# Patient Record
Sex: MALE | Race: WHITE | NOT HISPANIC OR LATINO | URBAN - METROPOLITAN AREA
[De-identification: names, ages, dates, MRNs, and addresses within clinical notes are randomized per-mention and may not be internally consistent; named-entity substitution may affect disease eponyms.]

---

## 2019-10-07 ENCOUNTER — EMERGENCY (EMERGENCY)
Facility: HOSPITAL | Age: 59
LOS: 1 days | Discharge: ANOTHER HOSPITAL | End: 2019-10-07
Attending: EMERGENCY MEDICINE | Admitting: PERSONAL EMERGENCY RESPONSE ATTENDANT
Payer: COMMERCIAL

## 2019-10-07 VITALS
OXYGEN SATURATION: 100 % | HEART RATE: 91 BPM | RESPIRATION RATE: 18 BRPM | SYSTOLIC BLOOD PRESSURE: 126 MMHG | DIASTOLIC BLOOD PRESSURE: 88 MMHG | TEMPERATURE: 98 F

## 2019-10-07 PROCEDURE — 99284 EMERGENCY DEPT VISIT MOD MDM: CPT

## 2019-10-07 PROCEDURE — 71046 X-RAY EXAM CHEST 2 VIEWS: CPT | Mod: 26
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
## 2019-10-07 NOTE — ED ADULT TRIAGE NOTE - CHIEF COMPLAINT QUOTE
Pt sent in from MD office for low hemoglobin and worsening SOB over the past several weeks,  pt was told his most recent Hgb was 7.2. Pt on 4L NC breathing even and unlabored but does experience SEVERINO, pt denies any present pain, no headache/dizziness, abd soft, and distended.

## 2019-10-07 NOTE — ED ADULT NURSE NOTE - NSIMPLEMENTINTERV_GEN_ALL_ED
Implemented All Universal Safety Interventions:  [place] to call system. Call bell, personal items and telephone within reach. Instruct patient to call for assistance. Room bathroom lighting operational. Non-slip footwear when patient is off stretcher. Physically safe environment: no spills, clutter or unnecessary equipment. Stretcher in lowest position, wheels locked, appropriate side rails in place.

## 2019-10-07 NOTE — ED ADULT NURSE NOTE - OBJECTIVE STATEMENT
Received pt in room 21. Pt A&Ox3, ambulatory. Pt sent in from MD office for low hemoglobin and worsening SOB over the past several weeks,  pt was told his most recent Hgb was 7.2. Pt on 4L NC breathing even and unlabored but does experience SEVERINO, sating 97%. Pt appears uncomfortable but in no apparent distress. Pt afebrile rectally. vitals as noted.  pt denies any present pain, no headache/dizziness, abd soft, and distended. Pt stable, family at bedside. awaiting further plan Received pt in room 21. Pt A&Ox3, ambulatory. Pt sent in from MD office for low hemoglobin and worsening SOB over the past several weeks,  pt was told his most recent Hgb was 7.2. Pt w hx. Pt also c/o cough. Pt on 4L NC breathing even and unlabored but does experience SEVERINO, sating 97%. Pt appears uncomfortable but in no apparent distress. Pt afebrile rectally. vitals as noted.  pt denies any present pain, no headache/dizziness, abd soft, and distended. Pt stable, family at bedside. awaiting further plan Received pt in room 21. Pt A&Ox3, ambulatory. Pt sent in from MD office for low hemoglobin and worsening SOB over the past several weeks,  pt was told his most recent Hgb was 7.2. Pt w hx. Pt also c/o cough. Pt on 4L NC breathing even and unlabored but does experience SEVERINO, sating 97%. Pt appears uncomfortable but in no apparent distress. Pt afebrile rectally. vitals as noted.  pt denies any present pain, no headache/dizziness, abd soft, and distended. 20 gauge IV placed in the left AC, labs sent. Flu swab sent. Pt stable, family at bedside. awaiting further plan Received pt in room 21. Pt A&Ox3, ambulatory. Pt sent in from MD office for low hemoglobin and worsening SOB over the past several weeks,  pt was told his most recent Hgb was 7.2. Pt w hx. Pt also c/o cough. Pt on 4L NC breathing even and unlabored but does experience SEVERINO, sating 97%. Pt appears uncomfortable but in no apparent distress. Pt afebrile rectally. vitals as noted.  pt denies any present pain, no headache/dizziness, abd soft, and distended. 20 gauge IV placed in the left forearm, labs sent. Flu swab sent. Pt stable, family at bedside. awaiting further plan Received pt in room 21. Pt A&Ox3, ambulatory. Pt sent in from MD office for low hemoglobin and worsening SOB over the past several weeks,  pt was told his most recent Hgb was 7.2. Pt w hx anemia, CHF. Pt also c/o cough. Pt on 4L NC breathing even and unlabored but does experience SEVERINO, sating 97%. Pt appears uncomfortable but in no apparent distress. Pt afebrile rectally. vitals as noted.  pt denies any present pain, no headache/dizziness, abd soft, and distended. 20 gauge IV placed in the left forearm, labs sent. Flu swab sent. Pt stable, family at bedside. awaiting further plan

## 2019-10-08 ENCOUNTER — INPATIENT (INPATIENT)
Facility: HOSPITAL | Age: 59
LOS: 8 days | Discharge: HOME | DRG: 199 | End: 2019-10-17
Attending: STUDENT IN AN ORGANIZED HEALTH CARE EDUCATION/TRAINING PROGRAM | Admitting: SURGERY
Payer: COMMERCIAL

## 2019-10-08 ENCOUNTER — RESULT REVIEW (OUTPATIENT)
Age: 59
End: 2019-10-08

## 2019-10-08 VITALS
HEART RATE: 95 BPM | DIASTOLIC BLOOD PRESSURE: 59 MMHG | RESPIRATION RATE: 19 BRPM | OXYGEN SATURATION: 98 % | SYSTOLIC BLOOD PRESSURE: 145 MMHG | TEMPERATURE: 98 F

## 2019-10-08 VITALS
TEMPERATURE: 98 F | DIASTOLIC BLOOD PRESSURE: 74 MMHG | WEIGHT: 315 LBS | OXYGEN SATURATION: 95 % | HEIGHT: 70 IN | RESPIRATION RATE: 16 BRPM | HEART RATE: 95 BPM | SYSTOLIC BLOOD PRESSURE: 132 MMHG

## 2019-10-08 DIAGNOSIS — D61.818 OTHER PANCYTOPENIA: ICD-10-CM

## 2019-10-08 PROBLEM — Z00.00 ENCOUNTER FOR PREVENTIVE HEALTH EXAMINATION: Status: ACTIVE | Noted: 2019-10-08

## 2019-10-08 LAB
ALBUMIN FLD-MCNC: 1.8 G/DL — SIGNIFICANT CHANGE UP
ALBUMIN SERPL ELPH-MCNC: 2.8 G/DL — LOW (ref 3.3–5)
ALBUMIN SERPL ELPH-MCNC: 3 G/DL — LOW (ref 3.3–5)
ALP SERPL-CCNC: 135 U/L — HIGH (ref 40–120)
ALP SERPL-CCNC: 143 U/L — HIGH (ref 40–120)
ALT FLD-CCNC: 19 U/L — SIGNIFICANT CHANGE UP (ref 10–45)
ALT FLD-CCNC: 22 U/L — SIGNIFICANT CHANGE UP (ref 4–41)
AMMONIA BLD-MCNC: 94 UMOL/L — HIGH (ref 11–55)
ANION GAP SERPL CALC-SCNC: 11 MMO/L — SIGNIFICANT CHANGE UP (ref 7–14)
ANION GAP SERPL CALC-SCNC: 7 MMOL/L — SIGNIFICANT CHANGE UP (ref 5–17)
ANISOCYTOSIS BLD QL: SIGNIFICANT CHANGE UP
APTT BLD: 29.4 SEC — SIGNIFICANT CHANGE UP (ref 27.5–36.3)
APTT BLD: 38.1 SEC — HIGH (ref 27.5–36.3)
AST SERPL-CCNC: 34 U/L — SIGNIFICANT CHANGE UP (ref 10–40)
AST SERPL-CCNC: 45 U/L — HIGH (ref 4–40)
B PERT DNA SPEC QL NAA+PROBE: NOT DETECTED — SIGNIFICANT CHANGE UP
B PERT IGG+IGM PNL SER: ABNORMAL
BASE EXCESS BLDV CALC-SCNC: 8 MMOL/L — HIGH (ref -2–2)
BASOPHILS # BLD AUTO: 0 K/UL — SIGNIFICANT CHANGE UP (ref 0–0.2)
BASOPHILS # BLD AUTO: 0.03 K/UL — SIGNIFICANT CHANGE UP (ref 0–0.2)
BASOPHILS NFR BLD AUTO: 0 % — SIGNIFICANT CHANGE UP (ref 0–2)
BASOPHILS NFR BLD AUTO: 0.9 % — SIGNIFICANT CHANGE UP (ref 0–2)
BILIRUB DIRECT SERPL-MCNC: 0.7 MG/DL — HIGH (ref 0–0.2)
BILIRUB INDIRECT FLD-MCNC: 1.2 MG/DL — HIGH (ref 0.2–1)
BILIRUB SERPL-MCNC: 1.6 MG/DL — HIGH (ref 0.2–1.2)
BILIRUB SERPL-MCNC: 1.9 MG/DL — HIGH (ref 0.2–1.2)
BILIRUB SERPL-MCNC: 1.9 MG/DL — HIGH (ref 0.2–1.2)
BLASTS # FLD: 0.9 % — HIGH (ref 0–0)
BLD GP AB SCN SERPL QL: NEGATIVE — SIGNIFICANT CHANGE UP
BLD GP AB SCN SERPL QL: NEGATIVE — SIGNIFICANT CHANGE UP
BUN SERPL-MCNC: 11 MG/DL — SIGNIFICANT CHANGE UP (ref 7–23)
BUN SERPL-MCNC: 11 MG/DL — SIGNIFICANT CHANGE UP (ref 7–23)
C PNEUM DNA SPEC QL NAA+PROBE: NOT DETECTED — SIGNIFICANT CHANGE UP
CA-I SERPL-SCNC: 1.22 MMOL/L — SIGNIFICANT CHANGE UP (ref 1.12–1.3)
CALCIUM SERPL-MCNC: 8.4 MG/DL — SIGNIFICANT CHANGE UP (ref 8.4–10.5)
CALCIUM SERPL-MCNC: 8.6 MG/DL — SIGNIFICANT CHANGE UP (ref 8.4–10.5)
CHLORIDE BLDV-SCNC: 101 MMOL/L — SIGNIFICANT CHANGE UP (ref 96–108)
CHLORIDE SERPL-SCNC: 102 MMOL/L — SIGNIFICANT CHANGE UP (ref 96–108)
CHLORIDE SERPL-SCNC: 102 MMOL/L — SIGNIFICANT CHANGE UP (ref 98–107)
CK MB BLD-MCNC: 3.2 % — SIGNIFICANT CHANGE UP (ref 0–3.5)
CK MB CFR SERPL CALC: 1.6 NG/ML — SIGNIFICANT CHANGE UP (ref 0–6.7)
CK SERPL-CCNC: 50 U/L — SIGNIFICANT CHANGE UP (ref 30–200)
CO2 BLDV-SCNC: 37 MMOL/L — HIGH (ref 22–30)
CO2 SERPL-SCNC: 26 MMOL/L — SIGNIFICANT CHANGE UP (ref 22–31)
CO2 SERPL-SCNC: 32 MMOL/L — HIGH (ref 22–31)
COLOR FLD: SIGNIFICANT CHANGE UP
CREAT SERPL-MCNC: 0.96 MG/DL — SIGNIFICANT CHANGE UP (ref 0.5–1.3)
CREAT SERPL-MCNC: 1.05 MG/DL — SIGNIFICANT CHANGE UP (ref 0.5–1.3)
D DIMER BLD IA.RAPID-MCNC: 1947 NG/ML — SIGNIFICANT CHANGE UP
ELLIPTOCYTES BLD QL SMEAR: SLIGHT — SIGNIFICANT CHANGE UP
EOSINOPHIL # BLD AUTO: 0.09 K/UL — SIGNIFICANT CHANGE UP (ref 0–0.5)
EOSINOPHIL # BLD AUTO: 0.25 K/UL — SIGNIFICANT CHANGE UP (ref 0–0.5)
EOSINOPHIL # FLD: 29 % — SIGNIFICANT CHANGE UP
EOSINOPHIL NFR BLD AUTO: 4.4 % — SIGNIFICANT CHANGE UP (ref 0–6)
EOSINOPHIL NFR BLD AUTO: 7.6 % — HIGH (ref 0–6)
FLUAV H1 2009 PAND RNA SPEC QL NAA+PROBE: NOT DETECTED — SIGNIFICANT CHANGE UP
FLUAV H1 RNA SPEC QL NAA+PROBE: NOT DETECTED — SIGNIFICANT CHANGE UP
FLUAV H3 RNA SPEC QL NAA+PROBE: NOT DETECTED — SIGNIFICANT CHANGE UP
FLUAV SUBTYP SPEC NAA+PROBE: NOT DETECTED — SIGNIFICANT CHANGE UP
FLUBV RNA SPEC QL NAA+PROBE: NOT DETECTED — SIGNIFICANT CHANGE UP
FLUID INTAKE SUBSTANCE CLASS: SIGNIFICANT CHANGE UP
FLUID SEGMENTED GRANULOCYTES: 3 % — SIGNIFICANT CHANGE UP
GAS PNL BLDV: 139 MMOL/L — SIGNIFICANT CHANGE UP (ref 135–145)
GAS PNL BLDV: SIGNIFICANT CHANGE UP
GAS PNL BLDV: SIGNIFICANT CHANGE UP
GIANT PLATELETS BLD QL SMEAR: PRESENT — SIGNIFICANT CHANGE UP
GLUCOSE BLDV-MCNC: 97 MG/DL — SIGNIFICANT CHANGE UP (ref 70–99)
GLUCOSE FLD-MCNC: 117 MG/DL — SIGNIFICANT CHANGE UP
GLUCOSE SERPL-MCNC: 105 MG/DL — HIGH (ref 70–99)
GLUCOSE SERPL-MCNC: 129 MG/DL — HIGH (ref 70–99)
GRAM STN FLD: SIGNIFICANT CHANGE UP
HADV DNA SPEC QL NAA+PROBE: NOT DETECTED — SIGNIFICANT CHANGE UP
HAPTOGLOB SERPL-MCNC: <20 MG/DL — LOW (ref 34–200)
HCO3 BLDV-SCNC: 35 MMOL/L — HIGH (ref 21–29)
HCOV PNL SPEC NAA+PROBE: SIGNIFICANT CHANGE UP
HCT VFR BLD CALC: 25.7 % — LOW (ref 39–50)
HCT VFR BLD CALC: 30 % — LOW (ref 39–50)
HCT VFR BLD CALC: SIGNIFICANT CHANGE UP (ref 39–50)
HCT VFR BLDA CALC: 24 % — LOW (ref 39–50)
HGB BLD CALC-MCNC: 7.6 G/DL — LOW (ref 13–17)
HGB BLD-MCNC: 7 G/DL — CRITICAL LOW (ref 13–17)
HGB BLD-MCNC: 7.6 G/DL — LOW (ref 13–17)
HGB BLD-MCNC: 7.7 G/DL — LOW (ref 13–17)
HMPV RNA SPEC QL NAA+PROBE: NOT DETECTED — SIGNIFICANT CHANGE UP
HPIV1 RNA SPEC QL NAA+PROBE: NOT DETECTED — SIGNIFICANT CHANGE UP
HPIV2 RNA SPEC QL NAA+PROBE: NOT DETECTED — SIGNIFICANT CHANGE UP
HPIV3 RNA SPEC QL NAA+PROBE: NOT DETECTED — SIGNIFICANT CHANGE UP
HPIV4 RNA SPEC QL NAA+PROBE: NOT DETECTED — SIGNIFICANT CHANGE UP
HYPOCHROMIA BLD QL: SLIGHT — SIGNIFICANT CHANGE UP
IMM GRANULOCYTES NFR BLD AUTO: 0.3 % — SIGNIFICANT CHANGE UP (ref 0–1.5)
INR BLD: 1.8 — HIGH (ref 0.88–1.17)
INR BLD: 1.91 RATIO — HIGH (ref 0.88–1.16)
LACTATE BLDV-MCNC: 1.4 MMOL/L — SIGNIFICANT CHANGE UP (ref 0.7–2)
LDH SERPL L TO P-CCNC: 236 U/L — SIGNIFICANT CHANGE UP (ref 50–242)
LDH SERPL L TO P-CCNC: 282 U/L — SIGNIFICANT CHANGE UP
LIDOCAIN IGE QN: 62 U/L — HIGH (ref 7–60)
LYMPHOCYTES # BLD AUTO: 0.38 K/UL — LOW (ref 1–3.3)
LYMPHOCYTES # BLD AUTO: 0.91 K/UL — LOW (ref 1–3.3)
LYMPHOCYTES # BLD AUTO: 19.3 % — SIGNIFICANT CHANGE UP (ref 13–44)
LYMPHOCYTES # BLD AUTO: 27.5 % — SIGNIFICANT CHANGE UP (ref 13–44)
LYMPHOCYTES # FLD: 40 % — SIGNIFICANT CHANGE UP
MANUAL SMEAR VERIFICATION: SIGNIFICANT CHANGE UP
MCHC RBC-ENTMCNC: 21.6 PG — LOW (ref 27–34)
MCHC RBC-ENTMCNC: 22.2 PG — LOW (ref 27–34)
MCHC RBC-ENTMCNC: 25.7 % — LOW (ref 32–36)
MCHC RBC-ENTMCNC: 27.2 GM/DL — LOW (ref 32–36)
MCHC RBC-ENTMCNC: SIGNIFICANT CHANGE UP (ref 27–34)
MCHC RBC-ENTMCNC: SIGNIFICANT CHANGE UP (ref 32–36)
MCV RBC AUTO: 81.6 FL — SIGNIFICANT CHANGE UP (ref 80–100)
MCV RBC AUTO: 84 FL — SIGNIFICANT CHANGE UP (ref 80–100)
MCV RBC AUTO: SIGNIFICANT CHANGE UP (ref 80–100)
MESOTHL CELL # FLD: 1 % — SIGNIFICANT CHANGE UP
MICROCYTES BLD QL: SIGNIFICANT CHANGE UP
MONOCYTES # BLD AUTO: 0.03 K/UL — SIGNIFICANT CHANGE UP (ref 0–0.9)
MONOCYTES # BLD AUTO: 0.31 K/UL — SIGNIFICANT CHANGE UP (ref 0–0.9)
MONOCYTES NFR BLD AUTO: 1.7 % — LOW (ref 2–14)
MONOCYTES NFR BLD AUTO: 9.4 % — SIGNIFICANT CHANGE UP (ref 2–14)
MONOS+MACROS # FLD: 27 % — SIGNIFICANT CHANGE UP
MYELOCYTES NFR BLD: 0.9 % — HIGH (ref 0–0)
NEUTROPHILS # BLD AUTO: 1.45 K/UL — LOW (ref 1.8–7.4)
NEUTROPHILS # BLD AUTO: 1.8 K/UL — SIGNIFICANT CHANGE UP (ref 1.8–7.4)
NEUTROPHILS NFR BLD AUTO: 54.3 % — SIGNIFICANT CHANGE UP (ref 43–77)
NEUTROPHILS NFR BLD AUTO: 72.8 % — SIGNIFICANT CHANGE UP (ref 43–77)
NRBC # BLD: 0 /100 WBCS — SIGNIFICANT CHANGE UP (ref 0–0)
NRBC # BLD: 1 /100 — HIGH (ref 0–0)
NRBC # FLD: 0.02 K/UL — SIGNIFICANT CHANGE UP (ref 0–0)
OSMOLALITY SERPL: 303 MOSMOL/KG — HIGH (ref 275–300)
PCO2 BLDV: 69 MMHG — HIGH (ref 35–50)
PH BLDV: 7.32 — LOW (ref 7.35–7.45)
PH FLD: 7.51 — SIGNIFICANT CHANGE UP
PLAT MORPH BLD: ABNORMAL
PLATELET # BLD AUTO: 102 K/UL — LOW (ref 150–400)
PLATELET # BLD AUTO: 141 K/UL — LOW (ref 150–400)
PLATELET # BLD AUTO: 95 K/UL — LOW (ref 150–400)
PMV BLD: 10.8 FL — SIGNIFICANT CHANGE UP (ref 7–13)
PO2 BLDV: 37 MMHG — SIGNIFICANT CHANGE UP (ref 25–45)
POIKILOCYTOSIS BLD QL AUTO: SIGNIFICANT CHANGE UP
POLYCHROMASIA BLD QL SMEAR: SLIGHT — SIGNIFICANT CHANGE UP
POTASSIUM BLDV-SCNC: 4.4 MMOL/L — SIGNIFICANT CHANGE UP (ref 3.5–5.3)
POTASSIUM SERPL-MCNC: 4.3 MMOL/L — SIGNIFICANT CHANGE UP (ref 3.5–5.3)
POTASSIUM SERPL-MCNC: 4.4 MMOL/L — SIGNIFICANT CHANGE UP (ref 3.5–5.3)
POTASSIUM SERPL-SCNC: 4.3 MMOL/L — SIGNIFICANT CHANGE UP (ref 3.5–5.3)
POTASSIUM SERPL-SCNC: 4.4 MMOL/L — SIGNIFICANT CHANGE UP (ref 3.5–5.3)
PROT FLD-MCNC: 3.8 G/DL — SIGNIFICANT CHANGE UP
PROT SERPL-MCNC: 7 G/DL — SIGNIFICANT CHANGE UP (ref 6–8.3)
PROT SERPL-MCNC: 7.7 G/DL — SIGNIFICANT CHANGE UP (ref 6–8.3)
PROTHROM AB SERPL-ACNC: 20.3 SEC — HIGH (ref 9.8–13.1)
PROTHROM AB SERPL-ACNC: 22.4 SEC — HIGH (ref 10–12.9)
RBC # BLD: 3.15 M/UL — LOW (ref 4.2–5.8)
RBC # BLD: 3.44 M/UL — LOW (ref 4.2–5.8)
RBC # BLD: 3.57 M/UL — LOW (ref 4.2–5.8)
RBC # FLD: 19.8 % — HIGH (ref 10.3–14.5)
RBC # FLD: 19.8 % — HIGH (ref 10.3–14.5)
RBC # FLD: SIGNIFICANT CHANGE UP (ref 10.3–14.5)
RBC BLD AUTO: ABNORMAL
RCV VOL RI: HIGH /UL (ref 0–5)
RH IG SCN BLD-IMP: NEGATIVE — SIGNIFICANT CHANGE UP
RH IG SCN BLD-IMP: NEGATIVE — SIGNIFICANT CHANGE UP
RSV RNA SPEC QL NAA+PROBE: NOT DETECTED — SIGNIFICANT CHANGE UP
RV+EV RNA SPEC QL NAA+PROBE: NOT DETECTED — SIGNIFICANT CHANGE UP
SAO2 % BLDV: 56 % — LOW (ref 67–88)
SODIUM SERPL-SCNC: 139 MMOL/L — SIGNIFICANT CHANGE UP (ref 135–145)
SODIUM SERPL-SCNC: 141 MMOL/L — SIGNIFICANT CHANGE UP (ref 135–145)
SPECIMEN SOURCE FLD: SIGNIFICANT CHANGE UP
SPECIMEN SOURCE: SIGNIFICANT CHANGE UP
TOTAL NUCLEATED CELL COUNT, BODY FLUID: 540 /UL — HIGH (ref 0–5)
TROPONIN T, HIGH SENSITIVITY RESULT: 17 NG/L — SIGNIFICANT CHANGE UP (ref 0–51)
TROPONIN T, HIGH SENSITIVITY: 15 NG/L — SIGNIFICANT CHANGE UP (ref ?–14)
TUBE TYPE: SIGNIFICANT CHANGE UP
WBC # BLD: 1.99 K/UL — LOW (ref 3.8–10.5)
WBC # BLD: 2.56 K/UL — LOW (ref 3.8–10.5)
WBC # BLD: 3.31 K/UL — LOW (ref 3.8–10.5)
WBC # FLD AUTO: 1.99 K/UL — LOW (ref 3.8–10.5)
WBC # FLD AUTO: 2.56 K/UL — LOW (ref 3.8–10.5)
WBC # FLD AUTO: 3.31 K/UL — LOW (ref 3.8–10.5)

## 2019-10-08 PROCEDURE — 74177 CT ABD & PELVIS W/CONTRAST: CPT | Mod: 26

## 2019-10-08 PROCEDURE — 88112 CYTOPATH CELL ENHANCE TECH: CPT | Mod: 26

## 2019-10-08 PROCEDURE — 71275 CT ANGIOGRAPHY CHEST: CPT | Mod: 26

## 2019-10-08 PROCEDURE — 70450 CT HEAD/BRAIN W/O DYE: CPT | Mod: 26

## 2019-10-08 PROCEDURE — 99221 1ST HOSP IP/OBS SF/LOW 40: CPT

## 2019-10-08 PROCEDURE — 72125 CT NECK SPINE W/O DYE: CPT | Mod: 26

## 2019-10-08 PROCEDURE — 32557 INSERT CATH PLEURA W/ IMAGE: CPT | Mod: GC

## 2019-10-08 PROCEDURE — 99284 EMERGENCY DEPT VISIT MOD MDM: CPT

## 2019-10-08 PROCEDURE — 71045 X-RAY EXAM CHEST 1 VIEW: CPT | Mod: 26

## 2019-10-08 PROCEDURE — 88305 TISSUE EXAM BY PATHOLOGIST: CPT | Mod: 26

## 2019-10-08 PROCEDURE — 99254 IP/OBS CNSLTJ NEW/EST MOD 60: CPT | Mod: 57

## 2019-10-08 RX ORDER — KETOROLAC TROMETHAMINE 30 MG/ML
15 SYRINGE (ML) INJECTION ONCE
Refills: 0 | Status: DISCONTINUED | OUTPATIENT
Start: 2019-10-08 | End: 2019-10-08

## 2019-10-08 RX ORDER — OXYCODONE HYDROCHLORIDE 5 MG/1
5 TABLET ORAL ONCE
Refills: 0 | Status: DISCONTINUED | OUTPATIENT
Start: 2019-10-08 | End: 2019-10-09

## 2019-10-08 RX ORDER — ACETAMINOPHEN 500 MG
650 TABLET ORAL EVERY 6 HOURS
Refills: 0 | Status: DISCONTINUED | OUTPATIENT
Start: 2019-10-08 | End: 2019-10-17

## 2019-10-08 RX ORDER — INFLUENZA VIRUS VACCINE 15; 15; 15; 15 UG/.5ML; UG/.5ML; UG/.5ML; UG/.5ML
0.5 SUSPENSION INTRAMUSCULAR ONCE
Refills: 0 | Status: COMPLETED | OUTPATIENT
Start: 2019-10-08 | End: 2019-10-08

## 2019-10-08 RX ORDER — ENOXAPARIN SODIUM 100 MG/ML
40 INJECTION SUBCUTANEOUS DAILY
Refills: 0 | Status: DISCONTINUED | OUTPATIENT
Start: 2019-10-08 | End: 2019-10-17

## 2019-10-08 RX ORDER — OXYCODONE HYDROCHLORIDE 5 MG/1
10 TABLET ORAL ONCE
Refills: 0 | Status: DISCONTINUED | OUTPATIENT
Start: 2019-10-08 | End: 2019-10-08

## 2019-10-08 RX ADMIN — ENOXAPARIN SODIUM 40 MILLIGRAM(S): 100 INJECTION SUBCUTANEOUS at 22:35

## 2019-10-08 RX ADMIN — OXYCODONE HYDROCHLORIDE 10 MILLIGRAM(S): 5 TABLET ORAL at 17:13

## 2019-10-08 NOTE — ED PROVIDER NOTE - ATTENDING CONTRIBUTION TO CARE
Attending MD Yusra Griggs:  I personally have seen and examined this patient.  Resident note reviewed and agree on plan of care and except where noted.  See HPI, PE, and MDM for details.
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
## 2019-10-08 NOTE — ED PROVIDER NOTE - SKIN, MLM
Skin normal color for race, warm, dry and intact. Skin normal color for race, warm, dry and intact. + Caput Medusae

## 2019-10-08 NOTE — ED ADULT NURSE REASSESSMENT NOTE - NS ED NURSE REASSESS COMMENT FT1
Chest tube placed to right chest wall by Dr. Bonilla and Dr. Holman. Sterile procedure used and time out was performed prior to procedure. Pt tolerated well.

## 2019-10-08 NOTE — PATIENT PROFILE ADULT - BRADEN MOBILITY
Congestion and rhinitis typically in the spring, not currently taking any medication  No facial pain, cough   (3) slightly limited
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
## 2019-10-08 NOTE — ED PROVIDER NOTE - CARDIAC, MLM
Normal rate, regular rhythm.  Heart sounds S1, S2.  No murmurs, rubs or gallops. Normal rate, regular rhythm.  Heart sounds S1, S2.  No murmurs, rubs or gallops. +R chest wall TTP.

## 2019-10-08 NOTE — ED ADULT NURSE REASSESSMENT NOTE - NS ED NURSE REASSESS COMMENT FT1
report given to LIGIA Leone [facility] transfer center. pt currently receiving CT scan. pending EMS. will cont to monitor.

## 2019-10-08 NOTE — H&P ADULT - ASSESSMENT
ASSESSMENT:   Patient is a 59y old m with large right pleural effusion and right ribs 6-11 fractures    PLAN:    - Admit to trauma  - Thoracic surgery consult for drainage of effusion  - Pain control  - Plan discussed with Attending, Dr. Imelda Howe, PGY 2  [phone]

## 2019-10-08 NOTE — ED PROVIDER NOTE - ATTENDING CONTRIBUTION TO CARE
60yo w/ HLD, opioid abuse pw abnml labs from PCP. Hx of iron deficiency anemia previously on iron infusions found to be anemic in office. Pt also complains of some shortness of breath worse over past few days.   GEN - ill appearing; A+O x3   HEAD - NC/AT     EYES - EOMI,  +  conjunctival pallor, no scleral icterus  ENT -   mucous membranes  moist , no discharge      NECK - Neck supple  PULM - diminished bilaterally, mild resp distress, on 4L o2   COR -  RRR, S1 S2, no murmurs  ABD - , ND, NT, soft, no guarding, no rebound, no masses    BACK - no CVA tenderness, nontender spine     EXTREMS - no edema, no deformity, warm and well perfused    SKIN - no rash or bruising      NEUROLOGIC - alert, sensation nl, motor 5/5 RUE/LUE/RLE/LLE   a/p with SOB w/ known anemia however unclear if anemia pure source of SOB, will CTA chest r/o PE vs other intrapulmonary pathology, plan to admit.
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
## 2019-10-08 NOTE — CONSULT NOTE ADULT - ASSESSMENT
ASSESSMENT:   Patient is a 59y old m with large right pleural effusion and right ribs 6-11 fractures    PLAN:    - Pigtail chest tube for pleural effusion  - Will send fluid for culture and cytology  - Plan d/w thoracic surgery attending    Estrada Howe, PGY 2  [phone]

## 2019-10-08 NOTE — ED PROVIDER NOTE - CONSTITUTIONAL, MLM
normal... Obese, awake, alert, oriented to person, place, time/situation and appears mildly uncomfortable.

## 2019-10-08 NOTE — ED ADULT NURSE REASSESSMENT NOTE - NS ED NURSE REASSESS COMMENT FT1
report received from LIGIA HERMAN, pt aox self, place and time pt unsure of situation. pt repeating himself, noted to have bizarre affect. c/o feeling weak. on , NSR. offers no new complaints at this time, pending dispo, will cont to monitor.

## 2019-10-08 NOTE — ED PROVIDER NOTE - PHYSICAL EXAMINATION
Attending Yusra Griggs: Gen: NAD, heent: atrauamtic, eomi, perrla, mmm, op pink, uvula midline, neck; nttp, no nuchal rigidity, chest: nttp, no crepitus, cv: rrr, no murmurs, lungs: decreased breath sounds on right, abd: soft, ttp RUQ, no peritoneal signs, +BS, no guarding, ext: wwp, neg homans, mild le edemaskin: no rash, neuro: awake and alert, following commands, speech clear, sensation and strength intact, no focal deficits
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
## 2019-10-08 NOTE — ED PROVIDER NOTE - CLINICAL SUMMARY MEDICAL DECISION MAKING FREE TEXT BOX
60 yo M, hx CHF, presenting as transfer to [facility] d/t acute rib fractures with no known trauma. Initially presented to [facility] d/t anemia, found at [facility] to have R 6th to 11th rib fractures, as well as elevated ammonia, splenomegaly, and trace ascites. Patient endorsing pain over the fractures, and SOB, with atelectasis seen on CT, so transferred to [facility] for trauma evaluation. Will obtain pre-surgical labs, ekg, incentive spirometry, and trauma surgery evaluation. Reassess. 58 yo M, hx CHF, presenting as transfer to [facility] d/t acute rib fractures with no known trauma. Initially presented to [facility] d/t anemia, found at [facility] to have R 6th to 11th rib fractures, as well as elevated ammonia, splenomegaly, and trace ascites. Patient endorsing pain over the fractures, and SOB, with atelectasis seen on CT, so transferred to [facility] for trauma evaluation. Will obtain pre-surgical labs, ekg, incentive spirometry, and trauma surgery evaluation. Reassess.  Attending Yusra Griggs: 58 y/o  male h/o ETOH use and reported anemia transferred from outside hospital after CTA showed evidenceof multiple right sided rib fracture, a right pleural effusions, and splenomegaly. upon arrival pt in NAD. on exam pt with ttp RUQ. pocus shows evidence of stone in the neck. consider possible cholecystiis in addition to rib fractures. pt has a h/o etoh. denies any falls, however unclear if accurate history per pt. pt with pancytopenia, unclear whether underlying malignancy. may require hematology consult. surgery consulted for trauma eval and concern for ruq pain however pain could also be from rib fractures. will need admission

## 2019-10-08 NOTE — ED PROVIDER NOTE - OBJECTIVE STATEMENT
58 y/o male with pmhx of chronic cough x 2 yrs, obesity, herniated discs, HLD, insomnia, admitted for CHF (EF 52% 2017)/pneumonia/flu 2 yrs ago, presents to ED for SOB, SEVERINO, cough, anemia and hypoxia. Pt states his PMD sent him here for Hg of 7.2, hx of receiving iron transfusions 2 months ago. Pt states has dry cough that is worse. Has not required oxygen in the past. Nonsmoker. Not very active. No recent travel/surgeries/hospitalizations/calf pain/hx of dvt. No family hx of MI, no cardiac hx. No fever, chills, sore throat, abd pain, n/v, CP, palpitations, weakness, numbness, syncope. 58 y/o male with pmhx of chronic cough x 2 yrs, obesity, herniated discs, HLD, insomnia, admitted for CHF (EF 52% 2017)/"pneumonia/flu 2 yrs ago, " presents to ED for SOB, SEVERINO, worse cough, anemia and hypoxia. Pt states his PMD sent him here for Hg of 7.2, hx of receiving iron transfusions 2 months ago. Pt states has dry cough that is worse. Has not required oxygen in the past. Nonsmoker. Not very active. No recent travel/surgeries/hospitalizations/calf pain/hx of dvt. No family hx of MI, no cardiac hx. No fever, chills, sore throat, abd pain, n/v, CP, palpitations, weakness, numbness, syncope.

## 2019-10-08 NOTE — PATIENT PROFILE ADULT - NSASFALLNEEDSASSISTWITH_GEN_A_NUR
Psych Med Mgmt    Appearance: was calm and cooperative and good eye contact  Observed mood: mood appropriate  Observed mood: affect appropriate  Speech: a normal rate and fluent  Thought processes: coherent/organized  Hallucinations: no hallucinations present  Thought Content: no delusions  Abnormal Thoughts: The patient has no suicidal thoughts and no homicidal thoughts  Orientation: The patient is oriented to person, place and time  Recent and Remote Memory: short term memory intact and long term memory intact  Attention Span And Concentration: concentration intact  Insight: Insight intact  Judgment: Her judgment was intact  Muscle Strength And Tone  Muscle strength and tone were normal  Normal gait and station  Language:  Attacked and appropriate  Fund of knowledge: Patient displays adequate knowledge of current events, adequate fund of knowledge regarding past history and adequate fund of knowledge regarding vocabulary  Risks, Benefits And Possible Side Effects Of Medications: Risks, benefits, and possible side effects of medications explained to patient and patient verbalizes understanding  The patient has been filling controlled prescriptions on time as prescribed to [facility]     She reports normal appetite, normal energy level, no weight change and decrease in number of sleep hours   Hilary Book indicates that she is doing much better  Depression as 1-2/10 and anxieties 1-2/10  She says that work will make her stressed out a little bit at times and also at night when she decides to overeat and have weight gain this causes her to feel bad  She says that she's gained 6 pounds due to ice cream although generally she eats well  Energy is been good  Anxiety causes overeating and fatigue  No SI or HI  Sleep has been good  She says that she's not oversleeping like she used to  No side effects or issues with medications   Does take Klonopin perhaps twice a week when she is fellow overworked or anxious  Also she talked about Adderall how she does still feel like she needs it occasionally but generally doesn't use it very often  She would like to use immediate release rather than extended release because of the limited need  Discussed options with medications risks and benefits  Attention and memory have been relatively well controlled with the increase of Wellbutrin but she does feel that occasionally she will need that for special training such as for EPIC  Medications reviewed  No hospitalizations  No significant medical issues  No chest pain shortness of breath or other issues  Discussed her blood pressure elevation and relationship with medications and also healthy lifestyle  Also it was a mechanical device  She says that she has no issues whatsoever but will keep an eye on that occasionally and follow-up with her PCP  Vitals  Signs   Recorded: 01Sep2017 09:53AM   Heart Rate: 83  Respiration: 14  Systolic: 271  Diastolic: 90    Assessment    1  TIM (generalized anxiety disorder) (300 02) (F41 1)   2  Recurrent major depressive disorder, in remission (296 35) (F33 40)   3  Memory difficulties (780 93) (R41 3)   4  Attention and concentration deficit (799 51) (R41 840)    Generalized anxiety disorder  MDD, recurrent, in remission        Praful Siddiqi is a pleasant female who is presenting with symptoms that have shown improvement  Depression has shown improvement, anxiety has shown improvement, and attention and concentration have shown improvement  She rarely uses Adderall or Klonopin  Balanced lifestyle  No side effects of medication  Blood pressure elevated but asymptomatic  Advised of risks and relationship potentially the medication and lifestyle  Advised to continue to monitor and follow up with PCP as needed      Attention and concentration deficit is also apparently present although not sure if this is related to organic issues, anxiety, or underlying ADD/ADHD  She has a good support system in a good job  She is generally healthy aside from her neurological concerns and she does have a paternal aunt who had early onset dementia  Past medications include Prozac which helps but does have some decreased orgasm  She took Topamax for migraines but this seemed to lead to confusion and she has not been on it for years  She does not recall details of Zoloft, Celexa or Lexapro  Effexor seemed to cause weight gain and she did not like it for that  Wellbutrin in past  Ambien caused oversedation, melatonin did not help  Plan    1  Amphetamine-Dextroamphet ER 5 MG Oral Capsule Extended Release 24 Hour    2  ClonazePAM 0 5 MG Oral Tablet; Take 1/2 to 1 tab daily as needed   3  FLUoxetine HCl - 40 MG Oral Capsule; 1 CAPSULE DAILY    4  From  Amphetamine-Dextroamphetamine 5 MG Oral Tablet TAKE 1-2 tabs   daily as needed To Amphetamine-Dextroamphetamine 5 MG Oral Tablet (Adderall) TAKE   1 TO 2 TABLETS daily for attention difficulties    5  BuPROPion HCl ER (XL) 300 MG Oral Tablet Extended Release 24 Hour; take 1   tablet by mouth every morning        1) Meds:   - Prozac 40mg daily  PARQ discussed (consider increase but orgasm affected  Was on 50mg in past per charts)   - Wellbutrin XL 300mg daily   - Klonopin 0 25mg in day PRN and 0 5mg HS PRN  (Pt takes ~2x/wk)   - CHANGE FROM Adderal XR 20mg Daily TO Adderal 5mg 1-2 tabs daily as needed    2) Labs: PRN   -2/2017: CBC, CMP WNL  TSH 1 57, TG 56, HDL 98,     3) Therapy:   - not in therapy, revisit PRN    4) Medical:  Concentration issues, MRI abnormalities, family h/o early onset dementia ([place]), migraines (gets Botox)   - pt to f/u with neurologist   - pt to f/u with other providers PRN    5) Other: support prn   - works at [address] [place] [address]   Reception   - good support from family, daughter lives with her   - 3 kids,     6) Follow up:   - 3 months, but patient to call if issues or concerns    7) Treatment Plan: Needed      Review of Systems    Constitutional: No fever, no chills, feels well, no tiredness, no recent weight gain or loss  Cardiovascular: no complaints of slow or fast heart rate, no chest pain, no palpitations  Respiratory: no complaints of shortness of breath, no wheezing, no dyspnea on exertion  Gastrointestinal: no complaints of abdominal pain, no constipation, no nausea, no diarrhea, no vomiting  Genitourinary: no complaints of dysuria, no incontinence, no pelvic pain, no urinary frequency  Musculoskeletal: no complaints of arthralgia, no myalgias, no limb pain, no joint stiffness  Integumentary: no complaints of skin rash, no itching, no dry skin  Neurological: no complaints of headache, no confusion, no numbness, no dizziness  Past Psychiatric History    Past Psychiatric History: ALEX    Patient did used to see Andrea Lees in early 2016 when she had a change providers due to insurance  She does not have a therapist     She been hospitalized once in 2000  She says that it was related to having a breakup with her boyfriend at the time and then finding out that she lost her job because she was  at his place of employment  Losing her boyfriend because she did not want to get  and he did and also having job issues led her to crisis and overdose although it was not a dramatic overdose it was a time when she said that she was trying to take pills to go to sleep and that she did have some suicidal thoughts associated with it  She denies any self-harm homicidal ideation in the past or present  She's never been violent  She says that she's not had any suicidal ideation since that time  Active Problems    1  Attention and concentration deficit (799 51) (R41 840)   2  Chronic migraine without aura (346 70) (G43 709)   3  Classic migraine with aura (346 00) (G43 109)   4  TIM (generalized anxiety disorder) (300 02) (F41 1)   5  Hyperlipidemia (272 4) (E78 5)   6  Memory difficulties (780 93) (R41 3)   7  Recurrent major depressive disorder, in remission (296 35) (F33 40)    Past Medical History    1  History of Depression with anxiety (300 4) (F41 8)   2  History of basal cell carcinoma (V10 83) (Z85 828)   3  Denied: History of concussion   4  History of insomnia (V13 89) (Z87 898)   5  Denied: History of Seizures    The active problems and past medical history were reviewed and updated today  Surgical History    The surgical history was reviewed and updated today  Allergies    1  Femstat 3 CREA   2  Monistat 1 OINT    Current Meds   1  BuPROPion HCl ER (XL) 300 MG Oral Tablet Extended Release 24 Hour; take 1 tablet by   mouth every morning; Therapy: [id number] to (Last Rx:05Jun2017)  Requested for: [id number] Ordered   2  FLUoxetine HCl - 40 MG Oral Capsule; 1 CAPSULE DAILY  Requested for: [id number];   Last Rx:05Jun2017 Ordered    The medication list was reviewed and updated today  Family Psych History  Mother    1  Family history of Heart murmur   2  Family history of Hyperlipidemia  Father    3  Family history of Atrial fibrillation   4  Family history of arthritis (V17 7) (Z82 61)  Daughter    5  Family history of ADD (attention deficit disorder)  Son    10  Family history of ADD (attention deficit disorder)  Sister    9  Family history of Psoriasis  Paternal Grandmother    6  Family history of Breast Cancer (V16 3)   9  Family history of Diabetes Mellitus (V18 0)   10  Family history of Hypertension (V17 49)  Maternal Grandfather    11  Family history of Heart disease  Paternal Grandfather    15  Family history of Skin cancer  Family History    15  Denied: Family history of bipolar disorder   14  Denied: Family history of substance abuse   15  Denied: Family history of suicide attempt    The family history was reviewed and updated today         Social History    · Being A Social Drinker   · Caffeine use (V49 89) (F15 90)   ·    · Drinks coffee   · Denied: History of Drug use   · Never A Smoker  The social history was reviewed and updated today  Esther Keating was raised in [facility] to a "good" childhood  Her parents were together and still are  She denies ever having physical or sexual abuse or other forms neglect now or in the past as a child  She has one brother and one sister  She developed normally  She finished high school and got associates degree in business  Her daughter currently lives with her  She has 3 children 2 boys and one girl  She is good relationships with her family  She's been  and  twice  Currently not in a serious relationship at intake    Her support system includes her children her parents and her cousins  She has friends that are close but she doesn't typically opened up to them about mental health  She is [place] and attends [employer] sometimes  No  history no legal issues and no weapons at home  She does not use tobacco drinks about 1 cup of coffee a day and is a social drinker and when she does drink it's very rare and not much at that time  She denies ever using substances and has never been to rehabilitation  End of Encounter Meds    1  ClonazePAM 0 5 MG Oral Tablet; Take 1/2 to 1 tab daily as needed; Therapy: 01Sep2017 to (Last Rx:01Sep2017) Ordered   2  FLUoxetine HCl - 40 MG Oral Capsule; 1 CAPSULE DAILY  Requested for: 01Sep2017;   Last Rx:01Sep2017 Ordered    3  Amphetamine-Dextroamphetamine 5 MG Oral Tablet (Adderall); TAKE 1 TO 2 TABLETS   daily for attention difficulties; Therapy: 01Sep2017 to (Last Rx:01Sep2017) Ordered    4  BuPROPion HCl ER (XL) 300 MG Oral Tablet Extended Release 24 Hour; take 1 tablet by   mouth every morning;    Therapy: [id number] to (Last Rx:01Sep2017)  Requested for: 01Sep2017 Ordered    Future Appointments    Date/Time Provider Specialty Site   10/17/2017 07:30 AM Author Denice, [facility] Neurology [address] [place]   11/10/2017 10:45 AM David Valentin [facility] Neurology [facility]  [place]   01/19/2018 11:00 AM SHAVONNE Olson  Internal Medicine [facility]   11/28/2017 05:20 PM SHAVONNE Salgado   Obstetrics/Gynecology [facility] OB     Signatures   Electronically signed by : Buzz Alejandro DO; Sep  1 2017 10:02AM EST                       (Author) toileting/standing/walking
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
## 2019-10-08 NOTE — ED ADULT NURSE REASSESSMENT NOTE - NS ED NURSE REASSESS COMMENT FT1
1950- patient's report given by Daniella STRONG  to floor. Patient made aware about care for his chest tube. To floor with chest tube intact.  No complaints of pain. Denies respiratory problem.

## 2019-10-08 NOTE — ED PROVIDER NOTE - CLINICAL SUMMARY MEDICAL DECISION MAKING FREE TEXT BOX
60 y/o male with pmhx of chronic cough x 2 yrs, obesity, herniated discs, HLD, insomnia, admitted for CHF (EF 52% 2017)/pneumonia/flu 2 yrs ago, iron deficiency anemia presents to ED for SOB, SEVERINO, cough, anemia and hypoxia. concern for copd vs chf vs viral URI vs pneumonia vs PE. check rectal temp, rvp, cxr, dimer r/o PE given hypoxia, troponin. transfusion for anemia. admit to med on tele. 58 y/o male with pmhx of chronic cough x 2 yrs, obesity, herniated discs, HLD, insomnia, admitted for CHF (EF 52% 2017)/pneumonia/flu 2 yrs ago, iron deficiency anemia presents to ED for SOB, SEVERINO, cough, anemia and hypoxia. concern for copd vs chf vs viral URI vs pneumonia vs PE. check rectal temp, rvp, cxr, dimer r/o PE given hypoxia, troponin. admit to med on tele.

## 2019-10-08 NOTE — ED PROVIDER NOTE - PSYCHIATRIC, MLM
Alert and oriented to person, place, time/situation. normal mood and affect. Alert and oriented to person, place, time/situation. Bizarre mood and affect.

## 2019-10-09 DIAGNOSIS — K74.60 UNSPECIFIED CIRRHOSIS OF LIVER: ICD-10-CM

## 2019-10-09 DIAGNOSIS — E87.2 ACIDOSIS: ICD-10-CM

## 2019-10-09 DIAGNOSIS — S22.39XA FRACTURE OF ONE RIB, UNSPECIFIED SIDE, INITIAL ENCOUNTER FOR CLOSED FRACTURE: ICD-10-CM

## 2019-10-09 DIAGNOSIS — D68.9 COAGULATION DEFECT, UNSPECIFIED: ICD-10-CM

## 2019-10-09 DIAGNOSIS — D72.819 DECREASED WHITE BLOOD CELL COUNT, UNSPECIFIED: ICD-10-CM

## 2019-10-09 DIAGNOSIS — D69.6 THROMBOCYTOPENIA, UNSPECIFIED: ICD-10-CM

## 2019-10-09 DIAGNOSIS — D50.9 IRON DEFICIENCY ANEMIA, UNSPECIFIED: ICD-10-CM

## 2019-10-09 DIAGNOSIS — J90 PLEURAL EFFUSION, NOT ELSEWHERE CLASSIFIED: ICD-10-CM

## 2019-10-09 DIAGNOSIS — I51.7 CARDIOMEGALY: ICD-10-CM

## 2019-10-09 DIAGNOSIS — G47.33 OBSTRUCTIVE SLEEP APNEA (ADULT) (PEDIATRIC): ICD-10-CM

## 2019-10-09 PROBLEM — J18.9 PNEUMONIA, UNSPECIFIED ORGANISM: Chronic | Status: ACTIVE | Noted: 2019-10-08

## 2019-10-09 PROBLEM — E66.9 OBESITY, UNSPECIFIED: Chronic | Status: ACTIVE | Noted: 2019-10-08

## 2019-10-09 LAB
ALBUMIN SERPL ELPH-MCNC: 2.8 G/DL — LOW (ref 3.3–5)
ALP SERPL-CCNC: 130 U/L — HIGH (ref 40–120)
ALT FLD-CCNC: 18 U/L — SIGNIFICANT CHANGE UP (ref 10–45)
ANION GAP SERPL CALC-SCNC: 5 MMOL/L — SIGNIFICANT CHANGE UP (ref 5–17)
APTT BLD: 41.3 SEC — HIGH (ref 27.5–36.3)
AST SERPL-CCNC: 32 U/L — SIGNIFICANT CHANGE UP (ref 10–40)
BASOPHILS # BLD AUTO: 0 K/UL — SIGNIFICANT CHANGE UP (ref 0–0.2)
BASOPHILS NFR BLD AUTO: 0 % — SIGNIFICANT CHANGE UP (ref 0–2)
BILIRUB DIRECT SERPL-MCNC: 0.8 MG/DL — HIGH (ref 0–0.2)
BILIRUB INDIRECT FLD-MCNC: 1.3 MG/DL — HIGH (ref 0.2–1)
BILIRUB SERPL-MCNC: 2.1 MG/DL — HIGH (ref 0.2–1.2)
BUN SERPL-MCNC: 10 MG/DL — SIGNIFICANT CHANGE UP (ref 7–23)
CALCIUM SERPL-MCNC: 8.1 MG/DL — LOW (ref 8.4–10.5)
CHLORIDE SERPL-SCNC: 101 MMOL/L — SIGNIFICANT CHANGE UP (ref 96–108)
CO2 SERPL-SCNC: 32 MMOL/L — HIGH (ref 22–31)
CREAT SERPL-MCNC: 0.97 MG/DL — SIGNIFICANT CHANGE UP (ref 0.5–1.3)
EOSINOPHIL # BLD AUTO: 0.16 K/UL — SIGNIFICANT CHANGE UP (ref 0–0.5)
EOSINOPHIL NFR BLD AUTO: 7.9 % — HIGH (ref 0–6)
GLUCOSE SERPL-MCNC: 92 MG/DL — SIGNIFICANT CHANGE UP (ref 70–99)
HCT VFR BLD CALC: 25.5 % — LOW (ref 39–50)
HCT VFR BLD CALC: 28.2 % — LOW (ref 39–50)
HCV AB S/CO SERPL IA: 0.17 S/CO — SIGNIFICANT CHANGE UP (ref 0–0.99)
HCV AB SERPL-IMP: SIGNIFICANT CHANGE UP
HGB BLD-MCNC: 6.8 G/DL — CRITICAL LOW (ref 13–17)
HGB BLD-MCNC: 7.3 G/DL — LOW (ref 13–17)
INR BLD: 1.91 RATIO — HIGH (ref 0.88–1.16)
LYMPHOCYTES # BLD AUTO: 0.43 K/UL — LOW (ref 1–3.3)
LYMPHOCYTES # BLD AUTO: 21.1 % — SIGNIFICANT CHANGE UP (ref 13–44)
MAGNESIUM SERPL-MCNC: 2 MG/DL — SIGNIFICANT CHANGE UP (ref 1.6–2.6)
MANUAL DIF COMMENT BLD-IMP: SIGNIFICANT CHANGE UP
MANUAL SMEAR VERIFICATION: SIGNIFICANT CHANGE UP
MCHC RBC-ENTMCNC: 21.7 PG — LOW (ref 27–34)
MCHC RBC-ENTMCNC: 21.8 PG — LOW (ref 27–34)
MCHC RBC-ENTMCNC: 25.9 GM/DL — LOW (ref 32–36)
MCHC RBC-ENTMCNC: 26.7 GM/DL — LOW (ref 32–36)
MCV RBC AUTO: 81.7 FL — SIGNIFICANT CHANGE UP (ref 80–100)
MCV RBC AUTO: 83.7 FL — SIGNIFICANT CHANGE UP (ref 80–100)
MONOCYTES # BLD AUTO: 0.05 K/UL — SIGNIFICANT CHANGE UP (ref 0–0.9)
MONOCYTES NFR BLD AUTO: 2.6 % — SIGNIFICANT CHANGE UP (ref 2–14)
MYELOCYTES NFR BLD: 0.9 % — HIGH (ref 0–0)
NEUTROPHILS # BLD AUTO: 1.39 K/UL — LOW (ref 1.8–7.4)
NEUTROPHILS NFR BLD AUTO: 67.5 % — SIGNIFICANT CHANGE UP (ref 43–77)
PHOSPHATE SERPL-MCNC: 3.6 MG/DL — SIGNIFICANT CHANGE UP (ref 2.5–4.5)
PLAT MORPH BLD: NORMAL — SIGNIFICANT CHANGE UP
PLATELET # BLD AUTO: 102 K/UL — LOW (ref 150–400)
PLATELET # BLD AUTO: 89 K/UL — LOW (ref 150–400)
POTASSIUM SERPL-MCNC: 4.6 MMOL/L — SIGNIFICANT CHANGE UP (ref 3.5–5.3)
POTASSIUM SERPL-SCNC: 4.6 MMOL/L — SIGNIFICANT CHANGE UP (ref 3.5–5.3)
PROT SERPL-MCNC: 6.9 G/DL — SIGNIFICANT CHANGE UP (ref 6–8.3)
PROTHROM AB SERPL-ACNC: 22.2 SEC — HIGH (ref 10–13.1)
RBC # BLD: 3.12 M/UL — LOW (ref 4.2–5.8)
RBC # BLD: 3.37 M/UL — LOW (ref 4.2–5.8)
RBC # FLD: 19.3 % — HIGH (ref 10.3–14.5)
RBC # FLD: 19.4 % — HIGH (ref 10.3–14.5)
RBC BLD AUTO: SIGNIFICANT CHANGE UP
RH IG SCN BLD-IMP: NEGATIVE — SIGNIFICANT CHANGE UP
SODIUM SERPL-SCNC: 138 MMOL/L — SIGNIFICANT CHANGE UP (ref 135–145)
WBC # BLD: 2.06 K/UL — LOW (ref 3.8–10.5)
WBC # BLD: 3.14 K/UL — LOW (ref 3.8–10.5)
WBC # FLD AUTO: 2.06 K/UL — LOW (ref 3.8–10.5)
WBC # FLD AUTO: 3.14 K/UL — LOW (ref 3.8–10.5)

## 2019-10-09 PROCEDURE — 71045 X-RAY EXAM CHEST 1 VIEW: CPT | Mod: 26

## 2019-10-09 PROCEDURE — 99232 SBSQ HOSP IP/OBS MODERATE 35: CPT

## 2019-10-09 PROCEDURE — 99223 1ST HOSP IP/OBS HIGH 75: CPT

## 2019-10-09 RX ORDER — OMEGA-3 ACID ETHYL ESTERS 1 G
2 CAPSULE ORAL
Refills: 0 | Status: DISCONTINUED | OUTPATIENT
Start: 2019-10-09 | End: 2019-10-17

## 2019-10-09 RX ORDER — SPIRONOLACTONE 25 MG/1
100 TABLET, FILM COATED ORAL DAILY
Refills: 0 | Status: DISCONTINUED | OUTPATIENT
Start: 2019-10-09 | End: 2019-10-17

## 2019-10-09 RX ORDER — ATORVASTATIN CALCIUM 80 MG/1
20 TABLET, FILM COATED ORAL AT BEDTIME
Refills: 0 | Status: DISCONTINUED | OUTPATIENT
Start: 2019-10-09 | End: 2019-10-17

## 2019-10-09 RX ORDER — PANTOPRAZOLE SODIUM 20 MG/1
40 TABLET, DELAYED RELEASE ORAL
Refills: 0 | Status: DISCONTINUED | OUTPATIENT
Start: 2019-10-09 | End: 2019-10-17

## 2019-10-09 RX ORDER — LANOLIN ALCOHOL/MO/W.PET/CERES
3 CREAM (GRAM) TOPICAL AT BEDTIME
Refills: 0 | Status: DISCONTINUED | OUTPATIENT
Start: 2019-10-09 | End: 2019-10-17

## 2019-10-09 RX ORDER — SPIRONOLACTONE 25 MG/1
100 TABLET, FILM COATED ORAL DAILY
Refills: 0 | Status: DISCONTINUED | OUTPATIENT
Start: 2019-10-09 | End: 2019-10-09

## 2019-10-09 RX ORDER — OXYCODONE HYDROCHLORIDE 5 MG/1
10 TABLET ORAL EVERY 8 HOURS
Refills: 0 | Status: DISCONTINUED | OUTPATIENT
Start: 2019-10-09 | End: 2019-10-13

## 2019-10-09 RX ORDER — FUROSEMIDE 40 MG
40 TABLET ORAL
Refills: 0 | Status: DISCONTINUED | OUTPATIENT
Start: 2019-10-09 | End: 2019-10-17

## 2019-10-09 RX ORDER — FUROSEMIDE 40 MG
40 TABLET ORAL
Refills: 0 | Status: DISCONTINUED | OUTPATIENT
Start: 2019-10-09 | End: 2019-10-09

## 2019-10-09 RX ORDER — OXYCODONE HYDROCHLORIDE 5 MG/1
5 TABLET ORAL EVERY 6 HOURS
Refills: 0 | Status: DISCONTINUED | OUTPATIENT
Start: 2019-10-09 | End: 2019-10-13

## 2019-10-09 RX ADMIN — ENOXAPARIN SODIUM 40 MILLIGRAM(S): 100 INJECTION SUBCUTANEOUS at 22:33

## 2019-10-09 RX ADMIN — Medication 40 MILLIGRAM(S): at 18:03

## 2019-10-09 RX ADMIN — OXYCODONE HYDROCHLORIDE 5 MILLIGRAM(S): 5 TABLET ORAL at 23:20

## 2019-10-09 RX ADMIN — OXYCODONE HYDROCHLORIDE 5 MILLIGRAM(S): 5 TABLET ORAL at 23:03

## 2019-10-09 RX ADMIN — Medication 2 GRAM(S): at 18:03

## 2019-10-09 RX ADMIN — ATORVASTATIN CALCIUM 20 MILLIGRAM(S): 80 TABLET, FILM COATED ORAL at 22:33

## 2019-10-09 RX ADMIN — Medication 3 MILLIGRAM(S): at 23:20

## 2019-10-09 RX ADMIN — SPIRONOLACTONE 100 MILLIGRAM(S): 25 TABLET, FILM COATED ORAL at 13:32

## 2019-10-09 RX ADMIN — OXYCODONE HYDROCHLORIDE 5 MILLIGRAM(S): 5 TABLET ORAL at 22:33

## 2019-10-09 RX ADMIN — OXYCODONE HYDROCHLORIDE 5 MILLIGRAM(S): 5 TABLET ORAL at 23:50

## 2019-10-09 RX ADMIN — PANTOPRAZOLE SODIUM 40 MILLIGRAM(S): 20 TABLET, DELAYED RELEASE ORAL at 13:32
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
## 2019-10-09 NOTE — PROGRESS NOTE ADULT - SUBJECTIVE AND OBJECTIVE BOX
Patient is a 59y old  Male who presents with a chief complaint of Shortness of breath (09 Oct 2019 14:27)      Vital Signs Last 24 Hrs  T(C): 36.8 (10-09-19 @ 13:23), Max: 36.9 (10-08-19 @ 19:44)  T(F): 98.3 (10-09-19 @ 13:23), Max: 98.5 (10-08-19 @ 20:10)  HR: 90 (10-09-19 @ 13:23) (83 - 95)  BP: 115/64 (10-09-19 @ 13:23) (109/64 - 133/74)  RR: 18 (10-09-19 @ 13:23) (18 - 20)  SpO2: 95% (10-09-19 @ 13:23) (93% - 97%)           10-09-19 @ 07:01  -  10-09-19 @ 15:54  --------------------------------------------------------  IN: 860 mL / OUT: 200 mL / NET: 660 mL                            6.8    2.06  )-----------( 89       ( 09 Oct 2019 14:06 )             25.5     138  |  101  |  10  ----------------------------<  92  4.6   |  32<H>  |  0.97      PHYSICAL EXAM  Neurology: A&Ox3, NAD, no gross deficits  CV : RRR+S1S2  Lungs: Respirations non-labored, B/L BS clear, diminished at bases  +Right pigtail to LWS with serosanguinous drainage, no air leak  Abdomen: Soft, obese NT/ND, +BSx4Q  Extremities: B/L LE warm, no edema, +PP             MEDICATIONS  acetaminophen   Tablet .. 650 milliGRAM(s) Oral every 6 hours PRN  atorvastatin 20 milliGRAM(s) Oral at bedtime  enoxaparin Injectable 40 milliGRAM(s) SubCutaneous daily  furosemide    Tablet 40 milliGRAM(s) Oral two times a day  influenza   Vaccine 0.5 milliLiter(s) IntraMuscular once  LORazepam     Tablet 2 milliGRAM(s) Oral every 2 hours PRN  omega-3-Acid Ethyl Esters 2 Gram(s) Oral two times a day  oxyCODONE    IR 5 milliGRAM(s) Oral Once PRN  pantoprazole    Tablet 40 milliGRAM(s) Oral before breakfast  spironolactone 100 milliGRAM(s) Oral daily
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
## 2019-10-09 NOTE — PROGRESS NOTE ADULT - ATTENDING COMMENTS
Patient seen and examined on AM rounds  Doing much better than yesterday in ED, less dyspneic  Pain from rib fractures with good control  Denies symptoms of alcohol withdrawal or history of alcohol withdrawal  vitals stable  oxygenating well    CXR much improved from 10/7/19 pre-pig tail CXR    - Pleural effusion management as per thoracic surgery team  - Pancytopenia work up as per hospitalist  - Good pain control for rib fractures at this time

## 2019-10-09 NOTE — PROGRESS NOTE ADULT - PROBLEM SELECTOR PLAN 10
baseline INR 1.5-1.6. Currently 1.9  - continue monitoring, would give vit K if invasive procedures warranted    11. ppx: lovenox  - diet: dash/tlc  - dispo: pending clinical progress, weaning off O2, PT recs
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
## 2019-10-09 NOTE — PROGRESS NOTE ADULT - PROBLEM SELECTOR PLAN 1
spontaneous per patient's report, however per H/O patient is unreliable historian and may be omitting any recent falls/trauma  - pain presently controlled  - incentive spirometer  - care as per surgery

## 2019-10-09 NOTE — PROGRESS NOTE ADULT - PROBLEM SELECTOR PLAN 1
Maintain right pigtail to LWS   Check daily CXR  Strict I & Os, monitor drainage  F/U pleural fluid for culture and cytology  Keep NPO p MN for possible Right VATS decortication with Dr. Deng 10/10
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
## 2019-10-09 NOTE — PROGRESS NOTE ADULT - SUBJECTIVE AND OBJECTIVE BOX
TRAUMA SURGERY DAILY PROGRESS NOTE:       SUBJECTIVE/ROS: Patient seen and examined at bedside.  Patient states breathing is much better after placement of chest tube.  Denies nausea, vomiting, chest pain, SOB.         MEDICATIONS  (STANDING):  atorvastatin 20 milliGRAM(s) Oral at bedtime  enoxaparin Injectable 40 milliGRAM(s) SubCutaneous daily  furosemide    Tablet 40 milliGRAM(s) Oral two times a day  influenza   Vaccine 0.5 milliLiter(s) IntraMuscular once  omega-3-Acid Ethyl Esters 2 Gram(s) Oral two times a day  pantoprazole    Tablet 40 milliGRAM(s) Oral before breakfast  spironolactone 100 milliGRAM(s) Oral daily    MEDICATIONS  (PRN):  acetaminophen   Tablet .. 650 milliGRAM(s) Oral every 6 hours PRN Mild Pain (1 - 3)  LORazepam     Tablet 2 milliGRAM(s) Oral every 2 hours PRN CIWA-Ar score increase by 2 points and a total score of 7 or less  oxyCODONE    IR 5 milliGRAM(s) Oral Once PRN Moderate Pain (4 - 6)      OBJECTIVE:    Vital Signs Last 24 Hrs  T(C): 36.8 (09 Oct 2019 13:23), Max: 36.9 (08 Oct 2019 19:44)  T(F): 98.3 (09 Oct 2019 13:23), Max: 98.5 (08 Oct 2019 20:10)  HR: 90 (09 Oct 2019 13:23) (83 - 95)  BP: 115/64 (09 Oct 2019 13:23) (109/64 - 133/74)  BP(mean): --  RR: 18 (09 Oct 2019 13:23) (18 - 20)  SpO2: 95% (09 Oct 2019 13:23) (93% - 97%)        I&O's Detail    08 Oct 2019 07:01  -  09 Oct 2019 07:00  --------------------------------------------------------  IN:  Total IN: 0 mL    OUT:    Chest Tube: 400 mL    Voided: 300 mL  Total OUT: 700 mL    Total NET: -700 mL      09 Oct 2019 07:01  -  09 Oct 2019 15:12  --------------------------------------------------------  IN:    Oral Fluid: 860 mL  Total IN: 860 mL    OUT:    Voided: 200 mL  Total OUT: 200 mL    Total NET: 660 mL          Daily     Daily     LABS:                        6.8    2.06  )-----------( 89       ( 09 Oct 2019 14:06 )             25.5     10-09    138  |  101  |  10  ----------------------------<  92  4.6   |  32<H>  |  0.97    Ca    8.1<L>      09 Oct 2019 06:52  Phos  3.6     10-09  Mg     2.0     10-09    TPro  6.9  /  Alb  2.8<L>  /  TBili  2.1<H>  /  DBili  0.8<H>  /  AST  32  /  ALT  18  /  AlkPhos  130<H>  10-09    PT/INR - ( 09 Oct 2019 09:28 )   PT: 22.2 sec;   INR: 1.91 ratio         PTT - ( 09 Oct 2019 09:28 )  PTT:41.3 sec              PHYSICAL EXAM:  General: Obese male, in no acute distress   Neurologic:  AAOx3, No focal Deficits   Respiratory: No increased WOB, saturating well on 4L NC. Pigtail to suction.  No air leak.  Abdomen: Abdomen obese, soft, NT/ND, no rebound or guarding   Ext: Normal ROM all 4 extremities, strength and sensation grossly intact   Skin: Warm, dry, intact, no erythema

## 2019-10-09 NOTE — PROGRESS NOTE ADULT - PROBLEM SELECTOR PLAN 3
likely 2/2 alcohol abuse vs.possible concomitant NAFLD given poor eating habits and morbid obesity  - complicated by portant hypertension  - resume lasix/aldactone  - pt to resume OP GI follow up with Dr. White  - monitor LAURA likely 2/2 alcohol abuse vs.possible concomitant NAFLD given poor eating habits and morbid obesity  - complicated by portant hypertension  - resume lasix/aldactone  - pt to resume OP GI follow up with Dr. White  - monitor CIWA, start symptom triggered ativan for withdrawal

## 2019-10-10 ENCOUNTER — APPOINTMENT (OUTPATIENT)
Dept: THORACIC SURGERY | Facility: HOSPITAL | Age: 59
End: 2019-10-10

## 2019-10-10 LAB
24R-OH-CALCIDIOL SERPL-MCNC: 8.2 NG/ML — LOW (ref 30–80)
ALBUMIN SERPL ELPH-MCNC: 2.5 G/DL — LOW (ref 3.3–5)
ALP SERPL-CCNC: 122 U/L — HIGH (ref 40–120)
ALT FLD-CCNC: 14 U/L — SIGNIFICANT CHANGE UP (ref 10–45)
ANION GAP SERPL CALC-SCNC: 6 MMOL/L — SIGNIFICANT CHANGE UP (ref 5–17)
AST SERPL-CCNC: 27 U/L — SIGNIFICANT CHANGE UP (ref 10–40)
BILIRUB DIRECT SERPL-MCNC: 0.8 MG/DL — HIGH (ref 0–0.2)
BILIRUB INDIRECT FLD-MCNC: 1.6 MG/DL — HIGH (ref 0.2–1)
BILIRUB SERPL-MCNC: 2.4 MG/DL — HIGH (ref 0.2–1.2)
BUN SERPL-MCNC: 10 MG/DL — SIGNIFICANT CHANGE UP (ref 7–23)
CALCIUM SERPL-MCNC: 8.4 MG/DL — SIGNIFICANT CHANGE UP (ref 8.4–10.5)
CALCIUM SERPL-MCNC: 8.4 MG/DL — SIGNIFICANT CHANGE UP (ref 8.4–10.5)
CHLORIDE SERPL-SCNC: 100 MMOL/L — SIGNIFICANT CHANGE UP (ref 96–108)
CO2 SERPL-SCNC: 33 MMOL/L — HIGH (ref 22–31)
CREAT SERPL-MCNC: 0.96 MG/DL — SIGNIFICANT CHANGE UP (ref 0.5–1.3)
GGT SERPL-CCNC: 67 U/L — HIGH (ref 9–50)
GLUCOSE SERPL-MCNC: 87 MG/DL — SIGNIFICANT CHANGE UP (ref 70–99)
HCT VFR BLD CALC: 27.2 % — LOW (ref 39–50)
HGB BLD-MCNC: 7.3 G/DL — LOW (ref 13–17)
INR BLD: 1.77 RATIO — HIGH (ref 0.88–1.16)
MAGNESIUM SERPL-MCNC: 1.8 MG/DL — SIGNIFICANT CHANGE UP (ref 1.6–2.6)
MCHC RBC-ENTMCNC: 21.8 PG — LOW (ref 27–34)
MCHC RBC-ENTMCNC: 26.8 GM/DL — LOW (ref 32–36)
MCV RBC AUTO: 81.2 FL — SIGNIFICANT CHANGE UP (ref 80–100)
PHOSPHATE SERPL-MCNC: 3.3 MG/DL — SIGNIFICANT CHANGE UP (ref 2.5–4.5)
PLATELET # BLD AUTO: 82 K/UL — LOW (ref 150–400)
POTASSIUM SERPL-MCNC: 4.3 MMOL/L — SIGNIFICANT CHANGE UP (ref 3.5–5.3)
POTASSIUM SERPL-SCNC: 4.3 MMOL/L — SIGNIFICANT CHANGE UP (ref 3.5–5.3)
PROT SERPL-MCNC: 6.5 G/DL — SIGNIFICANT CHANGE UP (ref 6–8.3)
PROTHROM AB SERPL-ACNC: 20.7 SEC — HIGH (ref 10–12.9)
PTH-INTACT FLD-MCNC: 54 PG/ML — SIGNIFICANT CHANGE UP (ref 15–65)
RBC # BLD: 3.35 M/UL — LOW (ref 4.2–5.8)
RBC # FLD: 18.8 % — HIGH (ref 10.3–14.5)
SODIUM SERPL-SCNC: 139 MMOL/L — SIGNIFICANT CHANGE UP (ref 135–145)
VIT B12 SERPL-MCNC: 1326 PG/ML — HIGH (ref 232–1245)
VIT D25+D1,25 OH+D1,25 PNL SERPL-MCNC: 31.9 PG/ML — SIGNIFICANT CHANGE UP (ref 19.9–79.3)
WBC # BLD: 2.19 K/UL — LOW (ref 3.8–10.5)
WBC # FLD AUTO: 2.19 K/UL — LOW (ref 3.8–10.5)

## 2019-10-10 PROCEDURE — 99232 SBSQ HOSP IP/OBS MODERATE 35: CPT

## 2019-10-10 PROCEDURE — 71045 X-RAY EXAM CHEST 1 VIEW: CPT | Mod: 26

## 2019-10-10 PROCEDURE — 93306 TTE W/DOPPLER COMPLETE: CPT | Mod: 26

## 2019-10-10 PROCEDURE — 99233 SBSQ HOSP IP/OBS HIGH 50: CPT

## 2019-10-10 RX ORDER — ZOLPIDEM TARTRATE 10 MG/1
5 TABLET ORAL AT BEDTIME
Refills: 0 | Status: DISCONTINUED | OUTPATIENT
Start: 2019-10-10 | End: 2019-10-13

## 2019-10-10 RX ADMIN — OXYCODONE HYDROCHLORIDE 10 MILLIGRAM(S): 5 TABLET ORAL at 23:11

## 2019-10-10 RX ADMIN — ATORVASTATIN CALCIUM 20 MILLIGRAM(S): 80 TABLET, FILM COATED ORAL at 22:15

## 2019-10-10 RX ADMIN — Medication 2 GRAM(S): at 17:36

## 2019-10-10 RX ADMIN — ZOLPIDEM TARTRATE 5 MILLIGRAM(S): 10 TABLET ORAL at 22:15

## 2019-10-10 RX ADMIN — OXYCODONE HYDROCHLORIDE 10 MILLIGRAM(S): 5 TABLET ORAL at 23:41

## 2019-10-10 RX ADMIN — ENOXAPARIN SODIUM 40 MILLIGRAM(S): 100 INJECTION SUBCUTANEOUS at 22:15
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
## 2019-10-10 NOTE — PROGRESS NOTE ADULT - SUBJECTIVE AND OBJECTIVE BOX
Hospitalist- Lewis Leon MD  Pager: [phone]  If no response or off-hours, page [phone]  -------------------------------------  Patient is a 59y old  Male who presents with a chief complaint of Shortness of breath (09 Oct 2019 14:27)  Subjective: No acute events overnight. SOB stable, pain controlled, no fevers/chills, no cough. Pt reports he did not know he had cirrhosis. He also reports he did not develop a drinking habit until about 3-5 years ago.    14 point ros otherwise negative.     VITAL SIGNS:  Vital Signs Last 24 Hrs  T(C): 36.8 (10 Oct 2019 13:27), Max: 37.4 (09 Oct 2019 21:15)  T(F): 98.3 (10 Oct 2019 13:27), Max: 99.4 (09 Oct 2019 21:15)  HR: 79 (10 Oct 2019 13:27) (66 - 84)  BP: 111/63 (10 Oct 2019 13:27) (104/54 - 130/72)  BP(mean): --  RR: 18 (10 Oct 2019 13:27) (16 - 18)  SpO2: 95% (10 Oct 2019 13:27) (92% - 97%)      PHYSICAL EXAM:     GENERAL: no acute distress  HEENT: PERRLA, EOMI, moist oropharynx   RESPIRATORY: +CTAB, +R pigtail draining serosanguinous fluid  CARDIOVASCULAR: RRR, no murmurs, gallops, rubs  ABDOMINAL: soft, non-tender, +mildly distended, positive bowel sounds   EXTREMITIES: no clubbing, cyanosis, +trace pitting edema  NEUROLOGICAL: alert and oriented x 3, non-focal  SKIN: no rashes or lesions   MUSCULOSKELETAL: no gross joint deformity                          7.3    2.19  )-----------( 82       ( 10 Oct 2019 08:57 )             27.2     10-10    139  |  100  |  10  ----------------------------<  87  4.3   |  33<H>  |  0.96    Ca    8.4      10 Oct 2019 06:43  Phos  3.3     10-10  Mg     1.8     10-10    TPro  6.5  /  Alb  2.5<L>  /  TBili  2.4<H>  /  DBili  0.8<H>  /  AST  27  /  ALT  14  /  AlkPhos  122<H>  10-10      CAPILLARY BLOOD GLUCOSE          MEDICATIONS  (STANDING):  atorvastatin 20 milliGRAM(s) Oral at bedtime  enoxaparin Injectable 40 milliGRAM(s) SubCutaneous daily  furosemide    Tablet 40 milliGRAM(s) Oral two times a day  melatonin 3 milliGRAM(s) Oral at bedtime  omega-3-Acid Ethyl Esters 2 Gram(s) Oral two times a day  pantoprazole    Tablet 40 milliGRAM(s) Oral before breakfast  spironolactone 100 milliGRAM(s) Oral daily    MEDICATIONS  (PRN):  acetaminophen   Tablet .. 650 milliGRAM(s) Oral every 6 hours PRN Mild Pain (1 - 3)  LORazepam     Tablet 2 milliGRAM(s) Oral every 2 hours PRN CIWA-Ar score increase by 2 points and a total score of 7 or less  oxyCODONE    IR 10 milliGRAM(s) Oral every 8 hours PRN Severe Pain (7 - 10)  oxyCODONE    IR 5 milliGRAM(s) Oral every 6 hours PRN Moderate Pain (4 - 6)  zolpidem 5 milliGRAM(s) Oral at bedtime PRN Insomnia

## 2019-10-10 NOTE — PROVIDER CONTACT NOTE (OTHER) - ASSESSMENT
VSS, pt placed on CIWA for possible daily drinking( appears pt is not being completely upfront about drinking habits, changing story, medical hx. etc) c/o severe R side rib pain. Only 1x oxy 5 is ordered for pain. R pigtail chest tube in place to wall suction. pt asking for ambien says he usually takes it but it's not in home med rec. Pt stated he has taken melatonin to help him sleep when he was at [facility]. Pt NPO at midnight for possible VATS procedure. spironolactone and lasix scheduled for 6 a.m.

## 2019-10-10 NOTE — PROGRESS NOTE ADULT - PROBLEM SELECTOR PLAN 1
spontaneous per patient's report, possibly 2/2 heavy coughing, however per H/O patient is unreliable historian and may be omitting any recent falls/trauma  - pain presently controlled  - incentive spirometer  - care as per surgery  - vit D low- will start PO supplementation spontaneous per patient's report, possibly 2/2 heavy coughing, however per H/O patient is unreliable historian and may be omitting any recent falls/trauma  - pain presently controlled- would wean off opiates as soon as possible given pt successfully weaned off chronic opiates previously  - incentive spirometer  - care as per surgery  - vit D low- will start PO supplementation

## 2019-10-10 NOTE — PROGRESS NOTE ADULT - PROBLEM SELECTOR PLAN 3
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 Currently compensated.  - complicated by portal hypertension  - resume lasix/aldactone  - pt to resume OP GI follow up with Dr. White  - monitor CIWA, start symptom triggered ativan for withdrawal  - monitor CMP

## 2019-10-10 NOTE — CHART NOTE - NSCHARTNOTEFT_GEN_A_CORE
Upon Nutritional Assessment by the Registered Dietitian your patient was determined to meet criteria / has evidence of the following diagnosis/diagnoses:          [ ]  Mild Protein Calorie Malnutrition        [ ]  Moderate Protein Calorie Malnutrition        [ ] Severe Protein Calorie Malnutrition        [ ] Unspecified Protein Calorie Malnutrition        [ ] Underweight / BMI <19        [x] Morbid Obesity / BMI > 40      Findings as based on:  [x] Comprehensive nutrition assessment   [ ] Nutrition Focused Physical Exam  [x] Other: BMI 53 kg/m2       Nutrition Plan/Recommendations:    1) Extensive heart healthy education provided to patient    Pt aware RD remains available for any concerns, questions or diet preferences     Annelise Thibodeaux RD. Pager: [phone]       PROVIDER Section:     By signing this assessment you are acknowledging and agree with the diagnosis/diagnoses assigned by the Registered Dietitian    Comments:

## 2019-10-10 NOTE — PROGRESS NOTE ADULT - PROBLEM SELECTOR PLAN 1
Maintain right pigtail to water seal   Check daily CXR  Strict I & Os, document drainage q12h in flowsheets  F/U pleural fluid for culture and cytology  Continue care per primary team
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
## 2019-10-10 NOTE — PROGRESS NOTE ADULT - SUBJECTIVE AND OBJECTIVE BOX
Patient is a 59y old  Male who presents with a chief complaint of Shortness of breath (09 Oct 2019 14:27)      Vital Signs Last 24 Hrs  T(C): 37.1 (10-10-19 @ 07:30), Max: 37.4 (10-09-19 @ 21:15)  T(F): 98.7 (10-10-19 @ 07:30), Max: 99.4 (10-09-19 @ 21:15)  HR: 71 (10-10-19 @ 07:30) (67 - 90)  BP: 112/61 (10-10-19 @ 07:30) (104/54 - 130/72)  RR: 16 (10-10-19 @ 07:30) (16 - 18)  SpO2: 95% (10-10-19 @ 07:30) (92% - 97%)              10-10-19 @ 07:01  -  10-10-19 @ 12:43  --------------------------------------------------------  IN: 0 mL / OUT: 0 mL / NET: 0 mL                            7.3    2.19  )-----------( 82       ( 10 Oct 2019 08:57 )             27.2       139  |  100  |  10  ----------------------------<  87  4.3   |  33<H>  |  0.96           PHYSICAL EXAM  Neurology: A&Ox3, NAD, no gross deficits  CV : RRR+S1S2  Lungs: Respirations non-labored, B/L BS  Abdomen: Soft, NT/ND, +BSx4Q  Extremities: B/L LE edema, negative calf tenderness, +PP             MEDICATIONS  acetaminophen   Tablet .. 650 milliGRAM(s) Oral every 6 hours PRN  atorvastatin 20 milliGRAM(s) Oral at bedtime  enoxaparin Injectable 40 milliGRAM(s) SubCutaneous daily  furosemide    Tablet 40 milliGRAM(s) Oral two times a day  LORazepam     Tablet 2 milliGRAM(s) Oral every 2 hours PRN  melatonin 3 milliGRAM(s) Oral at bedtime  omega-3-Acid Ethyl Esters 2 Gram(s) Oral two times a day  oxyCODONE    IR 10 milliGRAM(s) Oral every 8 hours PRN  oxyCODONE    IR 5 milliGRAM(s) Oral every 6 hours PRN  pantoprazole    Tablet 40 milliGRAM(s) Oral before breakfast  spironolactone 100 milliGRAM(s) Oral daily  zolpidem 5 milliGRAM(s) Oral at bedtime PRN Patient is a 59y old  Male who presents with a chief complaint of Shortness of breath (09 Oct 2019 14:27)      Vital Signs Last 24 Hrs  T(C): 37.1 (10-10-19 @ 07:30), Max: 37.4 (10-09-19 @ 21:15)  T(F): 98.7 (10-10-19 @ 07:30), Max: 99.4 (10-09-19 @ 21:15)  HR: 71 (10-10-19 @ 07:30) (67 - 90)  BP: 112/61 (10-10-19 @ 07:30) (104/54 - 130/72)  RR: 16 (10-10-19 @ 07:30) (16 - 18)  SpO2: 95% (10-10-19 @ 07:30) (92% - 97%)              10-10-19 @ 07:01  -  10-10-19 @ 12:43  --------------------------------------------------------  IN: 0 mL / OUT: 0 mL / NET: 0 mL                            7.3    2.19  )-----------( 82       ( 10 Oct 2019 08:57 )             27.2       139  |  100  |  10  ----------------------------<  87  4.3   |  33<H>  |  0.96           PHYSICAL EXAM  Neurology: A&Ox3, NAD, no gross deficits  CV : RRR+S1S2  Lungs: Respirations non-labored, B/L BS clear, diminished at bases  +Right pigtail to water seal with serosanguinous drainage, no air leak  Abdomen: Soft, obese NT/ND, +BSx4Q  Extremities: B/L LE warm, no edema, +PP             MEDICATIONS  acetaminophen   Tablet .. 650 milliGRAM(s) Oral every 6 hours PRN  atorvastatin 20 milliGRAM(s) Oral at bedtime  enoxaparin Injectable 40 milliGRAM(s) SubCutaneous daily  furosemide    Tablet 40 milliGRAM(s) Oral two times a day  LORazepam     Tablet 2 milliGRAM(s) Oral every 2 hours PRN  melatonin 3 milliGRAM(s) Oral at bedtime  omega-3-Acid Ethyl Esters 2 Gram(s) Oral two times a day  oxyCODONE    IR 10 milliGRAM(s) Oral every 8 hours PRN  oxyCODONE    IR 5 milliGRAM(s) Oral every 6 hours PRN  pantoprazole    Tablet 40 milliGRAM(s) Oral before breakfast  spironolactone 100 milliGRAM(s) Oral daily  zolpidem 5 milliGRAM(s) Oral at bedtime PRN

## 2019-10-10 NOTE — DIETITIAN INITIAL EVALUATION ADULT. - ADD RECOMMEND
1) Change diet to DASH 2) Pt aware RD remains available for any concerns, questions or diet preferences 3) Monitor PO intake, weights, GI tolerance 4) BMI chart note placed in chart no abdominal pain, no bloating, no constipation, no diarrhea, no nausea and no vomiting.

## 2019-10-10 NOTE — PROGRESS NOTE ADULT - PROBLEM SELECTOR PLAN 10
baseline INR 1.5-1.6. Currently 1.8  - continue monitoring, would give vit K if invasive procedures warranted    11. ppx: lovenox  - diet: dash/tlc  - dispo: pending clinical progress, weaning off O2, PT recs

## 2019-10-10 NOTE — PROGRESS NOTE ADULT - SUBJECTIVE AND OBJECTIVE BOX
ACS SURGERY DAILY PROGRESS NOTE:       SUBJECTIVE/ROS: Patient seen and examined at bedside.  Patient states his pain is well controlled.  He received 1u PRBC yesterday for anemia (hgB<7).  He has been NPO after midnight for possible thoracic intervention today.         MEDICATIONS  (STANDING):  atorvastatin 20 milliGRAM(s) Oral at bedtime  enoxaparin Injectable 40 milliGRAM(s) SubCutaneous daily  furosemide    Tablet 40 milliGRAM(s) Oral two times a day  melatonin 3 milliGRAM(s) Oral at bedtime  omega-3-Acid Ethyl Esters 2 Gram(s) Oral two times a day  pantoprazole    Tablet 40 milliGRAM(s) Oral before breakfast  spironolactone 100 milliGRAM(s) Oral daily    MEDICATIONS  (PRN):  acetaminophen   Tablet .. 650 milliGRAM(s) Oral every 6 hours PRN Mild Pain (1 - 3)  LORazepam     Tablet 2 milliGRAM(s) Oral every 2 hours PRN CIWA-Ar score increase by 2 points and a total score of 7 or less  oxyCODONE    IR 10 milliGRAM(s) Oral every 8 hours PRN Severe Pain (7 - 10)  oxyCODONE    IR 5 milliGRAM(s) Oral every 6 hours PRN Moderate Pain (4 - 6)      OBJECTIVE:    Vital Signs Last 24 Hrs  T(C): 36.9 (10 Oct 2019 05:30), Max: 37.4 (09 Oct 2019 21:15)  T(F): 98.4 (10 Oct 2019 05:30), Max: 99.4 (09 Oct 2019 21:15)  HR: 74 (10 Oct 2019 05:30) (67 - 90)  BP: 126/71 (10 Oct 2019 05:30) (104/54 - 130/72)  BP(mean): --  RR: 18 (10 Oct 2019 05:30) (18 - 18)  SpO2: 95% (10 Oct 2019 05:30) (92% - 97%)        I&O's Detail    09 Oct 2019 07:01  -  10 Oct 2019 07:00  --------------------------------------------------------  IN:    Oral Fluid: 1420 mL  Total IN: 1420 mL    OUT:    Chest Tube: 350 mL    Voided: 1425 mL  Total OUT: 1775 mL    Total NET: -355 mL          Daily     Daily     LABS:                        6.8    2.06  )-----------( 89       ( 09 Oct 2019 14:06 )             25.5     10-09    138  |  101  |  10  ----------------------------<  92  4.6   |  32<H>  |  0.97    Ca    8.1<L>      09 Oct 2019 06:52  Phos  3.6     10-09  Mg     2.0     10-09    TPro  6.9  /  Alb  2.8<L>  /  TBili  2.1<H>  /  DBili  0.8<H>  /  AST  32  /  ALT  18  /  AlkPhos  130<H>  10-09    PT/INR - ( 09 Oct 2019 09:28 )   PT: 22.2 sec;   INR: 1.91 ratio         PTT - ( 09 Oct 2019 09:28 )  PTT:41.3 sec              PHYSICAL EXAM:  Constitutional: well developed, well nourished, NAD  Respiratory: Saturating well on 4L NC.  Pigtail in place with s/s drainage.  No air leak.  Abdomen: obese, soft, non-tender  Psych: A&Ox3

## 2019-10-10 NOTE — DIETITIAN INITIAL EVALUATION ADULT. - OTHER INFO
Intake PTA: Pt endorses poor diet, does not like vegetables, eats fried foods, all take-out, no prior nutrition knowledge. States he does not add salt to foods, however eats mostly takeout and pre-prepared foods.     Confirms NKFA, no nausea/vomiting, no difficulty chewing/swallowing no micronutrients PTA, BM 10/6, although no GI distress.    Diet: Pt with good PO intake, no complaints.    Education: Extensive education regarding heart healthy diet and low sodium recommendations. The following were discussed with the pt: relationship of sodium intake and fluid retention, impact of unhealthy fats on heart health, sources of heart healthy fats, recommended diet modifications, cooking with less salt, sodium contents of foods, modifications when eating out, optimizing fruit/vegetable intake, eliminating concentrated sweets and fruit juice.  Written education also provided.     Weight Hx: Pt unsure of UBW, does not endorse wt loss. Admitting wt is 369 pounds (10/8).

## 2019-10-11 ENCOUNTER — TRANSCRIPTION ENCOUNTER (OUTPATIENT)
Age: 59
End: 2019-10-11

## 2019-10-11 LAB
ALBUMIN SERPL ELPH-MCNC: 2.6 G/DL — LOW (ref 3.3–5)
ALP SERPL-CCNC: 121 U/L — HIGH (ref 40–120)
ALT FLD-CCNC: 15 U/L — SIGNIFICANT CHANGE UP (ref 10–45)
ANION GAP SERPL CALC-SCNC: 6 MMOL/L — SIGNIFICANT CHANGE UP (ref 5–17)
AST SERPL-CCNC: 23 U/L — SIGNIFICANT CHANGE UP (ref 10–40)
BILIRUB DIRECT SERPL-MCNC: 0.8 MG/DL — HIGH (ref 0–0.2)
BILIRUB INDIRECT FLD-MCNC: 1 MG/DL — SIGNIFICANT CHANGE UP (ref 0.2–1)
BILIRUB SERPL-MCNC: 1.8 MG/DL — HIGH (ref 0.2–1.2)
BUN SERPL-MCNC: 12 MG/DL — SIGNIFICANT CHANGE UP (ref 7–23)
CALCIUM SERPL-MCNC: 8 MG/DL — LOW (ref 8.4–10.5)
CHLORIDE SERPL-SCNC: 97 MMOL/L — SIGNIFICANT CHANGE UP (ref 96–108)
CO2 SERPL-SCNC: 31 MMOL/L — SIGNIFICANT CHANGE UP (ref 22–31)
CREAT SERPL-MCNC: 0.95 MG/DL — SIGNIFICANT CHANGE UP (ref 0.5–1.3)
GLUCOSE SERPL-MCNC: 99 MG/DL — SIGNIFICANT CHANGE UP (ref 70–99)
HCT VFR BLD CALC: 27.5 % — LOW (ref 39–50)
HGB BLD-MCNC: 7.4 G/DL — LOW (ref 13–17)
MAGNESIUM SERPL-MCNC: 1.9 MG/DL — SIGNIFICANT CHANGE UP (ref 1.6–2.6)
MCHC RBC-ENTMCNC: 22 PG — LOW (ref 27–34)
MCHC RBC-ENTMCNC: 26.9 GM/DL — LOW (ref 32–36)
MCV RBC AUTO: 81.6 FL — SIGNIFICANT CHANGE UP (ref 80–100)
PHOSPHATE SERPL-MCNC: 3 MG/DL — SIGNIFICANT CHANGE UP (ref 2.5–4.5)
PLATELET # BLD AUTO: 79 K/UL — LOW (ref 150–400)
POTASSIUM SERPL-MCNC: 4.6 MMOL/L — SIGNIFICANT CHANGE UP (ref 3.5–5.3)
POTASSIUM SERPL-SCNC: 4.6 MMOL/L — SIGNIFICANT CHANGE UP (ref 3.5–5.3)
PROT SERPL-MCNC: 6.6 G/DL — SIGNIFICANT CHANGE UP (ref 6–8.3)
RBC # BLD: 3.37 M/UL — LOW (ref 4.2–5.8)
RBC # FLD: 18.9 % — HIGH (ref 10.3–14.5)
SODIUM SERPL-SCNC: 134 MMOL/L — LOW (ref 135–145)
WBC # BLD: 1.95 K/UL — LOW (ref 3.8–10.5)
WBC # FLD AUTO: 1.95 K/UL — LOW (ref 3.8–10.5)

## 2019-10-11 PROCEDURE — 99231 SBSQ HOSP IP/OBS SF/LOW 25: CPT

## 2019-10-11 PROCEDURE — 99233 SBSQ HOSP IP/OBS HIGH 50: CPT

## 2019-10-11 PROCEDURE — 71045 X-RAY EXAM CHEST 1 VIEW: CPT | Mod: 26

## 2019-10-11 PROCEDURE — 99232 SBSQ HOSP IP/OBS MODERATE 35: CPT

## 2019-10-11 RX ADMIN — SPIRONOLACTONE 100 MILLIGRAM(S): 25 TABLET, FILM COATED ORAL at 06:03

## 2019-10-11 RX ADMIN — Medication 2 GRAM(S): at 17:35

## 2019-10-11 RX ADMIN — OXYCODONE HYDROCHLORIDE 10 MILLIGRAM(S): 5 TABLET ORAL at 20:19

## 2019-10-11 RX ADMIN — PANTOPRAZOLE SODIUM 40 MILLIGRAM(S): 20 TABLET, DELAYED RELEASE ORAL at 06:03

## 2019-10-11 RX ADMIN — ATORVASTATIN CALCIUM 20 MILLIGRAM(S): 80 TABLET, FILM COATED ORAL at 22:05

## 2019-10-11 RX ADMIN — Medication 2 GRAM(S): at 06:03

## 2019-10-11 RX ADMIN — OXYCODONE HYDROCHLORIDE 10 MILLIGRAM(S): 5 TABLET ORAL at 20:50

## 2019-10-11 RX ADMIN — Medication 3 MILLIGRAM(S): at 22:05

## 2019-10-11 RX ADMIN — Medication 40 MILLIGRAM(S): at 17:35

## 2019-10-11 RX ADMIN — Medication 40 MILLIGRAM(S): at 06:03

## 2019-10-11 RX ADMIN — ENOXAPARIN SODIUM 40 MILLIGRAM(S): 100 INJECTION SUBCUTANEOUS at 22:05

## 2019-10-11 NOTE — PROGRESS NOTE ADULT - PROBLEM SELECTOR PLAN 1
Maintain right pigtail to water seal   Check daily CXR  Strict I & Os, document drainage q12h in flowsheets  F/U pleural fluid for culture and cytology  Continue care per primary team Maintain right pigtail to water seal   Check daily CXR  Strict I & Os, document drainage,   Please have patient ambulate w/ asst  to asst w/ drainage  OOB to chair for all meals  F/U pleural fluid for culture and cytology

## 2019-10-11 NOTE — PROGRESS NOTE ADULT - SUBJECTIVE AND OBJECTIVE BOX
Hospitalist- Lewis Leon MD  Pager: [phone]  If no response or off-hours, page [phone]  -------------------------------------  Patient is a 59y old  Male who presents with a chief complaint of sp   rt pl tube (11 Oct 2019 10:41)  Subjective; No acute events overnight. Having daytime sleepiness today- reports this is common for him as he doesn't get good sleep at night due to his back pain. Takes frequent naps at home. Notes he often falls asleep mid conversation with family members. No sob, cough, no cp, no fevers/chills. No abd pain.    14 point ros otherwise negative.     VITAL SIGNS:  Vital Signs Last 24 Hrs  T(C): 36.8 (11 Oct 2019 11:45), Max: 36.8 (10 Oct 2019 13:27)  T(F): 98.3 (11 Oct 2019 11:45), Max: 98.3 (10 Oct 2019 13:27)  HR: 86 (11 Oct 2019 11:45) (74 - 87)  BP: 106/61 (11 Oct 2019 11:45) (93/50 - 137/98)  BP(mean): --  RR: 18 (11 Oct 2019 11:45) (16 - 18)  SpO2: 96% (11 Oct 2019 11:45) (93% - 99%)      PHYSICAL EXAM:     GENERAL: no acute distress  HEENT: PERRLA, EOMI, moist oropharynx   RESPIRATORY: +decreased breath sounds R base  CARDIOVASCULAR: RRR, no murmurs, gallops, rubs  ABDOMINAL: soft, non-tender, +obese + distended, positive bowel sounds   EXTREMITIES: no clubbing, cyanosis, +trace pitting edema  NEUROLOGICAL: alert and oriented x 3, non-focal  SKIN: no rashes or lesions   MUSCULOSKELETAL: no gross joint deformity                          7.4    1.95  )-----------( 79       ( 11 Oct 2019 10:17 )             27.5     10-11    134<L>  |  97  |  12  ----------------------------<  99  4.6   |  31  |  0.95    Ca    8.0<L>      11 Oct 2019 06:58  Phos  3.0     10-11  Mg     1.9     10-11    TPro  6.5  /  Alb  2.5<L>  /  TBili  2.4<H>  /  DBili  0.8<H>  /  AST  27  /  ALT  14  /  AlkPhos  122<H>  10-10      CAPILLARY BLOOD GLUCOSE          MEDICATIONS  (STANDING):  atorvastatin 20 milliGRAM(s) Oral at bedtime  enoxaparin Injectable 40 milliGRAM(s) SubCutaneous daily  furosemide    Tablet 40 milliGRAM(s) Oral two times a day  melatonin 3 milliGRAM(s) Oral at bedtime  omega-3-Acid Ethyl Esters 2 Gram(s) Oral two times a day  pantoprazole    Tablet 40 milliGRAM(s) Oral before breakfast  spironolactone 100 milliGRAM(s) Oral daily    MEDICATIONS  (PRN):  acetaminophen   Tablet .. 650 milliGRAM(s) Oral every 6 hours PRN Mild Pain (1 - 3)  LORazepam     Tablet 2 milliGRAM(s) Oral every 2 hours PRN CIWA-Ar score increase by 2 points and a total score of 7 or less  oxyCODONE    IR 10 milliGRAM(s) Oral every 8 hours PRN Severe Pain (7 - 10)  oxyCODONE    IR 5 milliGRAM(s) Oral every 6 hours PRN Moderate Pain (4 - 6)  zolpidem 5 milliGRAM(s) Oral at bedtime PRN Insomnia
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
## 2019-10-11 NOTE — PROGRESS NOTE ADULT - PROBLEM SELECTOR PLAN 3
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 Currently compensated.  - complicated by portal hypertension  - resume lasix/aldactone  - pt to resume OP GI follow up with Dr. White  - pt seems motivated to quit alcohol but resistant to changing his diet- also was interested in discussing transplant- will consult transplant hepatology  - monitor CIWA, start symptom triggered ativan for withdrawal  - monitor CMP

## 2019-10-11 NOTE — DISCHARGE NOTE PROVIDER - CARE PROVIDERS DIRECT ADDRESSES
[email or web address]_Unknown

## 2019-10-11 NOTE — CHART NOTE - NSCHARTNOTEFT_GEN_A_CORE
Spoke to patient to arrange for outpatient follow-up visit following treatment of rib fractures.     Patient may called Hepatology office number ([phone]) to arrange for follow-up.    Please call Hepatology ([phone]) if there are any additional questions or concerns. Please call on-call GI fellow after 5pm and before 8am, and on weekends.

## 2019-10-11 NOTE — DISCHARGE NOTE PROVIDER - PROVIDER TOKENS
PROVIDER:[TOKEN:[[id number]]] PROVIDER:[TOKEN:[[id number]]],PROVIDER:[TOKEN:[[id number]]],PROVIDER:[TOKEN:[[id number]]]

## 2019-10-11 NOTE — PROGRESS NOTE ADULT - SUBJECTIVE AND OBJECTIVE BOX
VITAL SIGNS    Telemetry:      Vital Signs Last 24 Hrs  T(C): 36.7 (10-11-19 @ 09:20), Max: 36.9 (10-10-19 @ 11:30)  T(F): 98.1 (10-11-19 @ 09:20), Max: 98.5 (10-10-19 @ 11:30)  HR: 87 (10-11-19 @ 09:20) (66 - 87)  BP: 106/54 (10-11-19 @ 09:20) (105/64 - 137/98)  RR: 18 (10-11-19 @ 09:20) (16 - 18)  SpO2: 96% (10-11-19 @ 09:20) (93% - 99%)                   Daily     Daily Weight in k (10 Oct 2019 15:12)        CAPILLARY BLOOD GLUCOSE                      PHYSICAL EXAM    Neurology: alert and oriented x 3, moves all extremities with no defecits  CV :  RRR  Sternal Wound :  CDI , Stable  Lungs:   CTA B/L  Abdomen: soft, nontender, nondistended, positive bowel sounds,  Extremities:     plus one to two pedal edema VITAL SIGNS      Vital Signs Last 24 Hrs  T(C): 36.7 (10-11-19 @ 09:20), Max: 36.9 (10-10-19 @ 11:30)  T(F): 98.1 (10-11-19 @ 09:20), Max: 98.5 (10-10-19 @ 11:30)  HR: 87 (10-11-19 @ 09:20) (66 - 87)  BP: 106/54 (10-11-19 @ 09:20) (105/64 - 137/98)  RR: 18 (10-11-19 @ 09:20) (16 - 18)  SpO2: 96% (10-11-19 @ 09:20) (93% - 99%)                   Daily     Daily Weight in k (10 Oct 2019 15:12)        CAPILLARY BLOOD GLUCOSE                      PHYSICAL EXAM  S   some SOB   when walking   No chest pain "  Neurology: alert and oriented x 3, moves all extremities with no defecits  CV :  RRR  Sternal Wound :  CDI , Stable  Lungs:   CTA B/L  Abdomen: soft, nontender, nondistended, positive bowel sounds,  Extremities:     plus one to two pedal edema

## 2019-10-11 NOTE — DISCHARGE NOTE PROVIDER - NSDCCPCAREPLAN_GEN_ALL_CORE_FT
PRINCIPAL DISCHARGE DIAGNOSIS  Diagnosis: Pancytopenia  Assessment and Plan of Treatment:       SECONDARY DISCHARGE DIAGNOSES  Diagnosis: Rib fractures  Assessment and Plan of Treatment: PRINCIPAL DISCHARGE DIAGNOSIS  Diagnosis: Pleural effusion, right  Assessment and Plan of Treatment: Follow up with Acute Care Surgery in 7-10days  [phone]  If Short of Breath, Increased pain not controlled with prescribed medications, chest pain, palpitation, weak, faint,  fevers, chills, or any other medical concerns go to the ER      SECONDARY DISCHARGE DIAGNOSES  Diagnosis: Rib fractures  Assessment and Plan of Treatment: Follow up with Acute Care Surgery  see above for concerning symptoms      Diagnosis: Hypertension  Assessment and Plan of Treatment: Follow with Primary Care Doctor  monitor for Headaches, Lightheadness, DIzziness, or Vision Changes and call doctor    Diagnosis: Anemia  Assessment and Plan of Treatment: Please follow up with Primary Care Doctor  Call their office for appointment  see above for concerning symptoms    Diagnosis: Cirrhosis  Assessment and Plan of Treatment: Cirrhosis  Please follow up with Primary Care Doctor      Diagnosis: SARKIS (obstructive sleep apnea)  Assessment and Plan of Treatment: SRAKIS (obstructive sleep apnea)  difficulty sleeping call doctor or difficulty breathing go to ER   Follow up as per PRimary care Doctor PRINCIPAL DISCHARGE DIAGNOSIS  Diagnosis: Pleural effusion, right  Assessment and Plan of Treatment: Follow up with Acute Care Surgery in 7-10days  [phone]  If Short of Breath, Increased pain not controlled with prescribed medications, chest pain, palpitation, weak, faint,  fevers, chills, or any other medical concerns go to the ER      SECONDARY DISCHARGE DIAGNOSES  Diagnosis: Cirrhosis  Assessment and Plan of Treatment: Cirrhosis  Follow up with Hepatology by calling [phone] and making an appointment.  Please follow up with Primary Care Doctor      Diagnosis: SARKIS (obstructive sleep apnea)  Assessment and Plan of Treatment: SARKIS (obstructive sleep apnea)  difficulty sleeping call doctor or difficulty breathing go to ER   Follow up as per PRimary care Doctor    Diagnosis: Hypertension  Assessment and Plan of Treatment: Follow with Primary Care Doctor  monitor for Headaches, Lightheadness, DIzziness, or Vision Changes and call doctor    Diagnosis: Anemia  Assessment and Plan of Treatment: Please follow up with Primary Care Doctor  Call their office for appointment  see above for concerning symptoms    Diagnosis: Rib fractures  Assessment and Plan of Treatment: Follow up with Acute Care Surgery  see above for concerning symptoms PRINCIPAL DISCHARGE DIAGNOSIS  Diagnosis: Pleural effusion, right  Assessment and Plan of Treatment: Follow up with Acute Care Surgery in 7-10days  [phone]  If Short of Breath, Increased pain not controlled with prescribed medications, chest pain, palpitation, weak, faint,  fevers, chills, or any other medical concerns go to the ER      SECONDARY DISCHARGE DIAGNOSES  Diagnosis: Cirrhosis  Assessment and Plan of Treatment: Cirrhosis  Follow up with Hepatology by calling [phone] and making an appointmen in the next 1-2 weeks   Please follow up with Primary Care Doctor      Diagnosis: SARKIS (obstructive sleep apnea)  Assessment and Plan of Treatment: SARKIS (obstructive sleep apnea)  difficulty sleeping call doctor or difficulty breathing go to ER   Follow up as per PRimary care Doctor    Diagnosis: Hypertension  Assessment and Plan of Treatment: Follow with Primary Care Doctor  monitor for Headaches, Lightheadness, DIzziness, or Vision Changes and call doctor    Diagnosis: Anemia  Assessment and Plan of Treatment: Please follow up with Primary Care Doctor  Call their office for appointment  see above for concerning symptoms    Diagnosis: Rib fractures  Assessment and Plan of Treatment: Follow up with Acute Care Surgery  see above for concerning symptoms

## 2019-10-11 NOTE — DISCHARGE NOTE PROVIDER - NSDCFUADDAPPT_GEN_ALL_CORE_FT
follow up with Primary Care Doctor follow up with Primary Care Doctor and Dr. Robert as well for your low platelets and for iron infusions.

## 2019-10-11 NOTE — PROGRESS NOTE ADULT - PROBLEM SELECTOR PLAN 1
spontaneous per patient's report, possibly 2/2 heavy coughing, however per H/O patient is unreliable historian and may be omitting any recent falls/trauma  - pain presently controlled- would wean off opiates as soon as possible given pt successfully weaned off chronic opiates previously  - incentive spirometer  - care as per surgery  - vit D low- will start PO supplementation
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
## 2019-10-11 NOTE — DISCHARGE NOTE PROVIDER - CARE PROVIDER_API CALL
Amadou Segura)  Surgery; Surgical Critical Care  [address] [place] [address]  Phone: [phone]  Fax: [phone]  Follow Up Time: Amadou Segura)  Surgery; Surgical Critical Care  [address] [place] [address]  Phone: [phone]  Fax: [phone]  Follow Up Time:     Jose Swartz)  Internal Medicine  [address] [place] [address]  Phone: [phone]  Fax: [phone]  Follow Up Time:     Wesley White)  Medicine  [address] [place] [address], [place] [address]  Phone: [phone]  Fax: [phone]  Follow Up Time:

## 2019-10-11 NOTE — PROGRESS NOTE ADULT - ATTENDING COMMENTS
Patient seen and examined on AM rounds  Doing well, no c/o SOB  vitals stable  pain with good control    - chest tube removal as per thoracic service  - I have offered patient a bariatric surgery evaluation (as his BMI is 53), which he states that he does not want  - I have discussed the need to stop drinking alcohol, given his advanced liver disease  - I have discussed the need for close outpatient follow up for his liver disease
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
## 2019-10-11 NOTE — PROGRESS NOTE ADULT - SUBJECTIVE AND OBJECTIVE BOX
ACS & TRAUMA SURGERY DAILY PROGRESS NOTE    SUBJECTIVE:  - Patient seen and examined at bedside during morning rounds  - Patient states feeling well  - Denies chest pain, SOB, N/V  --------------------------------------------------------------------------------------------------  OBJECTIVE:   Physical Exam:  General: AAOx3, NAD, lying comfortably in bed  HEENT: NC/AT  Respiratory: nonlabored breathing  Cardiovascular: RRR, normal S1 and S2, no murmurs or gallops  Abdomen: non-distended, soft, non-tender  Extremities: WWP, no edema  Drain:  Ostomy:   --------------------------------------------------------------------------------------------------  V/S:  Vital Signs Last 24 Hrs  T(C): 36.6 (11 Oct 2019 04:54), Max: 37.1 (10 Oct 2019 07:30)  T(F): 97.9 (11 Oct 2019 04:54), Max: 98.7 (10 Oct 2019 07:30)  HR: 79 (11 Oct 2019 04:54) (66 - 80)  BP: 137/98 (11 Oct 2019 04:54) (105/64 - 137/98)  BP(mean): --  RR: 18 (11 Oct 2019 04:54) (16 - 18)  SpO2: 93% (11 Oct 2019 04:54) (93% - 99%)    --------------------------------------------------------------------------------------------------  I/Os:    09 Oct 2019 07:01  -  10 Oct 2019 07:00  --------------------------------------------------------  IN:    Oral Fluid: 1420 mL  Total IN: 1420 mL    OUT:    Chest Tube: 650 mL    Voided: 1425 mL  Total OUT: 2075 mL    Total NET: -655 mL      10 Oct 2019 07:01  -  11 Oct 2019 05:01  --------------------------------------------------------  IN:    Oral Fluid: 500 mL  Total IN: 500 mL    OUT:    Chest Tube: 280 mL    Voided: 750 mL  Total OUT: 1030 mL    Total NET: -530 mL        --------------------------------------------------------------------------------------------------  LABS:                        7.3    2.19  )-----------( 82       ( 10 Oct 2019 08:57 )             27.2     10 Oct 2019 06:43    139    |  100    |  10     ----------------------------<  87     4.3     |  33     |  0.96     Ca    8.4        10 Oct 2019 06:43  Phos  3.3       10 Oct 2019 06:43  Mg     1.8       10 Oct 2019 06:43    TPro  6.5    /  Alb  2.5    /  TBili  2.4    /  DBili  0.8    /  AST  27     /  ALT  14     /  AlkPhos  122    10 Oct 2019 06:43    PT/INR - ( 10 Oct 2019 11:32 )   PT: 20.7 sec;   INR: 1.77 ratio         PTT - ( 09 Oct 2019 09:28 )  PTT:41.3 sec  CAPILLARY BLOOD GLUCOSE            LIVER FUNCTIONS - ( 10 Oct 2019 13:02 )  Alb: x     / Pro: x     / ALK PHOS: x     / ALT: x     / AST: x     / GGT: 67 U/L         Culture - Body Fluid with Gram Stain (collected 08 Oct 2019 20:45)  Source: .Body Fluid  Gram Stain (08 Oct 2019 23:14):    polymorphonuclear leukocytes seen    No organisms seen    by cytocentrifuge  Preliminary Report (09 Oct 2019 16:30):    No growth        --------------------------------------------------------------------------------------------------  MEDICATIONS  (STANDING):  atorvastatin 20 milliGRAM(s) Oral at bedtime  enoxaparin Injectable 40 milliGRAM(s) SubCutaneous daily  furosemide    Tablet 40 milliGRAM(s) Oral two times a day  melatonin 3 milliGRAM(s) Oral at bedtime  omega-3-Acid Ethyl Esters 2 Gram(s) Oral two times a day  pantoprazole    Tablet 40 milliGRAM(s) Oral before breakfast  spironolactone 100 milliGRAM(s) Oral daily    MEDICATIONS  (PRN):  acetaminophen   Tablet .. 650 milliGRAM(s) Oral every 6 hours PRN Mild Pain (1 - 3)  LORazepam     Tablet 2 milliGRAM(s) Oral every 2 hours PRN CIWA-Ar score increase by 2 points and a total score of 7 or less  oxyCODONE    IR 10 milliGRAM(s) Oral every 8 hours PRN Severe Pain (7 - 10)  oxyCODONE    IR 5 milliGRAM(s) Oral every 6 hours PRN Moderate Pain (4 - 6)  zolpidem 5 milliGRAM(s) Oral at bedtime PRN Insomnia
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
## 2019-10-12 LAB
ALBUMIN SERPL ELPH-MCNC: 2.7 G/DL — LOW (ref 3.3–5)
ALP SERPL-CCNC: 131 U/L — HIGH (ref 40–120)
ALT FLD-CCNC: 16 U/L — SIGNIFICANT CHANGE UP (ref 10–45)
ANION GAP SERPL CALC-SCNC: 8 MMOL/L — SIGNIFICANT CHANGE UP (ref 5–17)
APTT BLD: 41.5 SEC — HIGH (ref 27.5–36.3)
AST SERPL-CCNC: 26 U/L — SIGNIFICANT CHANGE UP (ref 10–40)
BILIRUB SERPL-MCNC: 2 MG/DL — HIGH (ref 0.2–1.2)
BUN SERPL-MCNC: 13 MG/DL — SIGNIFICANT CHANGE UP (ref 7–23)
CALCIUM SERPL-MCNC: 8.2 MG/DL — LOW (ref 8.4–10.5)
CHLORIDE SERPL-SCNC: 97 MMOL/L — SIGNIFICANT CHANGE UP (ref 96–108)
CO2 SERPL-SCNC: 32 MMOL/L — HIGH (ref 22–31)
CREAT SERPL-MCNC: 1.01 MG/DL — SIGNIFICANT CHANGE UP (ref 0.5–1.3)
GLUCOSE SERPL-MCNC: 84 MG/DL — SIGNIFICANT CHANGE UP (ref 70–99)
HCT VFR BLD CALC: 27.1 % — LOW (ref 39–50)
HGB BLD-MCNC: 7.5 G/DL — LOW (ref 13–17)
INR BLD: 1.8 RATIO — HIGH (ref 0.88–1.16)
MAGNESIUM SERPL-MCNC: 1.9 MG/DL — SIGNIFICANT CHANGE UP (ref 1.6–2.6)
MCHC RBC-ENTMCNC: 22.3 PG — LOW (ref 27–34)
MCHC RBC-ENTMCNC: 27.7 GM/DL — LOW (ref 32–36)
MCV RBC AUTO: 80.4 FL — SIGNIFICANT CHANGE UP (ref 80–100)
PHOSPHATE SERPL-MCNC: 3.4 MG/DL — SIGNIFICANT CHANGE UP (ref 2.5–4.5)
PLATELET # BLD AUTO: 78 K/UL — LOW (ref 150–400)
POTASSIUM SERPL-MCNC: 4.3 MMOL/L — SIGNIFICANT CHANGE UP (ref 3.5–5.3)
POTASSIUM SERPL-SCNC: 4.3 MMOL/L — SIGNIFICANT CHANGE UP (ref 3.5–5.3)
PROT SERPL-MCNC: 6.9 G/DL — SIGNIFICANT CHANGE UP (ref 6–8.3)
PROTHROM AB SERPL-ACNC: 20.9 SEC — HIGH (ref 10–13.1)
RBC # BLD: 3.37 M/UL — LOW (ref 4.2–5.8)
RBC # FLD: 19.1 % — HIGH (ref 10.3–14.5)
SODIUM SERPL-SCNC: 137 MMOL/L — SIGNIFICANT CHANGE UP (ref 135–145)
WBC # BLD: 1.99 K/UL — LOW (ref 3.8–10.5)
WBC # FLD AUTO: 1.99 K/UL — LOW (ref 3.8–10.5)

## 2019-10-12 PROCEDURE — 71045 X-RAY EXAM CHEST 1 VIEW: CPT | Mod: 26

## 2019-10-12 PROCEDURE — 71045 X-RAY EXAM CHEST 1 VIEW: CPT | Mod: 26,77

## 2019-10-12 PROCEDURE — 99232 SBSQ HOSP IP/OBS MODERATE 35: CPT

## 2019-10-12 PROCEDURE — 99231 SBSQ HOSP IP/OBS SF/LOW 25: CPT

## 2019-10-12 RX ADMIN — ENOXAPARIN SODIUM 40 MILLIGRAM(S): 100 INJECTION SUBCUTANEOUS at 22:35

## 2019-10-12 RX ADMIN — Medication 40 MILLIGRAM(S): at 05:54

## 2019-10-12 RX ADMIN — OXYCODONE HYDROCHLORIDE 10 MILLIGRAM(S): 5 TABLET ORAL at 05:52

## 2019-10-12 RX ADMIN — OXYCODONE HYDROCHLORIDE 10 MILLIGRAM(S): 5 TABLET ORAL at 06:30

## 2019-10-12 RX ADMIN — PANTOPRAZOLE SODIUM 40 MILLIGRAM(S): 20 TABLET, DELAYED RELEASE ORAL at 05:54

## 2019-10-12 RX ADMIN — ZOLPIDEM TARTRATE 5 MILLIGRAM(S): 10 TABLET ORAL at 22:35

## 2019-10-12 RX ADMIN — Medication 2 GRAM(S): at 18:53

## 2019-10-12 RX ADMIN — Medication 1 GRAM(S): at 05:53

## 2019-10-12 RX ADMIN — ATORVASTATIN CALCIUM 20 MILLIGRAM(S): 80 TABLET, FILM COATED ORAL at 22:35

## 2019-10-12 RX ADMIN — SPIRONOLACTONE 100 MILLIGRAM(S): 25 TABLET, FILM COATED ORAL at 05:53

## 2019-10-12 RX ADMIN — Medication 40 MILLIGRAM(S): at 18:53
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
## 2019-10-12 NOTE — PROGRESS NOTE ADULT - PROBLEM SELECTOR PLAN 1
dry dressing as needed to rt side   F/U pleural fluid for culture and cytology    will ck xray  and thoracic to sign off
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
## 2019-10-12 NOTE — PROGRESS NOTE ADULT - SUBJECTIVE AND OBJECTIVE BOX
ACS & TRAUMA SURGERY DAILY PROGRESS NOTE    SUBJECTIVE:  - Patient seen and examined at bedside during morning rounds  - Patient states feeling well, laying comfortable in bed  - Denies chest pain, SOB, N/V  --------------------------------------------------------------------------------------------------  OBJECTIVE:   Physical Exam:  General: AAOx3, NAD, lying comfortably in bed  HEENT: NC/AT  Respiratory: nonlabored breathing  Cardiovascular: RRR, normal S1 and S2, no murmurs or gallops  Abdomen: non-distended, soft, non-tender  Extremities: WWP, no edema  --------------------------------------------------------------------------------------------------  V/S:  Vital Signs Last 24 Hrs  T(C): 36.8 (12 Oct 2019 07:54), Max: 36.8 (11 Oct 2019 16:00)  T(F): 98.2 (12 Oct 2019 07:54), Max: 98.2 (11 Oct 2019 16:00)  HR: 84 (12 Oct 2019 07:54) (79 - 84)  BP: 134/72 (12 Oct 2019 07:54) (115/59 - 137/62)  BP(mean): --  RR: 16 (12 Oct 2019 07:54) (16 - 18)  SpO2: 91% (12 Oct 2019 07:54) (91% - 95%)    --------------------------------------------------------------------------------------------------  I/Os:    11 Oct 2019 07:01  -  12 Oct 2019 07:00  --------------------------------------------------------  IN:    Oral Fluid: 1310 mL  Total IN: 1310 mL    OUT:    Chest Tube: 100 mL    Voided: 2100 mL  Total OUT: 2200 mL    Total NET: -890 mL      12 Oct 2019 07:01  -  12 Oct 2019 14:30  --------------------------------------------------------  IN:  Total IN: 0 mL    OUT:    Voided: 450 mL  Total OUT: 450 mL    Total NET: -450 mL        --------------------------------------------------------------------------------------------------  LABS:                        7.5    1.99  )-----------( 78       ( 12 Oct 2019 11:01 )             27.1     12 Oct 2019 07:21    137    |  97     |  13     ----------------------------<  84     4.3     |  32     |  1.01     Ca    8.2        12 Oct 2019 07:21  Phos  3.4       12 Oct 2019 07:21  Mg     1.9       12 Oct 2019 07:21    TPro  6.9    /  Alb  2.7    /  TBili  2.0    /  DBili  x      /  AST  26     /  ALT  16     /  AlkPhos  131    12 Oct 2019 07:21    PT/INR - ( 12 Oct 2019 10:37 )   PT: 20.9 sec;   INR: 1.80 ratio         PTT - ( 12 Oct 2019 10:37 )  PTT:41.5 sec  CAPILLARY BLOOD GLUCOSE            LIVER FUNCTIONS - ( 12 Oct 2019 07:21 )  Alb: 2.7 g/dL / Pro: 6.9 g/dL / ALK PHOS: 131 U/L / ALT: 16 U/L / AST: 26 U/L / GGT: x               --------------------------------------------------------------------------------------------------  MEDICATIONS  (STANDING):  atorvastatin 20 milliGRAM(s) Oral at bedtime  enoxaparin Injectable 40 milliGRAM(s) SubCutaneous daily  furosemide    Tablet 40 milliGRAM(s) Oral two times a day  melatonin 3 milliGRAM(s) Oral at bedtime  omega-3-Acid Ethyl Esters 2 Gram(s) Oral two times a day  pantoprazole    Tablet 40 milliGRAM(s) Oral before breakfast  spironolactone 100 milliGRAM(s) Oral daily    MEDICATIONS  (PRN):  acetaminophen   Tablet .. 650 milliGRAM(s) Oral every 6 hours PRN Mild Pain (1 - 3)  LORazepam     Tablet 2 milliGRAM(s) Oral every 2 hours PRN CIWA-Ar score increase by 2 points and a total score of 7 or less  oxyCODONE    IR 10 milliGRAM(s) Oral every 8 hours PRN Severe Pain (7 - 10)  oxyCODONE    IR 5 milliGRAM(s) Oral every 6 hours PRN Moderate Pain (4 - 6)  zolpidem 5 milliGRAM(s) Oral at bedtime PRN Insomnia

## 2019-10-12 NOTE — PROGRESS NOTE ADULT - SUBJECTIVE AND OBJECTIVE BOX
VITAL SIGNS    Vital Signs Last 24 Hrs  T(C): 36.8 (10-12-19 @ 07:54), Max: 36.8 (10-11-19 @ 11:45)  T(F): 98.2 (10-12-19 @ 07:54), Max: 98.3 (10-11-19 @ 11:45)  HR: 84 (10-12-19 @ 07:54) (79 - 86)  BP: 134/72 (10-12-19 @ 07:54) (106/61 - 137/62)  RR: 16 (10-12-19 @ 07:54) (16 - 18)  SpO2: 91% (10-12-19 @ 07:54) (91% - 96%)                   Daily     Daily       Bilirubin Total, Serum: 2.0 mg/dL (10-12 @ 07:21)    CAPILLARY BLOOD GLUCOSE                      PHYSICAL EXAM  S      No chest pain no palpitations'  Neurology: alert and oriented x 3, moves all extremities with no defecits  CV :  RRR    Lungs:   cta   Abdomen: soft, nontender, nondistended, positive bowel sounds  Extremities:     no edema   no calf tenderness

## 2019-10-13 DIAGNOSIS — G93.40 ENCEPHALOPATHY, UNSPECIFIED: ICD-10-CM

## 2019-10-13 DIAGNOSIS — J94.2 HEMOTHORAX: ICD-10-CM

## 2019-10-13 DIAGNOSIS — Z29.9 ENCOUNTER FOR PROPHYLACTIC MEASURES, UNSPECIFIED: ICD-10-CM

## 2019-10-13 LAB
ALBUMIN SERPL ELPH-MCNC: 2.9 G/DL — LOW (ref 3.3–5)
ALP SERPL-CCNC: 133 U/L — HIGH (ref 40–120)
ALT FLD-CCNC: 17 U/L — SIGNIFICANT CHANGE UP (ref 10–45)
AMMONIA BLD-MCNC: 113 UMOL/L — HIGH (ref 11–55)
ANION GAP SERPL CALC-SCNC: 10 MMOL/L — SIGNIFICANT CHANGE UP (ref 5–17)
APPEARANCE UR: CLEAR — SIGNIFICANT CHANGE UP
AST SERPL-CCNC: 31 U/L — SIGNIFICANT CHANGE UP (ref 10–40)
BACTERIA # UR AUTO: ABNORMAL
BILIRUB SERPL-MCNC: 2.6 MG/DL — HIGH (ref 0.2–1.2)
BILIRUB UR-MCNC: NEGATIVE — SIGNIFICANT CHANGE UP
BUN SERPL-MCNC: 14 MG/DL — SIGNIFICANT CHANGE UP (ref 7–23)
CALCIUM SERPL-MCNC: 8.9 MG/DL — SIGNIFICANT CHANGE UP (ref 8.4–10.5)
CHLORIDE SERPL-SCNC: 95 MMOL/L — LOW (ref 96–108)
CO2 SERPL-SCNC: 30 MMOL/L — SIGNIFICANT CHANGE UP (ref 22–31)
COLOR SPEC: SIGNIFICANT CHANGE UP
CREAT SERPL-MCNC: 1.11 MG/DL — SIGNIFICANT CHANGE UP (ref 0.5–1.3)
CULTURE RESULTS: NO GROWTH — SIGNIFICANT CHANGE UP
DIFF PNL FLD: NEGATIVE — SIGNIFICANT CHANGE UP
EPI CELLS # UR: 0 /HPF — SIGNIFICANT CHANGE UP
GAS PNL BLDV: SIGNIFICANT CHANGE UP
GLUCOSE SERPL-MCNC: 105 MG/DL — HIGH (ref 70–99)
GLUCOSE UR QL: NEGATIVE — SIGNIFICANT CHANGE UP
HCT VFR BLD CALC: 28.1 % — LOW (ref 39–50)
HGB BLD-MCNC: 8.3 G/DL — LOW (ref 13–17)
HYALINE CASTS # UR AUTO: 0 /LPF — SIGNIFICANT CHANGE UP (ref 0–2)
KETONES UR-MCNC: NEGATIVE — SIGNIFICANT CHANGE UP
LEUKOCYTE ESTERASE UR-ACNC: NEGATIVE — SIGNIFICANT CHANGE UP
MCHC RBC-ENTMCNC: 23.1 PG — LOW (ref 27–34)
MCHC RBC-ENTMCNC: 29.5 GM/DL — LOW (ref 32–36)
MCV RBC AUTO: 78.1 FL — LOW (ref 80–100)
NITRITE UR-MCNC: NEGATIVE — SIGNIFICANT CHANGE UP
NRBC # BLD: 0 /100 WBCS — SIGNIFICANT CHANGE UP (ref 0–0)
PH UR: 7.5 — SIGNIFICANT CHANGE UP (ref 5–8)
PLATELET # BLD AUTO: 63 K/UL — LOW (ref 150–400)
POTASSIUM SERPL-MCNC: 4.1 MMOL/L — SIGNIFICANT CHANGE UP (ref 3.5–5.3)
POTASSIUM SERPL-SCNC: 4.1 MMOL/L — SIGNIFICANT CHANGE UP (ref 3.5–5.3)
PROT SERPL-MCNC: 7.3 G/DL — SIGNIFICANT CHANGE UP (ref 6–8.3)
PROT UR-MCNC: NEGATIVE — SIGNIFICANT CHANGE UP
RBC # BLD: 3.6 M/UL — LOW (ref 4.2–5.8)
RBC # FLD: 19.6 % — HIGH (ref 10.3–14.5)
RBC CASTS # UR COMP ASSIST: 4 /HPF — SIGNIFICANT CHANGE UP (ref 0–4)
SODIUM SERPL-SCNC: 135 MMOL/L — SIGNIFICANT CHANGE UP (ref 135–145)
SP GR SPEC: 1.01 — LOW (ref 1.01–1.02)
SPECIMEN SOURCE: SIGNIFICANT CHANGE UP
UROBILINOGEN FLD QL: NEGATIVE — SIGNIFICANT CHANGE UP
WBC # BLD: 1.95 K/UL — LOW (ref 3.8–10.5)
WBC # FLD AUTO: 1.95 K/UL — LOW (ref 3.8–10.5)
WBC UR QL: 2 /HPF — SIGNIFICANT CHANGE UP (ref 0–5)

## 2019-10-13 PROCEDURE — 99232 SBSQ HOSP IP/OBS MODERATE 35: CPT

## 2019-10-13 PROCEDURE — 99233 SBSQ HOSP IP/OBS HIGH 50: CPT

## 2019-10-13 RX ORDER — OXYCODONE HYDROCHLORIDE 5 MG/1
2.5 TABLET ORAL EVERY 6 HOURS
Refills: 0 | Status: DISCONTINUED | OUTPATIENT
Start: 2019-10-13 | End: 2019-10-13

## 2019-10-13 RX ORDER — LACTULOSE 10 G/15ML
10 SOLUTION ORAL THREE TIMES A DAY
Refills: 0 | Status: DISCONTINUED | OUTPATIENT
Start: 2019-10-13 | End: 2019-10-14

## 2019-10-13 RX ADMIN — ATORVASTATIN CALCIUM 20 MILLIGRAM(S): 80 TABLET, FILM COATED ORAL at 21:55

## 2019-10-13 RX ADMIN — Medication 2 GRAM(S): at 18:35

## 2019-10-13 RX ADMIN — SPIRONOLACTONE 100 MILLIGRAM(S): 25 TABLET, FILM COATED ORAL at 06:51

## 2019-10-13 RX ADMIN — PANTOPRAZOLE SODIUM 40 MILLIGRAM(S): 20 TABLET, DELAYED RELEASE ORAL at 06:51

## 2019-10-13 RX ADMIN — Medication 3 MILLIGRAM(S): at 21:55

## 2019-10-13 RX ADMIN — Medication 40 MILLIGRAM(S): at 18:35

## 2019-10-13 RX ADMIN — Medication 40 MILLIGRAM(S): at 06:51

## 2019-10-13 RX ADMIN — ENOXAPARIN SODIUM 40 MILLIGRAM(S): 100 INJECTION SUBCUTANEOUS at 21:55

## 2019-10-13 RX ADMIN — LACTULOSE 10 GRAM(S): 10 SOLUTION ORAL at 15:40

## 2019-10-13 RX ADMIN — Medication 2 GRAM(S): at 06:51

## 2019-10-13 RX ADMIN — LACTULOSE 10 GRAM(S): 10 SOLUTION ORAL at 21:55

## 2019-10-13 NOTE — PROGRESS NOTE ADULT - PROBLEM SELECTOR PLAN 3
Pt noted to be encephalopathic overnight, unable to coherently answer questions this morning, but able to follow simple commands, moves all extremities. Suspect toxic metabolic encephalopathy from hepatic encephalopathy vs. opiate-indeuced vs. doubt co2 narcosis as VBG shows normal PCO2 vs. doubt alcohol withdrawal  - ammonia elevateed > 100, will start rifaximin and lactulose standing  - r/o infection: check blood cultures x 2, UA  - check abdominal sono to eval for ascites/possible SBP  - CT head noncontrast  - place on continuous pulse ox given recent hemothorax  - BIPAP prn hypoxia if patient able to protect airway  - low threshold for MICU consult if patient unable to protect airway Pt noted to be encephalopathic overnight, unable to coherently answer questions this morning, but able to follow simple commands, moves all extremities. Suspect toxic metabolic encephalopathy from hepatic encephalopathy vs. opiate-indeuced vs. doubt co2 narcosis as VBG shows normal PCO2 vs. doubt alcohol withdrawal  - ammonia elevateed > 100, will start rifaximin and lactulose standing  - r/o infection: check blood cultures x 2, UA  - check abdominal sono to eval for ascites/possible SBP  - hold opiates  - CT head noncontrast  - place on continuous pulse ox given recent hemothorax  - BIPAP prn hypoxia if patient able to protect airway  - low threshold for MICU consult if patient unable to protect airway
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
## 2019-10-13 NOTE — PROVIDER CONTACT NOTE (OTHER) - ASSESSMENT
VSS, Pt confused- only knows his name and , keeps repeating his  when asked what the date it, unsure of year says I can't remember at all, unsure where he is or what happened to bring him here. Pt personality has been off since admission but was previoulsy answering orientation questions appropriately. Pt did say president is Trump. Asked "what's wrong with my head why can't I remember"

## 2019-10-13 NOTE — PROGRESS NOTE ADULT - ATTENDING COMMENTS
Patient seen and examined  Mildly confused, not agitated  Doesn't remember me from previous visits  Denies pain  vitals stable, oxygenating well  abd - morbidly obese, benign    ammonia level=113  MELD on admission = 16  Blayne B cirrhotic    - Likely mild hepatic encephalopathy  - ok to transfer to medical service for further work up of liver disease and pancytopenia

## 2019-10-13 NOTE — PROGRESS NOTE ADULT - PROBLEM SELECTOR PLAN 1
spontaneous per patient's report, possibly 2/2 heavy coughing, however per H/O patient is unreliable historian and may be omitting any recent falls/trauma  - pain presently controlled- will decrease oxy to 2.5mg prn severe pain given concern for worsening encephalopathy  - incentive spirometer  - surgery recs appreciated  - vit D low- will start PO supplementation spontaneous per patient's report, possibly 2/2 heavy coughing, however per H/O patient is unreliable historian and may be omitting any recent falls/trauma  - pain presently controlled- will hold opiates due to concern for worsening encephalopathy  - incentive spirometer  - surgery recs appreciated  - vit D low- will start PO supplementation

## 2019-10-13 NOTE — PROGRESS NOTE ADULT - SUBJECTIVE AND OBJECTIVE BOX
MEDICINE ACCEPT NOTE    Hospitalist- Lewis Leon MD  Pager: [phone]  If no response or off-hours, page [phone]  -------------------------------------  Patient is a 59y old  Male who presents with a chief complaint of sp  chest tube removal for rt pleural effusion (12 Oct 2019 11:33)    Subjective: Patient initially on trauma surgery service for R sided traumatic rib fractures suspected 2/2 chronic coughing and associated hemothorax s/p pigtail placement with resolution of hemothorax. Pigtail removed 10/12 and patient with stable respiratory status. Medicine comanaging for cirrhosis until 10/12. Noted to have become increasingly lethargic and encephalopathic overnight. Today, unable to obtain full ROS due to patient's encephalopathy.      VITAL SIGNS:  Vital Signs Last 24 Hrs  T(C): 36.6 (13 Oct 2019 09:01), Max: 36.9 (12 Oct 2019 21:18)  T(F): 97.9 (13 Oct 2019 09:01), Max: 98.5 (12 Oct 2019 21:18)  HR: 81 (13 Oct 2019 09:01) (66 - 81)  BP: 126/88 (13 Oct 2019 09:01) (100/62 - 137/72)  BP(mean): --  RR: 18 (13 Oct 2019 09:01) (16 - 18)  SpO2: 98% (13 Oct 2019 09:01) (94% - 98%)      PHYSICAL EXAM:     GENERAL: +encephalopathic  HEENT: PERRLA, EOMI, moist oropharynx   RESPIRATORY: CTAB, no w/c   CARDIOVASCULAR: RRR, no murmurs, gallops, rubs  ABDOMINAL: soft, non-tender, +obese, moderately distended, positive bowel sounds   EXTREMITIES: no clubbing, cyanosis, +2 pitting edema  NEUROLOGICAL: alert and oriented x 1,moves all extremities, follows basic commands, otherwise exam limited by encephalopathy  SKIN: no rashes or lesions, no jaundice  MUSCULOSKELETAL: no gross joint deformity                          8.3    1.95  )-----------( 63       ( 13 Oct 2019 09:39 )             28.1     10-13    135  |  95<L>  |  14  ----------------------------<  105<H>  4.1   |  30  |  1.11    Ca    8.9      13 Oct 2019 09:39  Phos  3.4     10-12  Mg     1.9     10-12    TPro  7.3  /  Alb  2.9<L>  /  TBili  2.6<H>  /  DBili  x   /  AST  31  /  ALT  17  /  AlkPhos  133<H>  10-13      CAPILLARY BLOOD GLUCOSE      MEDICATIONS  (STANDING):  atorvastatin 20 milliGRAM(s) Oral at bedtime  enoxaparin Injectable 40 milliGRAM(s) SubCutaneous daily  furosemide    Tablet 40 milliGRAM(s) Oral two times a day  lactulose Syrup 10 Gram(s) Oral three times a day  melatonin 3 milliGRAM(s) Oral at bedtime  omega-3-Acid Ethyl Esters 2 Gram(s) Oral two times a day  pantoprazole    Tablet 40 milliGRAM(s) Oral before breakfast  rifaximin 550 milliGRAM(s) Oral two times a day  spironolactone 100 milliGRAM(s) Oral daily    MEDICATIONS  (PRN):  acetaminophen   Tablet .. 650 milliGRAM(s) Oral every 6 hours PRN Mild Pain (1 - 3)  oxyCODONE    IR 2.5 milliGRAM(s) Oral every 6 hours PRN Severe Pain (7 - 10)

## 2019-10-13 NOTE — PROGRESS NOTE ADULT - SUBJECTIVE AND OBJECTIVE BOX
ACS & TRAUMA SURGERY DAILY PROGRESS NOTE    SUBJECTIVE:  - Patient seen and examined at bedside during morning rounds  - Patient states feeling well, laying comfortable in bed  - Denies chest pain, SOB, N/V  --------------------------------------------------------------------------------------------------  OBJECTIVE:   Physical Exam:  General: AAOx3, NAD, lying comfortably in bed  HEENT: NC/AT  Respiratory: nonlabored breathing  Cardiovascular: RRR, normal S1 and S2, no murmurs or gallops  Abdomen: non-distended, soft, non-tender  Extremities: WWP, no edema  --------------------------------------------------------------------------------------------------  V/S:  Vital Signs Last 24 Hrs  T(C): 36.7 (13 Oct 2019 04:29), Max: 36.9 (12 Oct 2019 21:18)  T(F): 98 (13 Oct 2019 04:29), Max: 98.5 (12 Oct 2019 21:18)  HR: 80 (13 Oct 2019 04:29) (66 - 84)  BP: 137/72 (13 Oct 2019 04:29) (100/62 - 137/72)  BP(mean): --  RR: 16 (13 Oct 2019 04:29) (16 - 16)  SpO2: 96% (13 Oct 2019 04:29) (91% - 98%)    --------------------------------------------------------------------------------------------------  I/Os:    11 Oct 2019 07:01  -  12 Oct 2019 07:00  --------------------------------------------------------  IN:    Oral Fluid: 1310 mL  Total IN: 1310 mL    OUT:    Chest Tube: 100 mL    Voided: 2100 mL  Total OUT: 2200 mL    Total NET: -890 mL      12 Oct 2019 07:01  -  13 Oct 2019 04:49  --------------------------------------------------------  IN:    Oral Fluid: 1380 mL  Total IN: 1380 mL    OUT:    Voided: 1150 mL  Total OUT: 1150 mL    Total NET: 230 mL        --------------------------------------------------------------------------------------------------  LABS:                        7.5    1.99  )-----------( 78       ( 12 Oct 2019 11:01 )             27.1     12 Oct 2019 07:21    137    |  97     |  13     ----------------------------<  84     4.3     |  32     |  1.01     Ca    8.2        12 Oct 2019 07:21  Phos  3.4       12 Oct 2019 07:21  Mg     1.9       12 Oct 2019 07:21    TPro  6.9    /  Alb  2.7    /  TBili  2.0    /  DBili  x      /  AST  26     /  ALT  16     /  AlkPhos  131    12 Oct 2019 07:21    PT/INR - ( 12 Oct 2019 10:37 )   PT: 20.9 sec;   INR: 1.80 ratio         PTT - ( 12 Oct 2019 10:37 )  PTT:41.5 sec  CAPILLARY BLOOD GLUCOSE            LIVER FUNCTIONS - ( 12 Oct 2019 07:21 )  Alb: 2.7 g/dL / Pro: 6.9 g/dL / ALK PHOS: 131 U/L / ALT: 16 U/L / AST: 26 U/L / GGT: x               --------------------------------------------------------------------------------------------------  MEDICATIONS  (STANDING):  atorvastatin 20 milliGRAM(s) Oral at bedtime  enoxaparin Injectable 40 milliGRAM(s) SubCutaneous daily  furosemide    Tablet 40 milliGRAM(s) Oral two times a day  melatonin 3 milliGRAM(s) Oral at bedtime  omega-3-Acid Ethyl Esters 2 Gram(s) Oral two times a day  pantoprazole    Tablet 40 milliGRAM(s) Oral before breakfast  spironolactone 100 milliGRAM(s) Oral daily    MEDICATIONS  (PRN):  acetaminophen   Tablet .. 650 milliGRAM(s) Oral every 6 hours PRN Mild Pain (1 - 3)  LORazepam     Tablet 2 milliGRAM(s) Oral every 2 hours PRN CIWA-Ar score increase by 2 points and a total score of 7 or less  oxyCODONE    IR 10 milliGRAM(s) Oral every 8 hours PRN Severe Pain (7 - 10)  oxyCODONE    IR 5 milliGRAM(s) Oral every 6 hours PRN Moderate Pain (4 - 6)  zolpidem 5 milliGRAM(s) Oral at bedtime PRN Insomnia ACS & TRAUMA SURGERY DAILY PROGRESS NOTE    SUBJECTIVE:  - Patient seen and examined at bedside during morning rounds  - Patient states feeling well, laying comfortable in bed, mildly confused  - Denies chest pain, SOB, N/V  --------------------------------------------------------------------------------------------------  OBJECTIVE:   Physical Exam:  General: AAOx3, NAD, lying comfortably in bed  HEENT: NC/AT  Respiratory: nonlabored breathing  Cardiovascular: RRR, normal S1 and S2, no murmurs or gallops  Abdomen: non-distended, soft, non-tender  Extremities: WWP, no edema  --------------------------------------------------------------------------------------------------  V/S:  Vital Signs Last 24 Hrs  T(C): 36.7 (13 Oct 2019 04:29), Max: 36.9 (12 Oct 2019 21:18)  T(F): 98 (13 Oct 2019 04:29), Max: 98.5 (12 Oct 2019 21:18)  HR: 80 (13 Oct 2019 04:29) (66 - 84)  BP: 137/72 (13 Oct 2019 04:29) (100/62 - 137/72)  BP(mean): --  RR: 16 (13 Oct 2019 04:29) (16 - 16)  SpO2: 96% (13 Oct 2019 04:29) (91% - 98%)    --------------------------------------------------------------------------------------------------  I/Os:    11 Oct 2019 07:01  -  12 Oct 2019 07:00  --------------------------------------------------------  IN:    Oral Fluid: 1310 mL  Total IN: 1310 mL    OUT:    Chest Tube: 100 mL    Voided: 2100 mL  Total OUT: 2200 mL    Total NET: -890 mL      12 Oct 2019 07:01  -  13 Oct 2019 04:49  --------------------------------------------------------  IN:    Oral Fluid: 1380 mL  Total IN: 1380 mL    OUT:    Voided: 1150 mL  Total OUT: 1150 mL    Total NET: 230 mL        --------------------------------------------------------------------------------------------------  LABS:                        7.5    1.99  )-----------( 78       ( 12 Oct 2019 11:01 )             27.1     12 Oct 2019 07:21    137    |  97     |  13     ----------------------------<  84     4.3     |  32     |  1.01     Ca    8.2        12 Oct 2019 07:21  Phos  3.4       12 Oct 2019 07:21  Mg     1.9       12 Oct 2019 07:21    TPro  6.9    /  Alb  2.7    /  TBili  2.0    /  DBili  x      /  AST  26     /  ALT  16     /  AlkPhos  131    12 Oct 2019 07:21    PT/INR - ( 12 Oct 2019 10:37 )   PT: 20.9 sec;   INR: 1.80 ratio         PTT - ( 12 Oct 2019 10:37 )  PTT:41.5 sec  CAPILLARY BLOOD GLUCOSE            LIVER FUNCTIONS - ( 12 Oct 2019 07:21 )  Alb: 2.7 g/dL / Pro: 6.9 g/dL / ALK PHOS: 131 U/L / ALT: 16 U/L / AST: 26 U/L / GGT: x               --------------------------------------------------------------------------------------------------  MEDICATIONS  (STANDING):  atorvastatin 20 milliGRAM(s) Oral at bedtime  enoxaparin Injectable 40 milliGRAM(s) SubCutaneous daily  furosemide    Tablet 40 milliGRAM(s) Oral two times a day  melatonin 3 milliGRAM(s) Oral at bedtime  omega-3-Acid Ethyl Esters 2 Gram(s) Oral two times a day  pantoprazole    Tablet 40 milliGRAM(s) Oral before breakfast  spironolactone 100 milliGRAM(s) Oral daily    MEDICATIONS  (PRN):  acetaminophen   Tablet .. 650 milliGRAM(s) Oral every 6 hours PRN Mild Pain (1 - 3)  LORazepam     Tablet 2 milliGRAM(s) Oral every 2 hours PRN CIWA-Ar score increase by 2 points and a total score of 7 or less  oxyCODONE    IR 10 milliGRAM(s) Oral every 8 hours PRN Severe Pain (7 - 10)  oxyCODONE    IR 5 milliGRAM(s) Oral every 6 hours PRN Moderate Pain (4 - 6)  zolpidem 5 milliGRAM(s) Oral at bedtime PRN Insomnia

## 2019-10-14 LAB
ALBUMIN SERPL ELPH-MCNC: 2.5 G/DL — LOW (ref 3.3–5)
ALP SERPL-CCNC: 118 U/L — SIGNIFICANT CHANGE UP (ref 40–120)
ALT FLD-CCNC: 15 U/L — SIGNIFICANT CHANGE UP (ref 10–45)
AMMONIA BLD-MCNC: 103 UMOL/L — HIGH (ref 11–55)
ANION GAP SERPL CALC-SCNC: 10 MMOL/L — SIGNIFICANT CHANGE UP (ref 5–17)
AST SERPL-CCNC: 29 U/L — SIGNIFICANT CHANGE UP (ref 10–40)
BILIRUB SERPL-MCNC: 2.4 MG/DL — HIGH (ref 0.2–1.2)
BUN SERPL-MCNC: 15 MG/DL — SIGNIFICANT CHANGE UP (ref 7–23)
CALCIUM SERPL-MCNC: 8.6 MG/DL — SIGNIFICANT CHANGE UP (ref 8.4–10.5)
CHLORIDE SERPL-SCNC: 97 MMOL/L — SIGNIFICANT CHANGE UP (ref 96–108)
CO2 SERPL-SCNC: 30 MMOL/L — SIGNIFICANT CHANGE UP (ref 22–31)
CREAT SERPL-MCNC: 1.2 MG/DL — SIGNIFICANT CHANGE UP (ref 0.5–1.3)
GLUCOSE SERPL-MCNC: 94 MG/DL — SIGNIFICANT CHANGE UP (ref 70–99)
HCT VFR BLD CALC: 24.9 % — LOW (ref 39–50)
HGB BLD-MCNC: 7.2 G/DL — LOW (ref 13–17)
MCHC RBC-ENTMCNC: 22.4 PG — LOW (ref 27–34)
MCHC RBC-ENTMCNC: 28.9 GM/DL — LOW (ref 32–36)
MCV RBC AUTO: 77.6 FL — LOW (ref 80–100)
NRBC # BLD: 0 /100 WBCS — SIGNIFICANT CHANGE UP (ref 0–0)
PLATELET # BLD AUTO: 74 K/UL — LOW (ref 150–400)
POTASSIUM SERPL-MCNC: 3.9 MMOL/L — SIGNIFICANT CHANGE UP (ref 3.5–5.3)
POTASSIUM SERPL-SCNC: 3.9 MMOL/L — SIGNIFICANT CHANGE UP (ref 3.5–5.3)
PROT SERPL-MCNC: 6.6 G/DL — SIGNIFICANT CHANGE UP (ref 6–8.3)
RBC # BLD: 3.21 M/UL — LOW (ref 4.2–5.8)
RBC # FLD: 19.5 % — HIGH (ref 10.3–14.5)
SODIUM SERPL-SCNC: 137 MMOL/L — SIGNIFICANT CHANGE UP (ref 135–145)
WBC # BLD: 1.65 K/UL — LOW (ref 3.8–10.5)
WBC # FLD AUTO: 1.65 K/UL — LOW (ref 3.8–10.5)

## 2019-10-14 PROCEDURE — 99233 SBSQ HOSP IP/OBS HIGH 50: CPT

## 2019-10-14 PROCEDURE — 76705 ECHO EXAM OF ABDOMEN: CPT | Mod: 26,RT

## 2019-10-14 RX ORDER — LACTULOSE 10 G/15ML
20 SOLUTION ORAL THREE TIMES A DAY
Refills: 0 | Status: DISCONTINUED | OUTPATIENT
Start: 2019-10-14 | End: 2019-10-15

## 2019-10-14 RX ORDER — CHOLECALCIFEROL (VITAMIN D3) 125 MCG
2000 CAPSULE ORAL DAILY
Refills: 0 | Status: DISCONTINUED | OUTPATIENT
Start: 2019-10-14 | End: 2019-10-17

## 2019-10-14 RX ADMIN — LACTULOSE 20 GRAM(S): 10 SOLUTION ORAL at 21:36

## 2019-10-14 RX ADMIN — SPIRONOLACTONE 100 MILLIGRAM(S): 25 TABLET, FILM COATED ORAL at 05:55

## 2019-10-14 RX ADMIN — Medication 2 GRAM(S): at 17:35

## 2019-10-14 RX ADMIN — PANTOPRAZOLE SODIUM 40 MILLIGRAM(S): 20 TABLET, DELAYED RELEASE ORAL at 05:54

## 2019-10-14 RX ADMIN — ATORVASTATIN CALCIUM 20 MILLIGRAM(S): 80 TABLET, FILM COATED ORAL at 21:36

## 2019-10-14 RX ADMIN — Medication 3 MILLIGRAM(S): at 21:36

## 2019-10-14 RX ADMIN — Medication 40 MILLIGRAM(S): at 05:54

## 2019-10-14 RX ADMIN — Medication 2 GRAM(S): at 05:54

## 2019-10-14 RX ADMIN — LACTULOSE 10 GRAM(S): 10 SOLUTION ORAL at 06:21

## 2019-10-14 RX ADMIN — ENOXAPARIN SODIUM 40 MILLIGRAM(S): 100 INJECTION SUBCUTANEOUS at 21:36

## 2019-10-14 RX ADMIN — LACTULOSE 10 GRAM(S): 10 SOLUTION ORAL at 14:36

## 2019-10-14 NOTE — CONSULT NOTE ADULT - ATTENDING COMMENTS
alcohol hx: pt. states he drank only a 1-2 drinks per day for half a year, never more  weight hx: longstanding obesity, current weight is maximal weight, 168 lbs, BMI 53.  SHx: lives alone. Parents and brother live in [place].  On exam very obese, sitting in chair, slightly slow to answer, but A&Ox3, no asterixis.   Known fatty liver, GI Dr. White.    - cirrhosis, likely due to BOB, possibly contribution by alcohol consumption (not heavy); MELD-Na 22 (Na 138, Creat 1.20, Bili 2.4, INR 1.89  - decompensation in setting of rib fracture, small ascites now on lasix/spironolactone 40bid/100 mg/d, hepatic encephalopathy that resolved with lactulose and rifaximin  - pancytopenia, likely due to cirrhosis, PLT 77  - on PPI, indication unclear    I told him that he has severe liver disease, likely due to being overweight, but that he should avoid any alcohol in the future. I told him that cirrhosis can cause ascite and encephalopathy - like he currently has, and also bleeding from esophageal varices, liver cancer, chronic kidney disease, and increase the risk for complications after surgery to that major surgery may not be able to be performed. I also told him that fatty liver disease may progress and cause death, or require a liver transplant; further, that it may not worsen or even improve if he manages to lose significant weight.    -- encephalopathy: continue lactulose, titer to achieve 2-3 soft bowel movements daily; would stop rifaximin and only resume if lactulose alone insufficient  -- ascites: continue diuresis; can likely discharge with lasix 40 mg/d and spironolactone 100 mg/d.  -- would stop PPI, f/u with his gastroenterologist. There is an increasing number of retrospective studies that find PPI associated with worse encephalopathy and even higher mortality.   -- should follow-up in our liver center after discharge

## 2019-10-14 NOTE — PROVIDER CONTACT NOTE (OTHER) - ASSESSMENT
pt hemoglobin 7.2, VSS, pt asymptomatic at this time, pt confused at times but oriented to person place time and situation. no c/o of chest pain, SOB.

## 2019-10-14 NOTE — CONSULT NOTE ADULT - ASSESSMENT
Impression:  1) Cirrhosis, presumedly alcoholic vs BOB, MELD-NA 22  Ascites: Mild on US  Ascites: Unknown  HE: Appears slow to respond on exam  HCC: None on US    Recommendations:  - trend CBC, INR, CMP   - obtain US with doppler to evaluate biliary tree/ rule out portal vein thrombosis  - start Lactulose 20 TID, ok to have multiple bowel movements in the initial time period  - continue Xifaxan 550mg BID  - monitor mental status  - obtain HBsAb, HBsAg, HBcAb, HCV Ab, HAV IgG/IgM, Hep E IgM  - avoid all hepatotoxic agents  - pt will need outpatient hepatology clinic follow up for cirrhosis management (EGD for varices surveillance, hep A and hep B vaccination series if non-immune, ultrasound q6 months for HCC screening (#[id number])  - please call with questions

## 2019-10-14 NOTE — PROGRESS NOTE ADULT - SUBJECTIVE AND OBJECTIVE BOX
Pia Murphy MD  Division of Hospital Medicine  Pager [phone]  If no response or off hours page: [phone]  ---------------------------------------------------------    SANTOS BELLE  59y  Male      Patient is a 59y old  Male who presents with a chief complaint of R sided rib fractures (13 Oct 2019 13:54)      INTERVAL HPI/OVERNIGHT EVENTS:  Patient states he has no complaints of abdominal pain, n/V, fevers. Pulled out his IV last night. no BM this AM        REVIEW OF SYSTEMS: 14 point ROS negative unless listed above    T(C): 36.7 (10-14-19 @ 12:59), Max: 36.8 (10-13-19 @ 21:30)  HR: 78 (10-14-19 @ 12:59) (76 - 83)  BP: 122/62 (10-14-19 @ 12:59) (105/58 - 125/69)  RR: 16 (10-14-19 @ 12:59) (16 - 18)  SpO2: 95% (10-14-19 @ 12:59) (92% - 98%)  Wt(kg): --Vital Signs Last 24 Hrs  T(C): 36.7 (14 Oct 2019 12:59), Max: 36.8 (13 Oct 2019 21:30)  T(F): 98.1 (14 Oct 2019 12:59), Max: 98.2 (13 Oct 2019 21:30)  HR: 78 (14 Oct 2019 12:59) (76 - 83)  BP: 122/62 (14 Oct 2019 12:59) (105/58 - 125/69)  BP(mean): --  RR: 16 (14 Oct 2019 12:59) (16 - 18)  SpO2: 95% (14 Oct 2019 12:59) (92% - 98%)    PHYSICAL EXAM:  GENERAL: NAD, sleepy, obese  HEAD:  Atraumatic, Normocephalic  EYES: EOMI, PERRLA, conjunctiva and sclera clear  ENMT: Moist mucous membranes. No lesions  NECK: Supple  CHEST/LUNG: Clear to auscultation bilaterally; No rales, rhonchi, wheezing, or rubs  HEART: Regular rate and rhythm; No murmurs, rubs, or gallops  ABDOMEN: Soft, Nontender, milddistended; Bowel sounds present.    EXTREMITIES:  No clubbing, cyanosis, or edema  SKIN: No rashes or lesions  PSYCH: Alert & Oriented x 2 to person and place  NERVOUS SYSTEM: no focal deficits, Sensory intact    Consultant(s) Notes Reviewed:  [x ] YES  [ ] NO  Care Discussed with Consultants/Other Providers [ x] YES-NP  [ ] NO    LABS:                        7.2    1.65  )-----------( 74       ( 14 Oct 2019 10:43 )             24.9     10-14    137  |  97  |  15  ----------------------------<  94  3.9   |  30  |  1.20    Ca    8.6      14 Oct 2019 10:43    TPro  6.6  /  Alb  2.5<L>  /  TBili  2.4<H>  /  DBili  x   /  AST  29  /  ALT  15  /  AlkPhos  118  10-14      Urinalysis Basic - ( 13 Oct 2019 12:17 )    Color: Light Yellow / Appearance: Clear / S.009 / pH: x  Gluc: x / Ketone: Negative  / Bili: Negative / Urobili: Negative   Blood: x / Protein: Negative / Nitrite: Negative   Leuk Esterase: Negative / RBC: 4 /hpf / WBC 2 /HPF   Sq Epi: x / Non Sq Epi: 0 /hpf / Bacteria: Few      CAPILLARY BLOOD GLUCOSE            Urinalysis Basic - ( 13 Oct 2019 12:17 )    Color: Light Yellow / Appearance: Clear / S.009 / pH: x  Gluc: x / Ketone: Negative  / Bili: Negative / Urobili: Negative   Blood: x / Protein: Negative / Nitrite: Negative   Leuk Esterase: Negative / RBC: 4 /hpf / WBC 2 /HPF   Sq Epi: x / Non Sq Epi: 0 /hpf / Bacteria: Few        RADIOLOGY & ADDITIONAL TESTS:    Imaging Personally Reviewed:  [ ] YES  [ ] NO
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
## 2019-10-14 NOTE — PROVIDER CONTACT NOTE (OTHER) - ACTION/TREATMENT ORDERED:
Spoke shay lea, oxy 5 already given, will add oxy 5/10 prn, melatonin for bedtime. okay to give lovenox now and will hold off on diuretics until asking team in a.m.
Calvin Jin aware of situation, came by to assess patient, no interventions ordered at this time, chair/bed alarm maintained at all times, will cont to monitor
Freddy Bunn NP aware. NO new interventions at this time. Repeat labs in the morning. will continue to monitor.

## 2019-10-14 NOTE — PROGRESS NOTE ADULT - PROBLEM SELECTOR PLAN 3
Pt noted to be encephalopathic over the weekend, unable to coherently answer questions this morning, but able to follow simple commands, moves all extremities. Currently, improved, able to answer questions appropriately.  Suspect toxic metabolic encephalopathy from hepatic encephalopathy vs. opiate-indeuced vs. doubt co2 narcosis as VBG shows normal PCO2 vs. doubt alcohol withdrawal  - ammonia elevateed > 100, will start rifaximin and lactulose standing, stool count ordered, educated nurse  - r/o infection: check blood cultures x 2, UA negative  - abdominal sono: mild ascites, cirrhosis  - hold opiates  - will hold off on CTH for now in setting of improvement, if patient has worsening encephalopathy will consider CTH at that time  - BIPAP prn hypoxia if patient able to protect airway  - low threshold for MICU consult if patient unable to protect airway

## 2019-10-14 NOTE — PROGRESS NOTE ADULT - PROBLEM SELECTOR PLAN 1
spontaneous per patient's report, possibly 2/2 heavy coughing, however per H/O patient is unreliable historian and may be omitting any recent falls/trauma  - pain presently controlled- will hold opiates due to concern for worsening encephalopathy  - incentive spirometer  - surgery recs appreciated  - vit D low- c/w po supplementation

## 2019-10-15 LAB
ALBUMIN SERPL ELPH-MCNC: 2.7 G/DL — LOW (ref 3.3–5)
ALP SERPL-CCNC: 121 U/L — HIGH (ref 40–120)
ALT FLD-CCNC: 14 U/L — SIGNIFICANT CHANGE UP (ref 10–45)
AMMONIA BLD-MCNC: 96 UMOL/L — HIGH (ref 11–55)
ANION GAP SERPL CALC-SCNC: 13 MMOL/L — SIGNIFICANT CHANGE UP (ref 5–17)
ANISOCYTOSIS BLD QL: SLIGHT — SIGNIFICANT CHANGE UP
AST SERPL-CCNC: 28 U/L — SIGNIFICANT CHANGE UP (ref 10–40)
BASOPHILS # BLD AUTO: 0.02 K/UL — SIGNIFICANT CHANGE UP (ref 0–0.2)
BASOPHILS NFR BLD AUTO: 1 % — SIGNIFICANT CHANGE UP (ref 0–2)
BILIRUB SERPL-MCNC: 2.3 MG/DL — HIGH (ref 0.2–1.2)
BUN SERPL-MCNC: 15 MG/DL — SIGNIFICANT CHANGE UP (ref 7–23)
CALCIUM SERPL-MCNC: 8.7 MG/DL — SIGNIFICANT CHANGE UP (ref 8.4–10.5)
CHLORIDE SERPL-SCNC: 97 MMOL/L — SIGNIFICANT CHANGE UP (ref 96–108)
CO2 SERPL-SCNC: 28 MMOL/L — SIGNIFICANT CHANGE UP (ref 22–31)
CREAT SERPL-MCNC: 1.12 MG/DL — SIGNIFICANT CHANGE UP (ref 0.5–1.3)
ELLIPTOCYTES BLD QL SMEAR: SIGNIFICANT CHANGE UP
EOSINOPHIL # BLD AUTO: 0.05 K/UL — SIGNIFICANT CHANGE UP (ref 0–0.5)
EOSINOPHIL NFR BLD AUTO: 2.6 % — SIGNIFICANT CHANGE UP (ref 0–6)
GLUCOSE SERPL-MCNC: 109 MG/DL — HIGH (ref 70–99)
HAV IGG SER QL IA: REACTIVE
HAV IGM SER-ACNC: SIGNIFICANT CHANGE UP
HBV CORE AB SER-ACNC: SIGNIFICANT CHANGE UP
HBV SURFACE AB SER-ACNC: REACTIVE
HBV SURFACE AG SER-ACNC: SIGNIFICANT CHANGE UP
HCT VFR BLD CALC: 26.4 % — LOW (ref 39–50)
HGB BLD-MCNC: 7.4 G/DL — LOW (ref 13–17)
HYPOCHROMIA BLD QL: SLIGHT — SIGNIFICANT CHANGE UP
IMM GRANULOCYTES NFR BLD AUTO: 0 % — SIGNIFICANT CHANGE UP (ref 0–1.5)
INR BLD: 1.89 RATIO — HIGH (ref 0.88–1.16)
LYMPHOCYTES # BLD AUTO: 0.59 K/UL — LOW (ref 1–3.3)
LYMPHOCYTES # BLD AUTO: 30.1 % — SIGNIFICANT CHANGE UP (ref 13–44)
MANUAL SMEAR VERIFICATION: SIGNIFICANT CHANGE UP
MCHC RBC-ENTMCNC: 22 PG — LOW (ref 27–34)
MCHC RBC-ENTMCNC: 28 GM/DL — LOW (ref 32–36)
MCV RBC AUTO: 78.6 FL — LOW (ref 80–100)
MICROCYTES BLD QL: SLIGHT — SIGNIFICANT CHANGE UP
MONOCYTES # BLD AUTO: 0.25 K/UL — SIGNIFICANT CHANGE UP (ref 0–0.9)
MONOCYTES NFR BLD AUTO: 12.8 % — SIGNIFICANT CHANGE UP (ref 2–14)
NEUTROPHILS # BLD AUTO: 1.05 K/UL — LOW (ref 1.8–7.4)
NEUTROPHILS NFR BLD AUTO: 53.5 % — SIGNIFICANT CHANGE UP (ref 43–77)
PLATELET # BLD AUTO: 77 K/UL — LOW (ref 150–400)
POIKILOCYTOSIS BLD QL AUTO: SLIGHT — SIGNIFICANT CHANGE UP
POLYCHROMASIA BLD QL SMEAR: SLIGHT — SIGNIFICANT CHANGE UP
POTASSIUM SERPL-MCNC: 3.7 MMOL/L — SIGNIFICANT CHANGE UP (ref 3.5–5.3)
POTASSIUM SERPL-SCNC: 3.7 MMOL/L — SIGNIFICANT CHANGE UP (ref 3.5–5.3)
PROT SERPL-MCNC: 6.8 G/DL — SIGNIFICANT CHANGE UP (ref 6–8.3)
PROTHROM AB SERPL-ACNC: 21.8 SEC — HIGH (ref 10–13.1)
RBC # BLD: 3.36 M/UL — LOW (ref 4.2–5.8)
RBC # FLD: 19.2 % — HIGH (ref 10.3–14.5)
RBC BLD AUTO: ABNORMAL
SCHISTOCYTES BLD QL AUTO: SLIGHT — SIGNIFICANT CHANGE UP
SODIUM SERPL-SCNC: 138 MMOL/L — SIGNIFICANT CHANGE UP (ref 135–145)
TSH SERPL-MCNC: 5.29 UIU/ML — HIGH (ref 0.27–4.2)
WBC # BLD: 1.96 K/UL — LOW (ref 3.8–10.5)
WBC # FLD AUTO: 1.96 K/UL — LOW (ref 3.8–10.5)

## 2019-10-15 PROCEDURE — 99222 1ST HOSP IP/OBS MODERATE 55: CPT | Mod: GC

## 2019-10-15 PROCEDURE — 93975 VASCULAR STUDY: CPT | Mod: 26

## 2019-10-15 PROCEDURE — 70450 CT HEAD/BRAIN W/O DYE: CPT | Mod: 26

## 2019-10-15 PROCEDURE — 99233 SBSQ HOSP IP/OBS HIGH 50: CPT

## 2019-10-15 RX ORDER — LACTULOSE 10 G/15ML
20 SOLUTION ORAL EVERY 6 HOURS
Refills: 0 | Status: DISCONTINUED | OUTPATIENT
Start: 2019-10-15 | End: 2019-10-16

## 2019-10-15 RX ADMIN — ATORVASTATIN CALCIUM 20 MILLIGRAM(S): 80 TABLET, FILM COATED ORAL at 22:36

## 2019-10-15 RX ADMIN — Medication 2000 UNIT(S): at 12:05

## 2019-10-15 RX ADMIN — PANTOPRAZOLE SODIUM 40 MILLIGRAM(S): 20 TABLET, DELAYED RELEASE ORAL at 05:22

## 2019-10-15 RX ADMIN — Medication 40 MILLIGRAM(S): at 17:47

## 2019-10-15 RX ADMIN — LACTULOSE 20 GRAM(S): 10 SOLUTION ORAL at 13:40

## 2019-10-15 RX ADMIN — Medication 40 MILLIGRAM(S): at 05:22

## 2019-10-15 RX ADMIN — Medication 2 GRAM(S): at 05:21

## 2019-10-15 RX ADMIN — LACTULOSE 20 GRAM(S): 10 SOLUTION ORAL at 22:50

## 2019-10-15 RX ADMIN — Medication 2 GRAM(S): at 17:47

## 2019-10-15 RX ADMIN — SPIRONOLACTONE 100 MILLIGRAM(S): 25 TABLET, FILM COATED ORAL at 05:22

## 2019-10-15 RX ADMIN — ENOXAPARIN SODIUM 40 MILLIGRAM(S): 100 INJECTION SUBCUTANEOUS at 22:36

## 2019-10-15 RX ADMIN — LACTULOSE 20 GRAM(S): 10 SOLUTION ORAL at 05:21

## 2019-10-15 RX ADMIN — Medication 3 MILLIGRAM(S): at 22:36

## 2019-10-15 NOTE — PROGRESS NOTE ADULT - ATTENDING COMMENTS
Mentation markedly improved, if continues to have improved mentation and CTH w/ no acute changes, will plan for discharge tomorrow. PT re-eval pending. Plan d/w patient, NP, and CM.

## 2019-10-15 NOTE — PROGRESS NOTE ADULT - SUBJECTIVE AND OBJECTIVE BOX
Pia Murphy MD  Division of Hospital Medicine  Pager [phone]  If no response or off hours page: [phone]  ---------------------------------------------------------    SANTOS BELLE  59y  Male      Patient is a 59y old  Male who presents with a chief complaint of R sided rib fractures (14 Oct 2019 13:46)      INTERVAL HPI/OVERNIGHT EVENTS:  Patient today w/ no c/o of abdominal pain, fevers/chills, cp. States he had one BM, and feels like he is going to have another one.         REVIEW OF SYSTEMS: 14 point ROS negative unless listed above    T(C): 36.7 (10-15-19 @ 13:03), Max: 36.9 (10-14-19 @ 17:24)  HR: 90 (10-15-19 @ 13:03) (75 - 100)  BP: 133/69 (10-15-19 @ 13:03) (101/60 - 148/83)  RR: 18 (10-15-19 @ 13:03) (16 - 18)  SpO2: 93% (10-15-19 @ 13:03) (92% - 98%)  Wt(kg): --Vital Signs Last 24 Hrs  T(C): 36.7 (15 Oct 2019 13:03), Max: 36.9 (14 Oct 2019 17:24)  T(F): 98.1 (15 Oct 2019 13:03), Max: 98.4 (14 Oct 2019 17:24)  HR: 90 (15 Oct 2019 13:03) (75 - 100)  BP: 133/69 (15 Oct 2019 13:03) (101/60 - 148/83)  BP(mean): --  RR: 18 (15 Oct 2019 13:03) (16 - 18)  SpO2: 93% (15 Oct 2019 13:03) (92% - 98%)    PHYSICAL EXAM:     GENERAL: NAD, well-developed  HEENT: PERRLA, moist oropharynx   RESPIRATORY: CTAB, no w/c   CARDIOVASCULAR: RRR, no murmurs, gallops, rubs  ABDOMINAL: soft, non-tender, +obese,  positive bowel sounds   EXTREMITIES: no clubbing, cyanosis, +1-2 pitting edema  NEUROLOGICAL: alert and oriented x 3,moves all extremities  SKIN: no rashes or lesions, no jaundice  MUSCULOSKELETAL: no gross joint deformity    Consultant(s) Notes Reviewed:  [x ] YES  [ ] NO  Care Discussed with Consultants/Other Providers [ x] YES-NP Amadoi  [ ] NO    LABS:                        7.4    1.96  )-----------( 77       ( 15 Oct 2019 09:12 )             26.4     10-15    138  |  97  |  15  ----------------------------<  109<H>  3.7   |  28  |  1.12    Ca    8.7      15 Oct 2019 07:25    TPro  6.8  /  Alb  2.7<L>  /  TBili  2.3<H>  /  DBili  x   /  AST  28  /  ALT  14  /  AlkPhos  121<H>  10-15    PT/INR - ( 15 Oct 2019 09:02 )   PT: 21.8 sec;   INR: 1.89 ratio             CAPILLARY BLOOD GLUCOSE                RADIOLOGY & ADDITIONAL TESTS:    Imaging Personally Reviewed:  [ ] YES  [ ] NO
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
## 2019-10-15 NOTE — PROGRESS NOTE ADULT - PROBLEM SELECTOR PLAN 3
Pt noted to be encephalopathic over the weekend, unable to coherently answer questions this morning, but able to follow simple commands, moves all extremities. Currently, improved, able to answer questions appropriately.  Suspect toxic metabolic encephalopathy from hepatic encephalopathy vs. opiate-indeuced vs. doubt co2 narcosis as VBG shows normal PCO2 vs. doubt alcohol withdrawal  - ammonia elevated > 100, c/w rifaximin and lactulose standing, patient states he had one BM, and feels like he going to have another  -Patient today with markedly improved mental status, axo x 3, able to have a full conversation with me  - r/o infection: BCx NGTD, UA negative  - abdominal sono: mild ascites, cirrhosis  - hold opiates  - CTH pending

## 2019-10-15 NOTE — PHYSICAL THERAPY INITIAL EVALUATION ADULT - ADDITIONAL COMMENTS
Patient resides alone in a 2 family house on the second floor, 1 flight of stairs to enter. Pt was independent with all ADLs and no DME at baseline.
Patient resides alone in a 2 family house on the second floor, 1 flight of stairs to enter. Pt was independent with all ADLs and no DME at baseline.

## 2019-10-15 NOTE — PHYSICAL THERAPY INITIAL EVALUATION ADULT - GENERAL OBSERVATIONS, REHAB EVAL
Pt was found in bedside chair on room air, +IVL, +chest tube on water seal
Pt encountered sitting in chair, AO x 3, + family at bedside

## 2019-10-16 LAB
ALBUMIN SERPL ELPH-MCNC: 2.6 G/DL — LOW (ref 3.3–5)
ALP SERPL-CCNC: 119 U/L — SIGNIFICANT CHANGE UP (ref 40–120)
ALT FLD-CCNC: 17 U/L — SIGNIFICANT CHANGE UP (ref 10–45)
AMMONIA BLD-MCNC: 118 UMOL/L — HIGH (ref 11–55)
ANION GAP SERPL CALC-SCNC: 11 MMOL/L — SIGNIFICANT CHANGE UP (ref 5–17)
AST SERPL-CCNC: 24 U/L — SIGNIFICANT CHANGE UP (ref 10–40)
BILIRUB SERPL-MCNC: 2.3 MG/DL — HIGH (ref 0.2–1.2)
BUN SERPL-MCNC: 14 MG/DL — SIGNIFICANT CHANGE UP (ref 7–23)
CALCIUM SERPL-MCNC: 8.7 MG/DL — SIGNIFICANT CHANGE UP (ref 8.4–10.5)
CHLORIDE SERPL-SCNC: 99 MMOL/L — SIGNIFICANT CHANGE UP (ref 96–108)
CO2 SERPL-SCNC: 26 MMOL/L — SIGNIFICANT CHANGE UP (ref 22–31)
CREAT SERPL-MCNC: 1.16 MG/DL — SIGNIFICANT CHANGE UP (ref 0.5–1.3)
GLUCOSE SERPL-MCNC: 108 MG/DL — HIGH (ref 70–99)
HCT VFR BLD CALC: 26 % — LOW (ref 39–50)
HGB BLD-MCNC: 7.4 G/DL — LOW (ref 13–17)
MAGNESIUM SERPL-MCNC: 2.1 MG/DL — SIGNIFICANT CHANGE UP (ref 1.6–2.6)
MCHC RBC-ENTMCNC: 22.2 PG — LOW (ref 27–34)
MCHC RBC-ENTMCNC: 28.5 GM/DL — LOW (ref 32–36)
MCV RBC AUTO: 77.8 FL — LOW (ref 80–100)
PHOSPHATE SERPL-MCNC: 3 MG/DL — SIGNIFICANT CHANGE UP (ref 2.5–4.5)
PLAT MORPH BLD: NORMAL — SIGNIFICANT CHANGE UP
PLATELET # BLD AUTO: 73 K/UL — LOW (ref 150–400)
POTASSIUM SERPL-MCNC: 3.9 MMOL/L — SIGNIFICANT CHANGE UP (ref 3.5–5.3)
POTASSIUM SERPL-SCNC: 3.9 MMOL/L — SIGNIFICANT CHANGE UP (ref 3.5–5.3)
PROT SERPL-MCNC: 6.9 G/DL — SIGNIFICANT CHANGE UP (ref 6–8.3)
RBC # BLD: 3.34 M/UL — LOW (ref 4.2–5.8)
RBC # FLD: 19.1 % — HIGH (ref 10.3–14.5)
SODIUM SERPL-SCNC: 136 MMOL/L — SIGNIFICANT CHANGE UP (ref 135–145)
WBC # BLD: 2.18 K/UL — LOW (ref 3.8–10.5)
WBC # FLD AUTO: 2.18 K/UL — LOW (ref 3.8–10.5)

## 2019-10-16 PROCEDURE — 99233 SBSQ HOSP IP/OBS HIGH 50: CPT

## 2019-10-16 RX ORDER — LACTULOSE 10 G/15ML
30 SOLUTION ORAL EVERY 6 HOURS
Refills: 0 | Status: DISCONTINUED | OUTPATIENT
Start: 2019-10-16 | End: 2019-10-17

## 2019-10-16 RX ORDER — IBUPROFEN 200 MG
400 TABLET ORAL EVERY 8 HOURS
Refills: 0 | Status: DISCONTINUED | OUTPATIENT
Start: 2019-10-16 | End: 2019-10-17

## 2019-10-16 RX ORDER — ZOLPIDEM TARTRATE 10 MG/1
5 TABLET ORAL AT BEDTIME
Refills: 0 | Status: DISCONTINUED | OUTPATIENT
Start: 2019-10-16 | End: 2019-10-17

## 2019-10-16 RX ADMIN — LACTULOSE 20 GRAM(S): 10 SOLUTION ORAL at 17:43

## 2019-10-16 RX ADMIN — Medication 650 MILLIGRAM(S): at 05:08

## 2019-10-16 RX ADMIN — Medication 650 MILLIGRAM(S): at 21:38

## 2019-10-16 RX ADMIN — Medication 2 GRAM(S): at 17:42

## 2019-10-16 RX ADMIN — Medication 40 MILLIGRAM(S): at 05:07

## 2019-10-16 RX ADMIN — ATORVASTATIN CALCIUM 20 MILLIGRAM(S): 80 TABLET, FILM COATED ORAL at 21:38

## 2019-10-16 RX ADMIN — LACTULOSE 20 GRAM(S): 10 SOLUTION ORAL at 11:48

## 2019-10-16 RX ADMIN — ENOXAPARIN SODIUM 40 MILLIGRAM(S): 100 INJECTION SUBCUTANEOUS at 21:38

## 2019-10-16 RX ADMIN — LACTULOSE 30 GRAM(S): 10 SOLUTION ORAL at 21:38

## 2019-10-16 RX ADMIN — SPIRONOLACTONE 100 MILLIGRAM(S): 25 TABLET, FILM COATED ORAL at 05:07

## 2019-10-16 RX ADMIN — LACTULOSE 20 GRAM(S): 10 SOLUTION ORAL at 05:07

## 2019-10-16 RX ADMIN — Medication 650 MILLIGRAM(S): at 05:40

## 2019-10-16 RX ADMIN — Medication 400 MILLIGRAM(S): at 17:42

## 2019-10-16 RX ADMIN — Medication 40 MILLIGRAM(S): at 17:42

## 2019-10-16 RX ADMIN — ZOLPIDEM TARTRATE 5 MILLIGRAM(S): 10 TABLET ORAL at 21:38

## 2019-10-16 RX ADMIN — PANTOPRAZOLE SODIUM 40 MILLIGRAM(S): 20 TABLET, DELAYED RELEASE ORAL at 05:10

## 2019-10-16 RX ADMIN — Medication 2000 UNIT(S): at 11:48

## 2019-10-16 RX ADMIN — Medication 2 GRAM(S): at 05:07

## 2019-10-16 RX ADMIN — Medication 3 MILLIGRAM(S): at 21:38

## 2019-10-16 RX ADMIN — Medication 650 MILLIGRAM(S): at 22:10

## 2019-10-16 NOTE — CHART NOTE - NSCHARTNOTEFT_GEN_A_CORE
Pt had 1 BM since last night. D/w Hepatology MD, Dr Meléndez.  	His recommendations as follows:   	No need to monitor Ammonia level since pt is now alert.   	Increase Lactulose to 30 gm  Q6h since pt needs to have 3-4 BMs/day.   	D/c Rifaxamin.   	Pt can be d/cd tomorrow - He needs to follow up with his own Hepatologist, of if  	has none, pt can follow up with Dr Swartz in 1 to 2 weeks.   	D/w Attending Dr Murphy.

## 2019-10-16 NOTE — PROGRESS NOTE ADULT - PROBLEM SELECTOR PLAN 1
spontaneous per patient's report, possibly 2/2 heavy coughing, however per H/O patient is unreliable historian and may be omitting any recent falls/trauma  - pain presently controlled- will continue to hold opiates due to concern for worsening encephalopathy  - incentive spirometer  - surgery recs appreciated  - vit D low- c/w po supplementation

## 2019-10-16 NOTE — PROGRESS NOTE ADULT - PROBLEM SELECTOR PLAN 3
Pt noted to be encephalopathic over the weekend, unable to coherently answer questions this morning, but able to follow simple commands, moves all extremities. Currently, improved, able to answer questions appropriately.  Suspect toxic metabolic encephalopathy from hepatic encephalopathy vs. opiate-induced vs. doubt co2 narcosis as VBG shows normal PCO2 vs. doubt alcohol withdrawal  - ammonia elevated > 100, mental status much improved and having BM's  - r/o infection: BCx NGTD, UA negative  - abdominal sono: mild ascites, cirrhosis  - hold opiates  - CTH w/ no acute findings

## 2019-10-16 NOTE — PROGRESS NOTE ADULT - SUBJECTIVE AND OBJECTIVE BOX
Pia Murphy MD  Division of Hospital Medicine  Pager [phone]  If no response or off hours page: [phone]  ---------------------------------------------------------    SANTOS BELLE  59y  Male      Patient is a 59y old  Male who presents with a chief complaint of R sided rib fractures (15 Oct 2019 15:36)      INTERVAL HPI/OVERNIGHT EVENTS:        REVIEW OF SYSTEMS: 14 point ROS negative unless listed above    T(C): 36.4 (10-16-19 @ 07:12), Max: 36.8 (10-15-19 @ 20:30)  HR: 82 (10-16-19 @ 07:12) (75 - 90)  BP: 132/73 (10-16-19 @ 07:12) (119/65 - 133/69)  RR: 17 (10-16-19 @ 07:12) (17 - 18)  SpO2: 95% (10-16-19 @ 07:12) (93% - 97%)  Wt(kg): --Vital Signs Last 24 Hrs  T(C): 36.4 (16 Oct 2019 07:12), Max: 36.8 (15 Oct 2019 20:30)  T(F): 97.6 (16 Oct 2019 07:12), Max: 98.3 (15 Oct 2019 20:30)  HR: 82 (16 Oct 2019 07:12) (75 - 90)  BP: 132/73 (16 Oct 2019 07:12) (119/65 - 133/69)  BP(mean): --  RR: 17 (16 Oct 2019 07:12) (17 - 18)  SpO2: 95% (16 Oct 2019 07:12) (93% - 97%)    PHYSICAL EXAM:  GENERAL: NAD, well-groomed, well-developed  HEAD:  Atraumatic, Normocephalic  EYES: EOMI, PERRLA, conjunctiva and sclera clear  ENMT: No tonsillar erythema, exudates; Moist mucous membranes. No lesions  NECK: Supple, No JVD  CHEST/LUNG: Clear to auscultation bilaterally; No rales, rhonchi, wheezing, or rubs  HEART: Regular rate and rhythm; No murmurs, rubs, or gallops  ABDOMEN: Soft, Nontender, Nondistended; Bowel sounds present.    EXTREMITIES:  2+ Peripheral Pulses, No clubbing, cyanosis, or edema  LYMPH: No lymphadenopathy noted  SKIN: No rashes or lesions  PSYCH: Alert & Oriented x3  NERVOUS SYSTEM: Motor Strength 5/5 B/L upper and lower extremities.  Sensory intact    Consultant(s) Notes Reviewed:  [x ] YES  [ ] NO  Care Discussed with Consultants/Other Providers [ x] YES  [ ] NO    LABS:                        7.4    2.18  )-----------( 73       ( 16 Oct 2019 08:11 )             26.0     10-16    136  |  99  |  14  ----------------------------<  108<H>  3.9   |  26  |  1.16    Ca    8.7      16 Oct 2019 06:15  Phos  3.0     10-16  Mg     2.1     10-16    TPro  6.9  /  Alb  2.6<L>  /  TBili  2.3<H>  /  DBili  x   /  AST  24  /  ALT  17  /  AlkPhos  119  10-16    PT/INR - ( 15 Oct 2019 09:02 )   PT: 21.8 sec;   INR: 1.89 ratio             CAPILLARY BLOOD GLUCOSE                RADIOLOGY & ADDITIONAL TESTS:    Imaging Personally Reviewed:  [ ] YES  [ ] NO Pia Murphy MD  Division of Hospital Medicine  Pager [phone]  If no response or off hours page: [phone]  ---------------------------------------------------------    SANTOS BELLE  59y  Male      Patient is a 59y old  Male who presents with a chief complaint of R sided rib fractures (15 Oct 2019 15:36)      INTERVAL HPI/OVERNIGHT EVENTS:  No acute events overnight, Patient had 1 BM.  He currently has no complaints of pain, SOB, CP.         REVIEW OF SYSTEMS: 14 point ROS negative unless listed above    T(C): 36.4 (10-16-19 @ 07:12), Max: 36.8 (10-15-19 @ 20:30)  HR: 82 (10-16-19 @ 07:12) (75 - 90)  BP: 132/73 (10-16-19 @ 07:12) (119/65 - 133/69)  RR: 17 (10-16-19 @ 07:12) (17 - 18)  SpO2: 95% (10-16-19 @ 07:12) (93% - 97%)  Wt(kg): --Vital Signs Last 24 Hrs  T(C): 36.4 (16 Oct 2019 07:12), Max: 36.8 (15 Oct 2019 20:30)  T(F): 97.6 (16 Oct 2019 07:12), Max: 98.3 (15 Oct 2019 20:30)  HR: 82 (16 Oct 2019 07:12) (75 - 90)  BP: 132/73 (16 Oct 2019 07:12) (119/65 - 133/69)  BP(mean): --  RR: 17 (16 Oct 2019 07:12) (17 - 18)  SpO2: 95% (16 Oct 2019 07:12) (93% - 97%)    PHYSICAL EXAM:  GENERAL: NAD, well-groomed, well-developed  EYES:  conjunctiva and sclera clear  ENMT: Moist mucous membranes. No lesions  NECK: Supple  CHEST/LUNG: Clear to auscultation bilaterally; No rales, rhonchi, wheezing, or rubs  HEART: Regular rate and rhythm; No murmurs, rubs, or gallops  ABDOMEN: Soft, Nontender, Nondistended    EXTREMITIES:  No clubbing, cyanosis, or edema  SKIN: No rashes or lesions  PSYCH: Alert & Oriented x3      Consultant(s) Notes Reviewed:  [x ] YES  [ ] NO  Care Discussed with Consultants/Other Providers [ x] YES-NP  [ ] NO    LABS:                        7.4    2.18  )-----------( 73       ( 16 Oct 2019 08:11 )             26.0     10-16    136  |  99  |  14  ----------------------------<  108<H>  3.9   |  26  |  1.16    Ca    8.7      16 Oct 2019 06:15  Phos  3.0     10-16  Mg     2.1     10-16    TPro  6.9  /  Alb  2.6<L>  /  TBili  2.3<H>  /  DBili  x   /  AST  24  /  ALT  17  /  AlkPhos  119  10-16    PT/INR - ( 15 Oct 2019 09:02 )   PT: 21.8 sec;   INR: 1.89 ratio             CAPILLARY BLOOD GLUCOSE                RADIOLOGY & ADDITIONAL TESTS:    Imaging Personally Reviewed:  [ ] YES  [ ] NO    < from: CT Head No Cont (10.15.19 @ 14:54) >  FINDINGS:      There is no acute intracranial mass-effect, hemorrhage, midline shift, or   abnormal extra-axial fluid collection.     Ventricles, sulci, and cisterns are normal in size for the patient's age.   The gray-white junction is grossly preserved. No hydrocephalus. Basal   cisterns are patent.     Bilateral sphenoid and ethmoid sinus thickening. Bilateral maxillary   inclusion cysts versus polyps. Mastoid air cells are intact. Bilateral   proptosis. Multiple small dural calcifications. Calvarium is intact.     IMPRESSION:     No evidence of acute intracranialpathology.    < end of copied text >

## 2019-10-16 NOTE — PROGRESS NOTE ADULT - SUBJECTIVE AND OBJECTIVE BOX
Chief Complaint: Right sided flank pain    Interval Events:   - The patient had no complaints this morning  - He was awake, alert, and conversive    Allergies:  No Known Allergies    Hospital Medications:  acetaminophen   Tablet .. 650 milliGRAM(s) Oral every 6 hours PRN  atorvastatin 20 milliGRAM(s) Oral at bedtime  cholecalciferol 2000 Unit(s) Oral daily  enoxaparin Injectable 40 milliGRAM(s) SubCutaneous daily  furosemide    Tablet 40 milliGRAM(s) Oral two times a day  lactulose Syrup 20 Gram(s) Oral every 6 hours  melatonin 3 milliGRAM(s) Oral at bedtime  omega-3-Acid Ethyl Esters 2 Gram(s) Oral two times a day  pantoprazole    Tablet 40 milliGRAM(s) Oral before breakfast  rifaximin 550 milliGRAM(s) Oral two times a day  spironolactone 100 milliGRAM(s) Oral daily    PMHX/PSHX:  CHF (congestive heart failure)  Obese  Hyperlipidemia  Pneumonia  CHF (congestive heart failure)  No significant past surgical history    Family history:      ROS:     General:  No wt loss, fevers, chills, night sweats, fatigue,   Eyes:  Good vision, no reported pain  ENT:  No sore throat, pain, runny nose, dysphagia  CV:  No pain, palpitations, hypo/hypertension  Pulm:  No dyspnea, cough, tachypnea, wheezing  GI:  No pain, No nausea, No vomiting, No diarrhea, No constipation, No weight loss, No fever, No pruritis, No rectal bleeding, No tarry stools, No dysphagia  :  No pain, bleeding, incontinence, nocturia  Muscle:  + pain, weakness  Neuro:  No weakness, tingling, memory problems  Psych:  No fatigue, insomnia, mood problems, depression  Endocrine:  No polyuria, polydipsia, cold/heat intolerance  Heme:  No petechiae, ecchymosis, easy bruisability  Skin:  No rash, tattoos, scars, edema    PHYSICAL EXAM:   Vital Signs:  Vital Signs Last 24 Hrs  T(C): 36.4 (16 Oct 2019 07:12), Max: 36.8 (15 Oct 2019 20:30)  T(F): 97.6 (16 Oct 2019 07:12), Max: 98.3 (15 Oct 2019 20:30)  HR: 82 (16 Oct 2019 07:12) (75 - 90)  BP: 132/73 (16 Oct 2019 07:12) (119/65 - 133/69)  BP(mean): --  RR: 17 (16 Oct 2019 07:12) (17 - 18)  SpO2: 95% (16 Oct 2019 07:12) (93% - 97%)    GENERAL:  No acute distress  HEENT:  Normocephalic/atraumatic,  no scleral icterus  CHEST:  Normal effort, no accessory muscle use  HEART:  Regular rate and rhythm, no murmurs/rubs/gallops  ABDOMEN:  Soft, non-tender, + distended, normoactive bowel sounds  EXTREMITIES:  No cyanosis, clubbing, or edema  SKIN:  No rash/erythema  NEURO:  Alert and oriented x 3, no asterixis    LABS:                        7.4    2.18  )-----------( 73       ( 16 Oct 2019 08:11 )             26.0     Mean Cell Volume: 77.8 fl (10-16-19 @ 08:11)    10-16    136  |  99  |  14  ----------------------------<  108<H>  3.9   |  26  |  1.16    Ca    8.7      16 Oct 2019 06:15  Phos  3.0     10-16  Mg     2.1     10-16    TPro  6.9  /  Alb  2.6<L>  /  TBili  2.3<H>  /  DBili  x   /  AST  24  /  ALT  17  /  AlkPhos  119  10-16    LIVER FUNCTIONS - ( 16 Oct 2019 06:15 )  Alb: 2.6 g/dL / Pro: 6.9 g/dL / ALK PHOS: 119 U/L / ALT: 17 U/L / AST: 24 U/L / GGT: x           PT/INR - ( 15 Oct 2019 09:02 )   PT: 21.8 sec;   INR: 1.89 ratio      Amylase Serum--      Lipase serum--       [id number]  Amylase Serum--      Lipase serum--       [id number]                          7.4    2.18  )-----------( 73       ( 16 Oct 2019 08:11 )             26.0                         7.4    1.96  )-----------( 77       ( 15 Oct 2019 09:12 )             26.4                         7.2    1.65  )-----------( 74       ( 14 Oct 2019 10:43 )             24.9     Imaging:    < from: CT Head No Cont (10.15.19 @ 14:54) >    EXAM:  CT BRAIN                            PROCEDURE DATE:  10/15/2019            INTERPRETATION:  HISTORY: Altered mental status.    COMPARISON: Comparison is made with CT head from 10/8/2019.    TECHNIQUE: Axial noncontrast CT images from the skull base to the vertex   were obtained and submitted for interpretation. Coronal and sagittal   reformatted images were performed. Bone and soft tissue windows were   evaluated.    FINDINGS:      There is no acute intracranial mass-effect, hemorrhage, midline shift, or   abnormal extra-axial fluid collection.     Ventricles, sulci, and cisterns are normal in size for the patient's age.   The gray-white junction is grossly preserved. No hydrocephalus. Basal   cisterns are patent.     Bilateral sphenoid and ethmoid sinus thickening. Bilateral maxillary   inclusion cysts versus polyps. Mastoid air cells are intact. Bilateral   proptosis. Multiple small dural calcifications. Calvarium is intact.     IMPRESSION:     No evidence of acute intracranialpathology.                ODELL DIALLO M.D., RADIOLOGY RESIDENT  This document has been electronically signed.  ZOLTAN FERNÁNDEZ M.D., ATTENDING RADIOLOGIST  This document has been electronically signed. Oct 15 2019  4:41PM                < end of copied text >

## 2019-10-16 NOTE — PROGRESS NOTE ADULT - ATTENDING COMMENTS
Dispo likely today, w/ outpatient PT Will d/w hepatology, though ammonia level elevated, clinically patient much improved. Likely dispo later today vs tomorrow, pending hepatology recs. Plan d/w patient and NP.

## 2019-10-16 NOTE — PROGRESS NOTE ADULT - ASSESSMENT
Impression:  1) Cirrhosis, presumedly alcoholic vs BOB, MELD-NA 22  Ascites: Mild on US  HE: AAO x 3, no asterixis  HCC: None on US    Recommendations:  - trend CBC, INR, CMP   - Continue Lactulose 20 TID  - continue rifaximin 550mg BID  - monitor mental status  - F/U HBsAb, HBsAg, HBcAb, HCV Ab, HAV IgG/IgM, Hep E IgM  - avoid all hepatotoxic agents  - pt will need outpatient hepatology clinic follow up for cirrhosis management (EGD for varices surveillance, hep A and hep B vaccination series if non-immune, ultrasound q6 months for HCC screening (#[id number])    Mikel Rai MD  Gastroenterology Fellow  Pager number: [phone] / [phone]    Please call Hepatology ([phone]) if there are any additional questions or concerns. Please call on-call GI fellow after 5pm and before 8am, and on weekends.

## 2019-10-17 ENCOUNTER — TRANSCRIPTION ENCOUNTER (OUTPATIENT)
Age: 59
End: 2019-10-17

## 2019-10-17 VITALS
HEART RATE: 80 BPM | DIASTOLIC BLOOD PRESSURE: 52 MMHG | OXYGEN SATURATION: 94 % | TEMPERATURE: 98 F | RESPIRATION RATE: 17 BRPM | SYSTOLIC BLOOD PRESSURE: 104 MMHG

## 2019-10-17 LAB
HCT VFR BLD CALC: 24.8 % — LOW (ref 39–50)
HGB BLD-MCNC: 7.2 G/DL — LOW (ref 13–17)
MCHC RBC-ENTMCNC: 22.6 PG — LOW (ref 27–34)
MCHC RBC-ENTMCNC: 29 GM/DL — LOW (ref 32–36)
MCV RBC AUTO: 78 FL — LOW (ref 80–100)
NRBC # BLD: 0 /100 WBCS — SIGNIFICANT CHANGE UP (ref 0–0)
PLATELET # BLD AUTO: 65 K/UL — LOW (ref 150–400)
RBC # BLD: 3.18 M/UL — LOW (ref 4.2–5.8)
RBC # FLD: 18.6 % — HIGH (ref 10.3–14.5)
WBC # BLD: 2.03 K/UL — LOW (ref 3.8–10.5)
WBC # FLD AUTO: 2.03 K/UL — LOW (ref 3.8–10.5)

## 2019-10-17 PROCEDURE — 81001 URINALYSIS AUTO W/SCOPE: CPT

## 2019-10-17 PROCEDURE — 97164 PT RE-EVAL EST PLAN CARE: CPT

## 2019-10-17 PROCEDURE — 32556 INSERT CATH PLEURA W/O IMAGE: CPT

## 2019-10-17 PROCEDURE — 83930 ASSAY OF BLOOD OSMOLALITY: CPT

## 2019-10-17 PROCEDURE — 87205 SMEAR GRAM STAIN: CPT

## 2019-10-17 PROCEDURE — 88305 TISSUE EXAM BY PATHOLOGIST: CPT

## 2019-10-17 PROCEDURE — 93308 TTE F-UP OR LMTD: CPT

## 2019-10-17 PROCEDURE — 82435 ASSAY OF BLOOD CHLORIDE: CPT

## 2019-10-17 PROCEDURE — 82607 VITAMIN B-12: CPT

## 2019-10-17 PROCEDURE — 84484 ASSAY OF TROPONIN QUANT: CPT

## 2019-10-17 PROCEDURE — 86704 HEP B CORE ANTIBODY TOTAL: CPT

## 2019-10-17 PROCEDURE — 70450 CT HEAD/BRAIN W/O DYE: CPT

## 2019-10-17 PROCEDURE — 83880 ASSAY OF NATRIURETIC PEPTIDE: CPT

## 2019-10-17 PROCEDURE — C1729: CPT

## 2019-10-17 PROCEDURE — 82247 BILIRUBIN TOTAL: CPT

## 2019-10-17 PROCEDURE — 87340 HEPATITIS B SURFACE AG IA: CPT

## 2019-10-17 PROCEDURE — 86709 HEPATITIS A IGM ANTIBODY: CPT

## 2019-10-17 PROCEDURE — 87070 CULTURE OTHR SPECIMN AEROBIC: CPT

## 2019-10-17 PROCEDURE — 86901 BLOOD TYPING SEROLOGIC RH(D): CPT

## 2019-10-17 PROCEDURE — 84100 ASSAY OF PHOSPHORUS: CPT

## 2019-10-17 PROCEDURE — 82140 ASSAY OF AMMONIA: CPT

## 2019-10-17 PROCEDURE — 87040 BLOOD CULTURE FOR BACTERIA: CPT

## 2019-10-17 PROCEDURE — 76705 ECHO EXAM OF ABDOMEN: CPT

## 2019-10-17 PROCEDURE — 82945 GLUCOSE OTHER FLUID: CPT

## 2019-10-17 PROCEDURE — 80048 BASIC METABOLIC PNL TOTAL CA: CPT

## 2019-10-17 PROCEDURE — 85610 PROTHROMBIN TIME: CPT

## 2019-10-17 PROCEDURE — 93005 ELECTROCARDIOGRAM TRACING: CPT | Mod: XU

## 2019-10-17 PROCEDURE — 82306 VITAMIN D 25 HYDROXY: CPT

## 2019-10-17 PROCEDURE — 86708 HEPATITIS A ANTIBODY: CPT

## 2019-10-17 PROCEDURE — 87075 CULTR BACTERIA EXCEPT BLOOD: CPT

## 2019-10-17 PROCEDURE — 85014 HEMATOCRIT: CPT

## 2019-10-17 PROCEDURE — 82565 ASSAY OF CREATININE: CPT

## 2019-10-17 PROCEDURE — 82310 ASSAY OF CALCIUM: CPT

## 2019-10-17 PROCEDURE — 36430 TRANSFUSION BLD/BLD COMPNT: CPT

## 2019-10-17 PROCEDURE — 82550 ASSAY OF CK (CPK): CPT

## 2019-10-17 PROCEDURE — 83970 ASSAY OF PARATHORMONE: CPT

## 2019-10-17 PROCEDURE — C8929: CPT

## 2019-10-17 PROCEDURE — 89051 BODY FLUID CELL COUNT: CPT

## 2019-10-17 PROCEDURE — 86850 RBC ANTIBODY SCREEN: CPT

## 2019-10-17 PROCEDURE — 84443 ASSAY THYROID STIM HORMONE: CPT

## 2019-10-17 PROCEDURE — 83010 ASSAY OF HAPTOGLOBIN QUANT: CPT

## 2019-10-17 PROCEDURE — 88112 CYTOPATH CELL ENHANCE TECH: CPT

## 2019-10-17 PROCEDURE — 83986 ASSAY PH BODY FLUID NOS: CPT

## 2019-10-17 PROCEDURE — 36415 COLL VENOUS BLD VENIPUNCTURE: CPT

## 2019-10-17 PROCEDURE — 82977 ASSAY OF GGT: CPT

## 2019-10-17 PROCEDURE — 82330 ASSAY OF CALCIUM: CPT

## 2019-10-17 PROCEDURE — 86706 HEP B SURFACE ANTIBODY: CPT

## 2019-10-17 PROCEDURE — 82248 BILIRUBIN DIRECT: CPT

## 2019-10-17 PROCEDURE — 71045 X-RAY EXAM CHEST 1 VIEW: CPT

## 2019-10-17 PROCEDURE — 83605 ASSAY OF LACTIC ACID: CPT

## 2019-10-17 PROCEDURE — 84132 ASSAY OF SERUM POTASSIUM: CPT

## 2019-10-17 PROCEDURE — 86803 HEPATITIS C AB TEST: CPT

## 2019-10-17 PROCEDURE — 82553 CREATINE MB FRACTION: CPT

## 2019-10-17 PROCEDURE — 86900 BLOOD TYPING SEROLOGIC ABO: CPT

## 2019-10-17 PROCEDURE — 86923 COMPATIBILITY TEST ELECTRIC: CPT

## 2019-10-17 PROCEDURE — P9016: CPT

## 2019-10-17 PROCEDURE — 82042 OTHER SOURCE ALBUMIN QUAN EA: CPT

## 2019-10-17 PROCEDURE — 80076 HEPATIC FUNCTION PANEL: CPT

## 2019-10-17 PROCEDURE — 84295 ASSAY OF SERUM SODIUM: CPT

## 2019-10-17 PROCEDURE — 84157 ASSAY OF PROTEIN OTHER: CPT

## 2019-10-17 PROCEDURE — 83615 LACTATE (LD) (LDH) ENZYME: CPT

## 2019-10-17 PROCEDURE — 93975 VASCULAR STUDY: CPT

## 2019-10-17 PROCEDURE — 80053 COMPREHEN METABOLIC PANEL: CPT

## 2019-10-17 PROCEDURE — 82803 BLOOD GASES ANY COMBINATION: CPT

## 2019-10-17 PROCEDURE — 99285 EMERGENCY DEPT VISIT HI MDM: CPT | Mod: 25

## 2019-10-17 PROCEDURE — 83735 ASSAY OF MAGNESIUM: CPT

## 2019-10-17 PROCEDURE — 85730 THROMBOPLASTIN TIME PARTIAL: CPT

## 2019-10-17 PROCEDURE — 86790 VIRUS ANTIBODY NOS: CPT

## 2019-10-17 PROCEDURE — 82947 ASSAY GLUCOSE BLOOD QUANT: CPT

## 2019-10-17 PROCEDURE — 82652 VIT D 1 25-DIHYDROXY: CPT

## 2019-10-17 PROCEDURE — 99239 HOSP IP/OBS DSCHRG MGMT >30: CPT

## 2019-10-17 PROCEDURE — 83690 ASSAY OF LIPASE: CPT

## 2019-10-17 PROCEDURE — 85027 COMPLETE CBC AUTOMATED: CPT

## 2019-10-17 PROCEDURE — 97161 PT EVAL LOW COMPLEX 20 MIN: CPT

## 2019-10-17 RX ORDER — LACTULOSE 10 G/15ML
45 SOLUTION ORAL
Qty: 250 | Refills: 0
Start: 2019-10-17 | End: 2019-11-15

## 2019-10-17 RX ADMIN — Medication 400 MILLIGRAM(S): at 05:42

## 2019-10-17 RX ADMIN — LACTULOSE 30 GRAM(S): 10 SOLUTION ORAL at 10:54

## 2019-10-17 RX ADMIN — Medication 2 GRAM(S): at 05:42

## 2019-10-17 RX ADMIN — PANTOPRAZOLE SODIUM 40 MILLIGRAM(S): 20 TABLET, DELAYED RELEASE ORAL at 05:43

## 2019-10-17 RX ADMIN — Medication 2000 UNIT(S): at 11:36

## 2019-10-17 RX ADMIN — Medication 400 MILLIGRAM(S): at 06:10

## 2019-10-17 RX ADMIN — LACTULOSE 30 GRAM(S): 10 SOLUTION ORAL at 03:37

## 2019-10-17 RX ADMIN — Medication 40 MILLIGRAM(S): at 05:43

## 2019-10-17 RX ADMIN — SPIRONOLACTONE 100 MILLIGRAM(S): 25 TABLET, FILM COATED ORAL at 05:43

## 2019-10-17 NOTE — PROGRESS NOTE ADULT - PROVIDER SPECIALTY LIST ADULT
Hepatology
Hospitalist
Thoracic Surgery
Trauma Surgery
CT Surgery
Hospitalist
Hospitalist

## 2019-10-17 NOTE — PROGRESS NOTE ADULT - PROBLEM SELECTOR PLAN 5
likely 2/2 cirrhosis- lower than baseline, possibly 2/2 infection  - f/u infection workup as above  - monitor off abx for now  - continue to monitor
likely 2/2 cirrhosis- lower than baseline, possibly 2/2 infection  - infectious work-up negative  - monitor off abx for now  - continue to monitor
nonadherent to CPAP  - continue monitoring

## 2019-10-17 NOTE — PROGRESS NOTE ADULT - PROBLEM SELECTOR PLAN 7
diagnosed with ITP by Dr. Jakob cohen
diagnosed with ITP by Dr. Robert  - continue monitoring
diagnosed with ITP by Dr. Jakob cohen
diagnosed with ITP by Dr. Robert  - continue monitoring
diagnosed with ITP by Dr. Robert  - continue monitoring
on OP IV iron infusions.   - consider 1 unit prbc transfusion
on OP IV iron infusions.   - s/p 1 unit prbc  - resume OP follow up with Dr. Robert
on OP IV iron infusions.   - s/p 1 unit prbc  - resume OP follow up with Dr. Robret

## 2019-10-17 NOTE — PROGRESS NOTE ADULT - PROBLEM SELECTOR PLAN 2
2/2 rib fractures, s/p pigtail placement, now removed. Patient with stable respiratory status  -on RA
2/2 rib fractures, s/p pigtail placement, now removed. Patient with stable respiratory status  -on RA
2/2 rib fractures, s/p pigtail placement, now removed. Patient with stable respiratory status  - would place on continuous pulse ox given new encephalopathy
2/2 rib fractures, s/p pigtail placement, now removed. Patient with stable respiratory status  -on RA
2/2 rib fractures, s/p pigtail placement, now removed. Patient with stable respiratory status  -on RA
exudative by lytes criteria. Likely hemothorax 2/2 rib fractures vs. less likely superinfected transudative pleural effusion from cirrhosis vs. less likely malignant  - cytology negative for malignant cells  - TTE without LV/RV dysfunction  - f/u final gram stain/culture  - wean off o2 as tolerated  - incentive spirometer
exudative by lytes criteria. Possibly 2/2 rib fractures vs. less likely superinfected transudative pleural effusion from cirrhosis vs. less likely malignant  - cytology negative for malignant cells  - TTE without LV/RV dysfunction  - f/u final gram stain/culture  - wean off o2 as tolerated  - incentive spirometer
exudative by lytes criteria. Possibly superinfected transudative pleural effusion from cirrhosis or CHF vs. possible malignant pleural effusion  - s/p chest tube, f/u cytology, monitor output  - f/u TTE to eval LV/RV  - wean off o2 as tolerated  - incentive spirometer

## 2019-10-17 NOTE — PROGRESS NOTE ADULT - PROBLEM SELECTOR PROBLEM 5
Leukopenia
SARKIS (obstructive sleep apnea)

## 2019-10-17 NOTE — PROGRESS NOTE ADULT - PROBLEM SELECTOR PROBLEM 7
Thrombocytopenia
Thrombocytopenia
Iron deficiency anemia
Thrombocytopenia

## 2019-10-17 NOTE — PROGRESS NOTE ADULT - PROBLEM SELECTOR PROBLEM 8
SARKIS (obstructive sleep apnea)
Thrombocytopenia

## 2019-10-17 NOTE — PROGRESS NOTE ADULT - PROBLEM SELECTOR PLAN 4
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 Currently decompensated with hepatic encephalopathy  - transplant hepatology had seen patient on friday 10/11 and left clinic number for referral, will resconsult as patient now in hepatic encephalopathy  - complicated by portal hypertension  - resume lasix/aldactone  - pt to resume OP GI follow up with Dr. White  - monitor CMP
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 Currently decompensated with hepatic encephalopathy, now improving  - transplant hepatology following, appreciate recs  - complicated by portal hypertension  - resume lasix/aldactone, c/w lactulose and rifaximin  - pt to resume OP GI follow up with Dr. White  - monitor CMP
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 Currently decompensated with hepatic encephalopathy  - transplant hepatology had seen patient on friday 10/11 and left clinic number for referral, would reconsult tomorrow as patient now in hepatic encephalopathy  - complicated by portal hypertension  - resume lasix/aldactone  - pt to resume OP GI follow up with Dr. White  - monitor CMP
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 Currently decompensated with hepatic encephalopathy, now improving  - transplant hepatology following, appreciate recs  - complicated by portal hypertension  - resume lasix/aldactone, c/w lactulose and rifaximin  - pt to resume OP GI follow up with Dr. White  - monitor CMP
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 Currently decompensated with hepatic encephalopathy, now resolved  - transplant hepatology following, appreciate recs  - complicated by portal hypertension  - c/w lasix/aldactone, c/w lactulose  - pt to resume OP GI follow up with Dr. White or Dr. Swartz  - monitor CMP
Seen on prior OP chest CT, no formal CHF diagnosis.  - pro BNP normal  - TTE without evidence of heart failure  - resume lasix/aldactone
Seen on prior OP chest CT, no formal CHF diagnosis.  - pro BNP normal  - TTE without evidence of heart failure  - resume lasix/aldactone for cirrhosis
Seen on prior chest CT, no formal CHF diagnosis.  - pro BNP normal, although may be falsely normal due to obesity  - f/u TTE  - resume lasix/aldactone

## 2019-10-17 NOTE — PROGRESS NOTE ADULT - PROBLEM SELECTOR PLAN 8
nonadherent to CPAP  - continue monitoring
nonadherent to CPAP  - continue monitoring
diagnosed with ITP by Dr. Jakob cohen
nonadherent to CPAP  - continue monitoring

## 2019-10-17 NOTE — PROGRESS NOTE ADULT - PROBLEM SELECTOR PLAN 6
on OP IV iron infusions.   - s/p 1 unit prbc  - resume OP follow up with Dr. Robert
on OP IV iron infusions.   - s/p 1 unit prbc  - resume OP follow up with Dr. Robert
elevated PCO2 on VBG and elevated HCO3 on BMP, likely chronic CO2 retainer from SARKIS and likely obesity hypoventilation  - continue monitoring  - incentive spirometer
on OP IV iron infusions.   - s/p 1 unit prbc  - resume OP follow up with Dr. Robert

## 2019-10-17 NOTE — PROGRESS NOTE ADULT - PROBLEM SELECTOR PROBLEM 6
Iron deficiency anemia
Respiratory acidosis

## 2019-10-17 NOTE — PROGRESS NOTE ADULT - PROBLEM SELECTOR PROBLEM 1
Pleural effusion
Rib fractures
Rib fractures
Pleural effusion
Rib fractures

## 2019-10-17 NOTE — PROGRESS NOTE ADULT - PROBLEM SELECTOR PROBLEM 3
Encephalopathy acute
Encephalopathy acute
Cirrhosis
Encephalopathy acute

## 2019-10-17 NOTE — PROGRESS NOTE ADULT - SUBJECTIVE AND OBJECTIVE BOX
Pia Murphy MD  Division of Hospital Medicine  Pager [phone]  If no response or off hours page: [phone]  ---------------------------------------------------------    SANTOS BELLE  59y  Male      Patient is a 59y old  Male who presents with a chief complaint of R sided rib fractures (16 Oct 2019 08:49)      INTERVAL HPI/OVERNIGHT EVENTS:  no acute events, Patient doing wordsearch puzzles. No c/o of CP, SOB, palpitations.        REVIEW OF SYSTEMS: 14 point ROS negative unless listed above    T(C): 36.7 (10-17-19 @ 07:22), Max: 36.9 (10-16-19 @ 14:00)  HR: 80 (10-17-19 @ 07:22) (80 - 100)  BP: 104/52 (10-17-19 @ 07:22) (104/52 - 120/66)  RR: 17 (10-17-19 @ 07:22) (17 - 17)  SpO2: 94% (10-17-19 @ 07:22) (94% - 98%)  Wt(kg): --Vital Signs Last 24 Hrs  T(C): 36.7 (17 Oct 2019 07:22), Max: 36.9 (16 Oct 2019 14:00)  T(F): 98 (17 Oct 2019 07:22), Max: 98.4 (16 Oct 2019 14:00)  HR: 80 (17 Oct 2019 07:22) (80 - 100)  BP: 104/52 (17 Oct 2019 07:22) (104/52 - 120/66)  BP(mean): --  RR: 17 (17 Oct 2019 07:22) (17 - 17)  SpO2: 94% (17 Oct 2019 07:22) (94% - 98%)    PHYSICAL EXAM:  GENERAL: NAD, well-groomed, well-developed  EYES: conjunctiva and sclera clear  ENMT: Moist mucous membranes  NECK: Supple  CHEST/LUNG: Clear to auscultation bilaterally; No rales, rhonchi, wheezing, or rubs  HEART: Regular rate and rhythm; No murmurs, rubs, or gallops  ABDOMEN: Soft, Nontender, Nondistended  EXTREMITIES: No clubbing, cyanosis, or edema  SKIN: No rashes or lesions  PSYCH: Alert & Oriented x3      Consultant(s) Notes Reviewed:  [x ] YES  [ ] NO  Care Discussed with Consultants/Other Providers [ x] YES-NP  [ ] NO    LABS:                        7.2    2.03  )-----------( 65       ( 17 Oct 2019 07:22 )             24.8     10-16    136  |  99  |  14  ----------------------------<  108<H>  3.9   |  26  |  1.16    Ca    8.7      16 Oct 2019 06:15  Phos  3.0     10-16  Mg     2.1     10-16    TPro  6.9  /  Alb  2.6<L>  /  TBili  2.3<H>  /  DBili  x   /  AST  24  /  ALT  17  /  AlkPhos  119  10-16        CAPILLARY BLOOD GLUCOSE                RADIOLOGY & ADDITIONAL TESTS:    Imaging Personally Reviewed:  [ ] YES  [ ] NO

## 2019-10-17 NOTE — PROGRESS NOTE ADULT - PROBLEM SELECTOR PLAN 9
baseline INR 1.5-1.6. Currently 1.8. Likely 2/2 cirrhosis  - continue monitoring, would give vit K if invasive procedures planned    11. ppx: lovenox  - diet: dash/tlc  - dispo: pending clinical progress
baseline INR 1.5-1.6. Currently 1.8. Likely 2/2 cirrhosis  - continue monitoring, would give vit K if invasive procedures planned    11. ppx: lovenox  - diet: dash/tlc  - dispo: pending clinical progress  - PT: outpatient PT
baseline INR 1.5-1.6. Currently 1.8. Likely 2/2 cirrhosis  - continue monitoring, would give vit K if invasive procedures planned    11. ppx: lovenox  - diet: dash/tlc  - dispo: dispo today  - PT: outpatient PT
baseline INR 1.5-1.6. Currently 1.8. Likely 2/2 cirrhosis  - continue monitoring, would give vit K if invasive procedures planned    11. ppx: lovenox  - diet: dash/tlc  - dispo: pending clinical progress
baseline INR 1.5-1.6. Currently 1.8. Likely 2/2 cirrhosis  - continue monitoring, would give vit K if invasive procedures planned    11. ppx: lovenox  - diet: dash/tlc  - dispo: pending clinical progress
likely 2/2 cirrhosis- at baseline, continue monitoring

## 2019-10-17 NOTE — PROGRESS NOTE ADULT - REASON FOR ADMISSION
Rib fractures
sp  chest tube removal for rt pleural effusion
sp   rt pl tube
R sided rib fractures
Shortness of breath
acute rib fractures and pleural effusion
rib fractures

## 2019-10-17 NOTE — PROGRESS NOTE ADULT - PROBLEM SELECTOR PROBLEM 2
Hemothorax on right
Pleural effusion

## 2019-10-17 NOTE — DISCHARGE NOTE NURSING/CASE MANAGEMENT/SOCIAL WORK - PATIENT PORTAL LINK FT
You can access the FollowMyHealth Patient Portal offered by [facility] by registering at the following website: [email or web address]. By joining [facility]’s FollowMyHealth portal, you will also be able to view your health information using other applications (apps) compatible with our system.

## 2019-10-18 LAB
CULTURE RESULTS: SIGNIFICANT CHANGE UP
HEV IGM SER QL: SIGNIFICANT CHANGE UP
SPECIMEN SOURCE: SIGNIFICANT CHANGE UP

## 2019-10-20 LAB
CULTURE RESULTS: SIGNIFICANT CHANGE UP
SPECIMEN SOURCE: SIGNIFICANT CHANGE UP

## 2019-11-08 ENCOUNTER — INPATIENT (INPATIENT)
Facility: HOSPITAL | Age: 59
LOS: 13 days | Discharge: NURSING, REHAB OR LONG-TERM CARE | DRG: 871 | End: 2019-11-22
Attending: INTERNAL MEDICINE | Admitting: HOSPITALIST
Payer: COMMERCIAL

## 2019-11-08 VITALS
WEIGHT: 300.05 LBS | TEMPERATURE: 100 F | SYSTOLIC BLOOD PRESSURE: 136 MMHG | DIASTOLIC BLOOD PRESSURE: 64 MMHG | OXYGEN SATURATION: 95 % | HEIGHT: 70 IN | RESPIRATION RATE: 18 BRPM | HEART RATE: 101 BPM

## 2019-11-08 LAB
ALBUMIN SERPL ELPH-MCNC: 2.9 G/DL — LOW (ref 3.3–5)
ALP SERPL-CCNC: 153 U/L — HIGH (ref 40–120)
ALT FLD-CCNC: 18 U/L — SIGNIFICANT CHANGE UP (ref 10–45)
AMMONIA BLD-MCNC: 101 UMOL/L — HIGH (ref 11–55)
ANION GAP SERPL CALC-SCNC: 8 MMOL/L — SIGNIFICANT CHANGE UP (ref 5–17)
APTT BLD: 30.4 SEC — SIGNIFICANT CHANGE UP (ref 27.5–36.3)
AST SERPL-CCNC: 37 U/L — SIGNIFICANT CHANGE UP (ref 10–40)
BASOPHILS # BLD AUTO: 0.01 K/UL — SIGNIFICANT CHANGE UP (ref 0–0.2)
BASOPHILS NFR BLD AUTO: 0.2 % — SIGNIFICANT CHANGE UP (ref 0–2)
BILIRUB SERPL-MCNC: 3.3 MG/DL — HIGH (ref 0.2–1.2)
BUN SERPL-MCNC: 13 MG/DL — SIGNIFICANT CHANGE UP (ref 7–23)
CALCIUM SERPL-MCNC: 8.7 MG/DL — SIGNIFICANT CHANGE UP (ref 8.4–10.5)
CHLORIDE SERPL-SCNC: 102 MMOL/L — SIGNIFICANT CHANGE UP (ref 96–108)
CO2 SERPL-SCNC: 24 MMOL/L — SIGNIFICANT CHANGE UP (ref 22–31)
CREAT SERPL-MCNC: 0.93 MG/DL — SIGNIFICANT CHANGE UP (ref 0.5–1.3)
EOSINOPHIL # BLD AUTO: 0.02 K/UL — SIGNIFICANT CHANGE UP (ref 0–0.5)
EOSINOPHIL NFR BLD AUTO: 0.4 % — SIGNIFICANT CHANGE UP (ref 0–6)
GAS PNL BLDV: SIGNIFICANT CHANGE UP
GLUCOSE SERPL-MCNC: 115 MG/DL — HIGH (ref 70–99)
HCT VFR BLD CALC: SIGNIFICANT CHANGE UP (ref 39–50)
HGB BLD-MCNC: 8.2 G/DL — LOW (ref 13–17)
IMM GRANULOCYTES NFR BLD AUTO: 0.4 % — SIGNIFICANT CHANGE UP (ref 0–1.5)
INR BLD: 2.12 RATIO — HIGH (ref 0.88–1.16)
LYMPHOCYTES # BLD AUTO: 0.55 K/UL — LOW (ref 1–3.3)
LYMPHOCYTES # BLD AUTO: 11.4 % — LOW (ref 13–44)
MCHC RBC-ENTMCNC: SIGNIFICANT CHANGE UP (ref 27–34)
MCHC RBC-ENTMCNC: SIGNIFICANT CHANGE UP (ref 32–36)
MCV RBC AUTO: SIGNIFICANT CHANGE UP (ref 80–100)
MONOCYTES # BLD AUTO: 0.42 K/UL — SIGNIFICANT CHANGE UP (ref 0–0.9)
MONOCYTES NFR BLD AUTO: 8.7 % — SIGNIFICANT CHANGE UP (ref 2–14)
NEUTROPHILS # BLD AUTO: 3.79 K/UL — SIGNIFICANT CHANGE UP (ref 1.8–7.4)
NEUTROPHILS NFR BLD AUTO: 78.9 % — HIGH (ref 43–77)
NRBC # BLD: 0 /100 WBCS — SIGNIFICANT CHANGE UP (ref 0–0)
PLATELET # BLD AUTO: 106 K/UL — LOW (ref 150–400)
POTASSIUM SERPL-MCNC: 4.3 MMOL/L — SIGNIFICANT CHANGE UP (ref 3.5–5.3)
POTASSIUM SERPL-SCNC: 4.3 MMOL/L — SIGNIFICANT CHANGE UP (ref 3.5–5.3)
PROT SERPL-MCNC: 8.2 G/DL — SIGNIFICANT CHANGE UP (ref 6–8.3)
PROTHROM AB SERPL-ACNC: 25 SEC — HIGH (ref 10–12.9)
RBC # BLD: 3.63 M/UL — LOW (ref 4.2–5.8)
RBC # FLD: SIGNIFICANT CHANGE UP (ref 10.3–14.5)
SODIUM SERPL-SCNC: 134 MMOL/L — LOW (ref 135–145)
WBC # BLD: 4.81 K/UL — SIGNIFICANT CHANGE UP (ref 3.8–10.5)
WBC # FLD AUTO: 4.81 K/UL — SIGNIFICANT CHANGE UP (ref 3.8–10.5)

## 2019-11-08 PROCEDURE — 99291 CRITICAL CARE FIRST HOUR: CPT

## 2019-11-08 PROCEDURE — 93010 ELECTROCARDIOGRAM REPORT: CPT

## 2019-11-08 PROCEDURE — 74177 CT ABD & PELVIS W/CONTRAST: CPT | Mod: 26

## 2019-11-08 PROCEDURE — 71045 X-RAY EXAM CHEST 1 VIEW: CPT | Mod: 26

## 2019-11-08 PROCEDURE — 71260 CT THORAX DX C+: CPT | Mod: 26

## 2019-11-08 RX ORDER — ACETAMINOPHEN 500 MG
975 TABLET ORAL ONCE
Refills: 0 | Status: COMPLETED | OUTPATIENT
Start: 2019-11-08 | End: 2019-11-08

## 2019-11-08 RX ORDER — CEFTRIAXONE 500 MG/1
1000 INJECTION, POWDER, FOR SOLUTION INTRAMUSCULAR; INTRAVENOUS ONCE
Refills: 0 | Status: COMPLETED | OUTPATIENT
Start: 2019-11-08 | End: 2019-11-08

## 2019-11-08 RX ORDER — FUROSEMIDE 40 MG
40 TABLET ORAL ONCE
Refills: 0 | Status: COMPLETED | OUTPATIENT
Start: 2019-11-08 | End: 2019-11-08

## 2019-11-08 RX ADMIN — Medication 975 MILLIGRAM(S): at 22:22

## 2019-11-08 RX ADMIN — Medication 40 MILLIGRAM(S): at 22:22

## 2019-11-08 RX ADMIN — CEFTRIAXONE 100 MILLIGRAM(S): 500 INJECTION, POWDER, FOR SOLUTION INTRAMUSCULAR; INTRAVENOUS at 22:23

## 2019-11-08 NOTE — ED PROVIDER NOTE - NS ED ROS FT
GENERAL: +fever, +chills, EYES: no change in vision, HEENT: no trouble swallowing or speaking, CARDIAC: no chest pain, PULMONARY: no cough or +SOB, GI: no abdominal pain, no nausea, no vomiting, no diarrhea or constipation, : No changes in urination, SKIN: no rashes, NEURO: no headache,  MSK: No joint pain ~Tra Dolan M.D. Resident

## 2019-11-08 NOTE — ED ADULT NURSE NOTE - OBJECTIVE STATEMENT
60 y/o male A&OX3 with hx of liver cirrhosis, CHF and pneumonia presents to ED for fever, chills and cough. As per pt, was admitted with multiple rib fractures with chest tube placed, 4 weeks ago had paracentesis completed. +non productive cough, SOB with exertion. Upon arrival to [facility], pt tachypneic and sating low on RA, 5L NC O2 placed with improvement to 97%. B/l breath sounds diminished, no use of accessory muscles. No abdominal pain, dysuria or burning with urination. No CP, weakness, numbness or tingling. Safety measures maintained with bed in low position and side rails up.

## 2019-11-08 NOTE — ED PROVIDER NOTE - PMH
CHF (congestive heart failure)    CHF (congestive heart failure)    Hyperlipidemia    Obese    Pneumonia

## 2019-11-08 NOTE — ED PROVIDER NOTE - PHYSICAL EXAMINATION
Gen: AAOx3  Head: NCAT  HEENT: EOMI, oral mucosa moist, normal conjunctiva  Lung: CTAB, increase work of breathing,  speaking in full sentences  CV: tachycardia, no murmurs, rubs or gallops  Abd: Obese abd, soft, NTND, no guarding, no CVA tenderness  MSK: no visible deformities  EXT: B/L LE edema  Neuro: No focal sensory or motor deficits  Skin: Warm, well perfused, no rash  Psych: normal affect.   ~Tra Dolan M.D. Resident

## 2019-11-08 NOTE — ED PROVIDER NOTE - CLINICAL SUMMARY MEDICAL DECISION MAKING FREE TEXT BOX
59yM with h/o of CHF, HLD, Cirrhosis (2/2 alcohol intake) presents with fever and chills of 1 day duration. Meets SIRS criteria. Will evaluate with sepsis protocol, with ekg, labs, abx, UA, U cultures, blood cultures, CXR,. Will give lasix for fluid over load and CT chest/abd//pelvis for pathology and BiPAP for increase work of breathing and reassess

## 2019-11-08 NOTE — ED PROVIDER NOTE - OBJECTIVE STATEMENT
59yM with h/o of CHF, HLD, Cirrhosis (2/2 alcohol intake) presents with fever and chills of 1 day duration. Recently admitted for multiple rib fractures (10/8/19-10/17/19 ) in which a chest tube was placed. Endorse non productive cough (2 years), sob, and diarrhea but denies, chest pain, urinary or bowel irregularities.

## 2019-11-08 NOTE — ED PROVIDER NOTE - ATTENDING CONTRIBUTION TO CARE
attending Danelle: 59yM h/o CHF, HLD, alcoholic Cirrhosis, p/w fever and chills x 1 day. Recent admission for multiple rib fx requiring chest tube. Febrile and tachypneic on arrival. Sepsis. Will obtain labs including vbg with lactate and blood cultures, IVF, empiric abx, cxr, UA/Ucx, RVP, bipap for work of breathing, admit

## 2019-11-08 NOTE — ED PROVIDER NOTE - CRITICAL CARE PROVIDED
documentation/additional history taking/direct patient care (not related to procedure)/interpretation of diagnostic studies/consult w/ pt's family directly relating to pts condition

## 2019-11-09 DIAGNOSIS — Z29.9 ENCOUNTER FOR PROPHYLACTIC MEASURES, UNSPECIFIED: ICD-10-CM

## 2019-11-09 DIAGNOSIS — J90 PLEURAL EFFUSION, NOT ELSEWHERE CLASSIFIED: ICD-10-CM

## 2019-11-09 DIAGNOSIS — K70.30 ALCOHOLIC CIRRHOSIS OF LIVER WITHOUT ASCITES: ICD-10-CM

## 2019-11-09 DIAGNOSIS — Z98.890 OTHER SPECIFIED POSTPROCEDURAL STATES: Chronic | ICD-10-CM

## 2019-11-09 DIAGNOSIS — G47.33 OBSTRUCTIVE SLEEP APNEA (ADULT) (PEDIATRIC): ICD-10-CM

## 2019-11-09 DIAGNOSIS — D69.6 THROMBOCYTOPENIA, UNSPECIFIED: ICD-10-CM

## 2019-11-09 DIAGNOSIS — D68.9 COAGULATION DEFECT, UNSPECIFIED: ICD-10-CM

## 2019-11-09 DIAGNOSIS — J96.01 ACUTE RESPIRATORY FAILURE WITH HYPOXIA: ICD-10-CM

## 2019-11-09 DIAGNOSIS — Z02.9 ENCOUNTER FOR ADMINISTRATIVE EXAMINATIONS, UNSPECIFIED: ICD-10-CM

## 2019-11-09 DIAGNOSIS — N39.0 URINARY TRACT INFECTION, SITE NOT SPECIFIED: ICD-10-CM

## 2019-11-09 DIAGNOSIS — A41.9 SEPSIS, UNSPECIFIED ORGANISM: ICD-10-CM

## 2019-11-09 DIAGNOSIS — D50.9 IRON DEFICIENCY ANEMIA, UNSPECIFIED: ICD-10-CM

## 2019-11-09 LAB
APPEARANCE UR: ABNORMAL
BASE EXCESS BLDA CALC-SCNC: 1.4 MMOL/L — SIGNIFICANT CHANGE UP (ref -2–2)
BILIRUB UR-MCNC: NEGATIVE — SIGNIFICANT CHANGE UP
CO2 BLDA-SCNC: 27 MMOL/L — SIGNIFICANT CHANGE UP (ref 22–30)
COLOR SPEC: YELLOW — SIGNIFICANT CHANGE UP
DIFF PNL FLD: ABNORMAL
GAS PNL BLDA: SIGNIFICANT CHANGE UP
GLUCOSE UR QL: NEGATIVE — SIGNIFICANT CHANGE UP
HCO3 BLDA-SCNC: 26 MMOL/L — SIGNIFICANT CHANGE UP (ref 21–29)
KETONES UR-MCNC: NEGATIVE — SIGNIFICANT CHANGE UP
LEUKOCYTE ESTERASE UR-ACNC: ABNORMAL
NITRITE UR-MCNC: POSITIVE
PCO2 BLDA: 44 MMHG — SIGNIFICANT CHANGE UP (ref 32–46)
PH BLDA: 7.39 — SIGNIFICANT CHANGE UP (ref 7.35–7.45)
PH UR: 6 — SIGNIFICANT CHANGE UP (ref 5–8)
PO2 BLDA: 82 MMHG — SIGNIFICANT CHANGE UP (ref 74–108)
PROT UR-MCNC: ABNORMAL
RAPID RVP RESULT: SIGNIFICANT CHANGE UP
SAO2 % BLDA: 97 % — HIGH (ref 92–96)
SP GR SPEC: 1.03 — HIGH (ref 1.01–1.02)
UROBILINOGEN FLD QL: NEGATIVE — SIGNIFICANT CHANGE UP

## 2019-11-09 PROCEDURE — 99223 1ST HOSP IP/OBS HIGH 75: CPT | Mod: GC

## 2019-11-09 PROCEDURE — 99223 1ST HOSP IP/OBS HIGH 75: CPT | Mod: AI,GC

## 2019-11-09 RX ORDER — INFLUENZA VIRUS VACCINE 15; 15; 15; 15 UG/.5ML; UG/.5ML; UG/.5ML; UG/.5ML
0.5 SUSPENSION INTRAMUSCULAR ONCE
Refills: 0 | Status: DISCONTINUED | OUTPATIENT
Start: 2019-11-09 | End: 2019-11-22

## 2019-11-09 RX ORDER — LACTOBACILLUS ACIDOPHILUS 100MM CELL
1 CAPSULE ORAL DAILY
Refills: 0 | Status: DISCONTINUED | OUTPATIENT
Start: 2019-11-09 | End: 2019-11-22

## 2019-11-09 RX ORDER — PANTOPRAZOLE SODIUM 20 MG/1
40 TABLET, DELAYED RELEASE ORAL
Refills: 0 | Status: DISCONTINUED | OUTPATIENT
Start: 2019-11-09 | End: 2019-11-22

## 2019-11-09 RX ORDER — ACETAMINOPHEN 500 MG
325 TABLET ORAL EVERY 4 HOURS
Refills: 0 | Status: DISCONTINUED | OUTPATIENT
Start: 2019-11-09 | End: 2019-11-22

## 2019-11-09 RX ORDER — PIPERACILLIN AND TAZOBACTAM 4; .5 G/20ML; G/20ML
3.38 INJECTION, POWDER, LYOPHILIZED, FOR SOLUTION INTRAVENOUS ONCE
Refills: 0 | Status: COMPLETED | OUTPATIENT
Start: 2019-11-09 | End: 2019-11-09

## 2019-11-09 RX ORDER — ZOLPIDEM TARTRATE 10 MG/1
5 TABLET ORAL AT BEDTIME
Refills: 0 | Status: DISCONTINUED | OUTPATIENT
Start: 2019-11-09 | End: 2019-11-10

## 2019-11-09 RX ORDER — FUROSEMIDE 40 MG
40 TABLET ORAL
Refills: 0 | Status: DISCONTINUED | OUTPATIENT
Start: 2019-11-09 | End: 2019-11-10

## 2019-11-09 RX ORDER — ATORVASTATIN CALCIUM 80 MG/1
20 TABLET, FILM COATED ORAL AT BEDTIME
Refills: 0 | Status: DISCONTINUED | OUTPATIENT
Start: 2019-11-09 | End: 2019-11-22

## 2019-11-09 RX ORDER — FLUTICASONE PROPIONATE 50 MCG
1 SPRAY, SUSPENSION NASAL
Refills: 0 | Status: DISCONTINUED | OUTPATIENT
Start: 2019-11-09 | End: 2019-11-22

## 2019-11-09 RX ORDER — LACTULOSE 10 G/15ML
30 SOLUTION ORAL EVERY 6 HOURS
Refills: 0 | Status: DISCONTINUED | OUTPATIENT
Start: 2019-11-09 | End: 2019-11-22

## 2019-11-09 RX ORDER — LANOLIN ALCOHOL/MO/W.PET/CERES
3 CREAM (GRAM) TOPICAL AT BEDTIME
Refills: 0 | Status: DISCONTINUED | OUTPATIENT
Start: 2019-11-09 | End: 2019-11-09

## 2019-11-09 RX ORDER — OMEGA-3 ACID ETHYL ESTERS 1 G
4 CAPSULE ORAL DAILY
Refills: 0 | Status: DISCONTINUED | OUTPATIENT
Start: 2019-11-09 | End: 2019-11-22

## 2019-11-09 RX ORDER — SPIRONOLACTONE 25 MG/1
100 TABLET, FILM COATED ORAL DAILY
Refills: 0 | Status: DISCONTINUED | OUTPATIENT
Start: 2019-11-09 | End: 2019-11-19

## 2019-11-09 RX ORDER — CEFEPIME 1 G/1
INJECTION, POWDER, FOR SOLUTION INTRAMUSCULAR; INTRAVENOUS
Refills: 0 | Status: DISCONTINUED | OUTPATIENT
Start: 2019-11-09 | End: 2019-11-09

## 2019-11-09 RX ORDER — FUROSEMIDE 40 MG
40 TABLET ORAL DAILY
Refills: 0 | Status: DISCONTINUED | OUTPATIENT
Start: 2019-11-10 | End: 2019-11-10

## 2019-11-09 RX ORDER — CEFTRIAXONE 500 MG/1
1000 INJECTION, POWDER, FOR SOLUTION INTRAMUSCULAR; INTRAVENOUS EVERY 24 HOURS
Refills: 0 | Status: DISCONTINUED | OUTPATIENT
Start: 2019-11-09 | End: 2019-11-09

## 2019-11-09 RX ORDER — PIPERACILLIN AND TAZOBACTAM 4; .5 G/20ML; G/20ML
3.38 INJECTION, POWDER, LYOPHILIZED, FOR SOLUTION INTRAVENOUS EVERY 8 HOURS
Refills: 0 | Status: DISCONTINUED | OUTPATIENT
Start: 2019-11-09 | End: 2019-11-15

## 2019-11-09 RX ORDER — VANCOMYCIN HCL 1 G
2000 VIAL (EA) INTRAVENOUS EVERY 12 HOURS
Refills: 0 | Status: DISCONTINUED | OUTPATIENT
Start: 2019-11-09 | End: 2019-11-09

## 2019-11-09 RX ORDER — VANCOMYCIN HCL 1 G
1000 VIAL (EA) INTRAVENOUS EVERY 12 HOURS
Refills: 0 | Status: DISCONTINUED | OUTPATIENT
Start: 2019-11-09 | End: 2019-11-11

## 2019-11-09 RX ORDER — ZOLPIDEM TARTRATE 10 MG/1
5 TABLET ORAL AT BEDTIME
Refills: 0 | Status: DISCONTINUED | OUTPATIENT
Start: 2019-11-09 | End: 2019-11-16

## 2019-11-09 RX ORDER — ENOXAPARIN SODIUM 100 MG/ML
40 INJECTION SUBCUTANEOUS DAILY
Refills: 0 | Status: DISCONTINUED | OUTPATIENT
Start: 2019-11-09 | End: 2019-11-12

## 2019-11-09 RX ADMIN — Medication 250 MILLIGRAM(S): at 18:30

## 2019-11-09 RX ADMIN — Medication 4 GRAM(S): at 12:32

## 2019-11-09 RX ADMIN — ENOXAPARIN SODIUM 40 MILLIGRAM(S): 100 INJECTION SUBCUTANEOUS at 11:26

## 2019-11-09 RX ADMIN — Medication 40 MILLIGRAM(S): at 17:11

## 2019-11-09 RX ADMIN — PIPERACILLIN AND TAZOBACTAM 200 GRAM(S): 4; .5 INJECTION, POWDER, LYOPHILIZED, FOR SOLUTION INTRAVENOUS at 17:11

## 2019-11-09 RX ADMIN — Medication 1 TABLET(S): at 12:33

## 2019-11-09 RX ADMIN — Medication 1 SPRAY(S): at 17:11

## 2019-11-09 RX ADMIN — PIPERACILLIN AND TAZOBACTAM 25 GRAM(S): 4; .5 INJECTION, POWDER, LYOPHILIZED, FOR SOLUTION INTRAVENOUS at 21:18

## 2019-11-09 RX ADMIN — ZOLPIDEM TARTRATE 5 MILLIGRAM(S): 10 TABLET ORAL at 21:17

## 2019-11-09 RX ADMIN — SPIRONOLACTONE 100 MILLIGRAM(S): 25 TABLET, FILM COATED ORAL at 12:32

## 2019-11-09 RX ADMIN — LACTULOSE 30 GRAM(S): 10 SOLUTION ORAL at 12:33

## 2019-11-09 RX ADMIN — ATORVASTATIN CALCIUM 20 MILLIGRAM(S): 80 TABLET, FILM COATED ORAL at 21:18

## 2019-11-09 NOTE — H&P ADULT - NSHPREVIEWOFSYSTEMS_GEN_ALL_CORE
CONSTITUTIONAL: +Fevers, +Chills. No weakness, or unintentional weight change.  EYES: No visual changes, no blurry vision.  ENT:  No neck pain or stiffness, no vertigo, no dysphagia or odynophagia.  RESPIRATORY: +Chronic nonproductive cough and +SOB. No wheezing, hemoptysis  CARDIOVASCULAR: No chest pain, palpitations, or lower extremity edema  GASTROINTESTINAL: No abdominal pain. No nausea, vomiting, diarrhea or constipation. No melena or hematochezia.  GENITOURINARY: No dysuria, frequency or hematuria.  NEUROLOGICAL: No headache, fainting, numbness, or paresthesias.  SKIN: No itching, rashes, or unexpected bruising.  MSK: No joint or muscle pain.

## 2019-11-09 NOTE — H&P ADULT - PROBLEM SELECTOR PLAN 5
- Nonadherent w/ CPAP - Likely 2/2 cirrhosis  - Also previously Dx with ITP, follows with Heme Dr. Robert

## 2019-11-09 NOTE — H&P ADULT - PROBLEM SELECTOR PLAN 2
- Likely 2/2 alcoholism and BOB  - No asterixis or signs of hepatic encephalopathy on exam  - c/w home Lasix, Spironolactone, Lactulose - Bilateral lung effusion, R>L on CT (11/9)  - h/o R chest tube in recent hospitalization with negative cultures  - DDx Parapneumonic effusion vs. empyema vs. hepatic hydrothorax  - Pulm consulted, rec: diuresis goal -1L/day. Based on size of effusion, unlikely to be cause of hypoxia.  No safe window for thoracentesis

## 2019-11-09 NOTE — H&P ADULT - PROBLEM SELECTOR PLAN 4
- Likely 2/2 cirrhosis  - Also previously Dx with ITP, follows with Heme Dr. Robert - hx of decompensated cirrhosis  - Likely 2/2 alcoholism and BOB  - No asterixis or signs of hepatic encephalopathy on exam  - c/w home Lasix, Spironolactone, Lactulose - Hx of decompensated cirrhosis  - Likely 2/2 alcoholism and BOB  - No asterixis or signs of hepatic encephalopathy on exam  - c/w home Lasix, Spironolactone, Lactulose

## 2019-11-09 NOTE — H&P ADULT - PROBLEM SELECTOR PLAN 3
- Required IV iron on previous admission - Fever to 100.3, HR 's, RR 20, meeting SIRS criteria in ED (now resolved)  - f/u BCx, UCx  - c/w Vancomycin, Cefepime pending culture results - Fever to 100.3, HR 's, RR 20,  - patient reporting fevers of 102 at home  -likely due to bacterial pna  - f/u BCx, UCx  - c/w Vancomycin, Cefepime pending culture results - Fever to 100.3, HR 's, RR 20,  - patient reporting fevers of 102 at home  -likely due to bacterial pna  - f/u BCx, UCx  - c/w Vancomycin, Zosyn pending culture results

## 2019-11-09 NOTE — H&P ADULT - NSHPLABSRESULTS_GEN_ALL_CORE
ICU Vital Signs Last 24 Hrs  T(C): 37 (2019 10:00), Max: 37.9 (2019 18:15)  T(F): 98.6 (2019 10:00), Max: 100.3 (2019 18:15)  HR: 90 (2019 12:29) (80 - 889)  BP: 126/63 (2019 12:29) (108/60 - 144/79)  RR: 20 (2019 12:29) (18 - 20)  SpO2: 96% (2019 12:29) (91% - 100%)                          8.2    4.81  )-----------( 106      ( 2019 21:33 )             See note        134<L>  |  102  |  13  ----------------------------<  115<H>  4.3   |  24  |  0.93    Ca    8.7      2019 21:32    TPro  8.2  /  Alb  2.9<L>  /  TBili  3.3<H>  /  DBili  x   /  AST  37  /  ALT  18  /  AlkPhos  153<H>  1108    Urinalysis Basic - ( 2019 00:21 )    Color: Yellow / Appearance: Slightly Turbid / S.026 / pH: x  Gluc: x / Ketone: Negative  / Bili: Negative / Urobili: Negative   Blood: x / Protein: 30 mg/dL / Nitrite: Positive   Leuk Esterase: Large / RBC: 20 /hpf /  /HPF   Sq Epi: x / Non Sq Epi: 0 /hpf / Bacteria: Moderate    < from: CT Chest w/ IV Cont (19 @ 23:55) >  EXAM:  CT ABDOMEN AND PELVIS IC  EXAM:  CT CHEST IC    PROCEDURE DATE:  2019    IMPRESSION:   CHEST:  Moderate right pleural effusion. Small left pleural effusion.   No infectious consolidation.     ABDOMEN AND PELVIS:  Cirrhosis, splenomegaly and small volume ascites.   < end of copied text >
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
## 2019-11-09 NOTE — H&P ADULT - PROBLEM SELECTOR PLAN 8
Transitions of Care Status:  1.  Name of PCP:  2.  PCP Contacted on Admission: [ ] Y    [ ] N    3.  PCP contacted at Discharge: [ ] Y    [ ] N    [ ] N/A  4.  Post-Discharge Appointment Date and Location:  5.  Summary of Handoff given to PCP: - DVT:  - Diet: DASH/TLC - DVT: Lovenox 40  - Diet: DASH/TLC

## 2019-11-09 NOTE — H&P ADULT - ATTENDING COMMENTS
acute hypoxemic resp failure 2/2 to ? bacterial pna and r mod pleural effusion  +u/a  titrated on to NC 5L  get ABG  2d echo w bubble study  pulm c/s re: thoracentesis  follow up bcx, ucx  vanc, zosyn acute hypoxemic resp failure 2/2 to ? bacterial pna and r mod pleural effusion  +u/a  titrated on to NC 5L  get ABG  2d echo w bubble study  diurese with IV lasix  rest of plan as above  pulm c/s re: thoracentesis  follow up bcx, ucx  vanc, zosyn
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
## 2019-11-09 NOTE — H&P ADULT - NSHPSOCIALHISTORY_GEN_ALL_CORE
Marital Status:  (X) Single  Occupation: Retired   Lives with: (X) Alone    Substance Use (street drugs): (X) never used  Tobacco Usage:  (X) never smoked  Alcohol Usage: Former alcohol abuse; denies current drinking

## 2019-11-09 NOTE — H&P ADULT - NSHPPHYSICALEXAM_GEN_ALL_CORE
GENERAL: NAD, well-groomed, well-developed  HEAD:  Atraumatic, normocephalic  EYES: EOMI, PERRLA, conjunctiva and sclera clear, anicteric  ENMT: No tonsillar erythema, exudates, or enlargement; moist mucous membranes, good dentition, no lesions  NECK: Supple, no JVD, normal thyroid  CHEST/LUNG: On HFNC 40L.  +Coughing.  Clear to ausculation bilaterally; no rales, rhonchi, wheezing, or rubs  HEART: Regular rate and rhythm; no murmurs, rubs, or gallops. Prominent distal pulses.  ABDOMEN: Obese. Nontender, nondistended; bowel sounds present.  EXTREMITIES:  2+ Peripheral pulses, no clubbing, cyanosis, or pitting edema  LYMPH: No lymphadenopathy noted  SKIN: No rashes or lesions  NERVOUS SYSTEM:  CN II-XII intact. Oriented to person, place, time, events. Good concentration.  Nonfocal.
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
## 2019-11-09 NOTE — SBIRT NOTE ADULT - NSSBIRTALCPOSREINDET_GEN_A_CORE
Positive reinforcement provided given patient currently within healthy guidelines. Education materials reviewed

## 2019-11-09 NOTE — H&P ADULT - NSICDXPASTMEDICALHX_GEN_ALL_CORE_FT
PAST MEDICAL HISTORY:  CHF (congestive heart failure) EF 65% Oct 2019    Hyperlipidemia Atorvastatin 20    Obese     Pneumonia

## 2019-11-09 NOTE — H&P ADULT - PROBLEM SELECTOR PLAN 9
Transitions of Care Status:  1.  Name of PCP:  2.  PCP Contacted on Admission: [ ] Y    [ ] N    3.  PCP contacted at Discharge: [ ] Y    [ ] N    [ ] N/A  4.  Post-Discharge Appointment Date and Location:  5.  Summary of Handoff given to PCP: Transitions of Care Status:  1.  Name of PCP:  2.  PCP Contacted on Admission: [ ] Y    [X] N    3.  PCP contacted at Discharge: [ ] Y    [ ] N    [ ] N/A  4.  Post-Discharge Appointment Date and Location:  5.  Summary of Handoff given to PCP: Transitions of Care Status:  1.  Name of PCP: Wesley White [phone]  2.  PCP Contacted on Admission: [ ] Y    [X] N    3.  PCP contacted at Discharge: [ ] Y    [ ] N    [ ] N/A  4.  Post-Discharge Appointment Date and Location:  5.  Summary of Handoff given to PCP:
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
## 2019-11-09 NOTE — H&P ADULT - PROBLEM SELECTOR PLAN 1
- Fever to 100.3 and met SIRS criteria in ED (now resolved)  - Continues to require HFNC  - (+) UA, pending UCx/BCx  - c/w Ceftriaxone pending cultures - Initially on RA, however desaturated and required HFNC  - Likely 2/2 fluid overload vs restriction 2/2 obesity  - Respiratory consulted, will wean to 5L NC  - f/u ABG, consider TTE w/bubble study if hypoxic - Likely 2/2 fluid overload vs restriction 2/2 obesity  -, will wean to 5L NC  - f/u ABG, consider TTE w/bubble study if hypoxic

## 2019-11-09 NOTE — H&P ADULT - REASON FOR ADMISSION
Fever/shaking chills x1 day, found to have positive UA concerning for urosepsis Fever/shaking chills x1 day,

## 2019-11-10 DIAGNOSIS — N39.0 URINARY TRACT INFECTION, SITE NOT SPECIFIED: ICD-10-CM

## 2019-11-10 LAB
ALBUMIN SERPL ELPH-MCNC: 2.7 G/DL — LOW (ref 3.3–5)
ALP SERPL-CCNC: 138 U/L — HIGH (ref 40–120)
ALT FLD-CCNC: 15 U/L — SIGNIFICANT CHANGE UP (ref 10–45)
ANION GAP SERPL CALC-SCNC: 4 MMOL/L — LOW (ref 5–17)
AST SERPL-CCNC: 27 U/L — SIGNIFICANT CHANGE UP (ref 10–40)
BASE EXCESS BLDA CALC-SCNC: 2 MMOL/L — SIGNIFICANT CHANGE UP (ref -2–2)
BASOPHILS # BLD AUTO: 0.01 K/UL — SIGNIFICANT CHANGE UP (ref 0–0.2)
BASOPHILS NFR BLD AUTO: 0.3 % — SIGNIFICANT CHANGE UP (ref 0–2)
BILIRUB SERPL-MCNC: 2.2 MG/DL — HIGH (ref 0.2–1.2)
BUN SERPL-MCNC: 15 MG/DL — SIGNIFICANT CHANGE UP (ref 7–23)
CALCIUM SERPL-MCNC: 8.5 MG/DL — SIGNIFICANT CHANGE UP (ref 8.4–10.5)
CHLORIDE SERPL-SCNC: 102 MMOL/L — SIGNIFICANT CHANGE UP (ref 96–108)
CO2 BLDA-SCNC: 28 MMOL/L — SIGNIFICANT CHANGE UP (ref 22–30)
CO2 SERPL-SCNC: 26 MMOL/L — SIGNIFICANT CHANGE UP (ref 22–31)
CREAT SERPL-MCNC: 1.13 MG/DL — SIGNIFICANT CHANGE UP (ref 0.5–1.3)
CULTURE RESULTS: SIGNIFICANT CHANGE UP
EOSINOPHIL # BLD AUTO: 0.09 K/UL — SIGNIFICANT CHANGE UP (ref 0–0.5)
EOSINOPHIL NFR BLD AUTO: 2.6 % — SIGNIFICANT CHANGE UP (ref 0–6)
GLUCOSE SERPL-MCNC: 112 MG/DL — HIGH (ref 70–99)
HCO3 BLDA-SCNC: 27 MMOL/L — SIGNIFICANT CHANGE UP (ref 21–29)
HCT VFR BLD CALC: 29 % — LOW (ref 39–50)
HGB BLD-MCNC: 7.9 G/DL — LOW (ref 13–17)
HOROWITZ INDEX BLDA+IHG-RTO: 21 — SIGNIFICANT CHANGE UP
IMM GRANULOCYTES NFR BLD AUTO: 0.6 % — SIGNIFICANT CHANGE UP (ref 0–1.5)
LYMPHOCYTES # BLD AUTO: 0.72 K/UL — LOW (ref 1–3.3)
LYMPHOCYTES # BLD AUTO: 21.2 % — SIGNIFICANT CHANGE UP (ref 13–44)
MAGNESIUM SERPL-MCNC: 2 MG/DL — SIGNIFICANT CHANGE UP (ref 1.6–2.6)
MCHC RBC-ENTMCNC: 23.2 PG — LOW (ref 27–34)
MCHC RBC-ENTMCNC: 27.2 GM/DL — LOW (ref 32–36)
MCV RBC AUTO: 85 FL — SIGNIFICANT CHANGE UP (ref 80–100)
MONOCYTES # BLD AUTO: 0.57 K/UL — SIGNIFICANT CHANGE UP (ref 0–0.9)
MONOCYTES NFR BLD AUTO: 16.8 % — HIGH (ref 2–14)
NEUTROPHILS # BLD AUTO: 1.99 K/UL — SIGNIFICANT CHANGE UP (ref 1.8–7.4)
NEUTROPHILS NFR BLD AUTO: 58.5 % — SIGNIFICANT CHANGE UP (ref 43–77)
PCO2 BLDA: 46 MMHG — SIGNIFICANT CHANGE UP (ref 32–46)
PH BLDA: 7.38 — SIGNIFICANT CHANGE UP (ref 7.35–7.45)
PHOSPHATE SERPL-MCNC: 3.2 MG/DL — SIGNIFICANT CHANGE UP (ref 2.5–4.5)
PLATELET # BLD AUTO: 83 K/UL — LOW (ref 150–400)
PO2 BLDA: 65 MMHG — LOW (ref 74–108)
POTASSIUM SERPL-MCNC: 4.2 MMOL/L — SIGNIFICANT CHANGE UP (ref 3.5–5.3)
POTASSIUM SERPL-SCNC: 4.2 MMOL/L — SIGNIFICANT CHANGE UP (ref 3.5–5.3)
PROT SERPL-MCNC: 7.8 G/DL — SIGNIFICANT CHANGE UP (ref 6–8.3)
RBC # BLD: 3.41 M/UL — LOW (ref 4.2–5.8)
RBC # FLD: 21.2 % — HIGH (ref 10.3–14.5)
SAO2 % BLDA: 93 % — SIGNIFICANT CHANGE UP (ref 92–96)
SODIUM SERPL-SCNC: 132 MMOL/L — LOW (ref 135–145)
SPECIMEN SOURCE: SIGNIFICANT CHANGE UP
WBC # BLD: 3.4 K/UL — LOW (ref 3.8–10.5)
WBC # FLD AUTO: 3.4 K/UL — LOW (ref 3.8–10.5)

## 2019-11-10 PROCEDURE — 99233 SBSQ HOSP IP/OBS HIGH 50: CPT | Mod: GC

## 2019-11-10 RX ORDER — FUROSEMIDE 40 MG
40 TABLET ORAL EVERY 12 HOURS
Refills: 0 | Status: DISCONTINUED | OUTPATIENT
Start: 2019-11-10 | End: 2019-11-13

## 2019-11-10 RX ORDER — ZOLPIDEM TARTRATE 10 MG/1
5 TABLET ORAL ONCE
Refills: 0 | Status: DISCONTINUED | OUTPATIENT
Start: 2019-11-10 | End: 2019-11-10

## 2019-11-10 RX ADMIN — Medication 250 MILLIGRAM(S): at 05:44

## 2019-11-10 RX ADMIN — Medication 40 MILLIGRAM(S): at 05:43

## 2019-11-10 RX ADMIN — SPIRONOLACTONE 100 MILLIGRAM(S): 25 TABLET, FILM COATED ORAL at 06:02

## 2019-11-10 RX ADMIN — Medication 325 MILLIGRAM(S): at 06:32

## 2019-11-10 RX ADMIN — Medication 1 TABLET(S): at 11:27

## 2019-11-10 RX ADMIN — Medication 325 MILLIGRAM(S): at 11:25

## 2019-11-10 RX ADMIN — ZOLPIDEM TARTRATE 5 MILLIGRAM(S): 10 TABLET ORAL at 00:03

## 2019-11-10 RX ADMIN — Medication 250 MILLIGRAM(S): at 18:45

## 2019-11-10 RX ADMIN — Medication 1 SPRAY(S): at 05:42

## 2019-11-10 RX ADMIN — ENOXAPARIN SODIUM 40 MILLIGRAM(S): 100 INJECTION SUBCUTANEOUS at 11:26

## 2019-11-10 RX ADMIN — Medication 325 MILLIGRAM(S): at 12:20

## 2019-11-10 RX ADMIN — PIPERACILLIN AND TAZOBACTAM 25 GRAM(S): 4; .5 INJECTION, POWDER, LYOPHILIZED, FOR SOLUTION INTRAVENOUS at 05:45

## 2019-11-10 RX ADMIN — Medication 4 GRAM(S): at 11:26

## 2019-11-10 RX ADMIN — Medication 325 MILLIGRAM(S): at 00:37

## 2019-11-10 RX ADMIN — ATORVASTATIN CALCIUM 20 MILLIGRAM(S): 80 TABLET, FILM COATED ORAL at 21:14

## 2019-11-10 RX ADMIN — ZOLPIDEM TARTRATE 5 MILLIGRAM(S): 10 TABLET ORAL at 21:14

## 2019-11-10 RX ADMIN — Medication 100 MILLIGRAM(S): at 05:43

## 2019-11-10 RX ADMIN — Medication 325 MILLIGRAM(S): at 06:02

## 2019-11-10 RX ADMIN — LACTULOSE 30 GRAM(S): 10 SOLUTION ORAL at 11:27

## 2019-11-10 RX ADMIN — PIPERACILLIN AND TAZOBACTAM 25 GRAM(S): 4; .5 INJECTION, POWDER, LYOPHILIZED, FOR SOLUTION INTRAVENOUS at 15:15

## 2019-11-10 RX ADMIN — ZOLPIDEM TARTRATE 5 MILLIGRAM(S): 10 TABLET ORAL at 22:51

## 2019-11-10 RX ADMIN — PANTOPRAZOLE SODIUM 40 MILLIGRAM(S): 20 TABLET, DELAYED RELEASE ORAL at 06:02

## 2019-11-10 RX ADMIN — Medication 1 SPRAY(S): at 17:59

## 2019-11-10 RX ADMIN — Medication 40 MILLIGRAM(S): at 17:59

## 2019-11-10 RX ADMIN — Medication 325 MILLIGRAM(S): at 21:45

## 2019-11-10 RX ADMIN — Medication 325 MILLIGRAM(S): at 00:07

## 2019-11-10 RX ADMIN — PIPERACILLIN AND TAZOBACTAM 25 GRAM(S): 4; .5 INJECTION, POWDER, LYOPHILIZED, FOR SOLUTION INTRAVENOUS at 21:14

## 2019-11-10 RX ADMIN — Medication 325 MILLIGRAM(S): at 21:15

## 2019-11-10 NOTE — PROGRESS NOTE ADULT - PROBLEM SELECTOR PLAN 3
- Fever to 100.3, HR 's, RR 20  - Patient reporting fevers of 102 at home, continues   - Likely due to bacterial PNA  - f/u BCx, UCx  - c/w Vancomycin, Zosyn pending culture results - Positive UA on admission  - UCx 11/9: contaminated  - c/w Empiric Vanc & Zosyn

## 2019-11-10 NOTE — PROGRESS NOTE ADULT - PROBLEM SELECTOR PLAN 9
Transitions of Care Status:  1.  Name of PCP: Wesley White [phone]  2.  PCP Contacted on Admission: [ ] Y    [X] N    3.  PCP contacted at Discharge: [ ] Y    [ ] N    [ ] N/A  4.  Post-Discharge Appointment Date and Location:  5.  Summary of Handoff given to PCP: - DVT: Lovenox 40  - Diet: DASH/TLC

## 2019-11-10 NOTE — PROGRESS NOTE ADULT - PROBLEM SELECTOR PLAN 2
- Bilateral lung effusion, R>L on CT (11/9)  - h/o R chest tube in recent hospitalization with negative cultures  - DDx Parapneumonic effusion vs. empyema vs. hepatic hydrothorax  - Pulm consulted, rec: diuresis goal -1L/day. Based on size of effusion, unlikely to be cause of hypoxia.  No safe window for thoracentesis  - Strict I/O - Bilateral lung effusion, R>L on CT (11/9)  - h/o R chest tube in recent hospitalization with negative cultures  - DDx Parapneumonic effusion vs. empyema vs. hepatic hydrothorax  - Pulm consulted, rec: diuresis goal -1L/day. Based on size of effusion, unlikely to be cause of hypoxia.  No safe window for thoracentesis  - Strict I/O, daily weights, Lasix 40mg IVP BID

## 2019-11-10 NOTE — PROGRESS NOTE ADULT - PROBLEM SELECTOR PLAN 5
- Likely 2/2 cirrhosis  - Also previously Dx with ITP, follows with Heme Dr. Robert - Fever to 100.3, HR 's, RR 20 on admission. Currently resolved.  - Patient reporting fevers of 102 at home  - Likely due to bacterial PNA  - BCx 11/9: NGTD. UCx contaminated  - c/w Vancomycin, Zosyn pending culture results

## 2019-11-10 NOTE — PROGRESS NOTE ADULT - SUBJECTIVE AND OBJECTIVE BOX
Demario Art, PGY1  Internal Medicine Resident  Pager: [phone] ([facility]), [phone] ([facility])    Patient is a 59y old  Male who presents with a chief complaint of Fever/shaking chills x1 day, found to have positive UA concerning for urosepsis (2019 14:39)    SUBJECTIVE / OVERNIGHT EVENTS:  No overnight events per Night Float.  At bedside, patient said he felt fevers/chills overnight.  Continues to have coughing and SOB.  Currently on 5L NC.  Has been tolerating food and water.  Last bowel movement was 1x regular yesterday.     ADDITIONAL REVIEW OF SYSTEMS:  Patient denies chest pain, palpitations, wheezing, abdominal pain, nausea, vomiting, diarrhea, or constipation.    MEDICATIONS  (STANDING):  atorvastatin 20 milliGRAM(s) Oral at bedtime  enoxaparin Injectable 40 milliGRAM(s) SubCutaneous daily  fluticasone propionate 50 MICROgram(s)/spray Nasal Spray 1 Spray(s) Both Nostrils two times a day  furosemide    Tablet 40 milliGRAM(s) Oral two times a day  influenza   Vaccine 0.5 milliLiter(s) IntraMuscular once  lactobacillus acidophilus 1 Tablet(s) Oral daily  lactulose Syrup 30 Gram(s) Oral every 6 hours  omega-3-Acid Ethyl Esters 4 Gram(s) Oral daily  pantoprazole    Tablet 40 milliGRAM(s) Oral before breakfast  piperacillin/tazobactam IVPB.. 3.375 Gram(s) IV Intermittent every 8 hours  spironolactone 100 milliGRAM(s) Oral daily  vancomycin  IVPB 1000 milliGRAM(s) IV Intermittent every 12 hours    MEDICATIONS  (PRN):  acetaminophen   Tablet .. 325 milliGRAM(s) Oral every 4 hours PRN Temp greater or equal to 38C (100.4F), Mild Pain (1 - 3), Moderate Pain (4 - 6)  zolpidem 5 milliGRAM(s) Oral at bedtime PRN Insomnia  zolpidem 5 milliGRAM(s) Oral at bedtime PRN Insomnia      CAPILLARY BLOOD GLUCOSE        I&O's Summary    2019 07:01  -  10 Nov 2019 07:00  --------------------------------------------------------  IN: 1552 mL / OUT: 0 mL / NET: 1552 mL        PHYSICAL EXAM:  Vital Signs Last 24 Hrs  T(C): 36.2 (10 Nov 2019 05:23), Max: 37.6 (2019 21:00)  T(F): 97.2 (10 Nov 2019 05:23), Max: 99.6 (2019 21:00)  HR: 90 (10 Nov 2019 05:29) (87 - 98)  BP: 151/85 (10 Nov 2019 05:23) (115/70 - 151/85)  BP(mean): --  RR: 18 (10 Nov 2019 05:) (18 - 20)  SpO2: 96% (10 Nov 2019 05:) (94% - 97%)    CONSTITUTIONAL: No apparent distress. Awake and conversive, cooperative with exam  EYES: PERRLA; EOMI, conjunctiva and sclera clear  ENMT: Moist oral mucosa, no pharyngeal injection or exudates; normal dentition  NECK: Supple, no palpable masses; no JVD  RESPIRATORY: Normal respiratory effort; lungs CTAB, no wheezes, rales, or rhonchi. Coughing. On 5L NC.  CARDIOVASCULAR: Regular rate and rhythm, normal S1 and S2, no murmur/rub/gallop; No lower extremity edema; Peripheral pulses are 2+ bilaterally  ABDOMEN: Obese, distended, firm, nontender to palpation, normoactive bowel sounds, no rebound/guarding  MUSCULOSKELETAL:  Extremities WWP, no clubbing or cyanosis of digits; no joint swelling or tenderness to palpation  PSYCH: A+O to person, place, and time; affect appropriate  NEUROLOGY: Nonfocal; no gross sensory or motor deficits   SKIN: No rashes; no palpable lesions    LABS:                        7.9    3.40  )-----------( x        ( 10 Nov 2019 09:06 )             29.0     11-10    132<L>  |  102  |  15  ----------------------------<  112<H>  4.2   |  26  |  1.13    Ca    8.5      10 Nov 2019 06:15  Phos  3.2     11-10  Mg     2.0     11-10    TPro  7.8  /  Alb  2.7<L>  /  TBili  2.2<H>  /  DBili  x   /  AST  27  /  ALT  15  /  AlkPhos  138<H>  11-10    PT/INR - ( 2019 21:33 )   PT: 25.0 sec;   INR: 2.12 ratio         PTT - ( 2019 21:33 )  PTT:30.4 sec      Urinalysis Basic - ( 2019 00:21 )    Color: Yellow / Appearance: Slightly Turbid / S.026 / pH: x  Gluc: x / Ketone: Negative  / Bili: Negative / Urobili: Negative   Blood: x / Protein: 30 mg/dL / Nitrite: Positive   Leuk Esterase: Large / RBC: 20 /hpf /  /HPF   Sq Epi: x / Non Sq Epi: 0 /hpf / Bacteria: Moderate        Culture - Urine (collected 2019 02:56)  Source: .Urine Clean Catch (Midstream)  Final Report (10 Nov 2019 09:50):    >=3 organisms. Probable collection contamination.    Culture - Blood (collected 2019 01:09)  Source: .Blood Blood-Peripheral  Preliminary Report (10 Nov 2019 02:02):    No growth to date.    Culture - Blood (collected 2019 01:09)  Source: .Blood Blood-Peripheral  Preliminary Report (10 Nov 2019 02:02):    No growth to date.        RADIOLOGY & ADDITIONAL TESTS:  Results Reviewed:   Imaging Personally Reviewed:  Electrocardiogram Personally Reviewed:    COORDINATION OF CARE:  Care Discussed with Consultants/Other Providers [Y/N]:  Prior or Outpatient Records Reviewed [Y/N]: Demario Art, PGY1  Internal Medicine Resident  Pager: [phone] ([facility]), [phone] ([facility])    Patient is a 59y old  Male who presents with a chief complaint of Fever/shaking chills x1 day, found to have positive UA concerning for urosepsis (2019 14:39)    SUBJECTIVE / OVERNIGHT EVENTS:  No overnight events per Night Float.  At bedside, patient said he felt fevers/chills overnight.  Continues to have coughing and SOB.  Currently on 5L NC.  Has been tolerating food and water.  Last bowel movement was 1x regular yesterday.     ADDITIONAL REVIEW OF SYSTEMS:  Patient denies chest pain, palpitations, wheezing, abdominal pain, nausea, vomiting, diarrhea, or constipation.    MEDICATIONS  (STANDING):  atorvastatin 20 milliGRAM(s) Oral at bedtime  enoxaparin Injectable 40 milliGRAM(s) SubCutaneous daily  fluticasone propionate 50 MICROgram(s)/spray Nasal Spray 1 Spray(s) Both Nostrils two times a day  furosemide    Tablet 40 milliGRAM(s) Oral two times a day  influenza   Vaccine 0.5 milliLiter(s) IntraMuscular once  lactobacillus acidophilus 1 Tablet(s) Oral daily  lactulose Syrup 30 Gram(s) Oral every 6 hours  omega-3-Acid Ethyl Esters 4 Gram(s) Oral daily  pantoprazole    Tablet 40 milliGRAM(s) Oral before breakfast  piperacillin/tazobactam IVPB.. 3.375 Gram(s) IV Intermittent every 8 hours  spironolactone 100 milliGRAM(s) Oral daily  vancomycin  IVPB 1000 milliGRAM(s) IV Intermittent every 12 hours    MEDICATIONS  (PRN):  acetaminophen   Tablet .. 325 milliGRAM(s) Oral every 4 hours PRN Temp greater or equal to 38C (100.4F), Mild Pain (1 - 3), Moderate Pain (4 - 6)  zolpidem 5 milliGRAM(s) Oral at bedtime PRN Insomnia  zolpidem 5 milliGRAM(s) Oral at bedtime PRN Insomnia      CAPILLARY BLOOD GLUCOSE        I&O's Summary    2019 07:01  -  10 Nov 2019 07:00  --------------------------------------------------------  IN: 1552 mL / OUT: 0 mL / NET: 1552 mL        PHYSICAL EXAM:  Vital Signs Last 24 Hrs  T(C): 36.2 (10 Nov 2019 05:23), Max: 37.6 (2019 21:00)  T(F): 97.2 (10 Nov 2019 05:23), Max: 99.6 (2019 21:00)  HR: 90 (10 Nov 2019 05:29) (87 - 98)  BP: 151/85 (10 Nov 2019 05:23) (115/70 - 151/85)  BP(mean): --  RR: 18 (10 Nov 2019 05:) (18 - 20)  SpO2: 96% (10 Nov 2019 05:) (94% - 97%)    CONSTITUTIONAL: No apparent distress. Awake and conversive, cooperative with exam  ENMT: Moist oral mucosa, no pharyngeal injection or exudates; normal dentition  RESPIRATORY: Normal respiratory effort; lungs CTAB, no wheezes, rales, or rhonchi. Coughing. On 5L NC.  CARDIOVASCULAR: Regular rate and rhythm, normal S1 and S2, no murmur/rub/gallop; No lower extremity edema; Peripheral pulses are 2+ bilaterally  ABDOMEN: Obese, distended, firm, nontender to palpation, normoactive bowel sounds, no rebound/guarding  MUSCULOSKELETAL:  Extremities WWP, no clubbing or cyanosis of digits; no joint swelling or tenderness to palpation  PSYCH: A+O to person, place, and time; affect appropriate  NEUROLOGY: Nonfocal; no gross sensory or motor deficits       LABS:                        7.9    3.40  )-----------( x        ( 10 Nov 2019 09:06 )             29.0     11-10    132<L>  |  102  |  15  ----------------------------<  112<H>  4.2   |  26  |  1.13    Ca    8.5      10 Nov 2019 06:15  Phos  3.2     11-10  Mg     2.0     11-10    TPro  7.8  /  Alb  2.7<L>  /  TBili  2.2<H>  /  DBili  x   /  AST  27  /  ALT  15  /  AlkPhos  138<H>  11-10    PT/INR - ( 2019 21:33 )   PT: 25.0 sec;   INR: 2.12 ratio         PTT - ( 2019 21:33 )  PTT:30.4 sec      Urinalysis Basic - ( 2019 00:21 )    Color: Yellow / Appearance: Slightly Turbid / S.026 / pH: x  Gluc: x / Ketone: Negative  / Bili: Negative / Urobili: Negative   Blood: x / Protein: 30 mg/dL / Nitrite: Positive   Leuk Esterase: Large / RBC: 20 /hpf /  /HPF   Sq Epi: x / Non Sq Epi: 0 /hpf / Bacteria: Moderate        Culture - Urine (collected 2019 02:56)  Source: .Urine Clean Catch (Midstream)  Final Report (10 Nov 2019 09:50):    >=3 organisms. Probable collection contamination.    Culture - Blood (collected 2019 01:09)  Source: .Blood Blood-Peripheral  Preliminary Report (10 Nov 2019 02:02):    No growth to date.    Culture - Blood (collected 2019 01:09)  Source: .Blood Blood-Peripheral  Preliminary Report (10 Nov 2019 02:02):    No growth to date.        RADIOLOGY & ADDITIONAL TESTS:  Results Reviewed:   Imaging Personally Reviewed:  Electrocardiogram Personally Reviewed:    COORDINATION OF CARE:  Care Discussed with Consultants/Other Providers [Y/N]:  Prior or Outpatient Records Reviewed [Y/N]:
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
## 2019-11-10 NOTE — PROGRESS NOTE ADULT - PROBLEM SELECTOR PLAN 1
- Likely 2/2 fluid overload vs restriction 2/2 obesity  - Currently on 5L NC  - ABG WNL - Likely 2/2 fluid overload vs restriction 2/2 obesity  - Currently on 5L NC  - ABG WNL. Repeat ABG

## 2019-11-11 DIAGNOSIS — R09.02 HYPOXEMIA: ICD-10-CM

## 2019-11-11 LAB
-  CANDIDA ALBICANS: SIGNIFICANT CHANGE UP
-  CANDIDA GLABRATA: SIGNIFICANT CHANGE UP
-  CANDIDA KRUSEI: SIGNIFICANT CHANGE UP
-  CANDIDA PARAPSILOSIS: SIGNIFICANT CHANGE UP
-  CANDIDA TROPICALIS: SIGNIFICANT CHANGE UP
-  COAGULASE NEGATIVE STAPHYLOCOCCUS: SIGNIFICANT CHANGE UP
-  K. PNEUMONIAE GROUP: SIGNIFICANT CHANGE UP
-  KPC RESISTANCE GENE: SIGNIFICANT CHANGE UP
-  STREPTOCOCCUS SP. (NOT GRP A, B OR S PNEUMONIAE): SIGNIFICANT CHANGE UP
A BAUMANNII DNA SPEC QL NAA+PROBE: SIGNIFICANT CHANGE UP
ALBUMIN SERPL ELPH-MCNC: 2.6 G/DL — LOW (ref 3.3–5)
ALP SERPL-CCNC: 130 U/L — HIGH (ref 40–120)
ALT FLD-CCNC: 17 U/L — SIGNIFICANT CHANGE UP (ref 10–45)
ANION GAP SERPL CALC-SCNC: 9 MMOL/L — SIGNIFICANT CHANGE UP (ref 5–17)
APTT BLD: 41.9 SEC — HIGH (ref 27.5–36.3)
AST SERPL-CCNC: 31 U/L — SIGNIFICANT CHANGE UP (ref 10–40)
BILIRUB SERPL-MCNC: 1.4 MG/DL — HIGH (ref 0.2–1.2)
BUN SERPL-MCNC: 15 MG/DL — SIGNIFICANT CHANGE UP (ref 7–23)
CALCIUM SERPL-MCNC: 8.2 MG/DL — LOW (ref 8.4–10.5)
CHLORIDE SERPL-SCNC: 103 MMOL/L — SIGNIFICANT CHANGE UP (ref 96–108)
CO2 SERPL-SCNC: 26 MMOL/L — SIGNIFICANT CHANGE UP (ref 22–31)
CREAT SERPL-MCNC: 1.09 MG/DL — SIGNIFICANT CHANGE UP (ref 0.5–1.3)
E CLOAC COMP DNA BLD POS QL NAA+PROBE: SIGNIFICANT CHANGE UP
E COLI DNA BLD POS QL NAA+NON-PROBE: SIGNIFICANT CHANGE UP
ENTEROCOC DNA BLD POS QL NAA+NON-PROBE: SIGNIFICANT CHANGE UP
ENTEROCOC DNA BLD POS QL NAA+NON-PROBE: SIGNIFICANT CHANGE UP
GLUCOSE SERPL-MCNC: 107 MG/DL — HIGH (ref 70–99)
GP B STREP DNA BLD POS QL NAA+NON-PROBE: SIGNIFICANT CHANGE UP
GRAM STN FLD: SIGNIFICANT CHANGE UP
HAEM INFLU DNA BLD POS QL NAA+NON-PROBE: SIGNIFICANT CHANGE UP
HCT VFR BLD CALC: 25.6 % — LOW (ref 39–50)
HGB BLD-MCNC: 7.2 G/DL — LOW (ref 13–17)
INR BLD: 2.05 RATIO — HIGH (ref 0.88–1.16)
K OXYTOCA DNA BLD POS QL NAA+NON-PROBE: SIGNIFICANT CHANGE UP
L MONOCYTOG DNA BLD POS QL NAA+NON-PROBE: SIGNIFICANT CHANGE UP
MAGNESIUM SERPL-MCNC: 1.7 MG/DL — SIGNIFICANT CHANGE UP (ref 1.6–2.6)
MCHC RBC-ENTMCNC: 23.5 PG — LOW (ref 27–34)
MCHC RBC-ENTMCNC: 28.1 GM/DL — LOW (ref 32–36)
MCV RBC AUTO: 83.7 FL — SIGNIFICANT CHANGE UP (ref 80–100)
METHOD TYPE: SIGNIFICANT CHANGE UP
MRSA SPEC QL CULT: SIGNIFICANT CHANGE UP
MSSA DNA SPEC QL NAA+PROBE: SIGNIFICANT CHANGE UP
N MEN ISLT CULT: SIGNIFICANT CHANGE UP
P AERUGINOSA DNA BLD POS NAA+NON-PROBE: SIGNIFICANT CHANGE UP
PHOSPHATE SERPL-MCNC: 2.9 MG/DL — SIGNIFICANT CHANGE UP (ref 2.5–4.5)
PLATELET # BLD AUTO: 73 K/UL — LOW (ref 150–400)
POTASSIUM SERPL-MCNC: 4.7 MMOL/L — SIGNIFICANT CHANGE UP (ref 3.5–5.3)
POTASSIUM SERPL-SCNC: 4.7 MMOL/L — SIGNIFICANT CHANGE UP (ref 3.5–5.3)
PROT SERPL-MCNC: 7.2 G/DL — SIGNIFICANT CHANGE UP (ref 6–8.3)
PROTHROM AB SERPL-ACNC: 23.7 SEC — HIGH (ref 10–13.1)
RBC # BLD: 3.06 M/UL — LOW (ref 4.2–5.8)
RBC # FLD: 20.8 % — HIGH (ref 10.3–14.5)
S MARCESCENS DNA BLD POS NAA+NON-PROBE: SIGNIFICANT CHANGE UP
S PNEUM DNA BLD POS QL NAA+NON-PROBE: SIGNIFICANT CHANGE UP
S PYO DNA BLD POS QL NAA+NON-PROBE: SIGNIFICANT CHANGE UP
SODIUM SERPL-SCNC: 138 MMOL/L — SIGNIFICANT CHANGE UP (ref 135–145)
WBC # BLD: 2.33 K/UL — LOW (ref 3.8–10.5)
WBC # FLD AUTO: 2.33 K/UL — LOW (ref 3.8–10.5)

## 2019-11-11 PROCEDURE — 99233 SBSQ HOSP IP/OBS HIGH 50: CPT | Mod: GC

## 2019-11-11 PROCEDURE — 99223 1ST HOSP IP/OBS HIGH 75: CPT | Mod: GC

## 2019-11-11 RX ORDER — VANCOMYCIN HCL 1 G
1000 VIAL (EA) INTRAVENOUS EVERY 12 HOURS
Refills: 0 | Status: DISCONTINUED | OUTPATIENT
Start: 2019-11-11 | End: 2019-11-15

## 2019-11-11 RX ORDER — VANCOMYCIN HCL 1 G
1000 VIAL (EA) INTRAVENOUS ONCE
Refills: 0 | Status: DISCONTINUED | OUTPATIENT
Start: 2019-11-11 | End: 2019-11-11

## 2019-11-11 RX ADMIN — Medication 40 MILLIGRAM(S): at 05:37

## 2019-11-11 RX ADMIN — Medication 250 MILLIGRAM(S): at 18:00

## 2019-11-11 RX ADMIN — LACTULOSE 30 GRAM(S): 10 SOLUTION ORAL at 12:19

## 2019-11-11 RX ADMIN — LACTULOSE 30 GRAM(S): 10 SOLUTION ORAL at 18:00

## 2019-11-11 RX ADMIN — Medication 1 SPRAY(S): at 05:37

## 2019-11-11 RX ADMIN — Medication 325 MILLIGRAM(S): at 21:15

## 2019-11-11 RX ADMIN — Medication 1 SPRAY(S): at 17:59

## 2019-11-11 RX ADMIN — Medication 4 GRAM(S): at 12:18

## 2019-11-11 RX ADMIN — Medication 325 MILLIGRAM(S): at 10:06

## 2019-11-11 RX ADMIN — ENOXAPARIN SODIUM 40 MILLIGRAM(S): 100 INJECTION SUBCUTANEOUS at 12:19

## 2019-11-11 RX ADMIN — ATORVASTATIN CALCIUM 20 MILLIGRAM(S): 80 TABLET, FILM COATED ORAL at 21:39

## 2019-11-11 RX ADMIN — PANTOPRAZOLE SODIUM 40 MILLIGRAM(S): 20 TABLET, DELAYED RELEASE ORAL at 05:39

## 2019-11-11 RX ADMIN — PIPERACILLIN AND TAZOBACTAM 25 GRAM(S): 4; .5 INJECTION, POWDER, LYOPHILIZED, FOR SOLUTION INTRAVENOUS at 21:39

## 2019-11-11 RX ADMIN — Medication 1 TABLET(S): at 12:18

## 2019-11-11 RX ADMIN — PIPERACILLIN AND TAZOBACTAM 25 GRAM(S): 4; .5 INJECTION, POWDER, LYOPHILIZED, FOR SOLUTION INTRAVENOUS at 05:38

## 2019-11-11 RX ADMIN — Medication 325 MILLIGRAM(S): at 15:25

## 2019-11-11 RX ADMIN — Medication 250 MILLIGRAM(S): at 05:38

## 2019-11-11 RX ADMIN — Medication 325 MILLIGRAM(S): at 03:31

## 2019-11-11 RX ADMIN — SPIRONOLACTONE 100 MILLIGRAM(S): 25 TABLET, FILM COATED ORAL at 05:39

## 2019-11-11 RX ADMIN — Medication 325 MILLIGRAM(S): at 20:45

## 2019-11-11 RX ADMIN — Medication 325 MILLIGRAM(S): at 14:38

## 2019-11-11 RX ADMIN — Medication 100 MILLIGRAM(S): at 03:01

## 2019-11-11 RX ADMIN — Medication 40 MILLIGRAM(S): at 18:00

## 2019-11-11 RX ADMIN — Medication 325 MILLIGRAM(S): at 11:03

## 2019-11-11 RX ADMIN — Medication 325 MILLIGRAM(S): at 03:01

## 2019-11-11 RX ADMIN — PIPERACILLIN AND TAZOBACTAM 25 GRAM(S): 4; .5 INJECTION, POWDER, LYOPHILIZED, FOR SOLUTION INTRAVENOUS at 14:39

## 2019-11-11 RX ADMIN — ZOLPIDEM TARTRATE 5 MILLIGRAM(S): 10 TABLET ORAL at 21:38

## 2019-11-11 NOTE — DIETITIAN INITIAL EVALUATION ADULT. - PROBLEM SELECTOR PLAN 4
- Hx of decompensated cirrhosis  - Likely 2/2 alcoholism and BOB  - No asterixis or signs of hepatic encephalopathy on exam  - c/w home Lasix, Spironolactone, Lactulose

## 2019-11-11 NOTE — PROGRESS NOTE ADULT - SUBJECTIVE AND OBJECTIVE BOX
CHIEF COMPLAINT:    Interval Events: No acute events overnight. Still complaining of SOB and cough, unchanged since admission.     REVIEW OF SYSTEMS:  Constitutional: [ ] negative [ ] fevers [ ] chills [ ] weight loss [ ] weight gain  HEENT: [ ] negative [ ] dry eyes [ ] eye irritation [ ] postnasal drip [ ] nasal congestion  CV: [ ] negative  [ ] chest pain [ ] orthopnea [ ] palpitations [ ] murmur  Resp: [ ] negative [X] cough [X] shortness of breath [ ] dyspnea [ ] wheezing [ ] sputum [ ] hemoptysis  GI: [ ] negative [ ] nausea [ ] vomiting [ ] diarrhea [ ] constipation [ ] abd pain [ ] dysphagia   : [ ] negative [ ] dysuria [ ] nocturia [ ] hematuria [ ] increased urinary frequency  Musculoskeletal: [ ] negative [ ] back pain [ ] myalgias [ ] arthralgias [ ] fracture  Skin: [ ] negative [ ] rash [ ] itch  Neurological: [ ] negative [ ] headache [ ] dizziness [ ] syncope [ ] weakness [ ] numbness  Psychiatric: [ ] negative [ ] anxiety [ ] depression  Endocrine: [ ] negative [ ] diabetes [ ] thyroid problem  Hematologic/Lymphatic: [ ] negative [ ] anemia [ ] bleeding problem  Allergic/Immunologic: [ ] negative [ ] itchy eyes [ ] nasal discharge [ ] hives [ ] angioedema  [X] All other systems negative  [ ] Unable to assess ROS because ________    OBJECTIVE:  ICU Vital Signs Last 24 Hrs  T(C): 37.4 (11 Nov 2019 14:11), Max: 37.9 (11 Nov 2019 04:40)  T(F): 99.4 (11 Nov 2019 14:11), Max: 100.2 (11 Nov 2019 04:40)  HR: 94 (11 Nov 2019 17:34) (78 - 100)  BP: 154/76 (11 Nov 2019 14:11) (113/53 - 154/76)  BP(mean): --  ABP: --  ABP(mean): --  RR: 18 (11 Nov 2019 14:11) (18 - 23)  SpO2: 97% (11 Nov 2019 17:34) (95% - 100%)        11-10 @ 07:01  -  11-11 @ 07:00  --------------------------------------------------------  IN: 1940 mL / OUT: 1175 mL / NET: 765 mL    11-11 @ 07:01  -  11-11 @ 17:48  --------------------------------------------------------  IN: 240 mL / OUT: 0 mL / NET: 240 mL      CAPILLARY BLOOD GLUCOSE          PHYSICAL EXAM:  General: Uncomfortable, tremulous  HEENT: EOMI, PERRLA  Respiratory: Decreased bibasilar breath sounds  Cardiovascular: S1S2, RRR  Abdomen: Distended, NT, BS+, Soft  Extremities: Mild pitting edema  Neurological: [facility] MEDICATIONS:  MEDICATIONS  (STANDING):  atorvastatin 20 milliGRAM(s) Oral at bedtime  enoxaparin Injectable 40 milliGRAM(s) SubCutaneous daily  fluticasone propionate 50 MICROgram(s)/spray Nasal Spray 1 Spray(s) Both Nostrils two times a day  furosemide   Injectable 40 milliGRAM(s) IV Push every 12 hours  influenza   Vaccine 0.5 milliLiter(s) IntraMuscular once  lactobacillus acidophilus 1 Tablet(s) Oral daily  lactulose Syrup 30 Gram(s) Oral every 6 hours  omega-3-Acid Ethyl Esters 4 Gram(s) Oral daily  pantoprazole    Tablet 40 milliGRAM(s) Oral before breakfast  piperacillin/tazobactam IVPB.. 3.375 Gram(s) IV Intermittent every 8 hours  spironolactone 100 milliGRAM(s) Oral daily  vancomycin  IVPB 1000 milliGRAM(s) IV Intermittent every 12 hours    MEDICATIONS  (PRN):  acetaminophen   Tablet .. 325 milliGRAM(s) Oral every 4 hours PRN Temp greater or equal to 38C (100.4F), Mild Pain (1 - 3), Moderate Pain (4 - 6)  zolpidem 5 milliGRAM(s) Oral at bedtime PRN Insomnia      LABS:                        7.2    2.33  )-----------( 73       ( 11 Nov 2019 08:03 )             25.6     Hgb Trend: 7.2<--, 7.9<--, 8.2<--  11-11    138  |  103  |  15  ----------------------------<  107<H>  4.7   |  26  |  1.09    Ca    8.2<L>      11 Nov 2019 06:07  Phos  2.9     11-11  Mg     1.7     11-11    TPro  7.2  /  Alb  2.6<L>  /  TBili  1.4<H>  /  DBili  x   /  AST  31  /  ALT  17  /  AlkPhos  130<H>  11-11    Creatinine Trend: 1.09<--, 1.13<--, 0.93<--, 1.16<--, 1.12<--, 1.20<--  PT/INR - ( 11 Nov 2019 07:53 )   PT: 23.7 sec;   INR: 2.05 ratio         PTT - ( 11 Nov 2019 07:53 )  PTT:41.9 sec    Arterial Blood Gas:  11-10 @ 15:03  7.38/46/65/27/93/2.0  ABG lactate: --        MICROBIOLOGY:     Culture - Urine (collected 09 Nov 2019 02:56)  Source: .Urine Clean Catch (Midstream)  Final Report (10 Nov 2019 09:50):    >=3 organisms. Probable collection contamination.    Culture - Blood (collected 09 Nov 2019 01:09)  Source: .Blood Blood-Peripheral  Gram Stain (11 Nov 2019 14:03):    Growth in aerobic bottle: Gram Positive Cocci in Clusters  Preliminary Report (11 Nov 2019 14:04):    Growth in aerobic bottle: Gram Positive Cocci in Clusters    "Due to technical problems, Proteus sp. will Not be reported as part of    the BCID panel until further notice"    ***Blood Panel PCR results on this specimen are available    approximately 3 hours after the Gram stain result.***    Gram stain, PCR, and/or culture results may not always    correspond due to difference in methodologies.    ************************************************************    This PCR assay was performed using [employer].    The following targets are tested for: Enterococcus,    vancomycin resistant enterococci, Listeria monocytogenes,    coagulase negative staphylococci, S. aureus,    methicillin resistant S. aureus, Streptococcus agalactiae    (Group B), S. pneumoniae, S.pyogenes (Group A),    Acinetobacter baumannii, Enterobacter cloacae, E. coli,    Klebsiella oxytoca, K. pneumoniae, Proteus sp.,    Serratia marcescens, Haemophilus influenzae,    Neisseria meningitidis, Pseudomonas aeruginosa, Candida    albicans, C. glabrata, C krusei, C parapsilosis,    C. tropicalis and the KPC resistance gene.  Organism: Blood Culture PCR (11 Nov 2019 16:16)  Organism: Blood Culture PCR (11 Nov 2019 16:16)    Culture - Blood (collected 09 Nov 2019 01:09)  Source: .Blood Blood-Peripheral  Preliminary Report (10 Nov 2019 02:02):    No growth to date.        RADIOLOGY:  [ ] Reviewed and interpreted by me    PULMONARY FUNCTION TESTS:    EKG:
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
## 2019-11-11 NOTE — DIETITIAN INITIAL EVALUATION ADULT. - PROBLEM SELECTOR PLAN 1
- Likely 2/2 fluid overload vs restriction 2/2 obesity  -, will wean to 5L NC  - f/u ABG, consider TTE w/bubble study if hypoxic

## 2019-11-11 NOTE — PROGRESS NOTE ADULT - PROBLEM SELECTOR PLAN 5
- Fever to 100.3, HR 's, RR 20 on admission. Currently resolved.  - Patient reporting fevers of 102 at home  - Likely due to bacterial PNA  - BCx 11/9: NGTD. UCx contaminated  - c/w Vancomycin, Zosyn pending culture results

## 2019-11-11 NOTE — DIETITIAN INITIAL EVALUATION ADULT. - ADD RECOMMEND
1. Will continue to monitor PO intake, weight, labs, skin, GI status, diet. 2. Provided education on heart failure and weight loss nutrition therapy - made aware RD remains available. 3. BMI > 40 notification placed in  chart - spoke to Team 2.

## 2019-11-11 NOTE — CHART NOTE - NSCHARTNOTEFT_GEN_A_CORE
Upon Nutritional Assessment by the Registered Dietitian your patient was determined to meet criteria / has evidence of the following diagnosis/diagnoses:          [ ]  Mild Protein Calorie Malnutrition        [ ]  Moderate Protein Calorie Malnutrition        [ ] Severe Protein Calorie Malnutrition        [ ] Unspecified Protein Calorie Malnutrition        [ ] Underweight / BMI <19        [x] Morbid Obesity / BMI > 40      Findings as based on:  [x] Comprehensive nutrition assessment   [ ] Nutrition Focused Physical Exam  [x] Other: BMI 45.8       Nutrition Plan/Recommendations:    1. Recommend continue DASH/TLC diet. Will continue to monitor and adjust as needed.  2. Provided education on heart failure and weight loss nutrition therapy - made aware RD remains available.    RD remains available,   Natali Esquivel MS, RDN, #[phone]     PROVIDER Section:     By signing this assessment you are acknowledging and agree with the diagnosis/diagnoses assigned by the Registered Dietitian    Comments:

## 2019-11-11 NOTE — DIETITIAN INITIAL EVALUATION ADULT. - PROBLEM SELECTOR PLAN 9
Transitions of Care Status:  1.  Name of PCP: Wesley White [phone]  2.  PCP Contacted on Admission: [ ] Y    [X] N    3.  PCP contacted at Discharge: [ ] Y    [ ] N    [ ] N/A  4.  Post-Discharge Appointment Date and Location:  5.  Summary of Handoff given to PCP:
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
## 2019-11-11 NOTE — PROVIDER CONTACT NOTE (CRITICAL VALUE NOTIFICATION) - ASSESSMENT
Pt A&Ox2-3, disoriented to situation and place. Pt on IV zoysn for UTI and was on Vanco but d/c this morning.

## 2019-11-11 NOTE — PROGRESS NOTE ADULT - PROBLEM SELECTOR PLAN 2
- Bilateral lung effusion, R>L on CT (11/9)  - h/o R chest tube in recent hospitalization with negative cultures  - DDx Parapneumonic effusion vs. empyema vs. hepatic hydrothorax  - Pulm consulted, rec: diuresis goal -1L/day. Based on size of effusion, unlikely to be cause of hypoxia.  No safe window for thoracentesis  - Strict I/O, daily weights, Lasix 40mg IVP BID

## 2019-11-11 NOTE — PROVIDER CONTACT NOTE (MEDICATION) - ASSESSMENT
Pt A&OX3, constant dry cough, nonproductive, unable to sleep due to cough. O2 saturation 96% on nasal cannula 4L, no SOB.

## 2019-11-11 NOTE — PROGRESS NOTE ADULT - ASSESSMENT
Patient is a 60 yo M w/ HFpEF (EF 65% Oct 2019), HLD, EtOH cirrhosis, and recent admission (10/2019) for multiple rib fractures and R pleff s/p chest tube now p/w fevers, chills. CT Chest with b/l pleural effusion R>L, though on POCUS on time of consult with no safe window for thoracentesis.

## 2019-11-11 NOTE — PROGRESS NOTE ADULT - PROBLEM SELECTOR PLAN 1
- Likely multifactorial 2/2 hepatopulmonary syndrome and restrictive lung disease 2/2 obesity  - Currently on 5L NC with Sat in Mid 90s  - Will attempt to ambulate off of O2, however will likely require O2 on discharge  - Diuresis as below - Likely multifactorial 2/2 pleural effusions, bacterial pneuomonia and restrictive lung disease 2/2 obesity  - Currently on 5L NC with Sat in Mid 90s  - Will attempt to ambulate off of O2  - Diuresis as below

## 2019-11-11 NOTE — PROGRESS NOTE ADULT - SUBJECTIVE AND OBJECTIVE BOX
PROGRESS NOTE:   Authoted by Dr. Wili Elizalde MD, S  Pager [phone] [facility], [id number] [place]     Patient is a 59y old  Male who presents with a chief complaint of Fever/shaking chills x1 day, (10 Nov 2019 10:04)      SUBJECTIVE / OVERNIGHT EVENTS: No acute events overnight   This morning pt chief complaint remains his severe chronic cough present for the past 3years, but worse than usual during this admission    ADDITIONAL REVIEW OF SYSTEMS: He endorses SOB, but no Fever/chills, N/V, CP, or palpitations    MEDICATIONS  (STANDING):  atorvastatin 20 milliGRAM(s) Oral at bedtime  enoxaparin Injectable 40 milliGRAM(s) SubCutaneous daily  fluticasone propionate 50 MICROgram(s)/spray Nasal Spray 1 Spray(s) Both Nostrils two times a day  furosemide   Injectable 40 milliGRAM(s) IV Push every 12 hours  influenza   Vaccine 0.5 milliLiter(s) IntraMuscular once  lactobacillus acidophilus 1 Tablet(s) Oral daily  lactulose Syrup 30 Gram(s) Oral every 6 hours  omega-3-Acid Ethyl Esters 4 Gram(s) Oral daily  pantoprazole    Tablet 40 milliGRAM(s) Oral before breakfast  piperacillin/tazobactam IVPB.. 3.375 Gram(s) IV Intermittent every 8 hours  spironolactone 100 milliGRAM(s) Oral daily  vancomycin  IVPB 1000 milliGRAM(s) IV Intermittent every 12 hours    MEDICATIONS  (PRN):  acetaminophen   Tablet .. 325 milliGRAM(s) Oral every 4 hours PRN Temp greater or equal to 38C (100.4F), Mild Pain (1 - 3), Moderate Pain (4 - 6)  zolpidem 5 milliGRAM(s) Oral at bedtime PRN Insomnia      CAPILLARY BLOOD GLUCOSE        I&O's Summary    10 Nov 2019 07:01  -  11 Nov 2019 07:00  --------------------------------------------------------  IN: 1940 mL / OUT: 1175 mL / NET: 765 mL        PHYSICAL EXAM:  Vital Signs Last 24 Hrs  T(C): 37 (11 Nov 2019 06:12), Max: 37.9 (11 Nov 2019 04:40)  T(F): 98.6 (11 Nov 2019 06:12), Max: 100.2 (11 Nov 2019 04:40)  HR: 92 (11 Nov 2019 05:30) (76 - 96)  BP: 127/61 (11 Nov 2019 04:40) (127/61 - 139/49)  BP(mean): --  RR: 20 (11 Nov 2019 04:40) (20 - 23)  SpO2: 98% (11 Nov 2019 05:30) (95% - 100%)    CONSTITUTIONAL: No apparent distress. Awake and conversive, cooperative with exam  ENMT: Moist oral mucosa, no pharyngeal injection or exudates; normal dentition  RESPIRATORY: Normal respiratory effort; lungs CTAB, no wheezes, rales, or rhonchi. Coughing. On 5L NC.  CARDIOVASCULAR: Regular rate and rhythm, normal S1 and S2, no murmur/rub/gallop; No lower extremity edema; Peripheral pulses are 2+ bilaterally  ABDOMEN: Obese, distended, firm, nontender to palpation, normoactive bowel sounds, no rebound/guarding  MUSCULOSKELETAL:  Extremities WWP, no clubbing or cyanosis of digits; no joint swelling or tenderness to palpation  PSYCH: A+O to person, place, and time; affect appropriate  NEUROLOGY: Nonfocal; no gross sensory or motor deficits     LABS:                        7.9    3.40  )-----------( 83       ( 10 Nov 2019 09:06 )             29.0     11-11    138  |  103  |  15  ----------------------------<  107<H>  4.7   |  26  |  1.09    Ca    8.2<L>      11 Nov 2019 06:07  Phos  2.9     11-11  Mg     1.7     11-11    TPro  7.2  /  Alb  2.6<L>  /  TBili  1.4<H>  /  DBili  x   /  AST  31  /  ALT  17  /  AlkPhos  130<H>  11-11    PT/INR - ( 11 Nov 2019 07:53 )   PT: 23.7 sec;   INR: 2.05 ratio         PTT - ( 11 Nov 2019 07:53 )  PTT:41.9 sec          Culture - Urine (collected 09 Nov 2019 02:56)  Source: .Urine Clean Catch (Midstream)  Final Report (10 Nov 2019 09:50):    >=3 organisms. Probable collection contamination.    Culture - Blood (collected 09 Nov 2019 01:09)  Source: .Blood Blood-Peripheral  Preliminary Report (10 Nov 2019 02:02):    No growth to date.    Culture - Blood (collected 09 Nov 2019 01:09)  Source: .Blood Blood-Peripheral  Preliminary Report (10 Nov 2019 02:02):    No growth to date.        RADIOLOGY & ADDITIONAL TESTS:  Results Reviewed:   Imaging Personally Reviewed:  Electrocardiogram Personally Reviewed:    COORDINATION OF CARE:  Care Discussed with Consultants/Other Providers [Y/N]:  Prior or Outpatient Records Reviewed [Y/N]:

## 2019-11-11 NOTE — DIETITIAN INITIAL EVALUATION ADULT. - PROBLEM SELECTOR PLAN 2
- Bilateral lung effusion, R>L on CT (11/9)  - h/o R chest tube in recent hospitalization with negative cultures  - DDx Parapneumonic effusion vs. empyema vs. hepatic hydrothorax  - Pulm consulted, rec: diuresis goal -1L/day. Based on size of effusion, unlikely to be cause of hypoxia.  No safe window for thoracentesis

## 2019-11-11 NOTE — PROGRESS NOTE ADULT - PROBLEM SELECTOR PLAN 4
- Hx of decompensated cirrhosis  - Likely 2/2 alcoholism and BOB  - No asterixis or signs of hepatic encephalopathy on exam  - c/w home Spironolactone, Lactulose. Lasix switched to 40mg IV BID  - Daily INR and LFTs  - MELD-Na 15 on 11/11/19 - Hx of decompensated cirrhosis  - Likely 2/2 alcoholism and BOB  - No asterixis or signs of hepatic encephalopathy on exam  - c/w home Spironolactone, Lactulose. Lasix switched to 40mg IV BID  - Daily INR and LFTs  - MELD-Na 15 on 11/11/19  -small volume ascites on CT scan

## 2019-11-11 NOTE — DIETITIAN INITIAL EVALUATION ADULT. - REASON INDICATOR FOR ASSESSMENT
Pt seen for BMI >40 referral.   Information obtained from: medical record, previous RD note, pt, and PCA.   Pt forgetful at times/disoriented as per documentation, no family at bedside -?accuracy of information obtained.
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
## 2019-11-11 NOTE — PROGRESS NOTE ADULT - PROBLEM SELECTOR PLAN 1
- Will monitor pleural effusions with serial POCUS  - Supplemental O2 to keep SO2>90%  - Consider TTE with bubble study to eval for shunt physiology ie. hepatopulmonary syndrome  - c/w diuresis    Vasiliy Atkins MD  Pulmonary & Critical Care Fellow  [phone]

## 2019-11-11 NOTE — DIETITIAN INITIAL EVALUATION ADULT. - PHYSICAL APPEARANCE
No visual signs of muscle/fat loss noted/other (specify)/obese Ht: 70 inches Wt: 319.6 pounds BMI: 45.8 kg/m2 IBW: 166 (+/-10%) 182 %IBW  Noted +1 generalized, +2 charisma. ankle and foot, and +3 charisma. hand edema as per flow sheets.   Skin: no noted pressure injuries as per documentation.

## 2019-11-12 DIAGNOSIS — A41.9 SEPSIS, UNSPECIFIED ORGANISM: ICD-10-CM

## 2019-11-12 LAB
ALBUMIN SERPL ELPH-MCNC: 2.6 G/DL — LOW (ref 3.3–5)
ALP SERPL-CCNC: 113 U/L — SIGNIFICANT CHANGE UP (ref 40–120)
ALT FLD-CCNC: 16 U/L — SIGNIFICANT CHANGE UP (ref 10–45)
ANION GAP SERPL CALC-SCNC: 4 MMOL/L — LOW (ref 5–17)
APTT BLD: 42.4 SEC — HIGH (ref 27.5–36.3)
AST SERPL-CCNC: 29 U/L — SIGNIFICANT CHANGE UP (ref 10–40)
BILIRUB SERPL-MCNC: 1.2 MG/DL — SIGNIFICANT CHANGE UP (ref 0.2–1.2)
BUN SERPL-MCNC: 13 MG/DL — SIGNIFICANT CHANGE UP (ref 7–23)
CALCIUM SERPL-MCNC: 8.2 MG/DL — LOW (ref 8.4–10.5)
CHLORIDE SERPL-SCNC: 97 MMOL/L — SIGNIFICANT CHANGE UP (ref 96–108)
CO2 SERPL-SCNC: 31 MMOL/L — SIGNIFICANT CHANGE UP (ref 22–31)
CREAT SERPL-MCNC: 1.11 MG/DL — SIGNIFICANT CHANGE UP (ref 0.5–1.3)
CULTURE RESULTS: SIGNIFICANT CHANGE UP
GLUCOSE SERPL-MCNC: 126 MG/DL — HIGH (ref 70–99)
HCT VFR BLD CALC: 25.9 % — LOW (ref 39–50)
HGB BLD-MCNC: 7.3 G/DL — LOW (ref 13–17)
INR BLD: 1.87 RATIO — HIGH (ref 0.88–1.16)
MAGNESIUM SERPL-MCNC: 1.7 MG/DL — SIGNIFICANT CHANGE UP (ref 1.6–2.6)
MCHC RBC-ENTMCNC: 23.4 PG — LOW (ref 27–34)
MCHC RBC-ENTMCNC: 28.2 GM/DL — LOW (ref 32–36)
MCV RBC AUTO: 83 FL — SIGNIFICANT CHANGE UP (ref 80–100)
ORGANISM # SPEC MICROSCOPIC CNT: SIGNIFICANT CHANGE UP
ORGANISM # SPEC MICROSCOPIC CNT: SIGNIFICANT CHANGE UP
PHOSPHATE SERPL-MCNC: 2.7 MG/DL — SIGNIFICANT CHANGE UP (ref 2.5–4.5)
PLATELET # BLD AUTO: 70 K/UL — LOW (ref 150–400)
POTASSIUM SERPL-MCNC: 4.2 MMOL/L — SIGNIFICANT CHANGE UP (ref 3.5–5.3)
POTASSIUM SERPL-SCNC: 4.2 MMOL/L — SIGNIFICANT CHANGE UP (ref 3.5–5.3)
PROT SERPL-MCNC: 7.5 G/DL — SIGNIFICANT CHANGE UP (ref 6–8.3)
PROTHROM AB SERPL-ACNC: 21.9 SEC — HIGH (ref 10–13.1)
RBC # BLD: 3.12 M/UL — LOW (ref 4.2–5.8)
RBC # FLD: 21 % — HIGH (ref 10.3–14.5)
SODIUM SERPL-SCNC: 132 MMOL/L — LOW (ref 135–145)
SPECIMEN SOURCE: SIGNIFICANT CHANGE UP
WBC # BLD: 2.3 K/UL — LOW (ref 3.8–10.5)
WBC # FLD AUTO: 2.3 K/UL — LOW (ref 3.8–10.5)

## 2019-11-12 PROCEDURE — 99233 SBSQ HOSP IP/OBS HIGH 50: CPT | Mod: GC

## 2019-11-12 PROCEDURE — 99255 IP/OBS CONSLTJ NEW/EST HI 80: CPT | Mod: GC

## 2019-11-12 PROCEDURE — 99233 SBSQ HOSP IP/OBS HIGH 50: CPT | Mod: GC,25

## 2019-11-12 PROCEDURE — 76604 US EXAM CHEST: CPT | Mod: 26,GC

## 2019-11-12 RX ORDER — HEPARIN SODIUM 5000 [USP'U]/ML
7500 INJECTION INTRAVENOUS; SUBCUTANEOUS EVERY 8 HOURS
Refills: 0 | Status: DISCONTINUED | OUTPATIENT
Start: 2019-11-12 | End: 2019-11-12

## 2019-11-12 RX ORDER — PHYTONADIONE (VIT K1) 5 MG
5 TABLET ORAL ONCE
Refills: 0 | Status: COMPLETED | OUTPATIENT
Start: 2019-11-12 | End: 2019-11-12

## 2019-11-12 RX ORDER — HEPARIN SODIUM 5000 [USP'U]/ML
5000 INJECTION INTRAVENOUS; SUBCUTANEOUS EVERY 12 HOURS
Refills: 0 | Status: DISCONTINUED | OUTPATIENT
Start: 2019-11-12 | End: 2019-11-13

## 2019-11-12 RX ADMIN — Medication 1 SPRAY(S): at 05:36

## 2019-11-12 RX ADMIN — Medication 40 MILLIGRAM(S): at 06:37

## 2019-11-12 RX ADMIN — Medication 5 MILLIGRAM(S): at 16:25

## 2019-11-12 RX ADMIN — Medication 40 MILLIGRAM(S): at 18:29

## 2019-11-12 RX ADMIN — LACTULOSE 30 GRAM(S): 10 SOLUTION ORAL at 13:35

## 2019-11-12 RX ADMIN — Medication 325 MILLIGRAM(S): at 22:10

## 2019-11-12 RX ADMIN — Medication 325 MILLIGRAM(S): at 05:38

## 2019-11-12 RX ADMIN — LACTULOSE 30 GRAM(S): 10 SOLUTION ORAL at 21:13

## 2019-11-12 RX ADMIN — PIPERACILLIN AND TAZOBACTAM 25 GRAM(S): 4; .5 INJECTION, POWDER, LYOPHILIZED, FOR SOLUTION INTRAVENOUS at 07:56

## 2019-11-12 RX ADMIN — HEPARIN SODIUM 5000 UNIT(S): 5000 INJECTION INTRAVENOUS; SUBCUTANEOUS at 18:39

## 2019-11-12 RX ADMIN — PANTOPRAZOLE SODIUM 40 MILLIGRAM(S): 20 TABLET, DELAYED RELEASE ORAL at 05:38

## 2019-11-12 RX ADMIN — SPIRONOLACTONE 100 MILLIGRAM(S): 25 TABLET, FILM COATED ORAL at 05:38

## 2019-11-12 RX ADMIN — ATORVASTATIN CALCIUM 20 MILLIGRAM(S): 80 TABLET, FILM COATED ORAL at 21:11

## 2019-11-12 RX ADMIN — PIPERACILLIN AND TAZOBACTAM 25 GRAM(S): 4; .5 INJECTION, POWDER, LYOPHILIZED, FOR SOLUTION INTRAVENOUS at 21:12

## 2019-11-12 RX ADMIN — Medication 250 MILLIGRAM(S): at 05:36

## 2019-11-12 RX ADMIN — Medication 1 TABLET(S): at 13:35

## 2019-11-12 RX ADMIN — Medication 250 MILLIGRAM(S): at 18:27

## 2019-11-12 RX ADMIN — ZOLPIDEM TARTRATE 5 MILLIGRAM(S): 10 TABLET ORAL at 21:14

## 2019-11-12 RX ADMIN — Medication 4 GRAM(S): at 13:35

## 2019-11-12 RX ADMIN — PIPERACILLIN AND TAZOBACTAM 25 GRAM(S): 4; .5 INJECTION, POWDER, LYOPHILIZED, FOR SOLUTION INTRAVENOUS at 13:34

## 2019-11-12 RX ADMIN — Medication 325 MILLIGRAM(S): at 21:14

## 2019-11-12 RX ADMIN — Medication 1 SPRAY(S): at 21:12
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
## 2019-11-12 NOTE — PROGRESS NOTE ADULT - SUBJECTIVE AND OBJECTIVE BOX
Demario Art, PGY1  Internal Medicine Resident  Pager: [phone] ([facility]), [phone] ([facility])    Patient is a 59y old  Male who presents with a chief complaint of Fever/shaking chills x1 day, (11 Nov 2019 17:47)    SUBJECTIVE / OVERNIGHT EVENTS:  No overnight events per Night Float.  On Telemetry, .  At bedside, patient resting comfortably and slept well.  Has been tolerating food and water.  Last bowel movement was 1x regular yesterday.     ADDITIONAL REVIEW OF SYSTEMS:  Patient denies fevers/chills, chest pain, palpitations, SOB, cough/wheezing, abdominal pain, nausea, vomiting, diarrhea, or constipation.    MEDICATIONS  (STANDING):  atorvastatin 20 milliGRAM(s) Oral at bedtime  enoxaparin Injectable 40 milliGRAM(s) SubCutaneous daily  fluticasone propionate 50 MICROgram(s)/spray Nasal Spray 1 Spray(s) Both Nostrils two times a day  furosemide   Injectable 40 milliGRAM(s) IV Push every 12 hours  influenza   Vaccine 0.5 milliLiter(s) IntraMuscular once  lactobacillus acidophilus 1 Tablet(s) Oral daily  lactulose Syrup 30 Gram(s) Oral every 6 hours  omega-3-Acid Ethyl Esters 4 Gram(s) Oral daily  pantoprazole    Tablet 40 milliGRAM(s) Oral before breakfast  piperacillin/tazobactam IVPB.. 3.375 Gram(s) IV Intermittent every 8 hours  spironolactone 100 milliGRAM(s) Oral daily  vancomycin  IVPB 1000 milliGRAM(s) IV Intermittent every 12 hours    MEDICATIONS  (PRN):  acetaminophen   Tablet .. 325 milliGRAM(s) Oral every 4 hours PRN Temp greater or equal to 38C (100.4F), Mild Pain (1 - 3), Moderate Pain (4 - 6)  zolpidem 5 milliGRAM(s) Oral at bedtime PRN Insomnia      CAPILLARY BLOOD GLUCOSE        I&O's Summary    11 Nov 2019 07:01  -  12 Nov 2019 07:00  --------------------------------------------------------  IN: 2680 mL / OUT: 200 mL / NET: 2480 mL        PHYSICAL EXAM:  Vital Signs Last 24 Hrs  T(C): 37.6 (12 Nov 2019 07:57), Max: 38.4 (12 Nov 2019 05:18)  T(F): 99.6 (12 Nov 2019 07:57), Max: 101.1 (12 Nov 2019 05:18)  HR: 90 (12 Nov 2019 07:57) (77 - 100)  BP: 125/58 (12 Nov 2019 07:57) (113/53 - 154/76)  BP(mean): --  RR: 18 (12 Nov 2019 07:57) (18 - 22)  SpO2: 96% (12 Nov 2019 07:57) (93% - 98%)    CONSTITUTIONAL: No apparent distress. Awake and conversive, cooperative with exam  EYES: PERRLA; EOMI, conjunctiva and sclera clear  ENMT: Moist oral mucosa, no pharyngeal injection or exudates; normal dentition  NECK: Supple, no palpable masses; no JVD  RESPIRATORY: Normal respiratory effort; clear to auscultation bilaterally, no wheezes, rales, or rhonchi  CARDIOVASCULAR: Regular rate and rhythm, normal S1 and S2, no murmur/rub/gallop; No lower extremity edema; Peripheral pulses are 2+ bilaterally  ABDOMEN: Soft, nontender to palpation, normoactive bowel sounds, no rebound/guarding; No hepatosplenomegaly  MUSCULOSKELETAL:  Extremities WWP, no clubbing or cyanosis of digits; no joint swelling or tenderness to palpation  PSYCH: A+O to person, place, and time; affect appropriate  NEUROLOGY: CN 2-12 are intact and symmetric; no gross sensory or motor deficits   SKIN: No rashes; no palpable lesions    LABS:                        7.2    2.33  )-----------( 73       ( 11 Nov 2019 08:03 )             25.6     11-12    132<L>  |  97  |  13  ----------------------------<  126<H>  4.2   |  31  |  1.11    Ca    8.2<L>      12 Nov 2019 05:58  Phos  2.7     11-12  Mg     1.7     11-12    TPro  7.5  /  Alb  2.6<L>  /  TBili  1.2  /  DBili  x   /  AST  29  /  ALT  16  /  AlkPhos  113  11-12    PT/INR - ( 11 Nov 2019 07:53 )   PT: 23.7 sec;   INR: 2.05 ratio         PTT - ( 11 Nov 2019 07:53 )  PTT:41.9 sec            RADIOLOGY & ADDITIONAL TESTS:  Results Reviewed:   Imaging Personally Reviewed:  Electrocardiogram Personally Reviewed:    COORDINATION OF CARE:  Care Discussed with Consultants/Other Providers [Y/N]:  Prior or Outpatient Records Reviewed [Y/N]: Demario Art, PGY1  Internal Medicine Resident  Pager: [phone] ([facility]), [phone] ([facility])    Patient is a 59y old  Male who presents with a chief complaint of Fever/shaking chills x1 day, (11 Nov 2019 17:47)    SUBJECTIVE / OVERNIGHT EVENTS:  No overnight events per Night Float, .  At bedside, patient resting comfortably and slept well.  Has been tolerating food and water.  Last bowel movement was 1x regular yesterday.     ADDITIONAL REVIEW OF SYSTEMS:  Patient denies fevers/chills, chest pain, palpitations, SOB, cough/wheezing, abdominal pain, nausea, vomiting, diarrhea, or constipation.    MEDICATIONS  (STANDING):  atorvastatin 20 milliGRAM(s) Oral at bedtime  enoxaparin Injectable 40 milliGRAM(s) SubCutaneous daily  fluticasone propionate 50 MICROgram(s)/spray Nasal Spray 1 Spray(s) Both Nostrils two times a day  furosemide   Injectable 40 milliGRAM(s) IV Push every 12 hours  influenza   Vaccine 0.5 milliLiter(s) IntraMuscular once  lactobacillus acidophilus 1 Tablet(s) Oral daily  lactulose Syrup 30 Gram(s) Oral every 6 hours  omega-3-Acid Ethyl Esters 4 Gram(s) Oral daily  pantoprazole    Tablet 40 milliGRAM(s) Oral before breakfast  piperacillin/tazobactam IVPB.. 3.375 Gram(s) IV Intermittent every 8 hours  spironolactone 100 milliGRAM(s) Oral daily  vancomycin  IVPB 1000 milliGRAM(s) IV Intermittent every 12 hours    MEDICATIONS  (PRN):  acetaminophen   Tablet .. 325 milliGRAM(s) Oral every 4 hours PRN Temp greater or equal to 38C (100.4F), Mild Pain (1 - 3), Moderate Pain (4 - 6)  zolpidem 5 milliGRAM(s) Oral at bedtime PRN Insomnia      CAPILLARY BLOOD GLUCOSE        I&O's Summary    11 Nov 2019 07:01  -  12 Nov 2019 07:00  --------------------------------------------------------  IN: 2680 mL / OUT: 200 mL / NET: 2480 mL        PHYSICAL EXAM:  Vital Signs Last 24 Hrs  T(C): 37.6 (12 Nov 2019 07:57), Max: 38.4 (12 Nov 2019 05:18)  T(F): 99.6 (12 Nov 2019 07:57), Max: 101.1 (12 Nov 2019 05:18)  HR: 90 (12 Nov 2019 07:57) (77 - 100)  BP: 125/58 (12 Nov 2019 07:57) (113/53 - 154/76)  BP(mean): --  RR: 18 (12 Nov 2019 07:57) (18 - 22)  SpO2: 96% (12 Nov 2019 07:57) (93% - 98%)    CONSTITUTIONAL: No apparent distress. Awake and conversive, cooperative with exam  EYES: PERRLA; EOMI, conjunctiva and sclera clear  ENMT: Moist oral mucosa, no pharyngeal injection or exudates; normal dentition  NECK: Supple, no palpable masses; no JVD  RESPIRATORY: Normal respiratory effort; clear to auscultation bilaterally, no wheezes, rales, or rhonchi  CARDIOVASCULAR: Regular rate and rhythm, normal S1 and S2, no murmur/rub/gallop; No lower extremity edema; Peripheral pulses are 2+ bilaterally  ABDOMEN: Soft, nontender to palpation, normoactive bowel sounds, no rebound/guarding; No hepatosplenomegaly  MUSCULOSKELETAL:  Extremities WWP, no clubbing or cyanosis of digits; no joint swelling or tenderness to palpation  PSYCH: A+O to person, place, and time; affect appropriate  NEUROLOGY: CN 2-12 are intact and symmetric; no gross sensory or motor deficits   SKIN: No rashes; no palpable lesions    LABS:                        7.2    2.33  )-----------( 73       ( 11 Nov 2019 08:03 )             25.6     11-12    132<L>  |  97  |  13  ----------------------------<  126<H>  4.2   |  31  |  1.11    Ca    8.2<L>      12 Nov 2019 05:58  Phos  2.7     11-12  Mg     1.7     11-12    TPro  7.5  /  Alb  2.6<L>  /  TBili  1.2  /  DBili  x   /  AST  29  /  ALT  16  /  AlkPhos  113  11-12    PT/INR - ( 11 Nov 2019 07:53 )   PT: 23.7 sec;   INR: 2.05 ratio         PTT - ( 11 Nov 2019 07:53 )  PTT:41.9 sec            RADIOLOGY & ADDITIONAL TESTS:  Results Reviewed:   Imaging Personally Reviewed:  Electrocardiogram Personally Reviewed:    COORDINATION OF CARE:  Care Discussed with Consultants/Other Providers [Y/N]:  Prior or Outpatient Records Reviewed [Y/N]: Demario Art, PGY1  Internal Medicine Resident  Pager: [phone] ([facility]), [phone] ([facility])    Patient is a 59y old  Male who presents with a chief complaint of Fever/shaking chills x1 day, (11 Nov 2019 17:47)    SUBJECTIVE / OVERNIGHT EVENTS:  Patient complaining of worsening headache and productive cough overnight. States that CPAP was uncomfortable but slept well.  Has been tolerating food and water.  Last bowel movement was this morning and regular. One episode of urinary incontinence overnight.     ADDITIONAL REVIEW OF SYSTEMS:  Patient endorses fever, headache, and chest discomfort with productive cough. Denies chills, palpitations, SOB, wheezing, abdominal pain, nausea, vomiting, diarrhea, or constipation.    MEDICATIONS  (STANDING):  atorvastatin 20 milliGRAM(s) Oral at bedtime  enoxaparin Injectable 40 milliGRAM(s) SubCutaneous daily  fluticasone propionate 50 MICROgram(s)/spray Nasal Spray 1 Spray(s) Both Nostrils two times a day  furosemide   Injectable 40 milliGRAM(s) IV Push every 12 hours  influenza   Vaccine 0.5 milliLiter(s) IntraMuscular once  lactobacillus acidophilus 1 Tablet(s) Oral daily  lactulose Syrup 30 Gram(s) Oral every 6 hours  omega-3-Acid Ethyl Esters 4 Gram(s) Oral daily  pantoprazole    Tablet 40 milliGRAM(s) Oral before breakfast  piperacillin/tazobactam IVPB.. 3.375 Gram(s) IV Intermittent every 8 hours  spironolactone 100 milliGRAM(s) Oral daily  vancomycin  IVPB 1000 milliGRAM(s) IV Intermittent every 12 hours    MEDICATIONS  (PRN):  acetaminophen   Tablet .. 325 milliGRAM(s) Oral every 4 hours PRN Temp greater or equal to 38C (100.4F), Mild Pain (1 - 3), Moderate Pain (4 - 6)  zolpidem 5 milliGRAM(s) Oral at bedtime PRN Insomnia      CAPILLARY BLOOD GLUCOSE        I&O's Summary    11 Nov 2019 07:01  -  12 Nov 2019 07:00  --------------------------------------------------------  IN: 2680 mL / OUT: 200 mL / NET: 2480 mL        PHYSICAL EXAM:  Vital Signs Last 24 Hrs  T(C): 37.6 (12 Nov 2019 07:57), Max: 38.4 (12 Nov 2019 05:18)  T(F): 99.6 (12 Nov 2019 07:57), Max: 101.1 (12 Nov 2019 05:18)  HR: 90 (12 Nov 2019 07:57) (77 - 100)  BP: 125/58 (12 Nov 2019 07:57) (113/53 - 154/76)  BP(mean): --  RR: 18 (12 Nov 2019 07:57) (18 - 22)  SpO2: 96% (12 Nov 2019 07:57) (93% - 98%)    CONSTITUTIONAL: No acute distress. Tired-appearing and conversant, cooperative with exam  EYES: PERRLA; EOMI, conjunctiva and sclera clear  ENMT: Moist oral mucosa, no pharyngeal injection or exudates; normal dentition  NECK: Supple, no palpable masses; no JVD  RESPIRATORY: Increased work of breathing on 5L NC; coughing throughout exam, lungs clear to auscultation bilaterally, no wheezes, rales, or rhonchi  CARDIOVASCULAR: Regular rate and rhythm, normal S1 and S2, no murmur/rub/gallop; No lower extremity edema; Peripheral pulses are 2+ bilaterally  ABDOMEN: Soft, nontender to palpation, normoactive bowel sounds, no rebound/guarding; No hepatosplenomegaly  MUSCULOSKELETAL:  Extremities WWP, no clubbing or cyanosis of digits; no joint swelling or tenderness to palpation  PSYCH: AOx2 to person and place. Affect appropriate  NEUROLOGY: CN 2-12 are intact and symmetric; no gross sensory or motor deficits   SKIN: No rashes; no palpable lesions    LABS:                        7.2    2.33  )-----------( 73       ( 11 Nov 2019 08:03 )             25.6     11-12    132<L>  |  97  |  13  ----------------------------<  126<H>  4.2   |  31  |  1.11    Ca    8.2<L>      12 Nov 2019 05:58  Phos  2.7     11-12  Mg     1.7     11-12    TPro  7.5  /  Alb  2.6<L>  /  TBili  1.2  /  DBili  x   /  AST  29  /  ALT  16  /  AlkPhos  113  11-12    PT/INR - ( 11 Nov 2019 07:53 )   PT: 23.7 sec;   INR: 2.05 ratio         PTT - ( 11 Nov 2019 07:53 )  PTT:41.9 sec      RADIOLOGY & ADDITIONAL TESTS:     Culture Results:   Growth in aerobic bottle: Gram Positive Cocci in Clusters  "Due to technical problems, Proteus sp. will Not be reported as part of  the BCID panel until further notice"  ***Blood Panel PCR results on this specimen are available  approximately 3 hours after the Gram stain result.***  Gram stain, PCR, and/or culture results may not always  correspond due to difference in methodologies.  ************************************************************  This PCR assay was performed using [employer].  The following targets are tested for: Enterococcus,  vancomycin resistant enterococci, Listeria monocytogenes,  coagulase negative staphylococci, S. aureus,  methicillin resistant S. aureus, Streptococcus agalactiae  (Group B), S. pneumoniae, S.pyogenes (Group A),  Acinetobacter baumannii, Enterobacter cloacae, E. coli, Klebsiella oxytoca, K. pneumoniae, Proteus sp.,  Serratia marcescens, Haemophilus influenzae,  Neisseria meningitidis, Pseudomonas aeruginosa, Candida  albicans, C. glabrata, C krusei, C parapsilosis,  C. tropicalis and the KPC resistance gene. (11.09.19 @ 01:09)        < from: CT Chest w/ IV Cont (11.08.19 @ 23:55) >  CHEST:  Moderate right pleural effusion. Small left pleural effusion.   No infectious consolidation.     ABDOMEN AND PELVIS:  Cirrhosis, splenomegaly and small volume ascites.     AUDREY BANERJEE M.D., RADIOLOGY RESIDENT  This document has been electronically signed.  TINO BROWN M.D., ATTENDING RADIOLOGIST  This document has been electronically signed. Nov 9 2019  1:16AM        < end of copied text >      Culture Results:   >=3 organisms. Probable collection contamination. (11.09.19 @ 02:56)      Imaging Personally Reviewed:      Electrocardiogram Personally Reviewed: Demario Art, PGY1  Internal Medicine Resident  Pager: [phone] ([facility]), [phone] ([facility])    Patient is a 59y old  Male who presents with a chief complaint of Fever/shaking chills x1 day, (11 Nov 2019 17:47)    SUBJECTIVE / OVERNIGHT EVENTS:  Patient complaining of worsening headache and productive cough overnight. States that CPAP was uncomfortable but slept well.  Has been tolerating food and water.  Last bowel movement was this morning and regular. One episode of urinary incontinence overnight.     ADDITIONAL REVIEW OF SYSTEMS:  Patient endorses fever, headache, and chest discomfort with productive cough. Denies chills, palpitations, SOB, wheezing, abdominal pain, nausea, vomiting, diarrhea, or constipation.    MEDICATIONS  (STANDING):  atorvastatin 20 milliGRAM(s) Oral at bedtime  enoxaparin Injectable 40 milliGRAM(s) SubCutaneous daily  fluticasone propionate 50 MICROgram(s)/spray Nasal Spray 1 Spray(s) Both Nostrils two times a day  furosemide   Injectable 40 milliGRAM(s) IV Push every 12 hours  influenza   Vaccine 0.5 milliLiter(s) IntraMuscular once  lactobacillus acidophilus 1 Tablet(s) Oral daily  lactulose Syrup 30 Gram(s) Oral every 6 hours  omega-3-Acid Ethyl Esters 4 Gram(s) Oral daily  pantoprazole    Tablet 40 milliGRAM(s) Oral before breakfast  piperacillin/tazobactam IVPB.. 3.375 Gram(s) IV Intermittent every 8 hours  spironolactone 100 milliGRAM(s) Oral daily  vancomycin  IVPB 1000 milliGRAM(s) IV Intermittent every 12 hours    MEDICATIONS  (PRN):  acetaminophen   Tablet .. 325 milliGRAM(s) Oral every 4 hours PRN Temp greater or equal to 38C (100.4F), Mild Pain (1 - 3), Moderate Pain (4 - 6)  zolpidem 5 milliGRAM(s) Oral at bedtime PRN Insomnia      CAPILLARY BLOOD GLUCOSE        I&O's Summary    11 Nov 2019 07:01  -  12 Nov 2019 07:00  --------------------------------------------------------  IN: 2680 mL / OUT: 200 mL / NET: 2480 mL        PHYSICAL EXAM:  Vital Signs Last 24 Hrs  T(C): 37.6 (12 Nov 2019 07:57), Max: 38.4 (12 Nov 2019 05:18)  T(F): 99.6 (12 Nov 2019 07:57), Max: 101.1 (12 Nov 2019 05:18)  HR: 90 (12 Nov 2019 07:57) (77 - 100)  BP: 125/58 (12 Nov 2019 07:57) (113/53 - 154/76)  BP(mean): --  RR: 18 (12 Nov 2019 07:57) (18 - 22)  SpO2: 96% (12 Nov 2019 07:57) (93% - 98%)    CONSTITUTIONAL: No acute distress. Tired-appearing and conversant, cooperative with exam  EYES: PERRLA; EOMI, conjunctiva and sclera clear  ENMT: Moist oral mucosa, no pharyngeal injection or exudates; normal dentition  NECK: Supple, no palpable masses; no JVD  RESPIRATORY: Increased work of breathing on 5L NC; coughing throughout exam, lungs clear to auscultation bilaterally, no wheezes, rales, or rhonchi  CARDIOVASCULAR: Regular rate and rhythm, normal S1 and S2, no murmur/rub/gallop; No lower extremity edema; Peripheral pulses are 2+ bilaterally  ABDOMEN: Soft, nontender to palpation, normoactive bowel sounds, no rebound/guarding; No hepatosplenomegaly  MUSCULOSKELETAL:  Extremities WWP, no clubbing or cyanosis of digits; no joint swelling or tenderness to palpation  PSYCH: AOx2 to person and place. Affect appropriate  NEUROLOGY: CN 2-12 are intact and symmetric; no gross sensory or motor deficits   SKIN: No rashes; no palpable lesions    LABS:                        7.2    2.33  )-----------( 73       ( 11 Nov 2019 08:03 )             25.6     11-12    132<L>  |  97  |  13  ----------------------------<  126<H>  4.2   |  31  |  1.11    Ca    8.2<L>      12 Nov 2019 05:58  Phos  2.7     11-12  Mg     1.7     11-12    TPro  7.5  /  Alb  2.6<L>  /  TBili  1.2  /  DBili  x   /  AST  29  /  ALT  16  /  AlkPhos  113  11-12    PT/INR - ( 11 Nov 2019 07:53 )   PT: 23.7 sec;   INR: 2.05 ratio         PTT - ( 11 Nov 2019 07:53 )  PTT:41.9 sec      RADIOLOGY & ADDITIONAL TESTS:     Culture Results:   Growth in aerobic bottle: Gram Positive Cocci in Clusters  "Due to technical problems, Proteus sp. will Not be reported as part of  the BCID panel until further notice"  ***Blood Panel PCR results on this specimen are available  approximately 3 hours after the Gram stain result.***  Gram stain, PCR, and/or culture results may not always  correspond due to difference in methodologies.  ************************************************************  This PCR assay was performed using [employer].  The following targets are tested for: Enterococcus,  vancomycin resistant enterococci, Listeria monocytogenes,  coagulase negative staphylococci, S. aureus,  methicillin resistant S. aureus, Streptococcus agalactiae  (Group B), S. pneumoniae, S.pyogenes (Group A),  Acinetobacter baumannii, Enterobacter cloacae, E. coli, Klebsiella oxytoca, K. pneumoniae, Proteus sp.,  Serratia marcescens, Haemophilus influenzae,  Neisseria meningitidis, Pseudomonas aeruginosa, Candida  albicans, C. glabrata, C krusei, C parapsilosis,  C. tropicalis and the KPC resistance gene. (11.09.19 @ 01:09)    Culture Results:   >=3 organisms. Probable collection contamination. (11.09.19 @ 02:56)      Imaging Personally Reviewed:    < from: CT Chest w/ IV Cont (11.08.19 @ 23:55) >  CHEST:  Moderate right pleural effusion. Small left pleural effusion.   No infectious consolidation.     ABDOMEN AND PELVIS:  Cirrhosis, splenomegaly and small volume ascites.     AUDREY BANERJEE M.D., RADIOLOGY RESIDENT  This document has been electronically signed.  TINO BROWN M.D., ATTENDING RADIOLOGIST  This document has been electronically signed. Nov 9 2019  1:16AM        < end of copied text >        Electrocardiogram Personally Reviewed:  < from: 12 Lead ECG (11.08.19 @ 22:19) >  Diagnosis Line SINUS RHYTHM WITH PREMATURE SUPRAVENTRICULAR COMPLEXES  OTHERWISE NORMAL ECG    Confirmed by ATTENDING, ED ([id number]),  FELIX COMER ([id number]) on 11/9/2019 4:43:31 AM    < end of copied text > Demario Art, PGY1  Internal Medicine Resident  Pager: [phone] ([facility]), [phone] ([facility])    Patient is a 59y old  Male who presents with a chief complaint of Fever/shaking chills x1 day, (11 Nov 2019 17:47)    SUBJECTIVE / OVERNIGHT EVENTS:  Patient complaining of worsening headache and productive cough overnight. States that CPAP was uncomfortable but slept well.  Has been tolerating food and water.  Last bowel movement was this morning and regular. One episode of urinary incontinence overnight.     ADDITIONAL REVIEW OF SYSTEMS:  Patient endorses fever, headache, and chest discomfort with productive cough. Denies chills, palpitations, SOB, wheezing, abdominal pain, nausea, vomiting, diarrhea, or constipation.    MEDICATIONS  (STANDING):  atorvastatin 20 milliGRAM(s) Oral at bedtime  enoxaparin Injectable 40 milliGRAM(s) SubCutaneous daily  fluticasone propionate 50 MICROgram(s)/spray Nasal Spray 1 Spray(s) Both Nostrils two times a day  furosemide   Injectable 40 milliGRAM(s) IV Push every 12 hours  influenza   Vaccine 0.5 milliLiter(s) IntraMuscular once  lactobacillus acidophilus 1 Tablet(s) Oral daily  lactulose Syrup 30 Gram(s) Oral every 6 hours  omega-3-Acid Ethyl Esters 4 Gram(s) Oral daily  pantoprazole    Tablet 40 milliGRAM(s) Oral before breakfast  piperacillin/tazobactam IVPB.. 3.375 Gram(s) IV Intermittent every 8 hours  spironolactone 100 milliGRAM(s) Oral daily  vancomycin  IVPB 1000 milliGRAM(s) IV Intermittent every 12 hours    MEDICATIONS  (PRN):  acetaminophen   Tablet .. 325 milliGRAM(s) Oral every 4 hours PRN Temp greater or equal to 38C (100.4F), Mild Pain (1 - 3), Moderate Pain (4 - 6)  zolpidem 5 milliGRAM(s) Oral at bedtime PRN Insomnia      CAPILLARY BLOOD GLUCOSE        I&O's Summary    11 Nov 2019 07:01  -  12 Nov 2019 07:00  --------------------------------------------------------  IN: 2680 mL / OUT: 200 mL / NET: 2480 mL        PHYSICAL EXAM:  Vital Signs Last 24 Hrs  T(C): 37.6 (12 Nov 2019 07:57), Max: 38.4 (12 Nov 2019 05:18)  T(F): 99.6 (12 Nov 2019 07:57), Max: 101.1 (12 Nov 2019 05:18)  HR: 90 (12 Nov 2019 07:57) (77 - 100)  BP: 125/58 (12 Nov 2019 07:57) (113/53 - 154/76)  BP(mean): --  RR: 18 (12 Nov 2019 07:57) (18 - 22)  SpO2: 96% (12 Nov 2019 07:57) (93% - 98%)    CONSTITUTIONAL: No acute distress. Somnolent but conversant, cooperative with exam  EYES: PERRLA; EOMI, conjunctiva and sclera clear  ENMT: Moist oral mucosa, no pharyngeal injection or exudates; normal dentition  NECK: Supple, no palpable masses; no JVD  RESPIRATORY: Increased work of breathing on 3L nasal cannula; coughing throughout exam, lungs CTAB, no wheezes, rales, or rhonchi  CARDIOVASCULAR: Regular rate and rhythm, normal S1 and S2, no murmur/rub/gallop; No lower extremity edema; Peripheral pulses are 2+ bilaterally  ABDOMEN: Soft, nontender to palpation, normoactive bowel sounds, no rebound/guarding; no hepatosplenomegaly  MUSCULOSKELETAL:  Extremities WWP, no clubbing or cyanosis of digits; no joint swelling or tenderness to palpation  PSYCH: AOx2 to person and place. Affect appropriate  NEUROLOGY: CN 2-12 are intact and symmetric; no gross sensory or motor deficits   SKIN: No rashes; no palpable lesions    LABS:                        7.2    2.33  )-----------( 73       ( 11 Nov 2019 08:03 )             25.6     11-12    132<L>  |  97  |  13  ----------------------------<  126<H>  4.2   |  31  |  1.11    Ca    8.2<L>      12 Nov 2019 05:58  Phos  2.7     11-12  Mg     1.7     11-12    TPro  7.5  /  Alb  2.6<L>  /  TBili  1.2  /  DBili  x   /  AST  29  /  ALT  16  /  AlkPhos  113  11-12    PT/INR - ( 11 Nov 2019 07:53 )   PT: 23.7 sec;   INR: 2.05 ratio         PTT - ( 11 Nov 2019 07:53 )  PTT:41.9 sec      RADIOLOGY & ADDITIONAL TESTS:     Blood Culture Results:   Growth in aerobic bottle: Gram Positive Cocci in Clusters  "Due to technical problems, Proteus sp. will Not be reported as part of  the BCID panel until further notice"  ***Blood Panel PCR results on this specimen are available  approximately 3 hours after the Gram stain result.***  Gram stain, PCR, and/or culture results may not always  correspond due to difference in methodologies.  ************************************************************  This PCR assay was performed using [employer].  The following targets are tested for: Enterococcus,  vancomycin resistant enterococci, Listeria monocytogenes,  coagulase negative staphylococci, S. aureus,  methicillin resistant S. aureus, Streptococcus agalactiae  (Group B), S. pneumoniae, S.pyogenes (Group A),  Acinetobacter baumannii, Enterobacter cloacae, E. coli, Klebsiella oxytoca, K. pneumoniae, Proteus sp.,  Serratia marcescens, Haemophilus influenzae,  Neisseria meningitidis, Pseudomonas aeruginosa, Candida  albicans, C. glabrata, C krusei, C parapsilosis,  C. tropicalis and the KPC resistance gene. (11.09.19 @ 01:09)    Urinalysis (11.09.19 @ 00:21)    Glucose Qualitative, Urine: Negative    Blood, Urine: Trace    pH Urine: 6.0    Color: Yellow    Urine Appearance: Slightly Turbid    Bilirubin: Negative    Ketone - Urine: Negative    Specific Gravity: 1.026    Protein, Urine: 30 mg/dL    Urobilinogen: Negative    Nitrite: Positive    Leukocyte Esterase Concentration: Large    Urine Culture Results:   >=3 organisms. Probable collection contamination. (11.09.19 @ 02:56)      Imaging Personally Reviewed:  < from: CT Chest w/ IV Cont (11.08.19 @ 23:55) >  CHEST:  Moderate right pleural effusion. Small left pleural effusion.   No infectious consolidation.     ABDOMEN AND PELVIS:  Cirrhosis, splenomegaly and small volume ascites.     AUDREY BANERJEE M.D., RADIOLOGY RESIDENT  This document has been electronically signed.  TINO BROWN M.D., ATTENDING RADIOLOGIST  This document has been electronically signed. Nov 9 2019  1:16AM            Electrocardiogram Personally Reviewed:  < from: 12 Lead ECG (11.08.19 @ 22:19) >  Diagnosis Line SINUS RHYTHM WITH PREMATURE SUPRAVENTRICULAR COMPLEXES  OTHERWISE NORMAL ECG    Confirmed by ATTENDING, ED ([id number]),  FELIX COMER ([id number]) on 11/9/2019 4:43:31 AM    < end of copied text > Demario Art, PGY1  Internal Medicine Resident  Pager: [phone] ([place]), [phone] (NS)    Patient is a 59y old  Male who presents with a chief complaint of Fever/shaking chills x1 day, (11 Nov 2019 17:47)    SUBJECTIVE / OVERNIGHT EVENTS:  Patient complaining of worsening headache and productive cough overnight. States that CPAP was uncomfortable but slept well.  Has been tolerating food and water.  Last bowel movement was this morning and regular. One episode of urinary incontinence overnight.     ADDITIONAL REVIEW OF SYSTEMS:  Patient endorses fever, headache, and chest discomfort with productive cough. Denies chills, palpitations, SOB, wheezing, abdominal pain, nausea, vomiting, diarrhea, or constipation.    MEDICATIONS  (STANDING):  atorvastatin 20 milliGRAM(s) Oral at bedtime  enoxaparin Injectable 40 milliGRAM(s) SubCutaneous daily  fluticasone propionate 50 MICROgram(s)/spray Nasal Spray 1 Spray(s) Both Nostrils two times a day  furosemide   Injectable 40 milliGRAM(s) IV Push every 12 hours  influenza   Vaccine 0.5 milliLiter(s) IntraMuscular once  lactobacillus acidophilus 1 Tablet(s) Oral daily  lactulose Syrup 30 Gram(s) Oral every 6 hours  omega-3-Acid Ethyl Esters 4 Gram(s) Oral daily  pantoprazole    Tablet 40 milliGRAM(s) Oral before breakfast  piperacillin/tazobactam IVPB.. 3.375 Gram(s) IV Intermittent every 8 hours  spironolactone 100 milliGRAM(s) Oral daily  vancomycin  IVPB 1000 milliGRAM(s) IV Intermittent every 12 hours    MEDICATIONS  (PRN):  acetaminophen   Tablet .. 325 milliGRAM(s) Oral every 4 hours PRN Temp greater or equal to 38C (100.4F), Mild Pain (1 - 3), Moderate Pain (4 - 6)  zolpidem 5 milliGRAM(s) Oral at bedtime PRN Insomnia      CAPILLARY BLOOD GLUCOSE        I&O's Summary    11 Nov 2019 07:01  -  12 Nov 2019 07:00  --------------------------------------------------------  IN: 2680 mL / OUT: 200 mL / NET: 2480 mL        PHYSICAL EXAM:  Vital Signs Last 24 Hrs  T(C): 37.6 (12 Nov 2019 07:57), Max: 38.4 (12 Nov 2019 05:18)  T(F): 99.6 (12 Nov 2019 07:57), Max: 101.1 (12 Nov 2019 05:18)  HR: 90 (12 Nov 2019 07:57) (77 - 100)  BP: 125/58 (12 Nov 2019 07:57) (113/53 - 154/76)  BP(mean): --  RR: 18 (12 Nov 2019 07:57) (18 - 22)  SpO2: 96% (12 Nov 2019 07:57) (93% - 98%)    CONSTITUTIONAL: No acute distress. Somnolent but conversant, cooperative with exam  EYES: PERRLA; EOMI, conjunctiva and sclera clear  ENMT: Moist oral mucosa, no pharyngeal injection or exudates; normal dentition  NECK: Supple, no palpable masses; no JVD  RESPIRATORY: Increased work of breathing on 3L nasal cannula; coughing throughout exam, lungs CTAB, no wheezes, rales, or rhonchi  CARDIOVASCULAR: Regular rate and rhythm, normal S1 and S2, no murmur/rub/gallop; No lower extremity edema; Peripheral pulses are 2+ bilaterally  ABDOMEN: Soft, nontender to palpation, normoactive bowel sounds, no rebound/guarding; no hepatosplenomegaly  MUSCULOSKELETAL:  Extremities WWP, no clubbing or cyanosis of digits; no joint swelling or tenderness to palpation  PSYCH: AOx2 to person and place. Affect appropriate  NEUROLOGY: CN 2-12 are intact and symmetric; no gross sensory or motor deficits   SKIN: No rashes; no palpable lesions    LABS:                        7.2    2.33  )-----------( 73       ( 11 Nov 2019 08:03 )             25.6     11-12    132<L>  |  97  |  13  ----------------------------<  126<H>  4.2   |  31  |  1.11    Ca    8.2<L>      12 Nov 2019 05:58  Phos  2.7     11-12  Mg     1.7     11-12    TPro  7.5  /  Alb  2.6<L>  /  TBili  1.2  /  DBili  x   /  AST  29  /  ALT  16  /  AlkPhos  113  11-12    PT/INR - ( 11 Nov 2019 07:53 )   PT: 23.7 sec;   INR: 2.05 ratio         PTT - ( 11 Nov 2019 07:53 )  PTT:41.9 sec      RADIOLOGY & ADDITIONAL TESTS:     Blood Culture Results:   Growth in aerobic bottle: Gram Positive Cocci in Clusters  "Due to technical problems, Proteus sp. will Not be reported as part of  the BCID panel until further notice"  ***Blood Panel PCR results on this specimen are available  approximately 3 hours after the Gram stain result.***  Gram stain, PCR, and/or culture results may not always  correspond due to difference in methodologies.  ************************************************************  This PCR assay was performed using [employer].  The following targets are tested for: Enterococcus,  vancomycin resistant enterococci, Listeria monocytogenes,  coagulase negative staphylococci, S. aureus,  methicillin resistant S. aureus, Streptococcus agalactiae  (Group B), S. pneumoniae, S.pyogenes (Group A),  Acinetobacter baumannii, Enterobacter cloacae, E. coli, Klebsiella oxytoca, K. pneumoniae, Proteus sp.,  Serratia marcescens, Haemophilus influenzae,  Neisseria meningitidis, Pseudomonas aeruginosa, Candida  albicans, C. glabrata, C krusei, C parapsilosis,  C. tropicalis and the KPC resistance gene. (11.09.19 @ 01:09)    Urinalysis (11.09.19 @ 00:21)    Glucose Qualitative, Urine: Negative    Blood, Urine: Trace    pH Urine: 6.0    Color: Yellow    Urine Appearance: Slightly Turbid    Bilirubin: Negative    Ketone - Urine: Negative    Specific Gravity: 1.026    Protein, Urine: 30 mg/dL    Urobilinogen: Negative    Nitrite: Positive    Leukocyte Esterase Concentration: Large    Urine Culture Results:   >=3 organisms. Probable collection contamination. (11.09.19 @ 02:56)      Imaging Personally Reviewed:  < from: CT Chest w/ IV Cont (11.08.19 @ 23:55) >  CHEST:  Moderate right pleural effusion. Small left pleural effusion.   No infectious consolidation.     ABDOMEN AND PELVIS:  Cirrhosis, splenomegaly and small volume ascites.     AUDREY BANERJEE M.D., RADIOLOGY RESIDENT  This document has been electronically signed.  TINO BROWN M.D., ATTENDING RADIOLOGIST  This document has been electronically signed. Nov 9 2019  1:16AM            Electrocardiogram Personally Reviewed:  < from: 12 Lead ECG (11.08.19 @ 22:19) >  Diagnosis Line SINUS RHYTHM WITH PREMATURE SUPRAVENTRICULAR COMPLEXES  OTHERWISE NORMAL ECG    Confirmed by ATTENDING, ED ([id number]),  FELIX COMER ([id number]) on 11/9/2019 4:43:31 AM    < end of copied text >

## 2019-11-12 NOTE — PROGRESS NOTE ADULT - PROBLEM SELECTOR PLAN 2
- Bilateral lung effusion, R>L on CT (11/9)  - h/o R chest tube in recent hospitalization with negative cultures  - DDx Parapneumonic effusion vs. empyema vs. hepatic hydrothorax  - Pulm consulted, rec: diuresis goal -1L/day. Based on size of effusion, unlikely to be cause of hypoxia.  No safe window for thoracentesis  - Strict I/O, daily weights, Lasix 40mg IVP BID - Bilateral lung effusion, R>L on CT (11/9)  - h/o R chest tube in recent hospitalization (Oct 2019) with negative cultures  - DDx Parapneumonic effusion vs. empyema vs. hepatic hydrothorax  - Consult IR for thoracentesis w/ cytology  - Pulm consulted, rec: diuresis goal -1L/day. Based on size of effusion, unlikely to be cause of hypoxia.  No safe window for thoracentesis  - Strict I/O, daily weights, Lasix 40mg IVP BID - Bilateral lung effusion, R>L on CT (11/9)  - h/o R chest tube in recent hospitalization (Oct 2019) with negative cultures  - DDx Parapneumonic effusion vs. empyema vs. hepatic hydrothorax  - Pulm consulted, rec: diuresis goal -1L/day. Based on size of effusion, unlikely to be cause of hypoxia.  No safe window for thoracentesis  - Strict I/O, daily weights, Lasix 40mg IVP BID  - Diagnostic Rads consulted 11/12: will perform thoracentesis w/ cytology
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
## 2019-11-12 NOTE — PROGRESS NOTE ADULT - PROBLEM SELECTOR PLAN 4
- Hx of decompensated cirrhosis  - Likely 2/2 alcoholism and BOB  - No asterixis or signs of hepatic encephalopathy on exam  - c/w home Spironolactone, Lactulose. Lasix switched to 40mg IV BID  - Daily INR and LFTs  - MELD-Na 15 on 11/11/19  -small volume ascites on CT scan - Hx of decompensated cirrhosis  - Likely 2/2 alcoholism and BOB  - Mild ascites on CT ab  - No asterixis or signs of hepatic encephalopathy on exam - Positive UA on admission  - UCx 11/9: contaminated  - c/w Empiric Vanc & Zosyn - Positive UA on admission, however contaminated UCx (11/9)  - c/w Empiric Vanc & Zosyn - Positive UA on admission, however contaminated UCx (11/9)

## 2019-11-12 NOTE — PROGRESS NOTE ADULT - PROBLEM SELECTOR PLAN 1
Likely hepatic hydrothorax given cirrhosis/ascites. No safe window for thoracentesis on admission  - Will POCUS today to reeval size of pleural effusions  - Consider IR for thora/para if sampling required and there remains no safe window for bedside procedure  - Supplemental O2 to keep SO2>90%  - Consider TTE with bubble study to eval for shunt physiology ie. hepatopulmonary syndrome  - c/w diuresis    Vasiliy Atkins MD  Pulmonary & Critical Care Fellow  [phone]
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
## 2019-11-12 NOTE — PROGRESS NOTE ADULT - PROBLEM SELECTOR PLAN 3
- Positive UA on admission  - UCx 11/9: contaminated  - c/w Empiric Vanc & Zosyn - Fever to 101.1 overnight, HR 's, RR 20 on admission. AMS (AOx2-oriented to person, place; multiple episodes of urinary incontinence)  - Patient reporting fevers of 102 at home  - Likely due to bacterial PNA vs. UTI   - +BCx 11/9 w/ 1/4 aerobic bottles growing gram+ organisms in clusters. +UA, UCx contaminated  - Consult ID for eval of source control  - c/w Vancomycin, Zosyn pending culture results - Fever to 101.1 overnight, HR 's, RR 20 on admission. AMS (AOx2-oriented to person, place; multiple episodes of urinary incontinence)  - Patient reporting fevers of 102 at home  - Likely due to bacterial PNA vs. UTI   - +BCx 11/9 w/ 1/4 aerobic bottles growing gram+ organisms in clusters. +UA, UCx contaminated  - ID consulted for eval of source control  - c/w Vancomycin, Zosyn pending culture results

## 2019-11-12 NOTE — PROGRESS NOTE ADULT - SUBJECTIVE AND OBJECTIVE BOX
CHIEF COMPLAINT:    Interval Events: Febrile overnight to 101.1. Continues to have SOB and cough. Also endorsing abdominal pain.    REVIEW OF SYSTEMS:  Constitutional: [ ] negative [X] fevers [ ] chills [ ] weight loss [ ] weight gain  HEENT: [ ] negative [ ] dry eyes [ ] eye irritation [ ] postnasal drip [ ] nasal congestion  CV: [ ] negative  [ ] chest pain [ ] orthopnea [ ] palpitations [ ] murmur  Resp: [ ] negative [X] cough [X] shortness of breath [ ] dyspnea [ ] wheezing [ ] sputum [ ] hemoptysis  GI: [ ] negative [ ] nausea [ ] vomiting [ ] diarrhea [ ] constipation [X] abd pain [ ] dysphagia   : [ ] negative [ ] dysuria [ ] nocturia [ ] hematuria [ ] increased urinary frequency  Musculoskeletal: [ ] negative [ ] back pain [ ] myalgias [ ] arthralgias [ ] fracture  Skin: [ ] negative [ ] rash [ ] itch  Neurological: [ ] negative [ ] headache [ ] dizziness [ ] syncope [ ] weakness [ ] numbness  Psychiatric: [ ] negative [ ] anxiety [ ] depression  Endocrine: [ ] negative [ ] diabetes [ ] thyroid problem  Hematologic/Lymphatic: [ ] negative [ ] anemia [ ] bleeding problem  Allergic/Immunologic: [ ] negative [ ] itchy eyes [ ] nasal discharge [ ] hives [ ] angioedema  [X] All other systems negative  [ ] Unable to assess ROS because ________    OBJECTIVE:  ICU Vital Signs Last 24 Hrs  T(C): 37.6 (12 Nov 2019 07:57), Max: 38.4 (12 Nov 2019 05:18)  T(F): 99.6 (12 Nov 2019 07:57), Max: 101.1 (12 Nov 2019 05:18)  HR: 78 (12 Nov 2019 09:32) (77 - 100)  BP: 125/58 (12 Nov 2019 07:57) (113/53 - 154/76)  BP(mean): --  ABP: --  ABP(mean): --  RR: 18 (12 Nov 2019 07:57) (18 - 22)  SpO2: 96% (12 Nov 2019 09:32) (93% - 98%)        11-11 @ 07:01  -  11-12 @ 07:00  --------------------------------------------------------  IN: 2680 mL / OUT: 200 mL / NET: 2480 mL      CAPILLARY BLOOD GLUCOSE          PHYSICAL EXAM:  General: Uncomfortable, tremulous  HEENT: EOMI, PERRLA  Respiratory: Decreased bibasilar breath sounds  Cardiovascular: S1S2, RRR  Abdomen: Distended, TTP, BS+, Soft  Extremities: Mild pitting edema  Neurological: [facility] MEDICATIONS:  MEDICATIONS  (STANDING):  atorvastatin 20 milliGRAM(s) Oral at bedtime  fluticasone propionate 50 MICROgram(s)/spray Nasal Spray 1 Spray(s) Both Nostrils two times a day  furosemide   Injectable 40 milliGRAM(s) IV Push every 12 hours  influenza   Vaccine 0.5 milliLiter(s) IntraMuscular once  lactobacillus acidophilus 1 Tablet(s) Oral daily  lactulose Syrup 30 Gram(s) Oral every 6 hours  omega-3-Acid Ethyl Esters 4 Gram(s) Oral daily  pantoprazole    Tablet 40 milliGRAM(s) Oral before breakfast  piperacillin/tazobactam IVPB.. 3.375 Gram(s) IV Intermittent every 8 hours  spironolactone 100 milliGRAM(s) Oral daily  vancomycin  IVPB 1000 milliGRAM(s) IV Intermittent every 12 hours    MEDICATIONS  (PRN):  acetaminophen   Tablet .. 325 milliGRAM(s) Oral every 4 hours PRN Temp greater or equal to 38C (100.4F), Mild Pain (1 - 3), Moderate Pain (4 - 6)  zolpidem 5 milliGRAM(s) Oral at bedtime PRN Insomnia      LABS:                        7.3    2.30  )-----------( 70       ( 12 Nov 2019 08:57 )             25.9     Hgb Trend: 7.3<--, 7.2<--, 7.9<--, 8.2<--  11-12    132<L>  |  97  |  13  ----------------------------<  126<H>  4.2   |  31  |  1.11    Ca    8.2<L>      12 Nov 2019 05:58  Phos  2.7     11-12  Mg     1.7     11-12    TPro  7.5  /  Alb  2.6<L>  /  TBili  1.2  /  DBili  x   /  AST  29  /  ALT  16  /  AlkPhos  113  11-12    Creatinine Trend: 1.11<--, 1.09<--, 1.13<--, 0.93<--, 1.16<--, 1.12<--  PT/INR - ( 11 Nov 2019 07:53 )   PT: 23.7 sec;   INR: 2.05 ratio         PTT - ( 11 Nov 2019 07:53 )  PTT:41.9 sec    Arterial Blood Gas:  11-10 @ 15:03  7.38/46/65/27/93/2.0  ABG lactate: --        MICROBIOLOGY:       RADIOLOGY:  [ ] Reviewed and interpreted by me    PULMONARY FUNCTION TESTS:    EKG:

## 2019-11-12 NOTE — CHART NOTE - NSCHARTNOTEFT_GEN_A_CORE
: Wilbert    INDICATION: Pleural effusion    PROCEDURE:  [ ] LIMITED ECHO  [X] LIMITED CHEST  [ ] LIMITED RETROPERITONEAL  [ ] LIMITED ABDOMINAL  [ ] LIMITED DVT  [ ] NEEDLE GUIDANCE VASCULAR  [ ] NEEDLE GUIDANCE THORACENTESIS  [ ] NEEDLE GUIDANCE PARACENTESIS  [ ] NEEDLE GUIDANCE PERICARDIOCENTESIS  [ ] OTHER    FINDINGS: Small R pleural effusion. Trace L pleural effusion.       INTERPRETATION: Small R pleural effusion. Trace L pleural effusion.     Images stored on [employer].    Vasiliy Atkins MD  Pulmonary & Critical Care Fellow  [phone]

## 2019-11-12 NOTE — PHYSICAL THERAPY INITIAL EVALUATION ADULT - RANGE OF MOTION EXAMINATION, REHAB EVAL
bilateral lower extremity ROM was WFL (within functional limits)/bilateral shoulder flexion 0-90/bilateral upper extremity ROM was WFL (within functional limits)

## 2019-11-12 NOTE — PROGRESS NOTE ADULT - PROBLEM SELECTOR PLAN 5
- Fever to 100.3, HR 's, RR 20 on admission. Currently resolved.  - Patient reporting fevers of 102 at home  - Likely due to bacterial PNA  - BCx 11/9: NGTD. UCx contaminated  - c/w Vancomycin, Zosyn pending culture results - Fever to 101.1 overnight, HR 's, RR 20 on admission. AMS (AOx2-oriented to person, place; multiple episodes of urinary incontinence)  - Patient reporting fevers of 102 at home  - Likely due to bacterial PNA vs. UTI   - +BCx 11/9 w/ 1/4 aerobic bottles growing gram+ organisms in clusters. +UA, UCx contaminated  - c/w Vancomycin, Zosyn pending culture results - Hx of decompensated cirrhosis  - Likely 2/2 alcoholism and BOB  - Mild ascites on CT ab  - No asterixis or signs of hepatic encephalopathy on exam - Hx of decompensated cirrhosis  - Likely 2/2 alcoholism and BOB  - Mild ascites on CT abd/pelvis  - No asterixis or signs of hepatic encephalopathy on exam

## 2019-11-12 NOTE — PROGRESS NOTE ADULT - PROBLEM SELECTOR PLAN 1
- Likely multifactorial 2/2 pleural effusions, bacterial pneuomonia and restrictive lung disease 2/2 obesity  - Currently on 5L NC with Sat in Mid 90s  - Will attempt to ambulate off of O2  - Diuresis as below - Likely multifactorial 2/2 pleural effusions, bacterial pneumonia and restrictive lung disease 2/2 obesity  - Currently on 3L NC with Sat in Mid 90s  - Will attempt to ambulate off of O2  - Diuresis as below

## 2019-11-13 LAB
ALBUMIN SERPL ELPH-MCNC: 2.6 G/DL — LOW (ref 3.3–5)
ALP SERPL-CCNC: 109 U/L — SIGNIFICANT CHANGE UP (ref 40–120)
ALT FLD-CCNC: 19 U/L — SIGNIFICANT CHANGE UP (ref 10–45)
ANION GAP SERPL CALC-SCNC: 8 MMOL/L — SIGNIFICANT CHANGE UP (ref 5–17)
APTT BLD: 38.3 SEC — HIGH (ref 27.5–36.3)
APTT BLD: 39 SEC — HIGH (ref 27.5–36.3)
AST SERPL-CCNC: 30 U/L — SIGNIFICANT CHANGE UP (ref 10–40)
BASOPHILS # BLD AUTO: 0.02 K/UL — SIGNIFICANT CHANGE UP (ref 0–0.2)
BASOPHILS NFR BLD AUTO: 0.8 % — SIGNIFICANT CHANGE UP (ref 0–2)
BILIRUB SERPL-MCNC: 1.4 MG/DL — HIGH (ref 0.2–1.2)
BUN SERPL-MCNC: 12 MG/DL — SIGNIFICANT CHANGE UP (ref 7–23)
CALCIUM SERPL-MCNC: 8.3 MG/DL — LOW (ref 8.4–10.5)
CHLORIDE SERPL-SCNC: 97 MMOL/L — SIGNIFICANT CHANGE UP (ref 96–108)
CO2 SERPL-SCNC: 33 MMOL/L — HIGH (ref 22–31)
CREAT SERPL-MCNC: 1.06 MG/DL — SIGNIFICANT CHANGE UP (ref 0.5–1.3)
EOSINOPHIL # BLD AUTO: 0.05 K/UL — SIGNIFICANT CHANGE UP (ref 0–0.5)
EOSINOPHIL NFR BLD AUTO: 2 % — SIGNIFICANT CHANGE UP (ref 0–6)
GLUCOSE SERPL-MCNC: 109 MG/DL — HIGH (ref 70–99)
HCT VFR BLD CALC: 25.5 % — LOW (ref 39–50)
HGB BLD-MCNC: 7.2 G/DL — LOW (ref 13–17)
HIV 1+2 AB+HIV1 P24 AG SERPL QL IA: SIGNIFICANT CHANGE UP
IMM GRANULOCYTES NFR BLD AUTO: 0.4 % — SIGNIFICANT CHANGE UP (ref 0–1.5)
INR BLD: 1.99 RATIO — HIGH (ref 0.88–1.16)
INR BLD: 2.03 RATIO — HIGH (ref 0.88–1.16)
LYMPHOCYTES # BLD AUTO: 0.57 K/UL — LOW (ref 1–3.3)
LYMPHOCYTES # BLD AUTO: 22.6 % — SIGNIFICANT CHANGE UP (ref 13–44)
MAGNESIUM SERPL-MCNC: 1.8 MG/DL — SIGNIFICANT CHANGE UP (ref 1.6–2.6)
MCHC RBC-ENTMCNC: 23.5 PG — LOW (ref 27–34)
MCHC RBC-ENTMCNC: 28.2 GM/DL — LOW (ref 32–36)
MCV RBC AUTO: 83.1 FL — SIGNIFICANT CHANGE UP (ref 80–100)
MONOCYTES # BLD AUTO: 0.32 K/UL — SIGNIFICANT CHANGE UP (ref 0–0.9)
MONOCYTES NFR BLD AUTO: 12.7 % — SIGNIFICANT CHANGE UP (ref 2–14)
NEUTROPHILS # BLD AUTO: 1.55 K/UL — LOW (ref 1.8–7.4)
NEUTROPHILS NFR BLD AUTO: 61.5 % — SIGNIFICANT CHANGE UP (ref 43–77)
PHOSPHATE SERPL-MCNC: 2.9 MG/DL — SIGNIFICANT CHANGE UP (ref 2.5–4.5)
PLATELET # BLD AUTO: 74 K/UL — LOW (ref 150–400)
POTASSIUM SERPL-MCNC: 4.1 MMOL/L — SIGNIFICANT CHANGE UP (ref 3.5–5.3)
POTASSIUM SERPL-SCNC: 4.1 MMOL/L — SIGNIFICANT CHANGE UP (ref 3.5–5.3)
PROT SERPL-MCNC: 7.4 G/DL — SIGNIFICANT CHANGE UP (ref 6–8.3)
PROTHROM AB SERPL-ACNC: 23.2 SEC — HIGH (ref 10–12.9)
PROTHROM AB SERPL-ACNC: 23.6 SEC — HIGH (ref 10–12.9)
RBC # BLD: 3.07 M/UL — LOW (ref 4.2–5.8)
RBC # FLD: 21.1 % — HIGH (ref 10.3–14.5)
SODIUM SERPL-SCNC: 138 MMOL/L — SIGNIFICANT CHANGE UP (ref 135–145)
VANCOMYCIN TROUGH SERPL-MCNC: 13.3 UG/ML — SIGNIFICANT CHANGE UP (ref 10–20)
WBC # BLD: 2.52 K/UL — LOW (ref 3.8–10.5)
WBC # FLD AUTO: 2.52 K/UL — LOW (ref 3.8–10.5)

## 2019-11-13 PROCEDURE — 99233 SBSQ HOSP IP/OBS HIGH 50: CPT | Mod: GC

## 2019-11-13 PROCEDURE — 99232 SBSQ HOSP IP/OBS MODERATE 35: CPT | Mod: GC

## 2019-11-13 PROCEDURE — 99254 IP/OBS CNSLTJ NEW/EST MOD 60: CPT

## 2019-11-13 RX ORDER — FUROSEMIDE 40 MG
40 TABLET ORAL DAILY
Refills: 0 | Status: DISCONTINUED | OUTPATIENT
Start: 2019-11-13 | End: 2019-11-14

## 2019-11-13 RX ORDER — PHYTONADIONE (VIT K1) 5 MG
5 TABLET ORAL DAILY
Refills: 0 | Status: COMPLETED | OUTPATIENT
Start: 2019-11-13 | End: 2019-11-14

## 2019-11-13 RX ORDER — PHYTONADIONE (VIT K1) 5 MG
5 TABLET ORAL ONCE
Refills: 0 | Status: COMPLETED | OUTPATIENT
Start: 2019-11-13 | End: 2019-11-13

## 2019-11-13 RX ORDER — POLYETHYLENE GLYCOL 3350 17 G/17G
17 POWDER, FOR SOLUTION ORAL ONCE
Refills: 0 | Status: DISCONTINUED | OUTPATIENT
Start: 2019-11-13 | End: 2019-11-22

## 2019-11-13 RX ADMIN — PANTOPRAZOLE SODIUM 40 MILLIGRAM(S): 20 TABLET, DELAYED RELEASE ORAL at 06:58

## 2019-11-13 RX ADMIN — Medication 1 TABLET(S): at 13:50

## 2019-11-13 RX ADMIN — SPIRONOLACTONE 100 MILLIGRAM(S): 25 TABLET, FILM COATED ORAL at 06:58

## 2019-11-13 RX ADMIN — LACTULOSE 30 GRAM(S): 10 SOLUTION ORAL at 18:18

## 2019-11-13 RX ADMIN — Medication 250 MILLIGRAM(S): at 06:59

## 2019-11-13 RX ADMIN — Medication 40 MILLIGRAM(S): at 06:56

## 2019-11-13 RX ADMIN — Medication 40 MILLIGRAM(S): at 13:50

## 2019-11-13 RX ADMIN — Medication 1 SPRAY(S): at 18:18

## 2019-11-13 RX ADMIN — PIPERACILLIN AND TAZOBACTAM 25 GRAM(S): 4; .5 INJECTION, POWDER, LYOPHILIZED, FOR SOLUTION INTRAVENOUS at 22:34

## 2019-11-13 RX ADMIN — ZOLPIDEM TARTRATE 5 MILLIGRAM(S): 10 TABLET ORAL at 22:36

## 2019-11-13 RX ADMIN — HEPARIN SODIUM 5000 UNIT(S): 5000 INJECTION INTRAVENOUS; SUBCUTANEOUS at 06:57

## 2019-11-13 RX ADMIN — Medication 325 MILLIGRAM(S): at 06:57

## 2019-11-13 RX ADMIN — ATORVASTATIN CALCIUM 20 MILLIGRAM(S): 80 TABLET, FILM COATED ORAL at 22:36

## 2019-11-13 RX ADMIN — Medication 101 MILLIGRAM(S): at 18:19

## 2019-11-13 RX ADMIN — Medication 250 MILLIGRAM(S): at 18:54

## 2019-11-13 RX ADMIN — Medication 4 GRAM(S): at 13:52

## 2019-11-13 RX ADMIN — LACTULOSE 30 GRAM(S): 10 SOLUTION ORAL at 13:51

## 2019-11-13 RX ADMIN — Medication 325 MILLIGRAM(S): at 23:03

## 2019-11-13 RX ADMIN — Medication 325 MILLIGRAM(S): at 22:33

## 2019-11-13 RX ADMIN — PIPERACILLIN AND TAZOBACTAM 25 GRAM(S): 4; .5 INJECTION, POWDER, LYOPHILIZED, FOR SOLUTION INTRAVENOUS at 06:59

## 2019-11-13 RX ADMIN — LACTULOSE 30 GRAM(S): 10 SOLUTION ORAL at 06:55

## 2019-11-13 RX ADMIN — Medication 5 MILLIGRAM(S): at 13:50

## 2019-11-13 RX ADMIN — Medication 1 SPRAY(S): at 06:56

## 2019-11-13 RX ADMIN — PIPERACILLIN AND TAZOBACTAM 25 GRAM(S): 4; .5 INJECTION, POWDER, LYOPHILIZED, FOR SOLUTION INTRAVENOUS at 13:52

## 2019-11-13 NOTE — PROVIDER CONTACT NOTE (OTHER) - ACTION/TREATMENT ORDERED:
Team 2 made aware, will have another RN attempt blood draw.
MD aware & agrees, will continue to monitor patients safety at this time

## 2019-11-13 NOTE — PROGRESS NOTE ADULT - SUBJECTIVE AND OBJECTIVE BOX
Follow Up:  fever, pleural effusion    Interval History: pt afebrile today, now on airborne isolation    ROS:      All other systems negative    Constitutional: + fever, no chills  Head: no trauma  Eyes: no vision changes, no eye pain  ENT:  no sore throat, no rhinorrhea  Cardiovascular:  no chest pain, no palpitation  Respiratory:  + SOB, + cough  GI:  no abd pain, no vomiting, no diarrhea  urinary: no dysuria, no hematuria, no flank pain  musculoskeletal:  no joint pain, no joint swelling  skin:  no rash  neurology:  no headache, no seizure, no change in mental status  psych: no anxiety, no depression         Allergies  No Known Allergies        ANTIMICROBIALS:  piperacillin/tazobactam IVPB.. 3.375 every 8 hours  vancomycin  IVPB 1000 every 12 hours      OTHER MEDS:  acetaminophen   Tablet .. 325 milliGRAM(s) Oral every 4 hours PRN  atorvastatin 20 milliGRAM(s) Oral at bedtime  fluticasone propionate 50 MICROgram(s)/spray Nasal Spray 1 Spray(s) Both Nostrils two times a day  furosemide    Tablet 40 milliGRAM(s) Oral daily  influenza   Vaccine 0.5 milliLiter(s) IntraMuscular once  lactobacillus acidophilus 1 Tablet(s) Oral daily  lactulose Syrup 30 Gram(s) Oral every 6 hours  omega-3-Acid Ethyl Esters 4 Gram(s) Oral daily  pantoprazole    Tablet 40 milliGRAM(s) Oral before breakfast  phytonadione   Solution 5 milliGRAM(s) Oral daily  spironolactone 100 milliGRAM(s) Oral daily  zolpidem 5 milliGRAM(s) Oral at bedtime PRN      Vital Signs Last 24 Hrs  T(C): 36.8 (13 Nov 2019 09:20), Max: 37.4 (13 Nov 2019 07:01)  T(F): 98.3 (13 Nov 2019 09:20), Max: 99.4 (13 Nov 2019 07:01)  HR: 86 (13 Nov 2019 10:45) (72 - 94)  BP: 118/62 (13 Nov 2019 09:20) (118/62 - 145/80)  BP(mean): --  RR: 22 (13 Nov 2019 10:45) (18 - 22)  SpO2: 96% (13 Nov 2019 10:45) (93% - 99%)    Physical Exam:  General:    NAD, non toxic  Head: atraumatic, normocephalic  Eyes: normal sclera and conjunctiva  ENT:   no oropharyngeal lesions, no LAD, neck supple  Cardio:    irregular S1,S2, + murmur  Respiratory:  clear b/l, no wheezing  abd:   soft, BS +, not tender, no hepatosplenomegaly  :     no CVAT, no suprapubic tenderness, no rod  Musculoskeletal : no joint swelling, no edema  Skin:    no rash  vascular: no phlebitis  Neurologic:     no focal deficits  psych: normal affect                              7.3    2.30  )-----------( 70       ( 12 Nov 2019 08:57 )             25.9       11-12    132<L>  |  97  |  13  ----------------------------<  126<H>  4.2   |  31  |  1.11    Ca    8.2<L>      12 Nov 2019 05:58  Phos  2.7     11-12  Mg     1.7     11-12    TPro  7.5  /  Alb  2.6<L>  /  TBili  1.2  /  DBili  x   /  AST  29  /  ALT  16  /  AlkPhos  113  11-12          MICROBIOLOGY:  Vancomycin Level, Trough: 13.3 ug/mL (11-13-19 @ 07:22)  v  .Blood Blood-Venous  11-12-19   No growth to date.  --  --      .Blood Blood  11-11-19   No growth to date.  --  --      .Urine Clean Catch (Midstream)  11-09-19   >=3 organisms. Probable collection contamination.  --  --      .Blood Blood-Peripheral  11-09-19   No growth to date.  --  Blood Culture PCR      .Blood  10-15-19   No growth at 5 days.  --  --          Rapid RVP Result: NotDetec (11-08 @ 22:53)        RADIOLOGY:  Images below reviewed personally  < from: CT Chest w/ IV Cont (11.08.19 @ 23:55) >  IMPRESSION:   CHEST:  Moderate right pleural effusion. Small left pleural effusion.   No infectious consolidation.     ABDOMEN AND PELVIS:  Cirrhosis, splenomegaly and small volume ascites.

## 2019-11-13 NOTE — PROGRESS NOTE ADULT - PROBLEM SELECTOR PLAN 3
- Fever to 101.1 overnight, HR 's, RR 20 on admission. AMS (AOx2-oriented to person, place; multiple episodes of urinary incontinence)  - Patient reporting fevers of 102 at home  - Likely due to bacterial PNA vs. UTI   - +BCx 11/9 w/ 1/4 aerobic bottles growing gram+ organisms in clusters. +UA, UCx contaminated  - ID consulted: concern for TB. currently on airborne isolation, pending sputum AFB and thoracentesis fluid workup  - c/w Vancomycin, Zosyn: both started 11/10  - Repeat BCx Nov 11/12: NGTD - Fever to 101.1 overnight, HR 's, RR 20 on admission. AMS (AOx2-oriented to person, place; multiple episodes of urinary incontinence)  - Patient reporting fevers of 102 at home  - Likely due to bacterial PNA vs. UTI   - +BCx 11/9 w/ 1/4 aerobic bottles growing gram+ organisms in clusters. +UA, UCx contaminated  - ID consulted: concern for TB. currently on airborne isolation, pending sputum AFB and thoracentesis fluid workup. HIV nonreactive  - c/w Vancomycin, Zosyn: both started 11/10  - Repeat BCx Nov 11/12: NGTD - Fever to 101.1 overnight, HR 's, RR 20 on admission. AMS (AOx2-oriented to person, place; multiple episodes of urinary incontinence)  - Patient reporting fevers of 102 at home  - Likely due to bacterial PNA vs. UTI   - +BCx 11/9 w/ 1/4 aerobic bottles growing gram+ organisms in clusters. +UA, UCx contaminated  - ID consulted: concern for TB. currently on airborne isolation, pending sputum AFB and thoracentesis fluid workup. HIV nonreactive  - c/w Vancomycin, Zosyn: both started 11/10  - Last fever 101.2 on Nov 12  - Repeat BCx Nov 11/12: NGTD

## 2019-11-13 NOTE — PROVIDER CONTACT NOTE (OTHER) - SITUATION
Pt difficult stick, phlebotomy used to draw labs during day. Rn unable to draw stat ptt/inr at this time after two attempts.
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
## 2019-11-13 NOTE — PROGRESS NOTE ADULT - SUBJECTIVE AND OBJECTIVE BOX
Demario Art, PGY1  Internal Medicine Resident  Pager: [phone] ([facility]), [phone] ([facility])    Patient is a 59y old  Male who presents with a chief complaint of Fever/shaking chills x1 day, (13 Nov 2019 11:05)    SUBJECTIVE / OVERNIGHT EVENTS:  Was placed in isolation for TB precaution yesterday afternoon, otherwise no overnight events per Night Float.  At bedside, patient resting comfortably and slept well.  Has been tolerating food and water.  Last bowel movement was 1x regular yesterday.  Continues to have mild RUQ tenderness, but otherwise no complaints.  On 4L NC.    ADDITIONAL REVIEW OF SYSTEMS:  Patient denies fevers/chills, chest pain, palpitations, SOB, cough/wheezing, nausea, vomiting, diarrhea, or constipation.    MEDICATIONS  (STANDING):  atorvastatin 20 milliGRAM(s) Oral at bedtime  fluticasone propionate 50 MICROgram(s)/spray Nasal Spray 1 Spray(s) Both Nostrils two times a day  furosemide    Tablet 40 milliGRAM(s) Oral daily  influenza   Vaccine 0.5 milliLiter(s) IntraMuscular once  lactobacillus acidophilus 1 Tablet(s) Oral daily  lactulose Syrup 30 Gram(s) Oral every 6 hours  omega-3-Acid Ethyl Esters 4 Gram(s) Oral daily  pantoprazole    Tablet 40 milliGRAM(s) Oral before breakfast  phytonadione   Solution 5 milliGRAM(s) Oral daily  piperacillin/tazobactam IVPB.. 3.375 Gram(s) IV Intermittent every 8 hours  spironolactone 100 milliGRAM(s) Oral daily  vancomycin  IVPB 1000 milliGRAM(s) IV Intermittent every 12 hours    MEDICATIONS  (PRN):  acetaminophen   Tablet .. 325 milliGRAM(s) Oral every 4 hours PRN Temp greater or equal to 38C (100.4F), Mild Pain (1 - 3), Moderate Pain (4 - 6)  zolpidem 5 milliGRAM(s) Oral at bedtime PRN Insomnia      CAPILLARY BLOOD GLUCOSE        I&O's Summary    12 Nov 2019 07:01  -  13 Nov 2019 07:00  --------------------------------------------------------  IN: 710 mL / OUT: 1150 mL / NET: -440 mL    13 Nov 2019 07:01  -  13 Nov 2019 11:38  --------------------------------------------------------  IN: 800 mL / OUT: 0 mL / NET: 800 mL        PHYSICAL EXAM:  Vital Signs Last 24 Hrs  T(C): 36.8 (13 Nov 2019 09:20), Max: 37.4 (13 Nov 2019 07:01)  T(F): 98.3 (13 Nov 2019 09:20), Max: 99.4 (13 Nov 2019 07:01)  HR: 84 (13 Nov 2019 11:19) (72 - 94)  BP: 118/62 (13 Nov 2019 09:20) (118/62 - 145/80)  BP(mean): --  RR: 22 (13 Nov 2019 10:45) (18 - 22)  SpO2: 97% (13 Nov 2019 11:19) (93% - 99%)    CONSTITUTIONAL: No apparent distress. Awake and conversive, cooperative with exam  EYES: PERRLA, EOMI, conjunctiva and sclera clear  ENMT: Moist oral mucosa, no pharyngeal injection or exudates; normal dentition  NECK: Supple, no palpable masses; no JVD  RESPIRATORY: Normal respiratory effort, clear to auscultation bilaterally, no wheezes, rales, or rhonchi  CARDIOVASCULAR: Regular rate and rhythm, normal S1 and S2, no murmur/rub/gallop; distal pulses 2+ bilaterally  ABDOMEN: Soft, nontender to palpation, normoactive bowel sounds, no rebound/guarding  MUSCULOSKELETAL:  No LE edema; extremities WWP, no clubbing or cyanosis of digits; no joint swelling or tenderness  PSYCH: A+O to person, place, and time; affect appropriate  NEUROLOGY: CN II-XII grossly intact; no focal sensory or motor deficits; moving all extremities spontaneously  SKIN: No rashes; no palpable lesions    LABS:                        7.3    2.30  )-----------( 70       ( 12 Nov 2019 08:57 )             25.9     11-12    132<L>  |  97  |  13  ----------------------------<  126<H>  4.2   |  31  |  1.11    Ca    8.2<L>      12 Nov 2019 05:58  Phos  2.7     11-12  Mg     1.7     11-12    TPro  7.5  /  Alb  2.6<L>  /  TBili  1.2  /  DBili  x   /  AST  29  /  ALT  16  /  AlkPhos  113  11-12    PT/INR - ( 12 Nov 2019 09:28 )   PT: 21.9 sec;   INR: 1.87 ratio         PTT - ( 12 Nov 2019 09:28 )  PTT:42.4 sec          Culture - Blood (collected 12 Nov 2019 10:00)  Source: .Blood Blood-Venous  Preliminary Report (13 Nov 2019 11:01):    No growth to date.    Culture - Blood (collected 11 Nov 2019 21:00)  Source: .Blood Blood  Preliminary Report (12 Nov 2019 22:01):    No growth to date.        RADIOLOGY & ADDITIONAL TESTS:  Results Reviewed:   Imaging Personally Reviewed:  Electrocardiogram Personally Reviewed:    COORDINATION OF CARE:  Care Discussed with Consultants/Other Providers [Y/N]:  Prior or Outpatient Records Reviewed [Y/N]:

## 2019-11-13 NOTE — CONSULT NOTE ADULT - ASSESSMENT
This is a 58 y/o male with a medical history of SARKIS, HTN, HLD, ETOH cirrhosis, HFpEF, who is admitted for c/o SOB and SEVERINO and hypoxia with associated cough with new oxygen requirement. CT scan obtained which revealed bilat pleural effusions, R>L; POCUS by Pulmonary with no clear window for diagnostic thoracentesis.  Thoracic surgery consulted for evaluation. Pt is currently a Child Ochoa Class B Cirrhotic     -No acute thoracic surgical intervention warranted at this time  -Consult IR for diagnostic thoracentesis   -Pt high risk for general anesthesia given high Child B cirrhosis  -D/w Dr. Carbone This is a 60 y/o male with a medical history of SARKIS, HTN, HLD, ETOH cirrhosis, HFpEF, who is admitted for c/o SOB and SEVERINO and hypoxia with associated cough with new oxygen requirement. CT scan obtained which revealed bilat pleural effusions, R>L; POCUS by Pulmonary with no clear window for diagnostic thoracentesis.  Thoracic surgery consulted for evaluation. Pt is currently a Child Ochoa Class B Cirrhotic     -No acute thoracic surgical intervention warranted at this time  -Right pleural effusion most likely secondary to reactive effusion from cirrhosis  -Consult IR for diagnostic thoracentesis and pigtail insertion  -Pt high risk for general anesthesia given high Child B cirrhosis  -D/w Dr. Carbone

## 2019-11-13 NOTE — PROGRESS NOTE ADULT - PROBLEM SELECTOR PLAN 1
- Likely multifactorial 2/2 pleural effusions, bacterial pneumonia and restrictive lung disease 2/2 obesity  - Currently on 4L NC with Sat in Mid 90s  - Will attempt to ambulate off of O2  - Diuresis: back on home Lasix 40mg PO daily - Likely multifactorial 2/2 pleural effusions, bacterial pneumonia and restrictive lung disease 2/2 obesity  - Currently on 4L NC with Sat in Mid 90s  - Will attempt to ambulate off of O2  - changes IV lasix to 40mg lasix po

## 2019-11-13 NOTE — PROGRESS NOTE ADULT - SUBJECTIVE AND OBJECTIVE BOX
Interval Events:   Continues to complains of cough and SOB  Fever 101.1 , no fever since then  No abdominal pain, nausea, vomiting, diarrhea    Hospital Medications:  acetaminophen   Tablet .. 325 milliGRAM(s) Oral every 4 hours PRN  atorvastatin 20 milliGRAM(s) Oral at bedtime  fluticasone propionate 50 MICROgram(s)/spray Nasal Spray 1 Spray(s) Both Nostrils two times a day  furosemide    Tablet 40 milliGRAM(s) Oral daily  influenza   Vaccine 0.5 milliLiter(s) IntraMuscular once  lactobacillus acidophilus 1 Tablet(s) Oral daily  lactulose Syrup 30 Gram(s) Oral every 6 hours  omega-3-Acid Ethyl Esters 4 Gram(s) Oral daily  pantoprazole    Tablet 40 milliGRAM(s) Oral before breakfast  phytonadione   Solution 5 milliGRAM(s) Oral daily  piperacillin/tazobactam IVPB.. 3.375 Gram(s) IV Intermittent every 8 hours  spironolactone 100 milliGRAM(s) Oral daily  vancomycin  IVPB 1000 milliGRAM(s) IV Intermittent every 12 hours  zolpidem 5 milliGRAM(s) Oral at bedtime PRN        ROS:   General:  + fevers and chills  ENT:  No sore throat or dysphagia  CV:  No pain or palpitations  Resp:  + dyspnea, cough   GI:  as above  Skin:  No rash or edema  Neuro: no weakness   Hematologic: no bleeding  Musculoskeletal: no muscle pain or join pain  Psych: no agitation      PHYSICAL EXAM:   Vital Signs:  Vital Signs Last 24 Hrs  T(C): 36.8 (2019 09:20), Max: 37.4 (2019 07:01)  T(F): 98.3 (2019 09:20), Max: 99.4 (2019 07:01)  HR: 86 (2019 10:45) (72 - 94)  BP: 118/62 (2019 09:20) (118/62 - 145/80)  BP(mean): --  RR: 22 (2019 10:45) (18 - 22)  SpO2: 96% (2019 10:45) (93% - 99%)  Daily     Daily Weight in k (2019 16:39)    GENERAL:  NAD, Appears stated age, short neck, obese BMI: 43  HEENT:  NC/AT,  conjunctivae clear and pink, sclera -anicteric  CHEST:  decreased breath sounds b/l bases, (+) bibasilar rales appreciated R>L, (+) wheezing   HEART:  RRR S1/S2  ABDOMEN:  Soft, non-tender, non-distended, obese, normoactive bowel sounds,  no masses   EXTREMITIES: + Edema  SKIN:  Warm & Dry. No rash or erythema  NEURO:  Alert, oriented    LABS:                        7.3    2.30  )-----------( 70       ( 2019 08:57 )             25.9       11-12    132<L>  |  97  |  13  ----------------------------<  126<H>  4.2   |  31  |  1.11    Ca    8.2<L>      2019 05:58  Phos  2.7     -12  Mg     1.7         TPro  7.5  /  Alb  2.6<L>  /  TBili  1.2  /  DBili  x   /  AST  29  /  ALT  16  /  AlkPhos  113  -12    LIVER FUNCTIONS - ( 2019 05:58 )  Alb: 2.6 g/dL / Pro: 7.5 g/dL / ALK PHOS: 113 U/L / ALT: 16 U/L / AST: 29 U/L / GGT: x           PT/INR - ( 2019 09:28 )   PT: 21.9 sec;   INR: 1.87 ratio         PTT - ( 2019 09:28 )  PTT:42.4 sec                            7.3    2.30  )-----------( 70       ( 2019 08:57 )             25.9                         7.2    2.33  )-----------( 73       ( 2019 08:03 )             25.6       Imaging:

## 2019-11-13 NOTE — PROGRESS NOTE ADULT - PROBLEM SELECTOR PLAN 2
- Bilateral lung effusion, R>L on CT (11/9)  - h/o R chest tube in recent hospitalization (Oct 2019) with negative cultures  - DDx Parapneumonic effusion vs. empyema vs. hepatic hydrothorax vs. TB  - Pulm rec: diuresis -1L/day. Based on size of effusion, unlikely to be cause of hypoxia.    - Strict I/O, daily weights. Lasix returned to home dose 40mg PO daily  - Diagnostic Rads consulted 11/12: thoracentesis w/ cytology planned for 11/14 AM  - CT surgery consulted 11/13: do not see indication for VATS at this time - Bilateral lung effusion, R>L on CT (11/9)  - h/o R chest tube in recent hospitalization (Oct 2019) with negative cultures  - DDx Parapneumonic effusion vs. empyema vs. hepatic hydrothorax vs. TB  - Pulm rec: diuresis -1L/day. Based on size of effusion, unlikely to be cause of hypoxia.    - Strict I/O, daily weights. Lasix returned to home dose 40mg PO daily  - CT surgery consulted 11/13: do not see indication for VATS at this time  - Diagnostic Rads consulted 11/12: thoracentesis w/ cytology planned for 11/14 AM  - Since INR remains elevated despite 3x vitamin K, will require FFP - Bilateral lung effusion, R>L on CT (11/9)  - h/o R chest tube in recent hospitalization (Oct 2019) with negative cultures  - DDx Parapneumonic effusion vs. empyema vs. hepatic hydrothorax vs. TB  - Strict I/O, daily weights. Lasix returned to home dose 40mg PO daily  - CT surgery consulted 11/13: do not see indication for VATS at this time  - Diagnostic Rads consulted 11/12: thoracentesis w/ cytology planned for 11/14 AM  - Since INR remains elevated despite 3x vitamin K, will require FFP
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
## 2019-11-13 NOTE — PROGRESS NOTE ADULT - PROBLEM SELECTOR PLAN 5
- Hx of decompensated cirrhosis  - Likely 2/2 alcoholism and BOB  - Mild ascites on CT abd/pelvis  - No asterixis or signs of hepatic encephalopathy on exam  - Trend MELD-Na  - Hepatology following

## 2019-11-13 NOTE — PROGRESS NOTE ADULT - ASSESSMENT
59 M with HLD, CHF (EF 65% Oct 2019), HLD, cirrhosis (2/2 alcohol abuse), and recent admission (Oct 2019) for R pleural effusion s/p chest tube, fluid was exudative but negative cytology and cultures, now again presented 11/8 with fever and chills, SOB and stated that has had 3 years of cough which is getting more productive with brown sputum.   initially T: 100.3, WBC: 2-4 not neutropenic   u/a positive and urine cx >3 organisms  chest/abd CT: mod R pleural effusion, small L, no consolidation, splenomegaly, cirrhosis and small ascites   pt was started on zosyn but the blood cx came back with micrococcus luteus and pt was started on vanco 11/11  pt spiked again on 11/12 to 101.1     3 years of cough with recurrent R pleural effusion, last tap 10/2019 exudative with negative cx and cytology, will have to r/o TB vs malignancy  pt also cirrhotic with pancytopenia  micrococcus luteus 1/4 in blood likely contamination, repeat negative    * airborne isolation  * f/u sputum AFB x 3 q 8, one early morning  * if pleural tap can't be done, then will need pleural biopsy and cultures, AFB  * f/u HIV and quantiferon  * will c/w vanco 1 q 12, started 11/11, now day 3  * c/w zosyn 3.375 q 8, started 11/9, now day 5

## 2019-11-14 LAB
ALBUMIN SERPL ELPH-MCNC: 2.7 G/DL — LOW (ref 3.3–5)
ALP SERPL-CCNC: 108 U/L — SIGNIFICANT CHANGE UP (ref 40–120)
ALT FLD-CCNC: 18 U/L — SIGNIFICANT CHANGE UP (ref 10–45)
AMMONIA BLD-MCNC: 65 UMOL/L — HIGH (ref 11–55)
ANION GAP SERPL CALC-SCNC: 9 MMOL/L — SIGNIFICANT CHANGE UP (ref 5–17)
APTT BLD: 40.2 SEC — HIGH (ref 27.5–36.3)
AST SERPL-CCNC: 28 U/L — SIGNIFICANT CHANGE UP (ref 10–40)
BILIRUB SERPL-MCNC: 1.6 MG/DL — HIGH (ref 0.2–1.2)
BLD GP AB SCN SERPL QL: NEGATIVE — SIGNIFICANT CHANGE UP
BUN SERPL-MCNC: 13 MG/DL — SIGNIFICANT CHANGE UP (ref 7–23)
CALCIUM SERPL-MCNC: 8.6 MG/DL — SIGNIFICANT CHANGE UP (ref 8.4–10.5)
CHLORIDE SERPL-SCNC: 95 MMOL/L — LOW (ref 96–108)
CO2 SERPL-SCNC: 33 MMOL/L — HIGH (ref 22–31)
CREAT SERPL-MCNC: 1.04 MG/DL — SIGNIFICANT CHANGE UP (ref 0.5–1.3)
CULTURE RESULTS: SIGNIFICANT CHANGE UP
GLUCOSE SERPL-MCNC: 96 MG/DL — SIGNIFICANT CHANGE UP (ref 70–99)
HCT VFR BLD CALC: 25.6 % — LOW (ref 39–50)
HGB BLD-MCNC: 7.1 G/DL — LOW (ref 13–17)
INR BLD: 1.7 RATIO — HIGH (ref 0.88–1.16)
INR BLD: 1.88 RATIO — HIGH (ref 0.88–1.16)
INR BLD: 1.91 RATIO — HIGH (ref 0.88–1.16)
MAGNESIUM SERPL-MCNC: 1.6 MG/DL — SIGNIFICANT CHANGE UP (ref 1.6–2.6)
MCHC RBC-ENTMCNC: 23.1 PG — LOW (ref 27–34)
MCHC RBC-ENTMCNC: 27.7 GM/DL — LOW (ref 32–36)
MCV RBC AUTO: 83.1 FL — SIGNIFICANT CHANGE UP (ref 80–100)
PHOSPHATE SERPL-MCNC: 3.1 MG/DL — SIGNIFICANT CHANGE UP (ref 2.5–4.5)
PLATELET # BLD AUTO: 72 K/UL — LOW (ref 150–400)
POTASSIUM SERPL-MCNC: 4 MMOL/L — SIGNIFICANT CHANGE UP (ref 3.5–5.3)
POTASSIUM SERPL-SCNC: 4 MMOL/L — SIGNIFICANT CHANGE UP (ref 3.5–5.3)
PROT SERPL-MCNC: 7.4 G/DL — SIGNIFICANT CHANGE UP (ref 6–8.3)
PROTHROM AB SERPL-ACNC: 19.9 SEC — HIGH (ref 10–12.9)
PROTHROM AB SERPL-ACNC: 21.8 SEC — HIGH (ref 10–13.1)
PROTHROM AB SERPL-ACNC: 22.2 SEC — HIGH (ref 10–12.9)
RBC # BLD: 3.08 M/UL — LOW (ref 4.2–5.8)
RBC # FLD: 21.1 % — HIGH (ref 10.3–14.5)
RH IG SCN BLD-IMP: NEGATIVE — SIGNIFICANT CHANGE UP
SODIUM SERPL-SCNC: 137 MMOL/L — SIGNIFICANT CHANGE UP (ref 135–145)
SPECIMEN SOURCE: SIGNIFICANT CHANGE UP
WBC # BLD: 2.05 K/UL — LOW (ref 3.8–10.5)
WBC # FLD AUTO: 2.05 K/UL — LOW (ref 3.8–10.5)

## 2019-11-14 PROCEDURE — 71045 X-RAY EXAM CHEST 1 VIEW: CPT | Mod: 26

## 2019-11-14 PROCEDURE — 99232 SBSQ HOSP IP/OBS MODERATE 35: CPT | Mod: GC

## 2019-11-14 PROCEDURE — 76705 ECHO EXAM OF ABDOMEN: CPT | Mod: 26

## 2019-11-14 PROCEDURE — 99233 SBSQ HOSP IP/OBS HIGH 50: CPT | Mod: GC

## 2019-11-14 PROCEDURE — 99233 SBSQ HOSP IP/OBS HIGH 50: CPT

## 2019-11-14 RX ORDER — SODIUM CHLORIDE 9 MG/ML
5 INJECTION INTRAMUSCULAR; INTRAVENOUS; SUBCUTANEOUS THREE TIMES A DAY
Refills: 0 | Status: DISCONTINUED | OUTPATIENT
Start: 2019-11-14 | End: 2019-11-22

## 2019-11-14 RX ORDER — FUROSEMIDE 40 MG
40 TABLET ORAL
Refills: 0 | Status: DISCONTINUED | OUTPATIENT
Start: 2019-11-14 | End: 2019-11-15

## 2019-11-14 RX ADMIN — Medication 325 MILLIGRAM(S): at 03:49

## 2019-11-14 RX ADMIN — PIPERACILLIN AND TAZOBACTAM 25 GRAM(S): 4; .5 INJECTION, POWDER, LYOPHILIZED, FOR SOLUTION INTRAVENOUS at 15:40

## 2019-11-14 RX ADMIN — PIPERACILLIN AND TAZOBACTAM 25 GRAM(S): 4; .5 INJECTION, POWDER, LYOPHILIZED, FOR SOLUTION INTRAVENOUS at 06:47

## 2019-11-14 RX ADMIN — Medication 1 SPRAY(S): at 18:08

## 2019-11-14 RX ADMIN — ZOLPIDEM TARTRATE 5 MILLIGRAM(S): 10 TABLET ORAL at 21:37

## 2019-11-14 RX ADMIN — PANTOPRAZOLE SODIUM 40 MILLIGRAM(S): 20 TABLET, DELAYED RELEASE ORAL at 06:47

## 2019-11-14 RX ADMIN — Medication 250 MILLIGRAM(S): at 18:11

## 2019-11-14 RX ADMIN — LACTULOSE 30 GRAM(S): 10 SOLUTION ORAL at 06:47

## 2019-11-14 RX ADMIN — LACTULOSE 30 GRAM(S): 10 SOLUTION ORAL at 15:34

## 2019-11-14 RX ADMIN — Medication 5 MILLIGRAM(S): at 15:39

## 2019-11-14 RX ADMIN — SPIRONOLACTONE 100 MILLIGRAM(S): 25 TABLET, FILM COATED ORAL at 06:47

## 2019-11-14 RX ADMIN — SODIUM CHLORIDE 5 MILLILITER(S): 9 INJECTION INTRAMUSCULAR; INTRAVENOUS; SUBCUTANEOUS at 21:25

## 2019-11-14 RX ADMIN — PIPERACILLIN AND TAZOBACTAM 25 GRAM(S): 4; .5 INJECTION, POWDER, LYOPHILIZED, FOR SOLUTION INTRAVENOUS at 21:22

## 2019-11-14 RX ADMIN — Medication 40 MILLIGRAM(S): at 18:08

## 2019-11-14 RX ADMIN — SODIUM CHLORIDE 5 MILLILITER(S): 9 INJECTION INTRAMUSCULAR; INTRAVENOUS; SUBCUTANEOUS at 15:43

## 2019-11-14 RX ADMIN — Medication 1 TABLET(S): at 15:32

## 2019-11-14 RX ADMIN — Medication 1 SPRAY(S): at 06:46

## 2019-11-14 RX ADMIN — ATORVASTATIN CALCIUM 20 MILLIGRAM(S): 80 TABLET, FILM COATED ORAL at 21:22

## 2019-11-14 RX ADMIN — Medication 250 MILLIGRAM(S): at 06:48

## 2019-11-14 RX ADMIN — Medication 4 GRAM(S): at 15:37

## 2019-11-14 RX ADMIN — Medication 325 MILLIGRAM(S): at 04:19

## 2019-11-14 RX ADMIN — Medication 40 MILLIGRAM(S): at 06:47

## 2019-11-14 NOTE — PROGRESS NOTE ADULT - PROBLEM SELECTOR PLAN 4
- Fever to 101.1 overnight, HR 's, RR 20 on admission. AMS (AOx2-oriented to person, place; multiple episodes of urinary incontinence)  - Patient reporting fevers of 102 at home  - Likely due to bacterial PNA vs. UTI   - +BCx 11/9 w/ 1/4 aerobic bottles growing gram+ organisms in clusters. +UA, UCx contaminated  - ID consulted: concern for TB. currently on airborne isolation, pending sputum AFB and thoracentesis fluid workup. HIV nonreactive  - c/w Vancomycin, Zosyn: both started 11/10  - Last fever 101.2 on Nov 12  - Repeat BCx Nov 11/12: NGTD

## 2019-11-14 NOTE — PROGRESS NOTE ADULT - PROBLEM SELECTOR PLAN 1
- Likely multifactorial 2/2 pleural effusions, bacterial pneumonia and restrictive lung disease 2/2 obesity  - Currently on 4L NC with Sat in Mid 90s  - Will attempt to ambulate off of O2  - Lasix 40mg PO daily

## 2019-11-14 NOTE — PROGRESS NOTE ADULT - SUBJECTIVE AND OBJECTIVE BOX
Demairo Art, PGY1  Internal Medicine Resident  Pager: [phone] ([facility]), [phone] (NS)    Patient is a 59y old  Male who presents with a chief complaint of Fever/shaking chills x1 day, (14 Nov 2019 10:27)    SUBJECTIVE / OVERNIGHT EVENTS:  Overnight, INR could not be lowered <1.5 despite 2x FFP.  Otherwise no events per Night Float.  At bedside, patient said he could not sleep well due to SOB.  When I asked why he refused CPAP he said he tried it at home and finds it uncomfortable.  Has been tolerating food and water.  Had diarrhea yesterday.     ADDITIONAL REVIEW OF SYSTEMS:  Patient denies fevers/chills, chest pain, palpitations, cough/wheezing, abdominal pain, nausea, vomiting, or constipation.    MEDICATIONS  (STANDING):  atorvastatin 20 milliGRAM(s) Oral at bedtime  fluticasone propionate 50 MICROgram(s)/spray Nasal Spray 1 Spray(s) Both Nostrils two times a day  furosemide    Tablet 40 milliGRAM(s) Oral daily  influenza   Vaccine 0.5 milliLiter(s) IntraMuscular once  lactobacillus acidophilus 1 Tablet(s) Oral daily  lactulose Syrup 30 Gram(s) Oral every 6 hours  omega-3-Acid Ethyl Esters 4 Gram(s) Oral daily  pantoprazole    Tablet 40 milliGRAM(s) Oral before breakfast  phytonadione   Solution 5 milliGRAM(s) Oral daily  piperacillin/tazobactam IVPB.. 3.375 Gram(s) IV Intermittent every 8 hours  polyethylene glycol 3350 17 Gram(s) Oral once  rifAXIMin 550 milliGRAM(s) Oral two times a day  sodium chloride 7% Inhalation 5 milliLiter(s) Inhalation three times a day  spironolactone 100 milliGRAM(s) Oral daily  vancomycin  IVPB 1000 milliGRAM(s) IV Intermittent every 12 hours    MEDICATIONS  (PRN):  acetaminophen   Tablet .. 325 milliGRAM(s) Oral every 4 hours PRN Temp greater or equal to 38C (100.4F), Mild Pain (1 - 3), Moderate Pain (4 - 6)  zolpidem 5 milliGRAM(s) Oral at bedtime PRN Insomnia      CAPILLARY BLOOD GLUCOSE        I&O's Summary    13 Nov 2019 07:01  -  14 Nov 2019 07:00  --------------------------------------------------------  IN: 1550 mL / OUT: 0 mL / NET: 1550 mL        PHYSICAL EXAM:  Vital Signs Last 24 Hrs  T(C): 36.5 (14 Nov 2019 11:30), Max: 36.8 (13 Nov 2019 23:37)  T(F): 97.7 (14 Nov 2019 11:30), Max: 98.3 (13 Nov 2019 23:37)  HR: 99 (14 Nov 2019 11:30) (73 - 99)  BP: 116/69 (14 Nov 2019 11:30) (111/69 - 128/65)  BP(mean): --  RR: 18 (14 Nov 2019 11:30) (16 - 22)  SpO2: 97% (14 Nov 2019 11:30) (92% - 99%)    CONSTITUTIONAL: Uncomfortable, no apparent distress. Awake and conversive, cooperative with exam  EYES: PERRLA, EOMI, conjunctiva and sclera clear  ENMT: Moist oral mucosa, no pharyngeal injection or exudates; normal dentition  NECK: Supple, no palpable masses; no JVD  RESPIRATORY: Normal respiratory effort, clear to auscultation bilaterally, no wheezes, rales, or rhonchi  CARDIOVASCULAR: Regular rate and rhythm, normal S1 and S2, no murmur/rub/gallop; distal pulses 2+ bilaterally  ABDOMEN: Obese, soft, nontender to palpation, normoactive bowel sounds, no rebound/guarding  MUSCULOSKELETAL:  No LE edema; extremities WWP, no clubbing or cyanosis of digits; no joint swelling or tenderness  PSYCH: A+O to person, place, and time; affect appropriate  NEUROLOGY: CN II-XII grossly intact; no focal sensory or motor deficits; moving all extremities spontaneously  SKIN: No rashes; no palpable lesions    LABS:                        7.1    2.05  )-----------( 72       ( 14 Nov 2019 09:15 )             25.6     11-14    137  |  95<L>  |  13  ----------------------------<  96  4.0   |  33<H>  |  1.04    Ca    8.6      14 Nov 2019 07:17  Phos  3.1     11-14  Mg     1.6     11-14    TPro  7.4  /  Alb  2.7<L>  /  TBili  1.6<H>  /  DBili  x   /  AST  28  /  ALT  18  /  AlkPhos  108  11-14    PT/INR - ( 14 Nov 2019 09:32 )   PT: 21.8 sec;   INR: 1.88 ratio         PTT - ( 14 Nov 2019 09:32 )  PTT:40.2 sec          Culture - Blood (collected 12 Nov 2019 10:00)  Source: .Blood Blood-Venous  Preliminary Report (13 Nov 2019 11:01):    No growth to date.    Culture - Blood (collected 11 Nov 2019 21:00)  Source: .Blood Blood  Preliminary Report (12 Nov 2019 22:01):    No growth to date.        RADIOLOGY & ADDITIONAL TESTS:  Results Reviewed:   Imaging Personally Reviewed:  Electrocardiogram Personally Reviewed:    COORDINATION OF CARE:  Care Discussed with Consultants/Other Providers [Y/N]:  Prior or Outpatient Records Reviewed [Y/N]:

## 2019-11-14 NOTE — PROGRESS NOTE ADULT - SUBJECTIVE AND OBJECTIVE BOX
Interval Events:   awaiting thoracentesis  No new fevers  No abdominal pain     Hospital Medications:  acetaminophen   Tablet .. 325 milliGRAM(s) Oral every 4 hours PRN  atorvastatin 20 milliGRAM(s) Oral at bedtime  fluticasone propionate 50 MICROgram(s)/spray Nasal Spray 1 Spray(s) Both Nostrils two times a day  furosemide    Tablet 40 milliGRAM(s) Oral daily  influenza   Vaccine 0.5 milliLiter(s) IntraMuscular once  lactobacillus acidophilus 1 Tablet(s) Oral daily  lactulose Syrup 30 Gram(s) Oral every 6 hours  omega-3-Acid Ethyl Esters 4 Gram(s) Oral daily  pantoprazole    Tablet 40 milliGRAM(s) Oral before breakfast  phytonadione   Solution 5 milliGRAM(s) Oral daily  piperacillin/tazobactam IVPB.. 3.375 Gram(s) IV Intermittent every 8 hours  polyethylene glycol 3350 17 Gram(s) Oral once  spironolactone 100 milliGRAM(s) Oral daily  vancomycin  IVPB 1000 milliGRAM(s) IV Intermittent every 12 hours  zolpidem 5 milliGRAM(s) Oral at bedtime PRN        ROS:   General:  No fevers, chills or night sweats  ENT:  No sore throat or dysphagia  CV:  No pain or palpitations  Resp:  No dyspnea, cough or  wheezing  GI:  as above  Skin:  No rash or edema  Neuro: no weakness   Hematologic: no bleeding  Musculoskeletal: no muscle pain or join pain  Psych: no agitation      PHYSICAL EXAM:   Vital Signs:  Vital Signs Last 24 Hrs  T(C): 36.6 (14 Nov 2019 06:28), Max: 36.8 (13 Nov 2019 09:20)  T(F): 97.8 (14 Nov 2019 06:28), Max: 98.3 (13 Nov 2019 09:20)  HR: 82 (14 Nov 2019 06:28) (73 - 89)  BP: 120/73 (14 Nov 2019 06:28) (111/69 - 128/65)  BP(mean): --  RR: 18 (14 Nov 2019 06:28) (18 - 22)  SpO2: 92% (14 Nov 2019 06:28) (92% - 98%)  Daily     Daily     GENERAL:  mild respiratory distress, short neck, obese BMI: 43  HEENT:  NC/AT,  conjunctivae clear and pink, sclera -anicteric  CHEST:  decreased breath sounds b/l bases, (+) bibasilar rales appreciated R>L, (+) wheezing   HEART:  RRR S1/S2  ABDOMEN:  Soft, non-tender, non-distended, obese, normoactive bowel sounds,  no masses   EXTREMITIES: + Edema  SKIN:  Warm & Dry. No rash or erythema  NEURO:  Alert, oriented    LABS:                        7.2    2.52  )-----------( 74       ( 13 Nov 2019 16:19 )             25.5     Mean Cell Volume: 83.1 fl (11-13-19 @ 16:19)    11-13    138  |  97  |  12  ----------------------------<  109<H>  4.1   |  33<H>  |  1.06    Ca    8.3<L>      13 Nov 2019 13:20  Phos  2.9     11-13  Mg     1.8     11-13    TPro  7.4  /  Alb  2.6<L>  /  TBili  1.4<H>  /  DBili  x   /  AST  30  /  ALT  19  /  AlkPhos  109  11-13    LIVER FUNCTIONS - ( 13 Nov 2019 13:20 )  Alb: 2.6 g/dL / Pro: 7.4 g/dL / ALK PHOS: 109 U/L / ALT: 19 U/L / AST: 30 U/L / GGT: x           PT/INR - ( 14 Nov 2019 03:07 )   PT: 22.2 sec;   INR: 1.91 ratio         PTT - ( 13 Nov 2019 19:56 )  PTT:38.3 sec                            7.2    2.52  )-----------( 74       ( 13 Nov 2019 16:19 )             25.5                         7.3    2.30  )-----------( 70       ( 12 Nov 2019 08:57 )             25.9       Imaging:

## 2019-11-14 NOTE — PROGRESS NOTE ADULT - ASSESSMENT
59 year old man with medical history of cirrhosis likely due to BOB, possibly contribution by alcohol consumption (he drank only a 1-2 drinks per day for half a year),Known fatty liver, decompensated by small ascites for which he is on Lasix /spironolactone 40 mg/day, 100 mg/d, h/o hepatic encephalopathy on lactulose and rifaximin, Morbid obesity BMI: 43 ,SARKIS who was admitted on (10/2019) for traumatic R sided rib fractures right ribs 6-11 2/2 with right side pleural effusion s/p pigtail placement and removal with resolution of hemothorax, present with fevers, chills for 2 days.  He was found to have positive UA and now being treated for UTI. Found to be hypoxic on RA to 91% and placed on bilevel then HFNC.   Blood culture came back positive for gram positive cocci  CT chest, abdomen and pelvis: Moderate right pleural effusion. Small left pleural effusion. Cirrhosis, splenomegaly and small volume ascites.  Healing fractures of the right 8th-11th ribs.  Temp: 100.2 (100.3 in ER)  Echo 10/2019: mild aortic stenosis. Mild aortic regurgitation. Normal left ventricular systolic function 65%. No segmental wall motion abnormalities. Normal diastolic function. Normal right ventricular size and function.  Normal pulmonary pressures.  He was seen by Pulmonary consult.  There is no safe window for thoracentesis as per Pulmonary.     Impression:  # Fever 11/12 DDx: pneumonia, SBP, TB ?  # Positive blood culture : micrococcus luteus, contamination ?  # Pleural effusion (moderate size Rt >Lt small) DDx: parapneumonic effusion, Hepatic hydrothorax  # History of exudative right side pleural effusion in 10/2019 s/p right side chest tube. Serum- effusion albumin gradient is 1.0 indicating exudative fluid.   # Cirrhosis likely due to BOB, possibly contribution by alcohol consumption MELD - Na: 17 (11/11/2019)  - Ascites: Small CT 11/2019  - HE: on Lactulose and Rifaximin at home   - Varices; unknown   - HCC: none CT 11/2019  # Morbid Obesity BMI: 43  # SARKIS     Recommendations:  - Continue empiric abx as per ID.  - IR consult for diagnostic thoracentesis (and paracentesis if the ascitic fluid is drainable ) to rule out SBP, empyema   (check pleural / ascitic fluid for cultures, cell count, albumin, protein, cytology, AFB, TG, cholesterol, LDH, PH, glucose )  - 2g Sodium diet  - Daily weights  - I+ O  - Check daily electrolytes  - Daily CMP and INR  - Continue home meds including Lasix and spironolactone 40 and 100 mg / day PO  - Continue Lactulose and Rifaximin at home dose 59 year old man with medical history of cirrhosis likely due to BOB, possibly contribution by alcohol consumption (he drank only a 1-2 drinks per day for half a year),Known fatty liver, decompensated by small ascites for which he is on Lasix /spironolactone 40 mg/day, 100 mg/d, h/o hepatic encephalopathy on lactulose and rifaximin, Morbid obesity BMI: 43 ,SARKIS who was admitted on (10/2019) for traumatic R sided rib fractures right ribs 6-11 2/2 with right side pleural effusion s/p pigtail placement and removal with resolution of hemothorax, present with fevers, chills for 2 days.  He was found to have positive UA and now being treated for UTI. Found to be hypoxic on RA to 91% and placed on bilevel then HFNC.   Blood culture came back positive for gram positive cocci  CT chest, abdomen and pelvis: Moderate right pleural effusion. Small left pleural effusion. Cirrhosis, splenomegaly and small volume ascites.  Healing fractures of the right 8th-11th ribs.  Temp: 100.2 (100.3 in ER)  Echo 10/2019: mild aortic stenosis. Mild aortic regurgitation. Normal left ventricular systolic function 65%. No segmental wall motion abnormalities. Normal diastolic function. Normal right ventricular size and function.  Normal pulmonary pressures.  He was seen by Pulmonary consult.  There is no safe window for thoracentesis as per Pulmonary.     Impression:  # Fever 11/12 DDx: pneumonia, SBP, TB ?  # Positive blood culture : micrococcus luteus, contamination ?  # Pleural effusion (moderate size Rt >Lt small) DDx: parapneumonic effusion, Hepatic hydrothorax  # History of exudative right side pleural effusion in 10/2019 s/p right side chest tube. Serum- effusion albumin gradient is 1.0 indicating exudative fluid.   # Cirrhosis likely due to BOB, possibly contribution by alcohol consumption MELD - Na: 17 (11/11/2019)  - Ascites: Small CT 11/2019  - HE: on Lactulose and Rifaximin at home   - Varices; unknown   - HCC: none CT 11/2019  # Morbid Obesity BMI: 43  # SARKIS     Recommendations:  - Patient has cirrhosis, thrombocytopenia 74 k and elevated INR 1.9. No need to administer FFP or platelet transfusion prior to thoracentesis.     - Continue empiric abx as per ID.  - IR consult for diagnostic thoracentesis (and paracentesis if the ascitic fluid is drainable ) to rule out SBP, empyema   (check pleural / ascitic fluid for cultures, cell count, albumin, protein, cytology, AFB, TG, cholesterol, LDH, PH, glucose )  - 2g Sodium diet  - Daily weights  - I+ O  - Check daily electrolytes  - Daily CMP and INR  - Continue home meds including Lasix and spironolactone 40 and 100 mg / day PO  - Continue Lactulose and Rifaximin at home dose

## 2019-11-14 NOTE — PROGRESS NOTE ADULT - PROBLEM SELECTOR PLAN 3
- Hx of decompensated cirrhosis  - Likely 2/2 alcoholism and BOB  - Mild ascites on CT abd/pelvis  - No asterixis or signs of hepatic encephalopathy on exam  - Trend MELD-Na  - Hepatology following  - Started on Rifaximin for concern for encephalopathy

## 2019-11-14 NOTE — PROGRESS NOTE ADULT - PROBLEM SELECTOR PLAN 2
- Bilateral lung effusion, R>L on CT (11/9)  - h/o R chest tube in recent hospitalization (Oct 2019) with negative cultures  - DDx Parapneumonic effusion vs. empyema vs. hepatic hydrothorax vs. TB  - Strict I/O, daily weights. Lasix returned to home dose 40mg PO daily  - CT surgery consulted 11/13: do not see indication for VATS at this time  - Diagnostic Rads consulted 11/12: thoracentesis w/ cytology planned for 11/14 AM  - Currently requiring multiple transfusions of FFP to lower INR <1.5 for thoracentesis - Bilateral lung effusion, R>L on CT (11/9)  - h/o R chest tube in recent hospitalization (Oct 2019) with negative cultures  - DDx Parapneumonic effusion vs. empyema vs. hepatic hydrothorax vs. TB  - Strict I/O, daily weights. Lasix returned to home dose 40mg PO daily  - CT surgery consulted 11/13: do not see indication for VATS at this time  - Diagnostic Rads consulted 11/12: thoracentesis w/ cytology planned for 11/14 AM - Bilateral lung effusion, R>L on CT (11/9)  - h/o R chest tube in recent hospitalization (Oct 2019) with negative cultures  - DDx Parapneumonic effusion vs. empyema vs. hepatic hydrothorax vs. TB  - Strict I/O, daily weights. Lasix returned to home dose 40mg PO daily  - CT surgery consulted 11/13: do not see indication for VATS at this time  - Diagnostic Rads consulted 11/12: thoracentesis w/ cytology planned for 11/15 AM, will transfuse additional FFP prior to procedure

## 2019-11-14 NOTE — PROGRESS NOTE ADULT - ASSESSMENT
59 M with HLD, CHF (EF 65% Oct 2019), HLD, cirrhosis (2/2 alcohol abuse), and recent admission (Oct 2019) for R pleural effusion s/p chest tube, fluid was exudative but negative cytology and cultures, now again presented 11/8 with fever and chills, SOB and stated that has had 3 years of cough which is getting more productive with brown sputum.   initially T: 100.3, WBC: 2-4 not neutropenic   u/a positive and urine cx >3 organisms  chest/abd CT: mod R pleural effusion, small L, no consolidation, splenomegaly, cirrhosis and small ascites   pt was started on zosyn but the blood cx came back with micrococcus luteus and pt was started on vanco 11/11  pt spiked again on 11/12 to 101.1   HIV negative    3 years of cough with recurrent R pleural effusion, last tap 10/2019 exudative with negative cx and cytology, will have to r/o TB vs malignancy  pt also cirrhotic with pancytopenia, although hepatic hydrothorax possible but pt has fever with exudative effusion and chronic cough  micrococcus luteus 1/4 in blood likely contamination, repeat negative    * airborne isolation  * f/u sputum AFB x 3 q 8, one early morning  * thoracic surgery did not recommend pleural biopsy and as per pulm no window for tap, f/u with IR for thoracentesis  * send the effusion for cell count, cx, AFB and ADA  * f/u  quantiferon  * will c/w vanco 1 q 12, started 11/11, now day 4  * c/w zosyn 3.375 q 8, started 11/9, now day 6

## 2019-11-14 NOTE — PROGRESS NOTE ADULT - SUBJECTIVE AND OBJECTIVE BOX
Follow Up:  fever, pleural effusion    Interval History: pt afebrile now, pending thoracentesis     ROS:      All other systems negative    Constitutional: no fever, no chills  Head: no trauma  Eyes: no vision changes, no eye pain  ENT:  no sore throat, no rhinorrhea  Cardiovascular:  no chest pain, no palpitation  Respiratory:  + SOB, + cough  GI:  no abd pain, no vomiting, no diarrhea  urinary: no dysuria, no hematuria, no flank pain  musculoskeletal:  no joint pain, no joint swelling  skin:  no rash  neurology:  no headache, no seizure, no change in mental status  psych: no anxiety, no depression         Allergies  No Known Allergies        ANTIMICROBIALS:  piperacillin/tazobactam IVPB.. 3.375 every 8 hours  vancomycin  IVPB 1000 every 12 hours      OTHER MEDS:  acetaminophen   Tablet .. 325 milliGRAM(s) Oral every 4 hours PRN  atorvastatin 20 milliGRAM(s) Oral at bedtime  fluticasone propionate 50 MICROgram(s)/spray Nasal Spray 1 Spray(s) Both Nostrils two times a day  furosemide    Tablet 40 milliGRAM(s) Oral daily  influenza   Vaccine 0.5 milliLiter(s) IntraMuscular once  lactobacillus acidophilus 1 Tablet(s) Oral daily  lactulose Syrup 30 Gram(s) Oral every 6 hours  omega-3-Acid Ethyl Esters 4 Gram(s) Oral daily  pantoprazole    Tablet 40 milliGRAM(s) Oral before breakfast  phytonadione   Solution 5 milliGRAM(s) Oral daily  polyethylene glycol 3350 17 Gram(s) Oral once  spironolactone 100 milliGRAM(s) Oral daily  zolpidem 5 milliGRAM(s) Oral at bedtime PRN      Vital Signs Last 24 Hrs  T(C): 36.5 (14 Nov 2019 09:30), Max: 36.8 (13 Nov 2019 23:37)  T(F): 97.7 (14 Nov 2019 09:30), Max: 98.3 (13 Nov 2019 23:37)  HR: 80 (14 Nov 2019 09:30) (73 - 89)  BP: 121/74 (14 Nov 2019 09:30) (111/69 - 128/65)  BP(mean): --  RR: 16 (14 Nov 2019 09:30) (16 - 22)  SpO2: 99% (14 Nov 2019 09:30) (92% - 99%)    Physical Exam:  General:    NAD, non toxic  Head: atraumatic, normocephalic  Eyes: normal sclera and conjunctiva  ENT:   no oropharyngeal lesions, no LAD, neck supple  Cardio:    irregular S1,S2, + murmur  Respiratory:  clear b/l, no wheezing  abd:   soft, BS +, not tender, no hepatosplenomegaly  :     no CVAT, no suprapubic tenderness, no rod  Musculoskeletal : no joint swelling, no edema  Skin:    no rash  vascular: no phlebitis  Neurologic:     no focal deficits  psych: normal affect                            7.2    2.52  )-----------( 74       ( 13 Nov 2019 16:19 )             25.5       11-14    137  |  95<L>  |  13  ----------------------------<  96  4.0   |  33<H>  |  1.04    Ca    8.6      14 Nov 2019 07:17  Phos  3.1     11-14  Mg     1.6     11-14    TPro  7.4  /  Alb  2.7<L>  /  TBili  1.6<H>  /  DBili  x   /  AST  28  /  ALT  18  /  AlkPhos  108  11-14          MICROBIOLOGY:  v  .Blood Blood-Venous  11-12-19   No growth to date.  --  --      .Blood Blood  11-11-19   No growth to date.  --  --      .Urine Clean Catch (Midstream)  11-09-19   >=3 organisms. Probable collection contamination.  --  --      .Blood Blood-Peripheral  11-09-19   No growth at 5 days.  --  Blood Culture PCR      .Blood  10-15-19   No growth at 5 days.  --  --          Rapid RVP Result: NotDetec (11-08 @ 22:53)        RADIOLOGY:  Images below reviewed personally  < from: CT Chest w/ IV Cont (11.08.19 @ 23:55) >  IMPRESSION:   CHEST:  Moderate right pleural effusion. Small left pleural effusion.   No infectious consolidation.     ABDOMEN AND PELVIS:  Cirrhosis, splenomegaly and small volume ascites.

## 2019-11-15 ENCOUNTER — RESULT REVIEW (OUTPATIENT)
Age: 59
End: 2019-11-15

## 2019-11-15 LAB
ALBUMIN FLD-MCNC: 1.4 G/DL — SIGNIFICANT CHANGE UP
ALBUMIN SERPL ELPH-MCNC: 2.7 G/DL — LOW (ref 3.3–5)
ALP SERPL-CCNC: 112 U/L — SIGNIFICANT CHANGE UP (ref 40–120)
ALT FLD-CCNC: 18 U/L — SIGNIFICANT CHANGE UP (ref 10–45)
AMMONIA BLD-MCNC: 99 UMOL/L — HIGH (ref 11–55)
ANION GAP SERPL CALC-SCNC: 8 MMOL/L — SIGNIFICANT CHANGE UP (ref 5–17)
APTT BLD: 35.2 SEC — SIGNIFICANT CHANGE UP (ref 27.5–36.3)
AST SERPL-CCNC: 28 U/L — SIGNIFICANT CHANGE UP (ref 10–40)
B PERT IGG+IGM PNL SER: ABNORMAL
BILIRUB SERPL-MCNC: 1.5 MG/DL — HIGH (ref 0.2–1.2)
BUN SERPL-MCNC: 15 MG/DL — SIGNIFICANT CHANGE UP (ref 7–23)
CALCIUM SERPL-MCNC: 8.6 MG/DL — SIGNIFICANT CHANGE UP (ref 8.4–10.5)
CHLORIDE SERPL-SCNC: 98 MMOL/L — SIGNIFICANT CHANGE UP (ref 96–108)
CO2 SERPL-SCNC: 33 MMOL/L — HIGH (ref 22–31)
COLOR FLD: YELLOW — SIGNIFICANT CHANGE UP
CREAT SERPL-MCNC: 1.07 MG/DL — SIGNIFICANT CHANGE UP (ref 0.5–1.3)
FLUID INTAKE SUBSTANCE CLASS: SIGNIFICANT CHANGE UP
FLUID SEGMENTED GRANULOCYTES: 3 % — SIGNIFICANT CHANGE UP
GAMMA INTERFERON BACKGROUND BLD IA-ACNC: 0.01 IU/ML — SIGNIFICANT CHANGE UP
GLUCOSE FLD-MCNC: 105 MG/DL — SIGNIFICANT CHANGE UP
GLUCOSE SERPL-MCNC: 106 MG/DL — HIGH (ref 70–99)
GRAM STN FLD: SIGNIFICANT CHANGE UP
HCT VFR BLD CALC: 26.2 % — LOW (ref 39–50)
HGB BLD-MCNC: 7.4 G/DL — LOW (ref 13–17)
INR BLD: 1.81 RATIO — HIGH (ref 0.88–1.16)
LDH SERPL L TO P-CCNC: 175 U/L — SIGNIFICANT CHANGE UP
LDH SERPL L TO P-CCNC: 244 U/L — HIGH (ref 50–242)
LYMPHOCYTES # FLD: 53 % — SIGNIFICANT CHANGE UP
M TB IFN-G BLD-IMP: ABNORMAL
M TB IFN-G CD4+ BCKGRND COR BLD-ACNC: 0 IU/ML — SIGNIFICANT CHANGE UP
M TB IFN-G CD4+CD8+ BCKGRND COR BLD-ACNC: 0 IU/ML — SIGNIFICANT CHANGE UP
MAGNESIUM SERPL-MCNC: 1.6 MG/DL — SIGNIFICANT CHANGE UP (ref 1.6–2.6)
MCHC RBC-ENTMCNC: 23.7 PG — LOW (ref 27–34)
MCHC RBC-ENTMCNC: 28.2 GM/DL — LOW (ref 32–36)
MCV RBC AUTO: 84 FL — SIGNIFICANT CHANGE UP (ref 80–100)
MESOTHL CELL # FLD: 2 % — SIGNIFICANT CHANGE UP
MONOS+MACROS # FLD: 42 % — SIGNIFICANT CHANGE UP
NIGHT BLUE STAIN TISS: SIGNIFICANT CHANGE UP
PH FLD: 7.61 — SIGNIFICANT CHANGE UP
PHOSPHATE SERPL-MCNC: 3.3 MG/DL — SIGNIFICANT CHANGE UP (ref 2.5–4.5)
PLATELET # BLD AUTO: 81 K/UL — LOW (ref 150–400)
POTASSIUM SERPL-MCNC: 4.1 MMOL/L — SIGNIFICANT CHANGE UP (ref 3.5–5.3)
POTASSIUM SERPL-SCNC: 4.1 MMOL/L — SIGNIFICANT CHANGE UP (ref 3.5–5.3)
PROT FLD-MCNC: 3.7 G/DL — SIGNIFICANT CHANGE UP
PROT SERPL-MCNC: 7.8 G/DL — SIGNIFICANT CHANGE UP (ref 6–8.3)
PROTHROM AB SERPL-ACNC: 21.2 SEC — HIGH (ref 10–12.9)
QUANT TB PLUS MITOGEN MINUS NIL: 0.4 IU/ML — SIGNIFICANT CHANGE UP
RBC # BLD: 3.12 M/UL — LOW (ref 4.2–5.8)
RBC # FLD: 21.6 % — HIGH (ref 10.3–14.5)
RCV VOL RI: 6500 /UL — HIGH (ref 0–5)
SODIUM SERPL-SCNC: 139 MMOL/L — SIGNIFICANT CHANGE UP (ref 135–145)
SPECIMEN SOURCE FLD: SIGNIFICANT CHANGE UP
SPECIMEN SOURCE FLD: SIGNIFICANT CHANGE UP
SPECIMEN SOURCE: SIGNIFICANT CHANGE UP
SPECIMEN SOURCE: SIGNIFICANT CHANGE UP
TOTAL NUCLEATED CELL COUNT, BODY FLUID: 42 /UL — HIGH (ref 0–5)
TRIGL FLD-MCNC: 22 MG/DL — SIGNIFICANT CHANGE UP
TUBE TYPE: SIGNIFICANT CHANGE UP
VANCOMYCIN TROUGH SERPL-MCNC: 16.8 UG/ML — SIGNIFICANT CHANGE UP (ref 10–20)
WBC # BLD: 2.33 K/UL — LOW (ref 3.8–10.5)
WBC # FLD AUTO: 2.33 K/UL — LOW (ref 3.8–10.5)

## 2019-11-15 PROCEDURE — 32555 ASPIRATE PLEURA W/ IMAGING: CPT | Mod: RT

## 2019-11-15 PROCEDURE — 99233 SBSQ HOSP IP/OBS HIGH 50: CPT | Mod: GC

## 2019-11-15 PROCEDURE — 99232 SBSQ HOSP IP/OBS MODERATE 35: CPT | Mod: GC

## 2019-11-15 PROCEDURE — 88112 CYTOPATH CELL ENHANCE TECH: CPT | Mod: 26

## 2019-11-15 PROCEDURE — 99233 SBSQ HOSP IP/OBS HIGH 50: CPT

## 2019-11-15 PROCEDURE — 88305 TISSUE EXAM BY PATHOLOGIST: CPT | Mod: 26

## 2019-11-15 RX ORDER — ALBUMIN HUMAN 25 %
100 VIAL (ML) INTRAVENOUS EVERY 6 HOURS
Refills: 0 | Status: DISCONTINUED | OUTPATIENT
Start: 2019-11-15 | End: 2019-11-15

## 2019-11-15 RX ORDER — ALBUMIN HUMAN 25 %
50 VIAL (ML) INTRAVENOUS EVERY 6 HOURS
Refills: 0 | Status: COMPLETED | OUTPATIENT
Start: 2019-11-15 | End: 2019-11-16

## 2019-11-15 RX ORDER — FUROSEMIDE 40 MG
40 TABLET ORAL DAILY
Refills: 0 | Status: DISCONTINUED | OUTPATIENT
Start: 2019-11-15 | End: 2019-11-19

## 2019-11-15 RX ADMIN — SODIUM CHLORIDE 5 MILLILITER(S): 9 INJECTION INTRAMUSCULAR; INTRAVENOUS; SUBCUTANEOUS at 14:40

## 2019-11-15 RX ADMIN — Medication 40 MILLIGRAM(S): at 04:38

## 2019-11-15 RX ADMIN — Medication 250 MILLIGRAM(S): at 04:43

## 2019-11-15 RX ADMIN — LACTULOSE 30 GRAM(S): 10 SOLUTION ORAL at 18:13

## 2019-11-15 RX ADMIN — Medication 50 MILLILITER(S): at 18:07

## 2019-11-15 RX ADMIN — SPIRONOLACTONE 100 MILLIGRAM(S): 25 TABLET, FILM COATED ORAL at 04:39

## 2019-11-15 RX ADMIN — Medication 325 MILLIGRAM(S): at 03:47

## 2019-11-15 RX ADMIN — Medication 1 TABLET(S): at 14:39

## 2019-11-15 RX ADMIN — Medication 40 MILLIGRAM(S): at 14:42

## 2019-11-15 RX ADMIN — Medication 50 MILLILITER(S): at 23:57

## 2019-11-15 RX ADMIN — Medication 1 SPRAY(S): at 18:08

## 2019-11-15 RX ADMIN — LACTULOSE 30 GRAM(S): 10 SOLUTION ORAL at 14:42

## 2019-11-15 RX ADMIN — Medication 1 SPRAY(S): at 04:37

## 2019-11-15 RX ADMIN — LACTULOSE 30 GRAM(S): 10 SOLUTION ORAL at 23:55

## 2019-11-15 RX ADMIN — ATORVASTATIN CALCIUM 20 MILLIGRAM(S): 80 TABLET, FILM COATED ORAL at 22:30

## 2019-11-15 RX ADMIN — Medication 325 MILLIGRAM(S): at 02:47

## 2019-11-15 RX ADMIN — PANTOPRAZOLE SODIUM 40 MILLIGRAM(S): 20 TABLET, DELAYED RELEASE ORAL at 04:44

## 2019-11-15 RX ADMIN — PIPERACILLIN AND TAZOBACTAM 25 GRAM(S): 4; .5 INJECTION, POWDER, LYOPHILIZED, FOR SOLUTION INTRAVENOUS at 04:43

## 2019-11-15 RX ADMIN — Medication 325 MILLIGRAM(S): at 23:58

## 2019-11-15 RX ADMIN — Medication 100 MILLIGRAM(S): at 08:21

## 2019-11-15 RX ADMIN — SODIUM CHLORIDE 5 MILLILITER(S): 9 INJECTION INTRAMUSCULAR; INTRAVENOUS; SUBCUTANEOUS at 22:30

## 2019-11-15 RX ADMIN — Medication 4 GRAM(S): at 14:39

## 2019-11-15 RX ADMIN — SODIUM CHLORIDE 5 MILLILITER(S): 9 INJECTION INTRAMUSCULAR; INTRAVENOUS; SUBCUTANEOUS at 04:41

## 2019-11-15 NOTE — PROGRESS NOTE ADULT - PROBLEM SELECTOR PLAN 4
- Fever to 101.1 overnight, HR 's, RR 20 on admission. AMS (AOx2-oriented to person, place; multiple episodes of urinary incontinence)  - Patient reporting fevers of 102 at home  - Likely due to bacterial PNA vs. UTI   - +BCx 11/9 w/ 1/4 aerobic bottles growing gram+ organisms in clusters. +UA, UCx contaminated  - ID consulted: concern for TB. currently on airborne isolation, pending sputum AFB and thoracentesis fluid workup. HIV nonreactive  - c/w Vancomycin, Zosyn: both started 11/10  - Last fever 101.2 on Nov 12  - Repeat BCx Nov 11/12: NGTD - Fever to 101.1 overnight, HR 's, RR 20 on admission. AMS (AOx2-oriented to person, place; multiple episodes of urinary incontinence)  - Patient reporting fevers of 102 at home  - Likely due to bacterial PNA vs. UTI   - +BCx 11/9 w/ 1/4 aerobic bottles growing gram+ organisms in clusters. +UA, UCx contaminated  - ID consulted: concern for TB. currently on airborne isolation, pending sputum AFB and thoracentesis fluid workup. HIV nonreactive  - Last fever 101.2 on Nov 12  - Repeat BCx Nov 11/12: NGTD  - s/p 7 days Vanc & Zosyn (11/9-11/15), can DC per ID

## 2019-11-15 NOTE — PROGRESS NOTE ADULT - PROBLEM SELECTOR PLAN 2
- Bilateral lung effusion, R>L on CT (11/9)  - h/o R chest tube in recent hospitalization (Oct 2019) with negative cultures  - DDx Parapneumonic effusion vs. empyema vs. hepatic hydrothorax vs. TB  - Strict I/O, daily weights. Lasix returned to home dose 40mg PO daily  - CT surgery consulted 11/13: do not see indication for VATS at this time  - Diagnostic Rads consulted 11/12: thoracentesis w/ cytology planned for 11/15 AM, will transfuse additional FFP prior to procedure - Bilateral lung effusion, R>L on CT (11/9)  - h/o R chest tube in recent hospitalization (Oct 2019) with negative cultures  - DDx Parapneumonic effusion vs. empyema vs. hepatic hydrothorax vs. TB  - Strict I/O, daily weights. Lasix returned to home dose 40mg PO daily  - CT surgery consulted 11/13: no indication for VATS at this time  - Thoracentesis 11/15: meets Lights criteria (exudative effusion), cytology pending

## 2019-11-15 NOTE — PROGRESS NOTE ADULT - ASSESSMENT
59 year old man with medical history of cirrhosis likely due to BOB, possibly contribution by alcohol consumption (he drank only a 1-2 drinks per day for half a year),Known fatty liver, decompensated by small ascites for which he is on Lasix /spironolactone 40 mg/day, 100 mg/d, h/o hepatic encephalopathy on lactulose and rifaximin, Morbid obesity BMI: 43 ,SARKIS who was admitted on (10/2019) for traumatic R sided rib fractures right ribs 6-11 2/2 with right side pleural effusion s/p pigtail placement and removal with resolution of hemothorax, present with fevers, chills for 2 days.  He was found to have positive UA and now being treated for UTI. Found to be hypoxic on RA to 91% and placed on bilevel then HFNC.   Blood culture came back positive for gram positive cocci  CT chest, abdomen and pelvis: Moderate right pleural effusion. Small left pleural effusion. Cirrhosis, splenomegaly and small volume ascites.  Healing fractures of the right 8th-11th ribs.  Temp: 100.2 (100.3 in ER)  Echo 10/2019: mild aortic stenosis. Mild aortic regurgitation. Normal left ventricular systolic function 65%. No segmental wall motion abnormalities. Normal diastolic function. Normal right ventricular size and function.  Normal pulmonary pressures.  He was seen by Pulmonary consult.  There is no safe window for thoracentesis as per Pulmonary.   US abdomen: moderate size ascites.    Impression:  # Ascites   # Fever 11/12 DDx: pneumonia, SBP, TB ?  # Positive blood culture : micrococcus luteus, contamination ?  # Pleural effusion (moderate size Rt >Lt small) DDx: parapneumonic effusion, Hepatic hydrothorax  # History of exudative right side pleural effusion in 10/2019 s/p right side chest tube. Serum- effusion albumin gradient is 1.0 indicating exudative fluid.   # Cirrhosis likely due to BOB, possibly contribution by alcohol consumption MELD - Na: 17 (11/11/2019)  - Ascites: Small CT 11/2019  - HE: on Lactulose and Rifaximin at home   - Varices; unknown   - HCC: none CT 11/2019  # Morbid Obesity BMI: 43  # SARKIS     Recommendations:  - No need to administer FFP or platelet transfusion prior to thoracentesis and / or paracentesis.   - IR consult for diagnostic thoracentesis and paracentesis to rule out SBP, empyema   (check pleural / ascitic fluid for cultures, cell count, albumin, protein, cytology, AFB, TG, cholesterol, LDH, PH, glucose )  - Continue empiric abx as per ID.  - Daily CMP and INR  - Continue home meds including Lasix and spironolactone 40 and 100 mg / day PO  - Continue Lactulose and Rifaximin at home dose 59 year old man with medical history of cirrhosis likely due to BOB, possibly contribution by alcohol consumption (he drank only a 1-2 drinks per day for half a year),Known fatty liver, decompensated by small ascites for which he is on Lasix /spironolactone 40 mg/day, 100 mg/d, h/o hepatic encephalopathy on lactulose and rifaximin, Morbid obesity BMI: 43 ,SARKIS who was admitted on (10/2019) for traumatic R sided rib fractures right ribs 6-11 2/2 with right side pleural effusion s/p pigtail placement and removal with resolution of hemothorax, present with fevers, chills for 2 days.  He was found to have positive UA and now being treated for UTI. Found to be hypoxic on RA to 91% and placed on bilevel then HFNC.   Blood culture came back positive for gram positive cocci  CT chest, abdomen and pelvis: Moderate right pleural effusion. Small left pleural effusion. Cirrhosis, splenomegaly and small volume ascites.  Healing fractures of the right 8th-11th ribs.  Temp: 100.2 (100.3 in ER) then 101.1 on 11/12  Echo 10/2019: mild aortic stenosis. Mild aortic regurgitation. Normal left ventricular systolic function 65%. No segmental wall motion abnormalities. Normal diastolic function. Normal right ventricular size and function.  Normal pulmonary pressures.  He was seen by Pulmonary consult.  There is no safe window for thoracentesis as per Pulmonary.   US abdomen: moderate size ascites.    Impression:  # Ascites DDx: SBP, Portal HTN 2nd to cirrhosis   # Fever 11/12 DDx: pneumonia, SBP, TB ?  # Positive blood culture : micrococcus luteus, contamination ?  # Pleural effusion (moderate size Rt >Lt small) DDx: parapneumonic effusion, Hepatic hydrothorax  # History of exudative right side pleural effusion in 10/2019 s/p right side chest tube. Serum- effusion albumin gradient is 1.0 indicating exudative fluid.   # Cirrhosis likely due to BOB, possibly contribution by alcohol consumption MELD - Na: 17 (11/11/2019)  - Ascites: Small CT 11/2019  - HE: on Lactulose and Rifaximin at home   - Varices; unknown   - HCC: none CT 11/2019  # Morbid Obesity BMI: 43  # SARKIS     Recommendations:  - No need to administer FFP or platelet transfusion prior to thoracentesis and / or paracentesis.   - IR consult for diagnostic thoracentesis and paracentesis to rule out SBP, empyema   (check pleural / ascitic fluid for cultures, cell count, albumin, protein, cytology, AFB, TG, cholesterol, LDH, PH, glucose )  - Continue empiric abx as per ID.  - Daily CMP and INR  - Continue home meds including Lasix and spironolactone 40 and 100 mg / day PO  - Continue Lactulose and Rifaximin at home dose

## 2019-11-15 NOTE — PROGRESS NOTE ADULT - SUBJECTIVE AND OBJECTIVE BOX
Follow Up:  fever, pleural effusion    Interval History: pt afebrile, s/p thoracentesis    ROS:      All other systems negative    Constitutional: no fever, no chills  Head: no trauma  Eyes: no vision changes, no eye pain  ENT:  no sore throat, no rhinorrhea  Cardiovascular:  no chest pain, no palpitation  Respiratory:  + SOB, + cough  GI:  no abd pain, no vomiting, no diarrhea  urinary: no dysuria, no hematuria, no flank pain  musculoskeletal:  no joint pain, no joint swelling  skin:  no rash  neurology:  no headache, no seizure  psych: no anxiety, no depression         Allergies  No Known Allergies        ANTIMICROBIALS:  rifAXIMin 550 two times a day      OTHER MEDS:  acetaminophen   Tablet .. 325 milliGRAM(s) Oral every 4 hours PRN  atorvastatin 20 milliGRAM(s) Oral at bedtime  benzonatate 100 milliGRAM(s) Oral three times a day PRN  fluticasone propionate 50 MICROgram(s)/spray Nasal Spray 1 Spray(s) Both Nostrils two times a day  furosemide    Tablet 40 milliGRAM(s) Oral daily  influenza   Vaccine 0.5 milliLiter(s) IntraMuscular once  lactobacillus acidophilus 1 Tablet(s) Oral daily  lactulose Syrup 30 Gram(s) Oral every 6 hours  omega-3-Acid Ethyl Esters 4 Gram(s) Oral daily  pantoprazole    Tablet 40 milliGRAM(s) Oral before breakfast  polyethylene glycol 3350 17 Gram(s) Oral once  sodium chloride 7% Inhalation 5 milliLiter(s) Inhalation three times a day  spironolactone 100 milliGRAM(s) Oral daily  zolpidem 5 milliGRAM(s) Oral at bedtime PRN      Vital Signs Last 24 Hrs  T(C): 36.6 (15 Nov 2019 07:50), Max: 36.9 (15 Nov 2019 00:45)  T(F): 97.9 (15 Nov 2019 07:50), Max: 98.5 (15 Nov 2019 04:40)  HR: 85 (15 Nov 2019 07:50) (83 - 98)  BP: 116/65 (15 Nov 2019 07:50) (109/60 - 141/62)  BP(mean): --  RR: 18 (15 Nov 2019 07:50) (18 - 18)  SpO2: 95% (15 Nov 2019 07:55) (87% - 96%)    Physical Exam:  General:    NAD, non toxic  Head: atraumatic, normocephalic  Eyes: normal sclera and conjunctiva  ENT:   no oropharyngeal lesions, no LAD, neck supple  Cardio:    irregular S1,S2, + murmur  Respiratory:  clear b/l, no wheezing  abd:   soft, BS +, not tender, no hepatosplenomegaly  :     no CVAT, no suprapubic tenderness, no rod  Musculoskeletal : no joint swelling, no edema  Skin:    no rash  vascular: no phlebitis  Neurologic:     no focal deficits  psych: normal affect                          7.4    2.33  )-----------( 81       ( 15 Nov 2019 08:40 )             26.2       11-15    139  |  98  |  15  ----------------------------<  106<H>  4.1   |  33<H>  |  1.07    Ca    8.6      15 Nov 2019 04:46  Phos  3.3     11-15  Mg     1.6     11-15    TPro  7.8  /  Alb  2.7<L>  /  TBili  1.5<H>  /  DBili  x   /  AST  28  /  ALT  18  /  AlkPhos  112  11-15          MICROBIOLOGY:  Vancomycin Level, Trough: 16.8 ug/mL (11-15-19 @ 04:46)  v  .Blood Blood-Venous  11-12-19   No growth to date.  --  --      .Blood Blood  11-11-19   No growth to date.  --  --      .Urine Clean Catch (Midstream)  11-09-19   >=3 organisms. Probable collection contamination.  --  --      .Blood Blood-Peripheral  11-09-19   No growth at 5 days.  --  Blood Culture PCR          Rapid RVP Result: NotDetec (11-08 @ 22:53)        RADIOLOGY:  Images below reviewed personally  < from: US Abdomen Limited (11.14.19 @ 15:45) >    IMPRESSION:  Moderate volume ascites.      < from: Xray Chest 1 View- PORTABLE-Urgent (11.14.19 @ 13:40) >    IMPRESSION:   Right-sided pleural effusion     < from: CT Chest w/ IV Cont (11.08.19 @ 23:55) >  IMPRESSION:   CHEST:  Moderate right pleural effusion. Small left pleural effusion.   No infectious consolidation.     ABDOMEN AND PELVIS:  Cirrhosis, splenomegaly and small volume ascites.

## 2019-11-15 NOTE — PROGRESS NOTE ADULT - SUBJECTIVE AND OBJECTIVE BOX
Interval Events:     Hospital Medications:  acetaminophen   Tablet .. 325 milliGRAM(s) Oral every 4 hours PRN  atorvastatin 20 milliGRAM(s) Oral at bedtime  benzonatate 100 milliGRAM(s) Oral three times a day PRN  fluticasone propionate 50 MICROgram(s)/spray Nasal Spray 1 Spray(s) Both Nostrils two times a day  furosemide   Injectable 40 milliGRAM(s) IV Push two times a day  influenza   Vaccine 0.5 milliLiter(s) IntraMuscular once  lactobacillus acidophilus 1 Tablet(s) Oral daily  lactulose Syrup 30 Gram(s) Oral every 6 hours  omega-3-Acid Ethyl Esters 4 Gram(s) Oral daily  pantoprazole    Tablet 40 milliGRAM(s) Oral before breakfast  piperacillin/tazobactam IVPB.. 3.375 Gram(s) IV Intermittent every 8 hours  polyethylene glycol 3350 17 Gram(s) Oral once  rifAXIMin 550 milliGRAM(s) Oral two times a day  sodium chloride 7% Inhalation 5 milliLiter(s) Inhalation three times a day  spironolactone 100 milliGRAM(s) Oral daily  vancomycin  IVPB 1000 milliGRAM(s) IV Intermittent every 12 hours  zolpidem 5 milliGRAM(s) Oral at bedtime PRN        ROS:   General:  No fevers, chills or night sweats  ENT:  No sore throat or dysphagia  CV:  No pain or palpitations  Resp:  No dyspnea, cough or  wheezing  GI:  as above  Skin:  No rash or edema  Neuro: no weakness   Hematologic: no bleeding  Musculoskeletal: no muscle pain or join pain  Psych: no agitation      PHYSICAL EXAM:   Vital Signs:  Vital Signs Last 24 Hrs  T(C): 36.7 (15 Nov 2019 05:36), Max: 36.9 (15 Nov 2019 00:45)  T(F): 98.1 (15 Nov 2019 05:36), Max: 98.5 (15 Nov 2019 04:40)  HR: 96 (15 Nov 2019 05:36) (74 - 99)  BP: 109/60 (15 Nov 2019 05:36) (109/60 - 141/62)  BP(mean): --  RR: 18 (15 Nov 2019 05:36) (16 - 18)  SpO2: 96% (15 Nov 2019 05:36) (91% - 99%)  Daily     Daily     GENERAL:  mild respiratory distress, short neck, obese BMI: 43  HEENT:  NC/AT,  conjunctivae clear and pink, sclera -anicteric  CHEST:  decreased breath sounds b/l bases, (+) bibasilar rales appreciated R>L, (+) wheezing   HEART:  RRR S1/S2  ABDOMEN:  Soft, non-tender, non-distended, obese, normoactive bowel sounds,  no masses   EXTREMITIES: + Edema  SKIN:  Warm & Dry. No rash or erythema  NEURO:  Alert, oriented    LABS:                        7.1    2.05  )-----------( 72       ( 14 Nov 2019 09:15 )             25.6     Mean Cell Volume: 83.1 fl (11-14-19 @ 09:15)    11-15    139  |  98  |  15  ----------------------------<  106<H>  4.1   |  33<H>  |  1.07    Ca    8.6      15 Nov 2019 04:46  Phos  3.3     11-15  Mg     1.6     11-15    TPro  7.8  /  Alb  2.7<L>  /  TBili  1.5<H>  /  DBili  x   /  AST  28  /  ALT  18  /  AlkPhos  112  11-15    LIVER FUNCTIONS - ( 15 Nov 2019 04:46 )  Alb: 2.7 g/dL / Pro: 7.8 g/dL / ALK PHOS: 112 U/L / ALT: 18 U/L / AST: 28 U/L / GGT: x           PT/INR - ( 14 Nov 2019 14:03 )   PT: 19.9 sec;   INR: 1.70 ratio         PTT - ( 14 Nov 2019 09:32 )  PTT:40.2 sec    Amylase Serum--      Lipase serum--       [id number]                          7.1    2.05  )-----------( 72       ( 14 Nov 2019 09:15 )             25.6                         7.2    2.52  )-----------( 74       ( 13 Nov 2019 16:19 )             25.5                         7.3    2.30  )-----------( 70       ( 12 Nov 2019 08:57 )             25.9       Imaging: Interval Events:   Feels exhausted  No abdominal pain.     Hospital Medications:  acetaminophen   Tablet .. 325 milliGRAM(s) Oral every 4 hours PRN  atorvastatin 20 milliGRAM(s) Oral at bedtime  benzonatate 100 milliGRAM(s) Oral three times a day PRN  fluticasone propionate 50 MICROgram(s)/spray Nasal Spray 1 Spray(s) Both Nostrils two times a day  furosemide   Injectable 40 milliGRAM(s) IV Push two times a day  influenza   Vaccine 0.5 milliLiter(s) IntraMuscular once  lactobacillus acidophilus 1 Tablet(s) Oral daily  lactulose Syrup 30 Gram(s) Oral every 6 hours  omega-3-Acid Ethyl Esters 4 Gram(s) Oral daily  pantoprazole    Tablet 40 milliGRAM(s) Oral before breakfast  piperacillin/tazobactam IVPB.. 3.375 Gram(s) IV Intermittent every 8 hours  polyethylene glycol 3350 17 Gram(s) Oral once  rifAXIMin 550 milliGRAM(s) Oral two times a day  sodium chloride 7% Inhalation 5 milliLiter(s) Inhalation three times a day  spironolactone 100 milliGRAM(s) Oral daily  vancomycin  IVPB 1000 milliGRAM(s) IV Intermittent every 12 hours  zolpidem 5 milliGRAM(s) Oral at bedtime PRN        ROS:   General:  No fevers, chills or night sweats  ENT:  No sore throat or dysphagia  CV:  No pain or palpitations  Resp:  No dyspnea, cough or  wheezing  GI:  as above  Skin:  No rash or edema  Neuro: no weakness   Hematologic: no bleeding  Musculoskeletal: no muscle pain or join pain  Psych: no agitation      PHYSICAL EXAM:   Vital Signs:  Vital Signs Last 24 Hrs  T(C): 36.7 (15 Nov 2019 05:36), Max: 36.9 (15 Nov 2019 00:45)  T(F): 98.1 (15 Nov 2019 05:36), Max: 98.5 (15 Nov 2019 04:40)  HR: 96 (15 Nov 2019 05:36) (74 - 99)  BP: 109/60 (15 Nov 2019 05:36) (109/60 - 141/62)  BP(mean): --  RR: 18 (15 Nov 2019 05:36) (16 - 18)  SpO2: 96% (15 Nov 2019 05:36) (91% - 99%)  Daily     Daily     GENERAL: not in acute distress, short neck, obese BMI: 43  HEENT:  NC/AT,  conjunctivae clear and pink, sclera -anicteric  CHEST:  decreased breath sounds b/l bases, (+) bibasilar rales appreciated R>L,  HEART:  RRR S1/S2  ABDOMEN:  Soft, non-tender, distended, ascites, obese, normoactive bowel sounds,  no masses   EXTREMITIES: + Edema  SKIN:  Warm & Dry. No rash or erythema  NEURO:  Alert, oriented    LABS:                        7.1    2.05  )-----------( 72       ( 14 Nov 2019 09:15 )             25.6     Mean Cell Volume: 83.1 fl (11-14-19 @ 09:15)    11-15    139  |  98  |  15  ----------------------------<  106<H>  4.1   |  33<H>  |  1.07    Ca    8.6      15 Nov 2019 04:46  Phos  3.3     11-15  Mg     1.6     11-15    TPro  7.8  /  Alb  2.7<L>  /  TBili  1.5<H>  /  DBili  x   /  AST  28  /  ALT  18  /  AlkPhos  112  11-15    LIVER FUNCTIONS - ( 15 Nov 2019 04:46 )  Alb: 2.7 g/dL / Pro: 7.8 g/dL / ALK PHOS: 112 U/L / ALT: 18 U/L / AST: 28 U/L / GGT: x           PT/INR - ( 14 Nov 2019 14:03 )   PT: 19.9 sec;   INR: 1.70 ratio         PTT - ( 14 Nov 2019 09:32 )  PTT:40.2 sec    Amylase Serum--      Lipase serum--       [id number]                          7.1    2.05  )-----------( 72       ( 14 Nov 2019 09:15 )             25.6                         7.2    2.52  )-----------( 74       ( 13 Nov 2019 16:19 )             25.5                         7.3    2.30  )-----------( 70       ( 12 Nov 2019 08:57 )             25.9       Imaging:

## 2019-11-15 NOTE — PROGRESS NOTE ADULT - SUBJECTIVE AND OBJECTIVE BOX
Demario Art, PGY1  Internal Medicine Resident  Pager: [phone] ([facility]), [phone] (NS)    Patient is a 59y old  Male who presents with a chief complaint of Fever/shaking chills x1 day, (15 Nov 2019 08:26)    SUBJECTIVE / OVERNIGHT EVENTS:  No overnight events per Night Float.  At bedside, patient said he did not sleep well.  Has been tolerating food and water.  Last bowel movement was 1x regular yesterday. Tolerated FFP infusion without issue.    ADDITIONAL REVIEW OF SYSTEMS:  Patient denies fevers/chills, chest pain, palpitations, SOB, cough/wheezing, abdominal pain, nausea, vomiting, diarrhea, or constipation.    MEDICATIONS  (STANDING):  atorvastatin 20 milliGRAM(s) Oral at bedtime  fluticasone propionate 50 MICROgram(s)/spray Nasal Spray 1 Spray(s) Both Nostrils two times a day  furosemide   Injectable 40 milliGRAM(s) IV Push two times a day  influenza   Vaccine 0.5 milliLiter(s) IntraMuscular once  lactobacillus acidophilus 1 Tablet(s) Oral daily  lactulose Syrup 30 Gram(s) Oral every 6 hours  omega-3-Acid Ethyl Esters 4 Gram(s) Oral daily  pantoprazole    Tablet 40 milliGRAM(s) Oral before breakfast  piperacillin/tazobactam IVPB.. 3.375 Gram(s) IV Intermittent every 8 hours  polyethylene glycol 3350 17 Gram(s) Oral once  rifAXIMin 550 milliGRAM(s) Oral two times a day  sodium chloride 7% Inhalation 5 milliLiter(s) Inhalation three times a day  spironolactone 100 milliGRAM(s) Oral daily  vancomycin  IVPB 1000 milliGRAM(s) IV Intermittent every 12 hours    MEDICATIONS  (PRN):  acetaminophen   Tablet .. 325 milliGRAM(s) Oral every 4 hours PRN Temp greater or equal to 38C (100.4F), Mild Pain (1 - 3), Moderate Pain (4 - 6)  benzonatate 100 milliGRAM(s) Oral three times a day PRN Cough  zolpidem 5 milliGRAM(s) Oral at bedtime PRN Insomnia      CAPILLARY BLOOD GLUCOSE        I&O's Summary      PHYSICAL EXAM:  Vital Signs Last 24 Hrs  T(C): 36.6 (15 Nov 2019 07:50), Max: 36.9 (15 Nov 2019 00:45)  T(F): 97.9 (15 Nov 2019 07:50), Max: 98.5 (15 Nov 2019 04:40)  HR: 85 (15 Nov 2019 07:50) (80 - 99)  BP: 116/65 (15 Nov 2019 07:50) (109/60 - 141/62)  BP(mean): --  RR: 18 (15 Nov 2019 07:50) (16 - 18)  SpO2: 95% (15 Nov 2019 07:55) (87% - 99%)    CONSTITUTIONAL: Uncomfortable, no apparent distress. Awake and conversive, cooperative with exam  EYES: PERRLA, EOMI, conjunctiva and sclera clear  ENMT: Moist oral mucosa, no pharyngeal injection or exudates; normal dentition  NECK: Supple, no palpable masses; no JVD  RESPIRATORY: Normal respiratory effort, clear to auscultation bilaterally, no wheezes, rales, or rhonchi  CARDIOVASCULAR: Regular rate and rhythm, normal S1 and S2, no murmur/rub/gallop; distal pulses 2+ bilaterally  ABDOMEN: Obese, soft, nontender to palpation, normoactive bowel sounds, no rebound/guarding  MUSCULOSKELETAL:  No LE edema; extremities WWP, no clubbing or cyanosis of digits; no joint swelling or tenderness  PSYCH: A+Ox2 (person, place); affect appropriate  NEUROLOGY: CN II-XII grossly intact; no focal sensory or motor deficits; moving all extremities spontaneously  SKIN: No rashes; no palpable lesions    LABS:                        7.1    2.05  )-----------( 72       ( 14 Nov 2019 09:15 )             25.6     11-15    139  |  98  |  15  ----------------------------<  106<H>  4.1   |  33<H>  |  1.07    Ca    8.6      15 Nov 2019 04:46  Phos  3.3     11-15  Mg     1.6     11-15    TPro  7.8  /  Alb  2.7<L>  /  TBili  1.5<H>  /  DBili  x   /  AST  28  /  ALT  18  /  AlkPhos  112  11-15    PT/INR - ( 14 Nov 2019 14:03 )   PT: 19.9 sec;   INR: 1.70 ratio         PTT - ( 14 Nov 2019 09:32 )  PTT:40.2 sec          Culture - Blood (collected 12 Nov 2019 10:00)  Source: .Blood Blood-Venous  Preliminary Report (13 Nov 2019 11:01):    No growth to date.        RADIOLOGY & ADDITIONAL TESTS:  Results Reviewed:   Imaging Personally Reviewed:  Electrocardiogram Personally Reviewed:    COORDINATION OF CARE:  Care Discussed with Consultants/Other Providers [Y/N]:  Prior or Outpatient Records Reviewed [Y/N]:

## 2019-11-15 NOTE — PROGRESS NOTE ADULT - PROBLEM SELECTOR PLAN 3
- Hx of decompensated cirrhosis  - Likely 2/2 alcoholism and BOB  - Mild ascites on CT abd/pelvis  - No asterixis or signs of hepatic encephalopathy on exam  - Trend MELD-Na  - Hepatology following  - Started on Rifaximin for concern for encephalopathy - Hx of decompensated cirrhosis  - Likely 2/2 alcoholism and BOB  - Trend MELD-Na  - Hepatology following  - c/w lactulose and rifaxamin for PSE - Hx of decompensated cirrhosis  - Likely 2/2 alcoholism and BOB  - Trend MELD-Na  - Hepatology following  - c/w Lactulose and Rifaximin for PSE  - Abdominal US 11/14: moderate volume ascites (increased since admission): per IR, no safe window for paracentesis

## 2019-11-15 NOTE — PROGRESS NOTE ADULT - PROBLEM SELECTOR PLAN 1
- Likely multifactorial 2/2 pleural effusions, bacterial pneumonia and restrictive lung disease 2/2 obesity  - Currently on 5L NC with Sat in Mid 90s  - Lasix 40mg PO daily - Likely multifactorial 2/2 pleural effusions, bacterial pneumonia and restrictive lung disease 2/2 obesity  - Currently on 5L NC with Sat in Mid 90s  - Lasix 40mg IV daily

## 2019-11-15 NOTE — PROGRESS NOTE ADULT - ASSESSMENT
59 M with HLD, CHF (EF 65% Oct 2019), HLD, cirrhosis (2/2 alcohol abuse), and recent admission (Oct 2019) for R pleural effusion s/p chest tube, fluid was exudative but negative cytology and cultures, now again presented 11/8 with fever and chills, SOB and stated that has had 3 years of cough which is getting more productive with brown sputum.   initially T: 100.3, WBC: 2-4 not neutropenic   u/a positive and urine cx >3 organisms  chest/abd CT: mod R pleural effusion, small L, no consolidation, splenomegaly, cirrhosis and small ascites   pt was started on zosyn but the blood cx came back with micrococcus luteus and pt was started on vanco 11/11  pt spiked again on 11/12 to 101.1   HIV negative    3 years of cough with recurrent R pleural effusion, last tap 10/2019 exudative with negative cx and cytology, will have to r/o TB vs malignancy  pt also cirrhotic with pancytopenia, although hepatic hydrothorax possible but pt has fever with exudative effusion and chronic cough  micrococcus luteus 1/4 in blood likely contamination, repeat negative    * airborne isolation  * f/u sputum AFB x 3 q 8, one early morning  * thoracic surgery did not recommend pleural biopsy and as per pulm no window for tap, f/u with IR for thoracentesis  * s/p thoracentesis f/u the pleural effusion for cell count, cx, AFB and ADA  * f/u  quantiferon  * if 3 AFB smears are negative, DC isolation  * s/p 7 days of vanco and zosyn, will DC

## 2019-11-16 LAB
ALBUMIN SERPL ELPH-MCNC: 3 G/DL — LOW (ref 3.3–5)
ALP SERPL-CCNC: 109 U/L — SIGNIFICANT CHANGE UP (ref 40–120)
ALT FLD-CCNC: 19 U/L — SIGNIFICANT CHANGE UP (ref 10–45)
ANION GAP SERPL CALC-SCNC: 9 MMOL/L — SIGNIFICANT CHANGE UP (ref 5–17)
APTT BLD: 40.4 SEC — HIGH (ref 27.5–36.3)
AST SERPL-CCNC: 30 U/L — SIGNIFICANT CHANGE UP (ref 10–40)
BILIRUB SERPL-MCNC: 1.7 MG/DL — HIGH (ref 0.2–1.2)
BUN SERPL-MCNC: 12 MG/DL — SIGNIFICANT CHANGE UP (ref 7–23)
CALCIUM SERPL-MCNC: 8.8 MG/DL — SIGNIFICANT CHANGE UP (ref 8.4–10.5)
CHLORIDE SERPL-SCNC: 97 MMOL/L — SIGNIFICANT CHANGE UP (ref 96–108)
CO2 SERPL-SCNC: 33 MMOL/L — HIGH (ref 22–31)
CREAT SERPL-MCNC: 0.99 MG/DL — SIGNIFICANT CHANGE UP (ref 0.5–1.3)
CULTURE RESULTS: SIGNIFICANT CHANGE UP
GLUCOSE SERPL-MCNC: 107 MG/DL — HIGH (ref 70–99)
HCT VFR BLD CALC: 26.5 % — LOW (ref 39–50)
HGB BLD-MCNC: 7.4 G/DL — LOW (ref 13–17)
INR BLD: 1.69 RATIO — HIGH (ref 0.88–1.16)
MAGNESIUM SERPL-MCNC: 1.8 MG/DL — SIGNIFICANT CHANGE UP (ref 1.6–2.6)
MCHC RBC-ENTMCNC: 23.8 PG — LOW (ref 27–34)
MCHC RBC-ENTMCNC: 27.9 GM/DL — LOW (ref 32–36)
MCV RBC AUTO: 85.2 FL — SIGNIFICANT CHANGE UP (ref 80–100)
NIGHT BLUE STAIN TISS: SIGNIFICANT CHANGE UP
PHOSPHATE SERPL-MCNC: 3.1 MG/DL — SIGNIFICANT CHANGE UP (ref 2.5–4.5)
PLATELET # BLD AUTO: 85 K/UL — LOW (ref 150–400)
POTASSIUM SERPL-MCNC: 4 MMOL/L — SIGNIFICANT CHANGE UP (ref 3.5–5.3)
POTASSIUM SERPL-SCNC: 4 MMOL/L — SIGNIFICANT CHANGE UP (ref 3.5–5.3)
PROT SERPL-MCNC: 7.9 G/DL — SIGNIFICANT CHANGE UP (ref 6–8.3)
PROTHROM AB SERPL-ACNC: 19.6 SEC — HIGH (ref 10–13.1)
RBC # BLD: 3.11 M/UL — LOW (ref 4.2–5.8)
RBC # FLD: 21.5 % — HIGH (ref 10.3–14.5)
SODIUM SERPL-SCNC: 139 MMOL/L — SIGNIFICANT CHANGE UP (ref 135–145)
SPECIMEN SOURCE: SIGNIFICANT CHANGE UP
SPECIMEN SOURCE: SIGNIFICANT CHANGE UP
WBC # BLD: 2.64 K/UL — LOW (ref 3.8–10.5)
WBC # FLD AUTO: 2.64 K/UL — LOW (ref 3.8–10.5)

## 2019-11-16 PROCEDURE — 99233 SBSQ HOSP IP/OBS HIGH 50: CPT | Mod: GC

## 2019-11-16 PROCEDURE — 99232 SBSQ HOSP IP/OBS MODERATE 35: CPT | Mod: GC

## 2019-11-16 RX ORDER — ZOLPIDEM TARTRATE 10 MG/1
5 TABLET ORAL AT BEDTIME
Refills: 0 | Status: DISCONTINUED | OUTPATIENT
Start: 2019-11-16 | End: 2019-11-19

## 2019-11-16 RX ADMIN — PANTOPRAZOLE SODIUM 40 MILLIGRAM(S): 20 TABLET, DELAYED RELEASE ORAL at 05:46

## 2019-11-16 RX ADMIN — LACTULOSE 30 GRAM(S): 10 SOLUTION ORAL at 05:45

## 2019-11-16 RX ADMIN — LACTULOSE 30 GRAM(S): 10 SOLUTION ORAL at 18:10

## 2019-11-16 RX ADMIN — SPIRONOLACTONE 100 MILLIGRAM(S): 25 TABLET, FILM COATED ORAL at 05:46

## 2019-11-16 RX ADMIN — ZOLPIDEM TARTRATE 5 MILLIGRAM(S): 10 TABLET ORAL at 23:47

## 2019-11-16 RX ADMIN — SODIUM CHLORIDE 5 MILLILITER(S): 9 INJECTION INTRAMUSCULAR; INTRAVENOUS; SUBCUTANEOUS at 12:52

## 2019-11-16 RX ADMIN — ATORVASTATIN CALCIUM 20 MILLIGRAM(S): 80 TABLET, FILM COATED ORAL at 23:46

## 2019-11-16 RX ADMIN — Medication 40 MILLIGRAM(S): at 05:46

## 2019-11-16 RX ADMIN — LACTULOSE 30 GRAM(S): 10 SOLUTION ORAL at 23:46

## 2019-11-16 RX ADMIN — Medication 1 SPRAY(S): at 05:45

## 2019-11-16 RX ADMIN — Medication 4 GRAM(S): at 12:53

## 2019-11-16 RX ADMIN — SODIUM CHLORIDE 5 MILLILITER(S): 9 INJECTION INTRAMUSCULAR; INTRAVENOUS; SUBCUTANEOUS at 05:46

## 2019-11-16 RX ADMIN — Medication 50 MILLILITER(S): at 15:57

## 2019-11-16 RX ADMIN — LACTULOSE 30 GRAM(S): 10 SOLUTION ORAL at 12:57

## 2019-11-16 RX ADMIN — ZOLPIDEM TARTRATE 5 MILLIGRAM(S): 10 TABLET ORAL at 00:54

## 2019-11-16 RX ADMIN — Medication 1 TABLET(S): at 12:57

## 2019-11-16 RX ADMIN — Medication 325 MILLIGRAM(S): at 00:28

## 2019-11-16 RX ADMIN — Medication 1 SPRAY(S): at 18:11

## 2019-11-16 RX ADMIN — Medication 50 MILLILITER(S): at 05:58

## 2019-11-16 NOTE — PROGRESS NOTE ADULT - SUBJECTIVE AND OBJECTIVE BOX
Follow Up:  fever, pleural effusion    Interval History: pt afebrile, s/p thoracentesis    ROS:      All other systems negative    Constitutional: no fever, no chills  Head: no trauma  Eyes: no vision changes, no eye pain  ENT:  no sore throat, no rhinorrhea  Cardiovascular:  no chest pain, no palpitation  Respiratory:  + SOB, + cough  GI:  no abd pain, no vomiting, no diarrhea  urinary: no dysuria, no hematuria, no flank pain  musculoskeletal:  no joint pain, no joint swelling  skin:  no rash  neurology:  no headache, no seizure  psych: no anxiety, no depression         Allergies  No Known Allergies  influenza   Vaccine 0.5 once  rifAXIMin 550 two times a day      MEDICATIONS  (STANDING):  atorvastatin 20 at bedtime  furosemide    Tablet 40 daily  influenza   Vaccine 0.5 once  lactulose Syrup 30 every 6 hours  pantoprazole    Tablet 40 before breakfast  polyethylene glycol 3350 17 once  sodium chloride 7% Inhalation 5 three times a day  spironolactone 100 daily      PRN  acetaminophen   Tablet .. 325 milliGRAM(s) Oral every 4 hours PRN  benzonatate 100 milliGRAM(s) Oral three times a day PRN      Vital Signs Last 24 Hrs  T(F): 98.4 (11-16-19 @ 11:58), Max: 98.7 (11-15-19 @ 23:27)  HR: 92 (11-16-19 @ 11:58)  BP: 116/67 (11-16-19 @ 11:58)  RR: 18 (11-16-19 @ 11:58)  SpO2: 95% (11-16-19 @ 11:58) (91% - 96%)  Wt(kg): --    Physical Exam:  General:    NAD, non toxic, drousy, falling asleep during exam  Head: atraumatic, normocephalic  Eyes: normal sclera and conjunctiva  ENT:   no oropharyngeal lesions, no LAD, neck supple  Cardio:    irregular S1,S2, + murmur  Respiratory:  decreased b/l at the bases, no wheezing  abd:   soft, BS +, not tender, no hepatosplenomegaly  :     no CVAT, no suprapubic tenderness, no rod  Musculoskeletal : no joint swelling, + pitting edema  Skin:    no rash  vascular: no phlebitis  Neurologic:     no focal deficits  psych: normal affect                          7.4    2.33  )-----------( 81       ( 15 Nov 2019 08:40 )             26.2       11-15    139  |  98  |  15  ----------------------------<  106<H>  4.1   |  33<H>  |  1.07    Ca    8.6      15 Nov 2019 04:46  Phos  3.3     11-15  Mg     1.6     11-15    TPro  7.8  /  Alb  2.7<L>  /  TBili  1.5<H>  /  DBili  x   /  AST  28  /  ALT  18  /  AlkPhos  112  11-15          MICROBIOLOGY:  Vancomycin Level, Trough: 16.8 ug/mL (11-15-19 @ 04:46)  v  .Blood Blood-Venous  11-12-19   No growth to date.  --  --      .Blood Blood  11-11-19   No growth to date.  --  --      .Urine Clean Catch (Midstream)  11-09-19   >=3 organisms. Probable collection contamination.  --  --      .Blood Blood-Peripheral  11-09-19   No growth at 5 days.  --  Blood Culture PCR          Rapid RVP Result: NotDetec (11-08 @ 22:53)        RADIOLOGY:  Images below reviewed personally  < from: US Abdomen Limited (11.14.19 @ 15:45) >    IMPRESSION:  Moderate volume ascites.      < from: Xray Chest 1 View- PORTABLE-Urgent (11.14.19 @ 13:40) >    IMPRESSION:   Right-sided pleural effusion     < from: CT Chest w/ IV Cont (11.08.19 @ 23:55) >  IMPRESSION:   CHEST:  Moderate right pleural effusion. Small left pleural effusion.   No infectious consolidation.     ABDOMEN AND PELVIS:  Cirrhosis, splenomegaly and small volume ascites. Follow Up:  fever, pleural effusion    Interval History: pt afebrile, s/p thoracentesis, falling asleep while talking sitting in chair. Some cough, multiple BM's, could not sleep at night.       ROS:    Constitutional: no fever, no chills  Cardiovascular:  no chest pain,  Respiratory:  + SOB, + cough  GI:  no abd pain,   skin:  no rash      Allergies  No Known Allergies  influenza   Vaccine 0.5 once  rifAXIMin 550 two times a day      MEDICATIONS  (STANDING):  atorvastatin 20 at bedtime  furosemide    Tablet 40 daily  influenza   Vaccine 0.5 once  lactulose Syrup 30 every 6 hours  pantoprazole    Tablet 40 before breakfast  polyethylene glycol 3350 17 once  sodium chloride 7% Inhalation 5 three times a day  spironolactone 100 daily      PRN  acetaminophen   Tablet .. 325 milliGRAM(s) Oral every 4 hours PRN  benzonatate 100 milliGRAM(s) Oral three times a day PRN      Vital Signs Last 24 Hrs  T(F): 98.4 (11-16-19 @ 11:58), Max: 98.7 (11-15-19 @ 23:27)  HR: 92 (11-16-19 @ 11:58)  BP: 116/67 (11-16-19 @ 11:58)  RR: 18 (11-16-19 @ 11:58)  SpO2: 95% (11-16-19 @ 11:58) (91% - 96%)  Wt(kg): --      Physical Exam:  General:    NAD, non toxic, drousy, falling asleep during exam  Cardio:    irregular S1,S2, + murmur  Respiratory:  decreased b/l at the bases, rt >lt.   abd:   soft, BS +, not tender,   :   no rod  Skin:    no rash  vascular: no phlebitis, + pitting edema                            7.4    2.33  )-----------( 81       ( 15 Nov 2019 08:40 )             26.2       11-15    139  |  98  |  15  ----------------------------<  106<H>  4.1   |  33<H>  |  1.07    Ca    8.6      15 Nov 2019 04:46  Phos  3.3     11-15  Mg     1.6     11-15    TPro  7.8  /  Alb  2.7<L>  /  TBili  1.5<H>  /  DBili  x   /  AST  28  /  ALT  18  /  AlkPhos  112  11-15          MICROBIOLOGY:  Vancomycin Level, Trough: 16.8 ug/mL (11-15-19 @ 04:46)  v  .Blood Blood-Venous  11-12-19   No growth to date.  --  --      .Blood Blood  11-11-19   No growth to date.  --  --      .Urine Clean Catch (Midstream)  11-09-19   >=3 organisms. Probable collection contamination.  --  --      .Blood Blood-Peripheral  11-09-19   No growth at 5 days.  --  Blood Culture PCR          Rapid RVP Result: NotDetec (11-08 @ 22:53)        RADIOLOGY:  Images below reviewed personally  < from: US Abdomen Limited (11.14.19 @ 15:45) >    IMPRESSION:  Moderate volume ascites.      < from: Xray Chest 1 View- PORTABLE-Urgent (11.14.19 @ 13:40) >    IMPRESSION:   Right-sided pleural effusion     < from: CT Chest w/ IV Cont (11.08.19 @ 23:55) >  IMPRESSION:   CHEST:  Moderate right pleural effusion. Small left pleural effusion.   No infectious consolidation.     ABDOMEN AND PELVIS:  Cirrhosis, splenomegaly and small volume ascites.

## 2019-11-16 NOTE — PROGRESS NOTE ADULT - PROBLEM SELECTOR PLAN 3
- Hx of decompensated cirrhosis  - Likely 2/2 alcoholism and BOB  - Trend MELD-Na  - Hepatology following  - c/w Lactulose and Rifaximin for PSE  - Abdominal US 11/14: moderate volume ascites (increased since admission): per IR, no safe window for paracentesis

## 2019-11-16 NOTE — PROGRESS NOTE ADULT - PROBLEM SELECTOR PLAN 4
- AMS (AOx2-oriented to person, place; multiple episodes of urinary incontinence)  - Patient reporting fevers of 102 at home  - Likely due to bacterial PNA vs. UTI   - +BCx 11/9 w/ 1/4 aerobic bottles growing gram+ organisms in clusters. +UA, UCx contaminated  - ID consulted: concern for TB. currently on airborne isolation, pending sputum AFB and thoracentesis fluid workup. HIV nonreactive  - Last fever 101.2 on Nov 12  - Repeat BCx Nov 11/12: NGTD  - s/p 7 days Vanc & Zosyn (11/9-11/15), d/c'ed - AMS (AOx2-oriented to person, place; multiple episodes of urinary incontinence)  - Patient reporting fevers of 102 at home  - Likely due to bacterial PNA vs. UTI   - +BCx 11/9 w/ 1/4 aerobic bottles growing gram+ organisms in clusters. +UA, UCx contaminated  - ID consulted: concern for TB. currently on airborne isolation, pending sputum AFB and thoracentesis fluid workup. HIV nonreactive. If AFB - x 3 can d/c isolation  - Last fever 101.2 on Nov 12  - Repeat BCx Nov 11/12: NGTD  - s/p 7 days Vanc & Zosyn (11/9-11/15), d/c'ed

## 2019-11-16 NOTE — PROGRESS NOTE ADULT - SUBJECTIVE AND OBJECTIVE BOX
Wesley Woodson MD PGY-2  Department of Internal Medicine  Pager: [phone] ([facility]) /[phone] ([facility])     PROGRESS NOTE:     Patient is a 59y old  Male who presents with a chief complaint of Fever/shaking chills x1 day, (15 Nov 2019 11:43)      SUBJECTIVE / OVERNIGHT EVENTS: Patient seen and examined at bedside. Vital signs stable overnight. Patient denies headache, dizziness, chest/abd pain, shortness of breath. ROS negative unless otherwise stated.     ADDITIONAL REVIEW OF SYSTEMS:    MEDICATIONS  (STANDING):  albumin human 25% IVPB 50 milliLiter(s) IV Intermittent every 6 hours  atorvastatin 20 milliGRAM(s) Oral at bedtime  fluticasone propionate 50 MICROgram(s)/spray Nasal Spray 1 Spray(s) Both Nostrils two times a day  furosemide    Tablet 40 milliGRAM(s) Oral daily  influenza   Vaccine 0.5 milliLiter(s) IntraMuscular once  lactobacillus acidophilus 1 Tablet(s) Oral daily  lactulose Syrup 30 Gram(s) Oral every 6 hours  omega-3-Acid Ethyl Esters 4 Gram(s) Oral daily  pantoprazole    Tablet 40 milliGRAM(s) Oral before breakfast  polyethylene glycol 3350 17 Gram(s) Oral once  rifAXIMin 550 milliGRAM(s) Oral two times a day  sodium chloride 7% Inhalation 5 milliLiter(s) Inhalation three times a day  spironolactone 100 milliGRAM(s) Oral daily    MEDICATIONS  (PRN):  acetaminophen   Tablet .. 325 milliGRAM(s) Oral every 4 hours PRN Temp greater or equal to 38C (100.4F), Mild Pain (1 - 3), Moderate Pain (4 - 6)  benzonatate 100 milliGRAM(s) Oral three times a day PRN Cough      CAPILLARY BLOOD GLUCOSE        I&O's Summary    15 Nov 2019 07:01  -  16 Nov 2019 07:00  --------------------------------------------------------  IN: 990 mL / OUT: 3 mL / NET: 987 mL        PHYSICAL EXAM:  Vital Signs Last 24 Hrs  T(C): 36.8 (16 Nov 2019 05:34), Max: 37.1 (15 Nov 2019 23:27)  T(F): 98.3 (16 Nov 2019 05:34), Max: 98.7 (15 Nov 2019 23:27)  HR: 97 (16 Nov 2019 05:34) (86 - 97)  BP: 133/72 (16 Nov 2019 05:34) (103/65 - 133/72)  BP(mean): --  RR: 18 (16 Nov 2019 05:34) (17 - 18)  SpO2: 95% (16 Nov 2019 05:34) (91% - 96%)    CONSTITUTIONAL: Somnolent, in NAD, on 4L of NC  RESPIRATORY: Normal respiratory effort; lungs are clear to auscultation bilaterally  CARDIOVASCULAR: Regular rate and rhythm, normal S1 and S2, no murmur/rub/gallop; No lower extremity edema; Peripheral pulses are 2+ bilaterally  ABDOMEN: Distended abdomen however not tender to palpation  MUSCLOSKELETAL: no clubbing or cyanosis of digits; no joint swelling or tenderness to palpation  PSYCH: A+O x2    LABS:                        7.4    2.33  )-----------( 81       ( 15 Nov 2019 08:40 )             26.2     11-15    139  |  98  |  15  ----------------------------<  106<H>  4.1   |  33<H>  |  1.07    Ca    8.6      15 Nov 2019 04:46  Phos  3.3     11-15  Mg     1.6     11-15    TPro  7.8  /  Alb  2.7<L>  /  TBili  1.5<H>  /  DBili  x   /  AST  28  /  ALT  18  /  AlkPhos  112  11-15    PT/INR - ( 15 Nov 2019 08:38 )   PT: 21.2 sec;   INR: 1.81 ratio         PTT - ( 15 Nov 2019 08:38 )  PTT:35.2 sec          Culture - Body Fluid with Gram Stain (collected 15 Nov 2019 16:18)  Source: .Body Fluid Pleural Fluid  Gram Stain (15 Nov 2019 22:31):    polymorphonuclear leukocytes seen    No organisms seen    by cytocentrifuge    Culture - Acid Fast - Sputum w/Smear (collected 15 Nov 2019 01:59)  Source: .Sputum Sputum        RADIOLOGY & ADDITIONAL TESTS:  Results Reviewed:   Imaging Personally Reviewed:  Electrocardiogram Personally Reviewed:    COORDINATION OF CARE:  Care Discussed with Consultants/Other Providers [Y/N]:  Prior or Outpatient Records Reviewed [Y/N]:

## 2019-11-16 NOTE — PROGRESS NOTE ADULT - PROBLEM SELECTOR PLAN 2
- Bilateral lung effusion, R>L on CT (11/9)  - h/o R chest tube in recent hospitalization (Oct 2019) with negative cultures  - DDx Parapneumonic effusion vs. empyema vs. hepatic hydrothorax vs. TB  - Strict I/O, daily weights. Lasix returned to home dose 40mg PO daily  - CT surgery consulted 11/13: no indication for VATS at this time  - Thoracentesis 11/15: meets Lights criteria (exudative effusion), cytology pending
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
## 2019-11-16 NOTE — PROGRESS NOTE ADULT - ASSESSMENT
59 M with HLD, CHF (EF 65% Oct 2019), HLD, cirrhosis (2/2 alcohol abuse), and recent admission (Oct 2019) for R pleural effusion s/p chest tube, fluid was exudative but negative cytology and cultures, now again presented 11/8 with fever and chills, SOB and stated that has had 3 years of cough which is getting more productive with brown sputum.   initially T: 100.3, WBC: 2-4 not neutropenic   u/a positive and urine cx >3 organisms  chest/abd CT: mod R pleural effusion, small L, no consolidation, splenomegaly, cirrhosis and small ascites   pt was started on zosyn but the blood cx came back with micrococcus luteus and pt was started on vanco 11/11 now off antibiotics   pt spiked again on 11/12 to 101.1   HIV negative    3 years of cough with recurrent R pleural effusion, last tap 10/2019 exudative with negative cx and cytology, will have to r/o TB vs malignancy  pt also cirrhotic with pancytopenia, although hepatic hydrothorax possible but pt has fever with exudative effusion and chronic cough  micrococcus luteus 1/4 in blood likely contamination, repeat negative    * airborne isolation  * f/u sputum AFB x 3 q 8, one early morning->2/3 sent thus far  * thoracic surgery did not recommend pleural biopsy and as per pulm no window for tap, s/p thoracentesis  * s/p thoracentesis f/u the pleural effusion cx, AFB and ADA  * QuantiFeron indeterminate  * if 3 AFB smears are negative, DC isolation  * s/p 7 days of vanco and zosyn, now off antibiotics 59 M with HLD, CHF (EF 65% Oct 2019), HLD, cirrhosis (2/2 alcohol abuse), and recent admission (Oct 2019) for R pleural effusion s/p chest tube, fluid was exudative but negative cytology and cultures, now again presented 11/8 with fever and chills, SOB and stated that has had 3 years of cough which is getting more productive with brown sputum.   initially T: 100.3, WBC: 2-4 not neutropenic   u/a positive and urine cx >3 organisms  chest/abd CT: mod R pleural effusion, small L, no consolidation, splenomegaly, cirrhosis and small ascites   pt was started on zosyn but the blood cx came back with micrococcus luteus and pt was started on vanco 11/11 now off antibiotics   pt spiked again on 11/12 to 101.1   HIV negative    3 years of cough with recurrent R pleural effusion, last tap 10/2019 exudative with negative cx and cytology, will have to r/o TB vs malignancy  pt also cirrhotic with pancytopenia, although hepatic hydrothorax possible but pt has fever with exudative effusion and chronic cough  micrococcus luteus 1/4 in blood likely contamination, repeat negative      * airborne isolation  * f/u sputum AFB x 3 q 8, one early morning->2/3 sent thus far, needs one more specimen.   * thoracic surgery did not recommend pleural biopsy, s/p thoracentesis by IR, fluid exudative  * s/p thoracentesis f/u the pleural effusion cx, AFB and ADA and pathology   * QuantiFeron indeterminate  * if 3 AFB smears are negative, DC isolation  * s/p 7 days of vanco and zosyn, now off antibiotics

## 2019-11-17 LAB
ADENOSINE DEAMINASE PLR-CCNC: <1.6 U/L — SIGNIFICANT CHANGE UP (ref 0–9.4)
ALBUMIN SERPL ELPH-MCNC: 2.7 G/DL — LOW (ref 3.3–5)
ALP SERPL-CCNC: 109 U/L — SIGNIFICANT CHANGE UP (ref 40–120)
ALT FLD-CCNC: 19 U/L — SIGNIFICANT CHANGE UP (ref 10–45)
ANION GAP SERPL CALC-SCNC: 8 MMOL/L — SIGNIFICANT CHANGE UP (ref 5–17)
APTT BLD: 41.9 SEC — HIGH (ref 27.5–36.3)
AST SERPL-CCNC: 28 U/L — SIGNIFICANT CHANGE UP (ref 10–40)
BILIRUB SERPL-MCNC: 1.5 MG/DL — HIGH (ref 0.2–1.2)
BLD GP AB SCN SERPL QL: NEGATIVE — SIGNIFICANT CHANGE UP
BUN SERPL-MCNC: 13 MG/DL — SIGNIFICANT CHANGE UP (ref 7–23)
CALCIUM SERPL-MCNC: 8.8 MG/DL — SIGNIFICANT CHANGE UP (ref 8.4–10.5)
CHLORIDE SERPL-SCNC: 98 MMOL/L — SIGNIFICANT CHANGE UP (ref 96–108)
CO2 SERPL-SCNC: 33 MMOL/L — HIGH (ref 22–31)
CREAT SERPL-MCNC: 0.98 MG/DL — SIGNIFICANT CHANGE UP (ref 0.5–1.3)
CULTURE RESULTS: SIGNIFICANT CHANGE UP
GLUCOSE SERPL-MCNC: 95 MG/DL — SIGNIFICANT CHANGE UP (ref 70–99)
HCT VFR BLD CALC: 24.3 % — LOW (ref 39–50)
HGB BLD-MCNC: 6.8 G/DL — CRITICAL LOW (ref 13–17)
INR BLD: 1.82 RATIO — HIGH (ref 0.88–1.16)
MAGNESIUM SERPL-MCNC: 1.6 MG/DL — SIGNIFICANT CHANGE UP (ref 1.6–2.6)
MCHC RBC-ENTMCNC: 23.5 PG — LOW (ref 27–34)
MCHC RBC-ENTMCNC: 28 GM/DL — LOW (ref 32–36)
MCV RBC AUTO: 84.1 FL — SIGNIFICANT CHANGE UP (ref 80–100)
PHOSPHATE SERPL-MCNC: 2.3 MG/DL — LOW (ref 2.5–4.5)
PLATELET # BLD AUTO: 77 K/UL — LOW (ref 150–400)
POTASSIUM SERPL-MCNC: 4 MMOL/L — SIGNIFICANT CHANGE UP (ref 3.5–5.3)
POTASSIUM SERPL-SCNC: 4 MMOL/L — SIGNIFICANT CHANGE UP (ref 3.5–5.3)
PROT SERPL-MCNC: 7.3 G/DL — SIGNIFICANT CHANGE UP (ref 6–8.3)
PROTHROM AB SERPL-ACNC: 21.3 SEC — HIGH (ref 10–13.1)
RBC # BLD: 2.89 M/UL — LOW (ref 4.2–5.8)
RBC # FLD: 21.1 % — HIGH (ref 10.3–14.5)
RH IG SCN BLD-IMP: NEGATIVE — SIGNIFICANT CHANGE UP
SODIUM SERPL-SCNC: 139 MMOL/L — SIGNIFICANT CHANGE UP (ref 135–145)
SPECIMEN SOURCE: SIGNIFICANT CHANGE UP
WBC # BLD: 2.4 K/UL — LOW (ref 3.8–10.5)
WBC # FLD AUTO: 2.4 K/UL — LOW (ref 3.8–10.5)

## 2019-11-17 PROCEDURE — 99233 SBSQ HOSP IP/OBS HIGH 50: CPT | Mod: GC

## 2019-11-17 RX ORDER — HEPARIN SODIUM 5000 [USP'U]/ML
5000 INJECTION INTRAVENOUS; SUBCUTANEOUS EVERY 12 HOURS
Refills: 0 | Status: DISCONTINUED | OUTPATIENT
Start: 2019-11-17 | End: 2019-11-22

## 2019-11-17 RX ORDER — METOCLOPRAMIDE HCL 10 MG
10 TABLET ORAL ONCE
Refills: 0 | Status: DISCONTINUED | OUTPATIENT
Start: 2019-11-17 | End: 2019-11-22

## 2019-11-17 RX ADMIN — ZOLPIDEM TARTRATE 5 MILLIGRAM(S): 10 TABLET ORAL at 22:38

## 2019-11-17 RX ADMIN — LACTULOSE 30 GRAM(S): 10 SOLUTION ORAL at 17:22

## 2019-11-17 RX ADMIN — LACTULOSE 30 GRAM(S): 10 SOLUTION ORAL at 12:44

## 2019-11-17 RX ADMIN — Medication 1 SPRAY(S): at 06:17

## 2019-11-17 RX ADMIN — Medication 1 TABLET(S): at 12:44

## 2019-11-17 RX ADMIN — SODIUM CHLORIDE 5 MILLILITER(S): 9 INJECTION INTRAMUSCULAR; INTRAVENOUS; SUBCUTANEOUS at 06:18

## 2019-11-17 RX ADMIN — Medication 100 MILLIGRAM(S): at 17:22

## 2019-11-17 RX ADMIN — Medication 325 MILLIGRAM(S): at 12:44

## 2019-11-17 RX ADMIN — PANTOPRAZOLE SODIUM 40 MILLIGRAM(S): 20 TABLET, DELAYED RELEASE ORAL at 06:17

## 2019-11-17 RX ADMIN — SPIRONOLACTONE 100 MILLIGRAM(S): 25 TABLET, FILM COATED ORAL at 06:17

## 2019-11-17 RX ADMIN — Medication 1 SPRAY(S): at 17:22

## 2019-11-17 RX ADMIN — Medication 40 MILLIGRAM(S): at 06:17

## 2019-11-17 RX ADMIN — Medication 325 MILLIGRAM(S): at 13:16

## 2019-11-17 RX ADMIN — LACTULOSE 30 GRAM(S): 10 SOLUTION ORAL at 06:16

## 2019-11-17 RX ADMIN — ATORVASTATIN CALCIUM 20 MILLIGRAM(S): 80 TABLET, FILM COATED ORAL at 22:38

## 2019-11-17 RX ADMIN — SODIUM CHLORIDE 5 MILLILITER(S): 9 INJECTION INTRAMUSCULAR; INTRAVENOUS; SUBCUTANEOUS at 12:45

## 2019-11-17 RX ADMIN — Medication 4 GRAM(S): at 22:38

## 2019-11-17 RX ADMIN — LACTULOSE 30 GRAM(S): 10 SOLUTION ORAL at 22:38

## 2019-11-17 NOTE — PROGRESS NOTE ADULT - PROBLEM SELECTOR PLAN 4
- AMS (AOx2-oriented to person, place; multiple episodes of urinary incontinence)  - Patient reporting fevers of 102 at home  - Likely due to bacterial PNA vs. UTI   - +BCx 11/9 w/ 1/4 aerobic bottles growing gram+ organisms in clusters. +UA, UCx contaminated  - ID consulted: concern for TB. currently on airborne isolation, pending sputum AFB and thoracentesis fluid workup. HIV nonreactive. If AFB - x 3 can d/c isolation  - Last fever 101.2 on Nov 12  - Repeat BCx Nov 11/12: NGTD  - s/p 7 days Vanc & Zosyn (11/9-11/15), d/c'ed

## 2019-11-17 NOTE — PROGRESS NOTE ADULT - SUBJECTIVE AND OBJECTIVE BOX
Demario Art, PGY1  Internal Medicine Resident  Pager: [phone] ([facility]), [phone] ([facility])    Patient is a 59y old  Male who presents with a chief complaint of Fever/shaking chills x1 day, (16 Nov 2019 12:00)    SUBJECTIVE / OVERNIGHT EVENTS:  No overnight events per Night Float.  At bedside, patient said he has not slept well.  Has been tolerating food and water.  Last bowel movement was 1x regular yesterday.     ADDITIONAL REVIEW OF SYSTEMS:  Patient has SOB.  denies fevers/chills, chest pain, palpitations, SOB, cough/wheezing, abdominal pain, nausea, vomiting, diarrhea, or constipation.     MEDICATIONS  (STANDING):  atorvastatin 20 milliGRAM(s) Oral at bedtime  fluticasone propionate 50 MICROgram(s)/spray Nasal Spray 1 Spray(s) Both Nostrils two times a day  furosemide    Tablet 40 milliGRAM(s) Oral daily  heparin  Injectable 5000 Unit(s) SubCutaneous every 12 hours  influenza   Vaccine 0.5 milliLiter(s) IntraMuscular once  lactobacillus acidophilus 1 Tablet(s) Oral daily  lactulose Syrup 30 Gram(s) Oral every 6 hours  metoclopramide Injectable 10 milliGRAM(s) IV Push once  omega-3-Acid Ethyl Esters 4 Gram(s) Oral daily  pantoprazole    Tablet 40 milliGRAM(s) Oral before breakfast  polyethylene glycol 3350 17 Gram(s) Oral once  rifAXIMin 550 milliGRAM(s) Oral two times a day  sodium chloride 7% Inhalation 5 milliLiter(s) Inhalation three times a day  spironolactone 100 milliGRAM(s) Oral daily    MEDICATIONS  (PRN):  acetaminophen   Tablet .. 325 milliGRAM(s) Oral every 4 hours PRN Temp greater or equal to 38C (100.4F), Mild Pain (1 - 3), Moderate Pain (4 - 6)  benzonatate 100 milliGRAM(s) Oral three times a day PRN Cough  zolpidem 5 milliGRAM(s) Oral at bedtime PRN Insomnia      CAPILLARY BLOOD GLUCOSE        I&O's Summary    16 Nov 2019 07:01  -  17 Nov 2019 07:00  --------------------------------------------------------  IN: 950 mL / OUT: 0 mL / NET: 950 mL        PHYSICAL EXAM:  Vital Signs Last 24 Hrs  T(C): 36.9 (17 Nov 2019 04:44), Max: 36.9 (16 Nov 2019 11:58)  T(F): 98.4 (17 Nov 2019 04:44), Max: 98.4 (16 Nov 2019 11:58)  HR: 84 (17 Nov 2019 04:44) (77 - 92)  BP: 113/60 (17 Nov 2019 04:44) (113/60 - 133/75)  BP(mean): --  RR: 21 (17 Nov 2019 09:46) (18 - 21)  SpO2: 93% (17 Nov 2019 09:46) (93% - 98%)    CONSTITUTIONAL: No apparent distress. Awake and conversive, cooperative with exam  EYES: PERRLA, EOMI, conjunctiva and sclera clear  ENMT: Moist oral mucosa, no pharyngeal injection or exudates; normal dentition  NECK: Supple, no palpable masses; no JVD  RESPIRATORY: Normal respiratory effort, clear to auscultation bilaterally, no wheezes, rales, or rhonchi  CARDIOVASCULAR: Regular rate and rhythm, normal S1 and S2, no murmur/rub/gallop; distal pulses 2+ bilaterally  ABDOMEN: Soft, nontender to palpation, normoactive bowel sounds, no rebound/guarding  MUSCULOSKELETAL:  No LE edema; extremities WWP, no clubbing or cyanosis of digits; no joint swelling or tenderness  PSYCH: A+O to person, place, and time; affect appropriate  NEUROLOGY: CN II-XII grossly intact; no focal sensory or motor deficits; moving all extremities spontaneously  SKIN: No rashes; no palpable lesions    LABS:                        6.8    2.40  )-----------( 77       ( 17 Nov 2019 09:35 )             24.3     11-17    139  |  98  |  13  ----------------------------<  95  4.0   |  33<H>  |  0.98    Ca    8.8      17 Nov 2019 07:11  Phos  2.3     11-17  Mg     1.6     11-17    TPro  7.3  /  Alb  2.7<L>  /  TBili  1.5<H>  /  DBili  x   /  AST  28  /  ALT  19  /  AlkPhos  109  11-17    PT/INR - ( 17 Nov 2019 08:57 )   PT: 21.3 sec;   INR: 1.82 ratio         PTT - ( 17 Nov 2019 08:57 )  PTT:41.9 sec          Culture - Acid Fast - Sputum w/Smear (collected 16 Nov 2019 07:15)  Source: .Sputum Sputum    Culture - Acid Fast (collected 15 Nov 2019 21:53)    Culture - Body Fluid with Gram Stain (collected 15 Nov 2019 16:18)  Source: .Body Fluid Pleural Fluid  Gram Stain (15 Nov 2019 22:31):    polymorphonuclear leukocytes seen    No organisms seen    by cytocentrifuge  Preliminary Report (16 Nov 2019 10:54):    No growth    Culture - Acid Fast - Sputum w/Smear (collected 15 Nov 2019 01:59)  Source: .Sputum Sputum  Preliminary Report (16 Nov 2019 15:03):    Culture is being performed.        RADIOLOGY & ADDITIONAL TESTS:  Results Reviewed:   Imaging Personally Reviewed:  Electrocardiogram Personally Reviewed:    COORDINATION OF CARE:  Care Discussed with Consultants/Other Providers [Y/N]:  Prior or Outpatient Records Reviewed [Y/N]: Deamrio Art, PGY1  Internal Medicine Resident  Pager: [phone] ([facility]), [phone] ([facility])    Patient is a 59y old  Male who presents with a chief complaint of Fever/shaking chills x1 day, (16 Nov 2019 12:00)    SUBJECTIVE / OVERNIGHT EVENTS:  No overnight events per Night Float.  At bedside, patient said he has not slept well.  Has been tolerating food and water.  Last bowel movement was 1x regular yesterday.  Nasal cannula had slipped off; SaO2 was measured at 87%; was put back on 5L NC.    ADDITIONAL REVIEW OF SYSTEMS:  Patient has SOB.  Otherwise denies fevers/chills, chest pain, palpitations, SOB, cough/wheezing, abdominal pain, nausea, vomiting, diarrhea, or constipation.     MEDICATIONS  (STANDING):  atorvastatin 20 milliGRAM(s) Oral at bedtime  fluticasone propionate 50 MICROgram(s)/spray Nasal Spray 1 Spray(s) Both Nostrils two times a day  furosemide    Tablet 40 milliGRAM(s) Oral daily  heparin  Injectable 5000 Unit(s) SubCutaneous every 12 hours  influenza   Vaccine 0.5 milliLiter(s) IntraMuscular once  lactobacillus acidophilus 1 Tablet(s) Oral daily  lactulose Syrup 30 Gram(s) Oral every 6 hours  metoclopramide Injectable 10 milliGRAM(s) IV Push once  omega-3-Acid Ethyl Esters 4 Gram(s) Oral daily  pantoprazole    Tablet 40 milliGRAM(s) Oral before breakfast  polyethylene glycol 3350 17 Gram(s) Oral once  rifAXIMin 550 milliGRAM(s) Oral two times a day  sodium chloride 7% Inhalation 5 milliLiter(s) Inhalation three times a day  spironolactone 100 milliGRAM(s) Oral daily    MEDICATIONS  (PRN):  acetaminophen   Tablet .. 325 milliGRAM(s) Oral every 4 hours PRN Temp greater or equal to 38C (100.4F), Mild Pain (1 - 3), Moderate Pain (4 - 6)  benzonatate 100 milliGRAM(s) Oral three times a day PRN Cough  zolpidem 5 milliGRAM(s) Oral at bedtime PRN Insomnia      CAPILLARY BLOOD GLUCOSE        I&O's Summary    16 Nov 2019 07:01  -  17 Nov 2019 07:00  --------------------------------------------------------  IN: 950 mL / OUT: 0 mL / NET: 950 mL        PHYSICAL EXAM:  Vital Signs Last 24 Hrs  T(C): 36.9 (17 Nov 2019 04:44), Max: 36.9 (16 Nov 2019 11:58)  T(F): 98.4 (17 Nov 2019 04:44), Max: 98.4 (16 Nov 2019 11:58)  HR: 84 (17 Nov 2019 04:44) (77 - 92)  BP: 113/60 (17 Nov 2019 04:44) (113/60 - 133/75)  BP(mean): --  RR: 21 (17 Nov 2019 09:46) (18 - 21)  SpO2: 93% (17 Nov 2019 09:46) (93% - 98%)    CONSTITUTIONAL: Uncomfortable, no apparent distress. Sleepy but arousable and conversive, cooperative with exam  EYES: PERRLA, EOMI, conjunctiva and sclera clear  ENMT: Moist oral mucosa, no pharyngeal injection or exudates; normal dentition  NECK: Supple, no palpable masses; no JVD  RESPIRATORY: Normal respiratory effort, coarse breath sounds R side, no wheezes, rales, or rhonchi  CARDIOVASCULAR: Regular rate and rhythm, normal S1 and S2, no murmur/rub/gallop; distal pulses 2+ bilaterally  ABDOMEN: Obese, distended, firm, nontender to palpation, normoactive bowel sounds, no rebound/guarding  MUSCULOSKELETAL:  2+ pitting edema bilateral LE to upper shins; extremities WWP, no clubbing or cyanosis of digits; no joint swelling or tenderness  PSYCH: A+Ox2 (person, place); affect appropriate  NEUROLOGY: CN II-XII grossly intact; no focal sensory or motor deficits; moving all extremities spontaneously  SKIN: No rashes; no palpable lesions    LABS:                        6.8    2.40  )-----------( 77       ( 17 Nov 2019 09:35 )             24.3     11-17    139  |  98  |  13  ----------------------------<  95  4.0   |  33<H>  |  0.98    Ca    8.8      17 Nov 2019 07:11  Phos  2.3     11-17  Mg     1.6     11-17    TPro  7.3  /  Alb  2.7<L>  /  TBili  1.5<H>  /  DBili  x   /  AST  28  /  ALT  19  /  AlkPhos  109  11-17    PT/INR - ( 17 Nov 2019 08:57 )   PT: 21.3 sec;   INR: 1.82 ratio         PTT - ( 17 Nov 2019 08:57 )  PTT:41.9 sec          Culture - Acid Fast - Sputum w/Smear (collected 16 Nov 2019 07:15)  Source: .Sputum Sputum    Culture - Acid Fast (collected 15 Nov 2019 21:53)    Culture - Body Fluid with Gram Stain (collected 15 Nov 2019 16:18)  Source: .Body Fluid Pleural Fluid  Gram Stain (15 Nov 2019 22:31):    polymorphonuclear leukocytes seen    No organisms seen    by cytocentrifuge  Preliminary Report (16 Nov 2019 10:54):    No growth    Culture - Acid Fast - Sputum w/Smear (collected 15 Nov 2019 01:59)  Source: .Sputum Sputum  Preliminary Report (16 Nov 2019 15:03):    Culture is being performed.        RADIOLOGY & ADDITIONAL TESTS:  Results Reviewed:   Imaging Personally Reviewed:  Electrocardiogram Personally Reviewed:    COORDINATION OF CARE:  Care Discussed with Consultants/Other Providers [Y/N]:  Prior or Outpatient Records Reviewed [Y/N]:

## 2019-11-17 NOTE — PROGRESS NOTE ADULT - PROBLEM SELECTOR PLAN 3
- Hx of decompensated cirrhosis  - Likely 2/2 alcoholism and BOB  - Trend MELD-Na  - Hepatology following  - c/w Lactulose and Rifaximin for PSE  - Abdominal US 11/14: moderate volume ascites (increased since admission): per IR, no safe window for paracentesis - Hx of decompensated cirrhosis likely 2/2 alcoholism and BOB  - Trend MELD-Na  - Hepatology following  - c/w Lactulose and Rifaximin for PSE  - Abdominal US 11/14: moderate volume ascites (increased since admission): per IR, no safe window for paracentesis

## 2019-11-17 NOTE — PROGRESS NOTE ADULT - PROBLEM SELECTOR PLAN 2
- Bilateral lung effusion, R>L on CT (11/9)  - h/o R chest tube in recent hospitalization (Oct 2019) with negative cultures  - DDx Parapneumonic effusion vs. empyema vs. hepatic hydrothorax vs. TB  - Strict I/O, daily weights. Lasix returned to home dose 40mg PO daily  - CT surgery consulted 11/13: no indication for VATS at this time  - Thoracentesis 11/15: meets Lights criteria (exudative effusion), cytology pending - Bilateral lung effusion, R>L on CT (11/9)  - h/o R chest tube in recent hospitalization (Oct 2019) with negative cultures  - DDx Parapneumonic effusion vs. empyema vs. hepatic hydrothorax vs. TB  - Strict I/O, daily weights. Lasix returned to home dose 40mg PO daily  - CT surgery consulted 11/13: no indication for VATS at this time  - Thoracentesis 11/15: meets Lights criteria (exudative effusion), cytology pending  - TB workup: 2 sputum neg AFB (need 1 more), f/u ADA pleural fluid
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
## 2019-11-17 NOTE — PROGRESS NOTE ADULT - PROBLEM SELECTOR PLAN 1
- Likely multifactorial 2/2 pleural effusions, bacterial pneumonia and restrictive lung disease 2/2 obesity  - Currently on 4L NC with Sat in Mid 90s  - Lasix 40mg PO daily - Likely multifactorial 2/2 pleural effusions, bacterial pneumonia and restrictive lung disease 2/2 obesity  - Currently on 5L NC with Sat in Mid 90s  - Lasix 40mg PO daily

## 2019-11-18 LAB
ALBUMIN SERPL ELPH-MCNC: 2.9 G/DL — LOW (ref 3.3–5)
ALP SERPL-CCNC: 118 U/L — SIGNIFICANT CHANGE UP (ref 40–120)
ALT FLD-CCNC: 20 U/L — SIGNIFICANT CHANGE UP (ref 10–45)
ANION GAP SERPL CALC-SCNC: 7 MMOL/L — SIGNIFICANT CHANGE UP (ref 5–17)
APTT BLD: 43 SEC — HIGH (ref 27.5–36.3)
AST SERPL-CCNC: 31 U/L — SIGNIFICANT CHANGE UP (ref 10–40)
BILIRUB SERPL-MCNC: 2.3 MG/DL — HIGH (ref 0.2–1.2)
BUN SERPL-MCNC: 12 MG/DL — SIGNIFICANT CHANGE UP (ref 7–23)
CALCIUM SERPL-MCNC: 9.1 MG/DL — SIGNIFICANT CHANGE UP (ref 8.4–10.5)
CHLORIDE SERPL-SCNC: 98 MMOL/L — SIGNIFICANT CHANGE UP (ref 96–108)
CO2 SERPL-SCNC: 33 MMOL/L — HIGH (ref 22–31)
CREAT SERPL-MCNC: 0.93 MG/DL — SIGNIFICANT CHANGE UP (ref 0.5–1.3)
GLUCOSE SERPL-MCNC: 103 MG/DL — HIGH (ref 70–99)
HCT VFR BLD CALC: 29.8 % — LOW (ref 39–50)
HGB BLD-MCNC: 8.3 G/DL — LOW (ref 13–17)
INR BLD: 1.81 RATIO — HIGH (ref 0.88–1.16)
MAGNESIUM SERPL-MCNC: 1.8 MG/DL — SIGNIFICANT CHANGE UP (ref 1.6–2.6)
MCHC RBC-ENTMCNC: 23.3 PG — LOW (ref 27–34)
MCHC RBC-ENTMCNC: 27.9 GM/DL — LOW (ref 32–36)
MCV RBC AUTO: 83.7 FL — SIGNIFICANT CHANGE UP (ref 80–100)
NIGHT BLUE STAIN TISS: SIGNIFICANT CHANGE UP
NON-GYNECOLOGICAL CYTOLOGY STUDY: SIGNIFICANT CHANGE UP
PHOSPHATE SERPL-MCNC: 2.9 MG/DL — SIGNIFICANT CHANGE UP (ref 2.5–4.5)
PLATELET # BLD AUTO: 84 K/UL — LOW (ref 150–400)
POTASSIUM SERPL-MCNC: 4.8 MMOL/L — SIGNIFICANT CHANGE UP (ref 3.5–5.3)
POTASSIUM SERPL-SCNC: 4.8 MMOL/L — SIGNIFICANT CHANGE UP (ref 3.5–5.3)
PROT SERPL-MCNC: 7.8 G/DL — SIGNIFICANT CHANGE UP (ref 6–8.3)
PROTHROM AB SERPL-ACNC: 20.8 SEC — HIGH (ref 10–13.1)
RBC # BLD: 3.56 M/UL — LOW (ref 4.2–5.8)
RBC # FLD: 20.8 % — HIGH (ref 10.3–14.5)
SODIUM SERPL-SCNC: 138 MMOL/L — SIGNIFICANT CHANGE UP (ref 135–145)
SPECIMEN SOURCE: SIGNIFICANT CHANGE UP
WBC # BLD: 3.36 K/UL — LOW (ref 3.8–10.5)
WBC # FLD AUTO: 3.36 K/UL — LOW (ref 3.8–10.5)

## 2019-11-18 PROCEDURE — 99233 SBSQ HOSP IP/OBS HIGH 50: CPT

## 2019-11-18 PROCEDURE — 99232 SBSQ HOSP IP/OBS MODERATE 35: CPT | Mod: GC

## 2019-11-18 PROCEDURE — 76705 ECHO EXAM OF ABDOMEN: CPT | Mod: 26

## 2019-11-18 PROCEDURE — 99233 SBSQ HOSP IP/OBS HIGH 50: CPT | Mod: GC

## 2019-11-18 RX ADMIN — SPIRONOLACTONE 100 MILLIGRAM(S): 25 TABLET, FILM COATED ORAL at 06:53

## 2019-11-18 RX ADMIN — HEPARIN SODIUM 5000 UNIT(S): 5000 INJECTION INTRAVENOUS; SUBCUTANEOUS at 06:52

## 2019-11-18 RX ADMIN — ZOLPIDEM TARTRATE 5 MILLIGRAM(S): 10 TABLET ORAL at 22:37

## 2019-11-18 RX ADMIN — Medication 100 MILLIGRAM(S): at 22:14

## 2019-11-18 RX ADMIN — Medication 1 SPRAY(S): at 18:10

## 2019-11-18 RX ADMIN — ATORVASTATIN CALCIUM 20 MILLIGRAM(S): 80 TABLET, FILM COATED ORAL at 22:14

## 2019-11-18 RX ADMIN — Medication 325 MILLIGRAM(S): at 23:50

## 2019-11-18 RX ADMIN — PANTOPRAZOLE SODIUM 40 MILLIGRAM(S): 20 TABLET, DELAYED RELEASE ORAL at 06:53

## 2019-11-18 RX ADMIN — Medication 4 GRAM(S): at 13:35

## 2019-11-18 RX ADMIN — Medication 1 SPRAY(S): at 06:53

## 2019-11-18 RX ADMIN — SODIUM CHLORIDE 5 MILLILITER(S): 9 INJECTION INTRAMUSCULAR; INTRAVENOUS; SUBCUTANEOUS at 06:52

## 2019-11-18 RX ADMIN — SODIUM CHLORIDE 5 MILLILITER(S): 9 INJECTION INTRAMUSCULAR; INTRAVENOUS; SUBCUTANEOUS at 22:16

## 2019-11-18 RX ADMIN — LACTULOSE 30 GRAM(S): 10 SOLUTION ORAL at 06:53

## 2019-11-18 RX ADMIN — SODIUM CHLORIDE 5 MILLILITER(S): 9 INJECTION INTRAMUSCULAR; INTRAVENOUS; SUBCUTANEOUS at 13:33

## 2019-11-18 RX ADMIN — Medication 40 MILLIGRAM(S): at 06:53

## 2019-11-18 RX ADMIN — LACTULOSE 30 GRAM(S): 10 SOLUTION ORAL at 18:03

## 2019-11-18 RX ADMIN — Medication 325 MILLIGRAM(S): at 00:50

## 2019-11-18 RX ADMIN — LACTULOSE 30 GRAM(S): 10 SOLUTION ORAL at 23:50

## 2019-11-18 RX ADMIN — Medication 1 TABLET(S): at 13:33

## 2019-11-18 RX ADMIN — HEPARIN SODIUM 5000 UNIT(S): 5000 INJECTION INTRAVENOUS; SUBCUTANEOUS at 18:09

## 2019-11-18 RX ADMIN — LACTULOSE 30 GRAM(S): 10 SOLUTION ORAL at 18:09
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
## 2019-11-18 NOTE — PROGRESS NOTE ADULT - SUBJECTIVE AND OBJECTIVE BOX
Interval Events:   Afebrile   Remains hypoxic with SaO2 was measured at 87%; was put back on 5L NC.  No abdominal pain. No nausea, vomiting, diarrhea     Hospital Medications:  acetaminophen   Tablet .. 325 milliGRAM(s) Oral every 4 hours PRN  atorvastatin 20 milliGRAM(s) Oral at bedtime  benzonatate 100 milliGRAM(s) Oral three times a day PRN  fluticasone propionate 50 MICROgram(s)/spray Nasal Spray 1 Spray(s) Both Nostrils two times a day  furosemide    Tablet 40 milliGRAM(s) Oral daily  heparin  Injectable 5000 Unit(s) SubCutaneous every 12 hours  influenza   Vaccine 0.5 milliLiter(s) IntraMuscular once  lactobacillus acidophilus 1 Tablet(s) Oral daily  lactulose Syrup 30 Gram(s) Oral every 6 hours  metoclopramide Injectable 10 milliGRAM(s) IV Push once  omega-3-Acid Ethyl Esters 4 Gram(s) Oral daily  pantoprazole    Tablet 40 milliGRAM(s) Oral before breakfast  polyethylene glycol 3350 17 Gram(s) Oral once  rifAXIMin 550 milliGRAM(s) Oral two times a day  sodium chloride 7% Inhalation 5 milliLiter(s) Inhalation three times a day  spironolactone 100 milliGRAM(s) Oral daily  zolpidem 5 milliGRAM(s) Oral at bedtime PRN        ROS:   General:  No fevers, chills or night sweats  ENT:  No sore throat or dysphagia  CV:  No pain or palpitations  Resp:  + dyspnea and cough   GI:  as above  Skin:  No rash or edema  Neuro: no weakness   Hematologic: no bleeding  Musculoskeletal: no muscle pain or join pain  Psych: no agitation      PHYSICAL EXAM:   Vital Signs:  Vital Signs Last 24 Hrs  T(C): 37.1 (18 Nov 2019 08:16), Max: 37.2 (18 Nov 2019 00:08)  T(F): 98.8 (18 Nov 2019 08:16), Max: 98.9 (18 Nov 2019 00:08)  HR: 92 (18 Nov 2019 08:16) (75 - 92)  BP: 129/71 (18 Nov 2019 08:16) (97/58 - 129/71)  BP(mean): --  RR: 18 (18 Nov 2019 08:16) (18 - 22)  SpO2: 95% (18 Nov 2019 08:16) (91% - 95%)  Daily     Daily     GENERAL: not in acute distress, short neck, obese BMI: 43  HEENT:  NC/AT,  conjunctivae clear and pink, sclera -anicteric  CHEST:  decreased breath sounds b/l bases, (+) bibasilar rales appreciated R>L,  HEART:  RRR S1/S2  ABDOMEN:  Soft, non-tender, distended, obese, normoactive bowel sounds,  no masses   EXTREMITIES: + Edema  SKIN:  Warm & Dry. No rash or erythema  NEURO:  Alert, oriented    LABS:                        8.3    3.36  )-----------( 84       ( 18 Nov 2019 09:49 )             29.8     Mean Cell Volume: 83.7 fl (11-18-19 @ 09:49)    11-18    138  |  98  |  12  ----------------------------<  103<H>  4.8   |  33<H>  |  0.93    Ca    9.1      18 Nov 2019 07:19  Phos  2.9     11-18  Mg     1.8     11-18    TPro  7.8  /  Alb  2.9<L>  /  TBili  2.3<H>  /  DBili  x   /  AST  31  /  ALT  20  /  AlkPhos  118  11-18    LIVER FUNCTIONS - ( 18 Nov 2019 07:19 )  Alb: 2.9 g/dL / Pro: 7.8 g/dL / ALK PHOS: 118 U/L / ALT: 20 U/L / AST: 31 U/L / GGT: x           PT/INR - ( 18 Nov 2019 10:21 )   PT: 20.8 sec;   INR: 1.81 ratio         PTT - ( 18 Nov 2019 10:21 )  PTT:43.0 sec                            8.3    3.36  )-----------( 84       ( 18 Nov 2019 09:49 )             29.8                         6.8    2.40  )-----------( 77       ( 17 Nov 2019 09:35 )             24.3                         7.4    2.64  )-----------( 85       ( 16 Nov 2019 15:29 )             26.5       Imaging:

## 2019-11-18 NOTE — PROGRESS NOTE ADULT - PROBLEM SELECTOR PLAN 3
- Hx of decompensated cirrhosis likely 2/2 alcoholism and BOB  - Trend MELD-Na  - Hepatology following  - c/w Lactulose and Rifaximin for PSE  - Abdominal US 11/14: moderate volume ascites (increased since admission): per IR, no safe window for paracentesis. Re-ordered 11/18

## 2019-11-18 NOTE — PROGRESS NOTE ADULT - PROBLEM SELECTOR PLAN 4
- AMS (AOx2-oriented to person, place; multiple episodes of urinary incontinence)  - Patient reporting fevers of 102 at home  - Likely due to bacterial PNA vs. UTI   - +BCx 11/9 w/ 1/4 aerobic bottles growing gram+ organisms in clusters. +UA, UCx contaminated  - ID consulted: TB ruled out with negative sputum and pleural fluid. HIV nonreactive.   - Last fever 101.2 on Nov 12  - Repeat BCx Nov 11/12: NGTD  - Completed 7 days Vanc & Zosyn (11/9-11/15)

## 2019-11-18 NOTE — PROGRESS NOTE ADULT - PROBLEM SELECTOR PLAN 1
- Likely multifactorial 2/2 pleural effusions, bacterial pneumonia and restrictive lung disease 2/2 obesity  - Currently on 2L NC with Sat in Mid 90s  - Lasix 40mg PO daily

## 2019-11-18 NOTE — PROGRESS NOTE ADULT - PROBLEM SELECTOR PLAN 2
- Bilateral lung effusion, R>L on CT (11/9)  - h/o R chest tube in recent hospitalization (Oct 2019) with negative cultures  - DDx Parapneumonic effusion vs. empyema vs. hepatic hydrothorax vs. TB  - Strict I/O, daily weights. Lasix returned to home dose 40mg PO daily  - CT surgery consulted 11/13: no indication for VATS at this time  - Thoracentesis 11/15: meets Lights criteria (exudative effusion), cytology pending  - TB workup: negative sputum AFB x3, negative ADA pleural fluid - Bilateral lung effusion, R>L on CT (11/9)  - h/o R chest tube in recent hospitalization (Oct 2019) with negative cultures  - DDx Parapneumonic effusion vs. empyema vs. hepatic hydrothorax vs. TB  - Strict I/O, daily weights. Lasix returned to home dose 40mg PO daily  - CT surgery consulted 11/13: no indication for VATS at this time  - Thoracentesis 11/15: meets Lights criteria (exudative effusion), cytology negative for malignant cells  - TB workup: negative sputum AFB x3, negative ADA pleural fluid

## 2019-11-18 NOTE — PROGRESS NOTE ADULT - ASSESSMENT
59 M with HLD, CHF (EF 65% Oct 2019), HLD, cirrhosis (2/2 alcohol abuse), and recent admission (Oct 2019) for R pleural effusion s/p chest tube, fluid was exudative but negative cytology and cultures, now again presented 11/8 with fever and chills, SOB and stated that has had 3 years of cough which is getting more productive with brown sputum.   initially T: 100.3, WBC: 2-4 not neutropenic   u/a positive and urine cx >3 organisms  chest/abd CT: mod R pleural effusion, small L, no consolidation, splenomegaly, cirrhosis and small ascites   pt was started on zosyn but the blood cx came back with micrococcus luteus and pt was started on vanco 11/11  pt spiked again on 11/12 to 101.1   HIV negative    3 years of cough with recurrent R pleural effusion, last tap 10/2019 exudative with negative cx and cytology, so TB and malignancy were considered, now s/p tap again exudative but negative ADA and AFB, sputum also negative x 3, quantiferon indeterminate  pt also cirrhotic with pancytopenia, although hepatic hydrothorax possible but pt has fever with exudative effusion and chronic cough  micrococcus luteus 1/4 in blood likely contamination, repeat negative    * DC airborne isolation  * will follow the AFB cultures  * f/u with pulm and thoracic surgery regarding tap results and possible biopsy  * s/p 7 days of vanco and zosyn, now off

## 2019-11-18 NOTE — PROGRESS NOTE ADULT - SUBJECTIVE AND OBJECTIVE BOX
Demario Art, PGY1  Internal Medicine Resident  Pager: [phone] ([facility]), [phone] ([facility])    Patient is a 59y old  Male who presents with a chief complaint of Fever/shaking chills x1 day, (18 Nov 2019 12:43)    SUBJECTIVE / OVERNIGHT EVENTS:  No overnight events per Night Float.  At bedside, patient resting comfortably and slept well.  Has been tolerating food and water.  Last bowel movement was 1x diarrhea yesterday. Was not on NC this morning; I measured SaO2 at 87% - returned to 2L NC.    ADDITIONAL REVIEW OF SYSTEMS:  Patient denies fevers/chills, chest pain, palpitations, SOB, cough/wheezing, abdominal pain, nausea, vomiting, or constipation.    MEDICATIONS  (STANDING):  atorvastatin 20 milliGRAM(s) Oral at bedtime  fluticasone propionate 50 MICROgram(s)/spray Nasal Spray 1 Spray(s) Both Nostrils two times a day  furosemide    Tablet 40 milliGRAM(s) Oral daily  heparin  Injectable 5000 Unit(s) SubCutaneous every 12 hours  influenza   Vaccine 0.5 milliLiter(s) IntraMuscular once  lactobacillus acidophilus 1 Tablet(s) Oral daily  lactulose Syrup 30 Gram(s) Oral every 6 hours  metoclopramide Injectable 10 milliGRAM(s) IV Push once  omega-3-Acid Ethyl Esters 4 Gram(s) Oral daily  pantoprazole    Tablet 40 milliGRAM(s) Oral before breakfast  polyethylene glycol 3350 17 Gram(s) Oral once  rifAXIMin 550 milliGRAM(s) Oral two times a day  sodium chloride 7% Inhalation 5 milliLiter(s) Inhalation three times a day  spironolactone 100 milliGRAM(s) Oral daily    MEDICATIONS  (PRN):  acetaminophen   Tablet .. 325 milliGRAM(s) Oral every 4 hours PRN Temp greater or equal to 38C (100.4F), Mild Pain (1 - 3), Moderate Pain (4 - 6)  benzonatate 100 milliGRAM(s) Oral three times a day PRN Cough  zolpidem 5 milliGRAM(s) Oral at bedtime PRN Insomnia      CAPILLARY BLOOD GLUCOSE        I&O's Summary    17 Nov 2019 07:01  -  18 Nov 2019 07:00  --------------------------------------------------------  IN: 1100 mL / OUT: 0 mL / NET: 1100 mL    18 Nov 2019 07:01  -  18 Nov 2019 14:26  --------------------------------------------------------  IN: 260 mL / OUT: 0 mL / NET: 260 mL        PHYSICAL EXAM:  Vital Signs Last 24 Hrs  T(C): 37.1 (18 Nov 2019 08:16), Max: 37.2 (18 Nov 2019 00:08)  T(F): 98.8 (18 Nov 2019 08:16), Max: 98.9 (18 Nov 2019 00:08)  HR: 92 (18 Nov 2019 08:16) (75 - 92)  BP: 129/71 (18 Nov 2019 08:16) (102/61 - 129/71)  BP(mean): --  RR: 18 (18 Nov 2019 08:16) (18 - 22)  SpO2: 95% (18 Nov 2019 08:16) (91% - 95%)    CONSTITUTIONAL: Uncomfortable, no apparent distress. Sleepy but arousable and conversive, cooperative with exam  EYES: PERRLA, EOMI, conjunctiva and sclera clear  ENMT: Moist oral mucosa, no pharyngeal injection or exudates; normal dentition  NECK: Supple, no palpable masses; no JVD  RESPIRATORY: Normal respiratory effort, coarse breath sounds R>L, no wheezes, rales, or rhonchi  CARDIOVASCULAR: Regular rate and rhythm, normal S1 and S2, no murmur/rub/gallop; distal pulses 2+ bilaterally  ABDOMEN: Obese, distended, firm, nontender to palpation, normoactive bowel sounds, no rebound/guarding  MUSCULOSKELETAL:  Bilateral pitting edema improved to 1+ to upper shins; extremities WWP, no clubbing or cyanosis of digits; no joint swelling or tenderness  PSYCH: A+Ox2 (person, place); affect appropriate  NEUROLOGY: CN II-XII grossly intact; no focal sensory or motor deficits; moving all extremities spontaneously  SKIN: No rashes; no palpable lesions    LABS:                        8.3    3.36  )-----------( 84       ( 18 Nov 2019 09:49 )             29.8     11-18    138  |  98  |  12  ----------------------------<  103<H>  4.8   |  33<H>  |  0.93    Ca    9.1      18 Nov 2019 07:19  Phos  2.9     11-18  Mg     1.8     11-18    TPro  7.8  /  Alb  2.9<L>  /  TBili  2.3<H>  /  DBili  x   /  AST  31  /  ALT  20  /  AlkPhos  118  11-18    PT/INR - ( 18 Nov 2019 10:21 )   PT: 20.8 sec;   INR: 1.81 ratio         PTT - ( 18 Nov 2019 10:21 )  PTT:43.0 sec          Culture - Acid Fast - Sputum w/Smear (collected 17 Nov 2019 17:51)  Source: .Sputum Sputum    Culture - Acid Fast - Sputum w/Smear (collected 16 Nov 2019 07:15)  Source: .Sputum Sputum    Culture - Acid Fast (collected 15 Nov 2019 21:53)    Culture - Body Fluid with Gram Stain (collected 15 Nov 2019 16:18)  Source: .Body Fluid Pleural Fluid  Gram Stain (15 Nov 2019 22:31):    polymorphonuclear leukocytes seen    No organisms seen    by cytocentrifuge  Preliminary Report (16 Nov 2019 10:54):    No growth        RADIOLOGY & ADDITIONAL TESTS:  Results Reviewed:   Imaging Personally Reviewed:  Electrocardiogram Personally Reviewed:    COORDINATION OF CARE:  Care Discussed with Consultants/Other Providers [Y/N]:  Prior or Outpatient Records Reviewed [Y/N]:

## 2019-11-18 NOTE — PROGRESS NOTE ADULT - SUBJECTIVE AND OBJECTIVE BOX
Follow Up:  fever, pleural effusion    Interval History: pt afebrile, s/p thoracentesis, exudative but negative AFB and also sputum AFB negative x 3    ROS:      All other systems negative    Constitutional: no fever, no chills  Head: no trauma  Eyes: no vision changes, no eye pain  ENT:  no sore throat, no rhinorrhea  Cardiovascular:  no chest pain, no palpitation  Respiratory:  + SOB, + cough  GI:  no abd pain, no vomiting, no diarrhea  urinary: no dysuria, no hematuria, no flank pain  musculoskeletal:  no joint pain, no joint swelling  skin:  no rash  neurology:  no headache, no seizure  psych: no anxiety, no depression           Allergies  No Known Allergies        ANTIMICROBIALS:  rifAXIMin 550 two times a day      OTHER MEDS:  acetaminophen   Tablet .. 325 milliGRAM(s) Oral every 4 hours PRN  atorvastatin 20 milliGRAM(s) Oral at bedtime  benzonatate 100 milliGRAM(s) Oral three times a day PRN  fluticasone propionate 50 MICROgram(s)/spray Nasal Spray 1 Spray(s) Both Nostrils two times a day  furosemide    Tablet 40 milliGRAM(s) Oral daily  heparin  Injectable 5000 Unit(s) SubCutaneous every 12 hours  influenza   Vaccine 0.5 milliLiter(s) IntraMuscular once  lactobacillus acidophilus 1 Tablet(s) Oral daily  lactulose Syrup 30 Gram(s) Oral every 6 hours  metoclopramide Injectable 10 milliGRAM(s) IV Push once  omega-3-Acid Ethyl Esters 4 Gram(s) Oral daily  pantoprazole    Tablet 40 milliGRAM(s) Oral before breakfast  polyethylene glycol 3350 17 Gram(s) Oral once  sodium chloride 7% Inhalation 5 milliLiter(s) Inhalation three times a day  spironolactone 100 milliGRAM(s) Oral daily  zolpidem 5 milliGRAM(s) Oral at bedtime PRN      Vital Signs Last 24 Hrs  T(C): 37.1 (18 Nov 2019 08:16), Max: 37.2 (18 Nov 2019 00:08)  T(F): 98.8 (18 Nov 2019 08:16), Max: 98.9 (18 Nov 2019 00:08)  HR: 92 (18 Nov 2019 08:16) (75 - 92)  BP: 129/71 (18 Nov 2019 08:16) (102/61 - 129/71)  BP(mean): --  RR: 18 (18 Nov 2019 08:16) (18 - 22)  SpO2: 95% (18 Nov 2019 08:16) (91% - 95%)    Physical Exam:  General:    NAD, non toxic  Head: atraumatic, normocephalic  Eyes: normal sclera and conjunctiva  ENT:   no oropharyngeal lesions, no LAD, neck supple  Cardio:    irregular S1,S2, + murmur  Respiratory:  clear b/l, no wheezing  abd:   distended but soft, BS +, not tender  :     no CVAT, no suprapubic tenderness, no rod  Musculoskeletal : no joint swelling  Skin:    no rash  vascular: no phlebitis  Neurologic:     no focal deficits                            8.3    3.36  )-----------( 84       ( 18 Nov 2019 09:49 )             29.8       11-18    138  |  98  |  12  ----------------------------<  103<H>  4.8   |  33<H>  |  0.93    Ca    9.1      18 Nov 2019 07:19  Phos  2.9     11-18  Mg     1.8     11-18    TPro  7.8  /  Alb  2.9<L>  /  TBili  2.3<H>  /  DBili  x   /  AST  31  /  ALT  20  /  AlkPhos  118  11-18          MICROBIOLOGY:  v  .Sputum Sputum  11-17-19 --  --  --      .Sputum Sputum  11-16-19 --  --  --      .Body Fluid Pleural Fluid  11-15-19   No growth  --    polymorphonuclear leukocytes seen  No organisms seen  by cytocentrifuge      .Sputum Sputum  11-15-19   Culture is being performed.  --  --      .Blood Blood-Venous  11-12-19   No growth at 5 days.  --  --      .Blood Blood  11-11-19   No growth at 5 days.  --  --      .Urine Clean Catch (Midstream)  11-09-19   >=3 organisms. Probable collection contamination.  --  --      .Blood Blood-Peripheral  11-09-19   No growth at 5 days.  --  Blood Culture PCR                RADIOLOGY:  Images below reviewed personally  < from: US Abdomen Limited (11.14.19 @ 15:45) >    IMPRESSION:  Moderate volume ascites.    < from: CT Chest w/ IV Cont (11.08.19 @ 23:55) >  IMPRESSION:   CHEST:  Moderate right pleural effusion. Small left pleural effusion.   No infectious consolidation.     ABDOMEN AND PELVIS:  Cirrhosis, splenomegaly and small volume ascites.

## 2019-11-19 LAB
ALBUMIN SERPL ELPH-MCNC: 2.7 G/DL — LOW (ref 3.3–5)
ALP SERPL-CCNC: 124 U/L — HIGH (ref 40–120)
ALT FLD-CCNC: 27 U/L — SIGNIFICANT CHANGE UP (ref 10–45)
ANION GAP SERPL CALC-SCNC: 7 MMOL/L — SIGNIFICANT CHANGE UP (ref 5–17)
APTT BLD: 43 SEC — HIGH (ref 27.5–36.3)
AST SERPL-CCNC: 49 U/L — HIGH (ref 10–40)
BILIRUB SERPL-MCNC: 2.3 MG/DL — HIGH (ref 0.2–1.2)
BUN SERPL-MCNC: 14 MG/DL — SIGNIFICANT CHANGE UP (ref 7–23)
CALCIUM SERPL-MCNC: 8.6 MG/DL — SIGNIFICANT CHANGE UP (ref 8.4–10.5)
CHLORIDE SERPL-SCNC: 97 MMOL/L — SIGNIFICANT CHANGE UP (ref 96–108)
CO2 SERPL-SCNC: 32 MMOL/L — HIGH (ref 22–31)
CREAT SERPL-MCNC: 0.99 MG/DL — SIGNIFICANT CHANGE UP (ref 0.5–1.3)
GLUCOSE SERPL-MCNC: 99 MG/DL — SIGNIFICANT CHANGE UP (ref 70–99)
HCT VFR BLD CALC: 27.4 % — LOW (ref 39–50)
HGB BLD-MCNC: 7.8 G/DL — LOW (ref 13–17)
INR BLD: 1.95 RATIO — HIGH (ref 0.88–1.16)
MAGNESIUM SERPL-MCNC: 1.8 MG/DL — SIGNIFICANT CHANGE UP (ref 1.6–2.6)
MCHC RBC-ENTMCNC: 24.4 PG — LOW (ref 27–34)
MCHC RBC-ENTMCNC: 28.5 GM/DL — LOW (ref 32–36)
MCV RBC AUTO: 85.6 FL — SIGNIFICANT CHANGE UP (ref 80–100)
PHOSPHATE SERPL-MCNC: 3 MG/DL — SIGNIFICANT CHANGE UP (ref 2.5–4.5)
PLATELET # BLD AUTO: 78 K/UL — LOW (ref 150–400)
POTASSIUM SERPL-MCNC: 4.5 MMOL/L — SIGNIFICANT CHANGE UP (ref 3.5–5.3)
POTASSIUM SERPL-SCNC: 4.5 MMOL/L — SIGNIFICANT CHANGE UP (ref 3.5–5.3)
PROT SERPL-MCNC: 7.4 G/DL — SIGNIFICANT CHANGE UP (ref 6–8.3)
PROTHROM AB SERPL-ACNC: 22.9 SEC — HIGH (ref 10–13.1)
RBC # BLD: 3.2 M/UL — LOW (ref 4.2–5.8)
RBC # FLD: 21.1 % — HIGH (ref 10.3–14.5)
SODIUM SERPL-SCNC: 136 MMOL/L — SIGNIFICANT CHANGE UP (ref 135–145)
WBC # BLD: 3.4 K/UL — LOW (ref 3.8–10.5)
WBC # FLD AUTO: 3.4 K/UL — LOW (ref 3.8–10.5)

## 2019-11-19 PROCEDURE — 99233 SBSQ HOSP IP/OBS HIGH 50: CPT

## 2019-11-19 PROCEDURE — 99233 SBSQ HOSP IP/OBS HIGH 50: CPT | Mod: GC

## 2019-11-19 PROCEDURE — 99232 SBSQ HOSP IP/OBS MODERATE 35: CPT | Mod: GC

## 2019-11-19 RX ORDER — FUROSEMIDE 40 MG
60 TABLET ORAL DAILY
Refills: 0 | Status: DISCONTINUED | OUTPATIENT
Start: 2019-11-20 | End: 2019-11-22

## 2019-11-19 RX ORDER — SPIRONOLACTONE 25 MG/1
150 TABLET, FILM COATED ORAL DAILY
Refills: 0 | Status: DISCONTINUED | OUTPATIENT
Start: 2019-11-20 | End: 2019-11-22

## 2019-11-19 RX ORDER — ZOLPIDEM TARTRATE 10 MG/1
10 TABLET ORAL AT BEDTIME
Refills: 0 | Status: DISCONTINUED | OUTPATIENT
Start: 2019-11-19 | End: 2019-11-22

## 2019-11-19 RX ADMIN — LACTULOSE 30 GRAM(S): 10 SOLUTION ORAL at 06:06

## 2019-11-19 RX ADMIN — ATORVASTATIN CALCIUM 20 MILLIGRAM(S): 80 TABLET, FILM COATED ORAL at 21:47

## 2019-11-19 RX ADMIN — Medication 325 MILLIGRAM(S): at 13:44

## 2019-11-19 RX ADMIN — Medication 1 TABLET(S): at 13:42

## 2019-11-19 RX ADMIN — Medication 1 SPRAY(S): at 17:03

## 2019-11-19 RX ADMIN — Medication 40 MILLIGRAM(S): at 06:02

## 2019-11-19 RX ADMIN — HEPARIN SODIUM 5000 UNIT(S): 5000 INJECTION INTRAVENOUS; SUBCUTANEOUS at 06:03

## 2019-11-19 RX ADMIN — Medication 100 MILLIGRAM(S): at 17:06

## 2019-11-19 RX ADMIN — LACTULOSE 30 GRAM(S): 10 SOLUTION ORAL at 17:02

## 2019-11-19 RX ADMIN — Medication 4 GRAM(S): at 13:42

## 2019-11-19 RX ADMIN — Medication 325 MILLIGRAM(S): at 14:15

## 2019-11-19 RX ADMIN — SODIUM CHLORIDE 5 MILLILITER(S): 9 INJECTION INTRAMUSCULAR; INTRAVENOUS; SUBCUTANEOUS at 21:47

## 2019-11-19 RX ADMIN — SODIUM CHLORIDE 5 MILLILITER(S): 9 INJECTION INTRAMUSCULAR; INTRAVENOUS; SUBCUTANEOUS at 13:44

## 2019-11-19 RX ADMIN — SPIRONOLACTONE 100 MILLIGRAM(S): 25 TABLET, FILM COATED ORAL at 06:02

## 2019-11-19 RX ADMIN — SODIUM CHLORIDE 5 MILLILITER(S): 9 INJECTION INTRAMUSCULAR; INTRAVENOUS; SUBCUTANEOUS at 06:03

## 2019-11-19 RX ADMIN — PANTOPRAZOLE SODIUM 40 MILLIGRAM(S): 20 TABLET, DELAYED RELEASE ORAL at 06:02

## 2019-11-19 RX ADMIN — ZOLPIDEM TARTRATE 10 MILLIGRAM(S): 10 TABLET ORAL at 21:47

## 2019-11-19 RX ADMIN — Medication 1 SPRAY(S): at 06:02

## 2019-11-19 RX ADMIN — LACTULOSE 30 GRAM(S): 10 SOLUTION ORAL at 13:42

## 2019-11-19 RX ADMIN — HEPARIN SODIUM 5000 UNIT(S): 5000 INJECTION INTRAVENOUS; SUBCUTANEOUS at 17:03

## 2019-11-19 NOTE — PROGRESS NOTE ADULT - ASSESSMENT
59 year old man with medical history of cirrhosis likely due to BOB, possibly contribution by alcohol consumption (he drank only a 1-2 drinks per day for half a year),Known fatty liver, decompensated by small ascites for which he is on Lasix /spironolactone 40 mg/day, 100 mg/d, h/o hepatic encephalopathy on lactulose and rifaximin, Morbid obesity BMI: 43 ,SARKIS who was admitted on (10/2019) for traumatic R sided rib fractures right ribs 6-11 with right side pleural effusion s/p pigtail placement and removal with resolution of hemothorax, present with fevers, chills for 2 days.  He was found to have positive UA and now being treated for UTI. Found to be hypoxic on RA to 91% and placed on bilevel then HFNC.   Blood culture came back positive for gram positive cocci  CT chest, abdomen and pelvis: Moderate right pleural effusion. Small left pleural effusion. Cirrhosis, splenomegaly and small volume ascites.  Healing fractures of the right 8th-11th ribs.  Temp: 100.2 (100.3 in ER) then 101.1 on 11/12  Echo 10/2019: mild aortic stenosis. Mild aortic regurgitation. Normal left ventricular systolic function 65%. No segmental wall motion abnormalities. Normal diastolic function. Normal right ventricular size and function.  Normal pulmonary pressures.  US abdomen: moderate size ascites.  S/P u/s thoracentesis 11/15 600 ml drained : meets Lights criteria (exudative effusion), cytology pending  2 sputum neg AFB   QuantiFeron indeterminate  s/p 7 days of vanco and zosyn, now off antibiotics     Impression:  # Ascites DDx:  Portal HTN 2nd to cirrhosis   # Fever 11/12 DDx: pneumonia, SBP ?  # Positive blood culture : micrococcus luteus, contamination ?  # exudative Pleural effusion (moderate size Rt >Lt small) DDx: parapneumonic effusion, S/P u/s thoracentesis 11/15 600 ml drained : meets Lights criteria (exudative effusion),   # History of exudative right side pleural effusion in 10/2019 s/p right side chest tube. Serum- effusion albumin gradient is 1.0 indicating exudative fluid.   # Cirrhosis likely due to BOB, possibly contribution by alcohol consumption MELD - Na: 17 (11/18/2019)  - Ascites: Small CT 11/2019  - HE: on Lactulose and Rifaximin at home   - Varices; unknown   - HCC: none CT 11/2019  # Morbid Obesity BMI: 43  # SARKIS     Recommendations:  - Increase Lasix and spironolactone to 60 and 150 mg / day PO  - Daily CMP and INR  - Continue Lactulose and Rifaximin at home dose  - EGD as outpatient for varices screening   - Consider repeating echocardiogram, very limited exercise tolerance due to dyspnea, need better optimization of his cardiac and pulmonary functions, he is likely to have SARKIS, Obesity hypoventilation syndrome. Never had cardiac work up before. Consider cardiology consult and pulmonary follow up.

## 2019-11-19 NOTE — PROGRESS NOTE ADULT - PROBLEM SELECTOR PLAN 3
- Hx of decompensated cirrhosis likely 2/2 alcoholism and BOB  - Trend MELD-Na  - Hepatology following  - c/w Lactulose and Rifaximin for PSE  - Abdominal US 11/14: moderate volume ascites (increased since admission): per IR, no safe window for paracentesis. Repeat 11/18 small-volume; no window. - Hx of decompensated cirrhosis likely 2/2 alcoholism and BOB  - Trend MELD-Na  - Hepatology following  - c/w Lactulose and Rifaximin for PSE  - Abdominal US 11/14: moderate volume ascites (increased since admission): per IR, no safe window for paracentesis. Repeat 11/18 small-volume; no window.  - 11/19: Lasix increased to 60mg and aldactone to 150mg

## 2019-11-19 NOTE — PROGRESS NOTE ADULT - SUBJECTIVE AND OBJECTIVE BOX
Demario Art, PGY1  Internal Medicine Resident  Pager: [phone] ([facility]), [phone] ([facility])    Patient is a 59y old  Male who presents with a chief complaint of Fever/shaking chills x1 day, (19 Nov 2019 11:44)    SUBJECTIVE / OVERNIGHT EVENTS:  No overnight events per Night Float.  Patient was sitting in the chair this morning, off nasal cannula, with pulse ox SaO2 92-94%.  Has been tolerating food and water.  Last bowel movement was 1x diarrhea yesterday.  Feels better overall, asking for [facility].    ADDITIONAL REVIEW OF SYSTEMS:  Patient denies fevers/chills, chest pain, palpitations, SOB, cough/wheezing, abdominal pain, nausea, vomiting, diarrhea, or constipation.    MEDICATIONS  (STANDING):  atorvastatin 20 milliGRAM(s) Oral at bedtime  fluticasone propionate 50 MICROgram(s)/spray Nasal Spray 1 Spray(s) Both Nostrils two times a day  heparin  Injectable 5000 Unit(s) SubCutaneous every 12 hours  influenza   Vaccine 0.5 milliLiter(s) IntraMuscular once  lactobacillus acidophilus 1 Tablet(s) Oral daily  lactulose Syrup 30 Gram(s) Oral every 6 hours  metoclopramide Injectable 10 milliGRAM(s) IV Push once  omega-3-Acid Ethyl Esters 4 Gram(s) Oral daily  pantoprazole    Tablet 40 milliGRAM(s) Oral before breakfast  polyethylene glycol 3350 17 Gram(s) Oral once  rifAXIMin 550 milliGRAM(s) Oral two times a day  sodium chloride 7% Inhalation 5 milliLiter(s) Inhalation three times a day    MEDICATIONS  (PRN):  acetaminophen   Tablet .. 325 milliGRAM(s) Oral every 4 hours PRN Temp greater or equal to 38C (100.4F), Mild Pain (1 - 3), Moderate Pain (4 - 6)  benzonatate 100 milliGRAM(s) Oral three times a day PRN Cough  zolpidem 5 milliGRAM(s) Oral at bedtime PRN Insomnia      CAPILLARY BLOOD GLUCOSE        I&O's Summary    18 Nov 2019 07:01  -  19 Nov 2019 07:00  --------------------------------------------------------  IN: 260 mL / OUT: 0 mL / NET: 260 mL    19 Nov 2019 07:01  -  19 Nov 2019 11:58  --------------------------------------------------------  IN: 280 mL / OUT: 0 mL / NET: 280 mL        PHYSICAL EXAM:  Vital Signs Last 24 Hrs  T(C): 36.8 (19 Nov 2019 08:47), Max: 36.8 (18 Nov 2019 16:10)  T(F): 98.2 (19 Nov 2019 08:47), Max: 98.2 (18 Nov 2019 16:10)  HR: 72 (19 Nov 2019 10:49) (72 - 84)  BP: 111/68 (19 Nov 2019 10:49) (99/56 - 132/68)  BP(mean): --  RR: 18 (19 Nov 2019 10:49) (18 - 18)  SpO2: 91% (19 Nov 2019 10:49) (90% - 93%)    CONSTITUTIONAL: No apparent distress. Awake and conversive, cooperative with exam  EYES: PERRLA, EOMI, conjunctiva and sclera clear  ENMT: Moist oral mucosa, no pharyngeal injection or exudates; normal dentition  NECK: Supple, no palpable masses; no JVD  RESPIRATORY: Normal respiratory effort, somewhat reduced breath sounds R side, otherwise CTAB, no wheezes, rales, or rhonchi  CARDIOVASCULAR: Regular rate and rhythm, normal S1 and S2, no murmur/rub/gallop; distal pulses 2+ bilaterally  ABDOMEN: Obese, distended, nontender to palpation, normoactive bowel sounds, no rebound/guarding  MUSCULOSKELETAL:  Bilateral pitting edema improved to 1+ to upper shins; extremities WWP, no clubbing or cyanosis of digits; no joint swelling or tenderness  PSYCH: A+Ox2-3; affect appropriate  NEUROLOGY: CN II-XII grossly intact; no focal sensory or motor deficits; moving all extremities     LABS:                        7.8    3.40  )-----------( 78       ( 19 Nov 2019 10:38 )             27.4     11-19    136  |  97  |  14  ----------------------------<  99  4.5   |  32<H>  |  0.99    Ca    8.6      19 Nov 2019 08:13  Phos  3.0     11-19  Mg     1.8     11-19    TPro  7.4  /  Alb  2.7<L>  /  TBili  2.3<H>  /  DBili  x   /  AST  49<H>  /  ALT  27  /  AlkPhos  124<H>  11-19    PT/INR - ( 19 Nov 2019 10:43 )   PT: 22.9 sec;   INR: 1.95 ratio         PTT - ( 19 Nov 2019 10:43 )  PTT:43.0 sec          Culture - Acid Fast - Sputum w/Smear (collected 17 Nov 2019 17:51)  Source: .Sputum Sputum        RADIOLOGY & ADDITIONAL TESTS:  Results Reviewed:   Imaging Personally Reviewed:  Electrocardiogram Personally Reviewed:    COORDINATION OF CARE:  Care Discussed with Consultants/Other Providers [Y/N]:  Prior or Outpatient Records Reviewed [Y/N]:

## 2019-11-19 NOTE — PROGRESS NOTE ADULT - SUBJECTIVE AND OBJECTIVE BOX
Follow Up:  fever, pleural effusion    Interval History: pt afebrile, s/p thoracentesis, exudative but negative AFB and also sputum AFB negative x 3    ROS:      All other systems negative    Constitutional: no fever, no chills  Head: no trauma  Eyes: no vision changes, no eye pain  ENT:  no sore throat, no rhinorrhea  Cardiovascular:  no chest pain, no palpitation  Respiratory:  + SOB mostly on exertion, + cough  GI:  no abd pain, no vomiting, no diarrhea  urinary: no dysuria, no hematuria, no flank pain  musculoskeletal:  no joint pain, no joint swelling  skin:  no rash  neurology:  no headache, no seizure  psych: no anxiety, no depression         Allergies  No Known Allergies        ANTIMICROBIALS:  rifAXIMin 550 two times a day      OTHER MEDS:  acetaminophen   Tablet .. 325 milliGRAM(s) Oral every 4 hours PRN  atorvastatin 20 milliGRAM(s) Oral at bedtime  benzonatate 100 milliGRAM(s) Oral three times a day PRN  fluticasone propionate 50 MICROgram(s)/spray Nasal Spray 1 Spray(s) Both Nostrils two times a day  heparin  Injectable 5000 Unit(s) SubCutaneous every 12 hours  influenza   Vaccine 0.5 milliLiter(s) IntraMuscular once  lactobacillus acidophilus 1 Tablet(s) Oral daily  lactulose Syrup 30 Gram(s) Oral every 6 hours  metoclopramide Injectable 10 milliGRAM(s) IV Push once  omega-3-Acid Ethyl Esters 4 Gram(s) Oral daily  pantoprazole    Tablet 40 milliGRAM(s) Oral before breakfast  polyethylene glycol 3350 17 Gram(s) Oral once  sodium chloride 7% Inhalation 5 milliLiter(s) Inhalation three times a day  zolpidem 5 milliGRAM(s) Oral at bedtime PRN      Vital Signs Last 24 Hrs  T(C): 36.8 (19 Nov 2019 08:47), Max: 36.8 (18 Nov 2019 16:10)  T(F): 98.2 (19 Nov 2019 08:47), Max: 98.2 (18 Nov 2019 16:10)  HR: 72 (19 Nov 2019 10:49) (72 - 84)  BP: 111/68 (19 Nov 2019 10:49) (99/56 - 132/68)  BP(mean): --  RR: 18 (19 Nov 2019 10:49) (18 - 18)  SpO2: 91% (19 Nov 2019 10:49) (90% - 93%)    Physical Exam:  General:    NAD, non toxic  Head: atraumatic, normocephalic  Eyes: normal sclera and conjunctiva  ENT:   no oropharyngeal lesions, no LAD, neck supple  Cardio:    irregular S1,S2, + murmur  Respiratory:  clear b/l, no wheezing  abd:   distended but soft, BS +, not tender  :     no CVAT, no suprapubic tenderness, no rod  Musculoskeletal : no joint swelling  Skin:    no rash  vascular: no phlebitis  Neurologic:     no focal deficits                          7.8    3.40  )-----------( 78       ( 19 Nov 2019 10:38 )             27.4       11-19    136  |  97  |  14  ----------------------------<  99  4.5   |  32<H>  |  0.99    Ca    8.6      19 Nov 2019 08:13  Phos  3.0     11-19  Mg     1.8     11-19    TPro  7.4  /  Alb  2.7<L>  /  TBili  2.3<H>  /  DBili  x   /  AST  49<H>  /  ALT  27  /  AlkPhos  124<H>  11-19          MICROBIOLOGY:  v  .Sputum Sputum  11-17-19 --  --  --      .Sputum Sputum  11-16-19 --  --  --      .Body Fluid Pleural Fluid  11-15-19   No growth  --    polymorphonuclear leukocytes seen  No organisms seen  by cytocentrifuge      .Sputum Sputum  11-15-19   Culture is being performed.  --  --      .Blood Blood-Venous  11-12-19   No growth at 5 days.  --  --      .Blood Blood  11-11-19   No growth at 5 days.  --  --      .Urine Clean Catch (Midstream)  11-09-19   >=3 organisms. Probable collection contamination.  --  --      .Blood Blood-Peripheral  11-09-19   No growth at 5 days.  --  Blood Culture PCR                RADIOLOGY:  Images below reviewed personally  < from: US Abdomen Limited (11.18.19 @ 15:07) >  IMPRESSION:     Small volume of ascites.    Right pleural effusion.        < from: CT Chest w/ IV Cont (11.08.19 @ 23:55) >  IMPRESSION:   CHEST:  Moderate right pleural effusion. Small left pleural effusion.   No infectious consolidation.     ABDOMEN AND PELVIS:  Cirrhosis, splenomegaly and small volume ascites.

## 2019-11-19 NOTE — PROGRESS NOTE ADULT - PROBLEM SELECTOR PLAN 1
- Likely multifactorial 2/2 pleural effusions, bacterial pneumonia and restrictive lung disease 2/2 obesity  - Lasix 40mg PO daily  - Currently saturating mid 90's off nasal cannula  - Attempt to walk with PT off of supplemental oxygen

## 2019-11-19 NOTE — PROGRESS NOTE ADULT - ASSESSMENT
59 M with HLD, CHF (EF 65% Oct 2019), HLD, cirrhosis (2/2 alcohol abuse), and recent admission (Oct 2019) for R pleural effusion s/p chest tube, fluid was exudative but negative cytology and cultures, now again presented 11/8 with fever and chills, SOB and stated that has had 3 years of cough which is getting more productive with brown sputum.   initially T: 100.3, WBC: 2-4 not neutropenic   u/a positive and urine cx >3 organisms  chest/abd CT: mod R pleural effusion, small L, no consolidation, splenomegaly, cirrhosis and small ascites   pt was started on zosyn but the blood cx came back with micrococcus luteus and pt was started on vanco 11/11  pt spiked again on 11/12 to 101.1   HIV negative    3 years of cough with recurrent R pleural effusion, last tap 10/2019 exudative with negative cx and cytology, so TB and malignancy were considered, now s/p tap again exudative but negative ADA and AFB, sputum also negative x 3, quantiferon indeterminate  pt also cirrhotic with pancytopenia, although hepatic hydrothorax possible but pt has fever with exudative effusion and chronic cough  micrococcus luteus 1/4 in blood likely contamination, repeat negative    * will follow the AFB cultures  * f/u with pulm and thoracic surgery regarding tap results and possible biopsy  * if patient is not a candidate for biopsy and invasive measures then will have to follow the cultures and monitor closely  * s/p 7 days of vanco and zosyn, now off

## 2019-11-19 NOTE — PROGRESS NOTE ADULT - PROBLEM SELECTOR PLAN 2
- Bilateral lung effusion, R>L on CT (11/9)  - h/o R chest tube in recent hospitalization (Oct 2019) with negative cultures  - DDx Parapneumonic effusion vs. empyema vs. hepatic hydrothorax vs. TB  - Strict I/O, daily weights. Lasix returned to home dose 40mg PO daily  - CT surgery consulted 11/13: no indication for VATS at this time  - Thoracentesis 11/15: (+) Light's criteria (exudative effusion), cytology negative for malignant cells, cultures negative  - TB workup: negative sputum AFB x3, negative ADA pleural fluid

## 2019-11-19 NOTE — PROGRESS NOTE ADULT - SUBJECTIVE AND OBJECTIVE BOX
Interval Events:   Remained short of breath.  No abdominal pain.    Hospital Medications:  acetaminophen   Tablet .. 325 milliGRAM(s) Oral every 4 hours PRN  atorvastatin 20 milliGRAM(s) Oral at bedtime  benzonatate 100 milliGRAM(s) Oral three times a day PRN  fluticasone propionate 50 MICROgram(s)/spray Nasal Spray 1 Spray(s) Both Nostrils two times a day  heparin  Injectable 5000 Unit(s) SubCutaneous every 12 hours  influenza   Vaccine 0.5 milliLiter(s) IntraMuscular once  lactobacillus acidophilus 1 Tablet(s) Oral daily  lactulose Syrup 30 Gram(s) Oral every 6 hours  metoclopramide Injectable 10 milliGRAM(s) IV Push once  omega-3-Acid Ethyl Esters 4 Gram(s) Oral daily  pantoprazole    Tablet 40 milliGRAM(s) Oral before breakfast  polyethylene glycol 3350 17 Gram(s) Oral once  rifAXIMin 550 milliGRAM(s) Oral two times a day  sodium chloride 7% Inhalation 5 milliLiter(s) Inhalation three times a day  zolpidem 5 milliGRAM(s) Oral at bedtime PRN        ROS:   General:  No fevers, chills or night sweats  ENT:  No sore throat or dysphagia  CV:  No pain or palpitations  Resp:  No dyspnea, cough or  wheezing  GI:  as above  Skin:  No rash or edema  Neuro: no weakness   Hematologic: no bleeding  Musculoskeletal: no muscle pain or join pain  Psych: no agitation      PHYSICAL EXAM:   Vital Signs:  Vital Signs Last 24 Hrs  T(C): 36.8 (2019 08:47), Max: 36.8 (2019 16:10)  T(F): 98.2 (2019 08:47), Max: 98.2 (2019 16:10)  HR: 80 (2019 13:05) (72 - 84)  BP: 125/70 (2019 13:05) (99/56 - 132/68)  BP(mean): --  RR: 18 (2019 10:49) (18 - 18)  SpO2: 90% (2019 13:05) (90% - 93%)  Daily     Daily Weight in k.2 (2019 08:47)    GENERAL: not in acute distress, short neck, obese BMI: 43  HEENT:  NC/AT,  conjunctivae clear and pink, sclera -anicteric  CHEST:  decreased breath sounds b/l bases, (+) bibasilar rales appreciated R>L,  HEART:  RRR S1/S2  ABDOMEN:  Soft, non-tender, distended, obese, normoactive bowel sounds,  no masses   EXTREMITIES: + Edema  SKIN:  Warm & Dry. No rash or erythema  NEURO:  Alert, oriented  LABS:                        7.8    3.40  )-----------( 78       ( 2019 10:38 )             27.4     Mean Cell Volume: 85.6 fl (19 @ 10:38)        136  |  97  |  14  ----------------------------<  99  4.5   |  32<H>  |  0.99    Ca    8.6      2019 08:13  Phos  3.0       Mg     1.8         TPro  7.4  /  Alb  2.7<L>  /  TBili  2.3<H>  /  DBili  x   /  AST  49<H>  /  ALT  27  /  AlkPhos  124<H>      LIVER FUNCTIONS - ( 2019 08:13 )  Alb: 2.7 g/dL / Pro: 7.4 g/dL / ALK PHOS: 124 U/L / ALT: 27 U/L / AST: 49 U/L / GGT: x           PT/INR - ( 2019 10:43 )   PT: 22.9 sec;   INR: 1.95 ratio         PTT - ( 2019 10:43 )  PTT:43.0 sec                            7.8    3.40  )-----------( 78       ( 2019 10:38 )             27.4                         8.3    3.36  )-----------( 84       ( 2019 09:49 )             29.8                         6.8    2.40  )-----------( 77       ( 2019 09:35 )             24.3                         7.4    2.64  )-----------( 85       ( 2019 15:29 )             26.5       Imaging:

## 2019-11-20 ENCOUNTER — TRANSCRIPTION ENCOUNTER (OUTPATIENT)
Age: 59
End: 2019-11-20

## 2019-11-20 LAB
ALBUMIN SERPL ELPH-MCNC: 2.8 G/DL — LOW (ref 3.3–5)
ALP SERPL-CCNC: 130 U/L — HIGH (ref 40–120)
ALT FLD-CCNC: 22 U/L — SIGNIFICANT CHANGE UP (ref 10–45)
ANION GAP SERPL CALC-SCNC: 10 MMOL/L — SIGNIFICANT CHANGE UP (ref 5–17)
APTT BLD: 41.7 SEC — HIGH (ref 27.5–36.3)
AST SERPL-CCNC: 38 U/L — SIGNIFICANT CHANGE UP (ref 10–40)
BILIRUB SERPL-MCNC: 2 MG/DL — HIGH (ref 0.2–1.2)
BUN SERPL-MCNC: 16 MG/DL — SIGNIFICANT CHANGE UP (ref 7–23)
CALCIUM SERPL-MCNC: 8.7 MG/DL — SIGNIFICANT CHANGE UP (ref 8.4–10.5)
CHLORIDE SERPL-SCNC: 98 MMOL/L — SIGNIFICANT CHANGE UP (ref 96–108)
CO2 SERPL-SCNC: 29 MMOL/L — SIGNIFICANT CHANGE UP (ref 22–31)
CREAT SERPL-MCNC: 1.04 MG/DL — SIGNIFICANT CHANGE UP (ref 0.5–1.3)
CULTURE RESULTS: SIGNIFICANT CHANGE UP
GLUCOSE SERPL-MCNC: 98 MG/DL — SIGNIFICANT CHANGE UP (ref 70–99)
HCT VFR BLD CALC: 27.6 % — LOW (ref 39–50)
HGB BLD-MCNC: 7.8 G/DL — LOW (ref 13–17)
INR BLD: 1.89 RATIO — HIGH (ref 0.88–1.16)
MAGNESIUM SERPL-MCNC: 1.8 MG/DL — SIGNIFICANT CHANGE UP (ref 1.6–2.6)
MCHC RBC-ENTMCNC: 24.1 PG — LOW (ref 27–34)
MCHC RBC-ENTMCNC: 28.3 GM/DL — LOW (ref 32–36)
MCV RBC AUTO: 85.2 FL — SIGNIFICANT CHANGE UP (ref 80–100)
PHOSPHATE SERPL-MCNC: 3.8 MG/DL — SIGNIFICANT CHANGE UP (ref 2.5–4.5)
PLATELET # BLD AUTO: 80 K/UL — LOW (ref 150–400)
POTASSIUM SERPL-MCNC: 4.7 MMOL/L — SIGNIFICANT CHANGE UP (ref 3.5–5.3)
POTASSIUM SERPL-SCNC: 4.7 MMOL/L — SIGNIFICANT CHANGE UP (ref 3.5–5.3)
PROT SERPL-MCNC: 7.8 G/DL — SIGNIFICANT CHANGE UP (ref 6–8.3)
PROTHROM AB SERPL-ACNC: 22 SEC — HIGH (ref 10–13.1)
RBC # BLD: 3.24 M/UL — LOW (ref 4.2–5.8)
RBC # FLD: 21.4 % — HIGH (ref 10.3–14.5)
SODIUM SERPL-SCNC: 137 MMOL/L — SIGNIFICANT CHANGE UP (ref 135–145)
SPECIMEN SOURCE: SIGNIFICANT CHANGE UP
WBC # BLD: 2.73 K/UL — LOW (ref 3.8–10.5)
WBC # FLD AUTO: 2.73 K/UL — LOW (ref 3.8–10.5)

## 2019-11-20 PROCEDURE — 99233 SBSQ HOSP IP/OBS HIGH 50: CPT | Mod: GC

## 2019-11-20 PROCEDURE — 99232 SBSQ HOSP IP/OBS MODERATE 35: CPT

## 2019-11-20 PROCEDURE — 99232 SBSQ HOSP IP/OBS MODERATE 35: CPT | Mod: GC

## 2019-11-20 RX ORDER — LACTULOSE 10 G/15ML
45 SOLUTION ORAL
Qty: 0 | Refills: 0 | DISCHARGE
Start: 2019-11-20

## 2019-11-20 RX ORDER — CHLORHEXIDINE GLUCONATE 213 G/1000ML
1 SOLUTION TOPICAL DAILY
Refills: 0 | Status: DISCONTINUED | OUTPATIENT
Start: 2019-11-20 | End: 2019-11-22

## 2019-11-20 RX ORDER — PANTOPRAZOLE SODIUM 20 MG/1
1 TABLET, DELAYED RELEASE ORAL
Qty: 0 | Refills: 0 | DISCHARGE
Start: 2019-11-20

## 2019-11-20 RX ORDER — FUROSEMIDE 40 MG
1 TABLET ORAL
Qty: 0 | Refills: 0 | DISCHARGE

## 2019-11-20 RX ORDER — FUROSEMIDE 40 MG
3 TABLET ORAL
Qty: 0 | Refills: 0 | DISCHARGE
Start: 2019-11-20

## 2019-11-20 RX ORDER — OMEPRAZOLE 10 MG/1
1 CAPSULE, DELAYED RELEASE ORAL
Qty: 0 | Refills: 0 | DISCHARGE

## 2019-11-20 RX ORDER — SPIRONOLACTONE 25 MG/1
1 TABLET, FILM COATED ORAL
Qty: 0 | Refills: 0 | DISCHARGE

## 2019-11-20 RX ORDER — FLUTICASONE PROPIONATE 50 MCG
1 SPRAY, SUSPENSION NASAL
Qty: 0 | Refills: 0 | DISCHARGE
Start: 2019-11-20

## 2019-11-20 RX ORDER — SPIRONOLACTONE 25 MG/1
6 TABLET, FILM COATED ORAL
Qty: 0 | Refills: 0 | DISCHARGE
Start: 2019-11-20

## 2019-11-20 RX ADMIN — Medication 100 MILLIGRAM(S): at 06:13

## 2019-11-20 RX ADMIN — SODIUM CHLORIDE 5 MILLILITER(S): 9 INJECTION INTRAMUSCULAR; INTRAVENOUS; SUBCUTANEOUS at 15:47

## 2019-11-20 RX ADMIN — Medication 1 TABLET(S): at 13:27

## 2019-11-20 RX ADMIN — ZOLPIDEM TARTRATE 10 MILLIGRAM(S): 10 TABLET ORAL at 21:47

## 2019-11-20 RX ADMIN — Medication 325 MILLIGRAM(S): at 22:35

## 2019-11-20 RX ADMIN — Medication 60 MILLIGRAM(S): at 06:08

## 2019-11-20 RX ADMIN — LACTULOSE 30 GRAM(S): 10 SOLUTION ORAL at 06:01

## 2019-11-20 RX ADMIN — Medication 4 GRAM(S): at 15:48

## 2019-11-20 RX ADMIN — ATORVASTATIN CALCIUM 20 MILLIGRAM(S): 80 TABLET, FILM COATED ORAL at 21:47

## 2019-11-20 RX ADMIN — HEPARIN SODIUM 5000 UNIT(S): 5000 INJECTION INTRAVENOUS; SUBCUTANEOUS at 06:02

## 2019-11-20 RX ADMIN — SPIRONOLACTONE 150 MILLIGRAM(S): 25 TABLET, FILM COATED ORAL at 06:08

## 2019-11-20 RX ADMIN — Medication 1 SPRAY(S): at 17:28

## 2019-11-20 RX ADMIN — LACTULOSE 20 GRAM(S): 10 SOLUTION ORAL at 00:57

## 2019-11-20 RX ADMIN — SODIUM CHLORIDE 5 MILLILITER(S): 9 INJECTION INTRAMUSCULAR; INTRAVENOUS; SUBCUTANEOUS at 21:47

## 2019-11-20 RX ADMIN — HEPARIN SODIUM 5000 UNIT(S): 5000 INJECTION INTRAVENOUS; SUBCUTANEOUS at 17:28

## 2019-11-20 RX ADMIN — Medication 325 MILLIGRAM(S): at 23:00

## 2019-11-20 RX ADMIN — Medication 1 SPRAY(S): at 06:01

## 2019-11-20 RX ADMIN — SODIUM CHLORIDE 5 MILLILITER(S): 9 INJECTION INTRAMUSCULAR; INTRAVENOUS; SUBCUTANEOUS at 06:02

## 2019-11-20 RX ADMIN — LACTULOSE 30 GRAM(S): 10 SOLUTION ORAL at 13:26

## 2019-11-20 RX ADMIN — PANTOPRAZOLE SODIUM 40 MILLIGRAM(S): 20 TABLET, DELAYED RELEASE ORAL at 06:09
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
## 2019-11-20 NOTE — PROGRESS NOTE ADULT - ASSESSMENT
59 M with HLD, CHF (EF 65% Oct 2019), HLD, cirrhosis (2/2 alcohol abuse), and recent admission (Oct 2019) for R pleural effusion s/p chest tube, fluid was exudative but negative cytology and cultures, now again presented 11/8 with fever and chills, SOB and stated that has had 3 years of cough which is getting more productive with brown sputum.   initially T: 100.3, WBC: 2-4 not neutropenic   u/a positive and urine cx >3 organisms  chest/abd CT: mod R pleural effusion, small L, no consolidation, splenomegaly, cirrhosis and small ascites   pt was started on zosyn but the blood cx came back with micrococcus luteus and pt was started on vanco 11/11  pt spiked again on 11/12 to 101.1   HIV negative    3 years of cough with recurrent R pleural effusion, last tap 10/2019 exudative with negative cx and cytology, so TB and malignancy were considered, now s/p tap again exudative but negative ADA and AFB, sputum also negative x 3, quantiferon indeterminate  pt also cirrhotic with pancytopenia, although hepatic hydrothorax possible but pt has fever with exudative effusion and chronic cough  micrococcus luteus 1/4 in blood likely contamination, repeat negative    * will follow the AFB cultures  * if patient is not a candidate for biopsy and invasive measures then will have to follow the cultures and monitor closely  * s/p 7 days of vanco and zosyn, now off  * f/u with ID and pulmonary as outpatient
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
## 2019-11-20 NOTE — PROGRESS NOTE ADULT - SUBJECTIVE AND OBJECTIVE BOX
Demario Art, PGY1  Internal Medicine Resident  Pager: [phone] ([facility]), [phone] ([facility])    Patient is a 59y old  Male who presents with a chief complaint of Fever/shaking chills x1 day, (20 Nov 2019 12:20)    SUBJECTIVE / OVERNIGHT EVENTS:  No overnight events per Night Float.  At bedside, patient resting comfortably, did not sleep well.  Has been tolerating food and water.  Continues to have diarrhea from lactulose.  Coughing.  Fully oriented this morning.    ADDITIONAL REVIEW OF SYSTEMS:  Patient denies fevers/chills, chest pain, palpitations, abdominal pain, nausea, vomiting, or constipation.    MEDICATIONS  (STANDING):  atorvastatin 20 milliGRAM(s) Oral at bedtime  fluticasone propionate 50 MICROgram(s)/spray Nasal Spray 1 Spray(s) Both Nostrils two times a day  furosemide    Tablet 60 milliGRAM(s) Oral daily  heparin  Injectable 5000 Unit(s) SubCutaneous every 12 hours  influenza   Vaccine 0.5 milliLiter(s) IntraMuscular once  lactobacillus acidophilus 1 Tablet(s) Oral daily  lactulose Syrup 30 Gram(s) Oral every 6 hours  metoclopramide Injectable 10 milliGRAM(s) IV Push once  omega-3-Acid Ethyl Esters 4 Gram(s) Oral daily  pantoprazole    Tablet 40 milliGRAM(s) Oral before breakfast  polyethylene glycol 3350 17 Gram(s) Oral once  rifAXIMin 550 milliGRAM(s) Oral two times a day  sodium chloride 7% Inhalation 5 milliLiter(s) Inhalation three times a day  spironolactone 150 milliGRAM(s) Oral daily    MEDICATIONS  (PRN):  acetaminophen   Tablet .. 325 milliGRAM(s) Oral every 4 hours PRN Temp greater or equal to 38C (100.4F), Mild Pain (1 - 3), Moderate Pain (4 - 6)  benzonatate 100 milliGRAM(s) Oral three times a day PRN Cough  zolpidem 10 milliGRAM(s) Oral at bedtime PRN Insomnia      CAPILLARY BLOOD GLUCOSE        I&O's Summary    19 Nov 2019 07:01  -  20 Nov 2019 07:00  --------------------------------------------------------  IN: 600 mL / OUT: 0 mL / NET: 600 mL        PHYSICAL EXAM:  Vital Signs Last 24 Hrs  T(C): 37.2 (20 Nov 2019 04:34), Max: 37.2 (20 Nov 2019 04:34)  T(F): 98.9 (20 Nov 2019 04:34), Max: 98.9 (20 Nov 2019 04:34)  HR: 89 (20 Nov 2019 09:19) (76 - 97)  BP: 99/56 (20 Nov 2019 09:19) (99/56 - 151/72)  BP(mean): --  RR: 18 (20 Nov 2019 09:19) (18 - 18)  SpO2: 90% (20 Nov 2019 09:19) (90% - 92%)    CONSTITUTIONAL: No apparent distress. Awake and conversive, cooperative with exam  EYES: PERRLA, EOMI, conjunctiva and sclera clear  ENMT: Moist oral mucosa, no pharyngeal injection or exudates; normal dentition  NECK: Supple, no palpable masses; no JVD  RESPIRATORY: Normal respiratory effort, reduced breath sounds R side, otherwise CTAB, no wheezes, rales, or rhonchi  CARDIOVASCULAR: Regular rate and rhythm, normal S1 and S2, no murmur/rub/gallop; distal pulses 2+ bilaterally  ABDOMEN: Obese, distended, nontender to palpation, normoactive bowel sounds, no rebound/guarding  MUSCULOSKELETAL:  Bilateral pitting edema 1+ to upper shins; extremities WWP, no clubbing or cyanosis of digits; no joint swelling or tenderness  PSYCH: A+O x3; affect appropriate  NEUROLOGY: CN II-XII grossly intact; no focal sensory or motor deficits; moving all extremities    LABS:                        7.8    3.40  )-----------( 78       ( 19 Nov 2019 10:38 )             27.4     11-20    137  |  98  |  16  ----------------------------<  98  4.7   |  29  |  1.04    Ca    8.7      20 Nov 2019 08:54  Phos  3.8     11-20  Mg     1.8     11-20    TPro  7.8  /  Alb  2.8<L>  /  TBili  2.0<H>  /  DBili  x   /  AST  38  /  ALT  22  /  AlkPhos  130<H>  11-20    PT/INR - ( 19 Nov 2019 10:43 )   PT: 22.9 sec;   INR: 1.95 ratio         PTT - ( 19 Nov 2019 10:43 )  PTT:43.0 sec          Culture - Acid Fast - Sputum w/Smear (collected 17 Nov 2019 17:51)  Source: .Sputum Sputum        RADIOLOGY & ADDITIONAL TESTS:  Results Reviewed:   Imaging Personally Reviewed:  Electrocardiogram Personally Reviewed:    COORDINATION OF CARE:  Care Discussed with Consultants/Other Providers [Y/N]:  Prior or Outpatient Records Reviewed [Y/N]:

## 2019-11-20 NOTE — PROGRESS NOTE ADULT - PROBLEM SELECTOR PLAN 3
- Hx of decompensated cirrhosis likely 2/2 alcoholism and BOB  - Trend MELD-Na  - Hepatology following  - c/w Lactulose and Rifaximin for PSE  - Abdominal US 11/14: moderate volume ascites (increased since admission): per IR, no safe window for paracentesis. Repeat 11/18 small-volume; no window.  - 11/19: Lasix increased to 60mg and aldactone to 150mg

## 2019-11-20 NOTE — DISCHARGE NOTE PROVIDER - CARE PROVIDER_API CALL
Aubrey Rios)  Gastroenterology; Internal Medicine; Transplant Hepatology  [address] [place] [address]  Phone: [phone]  Fax: [phone]  Follow Up Time:     Wesley White)  [address], [place] [address]  Phone: [phone]  Fax: [phone]  Follow Up Time:

## 2019-11-20 NOTE — DISCHARGE NOTE PROVIDER - NSDCCPCAREPLAN_GEN_ALL_CORE_FT
PRINCIPAL DISCHARGE DIAGNOSIS  Diagnosis: Sepsis due to pneumonia  Assessment and Plan of Treatment: You came in to the hospital with fevers and were found to have signs of severe infection (sepsis). You sepsis was likely caused by a pneumonia, however we were not able to test the fluid in your abdomen to determine if an infection in your abdomen was the cause. You completed a 7 day course of antibiotics and your fevers resolved.      SECONDARY DISCHARGE DIAGNOSES  Diagnosis: Cirrhosis  Assessment and Plan of Treatment:     Diagnosis: Pleural effusion  Assessment and Plan of Treatment: Due to your liver failure you have fluid in your abdomen as well as your lungs. on 11/15/19 radiology specialists drained 600ml of fluid out of your right chest. This fluid did not show signs of infection and had accumulated to your liver failure. PRINCIPAL DISCHARGE DIAGNOSIS  Diagnosis: Sepsis due to pneumonia  Assessment and Plan of Treatment: You came in to the hospital with fevers and were found to have signs of severe infection (sepsis). You sepsis was likely caused by a pneumonia, however we were not able to test the fluid in your abdomen to determine if an infection in your abdomen was the cause. You completed a 7 day course of antibiotics and your fevers resolved.      SECONDARY DISCHARGE DIAGNOSES  Diagnosis: Cirrhosis  Assessment and Plan of Treatment: You have cirrhosis (scaring of the liver) due to fatty tissue in your liver from obesity and prior alcohol consumption. While in the hospital your were evaluated by hepatology specialists and your medications were adjusted. It is important that your take all your medications as prescribed and followup with hepatology clinic on discharge.    Diagnosis: Pleural effusion  Assessment and Plan of Treatment: Due to your liver failure you have fluid in your abdomen as well as your lungs. on 11/15/19 radiology specialists drained 600ml of fluid out of your right chest. This fluid did not show signs of infection and had accumulated to your liver failure. PRINCIPAL DISCHARGE DIAGNOSIS  Diagnosis: Sepsis due to pneumonia  Assessment and Plan of Treatment: You came in to the hospital with fevers and were found to have signs of severe infection (sepsis). Your sepsis was likely caused by pneumonia, however we were not able to test the fluid in your abdomen to determine if an infection in your abdomen was the cause. You completed a 7 day course of antibiotics and your fevers resolved.  Please follow up with your primary care physician after leaving the hospital. Should you experience a return of the fevers, return to the hospital immediately.      SECONDARY DISCHARGE DIAGNOSES  Diagnosis: Cirrhosis  Assessment and Plan of Treatment: You have cirrhosis (scarring of the liver) due to fatty tissue in your liver from obesity and prior alcohol consumption. While in the hospital you were evaluated by hepatology specialists and your medications were adjusted. It is important that your take all your medications as prescribed and follow-up with a hepatologist after leaving the hospital.    Diagnosis: Pleural effusion  Assessment and Plan of Treatment: Due to your liver failure you have fluid in your abdomen as well as your lungs. On 11/15/19, radiology specialists drained 600ml of fluid out of your right chest. This fluid did not show signs of infection and had most likely accumulated to your liver failure.

## 2019-11-20 NOTE — DISCHARGE NOTE PROVIDER - NSDCFUADDINST_GEN_ALL_CORE_FT
It is important that you take lactulose as needed so that you have 3-4 bowel movements per day. This is to prevent hepatic encephalopathy.

## 2019-11-20 NOTE — PROGRESS NOTE ADULT - ASSESSMENT
59 year old man with medical history of cirrhosis likely due to BOB, possibly contribution by alcohol consumption (he drank only a 1-2 drinks per day for half a year),Known fatty liver, decompensated by small ascites for which he is on Lasix /spironolactone 40 mg/day, 100 mg/d, h/o hepatic encephalopathy on lactulose and rifaximin, Morbid obesity BMI: 43 ,SARKIS   who was admitted on (10/2019) for traumatic R sided rib fractures right ribs 6-11 with right side pleural effusion s/p pigtail placement and removal with resolution of hemothorax,   present with fevers, chills for 2 days.  Found to be hypoxic on RA to 91% and placed on bilevel then HFNC., currently on room air   Blood culture came back positive for gram positive cocci  CT chest, abdomen and pelvis: Moderate right pleural effusion. Small left pleural effusion. Cirrhosis, splenomegaly and small volume ascites.  Healing fractures of the right 8th-11th ribs.  Temp: 100.2 (100.3 in ER) then 101.1 on 11/12, currently afebrile   Echo 10/2019: mild aortic stenosis. Mild aortic regurgitation. Normal left ventricular systolic function 65%. No segmental wall motion abnormalities. Normal diastolic function. Normal right ventricular size and function.  Normal pulmonary pressures.  US abdomen: small volume ascites   S/P u/s thoracentesis 11/15 600 ml drained : meets Lights criteria (exudative effusion), cytology pending  2 sputum neg AFB   QuantiFeron indeterminate  s/p 7 days of vanco and zosyn, now off antibiotics     Impression:  # Min Ascites- stable , DDx:  Portal HTN 2nd to cirrhosis   # Fever 11/12 DDx: pneumonia, SBP ?  # Positive blood culture : micrococcus luteus, contamination ?  # exudative Pleural effusion (moderate size Rt >Lt small) DDx: parapneumonic effusion, S/P u/s thoracentesis 11/15 600 ml drained : meets Lights criteria (exudative effusion),   # History of exudative right side pleural effusion in 10/2019 s/p right side chest tube. Serum- effusion albumin gradient is 1.0 indicating exudative fluid.   # Cirrhosis likely due to BOB, possibly contribution by alcohol consumption MELD - Na: 17 (11/18/2019)  - Ascites: Small CT 11/2019, currently on Lasix 60 and aldactone 150 , Na - 137, K- 4.7, Cr -1.07   - HE: on Lactulose and Rifaximin at home   - Varices; unknown   - HCC: none CT 11/2019  # Morbid Obesity BMI: 43  # SARKIS     Recommendations:  - c/w Lasix and spironolactone to 60 and 150 mg / day PO  - Daily CMP and INR  - Continue Lactulose and Rifaximin at home dose  - EGD as outpatient for varices screening   - Consider repeating echocardiogram, very limited exercise tolerance due to dyspnea, need better optimization of his cardiac and pulmonary functions, he is likely to have SARKIS, Obesity hypoventilation syndrome. Never had cardiac work up before. Consider cardiology consult and pulmonary follow up. 59 year old man with medical history of cirrhosis likely due to BOB, possibly contribution by alcohol consumption (he drank only a 1-2 drinks per day for half a year),Known fatty liver, decompensated by small ascites for which he is on Lasix /spironolactone 40 mg/day, 100 mg/d, h/o hepatic encephalopathy on lactulose and rifaximin, Morbid obesity BMI: 43 ,SARKIS   who was admitted on (10/2019) for traumatic R sided rib fractures right ribs 6-11 with right side pleural effusion s/p pigtail placement and removal with resolution of hemothorax,   present with fevers, chills for 2 days.  Found to be hypoxic on RA to 91% and placed on bilevel then HFNC., currently on room air   Blood culture came back positive for gram positive cocci  CT chest, abdomen and pelvis: Moderate right pleural effusion. Small left pleural effusion. Cirrhosis, splenomegaly and small volume ascites.  Healing fractures of the right 8th-11th ribs.  Temp: 100.2 (100.3 in ER) then 101.1 on 11/12, currently afebrile   Echo 10/2019: mild aortic stenosis. Mild aortic regurgitation. Normal left ventricular systolic function 65%. No segmental wall motion abnormalities. Normal diastolic function. Normal right ventricular size and function.  Normal pulmonary pressures.  US abdomen: small volume ascites   S/P u/s thoracentesis 11/15 600 ml drained : meets Lights criteria (exudative effusion), cytology pending  2 sputum neg AFB   QuantiFeron indeterminate  s/p 7 days of vanco and zosyn, now off antibiotics     Impression:  # Min Ascites- stable , DDx:  Portal HTN 2nd to cirrhosis   # Fever 11/12 DDx: pneumonia, SBP ?  # Positive blood culture : micrococcus luteus, contamination ?  # exudative Pleural effusion (moderate size Rt >Lt small) DDx: parapneumonic effusion, S/P u/s thoracentesis 11/15 600 ml drained : meets Lights criteria (exudative effusion),   # History of exudative right side pleural effusion in 10/2019 s/p right side chest tube. Serum- effusion albumin gradient is 1.0 indicating exudative fluid.   # Cirrhosis likely due to BOB, possibly contribution by alcohol consumption MELD - Na: 17 (11/18/2019)  - Ascites: Small CT 11/2019, currently on Lasix 60 and aldactone 150 , Na - 137, K- 4.7, Cr -1.07   - HE: on Lactulose and Rifaximin at home   - Varices; unknown   - HCC: none CT 11/2019  # Morbid Obesity BMI: 43  # SARKSI     Recommendations:  - c/w Lasix and spironolactone to 60 and 150 mg / day PO and titrate based on daily weights, Sr. Cr, Sr.Na, K   - Daily CMP and INR  - Continue Lactulose and Rifaximin at home dose  - EGD as outpatient for varices screening   - Consider repeating echocardiogram, very limited exercise tolerance due to dyspnea, need better optimization of his cardiac and pulmonary functions, he is likely to have SARKIS, Obesity hypoventilation syndrome. Never had cardiac work up before. Consider cardiology consult and pulmonary follow up.

## 2019-11-20 NOTE — PROGRESS NOTE ADULT - PROBLEM SELECTOR PLAN 1
- Likely multifactorial 2/2 pleural effusions, bacterial pneumonia and restrictive lung disease 2/2 obesity  - Lasix 40mg PO daily  - Currently saturating mid 90's off nasal cannula  - PT nayeli 11/19, recommending J[facility]RI

## 2019-11-20 NOTE — PROGRESS NOTE ADULT - SUBJECTIVE AND OBJECTIVE BOX
Follow Up:  fever, pleural effusion    Interval History: pt afebrile, still with cough    ROS:      All other systems negative    Constitutional: no fever, no chills  Head: no trauma  Eyes: no vision changes, no eye pain  ENT:  no sore throat, no rhinorrhea  Cardiovascular:  no chest pain, no palpitation  Respiratory:  + SOB mostly on exertion, + cough  GI:  no abd pain, no vomiting, no diarrhea  urinary: no dysuria, no hematuria, no flank pain  musculoskeletal:  no joint pain, no joint swelling  skin:  no rash  neurology:  no headache, no seizure  psych: no anxiety, no depression           Allergies  No Known Allergies        ANTIMICROBIALS:  rifAXIMin 550 two times a day      OTHER MEDS:  acetaminophen   Tablet .. 325 milliGRAM(s) Oral every 4 hours PRN  atorvastatin 20 milliGRAM(s) Oral at bedtime  benzonatate 100 milliGRAM(s) Oral three times a day PRN  fluticasone propionate 50 MICROgram(s)/spray Nasal Spray 1 Spray(s) Both Nostrils two times a day  furosemide    Tablet 60 milliGRAM(s) Oral daily  heparin  Injectable 5000 Unit(s) SubCutaneous every 12 hours  influenza   Vaccine 0.5 milliLiter(s) IntraMuscular once  lactobacillus acidophilus 1 Tablet(s) Oral daily  lactulose Syrup 30 Gram(s) Oral every 6 hours  metoclopramide Injectable 10 milliGRAM(s) IV Push once  omega-3-Acid Ethyl Esters 4 Gram(s) Oral daily  pantoprazole    Tablet 40 milliGRAM(s) Oral before breakfast  polyethylene glycol 3350 17 Gram(s) Oral once  sodium chloride 7% Inhalation 5 milliLiter(s) Inhalation three times a day  spironolactone 150 milliGRAM(s) Oral daily  zolpidem 10 milliGRAM(s) Oral at bedtime PRN      Vital Signs Last 24 Hrs  T(C): 37.2 (20 Nov 2019 04:34), Max: 37.2 (20 Nov 2019 04:34)  T(F): 98.9 (20 Nov 2019 04:34), Max: 98.9 (20 Nov 2019 04:34)  HR: 89 (20 Nov 2019 09:19) (76 - 97)  BP: 99/56 (20 Nov 2019 09:19) (99/56 - 151/72)  BP(mean): --  RR: 18 (20 Nov 2019 09:19) (18 - 18)  SpO2: 90% (20 Nov 2019 09:19) (90% - 92%)    Physical Exam:  General:    NAD, non toxic  Head: atraumatic, normocephalic  Eyes: normal sclera and conjunctiva  ENT:   no oropharyngeal lesions, no LAD, neck supple  Cardio:    irregular S1,S2, + murmur  Respiratory:  clear b/l, no wheezing  abd:   distended but soft, BS +, not tender  :     no CVAT, no suprapubic tenderness, no rod  Musculoskeletal : no joint swelling  Skin:    no rash  vascular: no phlebitis  Neurologic:     no focal deficits                        7.8    3.40  )-----------( 78       ( 19 Nov 2019 10:38 )             27.4       11-20    137  |  98  |  16  ----------------------------<  98  4.7   |  29  |  1.04    Ca    8.7      20 Nov 2019 08:54  Phos  3.8     11-20  Mg     1.8     11-20    TPro  7.8  /  Alb  2.8<L>  /  TBili  2.0<H>  /  DBili  x   /  AST  38  /  ALT  22  /  AlkPhos  130<H>  11-20          MICROBIOLOGY:  v  .Sputum Sputum  11-17-19 --  --  --      .Sputum Sputum  11-16-19 --  --  --      .Body Fluid Pleural Fluid  11-15-19   No growth at 5 days  --    polymorphonuclear leukocytes seen  No organisms seen  by cytocentrifuge      .Sputum Sputum  11-15-19   Culture is being performed.  --  --      .Blood Blood-Venous  11-12-19   No growth at 5 days.  --  --      .Blood Blood  11-11-19   No growth at 5 days.  --  --      .Urine Clean Catch (Midstream)  11-09-19   >=3 organisms. Probable collection contamination.  --  --      .Blood Blood-Peripheral  11-09-19   No growth at 5 days.  --  Blood Culture PCR                RADIOLOGY:  Images below reviewed personally  < from: CT Chest w/ IV Cont (11.08.19 @ 23:55) >  IMPRESSION:   CHEST:  Moderate right pleural effusion. Small left pleural effusion.   No infectious consolidation.     ABDOMEN AND PELVIS:  Cirrhosis, splenomegaly and small volume ascites.

## 2019-11-20 NOTE — DISCHARGE NOTE PROVIDER - NSDCMRMEDTOKEN_GEN_ALL_CORE_FT
atorvastatin 20 mg oral tablet: 1 tab(s) orally once a day  Bariatric Rolling Walker: Dx: R 6-11 rib fx  furosemide 40 mg oral tablet: 1 tab(s) orally 2 times a day  lactulose 10 g/15 mL oral syrup: 45 milliliter(s) orally every 6 hours  hold for diarrhea   Omega-3 oral capsule: 1 cap(s) orally once a day  omeprazole 40 mg oral delayed release capsule: 1 cap(s) orally once a day  Prodigen oral capsule: 1 cap(s) orally once a day  spironolactone 100 mg oral tablet: 1 tab(s) orally once a day atorvastatin 20 mg oral tablet: 1 tab(s) orally once a day  Bariatric Rolling Walker: Dx: R 6-11 rib fx  benzonatate 100 mg oral capsule: 1 cap(s) orally 3 times a day, As needed, Cough  fluticasone 50 mcg/inh nasal spray: 1 spray(s) nasal 2 times a day  furosemide 20 mg oral tablet: 3 tab(s) orally once a day  lactulose 10 g/15 mL oral syrup: 45 milliliter(s) orally every 6 hours  Titrate to 3 BMs per day  Omega-3 oral capsule: 1 cap(s) orally once a day  pantoprazole 40 mg oral delayed release tablet: 1 tab(s) orally once a day (before a meal)  Prodigen oral capsule: 1 cap(s) orally once a day  rifAXIMin 550 mg oral tablet: 1 tab(s) orally 2 times a day  spironolactone 25 mg oral tablet: 6 tab(s) orally once a day

## 2019-11-21 LAB
ALBUMIN SERPL ELPH-MCNC: 3 G/DL — LOW (ref 3.3–5)
ALP SERPL-CCNC: 133 U/L — HIGH (ref 40–120)
ALT FLD-CCNC: 23 U/L — SIGNIFICANT CHANGE UP (ref 10–45)
ANION GAP SERPL CALC-SCNC: 10 MMOL/L — SIGNIFICANT CHANGE UP (ref 5–17)
APTT BLD: 39.8 SEC — HIGH (ref 27.5–36.3)
AST SERPL-CCNC: 34 U/L — SIGNIFICANT CHANGE UP (ref 10–40)
BILIRUB SERPL-MCNC: 2.2 MG/DL — HIGH (ref 0.2–1.2)
BUN SERPL-MCNC: 16 MG/DL — SIGNIFICANT CHANGE UP (ref 7–23)
CALCIUM SERPL-MCNC: 8.7 MG/DL — SIGNIFICANT CHANGE UP (ref 8.4–10.5)
CHLORIDE SERPL-SCNC: 96 MMOL/L — SIGNIFICANT CHANGE UP (ref 96–108)
CO2 SERPL-SCNC: 29 MMOL/L — SIGNIFICANT CHANGE UP (ref 22–31)
CREAT SERPL-MCNC: 1.08 MG/DL — SIGNIFICANT CHANGE UP (ref 0.5–1.3)
GLUCOSE SERPL-MCNC: 92 MG/DL — SIGNIFICANT CHANGE UP (ref 70–99)
HCT VFR BLD CALC: 28.3 % — LOW (ref 39–50)
HGB BLD-MCNC: 8.2 G/DL — LOW (ref 13–17)
INR BLD: 1.79 RATIO — HIGH (ref 0.88–1.16)
MAGNESIUM SERPL-MCNC: 1.8 MG/DL — SIGNIFICANT CHANGE UP (ref 1.6–2.6)
MCHC RBC-ENTMCNC: 24.4 PG — LOW (ref 27–34)
MCHC RBC-ENTMCNC: 29 GM/DL — LOW (ref 32–36)
MCV RBC AUTO: 84.2 FL — SIGNIFICANT CHANGE UP (ref 80–100)
PHOSPHATE SERPL-MCNC: 3.9 MG/DL — SIGNIFICANT CHANGE UP (ref 2.5–4.5)
PLATELET # BLD AUTO: 87 K/UL — LOW (ref 150–400)
POTASSIUM SERPL-MCNC: 5 MMOL/L — SIGNIFICANT CHANGE UP (ref 3.5–5.3)
POTASSIUM SERPL-SCNC: 5 MMOL/L — SIGNIFICANT CHANGE UP (ref 3.5–5.3)
PROT SERPL-MCNC: 8.2 G/DL — SIGNIFICANT CHANGE UP (ref 6–8.3)
PROTHROM AB SERPL-ACNC: 20.8 SEC — HIGH (ref 10–13.1)
RBC # BLD: 3.36 M/UL — LOW (ref 4.2–5.8)
RBC # FLD: 21.6 % — HIGH (ref 10.3–14.5)
SODIUM SERPL-SCNC: 135 MMOL/L — SIGNIFICANT CHANGE UP (ref 135–145)
WBC # BLD: 2.76 K/UL — LOW (ref 3.8–10.5)
WBC # FLD AUTO: 2.76 K/UL — LOW (ref 3.8–10.5)

## 2019-11-21 PROCEDURE — 99233 SBSQ HOSP IP/OBS HIGH 50: CPT | Mod: GC

## 2019-11-21 PROCEDURE — 99232 SBSQ HOSP IP/OBS MODERATE 35: CPT

## 2019-11-21 RX ADMIN — Medication 1 SPRAY(S): at 05:21

## 2019-11-21 RX ADMIN — CHLORHEXIDINE GLUCONATE 1 APPLICATION(S): 213 SOLUTION TOPICAL at 12:24

## 2019-11-21 RX ADMIN — Medication 325 MILLIGRAM(S): at 21:04

## 2019-11-21 RX ADMIN — HEPARIN SODIUM 5000 UNIT(S): 5000 INJECTION INTRAVENOUS; SUBCUTANEOUS at 17:43

## 2019-11-21 RX ADMIN — Medication 1 TABLET(S): at 12:25

## 2019-11-21 RX ADMIN — SPIRONOLACTONE 150 MILLIGRAM(S): 25 TABLET, FILM COATED ORAL at 05:23

## 2019-11-21 RX ADMIN — Medication 325 MILLIGRAM(S): at 21:34

## 2019-11-21 RX ADMIN — Medication 60 MILLIGRAM(S): at 05:22

## 2019-11-21 RX ADMIN — HEPARIN SODIUM 5000 UNIT(S): 5000 INJECTION INTRAVENOUS; SUBCUTANEOUS at 05:23

## 2019-11-21 RX ADMIN — Medication 4 GRAM(S): at 17:43

## 2019-11-21 RX ADMIN — Medication 1 SPRAY(S): at 17:43

## 2019-11-21 RX ADMIN — ZOLPIDEM TARTRATE 10 MILLIGRAM(S): 10 TABLET ORAL at 21:04

## 2019-11-21 RX ADMIN — ATORVASTATIN CALCIUM 20 MILLIGRAM(S): 80 TABLET, FILM COATED ORAL at 21:05

## 2019-11-21 RX ADMIN — PANTOPRAZOLE SODIUM 40 MILLIGRAM(S): 20 TABLET, DELAYED RELEASE ORAL at 05:22

## 2019-11-21 NOTE — PROGRESS NOTE ADULT - SUBJECTIVE AND OBJECTIVE BOX
Follow Up:  fever, pleural effusion    Interval History: pt afebrile, still with cough    ROS:      All other systems negative    Constitutional: no fever, no chills  Head: no trauma  Eyes: no vision changes, no eye pain  ENT:  no sore throat, no rhinorrhea  Cardiovascular:  no chest pain, no palpitation  Respiratory:  + SOB mostly on exertion, + cough  GI:  no abd pain, no vomiting, no diarrhea  urinary: no dysuria, no hematuria, no flank pain  musculoskeletal:  no joint pain, no joint swelling  skin:  no rash  neurology:  no headache, no seizure  psych: no anxiety, no depression         Allergies  No Known Allergies        ANTIMICROBIALS:  rifAXIMin 550 two times a day      OTHER MEDS:  acetaminophen   Tablet .. 325 milliGRAM(s) Oral every 4 hours PRN  atorvastatin 20 milliGRAM(s) Oral at bedtime  benzonatate 100 milliGRAM(s) Oral three times a day PRN  chlorhexidine 4% Liquid 1 Application(s) Topical daily  fluticasone propionate 50 MICROgram(s)/spray Nasal Spray 1 Spray(s) Both Nostrils two times a day  furosemide    Tablet 60 milliGRAM(s) Oral daily  heparin  Injectable 5000 Unit(s) SubCutaneous every 12 hours  influenza   Vaccine 0.5 milliLiter(s) IntraMuscular once  lactobacillus acidophilus 1 Tablet(s) Oral daily  lactulose Syrup 30 Gram(s) Oral every 6 hours  metoclopramide Injectable 10 milliGRAM(s) IV Push once  omega-3-Acid Ethyl Esters 4 Gram(s) Oral daily  pantoprazole    Tablet 40 milliGRAM(s) Oral before breakfast  polyethylene glycol 3350 17 Gram(s) Oral once  sodium chloride 7% Inhalation 5 milliLiter(s) Inhalation three times a day  spironolactone 150 milliGRAM(s) Oral daily  zolpidem 10 milliGRAM(s) Oral at bedtime PRN      Vital Signs Last 24 Hrs  T(C): 36.7 (21 Nov 2019 08:33), Max: 36.8 (21 Nov 2019 05:03)  T(F): 98.1 (21 Nov 2019 08:33), Max: 98.2 (21 Nov 2019 05:03)  HR: 75 (21 Nov 2019 08:33) (74 - 83)  BP: 108/56 (21 Nov 2019 08:33) (106/60 - 119/65)  BP(mean): --  RR: 18 (21 Nov 2019 08:33) (18 - 18)  SpO2: 90% (21 Nov 2019 08:33) (90% - 95%)    Physical Exam:  General:    NAD, non toxic  Head: atraumatic, normocephalic  Eyes: normal sclera and conjunctiva  ENT:   no oropharyngeal lesions, no LAD, neck supple  Cardio:    irregular S1,S2, + murmur  Respiratory:  clear b/l, no wheezing  abd:   distended but soft, BS +, not tender  :     no CVAT, no suprapubic tenderness, no rod  Musculoskeletal : no joint swelling  Skin:    no rash  vascular: no phlebitis  Neurologic:     no focal deficits                        7.8    2.73  )-----------( 80       ( 20 Nov 2019 13:06 )             27.6       11-21    135  |  96  |  16  ----------------------------<  92  5.0   |  29  |  1.08    Ca    8.7      21 Nov 2019 08:00  Phos  3.9     11-21  Mg     1.8     11-21    TPro  8.2  /  Alb  3.0<L>  /  TBili  2.2<H>  /  DBili  x   /  AST  34  /  ALT  23  /  AlkPhos  133<H>  11-21          MICROBIOLOGY:  v  .Sputum Sputum  11-17-19   Culture is being performed.  --  --      .Sputum Sputum  11-16-19   Culture is being performed.  --  --      .Body Fluid Pleural Fluid  11-15-19   No growth at 5 days  --    polymorphonuclear leukocytes seen  No organisms seen  by cytocentrifuge      .Sputum Sputum  11-15-19   Culture is being performed.  --  --      .Blood Blood-Venous  11-12-19   No growth at 5 days.  --  --      .Blood Blood  11-11-19   No growth at 5 days.  --  --      .Urine Clean Catch (Midstream)  11-09-19   >=3 organisms. Probable collection contamination.  --  --      .Blood Blood-Peripheral  11-09-19   No growth at 5 days.  --  Blood Culture PCR                RADIOLOGY:  Images below reviewed personally  < from: CT Chest w/ IV Cont (11.08.19 @ 23:55) >  IMPRESSION:   CHEST:  Moderate right pleural effusion. Small left pleural effusion.   No infectious consolidation.     ABDOMEN AND PELVIS:  Cirrhosis, splenomegaly and small volume ascites.

## 2019-11-21 NOTE — PROGRESS NOTE ADULT - PROBLEM SELECTOR PLAN 1
- Likely multifactorial 2/2 pleural effusions, bacterial pneumonia and restrictive lung disease 2/2 obesity  - Lasix 40mg PO daily  - Currently saturating mid 90's off nasal cannula  - PT eval 11/19, rec JERRI, pending pre-auth

## 2019-11-21 NOTE — PROGRESS NOTE ADULT - SUBJECTIVE AND OBJECTIVE BOX
Demario Art, PGY1  Internal Medicine Resident  Pager: [phone] ([facility]), [phone] ([facility])    Patient is a 59y old  Male who presents with a chief complaint of Fever/shaking chills x1 day, (20 Nov 2019 13:05)    SUBJECTIVE / OVERNIGHT EVENTS:  No overnight events per Night Float.  On exam, patient sitting comfortably in chair, off of supplemental oxygen.  Still coughing occasionally.  Has been tolerating food and water.  Last bowel movement was 3x diarrhea yesterday.  Looking forward to [facility].    ADDITIONAL REVIEW OF SYSTEMS:  Patient denies fevers/chills, chest pain, palpitations, abdominal pain, nausea, vomiting, or constipation.    MEDICATIONS  (STANDING):  atorvastatin 20 milliGRAM(s) Oral at bedtime  chlorhexidine 4% Liquid 1 Application(s) Topical daily  fluticasone propionate 50 MICROgram(s)/spray Nasal Spray 1 Spray(s) Both Nostrils two times a day  furosemide    Tablet 60 milliGRAM(s) Oral daily  heparin  Injectable 5000 Unit(s) SubCutaneous every 12 hours  influenza   Vaccine 0.5 milliLiter(s) IntraMuscular once  lactobacillus acidophilus 1 Tablet(s) Oral daily  lactulose Syrup 30 Gram(s) Oral every 6 hours  metoclopramide Injectable 10 milliGRAM(s) IV Push once  omega-3-Acid Ethyl Esters 4 Gram(s) Oral daily  pantoprazole    Tablet 40 milliGRAM(s) Oral before breakfast  polyethylene glycol 3350 17 Gram(s) Oral once  rifAXIMin 550 milliGRAM(s) Oral two times a day  sodium chloride 7% Inhalation 5 milliLiter(s) Inhalation three times a day  spironolactone 150 milliGRAM(s) Oral daily    MEDICATIONS  (PRN):  acetaminophen   Tablet .. 325 milliGRAM(s) Oral every 4 hours PRN Temp greater or equal to 38C (100.4F), Mild Pain (1 - 3), Moderate Pain (4 - 6)  benzonatate 100 milliGRAM(s) Oral three times a day PRN Cough  zolpidem 10 milliGRAM(s) Oral at bedtime PRN Insomnia      CAPILLARY BLOOD GLUCOSE        I&O's Summary    21 Nov 2019 07:01  -  21 Nov 2019 11:13  --------------------------------------------------------  IN: 360 mL / OUT: 0 mL / NET: 360 mL        PHYSICAL EXAM:  Vital Signs Last 24 Hrs  T(C): 36.7 (21 Nov 2019 08:33), Max: 36.8 (21 Nov 2019 05:03)  T(F): 98.1 (21 Nov 2019 08:33), Max: 98.2 (21 Nov 2019 05:03)  HR: 75 (21 Nov 2019 08:33) (74 - 83)  BP: 108/56 (21 Nov 2019 08:33) (106/60 - 119/65)  BP(mean): --  RR: 18 (21 Nov 2019 08:33) (18 - 18)  SpO2: 90% (21 Nov 2019 08:33) (90% - 95%)    CONSTITUTIONAL: No apparent distress. Awake and conversive, cooperative with exam  EYES: PERRLA, EOMI, conjunctiva and sclera clear  ENMT: Moist oral mucosa, no pharyngeal injection or exudates; normal dentition  NECK: Supple, no palpable masses; no JVD  RESPIRATORY: Normal respiratory effort, reduced breath sounds R side, otherwise CTAB, no wheezes, rales, or rhonchi  CARDIOVASCULAR: Regular rate and rhythm, normal S1 and S2, no murmur/rub/gallop; distal pulses 2+ bilaterally  ABDOMEN: Obese, distended, nontender to palpation, normoactive bowel sounds, no rebound/guarding  MUSCULOSKELETAL:  Bilateral pitting edema 1+ to upper shins; extremities WWP, no clubbing or cyanosis of digits; no joint swelling or tenderness  PSYCH: A+O x3; affect appropriate  NEUROLOGY: CN II-XII grossly intact; no focal sensory or motor deficits; moving all extremities    LABS:                        8.2    2.76  )-----------( 87       ( 21 Nov 2019 10:47 )             28.3     11-21    135  |  96  |  16  ----------------------------<  92  5.0   |  29  |  1.08    Ca    8.7      21 Nov 2019 08:00  Phos  3.9     11-21  Mg     1.8     11-21    TPro  8.2  /  Alb  3.0<L>  /  TBili  2.2<H>  /  DBili  x   /  AST  34  /  ALT  23  /  AlkPhos  133<H>  11-21    PT/INR - ( 21 Nov 2019 10:32 )   PT: 20.8 sec;   INR: 1.79 ratio         PTT - ( 21 Nov 2019 10:32 )  PTT:39.8 sec            RADIOLOGY & ADDITIONAL TESTS:  Results Reviewed:   Imaging Personally Reviewed:  Electrocardiogram Personally Reviewed:    COORDINATION OF CARE:  Care Discussed with Consultants/Other Providers [Y/N]:  Prior or Outpatient Records Reviewed [Y/N]:

## 2019-11-21 NOTE — PROGRESS NOTE ADULT - NSHPATTENDINGPLANDISCUSS_GEN_ALL_CORE
the primary team
Medicine Team
Medicine Team

## 2019-11-21 NOTE — PROGRESS NOTE ADULT - ASSESSMENT
59 M with HLD, CHF (EF 65% Oct 2019), HLD, cirrhosis (2/2 alcohol abuse), and recent admission (Oct 2019) for R pleural effusion s/p chest tube, fluid was exudative but negative cytology and cultures, now again presented 11/8 with fever and chills, SOB and stated that has had 3 years of cough which is getting more productive with brown sputum.   initially T: 100.3, WBC: 2-4 not neutropenic   u/a positive and urine cx >3 organisms  chest/abd CT: mod R pleural effusion, small L, no consolidation, splenomegaly, cirrhosis and small ascites   pt was started on zosyn but the blood cx came back with micrococcus luteus and pt was started on vanco 11/11  pt spiked again on 11/12 to 101.1   HIV negative    3 years of cough with recurrent R pleural effusion, last tap 10/2019 exudative with negative cx and cytology, so TB and malignancy were considered, now s/p tap again exudative but negative ADA and AFB, sputum also negative x 3, quantiferon indeterminate  pt also cirrhotic with pancytopenia, although hepatic hydrothorax possible but pt has fever with exudative effusion and chronic cough  micrococcus luteus 1/4 in blood likely contamination, repeat negative    * will follow the AFB cultures  * pulm and thoracic surgery not convinced that this is an exudative or infectious effusion  * s/p 7 days of vanco and zosyn, now off  * f/u with ID and pulmonary as outpatient  * will sign off, please call with questions

## 2019-11-22 ENCOUNTER — TRANSCRIPTION ENCOUNTER (OUTPATIENT)
Age: 59
End: 2019-11-22

## 2019-11-22 VITALS
HEART RATE: 85 BPM | RESPIRATION RATE: 18 BRPM | OXYGEN SATURATION: 92 % | TEMPERATURE: 98 F | DIASTOLIC BLOOD PRESSURE: 66 MMHG | SYSTOLIC BLOOD PRESSURE: 118 MMHG

## 2019-11-22 PROBLEM — E78.5 HYPERLIPIDEMIA, UNSPECIFIED: Chronic | Status: ACTIVE | Noted: 2019-10-08

## 2019-11-22 PROBLEM — I50.9 HEART FAILURE, UNSPECIFIED: Chronic | Status: ACTIVE | Noted: 2019-10-08

## 2019-11-22 LAB
ALBUMIN SERPL ELPH-MCNC: 2.9 G/DL — LOW (ref 3.3–5)
ALP SERPL-CCNC: 130 U/L — HIGH (ref 40–120)
ALT FLD-CCNC: 21 U/L — SIGNIFICANT CHANGE UP (ref 10–45)
ANION GAP SERPL CALC-SCNC: 11 MMOL/L — SIGNIFICANT CHANGE UP (ref 5–17)
APTT BLD: 39.9 SEC — HIGH (ref 27.5–36.3)
AST SERPL-CCNC: 30 U/L — SIGNIFICANT CHANGE UP (ref 10–40)
BILIRUB SERPL-MCNC: 2.1 MG/DL — HIGH (ref 0.2–1.2)
BUN SERPL-MCNC: 19 MG/DL — SIGNIFICANT CHANGE UP (ref 7–23)
CALCIUM SERPL-MCNC: 9 MG/DL — SIGNIFICANT CHANGE UP (ref 8.4–10.5)
CHLORIDE SERPL-SCNC: 96 MMOL/L — SIGNIFICANT CHANGE UP (ref 96–108)
CO2 SERPL-SCNC: 29 MMOL/L — SIGNIFICANT CHANGE UP (ref 22–31)
CREAT SERPL-MCNC: 1.16 MG/DL — SIGNIFICANT CHANGE UP (ref 0.5–1.3)
GLUCOSE SERPL-MCNC: 95 MG/DL — SIGNIFICANT CHANGE UP (ref 70–99)
HCT VFR BLD CALC: 27.8 % — LOW (ref 39–50)
HGB BLD-MCNC: 8.1 G/DL — LOW (ref 13–17)
INR BLD: 1.8 RATIO — HIGH (ref 0.88–1.16)
MAGNESIUM SERPL-MCNC: 1.8 MG/DL — SIGNIFICANT CHANGE UP (ref 1.6–2.6)
MCHC RBC-ENTMCNC: 24.5 PG — LOW (ref 27–34)
MCHC RBC-ENTMCNC: 29.1 GM/DL — LOW (ref 32–36)
MCV RBC AUTO: 84 FL — SIGNIFICANT CHANGE UP (ref 80–100)
PHOSPHATE SERPL-MCNC: 4 MG/DL — SIGNIFICANT CHANGE UP (ref 2.5–4.5)
PLATELET # BLD AUTO: 88 K/UL — LOW (ref 150–400)
POTASSIUM SERPL-MCNC: 5 MMOL/L — SIGNIFICANT CHANGE UP (ref 3.5–5.3)
POTASSIUM SERPL-SCNC: 5 MMOL/L — SIGNIFICANT CHANGE UP (ref 3.5–5.3)
PROT SERPL-MCNC: 8.4 G/DL — HIGH (ref 6–8.3)
PROTHROM AB SERPL-ACNC: 20.7 SEC — HIGH (ref 10–13.1)
RBC # BLD: 3.31 M/UL — LOW (ref 4.2–5.8)
RBC # FLD: 21.8 % — HIGH (ref 10.3–14.5)
SODIUM SERPL-SCNC: 136 MMOL/L — SIGNIFICANT CHANGE UP (ref 135–145)
WBC # BLD: 2.97 K/UL — LOW (ref 3.8–10.5)
WBC # FLD AUTO: 2.97 K/UL — LOW (ref 3.8–10.5)

## 2019-11-22 PROCEDURE — 97161 PT EVAL LOW COMPLEX 20 MIN: CPT

## 2019-11-22 PROCEDURE — 86850 RBC ANTIBODY SCREEN: CPT

## 2019-11-22 PROCEDURE — 99281 EMR DPT VST MAYX REQ PHY/QHP: CPT | Mod: 25

## 2019-11-22 PROCEDURE — 93005 ELECTROCARDIOGRAM TRACING: CPT

## 2019-11-22 PROCEDURE — 87798 DETECT AGENT NOS DNA AMP: CPT

## 2019-11-22 PROCEDURE — 82945 GLUCOSE OTHER FLUID: CPT

## 2019-11-22 PROCEDURE — 84157 ASSAY OF PROTEIN OTHER: CPT

## 2019-11-22 PROCEDURE — 87633 RESP VIRUS 12-25 TARGETS: CPT

## 2019-11-22 PROCEDURE — 87015 SPECIMEN INFECT AGNT CONCNTJ: CPT

## 2019-11-22 PROCEDURE — 87070 CULTURE OTHR SPECIMN AEROBIC: CPT

## 2019-11-22 PROCEDURE — P9016: CPT

## 2019-11-22 PROCEDURE — P9011: CPT

## 2019-11-22 PROCEDURE — 83605 ASSAY OF LACTIC ACID: CPT

## 2019-11-22 PROCEDURE — 85027 COMPLETE CBC AUTOMATED: CPT

## 2019-11-22 PROCEDURE — 83986 ASSAY PH BODY FLUID NOS: CPT

## 2019-11-22 PROCEDURE — 94660 CPAP INITIATION&MGMT: CPT

## 2019-11-22 PROCEDURE — P9047: CPT

## 2019-11-22 PROCEDURE — 82947 ASSAY GLUCOSE BLOOD QUANT: CPT

## 2019-11-22 PROCEDURE — 86901 BLOOD TYPING SEROLOGIC RH(D): CPT

## 2019-11-22 PROCEDURE — 84295 ASSAY OF SERUM SODIUM: CPT

## 2019-11-22 PROCEDURE — 74177 CT ABD & PELVIS W/CONTRAST: CPT

## 2019-11-22 PROCEDURE — 87086 URINE CULTURE/COLONY COUNT: CPT

## 2019-11-22 PROCEDURE — 32555 ASPIRATE PLEURA W/ IMAGING: CPT

## 2019-11-22 PROCEDURE — 82435 ASSAY OF BLOOD CHLORIDE: CPT

## 2019-11-22 PROCEDURE — 71260 CT THORAX DX C+: CPT

## 2019-11-22 PROCEDURE — 87040 BLOOD CULTURE FOR BACTERIA: CPT

## 2019-11-22 PROCEDURE — 87150 DNA/RNA AMPLIFIED PROBE: CPT

## 2019-11-22 PROCEDURE — 80202 ASSAY OF VANCOMYCIN: CPT

## 2019-11-22 PROCEDURE — 36600 WITHDRAWAL OF ARTERIAL BLOOD: CPT

## 2019-11-22 PROCEDURE — 87486 CHLMYD PNEUM DNA AMP PROBE: CPT

## 2019-11-22 PROCEDURE — P9059: CPT

## 2019-11-22 PROCEDURE — 96374 THER/PROPH/DIAG INJ IV PUSH: CPT | Mod: XU

## 2019-11-22 PROCEDURE — 82042 OTHER SOURCE ALBUMIN QUAN EA: CPT

## 2019-11-22 PROCEDURE — 82140 ASSAY OF AMMONIA: CPT

## 2019-11-22 PROCEDURE — 85610 PROTHROMBIN TIME: CPT

## 2019-11-22 PROCEDURE — 84311 SPECTROPHOTOMETRY: CPT

## 2019-11-22 PROCEDURE — 87075 CULTR BACTERIA EXCEPT BLOOD: CPT

## 2019-11-22 PROCEDURE — 88305 TISSUE EXAM BY PATHOLOGIST: CPT

## 2019-11-22 PROCEDURE — 86480 TB TEST CELL IMMUN MEASURE: CPT

## 2019-11-22 PROCEDURE — 86900 BLOOD TYPING SEROLOGIC ABO: CPT

## 2019-11-22 PROCEDURE — 85730 THROMBOPLASTIN TIME PARTIAL: CPT

## 2019-11-22 PROCEDURE — 87581 M.PNEUMON DNA AMP PROBE: CPT

## 2019-11-22 PROCEDURE — 83690 ASSAY OF LIPASE: CPT

## 2019-11-22 PROCEDURE — 82330 ASSAY OF CALCIUM: CPT

## 2019-11-22 PROCEDURE — 97116 GAIT TRAINING THERAPY: CPT

## 2019-11-22 PROCEDURE — 94640 AIRWAY INHALATION TREATMENT: CPT

## 2019-11-22 PROCEDURE — 87205 SMEAR GRAM STAIN: CPT

## 2019-11-22 PROCEDURE — 87206 SMEAR FLUORESCENT/ACID STAI: CPT

## 2019-11-22 PROCEDURE — 83735 ASSAY OF MAGNESIUM: CPT

## 2019-11-22 PROCEDURE — 76705 ECHO EXAM OF ABDOMEN: CPT

## 2019-11-22 PROCEDURE — 99239 HOSP IP/OBS DSCHRG MGMT >30: CPT | Mod: GC

## 2019-11-22 PROCEDURE — 36430 TRANSFUSION BLD/BLD COMPNT: CPT

## 2019-11-22 PROCEDURE — 88112 CYTOPATH CELL ENHANCE TECH: CPT

## 2019-11-22 PROCEDURE — 85014 HEMATOCRIT: CPT

## 2019-11-22 PROCEDURE — 80053 COMPREHEN METABOLIC PANEL: CPT

## 2019-11-22 PROCEDURE — 96375 TX/PRO/DX INJ NEW DRUG ADDON: CPT | Mod: XU

## 2019-11-22 PROCEDURE — 84100 ASSAY OF PHOSPHORUS: CPT

## 2019-11-22 PROCEDURE — 81001 URINALYSIS AUTO W/SCOPE: CPT

## 2019-11-22 PROCEDURE — 83615 LACTATE (LD) (LDH) ENZYME: CPT

## 2019-11-22 PROCEDURE — 87389 HIV-1 AG W/HIV-1&-2 AB AG IA: CPT

## 2019-11-22 PROCEDURE — 84132 ASSAY OF SERUM POTASSIUM: CPT

## 2019-11-22 PROCEDURE — 89051 BODY FLUID CELL COUNT: CPT

## 2019-11-22 PROCEDURE — 86923 COMPATIBILITY TEST ELECTRIC: CPT

## 2019-11-22 PROCEDURE — 71045 X-RAY EXAM CHEST 1 VIEW: CPT

## 2019-11-22 PROCEDURE — 97530 THERAPEUTIC ACTIVITIES: CPT

## 2019-11-22 PROCEDURE — 82803 BLOOD GASES ANY COMBINATION: CPT

## 2019-11-22 PROCEDURE — 87116 MYCOBACTERIA CULTURE: CPT

## 2019-11-22 PROCEDURE — 84478 ASSAY OF TRIGLYCERIDES: CPT

## 2019-11-22 RX ADMIN — PANTOPRAZOLE SODIUM 40 MILLIGRAM(S): 20 TABLET, DELAYED RELEASE ORAL at 05:07

## 2019-11-22 RX ADMIN — CHLORHEXIDINE GLUCONATE 1 APPLICATION(S): 213 SOLUTION TOPICAL at 11:59

## 2019-11-22 RX ADMIN — Medication 1 TABLET(S): at 11:59

## 2019-11-22 RX ADMIN — Medication 60 MILLIGRAM(S): at 05:07

## 2019-11-22 RX ADMIN — Medication 1 SPRAY(S): at 05:08

## 2019-11-22 RX ADMIN — SPIRONOLACTONE 150 MILLIGRAM(S): 25 TABLET, FILM COATED ORAL at 05:06

## 2019-11-22 RX ADMIN — LACTULOSE 30 GRAM(S): 10 SOLUTION ORAL at 05:08

## 2019-11-22 RX ADMIN — HEPARIN SODIUM 5000 UNIT(S): 5000 INJECTION INTRAVENOUS; SUBCUTANEOUS at 05:07

## 2019-11-22 RX ADMIN — Medication 4 GRAM(S): at 11:59

## 2019-11-22 NOTE — PROGRESS NOTE ADULT - SUBJECTIVE AND OBJECTIVE BOX
Demario Art, PGY1  Internal Medicine Resident  Pager: [phone] ([facility]), [phone] ([facility])    Patient is a 59y old  Male who presents with a chief complaint of Fever/shaking chills x1 day, (21 Nov 2019 11:13)    SUBJECTIVE / OVERNIGHT EVENTS:  No overnight events per Night Float.  At bedside, patient resting comfortably but did not sleep well.  Has been tolerating food and water.  Had diarrhea yesterday.  Pending transport to [facility] at 3 PM.    ADDITIONAL REVIEW OF SYSTEMS:  Patient denies fevers/chills, chest pain, palpitations, abdominal pain, nausea, vomiting, diarrhea, or constipation.    MEDICATIONS  (STANDING):  atorvastatin 20 milliGRAM(s) Oral at bedtime  chlorhexidine 4% Liquid 1 Application(s) Topical daily  fluticasone propionate 50 MICROgram(s)/spray Nasal Spray 1 Spray(s) Both Nostrils two times a day  furosemide    Tablet 60 milliGRAM(s) Oral daily  heparin  Injectable 5000 Unit(s) SubCutaneous every 12 hours  influenza   Vaccine 0.5 milliLiter(s) IntraMuscular once  lactobacillus acidophilus 1 Tablet(s) Oral daily  lactulose Syrup 30 Gram(s) Oral every 6 hours  metoclopramide Injectable 10 milliGRAM(s) IV Push once  omega-3-Acid Ethyl Esters 4 Gram(s) Oral daily  pantoprazole    Tablet 40 milliGRAM(s) Oral before breakfast  polyethylene glycol 3350 17 Gram(s) Oral once  rifAXIMin 550 milliGRAM(s) Oral two times a day  sodium chloride 7% Inhalation 5 milliLiter(s) Inhalation three times a day  spironolactone 150 milliGRAM(s) Oral daily    MEDICATIONS  (PRN):  acetaminophen   Tablet .. 325 milliGRAM(s) Oral every 4 hours PRN Temp greater or equal to 38C (100.4F), Mild Pain (1 - 3), Moderate Pain (4 - 6)  benzonatate 100 milliGRAM(s) Oral three times a day PRN Cough  zolpidem 10 milliGRAM(s) Oral at bedtime PRN Insomnia      CAPILLARY BLOOD GLUCOSE        I&O's Summary    21 Nov 2019 07:01  -  22 Nov 2019 07:00  --------------------------------------------------------  IN: 1080 mL / OUT: 250 mL / NET: 830 mL    22 Nov 2019 07:01  -  22 Nov 2019 13:35  --------------------------------------------------------  IN: 420 mL / OUT: 2 mL / NET: 418 mL        PHYSICAL EXAM:  Vital Signs Last 24 Hrs  T(C): 36.7 (22 Nov 2019 09:53), Max: 37.1 (21 Nov 2019 16:36)  T(F): 98 (22 Nov 2019 09:53), Max: 98.7 (21 Nov 2019 16:36)  HR: 89 (22 Nov 2019 09:53) (80 - 89)  BP: 121/55 (22 Nov 2019 09:53) (110/62 - 132/71)  BP(mean): --  RR: 18 (22 Nov 2019 09:53) (18 - 18)  SpO2: 92% (22 Nov 2019 09:53) (90% - 94%)    CONSTITUTIONAL: No apparent distress. Awake and conversive, cooperative with exam  EYES: PERRLA, EOMI, conjunctiva and sclera clear  ENMT: Moist oral mucosa, no pharyngeal injection or exudates; normal dentition  NECK: Supple, no palpable masses; no JVD  RESPIRATORY: Normal respiratory effort, clear to auscultation bilaterally, no wheezes, rales, or rhonchi  CARDIOVASCULAR: Regular rate and rhythm, normal S1 and S2, no murmur/rub/gallop; distal pulses 2+ bilaterally  ABDOMEN: Soft, nontender to palpation, normoactive bowel sounds, no rebound/guarding  MUSCULOSKELETAL:  No LE edema; extremities WWP, no clubbing or cyanosis of digits; no joint swelling or tenderness  PSYCH: A+O to person, place, and time; affect appropriate  NEUROLOGY: CN II-XII grossly intact; no focal sensory or motor deficits; moving all extremities spontaneously  SKIN: No rashes; no palpable lesions    LABS:                        8.1    2.97  )-----------( 88       ( 22 Nov 2019 09:11 )             27.8     11-22    136  |  96  |  19  ----------------------------<  95  5.0   |  29  |  1.16    Ca    9.0      22 Nov 2019 07:33  Phos  4.0     11-22  Mg     1.8     11-22    TPro  8.4<H>  /  Alb  2.9<L>  /  TBili  2.1<H>  /  DBili  x   /  AST  30  /  ALT  21  /  AlkPhos  130<H>  11-22    PT/INR - ( 22 Nov 2019 08:51 )   PT: 20.7 sec;   INR: 1.80 ratio         PTT - ( 22 Nov 2019 08:51 )  PTT:39.9 sec            RADIOLOGY & ADDITIONAL TESTS:  Results Reviewed:   Imaging Personally Reviewed:  Electrocardiogram Personally Reviewed:    COORDINATION OF CARE:  Care Discussed with Consultants/Other Providers [Y/N]:  Prior or Outpatient Records Reviewed [Y/N]:

## 2019-11-22 NOTE — PROGRESS NOTE ADULT - REASON FOR ADMISSION
Fever/shaking chills x1 day,

## 2019-11-22 NOTE — PROGRESS NOTE ADULT - PROBLEM SELECTOR PLAN 1
- Likely multifactorial 2/2 pleural effusions, bacterial pneumonia and restrictive lung disease 2/2 obesity  - Lasix 40mg PO daily  - Currently saturating mid 90's off nasal cannula  - PT eval 11/19, rec [facility], pending pre-auth - Likely multifactorial 2/2 pleural effusions, bacterial pneumonia and restrictive lung disease 2/2 obesity  - Lasix 40mg PO daily  - Currently saturating mid 90's off nasal cannula  - PT eval 11/19, transport scheduled to \[facility]\"" on 11/22/19, at 3 PM
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
## 2019-11-22 NOTE — DISCHARGE NOTE NURSING/CASE MANAGEMENT/SOCIAL WORK - PATIENT PORTAL LINK FT
You can access the FollowMyHealth Patient Portal offered by [facility] by registering at the following website: [email or web address]. By joining [employer]’s FollowMyHealth portal, you will also be able to view your health information using other applications (apps) compatible with our system.

## 2019-11-22 NOTE — PROGRESS NOTE ADULT - PROBLEM SELECTOR PLAN 10
Transitions of Care Status:  1.  Name of PCP: Wesley White [phone]  2.  PCP Contacted on Admission: [ ] Y    [X] N    3.  PCP contacted at Discharge: [ ] Y    [ ] N    [ ] N/A  4.  Post-Discharge Appointment Date and Location:  5.  Summary of Handoff given to PCP:
Transitions of Care Status:  1.  Name of PCP: Wesley White [phone]  2.  PCP Contacted on Admission: [ ] Y    [X] N    3.  PCP contacted at Discharge: [ ] Y    [ ] N    [ ] N/A  4.  Post-Discharge Appointment Date and Location:  5.  Summary of Handoff given to PCP:
Transitions of Care Status:  1.  Name of PCP: Wesley White [phone]  2.  PCP Contacted on Admission: [ ] Y    [X] N    3.  PCP contacted at Discharge: [ ] Y    [ ] N    [ ] N/A  4.  Post-Discharge Appointment Date and Location:  5.  Summary of Handoff given to PCP:
Transitions of Care Status:  1.  Name of PCP: Wesley White [phone]  2.  PCP Contacted on Admission: [ ] Y    [X] N    3.  PCP contacted at Discharge: [ ] Y    [ ] N    [ ] N/A  4.  Post-Discharge Appointment Date and Location:  5.  Summary of Handoff given to PCP:
Transitions of Care Status:  1.  Name of PCP: Wesley White [phone]  2.  PCP Contacted on Admission: [ ] Y    [X] N    3.  PCP contacted at Discharge: [ ] Y    [ ] N    [ ] N/A  4.  Post-Discharge Appointment Date and Location:  5.  Summary of Handoff given to PCP:
Transitions of Care Status:  1.  Name of PCP: Wesley White [phone]  2.  PCP Contacted on Admission: [ ] Y    [X] N    3.  PCP contacted at Discharge: [ ] Y    [ ] N    [ ] N/A  4.  Post-Discharge Appointment Date and Location:  5.  Summary of Handoff given to PCP:
Transitions of Care Status:  1.  Name of PCP: Wesley White [phone]  2.  PCP Contacted on Admission: [ ] Y    [X] N    3.  PCP contacted at Discharge: [ ] Y    [ ] N    [ ] N/A  4.  Post-Discharge Appointment Date and Location:  5.  Summary of Handoff given to PCP:
Transitions of Care Status:  1.  Name of PCP: Wesley White [phone]  2.  PCP Contacted on Admission: [ ] Y    [X] N    3.  PCP contacted at Discharge: [ ] Y    [ ] N    [ ] N/A  4.  Post-Discharge Appointment Date and Location:  5.  Summary of Handoff given to PCP:
Transitions of Care Status:  1.  Name of PCP: Wesley White [phone]  2.  PCP Contacted on Admission: [ ] Y    [X] N    3.  PCP contacted at Discharge: [ ] Y    [ ] N    [ ] N/A  4.  Post-Discharge Appointment Date and Location:  5.  Summary of Handoff given to PCP:
Transitions of Care Status:  1.  Name of PCP: Wesley White [phone]  2.  PCP Contacted on Admission: [ ] Y    [X] N    3.  PCP contacted at Discharge: [ ] Y    [ ] N    [ ] N/A  4.  Post-Discharge Appointment Date and Location:  5.  Summary of Handoff given to PCP:
Transitions of Care Status:  1.  Name of PCP: Wesley White [phone]  2.  PCP Contacted on Admission: [ ] Y    [X] N    3.  PCP contacted at Discharge: [ ] Y    [ ] N    [ ] N/A  4.  Post-Discharge Appointment Date and Location:  5.  Summary of Handoff given to PCP:
Transitions of Care Status:  1.  Name of PCP: Wesley White [phone]  2.  PCP Contacted on Admission: [ ] Y    [X] N    3.  PCP contacted at Discharge: [ ] Y    [ ] N    [ ] N/A  4.  Post-Discharge Appointment Date and Location:  5.  Summary of Handoff given to PCP:
Transitions of Care Status:  1.  Name of PCP: Wesley White [phone]  2.  PCP Contacted on Admission: [ ] Y    [X] N    3.  PCP contacted at Discharge: [ ] Y    [ ] N    [ ] N/A  4.  Post-Discharge Appointment Date and Location:  5.  Summary of Handoff given to PCP:
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
## 2019-11-22 NOTE — PROGRESS NOTE ADULT - PROBLEM SELECTOR PLAN 9
- DVT: Lovenox 40  - Diet: DASH/TLC  - Dispo: [facility] ([facility]) pending pre-authorization - DVT: Lovenox 40  - Diet: DASH/TLC  - Dispo: [facility]) , transport scheduled for 11/22 at 3 PM

## 2019-12-01 ENCOUNTER — FORM ENCOUNTER (OUTPATIENT)
Age: 59
End: 2019-12-01

## 2020-01-04 LAB
CULTURE RESULTS: SIGNIFICANT CHANGE UP
CULTURE RESULTS: SIGNIFICANT CHANGE UP
SPECIMEN SOURCE: SIGNIFICANT CHANGE UP
SPECIMEN SOURCE: SIGNIFICANT CHANGE UP

## 2020-01-08 LAB
CULTURE RESULTS: SIGNIFICANT CHANGE UP
SPECIMEN SOURCE: SIGNIFICANT CHANGE UP

## 2020-01-11 NOTE — ED ADULT NURSE REASSESSMENT NOTE - NS ED NURSE REASSESS COMMENT FT1
Patient's blood sugar was retaken and it is 67.  Patient was given the glucose gel and his fluids were switched to the protocol of D5W to stabilize his blood sugar.  Will recheck in 30 minutes.    pt transferred VIA [facility] to [facility].

## 2020-02-22 ENCOUNTER — INPATIENT (INPATIENT)
Facility: HOSPITAL | Age: 60
LOS: 4 days | Discharge: NURSING, REHAB OR LONG-TERM CARE | DRG: 433 | End: 2020-02-27
Attending: HOSPITALIST | Admitting: STUDENT IN AN ORGANIZED HEALTH CARE EDUCATION/TRAINING PROGRAM
Payer: COMMERCIAL

## 2020-02-22 VITALS
SYSTOLIC BLOOD PRESSURE: 134 MMHG | DIASTOLIC BLOOD PRESSURE: 79 MMHG | HEART RATE: 88 BPM | TEMPERATURE: 98 F | HEIGHT: 70 IN | WEIGHT: 289.91 LBS | OXYGEN SATURATION: 95 % | RESPIRATION RATE: 18 BRPM

## 2020-02-22 DIAGNOSIS — Z98.890 OTHER SPECIFIED POSTPROCEDURAL STATES: Chronic | ICD-10-CM

## 2020-02-22 DIAGNOSIS — Z02.9 ENCOUNTER FOR ADMINISTRATIVE EXAMINATIONS, UNSPECIFIED: ICD-10-CM

## 2020-02-22 DIAGNOSIS — Z29.9 ENCOUNTER FOR PROPHYLACTIC MEASURES, UNSPECIFIED: ICD-10-CM

## 2020-02-22 DIAGNOSIS — K72.90 HEPATIC FAILURE, UNSPECIFIED WITHOUT COMA: ICD-10-CM

## 2020-02-22 DIAGNOSIS — E78.5 HYPERLIPIDEMIA, UNSPECIFIED: ICD-10-CM

## 2020-02-22 DIAGNOSIS — R06.02 SHORTNESS OF BREATH: ICD-10-CM

## 2020-02-22 DIAGNOSIS — E66.9 OBESITY, UNSPECIFIED: ICD-10-CM

## 2020-02-22 DIAGNOSIS — D61.818 OTHER PANCYTOPENIA: ICD-10-CM

## 2020-02-22 LAB
ALBUMIN SERPL ELPH-MCNC: 2.8 G/DL — LOW (ref 3.3–5)
ALP SERPL-CCNC: 168 U/L — HIGH (ref 40–120)
ALT FLD-CCNC: 13 U/L — SIGNIFICANT CHANGE UP (ref 10–45)
ANION GAP SERPL CALC-SCNC: 8 MMOL/L — SIGNIFICANT CHANGE UP (ref 5–17)
APTT BLD: 42.7 SEC — HIGH (ref 27.5–36.3)
AST SERPL-CCNC: 27 U/L — SIGNIFICANT CHANGE UP (ref 10–40)
BASOPHILS # BLD AUTO: 0.04 K/UL — SIGNIFICANT CHANGE UP (ref 0–0.2)
BASOPHILS NFR BLD AUTO: 1.8 % — SIGNIFICANT CHANGE UP (ref 0–2)
BILIRUB SERPL-MCNC: 2.5 MG/DL — HIGH (ref 0.2–1.2)
BLD GP AB SCN SERPL QL: NEGATIVE — SIGNIFICANT CHANGE UP
BUN SERPL-MCNC: 13 MG/DL — SIGNIFICANT CHANGE UP (ref 7–23)
CALCIUM SERPL-MCNC: 8.8 MG/DL — SIGNIFICANT CHANGE UP (ref 8.4–10.5)
CHLORIDE SERPL-SCNC: 102 MMOL/L — SIGNIFICANT CHANGE UP (ref 96–108)
CO2 SERPL-SCNC: 29 MMOL/L — SIGNIFICANT CHANGE UP (ref 22–31)
CREAT SERPL-MCNC: 1.03 MG/DL — SIGNIFICANT CHANGE UP (ref 0.5–1.3)
EOSINOPHIL # BLD AUTO: 0 K/UL — SIGNIFICANT CHANGE UP (ref 0–0.5)
EOSINOPHIL NFR BLD AUTO: 0 % — SIGNIFICANT CHANGE UP (ref 0–6)
GAS PNL BLDV: SIGNIFICANT CHANGE UP
GLUCOSE SERPL-MCNC: 102 MG/DL — HIGH (ref 70–99)
HCT VFR BLD CALC: SIGNIFICANT CHANGE UP (ref 39–50)
HGB BLD-MCNC: 8.4 G/DL — LOW (ref 13–17)
INR BLD: 2.17 RATIO — HIGH (ref 0.88–1.16)
LYMPHOCYTES # BLD AUTO: 0.68 K/UL — LOW (ref 1–3.3)
LYMPHOCYTES # BLD AUTO: 29.4 % — SIGNIFICANT CHANGE UP (ref 13–44)
MCHC RBC-ENTMCNC: SIGNIFICANT CHANGE UP (ref 27–34)
MCHC RBC-ENTMCNC: SIGNIFICANT CHANGE UP (ref 32–36)
MCV RBC AUTO: SIGNIFICANT CHANGE UP (ref 80–100)
MONOCYTES # BLD AUTO: 0.06 K/UL — SIGNIFICANT CHANGE UP (ref 0–0.9)
MONOCYTES NFR BLD AUTO: 2.7 % — SIGNIFICANT CHANGE UP (ref 2–14)
NEUTROPHILS # BLD AUTO: 1.49 K/UL — LOW (ref 1.8–7.4)
NEUTROPHILS NFR BLD AUTO: 64.3 % — SIGNIFICANT CHANGE UP (ref 43–77)
NT-PROBNP SERPL-SCNC: 77 PG/ML — SIGNIFICANT CHANGE UP (ref 0–300)
PLATELET # BLD AUTO: 71 K/UL — LOW (ref 150–400)
POTASSIUM SERPL-MCNC: 4.2 MMOL/L — SIGNIFICANT CHANGE UP (ref 3.5–5.3)
POTASSIUM SERPL-SCNC: 4.2 MMOL/L — SIGNIFICANT CHANGE UP (ref 3.5–5.3)
PROT SERPL-MCNC: 7.9 G/DL — SIGNIFICANT CHANGE UP (ref 6–8.3)
PROTHROM AB SERPL-ACNC: 25.6 SEC — HIGH (ref 10–12.9)
RAPID RVP RESULT: SIGNIFICANT CHANGE UP
RBC # BLD: 3.52 M/UL — LOW (ref 4.2–5.8)
RBC # FLD: SIGNIFICANT CHANGE UP (ref 10.3–14.5)
RH IG SCN BLD-IMP: NEGATIVE — SIGNIFICANT CHANGE UP
SODIUM SERPL-SCNC: 139 MMOL/L — SIGNIFICANT CHANGE UP (ref 135–145)
WBC # BLD: 2.32 K/UL — LOW (ref 3.8–10.5)
WBC # FLD AUTO: 2.32 K/UL — LOW (ref 3.8–10.5)

## 2020-02-22 PROCEDURE — 99223 1ST HOSP IP/OBS HIGH 75: CPT

## 2020-02-22 PROCEDURE — 71046 X-RAY EXAM CHEST 2 VIEWS: CPT | Mod: 26

## 2020-02-22 PROCEDURE — 71275 CT ANGIOGRAPHY CHEST: CPT | Mod: 26

## 2020-02-22 PROCEDURE — 99285 EMERGENCY DEPT VISIT HI MDM: CPT

## 2020-02-22 PROCEDURE — 93010 ELECTROCARDIOGRAM REPORT: CPT

## 2020-02-22 RX ORDER — IPRATROPIUM/ALBUTEROL SULFATE 18-103MCG
3 AEROSOL WITH ADAPTER (GRAM) INHALATION ONCE
Refills: 0 | Status: COMPLETED | OUTPATIENT
Start: 2020-02-22 | End: 2020-02-22

## 2020-02-22 RX ORDER — ZOLPIDEM TARTRATE 10 MG/1
5 TABLET ORAL AT BEDTIME
Refills: 0 | Status: DISCONTINUED | OUTPATIENT
Start: 2020-02-22 | End: 2020-02-27

## 2020-02-22 RX ORDER — PANTOPRAZOLE SODIUM 20 MG/1
40 TABLET, DELAYED RELEASE ORAL
Refills: 0 | Status: DISCONTINUED | OUTPATIENT
Start: 2020-02-22 | End: 2020-02-27

## 2020-02-22 RX ORDER — SPIRONOLACTONE 25 MG/1
100 TABLET, FILM COATED ORAL DAILY
Refills: 0 | Status: DISCONTINUED | OUTPATIENT
Start: 2020-02-22 | End: 2020-02-27

## 2020-02-22 RX ORDER — ATORVASTATIN CALCIUM 80 MG/1
20 TABLET, FILM COATED ORAL AT BEDTIME
Refills: 0 | Status: DISCONTINUED | OUTPATIENT
Start: 2020-02-22 | End: 2020-02-27

## 2020-02-22 RX ORDER — INFLUENZA VIRUS VACCINE 15; 15; 15; 15 UG/.5ML; UG/.5ML; UG/.5ML; UG/.5ML
0.5 SUSPENSION INTRAMUSCULAR ONCE
Refills: 0 | Status: DISCONTINUED | OUTPATIENT
Start: 2020-02-22 | End: 2020-02-27

## 2020-02-22 RX ORDER — LANOLIN ALCOHOL/MO/W.PET/CERES
3 CREAM (GRAM) TOPICAL AT BEDTIME
Refills: 0 | Status: DISCONTINUED | OUTPATIENT
Start: 2020-02-22 | End: 2020-02-27

## 2020-02-22 RX ORDER — FUROSEMIDE 40 MG
40 TABLET ORAL DAILY
Refills: 0 | Status: DISCONTINUED | OUTPATIENT
Start: 2020-02-22 | End: 2020-02-23

## 2020-02-22 RX ORDER — LANOLIN ALCOHOL/MO/W.PET/CERES
3 CREAM (GRAM) TOPICAL ONCE
Refills: 0 | Status: COMPLETED | OUTPATIENT
Start: 2020-02-22 | End: 2020-02-22

## 2020-02-22 RX ORDER — LACTULOSE 10 G/15ML
30 SOLUTION ORAL
Refills: 0 | Status: DISCONTINUED | OUTPATIENT
Start: 2020-02-22 | End: 2020-02-27

## 2020-02-22 RX ADMIN — ATORVASTATIN CALCIUM 20 MILLIGRAM(S): 80 TABLET, FILM COATED ORAL at 22:42

## 2020-02-22 RX ADMIN — Medication 3 MILLILITER(S): at 17:24

## 2020-02-22 RX ADMIN — Medication 40 MILLIGRAM(S): at 22:42

## 2020-02-22 RX ADMIN — SPIRONOLACTONE 100 MILLIGRAM(S): 25 TABLET, FILM COATED ORAL at 22:43

## 2020-02-22 RX ADMIN — Medication 3 MILLILITER(S): at 17:45

## 2020-02-22 RX ADMIN — Medication 200 MILLIGRAM(S): at 23:27

## 2020-02-22 NOTE — H&P ADULT - PROBLEM SELECTOR PLAN 2
PE (ruled out) Vs. viral syndrome vs. volume overload  -US abd and +/- para if has ascites  -Starting diuretics as above

## 2020-02-22 NOTE — ED ADULT NURSE NOTE - PMH
CHF (congestive heart failure)  EF 65% Oct 2019  Hyperlipidemia  Atorvastatin 20  Obese    Pneumonia

## 2020-02-22 NOTE — ED ADULT NURSE NOTE - OBJECTIVE STATEMENT
59 year old male a/ox3 with pmh of CHF with EF 65%, Hyperlipidemia, Morbid Obesity, Cirrhosis (2* ETOH abuse, ascites, presenting to ed with 3 years of cough and sob on exertion. patient states he was also seen by his hematologist 4 weeks ago who told him to come to ED then, but patient decided to wait. respirations even unlabored no sob/dyspnea abd large round distended +bs nontender skin dry warm intact bateman equally

## 2020-02-22 NOTE — ED PROVIDER NOTE - NS ED ROS FT
ROS:  GENERAL: No fever, no chills  EYES: no change in vision  HEENT: no trouble swallowing, no trouble speaking  CARDIAC: no chest pain  PULMONARY: +cough and SOB   GI: no abdominal pain, no nausea, no vomiting, no diarrhea, no constipation  : No dysuria, no frequency, no change in appearance, or odor of urine  SKIN: no rashes  NEURO: no headache, no weakness  MSK: No joint pain    Javan Lemus PGY2

## 2020-02-22 NOTE — H&P ADULT - PROBLEM SELECTOR PLAN 1
Etiology BOB w/ ETOH abuse history last drink 6 months ago  HCC:  Ascites- f/u US, never had para before but has had thora.  Resume Lasix and Spironolactone- pt does not meet sepsis criteria and history consistent with medication non adherance.  Lasix 40mg PO daily and Spironolactone 100mg PO daily w/ holding parameters  Varices: Unknown  HCC: none CT 11/2019  Encephalopathy: Non present, continue home Lactulose 45mg BID and Rifaximin 550mg PO BID  F/U blood cultures, RVP, CXR shows effusions, blood cultures, UA and US abd w/ doppler for work up of decompensated cirrhosis. Etiology BOB w/ ETOH abuse history last drink 6 months ago  HCC:  Ascites- f/u US, never had para before but has had thora which showed exudate but most likely concentrated transudative fluid as per pulmonary due to hepatic hydrothorax and diuretic use.  Resume Lasix and Spironolactone- pt does not meet sepsis criteria and history consistent with medication non adherance.  Lasix 40mg PO daily and Spironolactone 100mg PO daily w/ holding parameters  Varices: Unknown  HCC: none CT 11/2019  Encephalopathy: Non present, continue home Lactulose 45mg BID and Rifaximin 550mg PO BID  F/U blood cultures, RVP, CXR shows effusions, blood cultures, UA and US abd w/ doppler for work up of decompensated cirrhosis.

## 2020-02-22 NOTE — ED PROVIDER NOTE - OBJECTIVE STATEMENT
59M with PMH of cirrhosis 2/2 BOB and etoh abuse and anemia requiring transfusion p/w dry cough and SOB x 3 years worsening over the past few weeks. Denies any other symptoms including fever, chest pain, increased swelling, abdominal pain. Has required paracentesis but denies any recent increase in abdominal distension.

## 2020-02-22 NOTE — H&P ADULT - HISTORY OF PRESENT ILLNESS
59M pmxh of BOB and ETOH cirrhosis with pancytopenia, and lumbar disc herniations presenting with increasing SOB after he ran out of his medications at home.  He says that he has had a cough for 3 years which is attributed to his hepatic hydrothorax (s/p diagnostic thoracentesis in the past) which is at it's baseline but that his SOB has been progressive over a couple of weeks/months finally getting worse this week after running out of medications.  He says he was not able to get refills because he "had a coughing fit".  He is not sure which of the medications he ran out of but says that he takes them all as prescribed since his December 2019 discharge.  He has no sick contacts, fevers, chills, nausea, vomiting, or disorientation. Says he takes his Lactulose twice per day and has 3-4 BMs per day.    In ED:  He was given nebulizer treatments.

## 2020-02-22 NOTE — ED ADULT TRIAGE NOTE - ESI TRIAGE ACUITY LEVEL, MLM
3
Breath Sounds equal & clear to percussion & auscultation, no accessory muscle use

## 2020-02-22 NOTE — ED PROVIDER NOTE - CLINICAL SUMMARY MEDICAL DECISION MAKING FREE TEXT BOX
Worsening chronic SOB/cough. No chest pain, abd pain, fever. Will assess for PE, PNA, PTX, CHF and likely admit for further observation/evaluation.

## 2020-02-22 NOTE — H&P ADULT - PROBLEM SELECTOR PLAN 7
Transitions of Care Status:  1.  Name of PCP: Dr. White   2.  PCP Contacted on Admission: [ x] Y    [ ] N    3.  PCP contacted at Discharge: [ ] Y    [ ] N    [ ] N/A  4.  Post-Discharge Appointment Date and Location:  5.  Summary of Handoff given to PCP:

## 2020-02-22 NOTE — H&P ADULT - PROBLEM SELECTOR PLAN 4
Morbid obesity BMI 43 last admission  Nutrition consult  Would need weight loss prior to being considered for transplant.

## 2020-02-22 NOTE — H&P ADULT - NSHPSOCIALHISTORY_GEN_ALL_CORE
No smoking  Former ETOH abuse- says he drank heavily on weekends only and for 2 years only. Last drink 6 months ago  Single

## 2020-02-22 NOTE — PATIENT PROFILE ADULT - NSTOBACCONEVERSMOKERY/N_GEN_A
Pt walks in room per self and reminded to use call light for assistance. No distress or complaints. Will continue to monitor on rounds. Bed alarm on. Call light in reach.  DALE MEDRANO No

## 2020-02-22 NOTE — H&P ADULT - ASSESSMENT
59M pmxh of BOB and ETOH cirrhosis with pancytopenia, and lumbar disc herniations admitted for acute on chronic shortness of breath 2/2 volume overload from medication non adherence and decompensated hepatic cirrhosis.

## 2020-02-22 NOTE — ED PROVIDER NOTE - ATTENDING CONTRIBUTION TO CARE
Attending MD Singh.  Agree with above.  Pt is a 58 yo male wiht hx of cirrhosis related to EtOH abuse, CHF, anemia requiring txes previously who presents to ED with complaint of 3 yrs cough, SOB worsening x sev'l wks.  Saw outpt doctor sevl wks ago for this complaint and was advised to come to ED but didn't come to ED at that time.  Has required paracentesis previously.  Denies abdominal/CP/fever.  Endorses isolated cough/SOB. Pt is morbidly obese.  No acute distress on arrival.  Pt breathing without acute distress on room air.  He is not hypoxic.  No sig LE edema bilaterally.  Coarse cough appreciated intermittently. Attending MD Singh.  Agree with above.  Pt is a 58 yo male wiht hx of cirrhosis related to EtOH abuse, CHF, anemia requiring txes previously who presents to ED with complaint of 3 yrs cough, SOB worsening x sev'l wks.  Saw outpt doctor victor manuell wks ago for this complaint and was advised to come to ED but didn't come to ED at that time.  Has required paracentesis previously.  Denies abdominal/CP/fever.  Endorses isolated cough/SOB. Pt is morbidly obese.  No acute distress on arrival.  Pt breathing without acute distress on room air.  He is not hypoxic.  No sig LE edema bilaterally.  Coarse cough appreciated intermittently.    Pt's CXR with what appears to be a chronic mild R basilar pleural effusion with poss new wedge deformity vs. fluid in R sulcus.  Will obtain CTA chest 2/2 cough, SOB.  Pt endorses orthopnea and inc SOB x 3 yrs that has worsened over the last 3 weeks to make him unable to lie flat.  Planned CTA and prob admission for AC if + for PE vs. CHF given orthopnea and fluid on CXR.  Hemodynamically stable with no acute SOB at time of signout to incoming team.

## 2020-02-22 NOTE — ED PROVIDER NOTE - PHYSICAL EXAMINATION
Gen: AAOx3, non-toxic  Head: NCAT  HEENT: EOMI, oral mucosa moist, normal conjunctiva  Lung: decreased breath sounds b/l   CV: RRR, no murmurs, rubs or gallops  Abd: soft, NTND, no guarding, no CVA tenderness  MSK: no visible deformities  Neuro: No focal sensory or motor deficits  Skin: Warm, well perfused, no rash  Psych: normal affect.     ~Javan Lemus PGY2

## 2020-02-22 NOTE — H&P ADULT - PROBLEM SELECTOR PLAN 3
2/2 cirrhosis, stable  No signs of active bleeding  Hold HSQ in elevated INR and thrombocytopenia  Monitor CBC daily

## 2020-02-22 NOTE — H&P ADULT - NSHPPHYSICALEXAM_GEN_ALL_CORE
ICU Vital Signs Last 24 Hrs  T(C): 36.8 (22 Feb 2020 15:53), Max: 36.8 (22 Feb 2020 13:44)  T(F): 98.2 (22 Feb 2020 15:53), Max: 98.3 (22 Feb 2020 13:44)  HR: 86 (22 Feb 2020 15:53) (86 - 88)  BP: 127/75 (22 Feb 2020 15:53) (127/75 - 134/79)  BP(mean): --  ABP: --  ABP(mean): --  RR: 18 (22 Feb 2020 15:53) (18 - 18)  SpO2: 95% (22 Feb 2020 15:53) (95% - 95%)

## 2020-02-22 NOTE — H&P ADULT - NSHPLABSRESULTS_GEN_ALL_CORE
02-22    139  |  102  |  13  ----------------------------<  102<H>  4.2   |  29  |  1.03    Ca    8.8      22 Feb 2020 14:39    TPro  7.9  /  Alb  2.8<L>  /  TBili  2.5<H>  /  DBili  x   /  AST  27  /  ALT  13  /  AlkPhos  168<H>  02-22                            8.4    2.32  )-----------( 71       ( 22 Feb 2020 14:39 )             See note            LIVER FUNCTIONS - ( 22 Feb 2020 14:39 )  Alb: 2.8 g/dL / Pro: 7.9 g/dL / ALK PHOS: 168 U/L / ALT: 13 U/L / AST: 27 U/L / GGT: x             PT/INR - ( 22 Feb 2020 14:39 )   PT: 25.6 sec;   INR: 2.17 ratio         PTT - ( 22 Feb 2020 14:39 )  PTT:42.7 sec    CTA: Negative for PE

## 2020-02-22 NOTE — H&P ADULT - PROBLEM SELECTOR PLAN 6
Holding in setting of elevated INR and pancytopenia  Keep Mg > 2 K > 4  Dash diet with 1.2L fluid restriction

## 2020-02-23 LAB
ALBUMIN SERPL ELPH-MCNC: 2.6 G/DL — LOW (ref 3.3–5)
ALP SERPL-CCNC: 154 U/L — HIGH (ref 40–120)
ALT FLD-CCNC: 14 U/L — SIGNIFICANT CHANGE UP (ref 10–45)
ANION GAP SERPL CALC-SCNC: 10 MMOL/L — SIGNIFICANT CHANGE UP (ref 5–17)
APPEARANCE UR: CLEAR — SIGNIFICANT CHANGE UP
APTT BLD: 43.7 SEC — HIGH (ref 27.5–36.3)
AST SERPL-CCNC: 21 U/L — SIGNIFICANT CHANGE UP (ref 10–40)
BILIRUB SERPL-MCNC: 2.2 MG/DL — HIGH (ref 0.2–1.2)
BILIRUB UR-MCNC: NEGATIVE — SIGNIFICANT CHANGE UP
BUN SERPL-MCNC: 12 MG/DL — SIGNIFICANT CHANGE UP (ref 7–23)
CALCIUM SERPL-MCNC: 8.4 MG/DL — SIGNIFICANT CHANGE UP (ref 8.4–10.5)
CHLORIDE SERPL-SCNC: 101 MMOL/L — SIGNIFICANT CHANGE UP (ref 96–108)
CO2 SERPL-SCNC: 29 MMOL/L — SIGNIFICANT CHANGE UP (ref 22–31)
COLOR SPEC: YELLOW — SIGNIFICANT CHANGE UP
CREAT SERPL-MCNC: 0.97 MG/DL — SIGNIFICANT CHANGE UP (ref 0.5–1.3)
DIFF PNL FLD: NEGATIVE — SIGNIFICANT CHANGE UP
GLUCOSE SERPL-MCNC: 81 MG/DL — SIGNIFICANT CHANGE UP (ref 70–99)
GLUCOSE UR QL: NEGATIVE — SIGNIFICANT CHANGE UP
HCT VFR BLD CALC: 26.9 % — LOW (ref 39–50)
HGB BLD-MCNC: 7.7 G/DL — LOW (ref 13–17)
INR BLD: 2.21 RATIO — HIGH (ref 0.88–1.16)
KETONES UR-MCNC: NEGATIVE — SIGNIFICANT CHANGE UP
LEUKOCYTE ESTERASE UR-ACNC: NEGATIVE — SIGNIFICANT CHANGE UP
MCHC RBC-ENTMCNC: 24.4 PG — LOW (ref 27–34)
MCHC RBC-ENTMCNC: 28.6 GM/DL — LOW (ref 32–36)
MCV RBC AUTO: 85.4 FL — SIGNIFICANT CHANGE UP (ref 80–100)
NITRITE UR-MCNC: NEGATIVE — SIGNIFICANT CHANGE UP
NRBC # BLD: 0 /100 WBCS — SIGNIFICANT CHANGE UP (ref 0–0)
PH UR: 6 — SIGNIFICANT CHANGE UP (ref 5–8)
PLATELET # BLD AUTO: 69 K/UL — LOW (ref 150–400)
POTASSIUM SERPL-MCNC: 4 MMOL/L — SIGNIFICANT CHANGE UP (ref 3.5–5.3)
POTASSIUM SERPL-SCNC: 4 MMOL/L — SIGNIFICANT CHANGE UP (ref 3.5–5.3)
PROT SERPL-MCNC: 7.3 G/DL — SIGNIFICANT CHANGE UP (ref 6–8.3)
PROT UR-MCNC: NEGATIVE — SIGNIFICANT CHANGE UP
PROTHROM AB SERPL-ACNC: 26.1 SEC — HIGH (ref 10–12.9)
RBC # BLD: 3.15 M/UL — LOW (ref 4.2–5.8)
RBC # FLD: 18.5 % — HIGH (ref 10.3–14.5)
SODIUM SERPL-SCNC: 140 MMOL/L — SIGNIFICANT CHANGE UP (ref 135–145)
SP GR SPEC: 1.02 — SIGNIFICANT CHANGE UP (ref 1.01–1.02)
UROBILINOGEN FLD QL: ABNORMAL
WBC # BLD: 2.09 K/UL — LOW (ref 3.8–10.5)
WBC # FLD AUTO: 2.09 K/UL — LOW (ref 3.8–10.5)

## 2020-02-23 PROCEDURE — 99232 SBSQ HOSP IP/OBS MODERATE 35: CPT | Mod: GC

## 2020-02-23 RX ORDER — LIDOCAINE 4 G/100G
1 CREAM TOPICAL DAILY
Refills: 0 | Status: DISCONTINUED | OUTPATIENT
Start: 2020-02-23 | End: 2020-02-27

## 2020-02-23 RX ORDER — FUROSEMIDE 40 MG
40 TABLET ORAL
Refills: 0 | Status: DISCONTINUED | OUTPATIENT
Start: 2020-02-23 | End: 2020-02-24

## 2020-02-23 RX ADMIN — Medication 40 MILLIGRAM(S): at 06:30

## 2020-02-23 RX ADMIN — PANTOPRAZOLE SODIUM 40 MILLIGRAM(S): 20 TABLET, DELAYED RELEASE ORAL at 06:30

## 2020-02-23 RX ADMIN — ZOLPIDEM TARTRATE 5 MILLIGRAM(S): 10 TABLET ORAL at 22:09

## 2020-02-23 RX ADMIN — LIDOCAINE 1 PATCH: 4 CREAM TOPICAL at 18:47

## 2020-02-23 RX ADMIN — LACTULOSE 30 GRAM(S): 10 SOLUTION ORAL at 06:31

## 2020-02-23 RX ADMIN — Medication 200 MILLIGRAM(S): at 06:30

## 2020-02-23 RX ADMIN — LACTULOSE 30 GRAM(S): 10 SOLUTION ORAL at 18:07

## 2020-02-23 RX ADMIN — ZOLPIDEM TARTRATE 5 MILLIGRAM(S): 10 TABLET ORAL at 00:03

## 2020-02-23 RX ADMIN — Medication 40 MILLIGRAM(S): at 18:08

## 2020-02-23 RX ADMIN — ZOLPIDEM TARTRATE 5 MILLIGRAM(S): 10 TABLET ORAL at 23:27

## 2020-02-23 RX ADMIN — ATORVASTATIN CALCIUM 20 MILLIGRAM(S): 80 TABLET, FILM COATED ORAL at 22:09

## 2020-02-23 RX ADMIN — Medication 200 MILLIGRAM(S): at 18:09

## 2020-02-23 RX ADMIN — SPIRONOLACTONE 100 MILLIGRAM(S): 25 TABLET, FILM COATED ORAL at 06:30

## 2020-02-23 RX ADMIN — LIDOCAINE 1 PATCH: 4 CREAM TOPICAL at 23:27

## 2020-02-23 NOTE — PROGRESS NOTE ADULT - SUBJECTIVE AND OBJECTIVE BOX
Contact info:  Marlen Cline MD  Internal Medicine, PGY3  Pager: [phone] (NS)/[phone] ([facility])    M-F 7AM-7PM: pager covered by primary day team  Mon-Sun 7PM-7AM: Night float page [phone] for teams 1-3, [phone] for teams 4, CMA, CMB  Sa-Sun 7AM-12PM: please see contact sheet in front of chart and/or Provider to RN, primary day team  Sa-Sun 12PM-7PM: Page [phone] for teams 1-4 (NS), primary team for CMA/CMB, [facility] please page covering resident    24 HOUR EVENTS/ROS:    MEDICATIONS:  furosemide    Tablet 40 milliGRAM(s) Oral daily  spironolactone 100 milliGRAM(s) Oral daily    rifAXIMin 550 milliGRAM(s) Oral two times a day    guaiFENesin   Syrup  (Sugar-Free) 200 milliGRAM(s) Oral every 6 hours PRN    melatonin 3 milliGRAM(s) Oral at bedtime  zolpidem 5 milliGRAM(s) Oral at bedtime PRN  zolpidem 5 milliGRAM(s) Oral at bedtime PRN    lactulose Syrup 30 Gram(s) Oral two times a day  pantoprazole    Tablet 40 milliGRAM(s) Oral before breakfast    atorvastatin 20 milliGRAM(s) Oral at bedtime    influenza   Vaccine 0.5 milliLiter(s) IntraMuscular once      PHYSICAL EXAM:  T(C): 36.6 (02-23-20 @ 04:51), Max: 36.8 (02-22-20 @ 13:44)  HR: 84 (02-23-20 @ 04:51) (84 - 93)  BP: 110/57 (02-23-20 @ 04:51) (110/57 - 137/68)  RR: 19 (02-23-20 @ 04:51) (18 - 22)  SpO2: 98% (02-23-20 @ 04:51) (89% - 98%)  Wt(kg): --  Daily Height in cm: 177.8 (22 Feb 2020 19:50)    Daily   I&O's Summary    TELEMETRY:     Appearance: NAD	  HEENT:   Normal oral mucosa, PERRL, EOMI	  Lymphatic: No lymphadenopathy  Cardiovascular: Normal S1 S2, No JVD, No murmurs, No edema  Respiratory: Lungs clear to auscultation	  Neuro/psych: Grossly non-focal, CN 2-12 intact, AAOX3, mood and affect normal  Gastrointestinal:  Soft, Non-tender, + BS	  Skin: No rashes, No ecchymoses, No cyanosis	  Extremities: Normal range of motion, No clubbing, cyanosis or edema  Vascular: Peripheral pulses palpable 2+ bilaterally    LABS:	 	                        8.4    2.32  )-----------( 71       ( 22 Feb 2020 14:39 )             See note    02-22    139  |  102  |  13  ----------------------------<  102<H>  4.2   |  29  |  1.03    Ca    8.8      22 Feb 2020 14:39    TPro  7.9  /  Alb  2.8<L>  /  TBili  2.5<H>  /  DBili  x   /  AST  27  /  ALT  13  /  AlkPhos  168<H>  02-22    proBNP: Serum Pro-Brain Natriuretic Peptide: 77 pg/mL (02-22 @ 14:39)    Lipid Profile:   HgA1c:   TSH:   FS: CAPILLARY BLOOD GLUCOSE        BCX/UCX:   Coags: PT/INR - ( 22 Feb 2020 14:39 )   PT: 25.6 sec;   INR: 2.17 ratio         PTT - ( 22 Feb 2020 14:39 )  PTT:42.7 sec    CARDIAC MARKERS:            	    ECG:  	  RADIOLOGY:    Consult notes reviewed:  Care discussed with providers: Contact info:  Marlen Cline MD  Internal Medicine, PGY3  Pager: [phone] (NS)/[phone] ([facility])    M-F 7AM-7PM: pager covered by primary day team  Mon-Sun 7PM-7AM: Night float page [phone] for teams 1-3, [phone] for teams 4, CMA, CMB  Sa-Sun 7AM-12PM: please see contact sheet in front of chart and/or Provider to RN, primary day team  Sa-Sun 12PM-7PM: Page [phone] for teams 1-4 (NS), primary team for CMA/CMB, [facility] please page covering resident    24 HOUR EVENTS/ROS: Pt still with dry cough and SOB. Denies F/C, swelling, N/V. Does report rib pain when coughing.    MEDICATIONS:  furosemide    Tablet 40 milliGRAM(s) Oral daily  spironolactone 100 milliGRAM(s) Oral daily  rifAXIMin 550 milliGRAM(s) Oral two times a day  guaiFENesin   Syrup  (Sugar-Free) 200 milliGRAM(s) Oral every 6 hours PRN  melatonin 3 milliGRAM(s) Oral at bedtime  zolpidem 5 milliGRAM(s) Oral at bedtime PRN  zolpidem 5 milliGRAM(s) Oral at bedtime PRN  lactulose Syrup 30 Gram(s) Oral two times a day  pantoprazole    Tablet 40 milliGRAM(s) Oral before breakfast  atorvastatin 20 milliGRAM(s) Oral at bedtime  influenza   Vaccine 0.5 milliLiter(s) IntraMuscular once      PHYSICAL EXAM:  T(C): 36.6 (02-23-20 @ 04:51), Max: 36.8 (02-22-20 @ 13:44)  HR: 84 (02-23-20 @ 04:51) (84 - 93)  BP: 110/57 (02-23-20 @ 04:51) (110/57 - 137/68)  RR: 19 (02-23-20 @ 04:51) (18 - 22)  SpO2: 98% (02-23-20 @ 04:51) (89% - 98%)  Wt(kg): --  Daily Height in cm: 177.8 (22 Feb 2020 19:50)    Daily   I&O's Summary    Appearance: NAD obese male sitting up eating breakfast  HEENT:   Normal oral mucosa, PERRL, EOMI	  Lymphatic: No lymphadenopathy  Cardiovascular: Normal S1 S2, No JVD, No murmurs, No edema  Respiratory: Lungs clear to auscultation, mildly reduced at bases  Neuro/psych: Grossly non-focal, CN 2-12 intact, AAOX3, mood and affect normal  Gastrointestinal:  Soft, Non-tender, + BS	  Skin: No rashes, No ecchymoses, No cyanosis	  Extremities: Normal range of motion, No clubbing, cyanosis or edema  Vascular: Peripheral pulses palpable 2+ bilaterally    LABS:	 	                        8.4    2.32  )-----------( 71       ( 22 Feb 2020 14:39 )             See note    02-22    139  |  102  |  13  ----------------------------<  102<H>  4.2   |  29  |  1.03    Ca    8.8      22 Feb 2020 14:39    TPro  7.9  /  Alb  2.8<L>  /  TBili  2.5<H>  /  DBili  x   /  AST  27  /  ALT  13  /  AlkPhos  168<H>  02-22    proBNP: Serum Pro-Brain Natriuretic Peptide: 77 pg/mL (02-22 @ 14:39)      BCX/UCX:   Coags: PT/INR - ( 22 Feb 2020 14:39 )   PT: 25.6 sec;   INR: 2.17 ratio         PTT - ( 22 Feb 2020 14:39 )  PTT:42.7 sec    Care discussed with providers: Edward

## 2020-02-24 LAB
ALBUMIN SERPL ELPH-MCNC: 2.9 G/DL — LOW (ref 3.3–5)
ALP SERPL-CCNC: 152 U/L — HIGH (ref 40–120)
ALT FLD-CCNC: 13 U/L — SIGNIFICANT CHANGE UP (ref 10–45)
ANION GAP SERPL CALC-SCNC: 9 MMOL/L — SIGNIFICANT CHANGE UP (ref 5–17)
APTT BLD: 41.8 SEC — HIGH (ref 27.5–36.3)
AST SERPL-CCNC: 25 U/L — SIGNIFICANT CHANGE UP (ref 10–40)
BILIRUB SERPL-MCNC: 2.8 MG/DL — HIGH (ref 0.2–1.2)
BUN SERPL-MCNC: 13 MG/DL — SIGNIFICANT CHANGE UP (ref 7–23)
CALCIUM SERPL-MCNC: 8.8 MG/DL — SIGNIFICANT CHANGE UP (ref 8.4–10.5)
CHLORIDE SERPL-SCNC: 98 MMOL/L — SIGNIFICANT CHANGE UP (ref 96–108)
CO2 SERPL-SCNC: 32 MMOL/L — HIGH (ref 22–31)
CREAT SERPL-MCNC: 1.18 MG/DL — SIGNIFICANT CHANGE UP (ref 0.5–1.3)
GLUCOSE SERPL-MCNC: 97 MG/DL — SIGNIFICANT CHANGE UP (ref 70–99)
HCT VFR BLD CALC: 29.8 % — LOW (ref 39–50)
HGB BLD-MCNC: 8.3 G/DL — LOW (ref 13–17)
INR BLD: 2.12 RATIO — HIGH (ref 0.88–1.16)
MAGNESIUM SERPL-MCNC: 1.6 MG/DL — SIGNIFICANT CHANGE UP (ref 1.6–2.6)
MCHC RBC-ENTMCNC: 23.6 PG — LOW (ref 27–34)
MCHC RBC-ENTMCNC: 27.9 GM/DL — LOW (ref 32–36)
MCV RBC AUTO: 84.7 FL — SIGNIFICANT CHANGE UP (ref 80–100)
NRBC # BLD: 0 /100 WBCS — SIGNIFICANT CHANGE UP (ref 0–0)
PHOSPHATE SERPL-MCNC: 3.9 MG/DL — SIGNIFICANT CHANGE UP (ref 2.5–4.5)
PLATELET # BLD AUTO: 73 K/UL — LOW (ref 150–400)
POTASSIUM SERPL-MCNC: 4.1 MMOL/L — SIGNIFICANT CHANGE UP (ref 3.5–5.3)
POTASSIUM SERPL-SCNC: 4.1 MMOL/L — SIGNIFICANT CHANGE UP (ref 3.5–5.3)
PROT SERPL-MCNC: 7.9 G/DL — SIGNIFICANT CHANGE UP (ref 6–8.3)
PROTHROM AB SERPL-ACNC: 25 SEC — HIGH (ref 10–12.9)
RBC # BLD: 3.52 M/UL — LOW (ref 4.2–5.8)
RBC # FLD: 18.8 % — HIGH (ref 10.3–14.5)
SODIUM SERPL-SCNC: 139 MMOL/L — SIGNIFICANT CHANGE UP (ref 135–145)
WBC # BLD: 3.15 K/UL — LOW (ref 3.8–10.5)
WBC # FLD AUTO: 3.15 K/UL — LOW (ref 3.8–10.5)

## 2020-02-24 PROCEDURE — 76700 US EXAM ABDOM COMPLETE: CPT | Mod: 26,59

## 2020-02-24 PROCEDURE — 93975 VASCULAR STUDY: CPT | Mod: 26

## 2020-02-24 PROCEDURE — 99233 SBSQ HOSP IP/OBS HIGH 50: CPT | Mod: GC

## 2020-02-24 RX ORDER — FUROSEMIDE 40 MG
40 TABLET ORAL
Refills: 0 | Status: DISCONTINUED | OUTPATIENT
Start: 2020-02-24 | End: 2020-02-27

## 2020-02-24 RX ORDER — FUROSEMIDE 40 MG
40 TABLET ORAL DAILY
Refills: 0 | Status: DISCONTINUED | OUTPATIENT
Start: 2020-02-24 | End: 2020-02-24

## 2020-02-24 RX ORDER — ACETAMINOPHEN 500 MG
1000 TABLET ORAL ONCE
Refills: 0 | Status: COMPLETED | OUTPATIENT
Start: 2020-02-24 | End: 2020-02-24

## 2020-02-24 RX ADMIN — Medication 40 MILLIGRAM(S): at 05:20

## 2020-02-24 RX ADMIN — LIDOCAINE 1 PATCH: 4 CREAM TOPICAL at 11:26

## 2020-02-24 RX ADMIN — ZOLPIDEM TARTRATE 5 MILLIGRAM(S): 10 TABLET ORAL at 22:36

## 2020-02-24 RX ADMIN — LIDOCAINE 1 PATCH: 4 CREAM TOPICAL at 07:42

## 2020-02-24 RX ADMIN — ZOLPIDEM TARTRATE 5 MILLIGRAM(S): 10 TABLET ORAL at 21:26

## 2020-02-24 RX ADMIN — Medication 1000 MILLIGRAM(S): at 11:30

## 2020-02-24 RX ADMIN — LIDOCAINE 1 PATCH: 4 CREAM TOPICAL at 23:25

## 2020-02-24 RX ADMIN — Medication 40 MILLIGRAM(S): at 17:34

## 2020-02-24 RX ADMIN — LACTULOSE 30 GRAM(S): 10 SOLUTION ORAL at 05:21

## 2020-02-24 RX ADMIN — SPIRONOLACTONE 100 MILLIGRAM(S): 25 TABLET, FILM COATED ORAL at 05:21

## 2020-02-24 RX ADMIN — Medication 1000 MILLIGRAM(S): at 12:00

## 2020-02-24 RX ADMIN — ATORVASTATIN CALCIUM 20 MILLIGRAM(S): 80 TABLET, FILM COATED ORAL at 21:26

## 2020-02-24 RX ADMIN — LACTULOSE 30 GRAM(S): 10 SOLUTION ORAL at 17:34

## 2020-02-24 RX ADMIN — PANTOPRAZOLE SODIUM 40 MILLIGRAM(S): 20 TABLET, DELAYED RELEASE ORAL at 05:21

## 2020-02-24 NOTE — DIETITIAN INITIAL EVALUATION ADULT. - REASON INDICATOR FOR ASSESSMENT
Pt seen for consult for nutrition assessment.  Information obtained from pt, EMR, previous RD notes.  Per chart: pt is a 59 y.o M with PMH of BOB and ETOH cirrhosis with pancytopenia, and lumbar disc herniations presenting with increasing SOB after he ran out of his medications at home.

## 2020-02-24 NOTE — PHYSICAL THERAPY INITIAL EVALUATION ADULT - DISCHARGE DISPOSITION, PT EVAL
Adequate: hears normal conversation without difficulty
rehabilitation facility/DC subacute rehab for endurance training and strengthening; pt lives alone +15 steps to negotiate, MD Reyes aware and agrees, pt agrees, DEVENDRA Aguilera aware.

## 2020-02-24 NOTE — DIETITIAN INITIAL EVALUATION ADULT. - ADD RECOMMEND
1. Continue with DASH/TLC diet, defer fluid restriction to team. 2. Provided weight loss nutrition education - pt amenable, made aware RD remains available.  3. BMI >40 alert placed in chart - provider alerted.   4. Will continue to monitor PO intake, labs, weights, BM, skin, clinical course.

## 2020-02-24 NOTE — CHART NOTE - NSCHARTNOTEFT_GEN_A_CORE
Upon Nutritional Assessment by the Registered Dietitian your patient was determined to meet criteria / has evidence of the following diagnosis/diagnoses:          [x] Morbid Obesity / BMI > 40      Findings as based on:  [x] Comprehensive nutrition assessment   [ ] Nutrition Focused Physical Exam  [ ] Other:       Nutrition Plan/Recommendations:    1. Continue with DASH/TLC diet, defer fluid restriction to team. 2. Provided weight loss nutrition education - pt amenable, made aware RD remains available.  3. Will continue to monitor PO intake, labs, weights, BM, skin, clinical course.    RD remains available.     PROVIDER Section:     By signing this assessment you are acknowledging and agree with the diagnosis/diagnoses assigned by the Registered Dietitian    Comments:

## 2020-02-24 NOTE — PHYSICAL THERAPY INITIAL EVALUATION ADULT - PERTINENT HX OF CURRENT PROBLEM, REHAB EVAL
Pt is a 59M admitted to [facility] on 2/22/20 pmxh of BOB and ETOH cirrhosis with pancytopenia, and lumbar disc herniations admitted for acute on chronic shortness of breath 2/2 volume overload from medication non adherence and decompensated hepatic cirrhosis.

## 2020-02-24 NOTE — PROGRESS NOTE ADULT - SUBJECTIVE AND OBJECTIVE BOX
Reddy Saunders MD, PGY-1  Pager #[phone]  After 7 PM on weekdays and 12 PM on weekends, for urgent issues please page #[phone].  ----------------------------------------------------------------  Patient is a 59y old  Male who presents with a chief complaint of SOB (2020 07:36)      SUBJECTIVE / OVERNIGHT EVENTS: No acute events overnight. Pt seen and examined at bedside. Pt feels short of breath still, not improved from admission. Denies any acute complaints including headache, fever, chills, nausea, vomiting, diarrhea, constipation, chest pain.     MEDICATIONS  (STANDING):  atorvastatin 20 milliGRAM(s) Oral at bedtime  furosemide   Injectable 40 milliGRAM(s) IV Push daily  influenza   Vaccine 0.5 milliLiter(s) IntraMuscular once  lactulose Syrup 30 Gram(s) Oral two times a day  lidocaine   Patch 1 Patch Transdermal daily  melatonin 3 milliGRAM(s) Oral at bedtime  pantoprazole    Tablet 40 milliGRAM(s) Oral before breakfast  rifAXIMin 550 milliGRAM(s) Oral two times a day  spironolactone 100 milliGRAM(s) Oral daily    MEDICATIONS  (PRN):  guaiFENesin   Syrup  (Sugar-Free) 200 milliGRAM(s) Oral every 6 hours PRN Cough  zolpidem 5 milliGRAM(s) Oral at bedtime PRN Insomnia  zolpidem 5 milliGRAM(s) Oral at bedtime PRN Insomnia      CAPILLARY BLOOD GLUCOSE    I&O's Summary    2020 07:01  -  2020 07:00  --------------------------------------------------------  IN: 714 mL / OUT: 375 mL / NET: 339 mL    PHYSICAL EXAM:  Vital Signs Last 24 Hrs  T(C): 36.7 (2020 13:10), Max: 36.7 (2020 21:18)  T(F): 98 (2020 13:10), Max: 98.1 (2020 21:18)  HR: 90 (2020 13:10) (85 - 102)  BP: 124/69 (2020 13:10) (104/54 - 124/69)  RR: 20 (2020 13:10) (20 - 24)  SpO2: 92% (2020 13:10) (92% - 94%)    Appearance: NAD, obese male  HEENT: PERRL, EOMI	  Cardiovascular: Normal s1, s2, no MRG  Respiratory: CTABL, no crackles  Gastrointestinal:  Soft, Non-tender, +BS	  Skin: No rashes, No ecchymoses, No cyanosis	  Extremities: trace b/l pedal edema  Vascular: Peripheral pulses palpable 2+ bilaterally    LABS:                        8.3    3.15  )-----------( 73       ( 2020 09:02 )             29.8     02-24    139  |  98  |  13  ----------------------------<  97  4.1   |  32<H>  |  1.18    Ca    8.8      2020 09:02  Phos  3.9     02-24  Mg     1.6     02-24    TPro  7.9  /  Alb  2.9<L>  /  TBili  2.8<H>  /  DBili  x   /  AST  25  /  ALT  13  /  AlkPhos  152<H>  02-24    PT/INR - ( 2020 09:02 )   PT: 25.0 sec;   INR: 2.12 ratio         PTT - ( 2020 09:02 )  PTT:41.8 sec    Urinalysis Basic - ( 2020 21:35 )    Color: Yellow / Appearance: Clear / S.022 / pH: x  Gluc: x / Ketone: Negative  / Bili: Negative / Urobili: 6 mg/dL   Blood: x / Protein: Negative / Nitrite: Negative   Leuk Esterase: Negative / RBC: x / WBC x   Sq Epi: x / Non Sq Epi: x / Bacteria: x    Culture - Blood (collected 2020 09:57)  Source: .Blood Blood-Peripheral  Preliminary Report (2020 10:01):    No growth to date.    Culture - Blood (collected 2020 01:33)  Source: .Blood Blood-Peripheral  Preliminary Report (2020 02:01):    No growth to date.        RADIOLOGY & ADDITIONAL TESTS:  Results Reviewed:   Imaging Personally Reviewed:  Electrocardiogram Personally Reviewed:    COORDINATION OF CARE:  Care Discussed with Consultants/Other Providers [Y/N]:  Prior or Outpatient Records Reviewed [Y/N]:

## 2020-02-24 NOTE — PHYSICAL THERAPY INITIAL EVALUATION ADULT - GENERAL OBSERVATIONS, REHAB EVAL
pt received sitting in chair, +IVL, A&Ox3, follows commands, groggy at times pt received sitting in bedside chair, +IVL, agreeable to amb with encouragement. Pt is A&Ox3, appears groggy, slow to answer questions.

## 2020-02-24 NOTE — PHYSICAL THERAPY INITIAL EVALUATION ADULT - ACTIVE RANGE OF MOTION EXAMINATION, REHAB EVAL
Left UE Active ROM was WFL (within functional limits)/Right UE Active ROM was WFL (within functional limits)/Left LE Active ROM was WFL (within functional limits)/b/l shld flex NT/Right LE Active ROM was WFL (within functional limits)

## 2020-02-24 NOTE — DIETITIAN INITIAL EVALUATION ADULT. - PROBLEM SELECTOR PLAN 1
Etiology BOB w/ ETOH abuse history last drink 6 months ago  HCC:  Ascites- f/u US, never had para before but has had thora which showed exudate but most likely concentrated transudative fluid as per pulmonary due to hepatic hydrothorax and diuretic use.  Resume Lasix and Spironolactone- pt does not meet sepsis criteria and history consistent with medication non adherance.  Lasix 40mg PO daily and Spironolactone 100mg PO daily w/ holding parameters  Varices: Unknown  HCC: none CT 11/2019  Encephalopathy: Non present, continue home Lactulose 45mg BID and Rifaximin 550mg PO BID  F/U blood cultures, RVP, CXR shows effusions, blood cultures, UA and US abd w/ doppler for work up of decompensated cirrhosis.
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
## 2020-02-24 NOTE — PHYSICAL THERAPY INITIAL EVALUATION ADULT - ADDITIONAL COMMENTS
pt lives at home alone, +15 steps to negotiate with HR, ind amb and ADLs, owns rolling walker does not use

## 2020-02-24 NOTE — DIETITIAN INITIAL EVALUATION ADULT. - PHYSICAL APPEARANCE
obese/other (specify) No overt fat or muscle loss observed.  Per nursing assessment, skin intact. Per flow sheets, +1 bilateral ankle edema noted.   Ht: 60 inches Wt: 316.3 pounds BMI: 45.4 kg/m2 IBW: 160 pounds (+/-10%) %IBW: 198%

## 2020-02-25 LAB
ANION GAP SERPL CALC-SCNC: 8 MMOL/L — SIGNIFICANT CHANGE UP (ref 5–17)
BUN SERPL-MCNC: 16 MG/DL — SIGNIFICANT CHANGE UP (ref 7–23)
CALCIUM SERPL-MCNC: 8.2 MG/DL — LOW (ref 8.4–10.5)
CHLORIDE SERPL-SCNC: 96 MMOL/L — SIGNIFICANT CHANGE UP (ref 96–108)
CO2 SERPL-SCNC: 30 MMOL/L — SIGNIFICANT CHANGE UP (ref 22–31)
CREAT SERPL-MCNC: 1.26 MG/DL — SIGNIFICANT CHANGE UP (ref 0.5–1.3)
GLUCOSE SERPL-MCNC: 120 MG/DL — HIGH (ref 70–99)
HCT VFR BLD CALC: 28.1 % — LOW (ref 39–50)
HGB BLD-MCNC: 7.8 G/DL — LOW (ref 13–17)
MAGNESIUM SERPL-MCNC: 1.6 MG/DL — SIGNIFICANT CHANGE UP (ref 1.6–2.6)
MCHC RBC-ENTMCNC: 23.8 PG — LOW (ref 27–34)
MCHC RBC-ENTMCNC: 27.8 GM/DL — LOW (ref 32–36)
MCV RBC AUTO: 85.7 FL — SIGNIFICANT CHANGE UP (ref 80–100)
NRBC # BLD: 0 /100 WBCS — SIGNIFICANT CHANGE UP (ref 0–0)
PHOSPHATE SERPL-MCNC: 3.4 MG/DL — SIGNIFICANT CHANGE UP (ref 2.5–4.5)
PLATELET # BLD AUTO: 66 K/UL — LOW (ref 150–400)
POTASSIUM SERPL-MCNC: 4.3 MMOL/L — SIGNIFICANT CHANGE UP (ref 3.5–5.3)
POTASSIUM SERPL-SCNC: 4.3 MMOL/L — SIGNIFICANT CHANGE UP (ref 3.5–5.3)
RBC # BLD: 3.28 M/UL — LOW (ref 4.2–5.8)
RBC # FLD: 18.6 % — HIGH (ref 10.3–14.5)
SODIUM SERPL-SCNC: 134 MMOL/L — LOW (ref 135–145)
WBC # BLD: 2.39 K/UL — LOW (ref 3.8–10.5)
WBC # FLD AUTO: 2.39 K/UL — LOW (ref 3.8–10.5)

## 2020-02-25 PROCEDURE — 99233 SBSQ HOSP IP/OBS HIGH 50: CPT | Mod: GC

## 2020-02-25 RX ORDER — ALBUTEROL 90 UG/1
2 AEROSOL, METERED ORAL EVERY 6 HOURS
Refills: 0 | Status: DISCONTINUED | OUTPATIENT
Start: 2020-02-25 | End: 2020-02-27

## 2020-02-25 RX ORDER — FLUTICASONE PROPIONATE 50 MCG
1 SPRAY, SUSPENSION NASAL
Refills: 0 | Status: DISCONTINUED | OUTPATIENT
Start: 2020-02-25 | End: 2020-02-27

## 2020-02-25 RX ADMIN — ZOLPIDEM TARTRATE 5 MILLIGRAM(S): 10 TABLET ORAL at 23:05

## 2020-02-25 RX ADMIN — Medication 200 MILLIGRAM(S): at 06:05

## 2020-02-25 RX ADMIN — Medication 1 SPRAY(S): at 18:11

## 2020-02-25 RX ADMIN — Medication 40 MILLIGRAM(S): at 18:11

## 2020-02-25 RX ADMIN — ATORVASTATIN CALCIUM 20 MILLIGRAM(S): 80 TABLET, FILM COATED ORAL at 21:19

## 2020-02-25 RX ADMIN — ZOLPIDEM TARTRATE 5 MILLIGRAM(S): 10 TABLET ORAL at 00:08

## 2020-02-25 RX ADMIN — LACTULOSE 30 GRAM(S): 10 SOLUTION ORAL at 06:00

## 2020-02-25 RX ADMIN — LACTULOSE 30 GRAM(S): 10 SOLUTION ORAL at 18:11

## 2020-02-25 RX ADMIN — ALBUTEROL 2 PUFF(S): 90 AEROSOL, METERED ORAL at 23:08

## 2020-02-25 RX ADMIN — Medication 40 MILLIGRAM(S): at 06:01

## 2020-02-25 RX ADMIN — ALBUTEROL 2 PUFF(S): 90 AEROSOL, METERED ORAL at 11:38

## 2020-02-25 RX ADMIN — Medication 3 MILLIGRAM(S): at 21:19

## 2020-02-25 RX ADMIN — PANTOPRAZOLE SODIUM 40 MILLIGRAM(S): 20 TABLET, DELAYED RELEASE ORAL at 06:00

## 2020-02-25 RX ADMIN — SPIRONOLACTONE 100 MILLIGRAM(S): 25 TABLET, FILM COATED ORAL at 06:00

## 2020-02-25 NOTE — CONSULT NOTE ADULT - ASSESSMENT
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 Decompensated cirrhosis MELD Na of 21, with increased INR from November in the setting of nonadherence with diuretics. Reports abstinence.   -ascites: with recurrent pleural effusion. SPAG from [facility] 11/15. 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 Decompensated cirrhosis MELD Na of 21, with increased INR from November in the setting of nonadherence with diuretics. Reports abstinence.   -ascites: with recurrent pleural effusion (R>L). SPAG from [facility] 11/15 1.3. Patient also required a chest tube during that admission,and had chest trauma. Small ascites on ultrasound.   -varices status unknown  -HCC not detected on CT w IV contrast 11/19  -PSE: patient currently with some confusion, difficulty verbalizing but no asterixis. Per chart has history of HE on last admission.   Immune to HAV/HBV    2. Morbid obesity     Recommendation:  -trend Cr, it is slowly uptrending slightly, may need to hold diuretics if continues to increase  -continue lactulose and rifaximin  -obtain an XR of the chest to assess progress of the pleural effusion  -daily MELD Labs This is a 59 year old man with BOB/NOELLE Cirrhosis who presents with decompensation in the setting of poor follow up /adherence found to have likely hepatic hydrothorax and bipedal edema    Impression:     1. Decompensated cirrhosis MELD Na of 21, with increased INR from November in the setting of nonadherence with diuretics. Reports abstinence.   -ascites: with recurrent pleural effusion (R>L). SPAG from [facility] 11/15 1.3. Patient also required a chest tube during that admission,and had chest trauma. Small ascites on ultrasound.   -varices status unknown  -HCC not detected on CT w IV contrast 11/19  -PSE: patient currently with some confusion, difficulty verbalizing but no asterixis. Per chart has history of HE on last admission.   Immune to HAV/HBV    2. Morbid obesity     3. Volume overload, TTE was unremarkable in 10/19    4. History of bacteremia, blood cultures negative this admission. UA negative as well.    Recommendation:  -trend Cr, it is slowly uptrending slightly, may need to hold diuretics if continues to increase  -continue lactulose and rifaximin  -obtain an XR of the chest to assess progress of the pleural effusion  -daily MELD Labs  -perform paracentesis if possible 59M with decompensated BOB/NOELLE Cirrhosis, h/o ascites and hepatic hydrothorax (0.6L drained in Nov), h/o hepatic encephalopathy, hospitalizations in Oct & Nov for SOB, poor follow-up, admitted on 2/22 with SOB after he ran out of medications. Last discharged on lasix/spironolactone 60/150, lactulose 30g q6, rifaximin, PPI. Now found only mild ascites, mild bilat. pleural effusions, 2+ edema, and mild disorientation.  PMH: severe obesity BMI 43,SARKIS, reported moderate alcohol use, disc herniations, chronic cough x 3 yrs  SHx: lives alone. Only family member is a brother.      1. Decompensated cirrhosis MELD Na of 21, with increased INR from November in the setting of nonadherence with diuretics. Reports abstinence.   -ascites: with recurrent pleural effusion (R>L). SPAG from [facility] 11/15 1.3. Patient also required a chest tube during that admission,and had chest trauma. Small ascites on ultrasound. 2+ edema, last echo 10/2019 showed no CHF  -varices status unknown  -HCC not detected on CT w IV contrast 11/19  -PSE: patient currently with some confusion, difficulty verbalizing but no asterixis. Per chart has history of HE on last admission.   Immune to HAV/HBV    2. Morbid obesity     3. Volume overload, TTE was unremarkable in 10/19    4. History of bacteremia, blood cultures negative this admission. UA negative as well.    Recommendation:  - edema, small ascites: continue diuresis  -continue lactulose and rifaximin  -obtain an XR of the chest to assess progress of the pleural effusion  -daily MELD Labs

## 2020-02-25 NOTE — CONSULT NOTE ADULT - SUBJECTIVE AND OBJECTIVE BOX
Chief Complaint:  Patient is a 59y old  Male who presents with a chief complaint of SOB (2020 07:29)      HPI:  This is a 59 year old with m orbid obesity, SARKIS, BOB/NOELLE cirrhosis decompensated by hepatic hydrothorax, encephalopathy who presented with dyspnea. Patient states he has not taken his diuretics for some time as he felt he did not need them and was not accumulating water. He endorses compliance with a low salt diet. He has not seen a hepatologist despite a hospitalization in November requiring thoracentesis for hepatic hydrothorax.  He states he has not consumed alcohol in 6 months.   He reports adherence to lactulose in the outpatient setting.   Since his hospitalization he has been receiving IV furosemide and PO aldactone after being diagnosed with a R sided pleural effusion and pedal edema.    Allergies:  No Known Allergies      Home Medications:    Hospital Medications:  ALBUTerol    90 MICROgram(s) HFA Inhaler 2 Puff(s) Inhalation every 6 hours  atorvastatin 20 milliGRAM(s) Oral at bedtime  fluticasone propionate 50 MICROgram(s)/spray Nasal Spray 1 Spray(s) Both Nostrils two times a day  furosemide   Injectable 40 milliGRAM(s) IV Push two times a day  guaiFENesin   Syrup  (Sugar-Free) 200 milliGRAM(s) Oral every 6 hours PRN  influenza   Vaccine 0.5 milliLiter(s) IntraMuscular once  lactulose Syrup 30 Gram(s) Oral two times a day  lidocaine   Patch 1 Patch Transdermal daily  melatonin 3 milliGRAM(s) Oral at bedtime  pantoprazole    Tablet 40 milliGRAM(s) Oral before breakfast  rifAXIMin 550 milliGRAM(s) Oral two times a day  spironolactone 100 milliGRAM(s) Oral daily  zolpidem 5 milliGRAM(s) Oral at bedtime PRN  zolpidem 5 milliGRAM(s) Oral at bedtime PRN      PMHX/PSHX:  CHF (congestive heart failure)  Obese  Hyperlipidemia  Pneumonia  CHF (congestive heart failure)  History of chest tube placement  No significant past surgical history      Family history:      Social History:     ROS:     General:  No wt loss, fevers, chills, night sweats, fatigue,   Eyes:  Good vision, no reported pain  ENT:  No sore throat, pain, runny nose, dysphagia  CV:  No pain, palpitations, hypo/hypertension  Resp:  No dyspnea, cough, tachypnea, wheezing  GI:  See HPI  :  No pain, bleeding, incontinence, nocturia  Muscle:  No pain, weakness  Neuro:  No weakness, tingling, memory problems  Psych:  No fatigue, insomnia, mood problems, depression  Endocrine:  No polyuria, polydipsia, cold/heat intolerance  Heme:  No petechiae, ecchymosis, easy bruisability  Skin:  No rash, edema      PHYSICAL EXAM:     GENERAL:  Appears stated age, well-groomed, well-nourished, no distress  HEENT:  NC/AT,  conjunctivae clear and pink,  no JVD  CHEST:  Full & symmetric excursion, no increased effort, breath sounds clear, decreased at the lung bases  HEART:  Regular rhythm, S1, S2, no murmur/rub/S3/S4, no abdominal bruit, no edema  ABDOMEN:  Soft, obese, non-tender, non-distended, normoactive bowel sounds,  no masses , no hepatosplenomegaly  EXTREMITIES:  bipedal edema  SKIN:  No rash/erythema/ecchymoses/petechiae/wounds/abscess/warm/dry  NEURO:  no asterixis, somewhat lethargic    Vital Signs:  Vital Signs Last 24 Hrs  T(C): 36.6 (2020 13:44), Max: 36.6 (2020 21:37)  T(F): 97.8 (2020 13:44), Max: 97.9 (2020 21:37)  HR: 87 (2020 18:08) (80 - 104)  BP: 112/63 (2020 18:08) (104/53 - 115/64)  BP(mean): --  RR: 20 (2020 13:44) (18 - 20)  SpO2: 92% (2020 13:44) (92% - 95%)  Daily     Daily     LABS:                        7.8    2.39  )-----------( 66       ( 2020 07:24 )             28.1     02-25    134<L>  |  96  |  16  ----------------------------<  120<H>  4.3   |  30  |  1.26    Ca    8.2<L>      2020 07:18  Phos  3.4     02-25  Mg     1.6     02-25    TPro  7.9  /  Alb  2.9<L>  /  TBili  2.8<H>  /  DBili  x   /  AST  25  /  ALT  13  /  AlkPhos  152<H>  02-24    LIVER FUNCTIONS - ( 2020 09:02 )  Alb: 2.9 g/dL / Pro: 7.9 g/dL / ALK PHOS: 152 U/L / ALT: 13 U/L / AST: 25 U/L / GGT: x           PT/INR - ( 2020 09:02 )   PT: 25.0 sec;   INR: 2.12 ratio         PTT - ( 2020 09:02 )  PTT:41.8 sec  Urinalysis Basic - ( 2020 21:35 )    Color: Yellow / Appearance: Clear / S.022 / pH: x  Gluc: x / Ketone: Negative  / Bili: Negative / Urobili: 6 mg/dL   Blood: x / Protein: Negative / Nitrite: Negative   Leuk Esterase: Negative / RBC: x / WBC x   Sq Epi: x / Non Sq Epi: x / Bacteria: x          Imaging: Chief Complaint:  Patient is a 59y old  Male who presents with a chief complaint of SOB (2020 07:29)      HPI:  This is a 59 year old with m orbid obesity, SARKIS, BOB/NOELLE cirrhosis decompensated by hepatic hydrothorax, encephalopathy who presented with dyspnea. Patient states he has not taken his diuretics for some time as he felt he did not need them and was not accumulating water. He endorses compliance with a low salt diet. He has not seen a hepatologist despite a hospitalization in November requiring thoracentesis for hepatic hydrothorax.  He states he has not consumed alcohol in 6 months.   He reports adherence to lactulose in the outpatient setting.   Since his hospitalization he has been receiving IV furosemide and PO aldactone after being diagnosed with a R sided pleural effusion and pedal edema.  During prior hospitalization he was treated for micrococcus luteus bacteremia.    Allergies:  No Known Allergies      Home Medications:    Hospital Medications:  ALBUTerol    90 MICROgram(s) HFA Inhaler 2 Puff(s) Inhalation every 6 hours  atorvastatin 20 milliGRAM(s) Oral at bedtime  fluticasone propionate 50 MICROgram(s)/spray Nasal Spray 1 Spray(s) Both Nostrils two times a day  furosemide   Injectable 40 milliGRAM(s) IV Push two times a day  guaiFENesin   Syrup  (Sugar-Free) 200 milliGRAM(s) Oral every 6 hours PRN  influenza   Vaccine 0.5 milliLiter(s) IntraMuscular once  lactulose Syrup 30 Gram(s) Oral two times a day  lidocaine   Patch 1 Patch Transdermal daily  melatonin 3 milliGRAM(s) Oral at bedtime  pantoprazole    Tablet 40 milliGRAM(s) Oral before breakfast  rifAXIMin 550 milliGRAM(s) Oral two times a day  spironolactone 100 milliGRAM(s) Oral daily  zolpidem 5 milliGRAM(s) Oral at bedtime PRN  zolpidem 5 milliGRAM(s) Oral at bedtime PRN      PMHX/PSHX:  CHF (congestive heart failure)  Obese  Hyperlipidemia  Pneumonia  CHF (congestive heart failure)  History of chest tube placement  No significant past surgical history      Family history:      Social History:     ROS:     General:  No wt loss, fevers, chills, night sweats, fatigue,   Eyes:  Good vision, no reported pain  ENT:  No sore throat, pain, runny nose, dysphagia  CV:  No pain, palpitations, hypo/hypertension  Resp:  No dyspnea, cough, tachypnea, wheezing  GI:  See HPI  :  No pain, bleeding, incontinence, nocturia  Muscle:  No pain, weakness  Neuro:  No weakness, tingling, memory problems  Psych:  No fatigue, insomnia, mood problems, depression  Endocrine:  No polyuria, polydipsia, cold/heat intolerance  Heme:  No petechiae, ecchymosis, easy bruisability  Skin:  No rash, edema      PHYSICAL EXAM:     GENERAL:  Appears stated age, well-groomed, well-nourished, no distress  HEENT:  NC/AT,  conjunctivae clear and pink,  no JVD  CHEST:  Full & symmetric excursion, no increased effort, breath sounds clear, decreased at the lung bases  HEART:  Regular rhythm, S1, S2, no murmur/rub/S3/S4, no abdominal bruit, no edema  ABDOMEN:  Soft, obese, non-tender, non-distended, normoactive bowel sounds,  no masses , no hepatosplenomegaly  EXTREMITIES:  bipedal edema  SKIN:  No rash/erythema/ecchymoses/petechiae/wounds/abscess/warm/dry  NEURO:  no asterixis, somewhat lethargic    Vital Signs:  Vital Signs Last 24 Hrs  T(C): 36.6 (2020 13:44), Max: 36.6 (2020 21:37)  T(F): 97.8 (2020 13:44), Max: 97.9 (2020 21:37)  HR: 87 (2020 18:08) (80 - 104)  BP: 112/63 (2020 18:08) (104/53 - 115/64)  BP(mean): --  RR: 20 (2020 13:44) (18 - 20)  SpO2: 92% (2020 13:44) (92% - 95%)  Daily     Daily     LABS:                        7.8    2.39  )-----------( 66       ( 2020 07:24 )             28.1     02-25    134<L>  |  96  |  16  ----------------------------<  120<H>  4.3   |  30  |  1.26    Ca    8.2<L>      2020 07:18  Phos  3.4     02-25  Mg     1.6     02-25    TPro  7.9  /  Alb  2.9<L>  /  TBili  2.8<H>  /  DBili  x   /  AST  25  /  ALT  13  /  AlkPhos  152<H>  02-24    LIVER FUNCTIONS - ( 2020 09:02 )  Alb: 2.9 g/dL / Pro: 7.9 g/dL / ALK PHOS: 152 U/L / ALT: 13 U/L / AST: 25 U/L / GGT: x           PT/INR - ( 2020 09:02 )   PT: 25.0 sec;   INR: 2.12 ratio         PTT - ( 2020 09:02 )  PTT:41.8 sec  Urinalysis Basic - ( 2020 21:35 )    Color: Yellow / Appearance: Clear / S.022 / pH: x  Gluc: x / Ketone: Negative  / Bili: Negative / Urobili: 6 mg/dL   Blood: x / Protein: Negative / Nitrite: Negative   Leuk Esterase: Negative / RBC: x / WBC x   Sq Epi: x / Non Sq Epi: x / Bacteria: x          Imaging:

## 2020-02-25 NOTE — PROGRESS NOTE ADULT - SUBJECTIVE AND OBJECTIVE BOX
Reddy Saunders MD, PGY-1  Pager #[phone]  After 7 PM on weekdays and 12 PM on weekends, for urgent issues please page #[phone].  ----------------------------------------------------------------  Patient is a 59y old  Male who presents with a chief complaint of SOB (2020 16:08)      SUBJECTIVE / OVERNIGHT EVENTS:  ADDITIONAL REVIEW OF SYSTEMS:    MEDICATIONS  (STANDING):  atorvastatin 20 milliGRAM(s) Oral at bedtime  furosemide   Injectable 40 milliGRAM(s) IV Push two times a day  influenza   Vaccine 0.5 milliLiter(s) IntraMuscular once  lactulose Syrup 30 Gram(s) Oral two times a day  lidocaine   Patch 1 Patch Transdermal daily  melatonin 3 milliGRAM(s) Oral at bedtime  pantoprazole    Tablet 40 milliGRAM(s) Oral before breakfast  rifAXIMin 550 milliGRAM(s) Oral two times a day  spironolactone 100 milliGRAM(s) Oral daily    MEDICATIONS  (PRN):  guaiFENesin   Syrup  (Sugar-Free) 200 milliGRAM(s) Oral every 6 hours PRN Cough  zolpidem 5 milliGRAM(s) Oral at bedtime PRN Insomnia  zolpidem 5 milliGRAM(s) Oral at bedtime PRN Insomnia      CAPILLARY BLOOD GLUCOSE        I&O's Summary      PHYSICAL EXAM:  Vital Signs Last 24 Hrs  T(C): 36.6 (2020 05:09), Max: 36.7 (2020 13:10)  T(F): 97.9 (2020 05:09), Max: 98 (2020 13:10)  HR: 104 (2020 05:09) (80 - 104)  BP: 104/53 (2020 05:09) (104/53 - 124/69)  RR: 20 (2020 05:09) (18 - 20)  SpO2: 95% (2020 05:09) (92% - 95%)    Appearance: NAD, obese male  HEENT: PERRL, EOMI	  Cardiovascular: Normal s1, s2, no MRG  Respiratory: CTABL, no crackles  Gastrointestinal:  Soft, Non-tender, +BS	  Skin: No rashes, No ecchymoses, No cyanosis	  Extremities: trace b/l pedal edema  Vascular: Peripheral pulses palpable 2+ bilaterally  LABS:                        8.3    3.15  )-----------( 73       ( 2020 09:02 )             29.8     02-24    139  |  98  |  13  ----------------------------<  97  4.1   |  32<H>  |  1.18    Ca    8.8      2020 09:02  Phos  3.9     02-24  Mg     1.6     02-24    TPro  7.9  /  Alb  2.9<L>  /  TBili  2.8<H>  /  DBili  x   /  AST  25  /  ALT  13  /  AlkPhos  152<H>  02-24    PT/INR - ( 2020 09:02 )   PT: 25.0 sec;   INR: 2.12 ratio         PTT - ( 2020 09:02 )  PTT:41.8 sec      Urinalysis Basic - ( 2020 21:35 )    Color: Yellow / Appearance: Clear / S.022 / pH: x  Gluc: x / Ketone: Negative  / Bili: Negative / Urobili: 6 mg/dL   Blood: x / Protein: Negative / Nitrite: Negative   Leuk Esterase: Negative / RBC: x / WBC x   Sq Epi: x / Non Sq Epi: x / Bacteria: x        Culture - Blood (collected 2020 09:57)  Source: .Blood Blood-Peripheral  Preliminary Report (2020 10:01):    No growth to date.    Culture - Blood (collected 2020 01:33)  Source: .Blood Blood-Peripheral  Preliminary Report (2020 02:01):    No growth to date.    RADIOLOGY & ADDITIONAL TESTS:  Results Reviewed:   Imaging Personally Reviewed:  Electrocardiogram Personally Reviewed:    COORDINATION OF CARE:  Care Discussed with Consultants/Other Providers [Y/N]:  Prior or Outpatient Records Reviewed [Y/N]: Reddy Saunders MD, PGY-1  Pager #[phone]  After 7 PM on weekdays and 12 PM on weekends, for urgent issues please page #[phone].  ----------------------------------------------------------------  Patient is a 59y old  Male who presents with a chief complaint of SOB (2020 16:08)      SUBJECTIVE / OVERNIGHT EVENTS: No acute events overnight. Pt seen and examined at bedside. Feels at his baseline of shortness of breath and coughing symptoms. His baseline exercise tolerance is about 1 block or 1 flight of stairs before getting short of breath. He denies any acute complaints including headache, nausea, vomiting, diarrhea, constipation.      MEDICATIONS  (STANDING):  atorvastatin 20 milliGRAM(s) Oral at bedtime  furosemide   Injectable 40 milliGRAM(s) IV Push two times a day  influenza   Vaccine 0.5 milliLiter(s) IntraMuscular once  lactulose Syrup 30 Gram(s) Oral two times a day  lidocaine   Patch 1 Patch Transdermal daily  melatonin 3 milliGRAM(s) Oral at bedtime  pantoprazole    Tablet 40 milliGRAM(s) Oral before breakfast  rifAXIMin 550 milliGRAM(s) Oral two times a day  spironolactone 100 milliGRAM(s) Oral daily    MEDICATIONS  (PRN):  guaiFENesin   Syrup  (Sugar-Free) 200 milliGRAM(s) Oral every 6 hours PRN Cough  zolpidem 5 milliGRAM(s) Oral at bedtime PRN Insomnia  zolpidem 5 milliGRAM(s) Oral at bedtime PRN Insomnia      CAPILLARY BLOOD GLUCOSE        I&O's Summary      PHYSICAL EXAM:  Vital Signs Last 24 Hrs  T(C): 36.6 (2020 05:09), Max: 36.7 (2020 13:10)  T(F): 97.9 (2020 05:09), Max: 98 (2020 13:10)  HR: 104 (2020 05:09) (80 - 104)  BP: 104/53 (2020 05:09) (104/53 - 124/69)  RR: 20 (2020 05:09) (18 - 20)  SpO2: 95% (2020 05:09) (92% - 95%)    Appearance: NAD, obese male  HEENT: PERRL, EOMI	  Cardiovascular: Normal s1, s2, no MRG  Respiratory: CTABL, no crackles, decreased sounds over R lung  Gastrointestinal:  Soft, Non-tender, +BS	  Skin: No rashes, No ecchymoses, No cyanosis	  Extremities: trace b/l pedal edema  Vascular: Peripheral pulses palpable 2+ bilaterally    LABS:                        8.3    3.15  )-----------( 73       ( 2020 09:02 )             29.8     02-24    139  |  98  |  13  ----------------------------<  97  4.1   |  32<H>  |  1.18    Ca    8.8      2020 09:02  Phos  3.9     02-24  Mg     1.6     02-24    TPro  7.9  /  Alb  2.9<L>  /  TBili  2.8<H>  /  DBili  x   /  AST  25  /  ALT  13  /  AlkPhos  152<H>  02-24    PT/INR - ( 2020 09:02 )   PT: 25.0 sec;   INR: 2.12 ratio         PTT - ( 2020 09:02 )  PTT:41.8 sec      Urinalysis Basic - ( 2020 21:35 )    Color: Yellow / Appearance: Clear / S.022 / pH: x  Gluc: x / Ketone: Negative  / Bili: Negative / Urobili: 6 mg/dL   Blood: x / Protein: Negative / Nitrite: Negative   Leuk Esterase: Negative / RBC: x / WBC x   Sq Epi: x / Non Sq Epi: x / Bacteria: x      Culture - Blood (collected 2020 09:57)  Source: .Blood Blood-Peripheral  Preliminary Report (2020 10:01):    No growth to date.    Culture - Blood (collected 2020 01:33)  Source: .Blood Blood-Peripheral  Preliminary Report (2020 02:01):    No growth to date.    RADIOLOGY & ADDITIONAL TESTS:  Results Reviewed:   Imaging Personally Reviewed:  Electrocardiogram Personally Reviewed:    COORDINATION OF CARE:  Care Discussed with Consultants/Other Providers [Y/N]:  Prior or Outpatient Records Reviewed [Y/N]:

## 2020-02-26 ENCOUNTER — TRANSCRIPTION ENCOUNTER (OUTPATIENT)
Age: 60
End: 2020-02-26

## 2020-02-26 LAB
ANION GAP SERPL CALC-SCNC: 9 MMOL/L — SIGNIFICANT CHANGE UP (ref 5–17)
APTT BLD: 42.4 SEC — HIGH (ref 27.5–36.3)
BUN SERPL-MCNC: 17 MG/DL — SIGNIFICANT CHANGE UP (ref 7–23)
CALCIUM SERPL-MCNC: 8.4 MG/DL — SIGNIFICANT CHANGE UP (ref 8.4–10.5)
CHLORIDE SERPL-SCNC: 98 MMOL/L — SIGNIFICANT CHANGE UP (ref 96–108)
CO2 SERPL-SCNC: 31 MMOL/L — SIGNIFICANT CHANGE UP (ref 22–31)
CREAT SERPL-MCNC: 1.24 MG/DL — SIGNIFICANT CHANGE UP (ref 0.5–1.3)
GLUCOSE SERPL-MCNC: 99 MG/DL — SIGNIFICANT CHANGE UP (ref 70–99)
HCT VFR BLD CALC: 26 % — LOW (ref 39–50)
HGB BLD-MCNC: 7.6 G/DL — LOW (ref 13–17)
INR BLD: 2.13 RATIO — HIGH (ref 0.88–1.16)
MAGNESIUM SERPL-MCNC: 1.6 MG/DL — SIGNIFICANT CHANGE UP (ref 1.6–2.6)
MCHC RBC-ENTMCNC: 24.7 PG — LOW (ref 27–34)
MCHC RBC-ENTMCNC: 29.2 GM/DL — LOW (ref 32–36)
MCV RBC AUTO: 84.4 FL — SIGNIFICANT CHANGE UP (ref 80–100)
NRBC # BLD: 0 /100 WBCS — SIGNIFICANT CHANGE UP (ref 0–0)
PHOSPHATE SERPL-MCNC: 4 MG/DL — SIGNIFICANT CHANGE UP (ref 2.5–4.5)
PLATELET # BLD AUTO: 60 K/UL — LOW (ref 150–400)
POTASSIUM SERPL-MCNC: 4.4 MMOL/L — SIGNIFICANT CHANGE UP (ref 3.5–5.3)
POTASSIUM SERPL-SCNC: 4.4 MMOL/L — SIGNIFICANT CHANGE UP (ref 3.5–5.3)
PROTHROM AB SERPL-ACNC: 24.9 SEC — HIGH (ref 10–12.9)
RBC # BLD: 3.08 M/UL — LOW (ref 4.2–5.8)
RBC # FLD: 18.6 % — HIGH (ref 10.3–14.5)
SODIUM SERPL-SCNC: 138 MMOL/L — SIGNIFICANT CHANGE UP (ref 135–145)
WBC # BLD: 2.31 K/UL — LOW (ref 3.8–10.5)
WBC # FLD AUTO: 2.31 K/UL — LOW (ref 3.8–10.5)

## 2020-02-26 PROCEDURE — 99222 1ST HOSP IP/OBS MODERATE 55: CPT | Mod: GC

## 2020-02-26 PROCEDURE — 99232 SBSQ HOSP IP/OBS MODERATE 35: CPT | Mod: GC

## 2020-02-26 PROCEDURE — 71045 X-RAY EXAM CHEST 1 VIEW: CPT | Mod: 26

## 2020-02-26 RX ORDER — MAGNESIUM SULFATE 500 MG/ML
2 VIAL (ML) INJECTION ONCE
Refills: 0 | Status: COMPLETED | OUTPATIENT
Start: 2020-02-26 | End: 2020-02-26

## 2020-02-26 RX ADMIN — Medication 1 SPRAY(S): at 18:28

## 2020-02-26 RX ADMIN — PANTOPRAZOLE SODIUM 40 MILLIGRAM(S): 20 TABLET, DELAYED RELEASE ORAL at 06:02

## 2020-02-26 RX ADMIN — LACTULOSE 30 GRAM(S): 10 SOLUTION ORAL at 06:03

## 2020-02-26 RX ADMIN — Medication 40 MILLIGRAM(S): at 06:02

## 2020-02-26 RX ADMIN — ZOLPIDEM TARTRATE 5 MILLIGRAM(S): 10 TABLET ORAL at 21:32

## 2020-02-26 RX ADMIN — ZOLPIDEM TARTRATE 5 MILLIGRAM(S): 10 TABLET ORAL at 00:17

## 2020-02-26 RX ADMIN — Medication 50 GRAM(S): at 15:13

## 2020-02-26 RX ADMIN — ALBUTEROL 2 PUFF(S): 90 AEROSOL, METERED ORAL at 23:56

## 2020-02-26 RX ADMIN — ALBUTEROL 2 PUFF(S): 90 AEROSOL, METERED ORAL at 17:48

## 2020-02-26 RX ADMIN — LACTULOSE 30 GRAM(S): 10 SOLUTION ORAL at 18:28

## 2020-02-26 RX ADMIN — SPIRONOLACTONE 100 MILLIGRAM(S): 25 TABLET, FILM COATED ORAL at 06:02

## 2020-02-26 RX ADMIN — Medication 40 MILLIGRAM(S): at 18:29

## 2020-02-26 RX ADMIN — ATORVASTATIN CALCIUM 20 MILLIGRAM(S): 80 TABLET, FILM COATED ORAL at 21:28

## 2020-02-26 RX ADMIN — Medication 3 MILLIGRAM(S): at 21:28

## 2020-02-26 RX ADMIN — Medication 1 SPRAY(S): at 06:02

## 2020-02-26 NOTE — DISCHARGE NOTE PROVIDER - NSDCMRMEDTOKEN_GEN_ALL_CORE_FT
albuterol 90 mcg/inh inhalation aerosol: 2 puff(s) inhaled every 6 hours  atorvastatin 20 mg oral tablet: 1 tab(s) orally once a day  fluticasone 50 mcg/inh nasal spray: 1 spray(s) nasal 2 times a day  furosemide 20 mg oral tablet: 3 tab(s) orally once a day  guaiFENesin 100 mg/5 mL oral liquid: 10 milliliter(s) orally every 6 hours, As needed, Cough  lactulose 10 g/15 mL oral syrup: 45 milliliter(s) orally 2 times a day  melatonin 3 mg oral tablet: 1 tab(s) orally once a day (at bedtime)  Omega-3 oral capsule: 1 cap(s) orally once a day  pantoprazole 40 mg oral delayed release tablet: 1 tab(s) orally once a day (before a meal)  Prodigen oral capsule: 1 cap(s) orally once a day  rifAXIMin 550 mg oral tablet: 1 tab(s) orally 2 times a day  spironolactone 25 mg oral tablet: 6 tab(s) orally once a day albuterol 90 mcg/inh inhalation aerosol: 2 puff(s) inhaled every 6 hours  atorvastatin 20 mg oral tablet: 1 tab(s) orally once a day  fluticasone 50 mcg/inh nasal spray: 1 spray(s) nasal 2 times a day  furosemide 20 mg oral tablet: 3 tab(s) orally once a day  guaiFENesin 100 mg/5 mL oral liquid: 10 milliliter(s) orally every 6 hours, As needed, Cough  lactulose 10 g/15 mL oral syrup: 45 milliliter(s) orally 2 times a day  lidocaine 5% topical film: Apply topically to affected area for 12 hours every 24 hours. Remove after 12 hours.  melatonin 3 mg oral tablet: 1 tab(s) orally once a day (at bedtime)  Omega-3 oral capsule: 1 cap(s) orally once a day  pantoprazole 40 mg oral delayed release tablet: 1 tab(s) orally once a day (before a meal)  Prodigen oral capsule: 1 cap(s) orally once a day  rifAXIMin 550 mg oral tablet: 1 tab(s) orally 2 times a day  spironolactone 25 mg oral tablet: 6 tab(s) orally once a day

## 2020-02-26 NOTE — PROGRESS NOTE ADULT - SUBJECTIVE AND OBJECTIVE BOX
Reddy Saunders MD, PGY-1  Pager #[phone]  After 7 PM on weekdays and 12 PM on weekends, for urgent issues please page #[phone].  ----------------------------------------------------------------  Patient is a 59y old  Male who presents with a chief complaint of SOB (25 Feb 2020 19:23)      SUBJECTIVE / OVERNIGHT EVENTS: No acute events overnight. Pt seen and examined at bedside. At baseline shortness of breath with exertion. No acute complaints.     MEDICATIONS  (STANDING):  ALBUTerol    90 MICROgram(s) HFA Inhaler 2 Puff(s) Inhalation every 6 hours  atorvastatin 20 milliGRAM(s) Oral at bedtime  fluticasone propionate 50 MICROgram(s)/spray Nasal Spray 1 Spray(s) Both Nostrils two times a day  furosemide   Injectable 40 milliGRAM(s) IV Push two times a day  influenza   Vaccine 0.5 milliLiter(s) IntraMuscular once  lactulose Syrup 30 Gram(s) Oral two times a day  lidocaine   Patch 1 Patch Transdermal daily  melatonin 3 milliGRAM(s) Oral at bedtime  pantoprazole    Tablet 40 milliGRAM(s) Oral before breakfast  rifAXIMin 550 milliGRAM(s) Oral two times a day  spironolactone 100 milliGRAM(s) Oral daily    MEDICATIONS  (PRN):  guaiFENesin   Syrup  (Sugar-Free) 200 milliGRAM(s) Oral every 6 hours PRN Cough  zolpidem 5 milliGRAM(s) Oral at bedtime PRN Insomnia  zolpidem 5 milliGRAM(s) Oral at bedtime PRN Insomnia      CAPILLARY BLOOD GLUCOSE    I&O's Summary    25 Feb 2020 07:01  -  26 Feb 2020 07:00  --------------------------------------------------------  IN: 1180 mL / OUT: 0 mL / NET: 1180 mL    26 Feb 2020 07:01  -  26 Feb 2020 15:40  --------------------------------------------------------  IN: 360 mL / OUT: 0 mL / NET: 360 mL    PHYSICAL EXAM:  Vital Signs Last 24 Hrs  T(C): 36.1 (26 Feb 2020 14:15), Max: 36.8 (25 Feb 2020 21:44)  T(F): 97 (26 Feb 2020 14:15), Max: 98.2 (25 Feb 2020 21:44)  HR: 78 (26 Feb 2020 14:15) (78 - 93)  BP: 142/71 (26 Feb 2020 14:15) (112/63 - 142/71)  RR: 20 (26 Feb 2020 14:15) (19 - 20)  SpO2: 94% (26 Feb 2020 14:15) (92% - 95%)    GENERAL: NAD, large male  HEAD:  NCAT  NECK: Supple, No JVD  CHEST/LUNG: decreased breath sounds on R, no crackles  HEART: RRR, no MRG  ABDOMEN: soft, nt, nd  EXTREMITIES:  1+ pitting edema b/l  NEURO: AOx3    LABS:                        7.6    2.31  )-----------( 60       ( 26 Feb 2020 07:03 )             26.0     02-26    138  |  98  |  17  ----------------------------<  99  4.4   |  31  |  1.24    Ca    8.4      26 Feb 2020 06:58  Phos  4.0     02-26  Mg     1.6     02-26      PT/INR - ( 26 Feb 2020 06:58 )   PT: 24.9 sec;   INR: 2.13 ratio         PTT - ( 26 Feb 2020 06:58 )  PTT:42.4 sec    RADIOLOGY & ADDITIONAL TESTS:  Results Reviewed:   Imaging Personally Reviewed:  Electrocardiogram Personally Reviewed:    COORDINATION OF CARE:  Care Discussed with Consultants/Other Providers [Y/N]:  Prior or Outpatient Records Reviewed [Y/N]:

## 2020-02-26 NOTE — DISCHARGE NOTE PROVIDER - HOSPITAL COURSE
59M pmxh of BOB and ETOH cirrhosis with pancytopenia, and lumbar disc herniations presenting with increasing SOB after he ran out of his medications at home.  He says that he has had a cough for 3 years which is attributed to his hepatic hydrothorax (s/p diagnostic thoracentesis in the past) which is at it's baseline but that his SOB has been progressive over a couple of weeks/months finally getting worse this week after running out of medications.  He says he was not able to get refills because he "had a coughing fit".  He is not sure which of the medications he ran out of but says that he takes them all as prescribed since his December 2019 discharge.  He has no sick contacts, fevers, chills, nausea, vomiting, or disorientation. Says he takes his Lactulose twice per day and has 3-4 BMs per day. HPI    59M pmxh of BOB and ETOH cirrhosis with pancytopenia, and lumbar disc herniations presenting with increasing SOB after he ran out of his medications at home.  He says that he has had a cough for 3 years which is attributed to his hepatic hydrothorax (s/p diagnostic thoracentesis in the past) which is at it's baseline but that his SOB has been progressive over a couple of weeks/months finally getting worse this week after running out of medications.  He says he was not able to get refills because he "had a coughing fit".  He is not sure which of the medications he ran out of but says that he takes them all as prescribed since his December 2019 discharge.  He has no sick contacts, fevers, chills, nausea, vomiting, or disorientation. Says he takes his Lactulose twice per day and has 3-4 BMs per day.        Hospital Course    The patient was increased on lasix to 40 BID and continued on spironolactone. The patient's respiratory status returned to baseline. His persistent cough remained unchanged. Hepatology was also consulted and no management changes were made. He was evaluated by physical therapy and recommended subacute rehab which the patient is amenable to. He was transitioned back to his PO regimen of lasix/spironolactone.

## 2020-02-26 NOTE — DISCHARGE NOTE PROVIDER - NSDCCPCAREPLAN_GEN_ALL_CORE_FT
PRINCIPAL DISCHARGE DIAGNOSIS  Diagnosis: Pleural effusion  Assessment and Plan of Treatment: You have a pleural effusion or fluid collection around your lung. This is likely a reuslt of your cirrhosis which causes inappropriate fluid build up. Please continue your lasix and spironolactone as prescribed and follow-up with your PCP.      SECONDARY DISCHARGE DIAGNOSES  Diagnosis: Decompensated hepatic cirrhosis  Assessment and Plan of Treatment: You have liver cirrhosis. This caused the above problem. Please take your rifaxamin and lactulose as prescribed and follow-up with your hepatologist.

## 2020-02-26 NOTE — DISCHARGE NOTE PROVIDER - CARE PROVIDER_API CALL
Wesley White)  Medicine  [address] [place] [address], [address]  [place] [address]  Phone: [phone]  Fax: [phone]  Established Patient  Follow Up Time: 1 week    Aubrey Rios)  Gastroenterology; Internal Medicine; Transplant Hepatology  [address] [place] [address]  Phone: [phone]  Fax: [phone]  Established Patient  Follow Up Time: 2 weeks Wesley White)  Medicine  [address] [place] [address], [address]  [place] [address]  Phone: [phone]  Fax: [phone]  Established Patient  Follow Up Time: 1 week    Jose Swartz)  Internal Medicine  [address] [place] [address]  Phone: [phone]  Fax: [phone]  Established Patient  Follow Up Time: 1 week

## 2020-02-26 NOTE — DISCHARGE NOTE PROVIDER - PROVIDER TOKENS
PROVIDER:[TOKEN:[[id number]],FOLLOWUP:[1 week],ESTABLISHEDPATIENT:[T]],PROVIDER:[TOKEN:[[id number]],FOLLOWUP:[2 weeks],ESTABLISHEDPATIENT:[T]] PROVIDER:[TOKEN:[[id number]],FOLLOWUP:[1 week],ESTABLISHEDPATIENT:[T]],PROVIDER:[TOKEN:[[id number]],FOLLOWUP:[1 week],ESTABLISHEDPATIENT:[T]]

## 2020-02-26 NOTE — DISCHARGE NOTE PROVIDER - CARE PROVIDERS DIRECT ADDRESSES
,DirectAddress_Unknown,[email or web address] ,DirectAddress_Unknown,[email or web address]

## 2020-02-27 ENCOUNTER — TRANSCRIPTION ENCOUNTER (OUTPATIENT)
Age: 60
End: 2020-02-27

## 2020-02-27 VITALS
DIASTOLIC BLOOD PRESSURE: 71 MMHG | HEART RATE: 90 BPM | RESPIRATION RATE: 16 BRPM | TEMPERATURE: 99 F | OXYGEN SATURATION: 95 % | SYSTOLIC BLOOD PRESSURE: 133 MMHG

## 2020-02-27 LAB
ANION GAP SERPL CALC-SCNC: 9 MMOL/L — SIGNIFICANT CHANGE UP (ref 5–17)
APTT BLD: 43 SEC — HIGH (ref 27.5–36.3)
BUN SERPL-MCNC: 18 MG/DL — SIGNIFICANT CHANGE UP (ref 7–23)
CALCIUM SERPL-MCNC: 8.2 MG/DL — LOW (ref 8.4–10.5)
CHLORIDE SERPL-SCNC: 97 MMOL/L — SIGNIFICANT CHANGE UP (ref 96–108)
CO2 SERPL-SCNC: 31 MMOL/L — SIGNIFICANT CHANGE UP (ref 22–31)
CREAT SERPL-MCNC: 1.22 MG/DL — SIGNIFICANT CHANGE UP (ref 0.5–1.3)
GLUCOSE SERPL-MCNC: 94 MG/DL — SIGNIFICANT CHANGE UP (ref 70–99)
HCT VFR BLD CALC: 24.9 % — LOW (ref 39–50)
HGB BLD-MCNC: 7.2 G/DL — LOW (ref 13–17)
INR BLD: 2.16 RATIO — HIGH (ref 0.88–1.16)
MAGNESIUM SERPL-MCNC: 1.8 MG/DL — SIGNIFICANT CHANGE UP (ref 1.6–2.6)
MCHC RBC-ENTMCNC: 24.4 PG — LOW (ref 27–34)
MCHC RBC-ENTMCNC: 28.9 GM/DL — LOW (ref 32–36)
MCV RBC AUTO: 84.4 FL — SIGNIFICANT CHANGE UP (ref 80–100)
NRBC # BLD: 0 /100 WBCS — SIGNIFICANT CHANGE UP (ref 0–0)
PHOSPHATE SERPL-MCNC: 4.6 MG/DL — HIGH (ref 2.5–4.5)
PLATELET # BLD AUTO: 60 K/UL — LOW (ref 150–400)
POTASSIUM SERPL-MCNC: 4.5 MMOL/L — SIGNIFICANT CHANGE UP (ref 3.5–5.3)
POTASSIUM SERPL-SCNC: 4.5 MMOL/L — SIGNIFICANT CHANGE UP (ref 3.5–5.3)
PROTHROM AB SERPL-ACNC: 25.4 SEC — HIGH (ref 10–12.9)
RBC # BLD: 2.95 M/UL — LOW (ref 4.2–5.8)
RBC # FLD: 18.5 % — HIGH (ref 10.3–14.5)
SODIUM SERPL-SCNC: 137 MMOL/L — SIGNIFICANT CHANGE UP (ref 135–145)
WBC # BLD: 1.99 K/UL — LOW (ref 3.8–10.5)
WBC # FLD AUTO: 1.99 K/UL — LOW (ref 3.8–10.5)

## 2020-02-27 PROCEDURE — 82330 ASSAY OF CALCIUM: CPT

## 2020-02-27 PROCEDURE — 87633 RESP VIRUS 12-25 TARGETS: CPT

## 2020-02-27 PROCEDURE — 86901 BLOOD TYPING SEROLOGIC RH(D): CPT

## 2020-02-27 PROCEDURE — 80053 COMPREHEN METABOLIC PANEL: CPT

## 2020-02-27 PROCEDURE — 82803 BLOOD GASES ANY COMBINATION: CPT

## 2020-02-27 PROCEDURE — 87040 BLOOD CULTURE FOR BACTERIA: CPT

## 2020-02-27 PROCEDURE — 76700 US EXAM ABDOM COMPLETE: CPT

## 2020-02-27 PROCEDURE — 85014 HEMATOCRIT: CPT

## 2020-02-27 PROCEDURE — 85730 THROMBOPLASTIN TIME PARTIAL: CPT

## 2020-02-27 PROCEDURE — 82947 ASSAY GLUCOSE BLOOD QUANT: CPT

## 2020-02-27 PROCEDURE — 87581 M.PNEUMON DNA AMP PROBE: CPT

## 2020-02-27 PROCEDURE — 99239 HOSP IP/OBS DSCHRG MGMT >30: CPT | Mod: GC

## 2020-02-27 PROCEDURE — 93005 ELECTROCARDIOGRAM TRACING: CPT

## 2020-02-27 PROCEDURE — 71045 X-RAY EXAM CHEST 1 VIEW: CPT

## 2020-02-27 PROCEDURE — 97116 GAIT TRAINING THERAPY: CPT

## 2020-02-27 PROCEDURE — 97161 PT EVAL LOW COMPLEX 20 MIN: CPT

## 2020-02-27 PROCEDURE — 83735 ASSAY OF MAGNESIUM: CPT

## 2020-02-27 PROCEDURE — 87798 DETECT AGENT NOS DNA AMP: CPT

## 2020-02-27 PROCEDURE — 94640 AIRWAY INHALATION TREATMENT: CPT

## 2020-02-27 PROCEDURE — 93975 VASCULAR STUDY: CPT

## 2020-02-27 PROCEDURE — 84295 ASSAY OF SERUM SODIUM: CPT

## 2020-02-27 PROCEDURE — 82565 ASSAY OF CREATININE: CPT

## 2020-02-27 PROCEDURE — 85027 COMPLETE CBC AUTOMATED: CPT

## 2020-02-27 PROCEDURE — 83605 ASSAY OF LACTIC ACID: CPT

## 2020-02-27 PROCEDURE — 85610 PROTHROMBIN TIME: CPT

## 2020-02-27 PROCEDURE — 99232 SBSQ HOSP IP/OBS MODERATE 35: CPT | Mod: GC

## 2020-02-27 PROCEDURE — 86850 RBC ANTIBODY SCREEN: CPT

## 2020-02-27 PROCEDURE — 86900 BLOOD TYPING SEROLOGIC ABO: CPT

## 2020-02-27 PROCEDURE — 80048 BASIC METABOLIC PNL TOTAL CA: CPT

## 2020-02-27 PROCEDURE — 99285 EMERGENCY DEPT VISIT HI MDM: CPT | Mod: 25

## 2020-02-27 PROCEDURE — 84100 ASSAY OF PHOSPHORUS: CPT

## 2020-02-27 PROCEDURE — 71046 X-RAY EXAM CHEST 2 VIEWS: CPT

## 2020-02-27 PROCEDURE — 94664 DEMO&/EVAL PT USE INHALER: CPT

## 2020-02-27 PROCEDURE — 87486 CHLMYD PNEUM DNA AMP PROBE: CPT

## 2020-02-27 PROCEDURE — 81003 URINALYSIS AUTO W/O SCOPE: CPT

## 2020-02-27 PROCEDURE — 97110 THERAPEUTIC EXERCISES: CPT

## 2020-02-27 PROCEDURE — 84132 ASSAY OF SERUM POTASSIUM: CPT

## 2020-02-27 PROCEDURE — 83880 ASSAY OF NATRIURETIC PEPTIDE: CPT

## 2020-02-27 PROCEDURE — 71275 CT ANGIOGRAPHY CHEST: CPT

## 2020-02-27 PROCEDURE — 82435 ASSAY OF BLOOD CHLORIDE: CPT

## 2020-02-27 RX ORDER — SPIRONOLACTONE 25 MG/1
150 TABLET, FILM COATED ORAL DAILY
Refills: 0 | Status: DISCONTINUED | OUTPATIENT
Start: 2020-02-27 | End: 2020-02-27

## 2020-02-27 RX ORDER — FUROSEMIDE 40 MG
3 TABLET ORAL
Qty: 0 | Refills: 0 | DISCHARGE
Start: 2020-02-27

## 2020-02-27 RX ORDER — FUROSEMIDE 40 MG
2 TABLET ORAL
Qty: 0 | Refills: 0 | DISCHARGE
Start: 2020-02-27

## 2020-02-27 RX ORDER — FUROSEMIDE 40 MG
60 TABLET ORAL DAILY
Refills: 0 | Status: DISCONTINUED | OUTPATIENT
Start: 2020-02-27 | End: 2020-02-27

## 2020-02-27 RX ORDER — SPIRONOLACTONE 25 MG/1
6 TABLET, FILM COATED ORAL
Qty: 0 | Refills: 0 | DISCHARGE
Start: 2020-02-27

## 2020-02-27 RX ADMIN — Medication 1 SPRAY(S): at 06:17

## 2020-02-27 RX ADMIN — Medication 60 MILLIGRAM(S): at 13:27

## 2020-02-27 RX ADMIN — LACTULOSE 30 GRAM(S): 10 SOLUTION ORAL at 06:18

## 2020-02-27 RX ADMIN — ALBUTEROL 2 PUFF(S): 90 AEROSOL, METERED ORAL at 11:34

## 2020-02-27 RX ADMIN — PANTOPRAZOLE SODIUM 40 MILLIGRAM(S): 20 TABLET, DELAYED RELEASE ORAL at 06:18

## 2020-02-27 RX ADMIN — ALBUTEROL 2 PUFF(S): 90 AEROSOL, METERED ORAL at 06:08

## 2020-02-27 RX ADMIN — SPIRONOLACTONE 100 MILLIGRAM(S): 25 TABLET, FILM COATED ORAL at 06:18

## 2020-02-27 RX ADMIN — Medication 40 MILLIGRAM(S): at 06:18

## 2020-02-27 NOTE — PROGRESS NOTE ADULT - PROBLEM SELECTOR PLAN 2
PE (ruled out) Vs. viral syndrome vs. volume overload  -US abd and +/- para if has ascites  -Starting diuretics as above
PE (ruled out) Vs. viral syndrome vs. volume overload  -US abd w/ small amount of ascites --risk of tapping outweighs benefits in this case  -Diuretics as above
PE (ruled out) Vs. viral syndrome vs. volume overload  -US abd and +/- para if has ascites  -Starting diuretics as above

## 2020-02-27 NOTE — PROGRESS NOTE ADULT - PROBLEM SELECTOR PLAN 1
Etiology BOB w/ ETOH abuse history last drink 6 months ago  Ascites - small volume ascites on US. Thora in past w/ exudate but most likely concentrated transudative fluid as per pulmonary due to hepatic hydrothorax and diuretic use.  Resume Lasix and Spironolactone- pt does not meet sepsis criteria and history consistent with medication non adherance.  Spironolactone 100mg PO daily w/ holding parameters and lasix diuresis 40 IV BID.   - restarted on home regimen: lasix 60 PO mg, spironolactone 150 mg po  Varices: Unknown  HCC: none CT 11/2019  Encephalopathy: Non present, continue home Lactulose 45mg BID and Rifaximin 550mg PO BID  - UA neg, BCx NGTD, neg RVP  - US abd w/ doppler neg for portal venous thrombosis  - hepatology recs appreciated
Etiology BOB w/ ETOH abuse history last drink 6 months ago  HCC:   Ascites - small volume ascites on US. Has had thora w/ exudate but most likely concentrated transudative fluid as per pulmonary due to hepatic hydrothorax and diuretic use.  Resume Lasix and Spironolactone- pt does not meet sepsis criteria and history consistent with medication non adherance.  Spironolactone 100mg PO daily w/ holding parameters and lasix diuresis 40 IV BID  Varices: Unknown  HCC: none CT 11/2019  Encephalopathy: Non present, continue home Lactulose 45mg BID and Rifaximin 550mg PO BID  - UA neg, BCx NGTD, neg RVP  - US abd w/ doppler neg for portal venous thrombosis
Etiology BOB w/ ETOH abuse history last drink 6 months ago  HCC:   Ascites - small volume ascites on US. Thora in past w/ exudate but most likely concentrated transudative fluid as per pulmonary due to hepatic hydrothorax and diuretic use.  Resume Lasix and Spironolactone- pt does not meet sepsis criteria and history consistent with medication non adherance.  Spironolactone 100mg PO daily w/ holding parameters and lasix diuresis 40 IV BID. Will switch back to lasix 40 po daily when respiratory status improves.   Varices: Unknown  HCC: none CT 11/2019  Encephalopathy: Non present, continue home Lactulose 45mg BID and Rifaximin 550mg PO BID  - UA neg, BCx NGTD, neg RVP  - US abd w/ doppler neg for portal venous thrombosis
Etiology BOB w/ ETOH abuse history last drink 6 months ago  HCC:   Ascites - small volume ascites on US. Thora in past w/ exudate but most likely concentrated transudative fluid as per pulmonary due to hepatic hydrothorax and diuretic use.  Resume Lasix and Spironolactone- pt does not meet sepsis criteria and history consistent with medication non adherance.  Spironolactone 100mg PO daily w/ holding parameters and lasix diuresis 40 IV BID. Will switch back to lasix 40 po daily when respiratory status improves.   Varices: Unknown  HCC: none CT 11/2019  Encephalopathy: Non present, continue home Lactulose 45mg BID and Rifaximin 550mg PO BID  - UA neg, BCx NGTD, neg RVP  - US abd w/ doppler neg for portal venous thrombosis  - hepatology recs appreciated
Etiology BOB w/ ETOH abuse history last drink 6 months ago  HCC:  Ascites- f/u US, never had para before but has had thora which showed exudate but most likely concentrated transudative fluid as per pulmonary due to hepatic hydrothorax and diuretic use.  Resume Lasix and Spironolactone- pt does not meet sepsis criteria and history consistent with medication non adherance.  Spironolactone 100mg PO daily w/ holding parameters and lasix diuresis 40 IV BID  Varices: Unknown  HCC: none CT 11/2019  Encephalopathy: Non present, continue home Lactulose 45mg BID and Rifaximin 550mg PO BID  F/U blood cultures, RVP, CXR shows effusions, blood cultures, UA and US abd w/ doppler for work up of decompensated cirrhosis.

## 2020-02-27 NOTE — PROGRESS NOTE ADULT - ASSESSMENT
59M with decompensated BOB/NOELLE Cirrhosis, h/o ascites and hepatic hydrothorax (0.6L drained in Nov), h/o hepatic encephalopathy, hospitalizations in Oct & Nov for SOB, poor follow-up, admitted on 2/22 with SOB after he ran out of medications. Last discharged on lasix/spironolactone 60/150, lactulose 30g q6, rifaximin, PPI. Now found only mild ascites, mild bilat. pleural effusions, 2+ edema, and mild disorientation.    Impression:  # Decompensated cirrhosis MELD Na of 21, with increased INR from November in the setting of nonadherence with diuretics. Reports abstinence.   -ascites: with recurrent pleural effusion (R>L). SPAG from L sided thora 11/15 1.3. Patient also required a chest tube during that admission and had chest trauma. Small ascites on ultrasound. 2+ edema, last echo 10/2019 showed no CHF  -varices status unknown  -HCC not detected on CT w IV contrast 11/19  -PSE: patient currently with some confusion, difficulty verbalizing but no asterixis. Per chart has history of HE on last admission.   Immune to HAV/HBV    2. Morbid obesity     3. Volume overload, TTE was unremarkable in 10/19    4. History of bacteremia, blood cultures negative this admission. UA negative as well.    Recommendation:  - edema, small ascites: continue diuresis  - continue lactulose and rifaximin  - repeat echocardiogram   - daily MELD Labs  - outpatient follow up with hepatology 59M with decompensated BOB/NOELLE Cirrhosis, h/o ascites and hepatic hydrothorax (0.6L drained in Nov), h/o hepatic encephalopathy, hospitalizations in Oct & Nov for SOB, poor follow-up, admitted on 2/22 with SOB after he ran out of medications. Last discharged on lasix/spironolactone 60/150, lactulose 30g q6, rifaximin, PPI. Now presented with shortness of breath 2/2 volume overload from medication non adherence and decompensated hepatic cirrhosis.    He was found to have only mild ascites, mild bilat. pleural effusions, 2+ edema, and mild disorientation.    Impression:  # Decompensated cirrhosis MELD Na of 21, with increased INR from November in the setting of nonadherence with diuretics. Reports abstinence.   -ascites: with recurrent pleural effusion (R>L). SPAG from L sided thora 11/15 1.3. Patient also required a chest tube during that admission and had chest trauma. Small ascites on ultrasound. 2+ edema, last echo 10/2019 showed no CHF  -varices status unknown  -HCC not detected on CT w IV contrast 11/19  -PSE: patient currently with some confusion, difficulty verbalizing but no asterixis. Per chart has history of HE on last admission.   Immune to HAV/HBV    # Morbid obesity BMI: 45.5    Recommendation:  - continue on Lasix / spironolactone 60/150 mg / day  - continue lactulose and rifaximin  - repeat echocardiogram   - daily MELD Labs  - outpatient follow up with hepatology

## 2020-02-27 NOTE — PROGRESS NOTE ADULT - PROBLEM SELECTOR PLAN 5
Atorvastatin 20mg PO daily

## 2020-02-27 NOTE — PROGRESS NOTE ADULT - PROBLEM SELECTOR PLAN 7
Transitions of Care Status:  1.  Name of PCP: Dr. White   2.  PCP Contacted on Admission: [ x] Y    [ ] N    3.  PCP contacted at Discharge: [ ] Y    [ ] N    [ ] N/A  4.  Post-Discharge Appointment Date and Location:  5.  Summary of Handoff given to PCP:  -PT ordered

## 2020-02-27 NOTE — DISCHARGE NOTE NURSING/CASE MANAGEMENT/SOCIAL WORK - PATIENT PORTAL LINK FT
You can access the FollowMyHealth Patient Portal offered by [facility] by registering at the following website: [email or web address]. By joining [facility]’s FollowMyHealth portal, you will also be able to view your health information using other applications (apps) compatible with our system.

## 2020-02-27 NOTE — PROGRESS NOTE ADULT - SUBJECTIVE AND OBJECTIVE BOX
Reddy Saunders MD, PGY-1  Pager #[phone]  After 7 PM on weekdays and 12 PM on weekends, for urgent issues please page #[phone].  ----------------------------------------------------------------  Patient is a 59y old  Male who presents with a chief complaint of SOB (27 Feb 2020 09:22)      SUBJECTIVE / OVERNIGHT EVENTS: No acute events overnight. Pt seen and examined at bedside. Pt denies any acute complaints including headache, fever, chills, nausea, vomiting, diarrhea, constipation, chest pain. Moving bowels. Baseline SOB.     MEDICATIONS  (STANDING):  ALBUTerol    90 MICROgram(s) HFA Inhaler 2 Puff(s) Inhalation every 6 hours  atorvastatin 20 milliGRAM(s) Oral at bedtime  fluticasone propionate 50 MICROgram(s)/spray Nasal Spray 1 Spray(s) Both Nostrils two times a day  furosemide    Tablet 60 milliGRAM(s) Oral daily  influenza   Vaccine 0.5 milliLiter(s) IntraMuscular once  lactulose Syrup 30 Gram(s) Oral two times a day  lidocaine   Patch 1 Patch Transdermal daily  melatonin 3 milliGRAM(s) Oral at bedtime  pantoprazole    Tablet 40 milliGRAM(s) Oral before breakfast  rifAXIMin 550 milliGRAM(s) Oral two times a day  spironolactone 150 milliGRAM(s) Oral daily    MEDICATIONS  (PRN):  guaiFENesin   Syrup  (Sugar-Free) 200 milliGRAM(s) Oral every 6 hours PRN Cough  zolpidem 5 milliGRAM(s) Oral at bedtime PRN Insomnia  zolpidem 5 milliGRAM(s) Oral at bedtime PRN Insomnia      CAPILLARY BLOOD GLUCOSE  I&O's Summary    26 Feb 2020 07:01  -  27 Feb 2020 07:00  --------------------------------------------------------  IN: 1140 mL / OUT: 450 mL / NET: 690 mL    PHYSICAL EXAM:  Vital Signs Last 24 Hrs  T(C): 37.1 (27 Feb 2020 13:24), Max: 37.1 (27 Feb 2020 13:24)  T(F): 98.8 (27 Feb 2020 13:24), Max: 98.8 (27 Feb 2020 13:24)  HR: 87 (27 Feb 2020 13:24) (80 - 90)  BP: 120/69 (27 Feb 2020 13:24) (101/61 - 135/70)  RR: 16 (27 Feb 2020 13:24) (16 - 20)  SpO2: 93% (27 Feb 2020 13:24) (92% - 94%)    GENERAL: NAD, large male  HEAD:  NCAT  NECK: Supple, No JVD  CHEST/LUNG: decreased breath sounds on R, no crackles  HEART: RRR, no MRG  ABDOMEN: soft, nt, nd  EXTREMITIES:  1+ pitting edema b/l  NEURO: AOx3    LABS:                        7.2    1.99  )-----------( 60       ( 27 Feb 2020 07:10 )             24.9     02-27    137  |  97  |  18  ----------------------------<  94  4.5   |  31  |  1.22    Ca    8.2<L>      27 Feb 2020 07:10  Phos  4.6     02-27  Mg     1.8     02-27      PT/INR - ( 27 Feb 2020 07:10 )   PT: 25.4 sec;   INR: 2.16 ratio         PTT - ( 27 Feb 2020 07:10 )  PTT:43.0 sec    RADIOLOGY & ADDITIONAL TESTS:  Results Reviewed:   Imaging Personally Reviewed:  Electrocardiogram Personally Reviewed:    COORDINATION OF CARE:  Care Discussed with Consultants/Other Providers [Y/N]:  Prior or Outpatient Records Reviewed [Y/N]:      LABS:                        7.6    2.31  )-----------( 60       ( 26 Feb 2020 07:03 )             26.0     02-26    138  |  98  |  17  ----------------------------<  99  4.4   |  31  |  1.24    Ca    8.4      26 Feb 2020 06:58  Phos  4.0     02-26  Mg     1.6     02-26      PT/INR - ( 26 Feb 2020 06:58 )   PT: 24.9 sec;   INR: 2.13 ratio         PTT - ( 26 Feb 2020 06:58 )  PTT:42.4 sec    RADIOLOGY & ADDITIONAL TESTS:  Results Reviewed:   Imaging Personally Reviewed:  Electrocardiogram Personally Reviewed:    COORDINATION OF CARE:  Care Discussed with Consultants/Other Providers [Y/N]:  Prior or Outpatient Records Reviewed [Y/N]:

## 2020-02-27 NOTE — PROGRESS NOTE ADULT - ATTENDING COMMENTS
pt seen and examined.  above plan discussed on rounds today.  In addition,    Decompensated Cirrhosis - he reports improvement of his symptoms with diuresis. c/w aggressive diuresis with IV lasix and spironolactone. Hepatology eval.  awaiting JERRI placement.
pt seen and examined.  above plan discussed on rounds today.  In addition,    Decompensated Cirrhosis - there is clinical improvement of his symptoms with aggressive diuresis. transition to PO lasix and spironolactone. Hepatology eval. appreciated.  Patient is agreeable to JERRI placement.  stable for discharge today.  Discharge time spent: 36 min
pt seen and examined.  above plan discussed on rounds today.  In addition,    Decompensated Cirrhosis - with significant volume overload, symptomatic pleural effusions and fatigue. c/w aggressive diuresis with IV lasix and spironolactone. will get Hepatology eval.  Deconditioning --> per PT will require JERRI upon discharge.
Hospitalist- Lewis Leon MD  Pager: [phone]  If no response or off-hours, page [phone]  -------------------------------------  I personally performed the E/M service provided and supervised key portions of the service furnished by the resident/fellow. I agree with the history, physical and assessment/plan as stated above, with the following additional remarks:  - pt reports increasing SOB at home after running out of his meds. States the only medications he still has are lactulose and ambien.  - At present his exam is remarkable for decreased breath sounds bibases, good RA saturation at rest, +distended but nontender cirrhotic abdomen, +3 pitting edema b/l.   - It appears his current hospitalization is due to medication nonadherence after running out of diuretics, and he reports not being able to see hepatology OP due to 'bad phone connection.' He requires re-education on his disease process, needs refills of his medications and PT re-evaluation for rehab. Will also notify hepatology.  - dispo: likely tomorrow pending PT recs and hepatology eval.
pt seen and examined.  above plan discussed on rounds today.  In addition,    Decompensated Cirrhosis - with significant volume overload, symptomatic pleural effusions and fatigue. he reports improvement of his symptos with diuresis. c/w aggressive diuresis with IV lasix and spironolactone. Awaiting Hepatology eval.  Deconditioning --> per PT will require JERRI upon discharge.

## 2020-02-27 NOTE — PROGRESS NOTE ADULT - PROBLEM SELECTOR PLAN 4
Morbid obesity BMI 43 last admission  Nutrition consult appreciated  Would need weight loss prior to being considered for transplant.
Morbid obesity BMI 43 last admission  Nutrition consult  Would need weight loss prior to being considered for transplant.

## 2020-02-27 NOTE — PROGRESS NOTE ADULT - SUBJECTIVE AND OBJECTIVE BOX
Interval Events:   No acute events     Hospital Medications:  ALBUTerol    90 MICROgram(s) HFA Inhaler 2 Puff(s) Inhalation every 6 hours  atorvastatin 20 milliGRAM(s) Oral at bedtime  fluticasone propionate 50 MICROgram(s)/spray Nasal Spray 1 Spray(s) Both Nostrils two times a day  furosemide   Injectable 40 milliGRAM(s) IV Push two times a day  guaiFENesin   Syrup  (Sugar-Free) 200 milliGRAM(s) Oral every 6 hours PRN  influenza   Vaccine 0.5 milliLiter(s) IntraMuscular once  lactulose Syrup 30 Gram(s) Oral two times a day  lidocaine   Patch 1 Patch Transdermal daily  melatonin 3 milliGRAM(s) Oral at bedtime  pantoprazole    Tablet 40 milliGRAM(s) Oral before breakfast  rifAXIMin 550 milliGRAM(s) Oral two times a day  spironolactone 100 milliGRAM(s) Oral daily  zolpidem 5 milliGRAM(s) Oral at bedtime PRN  zolpidem 5 milliGRAM(s) Oral at bedtime PRN        ROS:   General:  No fevers, chills or night sweats  ENT:  No sore throat or dysphagia  CV:  No pain or palpitations  Resp:  + dyspnea, cough or  wheezing  GI:  as above  Skin:  No rash or edema  Neuro: no weakness   Hematologic: no bleeding  Musculoskeletal: no muscle pain or join pain  Psych: no agitation      PHYSICAL EXAM:   Vital Signs:  Vital Signs Last 24 Hrs  T(C): 36.8 (27 Feb 2020 04:48), Max: 36.8 (27 Feb 2020 04:48)  T(F): 98.3 (27 Feb 2020 04:48), Max: 98.3 (27 Feb 2020 04:48)  HR: 82 (27 Feb 2020 06:10) (78 - 90)  BP: 135/70 (27 Feb 2020 06:05) (101/61 - 142/71)  BP(mean): --  RR: 18 (27 Feb 2020 04:48) (18 - 20)  SpO2: 94% (27 Feb 2020 06:10) (93% - 94%)  Daily     Daily     GENERAL:  Appears stated age, well-groomed, well-nourished, no distress  HEENT:  NC/AT,  conjunctivae clear and pink,  no JVD  CHEST:  Full & symmetric excursion, no increased effort, breath sounds clear, decreased at the lung bases  HEART:  Regular rhythm, S1, S2, no murmur/rub/S3/S4, no abdominal bruit, no edema  ABDOMEN:  Soft, obese, non-tender, non-distended, normoactive bowel sounds,  no masses , no hepatosplenomegaly  EXTREMITIES:  bipedal edema  SKIN:  No rash/erythema/ecchymoses/petechiae/wounds/abscess/warm/dry  NEURO:  no asterixis, somewhat lethargic    LABS:                        7.2    1.99  )-----------( 60       ( 27 Feb 2020 07:10 )             24.9     Mean Cell Volume: 84.4 fl (02-27-20 @ 07:10)    02-27    137  |  97  |  18  ----------------------------<  94  4.5   |  31  |  1.22    Ca    8.2<L>      27 Feb 2020 07:10  Phos  4.6     02-27  Mg     1.8     02-27        PT/INR - ( 27 Feb 2020 07:10 )   PT: 25.4 sec;   INR: 2.16 ratio         PTT - ( 27 Feb 2020 07:10 )  PTT:43.0 sec                            7.2    1.99  )-----------( 60       ( 27 Feb 2020 07:10 )             24.9                         7.6    2.31  )-----------( 60       ( 26 Feb 2020 07:03 )             26.0                         7.8    2.39  )-----------( 66       ( 25 Feb 2020 07:24 )             28.1       Imaging: Interval Events:   No new complains. SOB is improving     Hospital Medications:  ALBUTerol    90 MICROgram(s) HFA Inhaler 2 Puff(s) Inhalation every 6 hours  atorvastatin 20 milliGRAM(s) Oral at bedtime  fluticasone propionate 50 MICROgram(s)/spray Nasal Spray 1 Spray(s) Both Nostrils two times a day  furosemide   Injectable 40 milliGRAM(s) IV Push two times a day  guaiFENesin   Syrup  (Sugar-Free) 200 milliGRAM(s) Oral every 6 hours PRN  influenza   Vaccine 0.5 milliLiter(s) IntraMuscular once  lactulose Syrup 30 Gram(s) Oral two times a day  lidocaine   Patch 1 Patch Transdermal daily  melatonin 3 milliGRAM(s) Oral at bedtime  pantoprazole    Tablet 40 milliGRAM(s) Oral before breakfast  rifAXIMin 550 milliGRAM(s) Oral two times a day  spironolactone 100 milliGRAM(s) Oral daily  zolpidem 5 milliGRAM(s) Oral at bedtime PRN  zolpidem 5 milliGRAM(s) Oral at bedtime PRN        ROS:   General:  No fevers, chills or night sweats  ENT:  No sore throat or dysphagia  CV:  No pain or palpitations  Resp:  + dyspnea, cough or  wheezing  GI:  as above  Skin:  No rash or edema  Neuro: no weakness   Hematologic: no bleeding  Musculoskeletal: no muscle pain or join pain  Psych: no agitation      PHYSICAL EXAM:   Vital Signs:  Vital Signs Last 24 Hrs  T(C): 36.8 (27 Feb 2020 04:48), Max: 36.8 (27 Feb 2020 04:48)  T(F): 98.3 (27 Feb 2020 04:48), Max: 98.3 (27 Feb 2020 04:48)  HR: 82 (27 Feb 2020 06:10) (78 - 90)  BP: 135/70 (27 Feb 2020 06:05) (101/61 - 142/71)  BP(mean): --  RR: 18 (27 Feb 2020 04:48) (18 - 20)  SpO2: 94% (27 Feb 2020 06:10) (93% - 94%)  Daily     Daily     GENERAL:  Appears stated age, well-groomed, well-nourished, no distress  HEENT:  NC/AT,  conjunctivae clear and pink,  no JVD  CHEST:  Full & symmetric excursion, no increased effort, breath sounds clear, decreased at the lung bases  HEART:  Regular rhythm, S1, S2, no murmur/rub/S3/S4, no abdominal bruit, no edema  ABDOMEN:  Soft, obese, non-tender, non-distended, normoactive bowel sounds,  no masses , no hepatosplenomegaly  EXTREMITIES:  bipedal edema  SKIN:  No rash/erythema/ecchymoses/petechiae/wounds/abscess/warm/dry  NEURO:  no asterixis, somewhat lethargic    LABS:                        7.2    1.99  )-----------( 60       ( 27 Feb 2020 07:10 )             24.9     Mean Cell Volume: 84.4 fl (02-27-20 @ 07:10)    02-27    137  |  97  |  18  ----------------------------<  94  4.5   |  31  |  1.22    Ca    8.2<L>      27 Feb 2020 07:10  Phos  4.6     02-27  Mg     1.8     02-27        PT/INR - ( 27 Feb 2020 07:10 )   PT: 25.4 sec;   INR: 2.16 ratio         PTT - ( 27 Feb 2020 07:10 )  PTT:43.0 sec                            7.2    1.99  )-----------( 60       ( 27 Feb 2020 07:10 )             24.9                         7.6    2.31  )-----------( 60       ( 26 Feb 2020 07:03 )             26.0                         7.8    2.39  )-----------( 66       ( 25 Feb 2020 07:24 )             28.1       Imaging:

## 2020-02-27 NOTE — PROGRESS NOTE ADULT - PROBLEM SELECTOR PROBLEM 1
Decompensated hepatic cirrhosis

## 2020-03-24 NOTE — PROGRESS NOTE ADULT - PROBLEM SELECTOR PLAN 4
2 - Hx of decompensated cirrhosis  - Likely 2/2 alcoholism and BOB  - No asterixis or signs of hepatic encephalopathy on exam  - c/w home Lasix, Spironolactone, Lactulose - Hx of decompensated cirrhosis  - Likely 2/2 alcoholism and BOB  - No asterixis or signs of hepatic encephalopathy on exam  - c/w home Spironolactone, Lactulose. Lasix switched to 40mg IV BID  - Trend MELD daily

## 2020-07-28 NOTE — SBIRT NOTE ADULT - NSSBIRTDRGACTION/INTER_GEN_A_CORE
Detail Level: Simple Positive reinforcement Morphology Per Location (Optional): Med br up mac Size Of Lesion: 2mm

## 2020-09-07 ENCOUNTER — INPATIENT (INPATIENT)
Facility: HOSPITAL | Age: 60
LOS: 1 days | Discharge: HOME | End: 2020-09-09
Attending: INTERNAL MEDICINE | Admitting: INTERNAL MEDICINE
Payer: COMMERCIAL

## 2020-09-07 VITALS
HEIGHT: 68 IN | DIASTOLIC BLOOD PRESSURE: 82 MMHG | HEART RATE: 92 BPM | OXYGEN SATURATION: 99 % | WEIGHT: 309.97 LBS | RESPIRATION RATE: 18 BRPM | TEMPERATURE: 98 F | SYSTOLIC BLOOD PRESSURE: 160 MMHG

## 2020-09-07 DIAGNOSIS — D68.9 COAGULATION DEFECT, UNSPECIFIED: ICD-10-CM

## 2020-09-07 DIAGNOSIS — N17.9 ACUTE KIDNEY FAILURE, UNSPECIFIED: ICD-10-CM

## 2020-09-07 DIAGNOSIS — Z98.890 OTHER SPECIFIED POSTPROCEDURAL STATES: Chronic | ICD-10-CM

## 2020-09-07 DIAGNOSIS — E87.6 HYPOKALEMIA: ICD-10-CM

## 2020-09-07 DIAGNOSIS — Z29.9 ENCOUNTER FOR PROPHYLACTIC MEASURES, UNSPECIFIED: ICD-10-CM

## 2020-09-07 DIAGNOSIS — K72.90 HEPATIC FAILURE, UNSPECIFIED WITHOUT COMA: ICD-10-CM

## 2020-09-07 DIAGNOSIS — D61.818 OTHER PANCYTOPENIA: ICD-10-CM

## 2020-09-07 LAB
ALBUMIN SERPL ELPH-MCNC: 2.4 G/DL — LOW (ref 3.3–5)
ALP SERPL-CCNC: 113 U/L — SIGNIFICANT CHANGE UP (ref 40–120)
ALT FLD-CCNC: 31 U/L — SIGNIFICANT CHANGE UP (ref 12–78)
AMMONIA BLD-MCNC: 97 UMOL/L — HIGH (ref 11–32)
ANION GAP SERPL CALC-SCNC: 8 MMOL/L — SIGNIFICANT CHANGE UP (ref 5–17)
APAP SERPL-MCNC: < 2 UG/ML (ref 10–30)
APPEARANCE UR: CLEAR — SIGNIFICANT CHANGE UP
APTT BLD: 35.7 SEC — HIGH (ref 27.5–35.5)
AST SERPL-CCNC: 60 U/L — HIGH (ref 15–37)
BACTERIA # UR AUTO: NEGATIVE — SIGNIFICANT CHANGE UP
BASOPHILS # BLD AUTO: 0.01 K/UL — SIGNIFICANT CHANGE UP (ref 0–0.2)
BASOPHILS NFR BLD AUTO: 0.5 % — SIGNIFICANT CHANGE UP (ref 0–2)
BILIRUB DIRECT SERPL-MCNC: 1.18 MG/DL — HIGH (ref 0.05–0.2)
BILIRUB INDIRECT FLD-MCNC: 2.7 MG/DL — HIGH (ref 0.2–1)
BILIRUB SERPL-MCNC: 3.9 MG/DL — HIGH (ref 0.2–1.2)
BILIRUB SERPL-MCNC: 3.9 MG/DL — HIGH (ref 0.2–1.2)
BILIRUB UR-MCNC: NEGATIVE — SIGNIFICANT CHANGE UP
BUN SERPL-MCNC: 26 MG/DL — HIGH (ref 7–23)
CALCIUM SERPL-MCNC: 8.1 MG/DL — LOW (ref 8.5–10.1)
CHLORIDE SERPL-SCNC: 95 MMOL/L — LOW (ref 96–108)
CO2 SERPL-SCNC: 35 MMOL/L — HIGH (ref 22–31)
COLOR SPEC: YELLOW — SIGNIFICANT CHANGE UP
CREAT SERPL-MCNC: 1.56 MG/DL — HIGH (ref 0.5–1.3)
DIFF PNL FLD: ABNORMAL
EOSINOPHIL # BLD AUTO: 0.01 K/UL — SIGNIFICANT CHANGE UP (ref 0–0.5)
EOSINOPHIL NFR BLD AUTO: 0.5 % — SIGNIFICANT CHANGE UP (ref 0–6)
EPI CELLS # UR: SIGNIFICANT CHANGE UP
ETHANOL SERPL-MCNC: <10 MG/DL — SIGNIFICANT CHANGE UP (ref 0–10)
GLUCOSE SERPL-MCNC: 105 MG/DL — HIGH (ref 70–99)
GLUCOSE UR QL: NEGATIVE MG/DL — SIGNIFICANT CHANGE UP
HCT VFR BLD CALC: 27.2 % — LOW (ref 39–50)
HGB BLD-MCNC: 8 G/DL — LOW (ref 13–17)
HYALINE CASTS # UR AUTO: ABNORMAL /LPF
IMM GRANULOCYTES NFR BLD AUTO: 0.5 % — SIGNIFICANT CHANGE UP (ref 0–1.5)
INR BLD: 2.14 RATIO — HIGH (ref 0.88–1.16)
KETONES UR-MCNC: NEGATIVE — SIGNIFICANT CHANGE UP
LACTATE SERPL-SCNC: 2.2 MMOL/L — HIGH (ref 0.7–2)
LEUKOCYTE ESTERASE UR-ACNC: NEGATIVE — SIGNIFICANT CHANGE UP
LIDOCAIN IGE QN: 174 U/L — SIGNIFICANT CHANGE UP (ref 73–393)
LYMPHOCYTES # BLD AUTO: 0.4 K/UL — LOW (ref 1–3.3)
LYMPHOCYTES # BLD AUTO: 19.6 % — SIGNIFICANT CHANGE UP (ref 13–44)
MAGNESIUM SERPL-MCNC: 2 MG/DL — SIGNIFICANT CHANGE UP (ref 1.6–2.6)
MCHC RBC-ENTMCNC: 23.6 PG — LOW (ref 27–34)
MCHC RBC-ENTMCNC: 29.4 GM/DL — LOW (ref 32–36)
MCV RBC AUTO: 80.2 FL — SIGNIFICANT CHANGE UP (ref 80–100)
MONOCYTES # BLD AUTO: 0.13 K/UL — SIGNIFICANT CHANGE UP (ref 0–0.9)
MONOCYTES NFR BLD AUTO: 6.4 % — SIGNIFICANT CHANGE UP (ref 2–14)
NEUTROPHILS # BLD AUTO: 1.48 K/UL — LOW (ref 1.8–7.4)
NEUTROPHILS NFR BLD AUTO: 72.5 % — SIGNIFICANT CHANGE UP (ref 43–77)
NITRITE UR-MCNC: NEGATIVE — SIGNIFICANT CHANGE UP
NRBC # BLD: 0 /100 WBCS — SIGNIFICANT CHANGE UP (ref 0–0)
NT-PROBNP SERPL-SCNC: 315 PG/ML — HIGH (ref 0–125)
PH UR: 7 — SIGNIFICANT CHANGE UP (ref 5–8)
PLATELET # BLD AUTO: 81 K/UL — LOW (ref 150–400)
POTASSIUM SERPL-MCNC: 3.2 MMOL/L — LOW (ref 3.5–5.3)
POTASSIUM SERPL-SCNC: 3.2 MMOL/L — LOW (ref 3.5–5.3)
PROT SERPL-MCNC: 8.2 GM/DL — SIGNIFICANT CHANGE UP (ref 6–8.3)
PROT UR-MCNC: NEGATIVE MG/DL — SIGNIFICANT CHANGE UP
PROTHROM AB SERPL-ACNC: 23.9 SEC — HIGH (ref 10.6–13.6)
RBC # BLD: 3.39 M/UL — LOW (ref 4.2–5.8)
RBC # FLD: 19.2 % — HIGH (ref 10.3–14.5)
RBC CASTS # UR COMP ASSIST: ABNORMAL /HPF (ref 0–4)
SARS-COV-2 RNA SPEC QL NAA+PROBE: SIGNIFICANT CHANGE UP
SODIUM SERPL-SCNC: 138 MMOL/L — SIGNIFICANT CHANGE UP (ref 135–145)
SP GR SPEC: 1.01 — SIGNIFICANT CHANGE UP (ref 1.01–1.02)
TROPONIN I SERPL-MCNC: <.015 NG/ML — SIGNIFICANT CHANGE UP (ref 0.01–0.04)
UROBILINOGEN FLD QL: 8 MG/DL
WBC # BLD: 2.04 K/UL — LOW (ref 3.8–10.5)
WBC # FLD AUTO: 2.04 K/UL — LOW (ref 3.8–10.5)
WBC UR QL: SIGNIFICANT CHANGE UP

## 2020-09-07 RX ORDER — LACTULOSE 10 G/15ML
20 SOLUTION ORAL ONCE
Refills: 0 | Status: COMPLETED | OUTPATIENT
Start: 2020-09-07 | End: 2020-09-07

## 2020-09-07 RX ORDER — LACTULOSE 10 G/15ML
30 SOLUTION ORAL THREE TIMES A DAY
Refills: 0 | Status: DISCONTINUED | OUTPATIENT
Start: 2020-09-07 | End: 2020-09-09

## 2020-09-07 RX ORDER — BACITRACIN ZINC 500 UNIT/G
1 OINTMENT IN PACKET (EA) TOPICAL DAILY
Refills: 0 | Status: DISCONTINUED | OUTPATIENT
Start: 2020-09-07 | End: 2020-09-09

## 2020-09-07 RX ORDER — LACTULOSE 10 G/15ML
30 SOLUTION ORAL
Refills: 0 | Status: DISCONTINUED | OUTPATIENT
Start: 2020-09-07 | End: 2020-09-07

## 2020-09-07 RX ORDER — ATORVASTATIN CALCIUM 80 MG/1
20 TABLET, FILM COATED ORAL AT BEDTIME
Refills: 0 | Status: DISCONTINUED | OUTPATIENT
Start: 2020-09-07 | End: 2020-09-09

## 2020-09-07 RX ORDER — FUROSEMIDE 40 MG
40 TABLET ORAL
Refills: 0 | Status: DISCONTINUED | OUTPATIENT
Start: 2020-09-07 | End: 2020-09-09

## 2020-09-07 RX ORDER — SODIUM CHLORIDE 9 MG/ML
1000 INJECTION INTRAMUSCULAR; INTRAVENOUS; SUBCUTANEOUS ONCE
Refills: 0 | Status: COMPLETED | OUTPATIENT
Start: 2020-09-07 | End: 2020-09-07

## 2020-09-07 RX ORDER — POTASSIUM CHLORIDE 20 MEQ
40 PACKET (EA) ORAL ONCE
Refills: 0 | Status: COMPLETED | OUTPATIENT
Start: 2020-09-07 | End: 2020-09-07

## 2020-09-07 RX ORDER — SPIRONOLACTONE 25 MG/1
100 TABLET, FILM COATED ORAL DAILY
Refills: 0 | Status: DISCONTINUED | OUTPATIENT
Start: 2020-09-08 | End: 2020-09-09

## 2020-09-07 RX ADMIN — Medication 40 MILLIGRAM(S): at 18:09

## 2020-09-07 RX ADMIN — LACTULOSE 20 GRAM(S): 10 SOLUTION ORAL at 16:47

## 2020-09-07 RX ADMIN — SODIUM CHLORIDE 1000 MILLILITER(S): 9 INJECTION INTRAMUSCULAR; INTRAVENOUS; SUBCUTANEOUS at 16:46

## 2020-09-07 RX ADMIN — Medication 40 MILLIEQUIVALENT(S): at 18:09

## 2020-09-07 RX ADMIN — ATORVASTATIN CALCIUM 20 MILLIGRAM(S): 80 TABLET, FILM COATED ORAL at 21:49

## 2020-09-07 RX ADMIN — LACTULOSE 30 GRAM(S): 10 SOLUTION ORAL at 21:50

## 2020-09-07 NOTE — H&P ADULT - ASSESSMENT
60 y/o male w/ PMHx of Chronic Hepatic Cirrhosis of unknown etiology being admitted for hepatic encephalopathy. Pt also w/ hypokalemia, Pancytopenia and MONI 60 y/o male w/ PMHx of Chronic Hepatic Cirrhosis of unknown etiology being admitted for hepatic encephalopathy. Pt also w/ hypokalemia, Pancytopenia and MONI.    IMPROVE VTE Individual Risk Assessment    RISK                                                                Points    [  ] Previous VTE                                                  3    [  ] Thrombophilia                                               2    [  ] Lower limb paralysis                                      2        (unable to hold up >15 seconds)      [  ] Current Cancer                                              2         (within 6 months)    [  ] Immobilization > 24 hrs                                1    [  ] ICU/CCU stay > 24 hours                              1    [  ] Age > 60                                                      1    IMPROVE VTE Score ___1______    IMPROVE Score 0-1: Low Risk, No VTE prophylaxis required for most patients, encourage ambulation.   IMPROVE Score 2-3: At risk, pharmacologic VTE prophylaxis is indicated for most patients (in the absence of a contraindication)  IMPROVE Score > or = 4: High Risk, pharmacologic VTE prophylaxis is indicated for most patients (in the absence of a contraindication)

## 2020-09-07 NOTE — H&P ADULT - NSHPPHYSICALEXAM_GEN_ALL_CORE
PHYSICAL EXAM:    Vital Signs Last 24 Hrs  T(C): 36.4 (07 Sep 2020 14:15), Max: 36.4 (07 Sep 2020 14:15)  T(F): 97.6 (07 Sep 2020 14:15), Max: 97.6 (07 Sep 2020 14:15)  HR: 92 (07 Sep 2020 14:15) (92 - 92)  BP: 160/82 (07 Sep 2020 14:15) (160/82 - 160/82)  BP(mean): --  RR: 18 (07 Sep 2020 14:15) (18 - 18)  SpO2: 99% (07 Sep 2020 14:15) (99% - 99%)    GENERAL: Pt lying in bed comfortably in NAD  HEENT:  Atraumatic, EOMI, PERRL, conjunctiva and sclera clear, MMM  NECK: Supple, No JVD  CHEST/LUNG: Clear to auscultation bilaterally; No rales, rhonchi, wheezing or rubs. Unlabored respirations  HEART: Regular rate and rhythm; No murmurs, rubs, or gallops  ABDOMEN: Bowel sounds present; Soft, Nontender, Nondistended. No guarding or rigidity    EXTREMITIES:  2+ Peripheral Pulses, brisk capillary refill. No clubbing, cyanosis, or edema  NEUROLOGICAL:  Alert & Oriented X3, speech clear. Answers questions appropriately. Full and equal strength B/L upper and lower extremities. No deficits   MSK: FROM x 4 extremities   SKIN: No rashes or lesions PHYSICAL EXAM:    Vital Signs Last 24 Hrs  T(C): 36.4 (07 Sep 2020 14:15), Max: 36.4 (07 Sep 2020 14:15)  T(F): 97.6 (07 Sep 2020 14:15), Max: 97.6 (07 Sep 2020 14:15)  HR: 92 (07 Sep 2020 14:15) (92 - 92)  BP: 160/82 (07 Sep 2020 14:15) (160/82 - 160/82)  BP(mean): --  RR: 18 (07 Sep 2020 14:15) (18 - 18)  SpO2: 99% (07 Sep 2020 14:15) (99% - 99%)    GENERAL: Pt lying in bed comfortably in NAD  HEENT:  Atraumatic, EOMI, PERRL, mild sclera icterus, dry MM  NECK: Supple  CHEST/LUNG: Clear to auscultation bilaterally; No rales, rhonchi, wheezing or rubs. Unlabored respirations  HEART: Nl S1, S2,   ABDOMEN: Bowel sounds present; Soft, Nontender, Nondistended. No guarding or rigidity    EXTREMITIES:  2+ Peripheral Pulses, + b/l 2+ pitting edema  NEUROLOGICAL:  Alert & Oriented X1, speech clear. Confused. No focal deficits   MSK: FROM x 4 extremities   SKIN: No rashes or lesions PHYSICAL EXAM:    Vital Signs Last 24 Hrs  T(C): 36.4 (07 Sep 2020 14:15), Max: 36.4 (07 Sep 2020 14:15)  T(F): 97.6 (07 Sep 2020 14:15), Max: 97.6 (07 Sep 2020 14:15)  HR: 92 (07 Sep 2020 14:15) (92 - 92)  BP: 160/82 (07 Sep 2020 14:15) (160/82 - 160/82)  BP(mean): --  RR: 18 (07 Sep 2020 14:15) (18 - 18)  SpO2: 99% (07 Sep 2020 14:15) (99% - 99%)    GENERAL: Pt lying in bed comfortably in NAD  HEENT:  Atraumatic, EOMI, PERRL, mild sclera icterus, dry MM  NECK: Supple  CHEST/LUNG: Clear to auscultation bilaterally; No rales, rhonchi, wheezing or rubs. Unlabored respirations  HEART: Nl S1, S2, mild irregularity, nl rate  ABDOMEN: Bowel sounds present; Soft, Nontender, Nondistended. No guarding or rigidity    EXTREMITIES:  2+ Peripheral Pulses, + b/l 2+ pitting edema  NEUROLOGICAL:  Alert & Oriented X1, speech clear. Confused. No focal deficits   MSK: FROM x 4 extremities   SKIN: No rashes or lesions

## 2020-09-07 NOTE — ED PROVIDER NOTE - CLINICAL SUMMARY MEDICAL DECISION MAKING FREE TEXT BOX
Ddx: Hepatic encephalopathy/ no abd pain or fevers to suggest sbp/   Plan: Cbc, cmp, ua, ucx, cxr, ammonia, lactulose, admit

## 2020-09-07 NOTE — H&P ADULT - NSICDXPASTMEDICALHX_GEN_ALL_CORE_FT
PAST MEDICAL HISTORY:  Hepatic cirrhosis PAST MEDICAL HISTORY:  Hepatic cirrhosis     HLD (hyperlipidemia)

## 2020-09-07 NOTE — PATIENT PROFILE ADULT - NSPROMEDSBROUGHTTOHOSP_GEN_A_NUR
GYN Annual Exam     Chief Complaint   Patient presents with   • Gynecologic Exam       Delmy Cruz is a 38 y.o. female who presents for annual well woman exam. She is a new  patient. She moved from [place] to here almost 2 years ago. She is not sexually active at the present time. . Currently using condoms or withdrawal for contraception but has not has sex since last October. . She is happy with her contraception: Yes. Periods are regular every 30-37 days  days, lasting 7 days. Dysmenorrhea:none. Cyclic symptoms include anxiety, bloating, breast tenderness and irritability. No intermenstrual bleeding, spotting, or discharge.  Performing SBE: does not do SBE    She reports she has not had a cycle since May. This is not normal for her. She has skipped maybe one month before but she has never gone this long without a cycle. She has not had sex since October. She feels like she has PMS symptoms and feels like her cycle could start anytime.     HPI    OB History        0    Para   0    Term   0       0    AB   0    Living   0       SAB   0    TAB   0    Ectopic   0    Molar   0    Multiple   0    Live Births   0                Last Pap : 2018  History of abnormal Pap smear: yes - HPV  Family history of uterine, colon or ovarian cancer: no  Family history of breast cancer: yes - PGM wiht breast   History of abnormal mammogram: no  Gardasil Vaccine: Has not completed     History reviewed. No pertinent past medical history.    Past Surgical History:   Procedure Laterality Date   • WISDOM TOOTH EXTRACTION           Current Outpatient Medications:   •  citalopram (CeleXA) 40 MG tablet, Take 40 mg by mouth Daily., Disp: , Rfl:   •  Cetirizine HCl 10 MG capsule, Take  by mouth., Disp: , Rfl:   •  fluticasone (FLONASE) 50 MCG/ACT nasal spray, into the nostril(s) as directed by provider., Disp: , Rfl:     No Known Allergies    Social History     Tobacco Use   • Smoking status: Never Smoker   • Smokeless  "tobacco: Never Used   Substance Use Topics   • Alcohol use: Not on file     Comment: socially    • Drug use: Never       Family History   Problem Relation Age of Onset   • No Known Problems Father    • Skin cancer Mother    • Breast cancer Paternal Grandmother    • Colon cancer Neg Hx    • Ovarian cancer Neg Hx        Review of Systems   Constitutional: Negative.    Respiratory: Negative.    Cardiovascular: Negative.    Gastrointestinal: Negative.    Genitourinary: Positive for menstrual problem.   Musculoskeletal: Negative.    Skin: Negative.    Neurological: Negative.    Psychiatric/Behavioral: Negative.        /78   Ht 160 cm (63\")   Wt 101 kg (223 lb)   LMP 05/03/2020 (Exact Date)   BMI 39.50 kg/m²     Physical Exam   Constitutional: She is oriented to person, place, and time. She appears well-developed and well-nourished.   Neck: Normal range of motion. Neck supple. No thyromegaly present.   Cardiovascular: Normal rate and regular rhythm.   Pulmonary/Chest: Effort normal and breath sounds normal. Right breast exhibits no inverted nipple, no mass, no nipple discharge, no skin change and no tenderness. Left breast exhibits no inverted nipple, no mass, no nipple discharge, no skin change and no tenderness.   (R) breast < (L) breast    Abdominal: Soft. Bowel sounds are normal.   Genitourinary: Rectum normal. Pelvic exam was performed with patient supine. There is no rash, tenderness, lesion or injury on the right labia. There is no rash, tenderness, lesion or injury on the left labia. Uterus is not deviated, not enlarged, not fixed and not tender. Cervix exhibits no motion tenderness, no discharge and no friability. Right adnexum displays no mass, no tenderness and no fullness. Left adnexum displays no mass, no tenderness and no fullness. No erythema, tenderness or bleeding in the vagina. No foreign body in the vagina. No signs of injury around the vagina. No vaginal discharge found.   Musculoskeletal: " Normal range of motion. She exhibits no edema.   Neurological: She is alert and oriented to person, place, and time.   Skin: Skin is warm and dry.   Psychiatric: She has a normal mood and affect. Her behavior is normal.   Vitals reviewed.         Assessment     1. Well woman exam   2. Contraception management  3. Missed menses    Plan   1. Well woman exam: Pap collected Yes. Instructed on how to perform SBE. Recommend MVI daily.    2. Contraception: Declines RX.  Discussed if irregular cycles become a pattern, she will need contraception to regulate her cycles.  3. STD: Enc condoms. Desires STD screen today- No.  4. Smoking status: non smoker  5.   Patient's Body mass index is 39.5 kg/m². BMI is above normal parameters. Recommendations include: exercise counseling and nutrition counseling.  6.   Missed menses- Neg UPT. Check TVUS.  Instructed patient to come to her transvaginal ultrasound fasting for possible PCOS labs including a TPO antibody.  She recently had her thyroid checked with her primary care provider and is normal per epic.      RTO for TVUS     Maru Cobb, ABDIRAHMAN  8/7/2020  10:58     no

## 2020-09-07 NOTE — H&P ADULT - NSHPLABSRESULTS_GEN_ALL_CORE
T(C): 36.4 (20 @ 14:15), Max: 36.4 (20 @ 14:15)  HR: 92 (20 @ 14:15) (92 - 92)  BP: 160/82 (20 @ 14:15) (160/82 - 160/82)  RR: 18 (20 @ 14:15) (18 - 18)  SpO2: 99% (20 @ 14:15) (99% - 99%)                        8.0    2.04  )-----------( 81       ( 07 Sep 2020 15:36 )             27.2         138  |  95<L>  |  26<H>  ----------------------------<  105<H>  3.2<L>   |  35<H>  |  1.56<H>    Ca    8.1<L>      07 Sep 2020 15:36  Mg     2.0         TPro  8.2  /  Alb  2.4<L>  /  TBili  3.9<H>  /  DBili  1.18<H>  /  AST  60<H>  /  ALT  31  /  AlkPhos  113      LIVER FUNCTIONS - ( 07 Sep 2020 15:36 )  Alb: 2.4 g/dL / Pro: 8.2 gm/dL / ALK PHOS: 113 U/L / ALT: 31 U/L / AST: 60 U/L / GGT: x           PT/INR - ( 07 Sep 2020 15:36 )   PT: 23.9 sec;   INR: 2.14 ratio         PTT - ( 07 Sep 2020 15:36 )  PTT:35.7 sec  Urinalysis Basic - ( 07 Sep 2020 14:46 )    Color: Yellow / Appearance: Clear / S.010 / pH: x  Gluc: x / Ketone: Negative  / Bili: Negative / Urobili: 8 mg/dL   Blood: x / Protein: Negative mg/dL / Nitrite: Negative   Leuk Esterase: Negative / RBC: 6-10 /HPF / WBC 0-2   Sq Epi: x / Non Sq Epi: Occasional / Bacteria: Negative        < from: CT Head No Cont (20 @ 16:18) >    Impression: No no bleed or other acute finding      < end of copied text >      potassium chloride    Tablet ER 40 milliEquivalent(s) Oral once T(C): 36.4 (20 @ 14:15), Max: 36.4 (20 @ 14:15)  HR: 92 (20 @ 14:15) (92 - 92)  BP: 160/82 (20 @ 14:15) (160/82 - 160/82)  RR: 18 (20 @ 14:15) (18 - 18)  SpO2: 99% (20 @ 14:15) (99% - 99%)                        8.0    2.04  )-----------( 81       ( 07 Sep 2020 15:36 )             27.2         138  |  95<L>  |  26<H>  ----------------------------<  105<H>  3.2<L>   |  35<H>  |  1.56<H>    Ca    8.1<L>      07 Sep 2020 15:36  Mg     2.0         TPro  8.2  /  Alb  2.4<L>  /  TBili  3.9<H>  /  DBili  1.18<H>  /  AST  60<H>  /  ALT  31  /  AlkPhos  113      LIVER FUNCTIONS - ( 07 Sep 2020 15:36 )  Alb: 2.4 g/dL / Pro: 8.2 gm/dL / ALK PHOS: 113 U/L / ALT: 31 U/L / AST: 60 U/L / GGT: x           PT/INR - ( 07 Sep 2020 15:36 )   PT: 23.9 sec;   INR: 2.14 ratio         PTT - ( 07 Sep 2020 15:36 )  PTT:35.7 sec  Urinalysis Basic - ( 07 Sep 2020 14:46 )    Color: Yellow / Appearance: Clear / S.010 / pH: x  Gluc: x / Ketone: Negative  / Bili: Negative / Urobili: 8 mg/dL   Blood: x / Protein: Negative mg/dL / Nitrite: Negative   Leuk Esterase: Negative / RBC: 6-10 /HPF / WBC 0-2   Sq Epi: x / Non Sq Epi: Occasional / Bacteria: Negative    EKG - NSR w/ PVC.      < from: CT Head No Cont (20 @ 16:18) >    Impression: No no bleed or other acute finding      < end of copied text >      potassium chloride    Tablet ER 40 milliEquivalent(s) Oral once

## 2020-09-07 NOTE — H&P ADULT - HISTORY OF PRESENT ILLNESS
60 y/o male w/ PMHx of Hepatic Cirrhosis presents to the ED due to AMS. Pt awake, only oriented to self, unable to provide any history. Tried calling brother for further history unable to reach. History obtained from ED/Chart. 58 y/o male w/ PMHx of Hepatic Cirrhosis presents to the ED due to AMS. Pt awake, only oriented to self, unable to provide any history. Tried calling brother for further history unable to reach. History obtained from ED/Chart.  Per ED, Brother noticed worsening confusion from pt's baseline last night, and was even more significant this morning. Brother reported to the ED that he didn't think pt was taking his lactulose, and he had similar presentation in the past when he was not taking his lactulose. No reported abdominal pain, or fevers. 58 y/o male w/ PMHx of Hepatic Cirrhosis presents to the ED due to AMS. Pt awake, only oriented to self, unable to provide any history. Tried calling brother for further history however unable to reach. History obtained from ED/Chart. Per ED, Brother noticed worsening confusion from pt's baseline last night, and was even more significant this morning thus called EMS. Brother reported to the ED that he didn't think pt was taking his lactulose, and he had similar presentation in the past when he was not taking his lactulose. No reported abdominal pain, or fevers.     On arrival in ED, initial /82, rest of vitals stable. CT head neg. Ammonia level 97. Pt given 1 dose of Lactulose. Please see below for rest of significant labs.

## 2020-09-07 NOTE — ED ADULT NURSE NOTE - NSIMPLEMENTINTERV_GEN_ALL_ED
Implemented All Fall Risk Interventions:  [place] to call system. Call bell, personal items and telephone within reach. Instruct patient to call for assistance. Room bathroom lighting operational. Non-slip footwear when patient is off stretcher. Physically safe environment: no spills, clutter or unnecessary equipment. Stretcher in lowest position, wheels locked, appropriate side rails in place. Provide visual cue, wrist band, yellow gown, etc. Monitor gait and stability. Monitor for mental status changes and reorient to person, place, and time. Review medications for side effects contributing to fall risk. Reinforce activity limits and safety measures with patient and family.

## 2020-09-07 NOTE — H&P ADULT - PROBLEM SELECTOR PLAN 1
- c/w Lactulose - CT head neg  - c/w Lactulose  - f/u rpt Ammonia levels  - pt also w/ some fluid retention; c/w Lasix -->inc to BID  - c/w Spironolactone

## 2020-09-07 NOTE — ED PROVIDER NOTE - OBJECTIVE STATEMENT
Pt is a 58 yo gentleman with a pmhx of cirrhosis, HL who presents to the ED with ams. Brother noticed he was a bit more confused last night, and was worse this morning. He doesn't think he is taking his lactulose, and he has had similar presentation in the past when he doesn't take his lactulose. No fevers, no abdominal pain, no chest pain, no dysuria. Pt is A x 1, but is awake and talking.

## 2020-09-07 NOTE — ED ADULT NURSE REASSESSMENT NOTE - NS ED NURSE REASSESS COMMENT FT1
Patient admitted to hospital for admission, discoloration to R upper arm. periods of incontinent. UA and CNS pending.

## 2020-09-07 NOTE — ED ADULT TRIAGE NOTE - CHIEF COMPLAINT QUOTE
c/o confusion noted over the weekend worse today per pts brother hx liver failure not taking meds unknown duration

## 2020-09-08 LAB
ABO RH CONFIRMATION: SIGNIFICANT CHANGE UP
ALBUMIN SERPL ELPH-MCNC: 2 G/DL — LOW (ref 3.3–5)
ALBUMIN SERPL ELPH-MCNC: 2 G/DL — LOW (ref 3.3–5)
ALP SERPL-CCNC: 91 U/L — SIGNIFICANT CHANGE UP (ref 40–120)
ALP SERPL-CCNC: 99 U/L — SIGNIFICANT CHANGE UP (ref 40–120)
ALT FLD-CCNC: 23 U/L — SIGNIFICANT CHANGE UP (ref 12–78)
ALT FLD-CCNC: 25 U/L — SIGNIFICANT CHANGE UP (ref 12–78)
AMMONIA BLD-MCNC: 117 UMOL/L — HIGH (ref 11–32)
AMMONIA BLD-MCNC: 64 UMOL/L — HIGH (ref 11–32)
ANION GAP SERPL CALC-SCNC: 10 MMOL/L — SIGNIFICANT CHANGE UP (ref 5–17)
ANION GAP SERPL CALC-SCNC: 6 MMOL/L — SIGNIFICANT CHANGE UP (ref 5–17)
ANION GAP SERPL CALC-SCNC: 6 MMOL/L — SIGNIFICANT CHANGE UP (ref 5–17)
AST SERPL-CCNC: 33 U/L — SIGNIFICANT CHANGE UP (ref 15–37)
AST SERPL-CCNC: 34 U/L — SIGNIFICANT CHANGE UP (ref 15–37)
BASOPHILS # BLD AUTO: 0.01 K/UL — SIGNIFICANT CHANGE UP (ref 0–0.2)
BASOPHILS NFR BLD AUTO: 0.3 % — SIGNIFICANT CHANGE UP (ref 0–2)
BILIRUB SERPL-MCNC: 3.5 MG/DL — HIGH (ref 0.2–1.2)
BILIRUB SERPL-MCNC: 3.5 MG/DL — HIGH (ref 0.2–1.2)
BLD GP AB SCN SERPL QL: SIGNIFICANT CHANGE UP
BUN SERPL-MCNC: 23 MG/DL — SIGNIFICANT CHANGE UP (ref 7–23)
CALCIUM SERPL-MCNC: 8 MG/DL — LOW (ref 8.5–10.1)
CALCIUM SERPL-MCNC: 8 MG/DL — LOW (ref 8.5–10.1)
CALCIUM SERPL-MCNC: 8.1 MG/DL — LOW (ref 8.5–10.1)
CHLORIDE SERPL-SCNC: 97 MMOL/L — SIGNIFICANT CHANGE UP (ref 96–108)
CHLORIDE SERPL-SCNC: 97 MMOL/L — SIGNIFICANT CHANGE UP (ref 96–108)
CHLORIDE SERPL-SCNC: 98 MMOL/L — SIGNIFICANT CHANGE UP (ref 96–108)
CO2 SERPL-SCNC: 32 MMOL/L — HIGH (ref 22–31)
CO2 SERPL-SCNC: 34 MMOL/L — HIGH (ref 22–31)
CO2 SERPL-SCNC: 35 MMOL/L — HIGH (ref 22–31)
CREAT SERPL-MCNC: 1.26 MG/DL — SIGNIFICANT CHANGE UP (ref 0.5–1.3)
CREAT SERPL-MCNC: 1.49 MG/DL — HIGH (ref 0.5–1.3)
CREAT SERPL-MCNC: 1.59 MG/DL — HIGH (ref 0.5–1.3)
CULTURE RESULTS: SIGNIFICANT CHANGE UP
EOSINOPHIL # BLD AUTO: 0.04 K/UL — SIGNIFICANT CHANGE UP (ref 0–0.5)
EOSINOPHIL NFR BLD AUTO: 1.3 % — SIGNIFICANT CHANGE UP (ref 0–6)
FERRITIN SERPL-MCNC: 24 NG/ML — LOW (ref 30–400)
FOLATE SERPL-MCNC: >20 NG/ML — SIGNIFICANT CHANGE UP
GLUCOSE SERPL-MCNC: 114 MG/DL — HIGH (ref 70–99)
GLUCOSE SERPL-MCNC: 141 MG/DL — HIGH (ref 70–99)
GLUCOSE SERPL-MCNC: 83 MG/DL — SIGNIFICANT CHANGE UP (ref 70–99)
HAV IGM SER-ACNC: SIGNIFICANT CHANGE UP
HBV CORE IGM SER-ACNC: SIGNIFICANT CHANGE UP
HBV SURFACE AG SER-ACNC: SIGNIFICANT CHANGE UP
HCT VFR BLD CALC: 22.8 % — LOW (ref 39–50)
HCT VFR BLD CALC: 23.9 % — LOW (ref 39–50)
HCT VFR BLD CALC: 24.5 % — LOW (ref 39–50)
HCT VFR BLD CALC: 24.6 % — LOW (ref 39–50)
HCV AB S/CO SERPL IA: 0.15 S/CO — SIGNIFICANT CHANGE UP (ref 0–0.99)
HCV AB SERPL-IMP: SIGNIFICANT CHANGE UP
HGB BLD-MCNC: 6.5 G/DL — CRITICAL LOW (ref 13–17)
HGB BLD-MCNC: 7 G/DL — CRITICAL LOW (ref 13–17)
HGB BLD-MCNC: 7.3 G/DL — LOW (ref 13–17)
HGB BLD-MCNC: 7.3 G/DL — LOW (ref 13–17)
IMM GRANULOCYTES NFR BLD AUTO: 0.6 % — SIGNIFICANT CHANGE UP (ref 0–1.5)
IRON SATN MFR SERPL: 13 % — LOW (ref 16–55)
IRON SATN MFR SERPL: 32 UG/DL — LOW (ref 45–165)
LDH SERPL L TO P-CCNC: 245 U/L — HIGH (ref 50–242)
LYMPHOCYTES # BLD AUTO: 0.85 K/UL — LOW (ref 1–3.3)
LYMPHOCYTES # BLD AUTO: 27.2 % — SIGNIFICANT CHANGE UP (ref 13–44)
MAGNESIUM SERPL-MCNC: 2.1 MG/DL — SIGNIFICANT CHANGE UP (ref 1.6–2.6)
MCHC RBC-ENTMCNC: 23.5 PG — LOW (ref 27–34)
MCHC RBC-ENTMCNC: 23.8 PG — LOW (ref 27–34)
MCHC RBC-ENTMCNC: 24 PG — LOW (ref 27–34)
MCHC RBC-ENTMCNC: 24.2 PG — LOW (ref 27–34)
MCHC RBC-ENTMCNC: 28.5 GM/DL — LOW (ref 32–36)
MCHC RBC-ENTMCNC: 29.3 GM/DL — LOW (ref 32–36)
MCHC RBC-ENTMCNC: 29.7 GM/DL — LOW (ref 32–36)
MCHC RBC-ENTMCNC: 29.8 GM/DL — LOW (ref 32–36)
MCV RBC AUTO: 80.9 FL — SIGNIFICANT CHANGE UP (ref 80–100)
MCV RBC AUTO: 81.1 FL — SIGNIFICANT CHANGE UP (ref 80–100)
MCV RBC AUTO: 81.3 FL — SIGNIFICANT CHANGE UP (ref 80–100)
MCV RBC AUTO: 82.3 FL — SIGNIFICANT CHANGE UP (ref 80–100)
MONOCYTES # BLD AUTO: 0.34 K/UL — SIGNIFICANT CHANGE UP (ref 0–0.9)
MONOCYTES NFR BLD AUTO: 10.9 % — SIGNIFICANT CHANGE UP (ref 2–14)
NEUTROPHILS # BLD AUTO: 1.86 K/UL — SIGNIFICANT CHANGE UP (ref 1.8–7.4)
NEUTROPHILS NFR BLD AUTO: 59.7 % — SIGNIFICANT CHANGE UP (ref 43–77)
NRBC # BLD: 0 /100 WBCS — SIGNIFICANT CHANGE UP (ref 0–0)
PHOSPHATE SERPL-MCNC: 3.4 MG/DL — SIGNIFICANT CHANGE UP (ref 2.5–4.5)
PLATELET # BLD AUTO: 55 K/UL — LOW (ref 150–400)
PLATELET # BLD AUTO: 58 K/UL — LOW (ref 150–400)
PLATELET # BLD AUTO: 61 K/UL — LOW (ref 150–400)
PLATELET # BLD AUTO: 71 K/UL — LOW (ref 150–400)
POTASSIUM SERPL-MCNC: 2.6 MMOL/L — CRITICAL LOW (ref 3.5–5.3)
POTASSIUM SERPL-MCNC: 2.9 MMOL/L — CRITICAL LOW (ref 3.5–5.3)
POTASSIUM SERPL-MCNC: 3 MMOL/L — LOW (ref 3.5–5.3)
POTASSIUM SERPL-SCNC: 2.6 MMOL/L — CRITICAL LOW (ref 3.5–5.3)
POTASSIUM SERPL-SCNC: 2.9 MMOL/L — CRITICAL LOW (ref 3.5–5.3)
POTASSIUM SERPL-SCNC: 3 MMOL/L — LOW (ref 3.5–5.3)
PROT SERPL-MCNC: 6.4 GM/DL — SIGNIFICANT CHANGE UP (ref 6–8.3)
PROT SERPL-MCNC: 6.8 GM/DL — SIGNIFICANT CHANGE UP (ref 6–8.3)
RBC # BLD: 2.77 M/UL — LOW (ref 4.2–5.8)
RBC # BLD: 2.94 M/UL — LOW (ref 4.2–5.8)
RBC # BLD: 2.94 M/UL — LOW (ref 4.2–5.8)
RBC # BLD: 3.02 M/UL — LOW (ref 4.2–5.8)
RBC # BLD: 3.04 M/UL — LOW (ref 4.2–5.8)
RBC # FLD: 18.9 % — HIGH (ref 10.3–14.5)
RBC # FLD: 19.2 % — HIGH (ref 10.3–14.5)
RBC # FLD: 19.3 % — HIGH (ref 10.3–14.5)
RBC # FLD: 19.3 % — HIGH (ref 10.3–14.5)
RETICS #: 57.3 K/UL — SIGNIFICANT CHANGE UP (ref 25–125)
RETICS/RBC NFR: 2 % — SIGNIFICANT CHANGE UP (ref 0.5–2.5)
SARS-COV-2 IGG SERPL QL IA: NEGATIVE — SIGNIFICANT CHANGE UP
SARS-COV-2 IGM SERPL IA-ACNC: 0.49 RATIO — SIGNIFICANT CHANGE UP
SODIUM SERPL-SCNC: 137 MMOL/L — SIGNIFICANT CHANGE UP (ref 135–145)
SODIUM SERPL-SCNC: 139 MMOL/L — SIGNIFICANT CHANGE UP (ref 135–145)
SODIUM SERPL-SCNC: 139 MMOL/L — SIGNIFICANT CHANGE UP (ref 135–145)
SPECIMEN SOURCE: SIGNIFICANT CHANGE UP
TIBC SERPL-MCNC: 241 UG/DL — SIGNIFICANT CHANGE UP (ref 220–430)
UIBC SERPL-MCNC: 209 UG/DL — SIGNIFICANT CHANGE UP (ref 110–370)
VIT B12 SERPL-MCNC: 1670 PG/ML — HIGH (ref 232–1245)
WBC # BLD: 1.69 K/UL — LOW (ref 3.8–10.5)
WBC # BLD: 2.34 K/UL — LOW (ref 3.8–10.5)
WBC # BLD: 2.66 K/UL — LOW (ref 3.8–10.5)
WBC # BLD: 3.12 K/UL — LOW (ref 3.8–10.5)
WBC # FLD AUTO: 1.69 K/UL — LOW (ref 3.8–10.5)
WBC # FLD AUTO: 2.34 K/UL — LOW (ref 3.8–10.5)
WBC # FLD AUTO: 2.66 K/UL — LOW (ref 3.8–10.5)
WBC # FLD AUTO: 3.12 K/UL — LOW (ref 3.8–10.5)

## 2020-09-08 RX ORDER — PHYTONADIONE (VIT K1) 5 MG
5 TABLET ORAL DAILY
Refills: 0 | Status: COMPLETED | OUTPATIENT
Start: 2020-09-08 | End: 2020-09-09

## 2020-09-08 RX ORDER — POTASSIUM CHLORIDE 20 MEQ
10 PACKET (EA) ORAL ONCE
Refills: 0 | Status: DISCONTINUED | OUTPATIENT
Start: 2020-09-08 | End: 2020-09-08

## 2020-09-08 RX ORDER — POTASSIUM CHLORIDE 20 MEQ
40 PACKET (EA) ORAL EVERY 4 HOURS
Refills: 0 | Status: COMPLETED | OUTPATIENT
Start: 2020-09-08 | End: 2020-09-08

## 2020-09-08 RX ORDER — POTASSIUM CHLORIDE 20 MEQ
10 PACKET (EA) ORAL ONCE
Refills: 0 | Status: COMPLETED | OUTPATIENT
Start: 2020-09-08 | End: 2020-09-08

## 2020-09-08 RX ORDER — MAGNESIUM SULFATE 500 MG/ML
1 VIAL (ML) INJECTION ONCE
Refills: 0 | Status: COMPLETED | OUTPATIENT
Start: 2020-09-08 | End: 2020-09-08

## 2020-09-08 RX ADMIN — Medication 1 APPLICATION(S): at 13:28

## 2020-09-08 RX ADMIN — Medication 5 MILLIGRAM(S): at 13:28

## 2020-09-08 RX ADMIN — Medication 100 GRAM(S): at 21:06

## 2020-09-08 RX ADMIN — Medication 40 MILLIEQUIVALENT(S): at 21:06

## 2020-09-08 RX ADMIN — Medication 40 MILLIGRAM(S): at 06:01

## 2020-09-08 RX ADMIN — ATORVASTATIN CALCIUM 20 MILLIGRAM(S): 80 TABLET, FILM COATED ORAL at 21:07

## 2020-09-08 RX ADMIN — Medication 40 MILLIGRAM(S): at 17:33

## 2020-09-08 RX ADMIN — Medication 40 MILLIEQUIVALENT(S): at 14:47

## 2020-09-08 RX ADMIN — SPIRONOLACTONE 100 MILLIGRAM(S): 25 TABLET, FILM COATED ORAL at 06:01

## 2020-09-08 RX ADMIN — Medication 100 MILLIEQUIVALENT(S): at 08:57

## 2020-09-08 RX ADMIN — Medication 40 MILLIEQUIVALENT(S): at 10:45

## 2020-09-08 RX ADMIN — Medication 40 MILLIEQUIVALENT(S): at 17:35

## 2020-09-08 NOTE — PROGRESS NOTE ADULT - ASSESSMENT
60 y/o male w/ PMHx of Chronic Hepatic Cirrhosis of unknown etiology being admitted for hepatic encephalopathy. Pt also w/ hypokalemia, Pancytopenia and MONI.        Problem/Plan - 1:  ·  Problem: Acute Hepatic encephalopathy.  Plan: - CT head neg.  Cont lactulose. goal BM should 2-3 a day.   check ammonia level.       Problem/Plan - 2:  ·  Problem: Hypokalemia.  worse. no dizziness, chest pain or palpitations. replete aggressively. recheck again in the evening and if low, give more supplements. if K < 3 on repeat labs, will need telemonitoring.     Problem/Plan - 3:  ·  Problem: Pancytopenia. Related to liver disease.   Worsening. follow CBC. Give blood transfusion.        Problem/Plan - 4:  ·  Problem: Coagulopathy.  Plan: - INR 2.1 due to liver dz. Vitamin K x 2. Follow INR in am.     Problem/Plan - 5:  ·  Problem: MONI (acute kidney injury). creatinine better. cont lasix.     Problem/Plan - 6:  Problem: Preventive measure. Plan: - Pt coagulopathic, INR 2.1. SCDs    Morbid obesity. nutritional service consulted.

## 2020-09-08 NOTE — PROGRESS NOTE ADULT - SUBJECTIVE AND OBJECTIVE BOX
CHIEF COMPLAINT: confusion better but still not fully with it  no diarrhea on lactulose yet.   no abd pain  no n/v/fever  no active gross bleeding reported.   no melena       PHYSICAL EXAM:    GENERAL: Morbidly obese  CHEST/LUNG: Clear to ausculation bilaterally, no wheezing, no crackles   HEART: Regular rate and rhythm; No murmurs, rubs  ABDOMEN: central obesity, + BS.   EXTREMITIES:  Moving all four extremities spontaneously, No clubbing, cyanosis, or edema  NERVOUS SYSTEM:  Grossly non focal.  Psychiatry: AAO x 2 (not to month), mood is appropriate       OBJECTIVE DATA:   Vital Signs Last 24 Hrs  T(C): 36.8 (08 Sep 2020 05:15), Max: 37.1 (08 Sep 2020 00:20)  T(F): 98.2 (08 Sep 2020 05:15), Max: 98.7 (08 Sep 2020 00:20)  HR: 90 (08 Sep 2020 05:15) (75 - 90)  BP: 125/58 (08 Sep 2020 05:15) (120/53 - 142/67)  BP(mean): --  RR: 18 (08 Sep 2020 05:15) (18 - 18)  SpO2: 95% (08 Sep 2020 05:15) (95% - 100%)           Daily Height in cm: 172.72 (07 Sep 2020 21:05)    Daily Weight in k.6 (07 Sep 2020 21:05)  LABS:                        7.3    2.66  )-----------( 71       ( 08 Sep 2020 15:26 )             24.5             09-08    139  |  97  |  23  ----------------------------<  141<H>  3.0<L>   |  32<H>  |  1.59<H>    Ca    8.0<L>      08 Sep 2020 10:47  Phos  3.4     09-08  Mg     2.1     09-08    TPro  6.8  /  Alb  2.0<L>  /  TBili  3.5<H>  /  DBili  x   /  AST  34  /  ALT  25  /  AlkPhos  99  09-08              PT/INR - ( 07 Sep 2020 15:36 )   PT: 23.9 sec;   INR: 2.14 ratio         PTT - ( 07 Sep 2020 15:36 )  PTT:35.7 sec  Urinalysis Basic - ( 07 Sep 2020 14:46 )    Color: Yellow / Appearance: Clear / S.010 / pH: x  Gluc: x / Ketone: Negative  / Bili: Negative / Urobili: 8 mg/dL   Blood: x / Protein: Negative mg/dL / Nitrite: Negative   Leuk Esterase: Negative / RBC: 6-10 /HPF / WBC 0-2   Sq Epi: x / Non Sq Epi: Occasional / Bacteria: Negative        CARDIAC MARKERS ( 07 Sep 2020 15:36 )  <.015 ng/mL / x     / x     / x     / x             Interval Radiology studies: reviewed by me    < from: CT Head No Cont (20 @ 16:18) >  Impression: No no bleed or other acute finding    < end of copied text >      MEDICATIONS  (STANDING):  atorvastatin 20 milliGRAM(s) Oral at bedtime  BACItracin   Ointment 1 Application(s) Topical daily  furosemide    Tablet 40 milliGRAM(s) Oral two times a day  lactulose Syrup 30 Gram(s) Oral three times a day  phytonadione   Solution 5 milliGRAM(s) Oral daily  spironolactone 100 milliGRAM(s) Oral daily

## 2020-09-09 ENCOUNTER — TRANSCRIPTION ENCOUNTER (OUTPATIENT)
Age: 60
End: 2020-09-09

## 2020-09-09 VITALS
HEART RATE: 81 BPM | RESPIRATION RATE: 18 BRPM | DIASTOLIC BLOOD PRESSURE: 67 MMHG | TEMPERATURE: 99 F | SYSTOLIC BLOOD PRESSURE: 119 MMHG | OXYGEN SATURATION: 97 %

## 2020-09-09 LAB
ALBUMIN SERPL ELPH-MCNC: 2.2 G/DL — LOW (ref 3.3–5)
ALP SERPL-CCNC: 99 U/L — SIGNIFICANT CHANGE UP (ref 40–120)
ALT FLD-CCNC: 20 U/L — SIGNIFICANT CHANGE UP (ref 12–78)
AMMONIA BLD-MCNC: 87 UMOL/L — HIGH (ref 11–32)
ANION GAP SERPL CALC-SCNC: 6 MMOL/L — SIGNIFICANT CHANGE UP (ref 5–17)
AST SERPL-CCNC: 38 U/L — HIGH (ref 15–37)
BILIRUB SERPL-MCNC: 4.2 MG/DL — HIGH (ref 0.2–1.2)
BUN SERPL-MCNC: 23 MG/DL — SIGNIFICANT CHANGE UP (ref 7–23)
CALCIUM SERPL-MCNC: 8.1 MG/DL — LOW (ref 8.5–10.1)
CHLORIDE SERPL-SCNC: 101 MMOL/L — SIGNIFICANT CHANGE UP (ref 96–108)
CO2 SERPL-SCNC: 33 MMOL/L — HIGH (ref 22–31)
CREAT SERPL-MCNC: 1.52 MG/DL — HIGH (ref 0.5–1.3)
GLUCOSE SERPL-MCNC: 91 MG/DL — SIGNIFICANT CHANGE UP (ref 70–99)
HCT VFR BLD CALC: 25.4 % — LOW (ref 39–50)
HGB BLD-MCNC: 7.3 G/DL — LOW (ref 13–17)
INR BLD: 2.17 RATIO — HIGH (ref 0.88–1.16)
MCHC RBC-ENTMCNC: 23.9 PG — LOW (ref 27–34)
MCHC RBC-ENTMCNC: 28.7 GM/DL — LOW (ref 32–36)
MCV RBC AUTO: 83 FL — SIGNIFICANT CHANGE UP (ref 80–100)
NRBC # BLD: 0 /100 WBCS — SIGNIFICANT CHANGE UP (ref 0–0)
OB PNL STL: NEGATIVE — SIGNIFICANT CHANGE UP
PLATELET # BLD AUTO: 63 K/UL — LOW (ref 150–400)
POTASSIUM SERPL-MCNC: 3.3 MMOL/L — LOW (ref 3.5–5.3)
POTASSIUM SERPL-SCNC: 3.3 MMOL/L — LOW (ref 3.5–5.3)
PROT SERPL-MCNC: 6.8 GM/DL — SIGNIFICANT CHANGE UP (ref 6–8.3)
PROTHROM AB SERPL-ACNC: 24.3 SEC — HIGH (ref 10.6–13.6)
RBC # BLD: 3.06 M/UL — LOW (ref 4.2–5.8)
RBC # FLD: 19 % — HIGH (ref 10.3–14.5)
SODIUM SERPL-SCNC: 140 MMOL/L — SIGNIFICANT CHANGE UP (ref 135–145)
WBC # BLD: 2.42 K/UL — LOW (ref 3.8–10.5)
WBC # FLD AUTO: 2.42 K/UL — LOW (ref 3.8–10.5)

## 2020-09-09 RX ORDER — POTASSIUM CHLORIDE 20 MEQ
40 PACKET (EA) ORAL ONCE
Refills: 0 | Status: DISCONTINUED | OUTPATIENT
Start: 2020-09-09 | End: 2020-09-09

## 2020-09-09 RX ORDER — FERROUS SULFATE 325(65) MG
1 TABLET ORAL
Qty: 30 | Refills: 0
Start: 2020-09-09

## 2020-09-09 RX ORDER — FUROSEMIDE 40 MG
0 TABLET ORAL
Qty: 60 | Refills: 0 | DISCHARGE

## 2020-09-09 RX ORDER — FUROSEMIDE 40 MG
40 TABLET ORAL DAILY
Refills: 0 | Status: DISCONTINUED | OUTPATIENT
Start: 2020-09-09 | End: 2020-09-09

## 2020-09-09 RX ADMIN — LACTULOSE 30 GRAM(S): 10 SOLUTION ORAL at 13:58

## 2020-09-09 RX ADMIN — Medication 1 APPLICATION(S): at 12:30

## 2020-09-09 RX ADMIN — Medication 40 MILLIGRAM(S): at 05:11

## 2020-09-09 RX ADMIN — SPIRONOLACTONE 100 MILLIGRAM(S): 25 TABLET, FILM COATED ORAL at 05:11

## 2020-09-09 RX ADMIN — Medication 5 MILLIGRAM(S): at 13:55

## 2020-09-09 NOTE — DISCHARGE NOTE NURSING/CASE MANAGEMENT/SOCIAL WORK - PATIENT PORTAL LINK FT
You can access the FollowMyHealth Patient Portal offered by [facility] by registering at the following website: [email or web address]. By joining [facility]’s FollowMyHealth portal, you will also be able to view your health information using other applications (apps) compatible with our system.

## 2020-09-09 NOTE — DISCHARGE NOTE PROVIDER - NSDCFUADDINST_GEN_ALL_CORE_FT
educated about taking lactulose daily with goal BM 2-3 a day.   medication and diet compliance was reinforced.

## 2020-09-09 NOTE — DISCHARGE NOTE PROVIDER - NSDCMRMEDTOKEN_GEN_ALL_CORE_FT
albuterol 90 mcg/inh inhalation aerosol: 2 puff(s) inhaled every 6 hours  atorvastatin 20 mg oral tablet: 1 tab(s) orally once a day  ATORVASTATIN TAB 20MG:   Feosol 200 mg (65 mg elemental iron) oral tablet: 1 tab(s) orally once a day   fluticasone 50 mcg/inh nasal spray: 1 spray(s) nasal 2 times a day  furosemide 20 mg oral tablet: 2 tab(s) orally once a day  LACTULOSE    SOL 10GM/15:   lactulose 10 g/15 mL oral syrup: 45 milliliter(s) orally 2 times a day  lidocaine 5% topical film: Apply topically to affected area for 12 hours every 24 hours. Remove after 12 hours.  melatonin 3 mg oral tablet: 1 tab(s) orally once a day (at bedtime)  Omega-3 oral capsule: 1 cap(s) orally once a day  pantoprazole 40 mg oral delayed release tablet: 1 tab(s) orally once a day (before a meal)  Prodigen oral capsule: 1 cap(s) orally once a day  rifAXIMin 550 mg oral tablet: 1 tab(s) orally 2 times a day  SPIRONOLACT  TAB 100MG:   spironolactone 25 mg oral tablet: 6 tab(s) orally once a day  ZOLPIDEM     TAB 10MG:

## 2020-09-09 NOTE — DISCHARGE NOTE PROVIDER - HOSPITAL COURSE
58 y/o male w/ PMHx of Chronic Hepatic Cirrhosis of unknown etiology being admitted for hepatic encephalopathy. Pt also w/ hypokalemia, Pancytopenia and MONI.    patient was give lactulose with improvement in mentation.     blood transfusion was given for anemia but no active gross bleeding was noticed.     H and H remain stable.     patient also received vitamin K for liver disease induced coagulopathy and potassium supplements for Hypokalemia.                 Problem/Plan - 1:    ·  Problem: Acute Hepatic encephalopathy.  Plan: - CT head neg.    Cont lactulose. goal BM should 2-3 a day.                 Problem/Plan - 2:    ·  Problem: Hypokalemia.  repleted.         Problem/Plan - 3:    ·  Problem: Pancytopenia. Related to liver disease.     s/p blood transfusion for anemia. H and     H stable.      added feosol for iron deficiency anemia.         Problem/Plan - 4:    ·  Problem: Coagulopathy. s/p vitamin K x 2.         Problem/Plan - 5:    ·  Problem: MONI (acute kidney injury). creatinine stable.         Morbid obesity.        seen and examined. vitals stable.         DC time spent by me excluding billable procedure 38 mins

## 2020-09-14 DIAGNOSIS — D61.818 OTHER PANCYTOPENIA: ICD-10-CM

## 2020-09-14 DIAGNOSIS — E87.6 HYPOKALEMIA: ICD-10-CM

## 2020-09-14 DIAGNOSIS — N17.9 ACUTE KIDNEY FAILURE, UNSPECIFIED: ICD-10-CM

## 2020-09-14 DIAGNOSIS — K72.90 HEPATIC FAILURE, UNSPECIFIED WITHOUT COMA: ICD-10-CM

## 2020-09-14 DIAGNOSIS — E66.01 MORBID (SEVERE) OBESITY DUE TO EXCESS CALORIES: ICD-10-CM

## 2020-09-14 DIAGNOSIS — E78.5 HYPERLIPIDEMIA, UNSPECIFIED: ICD-10-CM

## 2020-09-14 DIAGNOSIS — K74.60 UNSPECIFIED CIRRHOSIS OF LIVER: ICD-10-CM

## 2020-10-13 ENCOUNTER — INPATIENT (INPATIENT)
Facility: HOSPITAL | Age: 60
LOS: 9 days | Discharge: ANOTHER HOSPITAL | End: 2020-10-23
Attending: FAMILY MEDICINE | Admitting: FAMILY MEDICINE
Payer: COMMERCIAL

## 2020-10-13 VITALS
RESPIRATION RATE: 25 BRPM | DIASTOLIC BLOOD PRESSURE: 59 MMHG | OXYGEN SATURATION: 99 % | HEART RATE: 138 BPM | HEIGHT: 68 IN | TEMPERATURE: 98 F | SYSTOLIC BLOOD PRESSURE: 121 MMHG | WEIGHT: 309.97 LBS

## 2020-10-13 DIAGNOSIS — Z98.890 OTHER SPECIFIED POSTPROCEDURAL STATES: Chronic | ICD-10-CM

## 2020-10-13 LAB
ALBUMIN SERPL ELPH-MCNC: 2 G/DL — LOW (ref 3.3–5)
ALP SERPL-CCNC: 122 U/L — HIGH (ref 40–120)
ALT FLD-CCNC: 40 U/L — SIGNIFICANT CHANGE UP (ref 12–78)
AMMONIA BLD-MCNC: 41 UMOL/L — HIGH (ref 11–32)
ANION GAP SERPL CALC-SCNC: 13 MMOL/L — SIGNIFICANT CHANGE UP (ref 5–17)
APTT BLD: 37.9 SEC — HIGH (ref 27.5–35.5)
AST SERPL-CCNC: 49 U/L — HIGH (ref 15–37)
BILIRUB SERPL-MCNC: 6.4 MG/DL — HIGH (ref 0.2–1.2)
BUN SERPL-MCNC: 48 MG/DL — HIGH (ref 7–23)
CALCIUM SERPL-MCNC: 8.1 MG/DL — LOW (ref 8.5–10.1)
CHLORIDE SERPL-SCNC: 104 MMOL/L — SIGNIFICANT CHANGE UP (ref 96–108)
CO2 SERPL-SCNC: 21 MMOL/L — LOW (ref 22–31)
CREAT SERPL-MCNC: 1.81 MG/DL — HIGH (ref 0.5–1.3)
GLUCOSE SERPL-MCNC: 128 MG/DL — HIGH (ref 70–99)
INR BLD: 2.65 RATIO — HIGH (ref 0.88–1.16)
LACTATE SERPL-SCNC: 8 MMOL/L — CRITICAL HIGH (ref 0.7–2)
LIDOCAIN IGE QN: 120 U/L — SIGNIFICANT CHANGE UP (ref 73–393)
POTASSIUM SERPL-MCNC: 4.5 MMOL/L — SIGNIFICANT CHANGE UP (ref 3.5–5.3)
POTASSIUM SERPL-SCNC: 4.5 MMOL/L — SIGNIFICANT CHANGE UP (ref 3.5–5.3)
PROT SERPL-MCNC: 7.2 GM/DL — SIGNIFICANT CHANGE UP (ref 6–8.3)
PROTHROM AB SERPL-ACNC: 29.3 SEC — HIGH (ref 10.6–13.6)
SODIUM SERPL-SCNC: 138 MMOL/L — SIGNIFICANT CHANGE UP (ref 135–145)
TROPONIN I SERPL-MCNC: <.015 NG/ML — SIGNIFICANT CHANGE UP (ref 0.01–0.04)

## 2020-10-13 PROCEDURE — 99285 EMERGENCY DEPT VISIT HI MDM: CPT | Mod: 25

## 2020-10-13 PROCEDURE — 93010 ELECTROCARDIOGRAM REPORT: CPT

## 2020-10-13 PROCEDURE — 49082 ABD PARACENTESIS: CPT

## 2020-10-13 RX ORDER — METRONIDAZOLE 500 MG
500 TABLET ORAL ONCE
Refills: 0 | Status: COMPLETED | OUTPATIENT
Start: 2020-10-13 | End: 2020-10-13

## 2020-10-13 RX ORDER — CEFTRIAXONE 500 MG/1
1000 INJECTION, POWDER, FOR SOLUTION INTRAMUSCULAR; INTRAVENOUS ONCE
Refills: 0 | Status: COMPLETED | OUTPATIENT
Start: 2020-10-13 | End: 2020-10-13

## 2020-10-13 RX ORDER — SODIUM CHLORIDE 9 MG/ML
1000 INJECTION INTRAMUSCULAR; INTRAVENOUS; SUBCUTANEOUS ONCE
Refills: 0 | Status: COMPLETED | OUTPATIENT
Start: 2020-10-13 | End: 2020-10-13

## 2020-10-13 NOTE — ED ADULT NURSE NOTE - OBJECTIVE STATEMENT
60M accompanied by family from home with c/o AMS. Patient awake, verbally responsive, confused with h/o Hepatic cirrhosis, HLD, CHF and obesity. As per family(son), doesn't know if he's taking his medications at home.  SOn gave him lactulose. Tachycardic in the ED, abdomen distended, BLE swollen.

## 2020-10-13 NOTE — ED ADULT TRIAGE NOTE - CHIEF COMPLAINT QUOTE
AMS more confused since yesterday. Lethargic in triage  HX cirrhosis, more jaundice than normal as per family

## 2020-10-13 NOTE — ED ADULT NURSE NOTE - PMH
CHF (congestive heart failure)  EF 65% Oct 2019  Hepatic cirrhosis    HLD (hyperlipidemia)    Hyperlipidemia  Atorvastatin 20  Obese    Pneumonia

## 2020-10-14 DIAGNOSIS — D69.6 THROMBOCYTOPENIA, UNSPECIFIED: ICD-10-CM

## 2020-10-14 DIAGNOSIS — Z29.9 ENCOUNTER FOR PROPHYLACTIC MEASURES, UNSPECIFIED: ICD-10-CM

## 2020-10-14 DIAGNOSIS — A41.9 SEPSIS, UNSPECIFIED ORGANISM: ICD-10-CM

## 2020-10-14 DIAGNOSIS — N28.9 DISORDER OF KIDNEY AND URETER, UNSPECIFIED: ICD-10-CM

## 2020-10-14 DIAGNOSIS — I10 ESSENTIAL (PRIMARY) HYPERTENSION: ICD-10-CM

## 2020-10-14 DIAGNOSIS — K70.31 ALCOHOLIC CIRRHOSIS OF LIVER WITH ASCITES: ICD-10-CM

## 2020-10-14 DIAGNOSIS — E78.5 HYPERLIPIDEMIA, UNSPECIFIED: ICD-10-CM

## 2020-10-14 PROBLEM — K74.60 UNSPECIFIED CIRRHOSIS OF LIVER: Chronic | Status: ACTIVE | Noted: 2020-09-07

## 2020-10-14 LAB
ALBUMIN FLD-MCNC: 0.2 G/DL — SIGNIFICANT CHANGE UP
ALBUMIN SERPL ELPH-MCNC: 2 G/DL — LOW (ref 3.3–5)
ALP SERPL-CCNC: 90 U/L — SIGNIFICANT CHANGE UP (ref 40–120)
ALT FLD-CCNC: 34 U/L — SIGNIFICANT CHANGE UP (ref 12–78)
ANION GAP SERPL CALC-SCNC: 9 MMOL/L — SIGNIFICANT CHANGE UP (ref 5–17)
ANISOCYTOSIS BLD QL: SIGNIFICANT CHANGE UP
APPEARANCE UR: CLEAR — SIGNIFICANT CHANGE UP
AST SERPL-CCNC: 54 U/L — HIGH (ref 15–37)
B PERT IGG+IGM PNL SER: CLEAR — SIGNIFICANT CHANGE UP
BACTERIA # UR AUTO: ABNORMAL
BASOPHILS # BLD AUTO: 0 K/UL — SIGNIFICANT CHANGE UP (ref 0–0.2)
BASOPHILS NFR BLD AUTO: 0 % — SIGNIFICANT CHANGE UP (ref 0–2)
BILIRUB SERPL-MCNC: 4.8 MG/DL — HIGH (ref 0.2–1.2)
BILIRUB UR-MCNC: ABNORMAL
BLD GP AB SCN SERPL QL: SIGNIFICANT CHANGE UP
BUN SERPL-MCNC: 56 MG/DL — HIGH (ref 7–23)
CALCIUM SERPL-MCNC: 8.1 MG/DL — LOW (ref 8.5–10.1)
CHLORIDE SERPL-SCNC: 107 MMOL/L — SIGNIFICANT CHANGE UP (ref 96–108)
CO2 SERPL-SCNC: 24 MMOL/L — SIGNIFICANT CHANGE UP (ref 22–31)
COLOR FLD: YELLOW — SIGNIFICANT CHANGE UP
COLOR SPEC: YELLOW — SIGNIFICANT CHANGE UP
CREAT SERPL-MCNC: 1.69 MG/DL — HIGH (ref 0.5–1.3)
DIFF PNL FLD: ABNORMAL
ELLIPTOCYTES BLD QL SMEAR: SLIGHT — SIGNIFICANT CHANGE UP
EOSINOPHIL # BLD AUTO: 0 K/UL — SIGNIFICANT CHANGE UP (ref 0–0.5)
EOSINOPHIL NFR BLD AUTO: 0 % — SIGNIFICANT CHANGE UP (ref 0–6)
EPI CELLS # UR: SIGNIFICANT CHANGE UP
FLUID INTAKE SUBSTANCE CLASS: SIGNIFICANT CHANGE UP
FLUID SEGMENTED GRANULOCYTES: 65 % — SIGNIFICANT CHANGE UP
GLUCOSE BLDC GLUCOMTR-MCNC: 130 MG/DL — HIGH (ref 70–99)
GLUCOSE FLD-MCNC: 133 MG/DL — SIGNIFICANT CHANGE UP
GLUCOSE SERPL-MCNC: 137 MG/DL — HIGH (ref 70–99)
GLUCOSE UR QL: NEGATIVE MG/DL — SIGNIFICANT CHANGE UP
GRAM STN FLD: SIGNIFICANT CHANGE UP
GRAN CASTS # UR COMP ASSIST: ABNORMAL /LPF
HCT VFR BLD CALC: 17.5 % — CRITICAL LOW (ref 39–50)
HCT VFR BLD CALC: 24.3 % — LOW (ref 39–50)
HGB BLD-MCNC: 5.6 G/DL — CRITICAL LOW (ref 13–17)
HGB BLD-MCNC: 7.5 G/DL — LOW (ref 13–17)
HYPOCHROMIA BLD QL: SIGNIFICANT CHANGE UP
KETONES UR-MCNC: NEGATIVE — SIGNIFICANT CHANGE UP
LACTATE SERPL-SCNC: 4.8 MMOL/L — CRITICAL HIGH (ref 0.7–2)
LACTATE SERPL-SCNC: 6.9 MMOL/L — CRITICAL HIGH (ref 0.7–2)
LDH SERPL L TO P-CCNC: 39 U/L — SIGNIFICANT CHANGE UP
LEUKOCYTE ESTERASE UR-ACNC: ABNORMAL
LYMPHOCYTES # BLD AUTO: 0.88 K/UL — LOW (ref 1–3.3)
LYMPHOCYTES # BLD AUTO: 13 % — SIGNIFICANT CHANGE UP (ref 13–44)
MAGNESIUM SERPL-MCNC: 1.9 MG/DL — SIGNIFICANT CHANGE UP (ref 1.6–2.6)
MANUAL SMEAR VERIFICATION: SIGNIFICANT CHANGE UP
MCHC RBC-ENTMCNC: 28.1 PG — SIGNIFICANT CHANGE UP (ref 27–34)
MCHC RBC-ENTMCNC: 29 PG — SIGNIFICANT CHANGE UP (ref 27–34)
MCHC RBC-ENTMCNC: 30.9 GM/DL — LOW (ref 32–36)
MCHC RBC-ENTMCNC: 32 GM/DL — SIGNIFICANT CHANGE UP (ref 32–36)
MCV RBC AUTO: 90.7 FL — SIGNIFICANT CHANGE UP (ref 80–100)
MCV RBC AUTO: 91 FL — SIGNIFICANT CHANGE UP (ref 80–100)
MICROCYTES BLD QL: SIGNIFICANT CHANGE UP
MONOCYTES # BLD AUTO: 0.4 K/UL — SIGNIFICANT CHANGE UP (ref 0–0.9)
MONOCYTES NFR BLD AUTO: 6 % — SIGNIFICANT CHANGE UP (ref 2–14)
MONOS+MACROS # FLD: 35 % — SIGNIFICANT CHANGE UP
NEUTROPHILS # BLD AUTO: 5.46 K/UL — SIGNIFICANT CHANGE UP (ref 1.8–7.4)
NEUTROPHILS NFR BLD AUTO: 80 % — HIGH (ref 43–77)
NEUTS BAND # BLD: 1 % — SIGNIFICANT CHANGE UP (ref 0–8)
NITRITE UR-MCNC: NEGATIVE — SIGNIFICANT CHANGE UP
NRBC # BLD: 0 /100 WBCS — SIGNIFICANT CHANGE UP (ref 0–0)
NRBC # BLD: 0 /100 — SIGNIFICANT CHANGE UP (ref 0–0)
NRBC # BLD: SIGNIFICANT CHANGE UP /100 WBCS (ref 0–0)
NT-PROBNP SERPL-SCNC: 83 PG/ML — SIGNIFICANT CHANGE UP (ref 0–125)
OB PNL STL: POSITIVE
PH UR: 5 — SIGNIFICANT CHANGE UP (ref 5–8)
PLAT MORPH BLD: ABNORMAL
PLATELET # BLD AUTO: 45 K/UL — LOW (ref 150–400)
PLATELET # BLD AUTO: 89 K/UL — LOW (ref 150–400)
POIKILOCYTOSIS BLD QL AUTO: SIGNIFICANT CHANGE UP
POTASSIUM SERPL-MCNC: 4 MMOL/L — SIGNIFICANT CHANGE UP (ref 3.5–5.3)
POTASSIUM SERPL-SCNC: 4 MMOL/L — SIGNIFICANT CHANGE UP (ref 3.5–5.3)
PROT FLD-MCNC: <1 G/DL — SIGNIFICANT CHANGE UP
PROT SERPL-MCNC: 5.9 GM/DL — LOW (ref 6–8.3)
PROT UR-MCNC: 15 MG/DL
RAPID RVP RESULT: SIGNIFICANT CHANGE UP
RBC # BLD: 1.93 M/UL — LOW (ref 4.2–5.8)
RBC # BLD: 2.67 M/UL — LOW (ref 4.2–5.8)
RBC # FLD: 22.5 % — HIGH (ref 10.3–14.5)
RBC # FLD: 22.7 % — HIGH (ref 10.3–14.5)
RBC BLD AUTO: ABNORMAL
RBC CASTS # UR COMP ASSIST: ABNORMAL /HPF (ref 0–4)
RCV VOL RI: 0 /UL — SIGNIFICANT CHANGE UP (ref 0–5)
SARS-COV-2 IGG SERPL QL IA: NEGATIVE — SIGNIFICANT CHANGE UP
SARS-COV-2 IGM SERPL IA-ACNC: 0.1 INDEX — SIGNIFICANT CHANGE UP
SARS-COV-2 RNA SPEC QL NAA+PROBE: SIGNIFICANT CHANGE UP
SCHISTOCYTES BLD QL AUTO: SLIGHT — SIGNIFICANT CHANGE UP
SODIUM SERPL-SCNC: 140 MMOL/L — SIGNIFICANT CHANGE UP (ref 135–145)
SP GR SPEC: 1.01 — SIGNIFICANT CHANGE UP (ref 1.01–1.02)
SPECIMEN SOURCE: SIGNIFICANT CHANGE UP
TOTAL NUCLEATED CELL COUNT, BODY FLUID: 224 /UL — SIGNIFICANT CHANGE UP
TUBE TYPE: SIGNIFICANT CHANGE UP
UROBILINOGEN FLD QL: NEGATIVE MG/DL — SIGNIFICANT CHANGE UP
WBC # BLD: 12.37 K/UL — HIGH (ref 3.8–10.5)
WBC # BLD: 6.74 K/UL — SIGNIFICANT CHANGE UP (ref 3.8–10.5)
WBC # FLD AUTO: 12.37 K/UL — HIGH (ref 3.8–10.5)
WBC # FLD AUTO: 6.74 K/UL — SIGNIFICANT CHANGE UP (ref 3.8–10.5)
WBC UR QL: SIGNIFICANT CHANGE UP

## 2020-10-14 PROCEDURE — 99223 1ST HOSP IP/OBS HIGH 75: CPT

## 2020-10-14 PROCEDURE — 74177 CT ABD & PELVIS W/CONTRAST: CPT | Mod: 26,MA

## 2020-10-14 PROCEDURE — 70450 CT HEAD/BRAIN W/O DYE: CPT | Mod: 26,MA

## 2020-10-14 PROCEDURE — 12345: CPT | Mod: NC

## 2020-10-14 PROCEDURE — 71045 X-RAY EXAM CHEST 1 VIEW: CPT | Mod: 26

## 2020-10-14 RX ORDER — OCTREOTIDE ACETATE 200 UG/ML
50 INJECTION, SOLUTION INTRAVENOUS; SUBCUTANEOUS ONCE
Refills: 0 | Status: COMPLETED | OUTPATIENT
Start: 2020-10-14 | End: 2020-10-14

## 2020-10-14 RX ORDER — VANCOMYCIN HCL 1 G
750 VIAL (EA) INTRAVENOUS EVERY 12 HOURS
Refills: 0 | Status: DISCONTINUED | OUTPATIENT
Start: 2020-10-14 | End: 2020-10-15

## 2020-10-14 RX ORDER — OCTREOTIDE ACETATE 200 UG/ML
50 INJECTION, SOLUTION INTRAVENOUS; SUBCUTANEOUS
Qty: 500 | Refills: 0 | Status: DISCONTINUED | OUTPATIENT
Start: 2020-10-14 | End: 2020-10-20

## 2020-10-14 RX ORDER — ATORVASTATIN CALCIUM 80 MG/1
0 TABLET, FILM COATED ORAL
Qty: 30 | Refills: 0 | DISCHARGE

## 2020-10-14 RX ORDER — SPIRONOLACTONE 25 MG/1
0 TABLET, FILM COATED ORAL
Qty: 30 | Refills: 0 | DISCHARGE

## 2020-10-14 RX ORDER — ALBUMIN HUMAN 25 %
250 VIAL (ML) INTRAVENOUS ONCE
Refills: 0 | Status: DISCONTINUED | OUTPATIENT
Start: 2020-10-14 | End: 2020-10-14

## 2020-10-14 RX ORDER — L.ACIDOPH/B.ANIMALIS/B.LONGUM 15B CELL
1 CAPSULE ORAL
Qty: 0 | Refills: 0 | DISCHARGE

## 2020-10-14 RX ORDER — PANTOPRAZOLE SODIUM 20 MG/1
8 TABLET, DELAYED RELEASE ORAL
Qty: 80 | Refills: 0 | Status: DISCONTINUED | OUTPATIENT
Start: 2020-10-14 | End: 2020-10-15

## 2020-10-14 RX ORDER — LACTULOSE 10 G/15ML
0 SOLUTION ORAL
Qty: 946 | Refills: 0 | DISCHARGE

## 2020-10-14 RX ORDER — ALBUMIN HUMAN 25 %
100 VIAL (ML) INTRAVENOUS ONCE
Refills: 0 | Status: COMPLETED | OUTPATIENT
Start: 2020-10-14 | End: 2020-10-14

## 2020-10-14 RX ORDER — CEFTRIAXONE 500 MG/1
1000 INJECTION, POWDER, FOR SOLUTION INTRAMUSCULAR; INTRAVENOUS EVERY 24 HOURS
Refills: 0 | Status: DISCONTINUED | OUTPATIENT
Start: 2020-10-14 | End: 2020-10-19

## 2020-10-14 RX ORDER — ZOLPIDEM TARTRATE 10 MG/1
0 TABLET ORAL
Qty: 30 | Refills: 0 | DISCHARGE

## 2020-10-14 RX ORDER — CHLORHEXIDINE GLUCONATE 213 G/1000ML
1 SOLUTION TOPICAL
Refills: 0 | Status: DISCONTINUED | OUTPATIENT
Start: 2020-10-14 | End: 2020-10-23

## 2020-10-14 RX ORDER — FUROSEMIDE 40 MG
1 TABLET ORAL
Qty: 0 | Refills: 0 | DISCHARGE

## 2020-10-14 RX ORDER — VANCOMYCIN HCL 1 G
1000 VIAL (EA) INTRAVENOUS ONCE
Refills: 0 | Status: COMPLETED | OUTPATIENT
Start: 2020-10-14 | End: 2020-10-14

## 2020-10-14 RX ORDER — SODIUM CHLORIDE 9 MG/ML
1000 INJECTION INTRAMUSCULAR; INTRAVENOUS; SUBCUTANEOUS ONCE
Refills: 0 | Status: COMPLETED | OUTPATIENT
Start: 2020-10-14 | End: 2020-10-14

## 2020-10-14 RX ORDER — VANCOMYCIN HCL 1 G
VIAL (EA) INTRAVENOUS
Refills: 0 | Status: DISCONTINUED | OUTPATIENT
Start: 2020-10-14 | End: 2020-10-15

## 2020-10-14 RX ADMIN — Medication 50 MILLILITER(S): at 03:44

## 2020-10-14 RX ADMIN — OCTREOTIDE ACETATE 10 MICROGRAM(S)/HR: 200 INJECTION, SOLUTION INTRAVENOUS; SUBCUTANEOUS at 14:05

## 2020-10-14 RX ADMIN — Medication 250 MILLIGRAM(S): at 04:52

## 2020-10-14 RX ADMIN — Medication 500 MILLIGRAM(S): at 03:00

## 2020-10-14 RX ADMIN — CEFTRIAXONE 100 MILLIGRAM(S): 500 INJECTION, POWDER, FOR SOLUTION INTRAMUSCULAR; INTRAVENOUS at 00:09

## 2020-10-14 RX ADMIN — SODIUM CHLORIDE 1000 MILLILITER(S): 9 INJECTION INTRAMUSCULAR; INTRAVENOUS; SUBCUTANEOUS at 03:18

## 2020-10-14 RX ADMIN — CHLORHEXIDINE GLUCONATE 1 APPLICATION(S): 213 SOLUTION TOPICAL at 16:26

## 2020-10-14 RX ADMIN — SODIUM CHLORIDE 1000 MILLILITER(S): 9 INJECTION INTRAMUSCULAR; INTRAVENOUS; SUBCUTANEOUS at 01:22

## 2020-10-14 RX ADMIN — PANTOPRAZOLE SODIUM 10 MG/HR: 20 TABLET, DELAYED RELEASE ORAL at 14:08

## 2020-10-14 RX ADMIN — PANTOPRAZOLE SODIUM 10 MG/HR: 20 TABLET, DELAYED RELEASE ORAL at 22:16

## 2020-10-14 RX ADMIN — OCTREOTIDE ACETATE 50 MICROGRAM(S): 200 INJECTION, SOLUTION INTRAVENOUS; SUBCUTANEOUS at 13:58

## 2020-10-14 RX ADMIN — SODIUM CHLORIDE 1000 MILLILITER(S): 9 INJECTION INTRAMUSCULAR; INTRAVENOUS; SUBCUTANEOUS at 00:08

## 2020-10-14 RX ADMIN — OCTREOTIDE ACETATE 10 MICROGRAM(S)/HR: 200 INJECTION, SOLUTION INTRAVENOUS; SUBCUTANEOUS at 22:16

## 2020-10-14 RX ADMIN — CEFTRIAXONE 100 MILLIGRAM(S): 500 INJECTION, POWDER, FOR SOLUTION INTRAMUSCULAR; INTRAVENOUS at 13:47

## 2020-10-14 RX ADMIN — Medication 100 MILLIGRAM(S): at 01:05

## 2020-10-14 RX ADMIN — CEFTRIAXONE 1000 MILLIGRAM(S): 500 INJECTION, POWDER, FOR SOLUTION INTRAMUSCULAR; INTRAVENOUS at 01:05

## 2020-10-14 RX ADMIN — Medication 250 MILLIGRAM(S): at 17:49

## 2020-10-14 NOTE — ED PROCEDURE NOTE - CPROC ED PATIENT POSITION1
History of CAD s/p CABG.  No s/s of angina.  Continue ASA, Statin, and BB.  Telemetry monitoring.  Monitor for s/s of ACS.     supine

## 2020-10-14 NOTE — ED PROVIDER NOTE - CLINICAL SUMMARY MEDICAL DECISION MAKING FREE TEXT BOX
confused but protecting airway. tachycardic, tachypneic and mildly hypoxic. respiratory exam clear. possible cause of respiratory symptoms is abdominal distension - bedside US with significant amount of ascites. possible causes include hepatic encephalopathy, sepsis, SBP, and less likely CVA. empiric sepsis treatment begun as elevated lactate. normotensive - swelling likely 2/2 decreased oncotic pressure as opposed to CHF. this is supoorted by respiratory status improving after ~1700 ccs of peritoneal fluid removed. will fluid resuscitate and monitor breathing status. BNP wnl.

## 2020-10-14 NOTE — H&P ADULT - PROBLEM SELECTOR PLAN 1
- start Zosyn and Vanco  - f/u ID consult  - f/u blood cultures  - pt s/p 1.5L bolus in ED, rpt LA trending down. Hold further IVF, pt w/ diffuse anisarca, third spacing will give Albumin instead

## 2020-10-14 NOTE — ED ADULT NURSE REASSESSMENT NOTE - NS ED NURSE REASSESS COMMENT FT1
Patient remains stable while in the ED, VS wnl. Due labs sent pending results. Hospitalist aware of pt's elevated lactate level. Attempted to give report 4x to the floor but nobody's answering phone and as per LIGIA shukla refusing to take patient secondary to lactate level. DAVIN Sheikh made aware of the pushback's.

## 2020-10-14 NOTE — H&P ADULT - NSHPPHYSICALEXAM_GEN_ALL_CORE
PHYSICAL EXAM:    Vital Signs Last 24 Hrs  T(C): 36.6 (14 Oct 2020 03:20), Max: 36.7 (13 Oct 2020 22:13)  T(F): 97.9 (14 Oct 2020 03:20), Max: 98.1 (13 Oct 2020 22:13)  HR: 114 (14 Oct 2020 03:20) (114 - 138)  BP: 107/55 (14 Oct 2020 03:20) (107/55 - 121/59)  BP(mean): 68 (14 Oct 2020 03:20) (68 - 79)  RR: 20 (14 Oct 2020 03:20) (18 - 25)  SpO2: 95% (14 Oct 2020 03:20) (95% - 99%)    GENERAL: Pt lying in bed, lethargic  HEENT:  Atraumatic, dry MM  NECK: Supple,   CHEST/LUNG: Dec BS b/l Unlabored respirations  HEART: Regular rate and rhythm;   ABDOMEN: Bowel sounds present; Soft, Nontender, moderately distended.   EXTREMITIES:  2+ Peripheral Pulses, 3+ b/l pitting edema  NEUROLOGICAL:  lethargic unable to assess  MSK: unable to assess  SKIN: no lesions

## 2020-10-14 NOTE — CHART NOTE - NSCHARTNOTEFT_GEN_A_CORE
Pt is a 58 yo morbidly obese WM with h/o CHF, HTN, HLD, cirrhosis with ascites and thrombocytopenia with recent admission 2 to hepatic encephalopathy (pt noncompliant with lactulose) presents to the ER due to AMS. In the ER pt was initially tachycardic with stable BP but confused with the following abnormal labs Hb 7.5 Platelets 89 INR 2.7 BUN/Cr 48/1.8 and lactic acid 8.0. s/p paracentesis 2 L. Pt admitted to medicine with working dx: 1) sepsis 2) metabolic encephalopathy. Today [facility] consulted pt with melena and a repeat Hb of 5.6 (from 6:43 am)    Vs as per EMR    Eyes: Sclera icteric  Lungs: CTA  Heart: RRR  Abd: Soft/+BS  Ext: LE edema  Neuro: Awake, slightly encephalopathic    a/p: 1) Acute blood loss anemia 2 to UGIB 2) Decompensated cirrhosis with ascites, coagulopathy, thrombocytopenia and hepatic encephalopathy 3) r/o sepsis 4) Lactic acidosis in part 2 to decreased liver clearance and ? sepsis    Resp: Elevated HOB/ Supplemental O2 prn  ID: Cont current Abx/ F/u Cx/ ID consult nored  CVS: Currently hemodynamically stable/ Lactae is down trending; cont to follow  Heme: Transfuse 2-3 units PRBC; trend Hb  FEN: NPO  GI: Agree with PPI + Octreotide drips/ GI consult  Renal: Follow BUN/Cr and UO  Neuro: MS should improve with medical management  PT to be admitted to the ICU    CCT: 45 min Pt is a 60 yo morbidly obese WM with h/o SARKIS CHF, HTN, HLD, cirrhosis (BOB/NOELLE) with ascites and thrombocytopenia with recent admission 2 to hepatic encephalopathy (pt noncompliant with lactulose) presents to the ER due to AMS. In the ER pt was initially tachycardic with stable BP but confused with the following abnormal labs Hb 7.5 Platelets 89 INR 2.7 BUN/Cr 48/1.8 and lactic acid 8.0. s/p paracentesis 2 L. Pt admitted to medicine with working dx: 1) sepsis 2) metabolic encephalopathy. Today [facility] consulted pt with melena and a repeat Hb of 5.6 (from 6:43 am)    Vs as per EMR    Eyes: Sclera icteric  Lungs: CTA  Heart: RRR  Abd: Soft/+BS  Ext: LE edema  Neuro: Awake, slightly encephalopathic    a/p: 1) Acute blood loss anemia 2 to UGIB 2) Decompensated cirrhosis with ascites, coagulopathy, thrombocytopenia and hepatic encephalopathy 3) r/o sepsis 4) Lactic acidosis in part 2 to decreased liver clearance and ? sepsis 5) MONI    Resp: Elevated HOB/ Supplemental O2 prn  ID: Cont current Abx/ F/u Cx/ ID consult nored  CVS: Currently hemodynamically stable/ Lactae is down trending; cont to follow  Heme: Transfuse 2-3 units PRBC; trend Hb  FEN: NPO  GI: Agree with PPI + Octreotide drips/ GI consult  Renal: Follow BUN/Cr and UO  Neuro: MS should improve with medical management  PT to be admitted to the ICU    CCT: 45 min Pt is a 60 yo morbidly obese WM with h/o SARKIS CHF, HTN, HLD, cirrhosis (BOB/NOELLE) with ascites and thrombocytopenia with recent admission 2 to hepatic encephalopathy (pt noncompliant with lactulose) presents to the ER due to AMS. In the ER pt was initially tachycardic with stable BP but confused with the following abnormal labs Hb 7.5 Platelets 89 INR 2.7 BUN/Cr 48/1.8 and lactic acid 8.0. s/p paracentesis 2 L. Pt admitted to medicine with working dx: 1) sepsis 2) metabolic encephalopathy. Today [facility] consulted pt with melena and a repeat Hb of 5.6 (from 6:43 am)    Vs as per EMR    Eyes: Sclera icteric  Lungs: CTA  Heart: RRR  Abd: Soft/+BS  Ext: LE edema  Neuro: Awake, slightly encephalopathic    a/p: 1) Acute blood loss anemia 2 to UGIB 2) Decompensated cirrhosis with ascites, coagulopathy, thrombocytopenia and hepatic encephalopathy 3) r/o sepsis 4) Lactic acidosis in part 2 to decreased liver clearance and ? sepsis 5) MONI    Resp: Elevated HOB/ Supplemental O2 prn  ID: Cont current Abx/ F/u Cx/ ID consult noted  CVS: Currently hemodynamically stable/ Lactate is down trending; cont to follow  Heme: Transfuse 2-3 units PRBC; trend Hb  FEN: NPO  GI: Agree with PPI + Octreotide drips/ GI consult  Renal: Follow BUN/Cr and UO  Neuro: MS should improve with medical management  Pt to be admitted to the ICU    CCT: 45 min

## 2020-10-14 NOTE — PROGRESS NOTE ADULT - SUBJECTIVE AND OBJECTIVE BOX
60 y/o male morbidly obese male w/ PMHx of Hepatic Cirrhosis w/ ascites & Thrombocytopenia, morbid Obesity w/ BMI of 47, chronic diastolic CHF, HTN, HLD, recent admx for Hepatic Encephalopathy, noncompliant with lactulose presented w/ Metabolic Encephalopathy and suspected SBP w/ acute blood loss anemia due to GI bleed. 3 units pRBC ordered. Will start Rocephin and octreotide drip for possible variceal bleed. Patient is being admitted to ICU.

## 2020-10-14 NOTE — ED PROVIDER NOTE - CARE PLAN
Principal Discharge DX:	Sepsis  Secondary Diagnosis:	Lactate blood increased  Secondary Diagnosis:	Delirium

## 2020-10-14 NOTE — H&P ADULT - PROBLEM SELECTOR PLAN 2
- s/p paracentesis in ED, 2L removed  - cell ct not reflective of SBP  - c/w lactulose  - hold todays dose of diuretics however monitor closely. pt given 1.5L bolus in ED, has a hx of CHF - s/p paracentesis in ED, 2L removed  - cell ct not reflective of SBP  - c/w lactulose  - f/u renal fxn in am if stable restart diuretics; pt given 1.5L bolus in ED, has a hx of CHF, ascites and b/l pleural effusions

## 2020-10-14 NOTE — CONSULT NOTE ADULT - ASSESSMENT
60 M with HLD, CHF (EF 65% Oct 2019), HLD, cirrhosis (2/2 alcohol abuse), and recent admission (Oct 2019) for R pleural effusion s/p chest tube, fluid was exudative but negative cytology and cultures, presented 11/8/2019 with fever and chills, SOB and worked up for TB which was negative., at that time got 7 days of vanco and zosyn. Now her returns not having taken his lactulose and is encephalopathic.   Afebrile but tachycardic   WBC 12, PLT 45, Hb 7.5->5.6, Crea 1.8->1.6, INR 2.65  SAAG 1.8 (>1.1 consistent with portal hypertension)   MELD on admission was 29  Lactate 8.0->6->4  CT (personally reviewed) moderate bilateral pleural effusions, splenomegaly, shriveled liver, mod-large ascites  Blood culture drawn on admission and given vanco, CTX and flagyl   Fluid appears transudative though no serum LDH on this admission   Fluid with less then 250 PMNs goes against SBP and gram stain on fluid is negative      60 M with HLD, CHF (EF 65% Oct 2019), HLD, cirrhosis (2/2 alcohol abuse), and recent admission (Oct 2019) for R pleural effusion s/p chest tube, fluid was exudative but negative cytology and cultures, presented 11/8/2019 with fever and chills, SOB and worked up for TB which was negative., at that time got 7 days of vanco and zosyn. Now her returns not having taken his lactulose and is encephalopathic.   Afebrile but tachycardic   WBC 12, PLT 45, Hb 7.5->5.6, Crea 1.8->1.6, INR 2.65  SAAG 1.8 (>1.1 consistent with portal hypertension)   MELD on admission was 29  Lactate 8.0->6->4  CT (personally reviewed) moderate bilateral pleural effusions, splenomegaly, shriveled liver, mod-large ascites  Blood culture drawn on admission and given vanco, CTX and flagyl   Fluid appears transudative though no serum LDH on this admission   Fluid with less then 250 PMNs goes against SBP and gram stain on fluid is negative   Pt had melena and hb dropped to 5.6 raising concern for variceal bleed    Overall, 60M hepatic encephalopathy, acute blood lose anemia (?variceal bleed), high lactate   -agree with ceftriaxone 1g daily   -f/u cultures   -low threshold for broadening antibiotics to Vanco and Zosyn   -GI consult   -transfusions per ICU team   -octreotide PPI   -if no improvement, consider IR consult for TIPS vs embolization   -May need transfer [facility] for possible transplant evaluation   -trend MELD     Prognosis is guarded.   Discussed with LYDIA, Dr. Maricarmen Ragsdale,   Cell: [phone]  Pager [phone]  Infectious Disease Attending  After 5pm/weekends please call [phone] for all inquiries and new consults

## 2020-10-14 NOTE — H&P ADULT - ASSESSMENT
58 y/o male morbidly obese male w/ PMHx of Hepatic Cirrhosis, ascites, Thrombocytopenia, CHF, HTN, HLD, recent admx for Hepatic Encephalopathy, noncompliant with lactulose presents to the ED due to AMS. Pt being admitted for Metabolic Encephalopathy and Sepsis of unclear etiology 58 y/o male morbidly obese male w/ PMHx of Hepatic Cirrhosis, ascites, Thrombocytopenia, CHF, HTN, HLD, recent admx for Hepatic Encephalopathy, noncompliant with lactulose presents to the ED due to AMS. Pt being admitted for Metabolic Encephalopathy and Sepsis of unclear etiology.    IMPROVE VTE Individual Risk Assessment    RISK                                                                Points    [  ] Previous VTE                                                  3    [  ] Thrombophilia                                               2    [  ] Lower limb paralysis                                      2        (unable to hold up >15 seconds)      [  ] Current Cancer                                              2         (within 6 months)    [  ] Immobilization > 24 hrs                                1    [  ] ICU/CCU stay > 24 hours                              1    [  ] Age > 60                                                      1    IMPROVE VTE Score ______1___    IMPROVE Score 0-1: Low Risk, No VTE prophylaxis required for most patients, encourage ambulation.   IMPROVE Score 2-3: At risk, pharmacologic VTE prophylaxis is indicated for most patients (in the absence of a contraindication)  IMPROVE Score > or = 4: High Risk, pharmacologic VTE prophylaxis is indicated for most patients (in the absence of a contraindication)
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
## 2020-10-14 NOTE — ED ADULT NURSE REASSESSMENT NOTE - NS ED NURSE REASSESS COMMENT FT1
[id number] multiple pages to dr lopez for notification of abnormal lab results hgb 5.6 hct 17.5 no response nursing leadership notifed emma and manager magdalene

## 2020-10-14 NOTE — H&P ADULT - NSICDXPASTMEDICALHX_GEN_ALL_CORE_FT
PAST MEDICAL HISTORY:  CHF (congestive heart failure) EF 65% Oct 2019    Hepatic cirrhosis     HLD (hyperlipidemia)     Hyperlipidemia Atorvastatin 20    Obese     Pneumonia

## 2020-10-14 NOTE — H&P ADULT - NSHPLABSRESULTS_GEN_ALL_CORE
T(C): 36.6 (10-14-20 @ 03:20), Max: 36.7 (10-13-20 @ 22:13)  HR: 114 (10-14-20 @ 03:20) (114 - 138)  BP: 107/55 (10-14-20 @ 03:20) (107/55 - 121/59)  RR: 20 (10-14-20 @ 03:20) (18 - 25)  SpO2: 95% (10-14-20 @ 03:20) (95% - 99%)                        7.5    12.37 )-----------( 89       ( 13 Oct 2020 22:57 )             24.3     10-13    138  |  104  |  48<H>  ----------------------------<  128<H>  4.5   |  21<L>  |  1.81<H>    Ca    8.1<L>      13 Oct 2020 22:57    TPro  7.2  /  Alb  2.0<L>  /  TBili  6.4<H>  /  DBili  x   /  AST  49<H>  /  ALT  40  /  AlkPhos  122<H>  10    LIVER FUNCTIONS - ( 13 Oct 2020 22:57 )  Alb: 2.0 g/dL / Pro: 7.2 gm/dL / ALK PHOS: 122 U/L / ALT: 40 U/L / AST: 49 U/L / GGT: x           PT/INR - ( 13 Oct 2020 22:57 )   PT: 29.3 sec;   INR: 2.65 ratio         PTT - ( 13 Oct 2020 22:57 )  PTT:37.9 sec  Urinalysis Basic - ( 14 Oct 2020 03:21 )    Color: Yellow / Appearance: Clear / S.015 / pH: x  Gluc: x / Ketone: Negative  / Bili: Small / Urobili: Negative mg/dL   Blood: x / Protein: 15 mg/dL / Nitrite: Negative   Leuk Esterase: Trace / RBC: 3-5 /HPF / WBC 3-5   Sq Epi: x / Non Sq Epi: Few / Bacteria: Occasional    < from: CT Head No Cont (10.14.20 @ 02:15) >    IMPRESSION:  1. Unremarkable, unenhanced CT head      < end of copied text >    < from: CT Abdomen and Pelvis w/ IV Cont (10.14.20 @ 02:02) >    IMPRESSION: Limited exam due to portions of the left abdomen pelvis excluded from the study.    Redemonstrated changes from portal hypertension with cirrhosis, splenomegaly, upper abdominal varices, moderate abdominopelvic ascites, pleural effusions, and anasarca.      < end of copied text >        vancomycin  IVPB 1000 milliGRAM(s) IV Intermittent once  vancomycin  IVPB 750 milliGRAM(s) IV Intermittent every 12 hours  vancomycin  IVPB

## 2020-10-14 NOTE — H&P ADULT - HISTORY OF PRESENT ILLNESS
58 y/o male morbidly obese male w/ PMHx of Hepatic Cirrhosis, ascites, Thrombocytopenia, CHF, HTN, HLD, recent admx for Hepatic Encephalopathy, noncompliant with lactulose presents to the ED due to AMS. Pt awake, lethargic only oriented to self, unable to provide any history. History obtained from ED/Chart. Per ED, Brother noticed worsening confusion x 1, noted again pt non-complaint with lactulose thus gave him 1 dose prior to his arrival in ED.    On arrival in ED, initial /59, , pt afebrile. CT head neg. Sig labs include WBC 12.37, h/h 7.5/24.3, PLT 89, LA 8, BUN/Cr 48/1.81, ammonia level 41, see below for rest of sig labs. Pt s/p paracentesis in ED, 2L removed cell ct not indicative of SBP. UA neg, CXR pre-valenzuela unremarkable. CT A/P     58 y/o male morbidly obese male w/ PMHx of Hepatic Cirrhosis, ascites, Thrombocytopenia, CHF, HTN, HLD, recent admx for Hepatic Encephalopathy, noncompliant with lactulose presents to the ED due to AMS. Pt awake, lethargic only oriented to self, unable to provide any history. History obtained from ED/Chart. Per ED, Brother noticed worsening confusion x 1, noted again pt non-complaint with lactulose thus gave him 1 dose prior to his arrival in ED.    On arrival in ED, initial /59, , pt afebrile. CT head neg. Sig labs include WBC 12.37, h/h 7.5/24.3, PLT 89, LA 8, BUN/Cr 48/1.81, ammonia level 41, see below for rest of sig labs. Pt s/p paracentesis in ED, 2L removed cell ct not indicative of SBP. UA neg, CXR pre-valenzuela unremarkable. CT A/P showed portal hypertension with cirrhosis, splenomegaly, upper abdominal varices, moderate abdominopelvic ascites, pleural effusions, and anasarca.

## 2020-10-14 NOTE — H&P ADULT - PROBLEM SELECTOR PLAN 3
- worsening since last admx, no trend consistent w/ hx of CKD  - cont to monitor  - pt third spacing, will give albumin, pt already given 1.5L IVF  - monitor I/O  - avoid nephrotoxins  - f/u renal function in am if stable/improving, restart diuretics, will hold for today - worsening since last admx, no trend consistent w/ hx of CKD  - cont to monitor  - pt third spacing, will give albumin, pt already given 1.5L IVF  - monitor I/O  - avoid nephrotoxins  - f/u renal function in am if stable/improving, restart diuretics

## 2020-10-14 NOTE — ED PROVIDER NOTE - OBJECTIVE STATEMENT
60m pmhx cirrhosis, CHF, prior episodes of hepatic encephalopathy with AMS for 1 day. known to be poorly compliant with medication

## 2020-10-14 NOTE — ED PROVIDER NOTE - PROGRESS NOTE DETAILS
mental status improved in ED. ammonia 41 - received lactulose shortly before ED arrival. pending results of paracentesis. empirically treated for SBP. will admit for delirium and sepsis. lactate still elevated but downtrending

## 2020-10-14 NOTE — ED ADULT NURSE REASSESSMENT NOTE - NS ED NURSE REASSESS COMMENT FT1
Patient admitted to medicine, bed available, attempted to give report 2x but floor said that they will call for report when they are ready. No pending stat orders noted, VS wnl. Had 2 episodes of BM in the ED.

## 2020-10-15 LAB
ALBUMIN SERPL ELPH-MCNC: 1.9 G/DL — LOW (ref 3.3–5)
ALBUMIN SERPL ELPH-MCNC: 2.1 G/DL — LOW (ref 3.3–5)
ALP SERPL-CCNC: 80 U/L — SIGNIFICANT CHANGE UP (ref 40–120)
ALP SERPL-CCNC: 91 U/L — SIGNIFICANT CHANGE UP (ref 40–120)
ALT FLD-CCNC: 33 U/L — SIGNIFICANT CHANGE UP (ref 12–78)
ALT FLD-CCNC: 43 U/L — SIGNIFICANT CHANGE UP (ref 12–78)
ANION GAP SERPL CALC-SCNC: 3 MMOL/L — LOW (ref 5–17)
ANION GAP SERPL CALC-SCNC: 4 MMOL/L — LOW (ref 5–17)
APTT BLD: 35.9 SEC — HIGH (ref 27.5–35.5)
AST SERPL-CCNC: 37 U/L — SIGNIFICANT CHANGE UP (ref 15–37)
AST SERPL-CCNC: 51 U/L — HIGH (ref 15–37)
BASOPHILS # BLD AUTO: 0.01 K/UL — SIGNIFICANT CHANGE UP (ref 0–0.2)
BASOPHILS NFR BLD AUTO: 0.3 % — SIGNIFICANT CHANGE UP (ref 0–2)
BILIRUB DIRECT SERPL-MCNC: 1.22 MG/DL — HIGH (ref 0.05–0.2)
BILIRUB INDIRECT FLD-MCNC: 3.1 MG/DL — HIGH (ref 0.2–1)
BILIRUB SERPL-MCNC: 4.1 MG/DL — HIGH (ref 0.2–1.2)
BILIRUB SERPL-MCNC: 4.3 MG/DL — HIGH (ref 0.2–1.2)
BUN SERPL-MCNC: 57 MG/DL — HIGH (ref 7–23)
BUN SERPL-MCNC: 66 MG/DL — HIGH (ref 7–23)
CALCIUM SERPL-MCNC: 7.7 MG/DL — LOW (ref 8.5–10.1)
CALCIUM SERPL-MCNC: 8.1 MG/DL — LOW (ref 8.5–10.1)
CHLORIDE SERPL-SCNC: 105 MMOL/L — SIGNIFICANT CHANGE UP (ref 96–108)
CHLORIDE SERPL-SCNC: 107 MMOL/L — SIGNIFICANT CHANGE UP (ref 96–108)
CO2 SERPL-SCNC: 28 MMOL/L — SIGNIFICANT CHANGE UP (ref 22–31)
CO2 SERPL-SCNC: 29 MMOL/L — SIGNIFICANT CHANGE UP (ref 22–31)
CREAT SERPL-MCNC: 1.47 MG/DL — HIGH (ref 0.5–1.3)
CREAT SERPL-MCNC: 1.68 MG/DL — HIGH (ref 0.5–1.3)
CULTURE RESULTS: SIGNIFICANT CHANGE UP
EOSINOPHIL # BLD AUTO: 0.02 K/UL — SIGNIFICANT CHANGE UP (ref 0–0.5)
EOSINOPHIL NFR BLD AUTO: 0.6 % — SIGNIFICANT CHANGE UP (ref 0–6)
GLUCOSE SERPL-MCNC: 87 MG/DL — SIGNIFICANT CHANGE UP (ref 70–99)
GLUCOSE SERPL-MCNC: 95 MG/DL — SIGNIFICANT CHANGE UP (ref 70–99)
HCT VFR BLD CALC: 19.8 % — CRITICAL LOW (ref 39–50)
HCT VFR BLD CALC: 21.6 % — LOW (ref 39–50)
HCT VFR BLD CALC: 21.7 % — LOW (ref 39–50)
HCT VFR BLD CALC: 25.9 % — LOW (ref 39–50)
HGB BLD-MCNC: 6.8 G/DL — CRITICAL LOW (ref 13–17)
HGB BLD-MCNC: 7.1 G/DL — LOW (ref 13–17)
HGB BLD-MCNC: 7.2 G/DL — LOW (ref 13–17)
HGB BLD-MCNC: 8.4 G/DL — LOW (ref 13–17)
IMM GRANULOCYTES NFR BLD AUTO: 0.8 % — SIGNIFICANT CHANGE UP (ref 0–1.5)
INR BLD: 1.71 RATIO — HIGH (ref 0.88–1.16)
INR BLD: 2.16 RATIO — HIGH (ref 0.88–1.16)
LACTATE SERPL-SCNC: 1.1 MMOL/L — SIGNIFICANT CHANGE UP (ref 0.7–2)
LYMPHOCYTES # BLD AUTO: 0.87 K/UL — LOW (ref 1–3.3)
LYMPHOCYTES # BLD AUTO: 24.4 % — SIGNIFICANT CHANGE UP (ref 13–44)
MAGNESIUM SERPL-MCNC: 1.9 MG/DL — SIGNIFICANT CHANGE UP (ref 1.6–2.6)
MAGNESIUM SERPL-MCNC: 2 MG/DL — SIGNIFICANT CHANGE UP (ref 1.6–2.6)
MCHC RBC-ENTMCNC: 29.6 PG — SIGNIFICANT CHANGE UP (ref 27–34)
MCHC RBC-ENTMCNC: 29.7 PG — SIGNIFICANT CHANGE UP (ref 27–34)
MCHC RBC-ENTMCNC: 29.8 PG — SIGNIFICANT CHANGE UP (ref 27–34)
MCHC RBC-ENTMCNC: 30.5 PG — SIGNIFICANT CHANGE UP (ref 27–34)
MCHC RBC-ENTMCNC: 32.4 GM/DL — SIGNIFICANT CHANGE UP (ref 32–36)
MCHC RBC-ENTMCNC: 32.7 GM/DL — SIGNIFICANT CHANGE UP (ref 32–36)
MCHC RBC-ENTMCNC: 33.3 GM/DL — SIGNIFICANT CHANGE UP (ref 32–36)
MCHC RBC-ENTMCNC: 34.3 GM/DL — SIGNIFICANT CHANGE UP (ref 32–36)
MCV RBC AUTO: 88.8 FL — SIGNIFICANT CHANGE UP (ref 80–100)
MCV RBC AUTO: 89.3 FL — SIGNIFICANT CHANGE UP (ref 80–100)
MCV RBC AUTO: 90.4 FL — SIGNIFICANT CHANGE UP (ref 80–100)
MCV RBC AUTO: 91.5 FL — SIGNIFICANT CHANGE UP (ref 80–100)
MONOCYTES # BLD AUTO: 0.34 K/UL — SIGNIFICANT CHANGE UP (ref 0–0.9)
MONOCYTES NFR BLD AUTO: 9.5 % — SIGNIFICANT CHANGE UP (ref 2–14)
NEUTROPHILS # BLD AUTO: 2.3 K/UL — SIGNIFICANT CHANGE UP (ref 1.8–7.4)
NEUTROPHILS NFR BLD AUTO: 64.4 % — SIGNIFICANT CHANGE UP (ref 43–77)
NRBC # BLD: 0 /100 WBCS — SIGNIFICANT CHANGE UP (ref 0–0)
PHOSPHATE SERPL-MCNC: 3.2 MG/DL — SIGNIFICANT CHANGE UP (ref 2.5–4.5)
PHOSPHATE SERPL-MCNC: 3.4 MG/DL — SIGNIFICANT CHANGE UP (ref 2.5–4.5)
PLATELET # BLD AUTO: 45 K/UL — LOW (ref 150–400)
PLATELET # BLD AUTO: 46 K/UL — LOW (ref 150–400)
PLATELET # BLD AUTO: 46 K/UL — LOW (ref 150–400)
PLATELET # BLD AUTO: 54 K/UL — LOW (ref 150–400)
POTASSIUM SERPL-MCNC: 3.6 MMOL/L — SIGNIFICANT CHANGE UP (ref 3.5–5.3)
POTASSIUM SERPL-MCNC: 3.8 MMOL/L — SIGNIFICANT CHANGE UP (ref 3.5–5.3)
POTASSIUM SERPL-SCNC: 3.6 MMOL/L — SIGNIFICANT CHANGE UP (ref 3.5–5.3)
POTASSIUM SERPL-SCNC: 3.8 MMOL/L — SIGNIFICANT CHANGE UP (ref 3.5–5.3)
PROT SERPL-MCNC: 5.8 GM/DL — LOW (ref 6–8.3)
PROT SERPL-MCNC: 6.8 GM/DL — SIGNIFICANT CHANGE UP (ref 6–8.3)
PROTHROM AB SERPL-ACNC: 19.3 SEC — HIGH (ref 10.6–13.6)
PROTHROM AB SERPL-ACNC: 24.2 SEC — HIGH (ref 10.6–13.6)
RBC # BLD: 2.23 M/UL — LOW (ref 4.2–5.8)
RBC # BLD: 2.4 M/UL — LOW (ref 4.2–5.8)
RBC # BLD: 2.42 M/UL — LOW (ref 4.2–5.8)
RBC # BLD: 2.83 M/UL — LOW (ref 4.2–5.8)
RBC # FLD: 19.3 % — HIGH (ref 10.3–14.5)
RBC # FLD: 19.7 % — HIGH (ref 10.3–14.5)
RBC # FLD: 20.2 % — HIGH (ref 10.3–14.5)
RBC # FLD: 20.2 % — HIGH (ref 10.3–14.5)
SODIUM SERPL-SCNC: 137 MMOL/L — SIGNIFICANT CHANGE UP (ref 135–145)
SODIUM SERPL-SCNC: 139 MMOL/L — SIGNIFICANT CHANGE UP (ref 135–145)
SPECIMEN SOURCE: SIGNIFICANT CHANGE UP
WBC # BLD: 2.49 K/UL — LOW (ref 3.8–10.5)
WBC # BLD: 2.57 K/UL — LOW (ref 3.8–10.5)
WBC # BLD: 3.57 K/UL — LOW (ref 3.8–10.5)
WBC # BLD: 4.71 K/UL — SIGNIFICANT CHANGE UP (ref 3.8–10.5)
WBC # FLD AUTO: 2.49 K/UL — LOW (ref 3.8–10.5)
WBC # FLD AUTO: 2.57 K/UL — LOW (ref 3.8–10.5)
WBC # FLD AUTO: 3.57 K/UL — LOW (ref 3.8–10.5)
WBC # FLD AUTO: 4.71 K/UL — SIGNIFICANT CHANGE UP (ref 3.8–10.5)

## 2020-10-15 PROCEDURE — 99231 SBSQ HOSP IP/OBS SF/LOW 25: CPT

## 2020-10-15 PROCEDURE — 99233 SBSQ HOSP IP/OBS HIGH 50: CPT

## 2020-10-15 RX ORDER — DESMOPRESSIN ACETATE 0.1 MG/1
20 TABLET ORAL ONCE
Refills: 0 | Status: COMPLETED | OUTPATIENT
Start: 2020-10-15 | End: 2020-10-15

## 2020-10-15 RX ORDER — PROTHROMBIN COMPLEX CONCENTRATE (HUMAN) 25.5; 16.5; 24; 22; 22; 26 [IU]/ML; [IU]/ML; [IU]/ML; [IU]/ML; [IU]/ML; [IU]/ML
3500 POWDER, FOR SOLUTION INTRAVENOUS ONCE
Refills: 0 | Status: COMPLETED | OUTPATIENT
Start: 2020-10-15 | End: 2020-10-15

## 2020-10-15 RX ORDER — PROTHROMBIN COMPLEX CONCENTRATE (HUMAN) 25.5; 16.5; 24; 22; 22; 26 [IU]/ML; [IU]/ML; [IU]/ML; [IU]/ML; [IU]/ML; [IU]/ML
1000 POWDER, FOR SOLUTION INTRAVENOUS ONCE
Refills: 0 | Status: DISCONTINUED | OUTPATIENT
Start: 2020-10-15 | End: 2020-10-15

## 2020-10-15 RX ORDER — PHYTONADIONE (VIT K1) 5 MG
10 TABLET ORAL ONCE
Refills: 0 | Status: COMPLETED | OUTPATIENT
Start: 2020-10-15 | End: 2020-10-15

## 2020-10-15 RX ORDER — PANTOPRAZOLE SODIUM 20 MG/1
40 TABLET, DELAYED RELEASE ORAL EVERY 12 HOURS
Refills: 0 | Status: DISCONTINUED | OUTPATIENT
Start: 2020-10-15 | End: 2020-10-23

## 2020-10-15 RX ADMIN — Medication 250 MILLIGRAM(S): at 05:17

## 2020-10-15 RX ADMIN — PROTHROMBIN COMPLEX CONCENTRATE (HUMAN) 400 INTERNATIONAL UNIT(S): 25.5; 16.5; 24; 22; 22; 26 POWDER, FOR SOLUTION INTRAVENOUS at 19:36

## 2020-10-15 RX ADMIN — PANTOPRAZOLE SODIUM 10 MG/HR: 20 TABLET, DELAYED RELEASE ORAL at 09:44

## 2020-10-15 RX ADMIN — DESMOPRESSIN ACETATE 220 MICROGRAM(S): 0.1 TABLET ORAL at 19:36

## 2020-10-15 RX ADMIN — CEFTRIAXONE 100 MILLIGRAM(S): 500 INJECTION, POWDER, FOR SOLUTION INTRAMUSCULAR; INTRAVENOUS at 13:06

## 2020-10-15 RX ADMIN — PANTOPRAZOLE SODIUM 40 MILLIGRAM(S): 20 TABLET, DELAYED RELEASE ORAL at 18:04

## 2020-10-15 RX ADMIN — Medication 102 MILLIGRAM(S): at 11:39

## 2020-10-15 RX ADMIN — CHLORHEXIDINE GLUCONATE 1 APPLICATION(S): 213 SOLUTION TOPICAL at 02:00

## 2020-10-15 RX ADMIN — OCTREOTIDE ACETATE 10 MICROGRAM(S)/HR: 200 INJECTION, SOLUTION INTRAVENOUS; SUBCUTANEOUS at 09:44

## 2020-10-15 RX ADMIN — OCTREOTIDE ACETATE 10 MICROGRAM(S)/HR: 200 INJECTION, SOLUTION INTRAVENOUS; SUBCUTANEOUS at 21:21

## 2020-10-15 NOTE — PHARMACY COMMUNICATION NOTE - COMMENTS
Spoke to MD and confirmed patient has an active bleed from Xarelto. MD is using 25units per kg. Patient weighs 140kg

## 2020-10-15 NOTE — PROGRESS NOTE ADULT - SUBJECTIVE AND OBJECTIVE BOX
Patient is a 60y old  Male who presents with a chief complaint of AMS, sepsis (14 Oct 2020 20:46)      HPI:     60 y/o male morbidly obese male w/ PMHx of Hepatic Cirrhosis, ascites, Thrombocytopenia, CHF, HTN, HLD, recent admx for Hepatic Encephalopathy, noncompliant with lactulose presents to the ED due to AMS. Pt awake, lethargic only oriented to self, unable to provide any history. History obtained from ED/Chart. Per ED, Brother noticed worsening confusion x 1, noted again pt non-complaint with lactulose thus gave him 1 dose prior to his arrival in ED.    On arrival in ED, initial /59, , pt afebrile. CT head neg. Sig labs include WBC 12.37, h/h 7.5/24.3, PLT 89, LA 8, BUN/Cr 48/1.81, ammonia level 41, see below for rest of sig labs. Pt s/p paracentesis in ED, 2L removed cell ct not indicative of SBP. UA neg, CXR pre-valenzuela unremarkable. CT A/P showed portal hypertension with cirrhosis, splenomegaly, upper abdominal varices, moderate abdominopelvic ascites, pleural effusions, and anasarca.     (14 Oct 2020 03:19)      INTERVAL HPI/OVERNIGHT EVENTS:  MS slightly better. Had one very dark BM over night. S/P blood transfusions     MEDICATIONS  (STANDING):  cefTRIAXone   IVPB 1000 milliGRAM(s) IV Intermittent every 24 hours  chlorhexidine 4% Liquid 1 Application(s) Topical <User Schedule>  octreotide  Infusion 50 MICROgram(s)/Hr (10 mL/Hr) IV Continuous <Continuous>  pantoprazole  Injectable 40 milliGRAM(s) IV Push every 12 hours  vancomycin  IVPB 750 milliGRAM(s) IV Intermittent every 12 hours  vancomycin  IVPB        MEDICATIONS  (PRN):      FAMILY HISTORY:  No pertinent family history in first degree relatives        Allergies    No Known Allergies    Intolerances        PMH/PSH:  HLD (hyperlipidemia)    Hepatic cirrhosis    CHF (congestive heart failure)    Obese    Hyperlipidemia    Pneumonia    CHF (congestive heart failure)    History of chest tube placement    No significant past surgical history          REVIEW OF SYSTEMS:  CONSTITUTIONAL: No fever, weight loss, or fatigue  EYES: No eye pain, visual disturbances, or discharge  ENMT:  No difficulty hearing, tinnitus, vertigo; No sinus or throat pain  NECK: No pain or stiffness  BREASTS: No pain, masses, or nipple discharge  RESPIRATORY: No cough, wheezing, chills or hemoptysis; No shortness of breath  CARDIOVASCULAR: No chest pain, palpitations, dizziness, or leg swelling  GASTROINTESTINAL:  See above  GENITOURINARY: No dysuria, frequency, hematuria, or incontinence  NEUROLOGICAL: No headaches,  numbness, or tremors  SKIN: No itching, burning, rashes, or lesions   LYMPH NODES: No enlarged glands  ENDOCRINE: No heat or cold intolerance; No hair loss  MUSCULOSKELETAL: No joint pain or swelling; No muscle, back, or extremity pain  PSYCHIATRIC: No depression, anxiety, mood swings, or difficulty sleeping  HEME/LYMPH: No easy bruising, or bleeding gums  ALLERGY AND IMMUNOLOGIC: No hives or eczema    Vital Signs Last 24 Hrs  T(C): 36.5 (15 Oct 2020 11:30), Max: 37.1 (14 Oct 2020 14:46)  T(F): 97.7 (15 Oct 2020 11:30), Max: 98.8 (14 Oct 2020 16:00)  HR: 77 (15 Oct 2020 11:30) (77 - 101)  BP: 130/66 (15 Oct 2020 11:30) (107/65 - 153/102)  BP(mean): 80 (15 Oct 2020 11:30) (73 - 127)  RR: 12 (15 Oct 2020 11:30) (12 - 24)  SpO2: 93% (15 Oct 2020 11:30) (91% - 100%)    PHYSICAL EXAM:  GENERAL: NAD, well-groomed, well-developed  HEAD:  Atraumatic, Normocephalic  EYES: EOMI, PERRLA, conjunctiva and sclera clear  NECK: Supple, No JVD, Normal thyroid  NERVOUS SYSTEM:  Lethargic   Poor concentration;   CHEST/LUNG: Clear to percussion bilaterally; No rales, rhonchi, wheezing, or rubs  HEART: Regular rate and rhythm; No murmurs, rubs, or gallops  ABDOMEN: Soft, Nontender, Nondistended; Bowel sounds present  EXTREMITIES:  2+ Peripheral Pulses, No clubbing, cyanosis, or edema  LYMPH: No lymphadenopathy noted  SKIN: No rashes or lesions    LAB  10-13 @ 22:57  amylase --   lipase 120                           6.8    3.57  )-----------( 46       ( 15 Oct 2020 05:32 )             19.8       CBC:  10-15 @ 05:32  WBC 3.57   Hgb 6.8   Hct 19.8   Plts 46  MCV 88.8  10-15 @ 00:42  WBC 4.71   Hgb 7.2   Hct 21.6   Plts 54  MCV 89.3  10-14 @ 06:43  WBC 6.74   Hgb 5.6   Hct 17.5   Plts 45  MCV 90.7  10-13 @ 22:57  WBC 12.37   Hgb 7.5   Hct 24.3   Plts 89  MCV 91.0      Chemistry:  10-15 @ 00:42  Na+ 139  K+ 3.8  Cl- 107  CO2 28  BUN 66  Cr 1.68     10-14 @ 06:43  Na+ 140  K+ 4.0  Cl- 107  CO2 24  BUN 56  Cr 1.69     10-13 @ :57  Na+ 138  K+ 4.5  Cl- 104  CO2 21  BUN 48  Cr 1.81         Glucose, Serum: 95 mg/dL (10-15 @ 00:42)  Glucose, Serum: 137 mg/dL (10-14 @ 06:43)  Glucose, Serum: 128 mg/dL (10-13 @ 22:57)      15 Oct 2020 00:42    139    |  107    |  66     ----------------------------<  95     3.8     |  28     |  1.68   14 Oct 2020 06:43    140    |  107    |  56     ----------------------------<  137    4.0     |  24     |  1.69   13 Oct 2020 22:57    138    |  104    |  48     ----------------------------<  128    4.5     |  21     |  1.81     Ca    8.1        15 Oct 2020 00:42  Ca    8.1        14 Oct 2020 06:43  Ca    8.1        13 Oct 2020 22:57  Phos  3.4       15 Oct 2020 00:42  Mg     2.0       15 Oct 2020 00:42  Mg     1.9       14 Oct 2020 06:43    TPro  6.8    /  Alb  2.1    /  TBili  4.3    /  DBili  1.22   /  AST  51     /  ALT  43     /  AlkPhos  91     15 Oct 2020 00:42  TPro  5.9    /  Alb  2.0    /  TBili  4.8    /  DBili  x      /  AST  54     /  ALT  34     /  AlkPhos  90     14 Oct 2020 06:43  TPro  7.2    /  Alb  2.0    /  TBili  6.4    /  DBili  x      /  AST  49     /  ALT  40     /  AlkPhos  122    13 Oct 2020 22:57      PT/INR - ( 13 Oct 2020 22:57 )   PT: 29.3 sec;   INR: 2.65 ratio         PTT - ( 13 Oct 2020 22:57 )  PTT:37.9 sec    Urinalysis Basic - ( 14 Oct 2020 03:21 )    Color: Yellow / Appearance: Clear / S.015 / pH: x  Gluc: x / Ketone: Negative  / Bili: Small / Urobili: Negative mg/dL   Blood: x / Protein: 15 mg/dL / Nitrite: Negative   Leuk Esterase: Trace / RBC: 3-5 /HPF / WBC 3-5   Sq Epi: x / Non Sq Epi: Few / Bacteria: Occasional        CAPILLARY BLOOD GLUCOSE      POCT Blood Glucose.: 130 mg/dL (14 Oct 2020 19:13)          RADIOLOGY & ADDITIONAL TESTS:    Imaging Personally Reviewed:  [ ] YES  [ ] NO    Consultant(s) Notes Reviewed:  [ ] YES  [ ] NO    Care Discussed with Consultants/Other Providers [ ] YES  [ ] NO

## 2020-10-15 NOTE — PROGRESS NOTE ADULT - SUBJECTIVE AND OBJECTIVE BOX
SANTOS BELLE  MRN-[id number]    Follow Up:  ID following for SBP    Interval History: patient reports feeling much better denies abd pain, would love to eat, still pancytopenic and receiving multiple units of blood, plt, FFP and kcentra     ROS:    [ ] Unobtainable because:  [X ] All other systems negative    Constitutional: no fever, no chills  Head: no trauma  Eyes: no vision changes, no eye pain  ENT:  no sore throat, no rhinorrhea  Cardiovascular:  no chest pain, no palpitation  Respiratory:  no SOB, no cough  GI:  no abd pain, no vomiting, no diarrhea  urinary: no dysuria, no hematuria, no flank pain  musculoskeletal:  no joint pain, no joint swelling  skin:  no rash  neurology:  no headache, no seizure, no change in mental status  psych: no anxiety, no depression         Allergies  No Known Allergies        ANTIMICROBIALS:  cefTRIAXone   IVPB 1000 every 24 hours      OTHER MEDS:  chlorhexidine 4% Liquid 1 Application(s) Topical <User Schedule>  octreotide  Infusion 50 MICROgram(s)/Hr IV Continuous <Continuous>  pantoprazole  Injectable 40 milliGRAM(s) IV Push every 12 hours      Vital Signs Last 24 Hrs  T(C): 36.4 (15 Oct 2020 21:40), Max: 36.8 (15 Oct 2020 03:00)  T(F): 97.6 (15 Oct 2020 21:40), Max: 98.2 (15 Oct 2020 03:00)  HR: 82 (15 Oct 2020 22:00) (72 - 101)  BP: 119/59 (15 Oct 2020 22:00) (111/67 - 153/102)  BP(mean): 74 (15 Oct 2020 22:00) (73 - 119)  RR: 14 (15 Oct 2020 22:00) (12 - 23)  SpO2: 97% (15 Oct 2020 22:00) (85% - 100%)    Physical Exam:  General:    NAD,  non toxic, obese  Head: atraumatic, normocephalic  Eye: mildly icteric sclera  ENT:    no oral lesions, neck supple  Cardio:     regular S1, S2,  no murmur  Respiratory:    clear b/l,    no wheezing  abd:     soft,   BS +,   no tenderness, site of paracentesis draining yellow fluid into collection bag, positive fluid wave, obese  :   no CVAT,  no suprapubic tenderness,   Musculoskeletal:   no joint swelling,   +edema  vascular: no central lines, +PIV   Skin:    no rash  Neurologic:     no focal deficit, a lot more coherent today and alert  psych: normal affect    WBC Count: 2.49 K/uL (10-15 @ 20:52)  WBC Count: 2.57 K/uL (10-15 @ 17:45)  WBC Count: 3.57 K/uL (10-15 @ 05:32)  WBC Count: 4.71 K/uL (10-15 @ 00:42)  WBC Count: 6.74 K/uL (10-14 @ 06:43)  WBC Count: 12.37 K/uL (10-13 @ 22:57)    Hemoglobin: 8.4 g/dL (10-15 @ :52)  Hemoglobin: 7.1 g/dL (10-15 @ 17:45)  Hemoglobin: 6.8 g/dL (10-15 @ 05:32)  Hemoglobin: 7.2 g/dL (10-15 @ 00:42)  Hemoglobin: 5.6 g/dL (10-14 @ 06:43)  Hemoglobin: 7.5 g/dL (10-13 @ 22:57)    Plt Trend: 45<--, 46<--, 46<--, 54<--, 45<--                          8.4    2.49  )-----------( 45       ( 15 Oct 2020 20:52 )             25.9       10-15    137  |  105  |  57<H>  ----------------------------<  87  3.6   |  29  |  1.47<H>    Ca    7.7<L>      15 Oct 2020 17:45  Phos  3.2     10-15  Mg     1.9     10-15    TPro  5.8<L>  /  Alb  1.9<L>  /  TBili  4.1<H>  /  DBili  x   /  AST  37  /  ALT  33  /  AlkPhos  80  10-15      Urinalysis Basic - ( 14 Oct 2020 03:21 )    Color: Yellow / Appearance: Clear / S.015 / pH: x  Gluc: x / Ketone: Negative  / Bili: Small / Urobili: Negative mg/dL   Blood: x / Protein: 15 mg/dL / Nitrite: Negative   Leuk Esterase: Trace / RBC: 3-5 /HPF / WBC 3-5   Sq Epi: x / Non Sq Epi: Few / Bacteria: Occasional        Creatinine Trend: 1.47<--, 1.68<--, 1.69<--, 1.81<--  Lactate, Blood: 1.1 mmol/L (10-15-20 @ 05:32)  Lactate, Blood: 4.8 mmol/L (10-14-20 @ 06:36)  Lactate, Blood: 6.9 mmol/L (10-14-20 @ 01:21)  Lactate, Blood: 8.0 mmol/L (10-13-20 @ 22:59)      MICROBIOLOGY:  v  .Urine Clean Catch (Midstream)  10-14-20   <10,000 CFU/mL Normal Urogenital Blanka  --  --      .Blood Blood-Peripheral  10-14-20   No growth to date.  --  --      .Body Fluid Peritoneal Fluid  10-14-20   Growth in fluid media only Gram Negative Rods  --    polymorphonuclear leukocytes seen  No organisms seen  by cytocentrifuge          Rapid RVP Result: NotDetec (10-14 @ 01:49)                    RADIOLOGY:

## 2020-10-15 NOTE — PROGRESS NOTE ADULT - ASSESSMENT
HPI:     60 y/o male morbidly obese male w/ PMHx of Hepatic Cirrhosis, ascites, Thrombocytopenia, CHF, HTN, HLD, recent admx for Hepatic Encephalopathy, noncompliant with lactulose presents to the ED due to AMS. Pt awake, lethargic only oriented to self, unable to provide any history. History obtained from ED/Chart. Per ED, Brother noticed worsening confusion x 1, noted again pt non-complaint with lactulose thus gave him 1 dose prior to his arrival in ED.    On arrival in ED, initial /59, , pt afebrile. CT head neg. Sig labs include WBC 12.37, h/h 7.5/24.3, PLT 89, LA 8, BUN/Cr 48/1.81, ammonia level 41, see below for rest of sig labs. Pt s/p paracentesis in ED, 2L removed cell ct not indicative of SBP. UA neg, CXR pre-valenzuela unremarkable. CT A/P showed portal hypertension with cirrhosis, splenomegaly, upper abdominal varices, moderate abdominopelvic ascites, pleural effusions, and anasarca.     (14 Oct 2020 03:19)  ----------------- As Above -----------------Patient seen earlier today.   The patient presents with a change in mental status. Patient has a history of Paulino and alcoholic cirrhosis. Patient is non complaint with Lactulose. Patient has had admissions previously for hepatic encephalopathy from non compliance with Lactulose. Patiet was found to have HGB of 7.5 with platelets of 89. INR > 2  The patient presents with severe lethargy. Ammonia only 41 but has elevated lactic acid levels. SBP in ER ruled out SBP  IN ER , patient was passing light brown liquid stool ( visualized )  ---- Patient later developed melena ( WNI ) HGB fell to 5.6, BUN increased to 56 and platelets fell to 45. Patient now in the ICU    Change in MS - Ammonia 41 and was given Lactulose . Patient probably has underlying sepsis Appreciate ID consult.   Melena - had one very dark BM over night. elevated INR, low platelets - Patient is not a candidate for immediate  EGD ( high INR, low platelets ) 1) Octreotide 2) Platelets 3) Keep HGB > 8  4) ICU setting 5) Ceftriaxone 6) NPO  === Discussed with staff

## 2020-10-15 NOTE — PROGRESS NOTE ADULT - SUBJECTIVE AND OBJECTIVE BOX
HPI:  Pt is a 58 yo morbidly obese WM with h/o SARKIS CHF, HTN, HLD, cirrhosis (BOB/NOELLE) with ascites and thrombocytopenia with recent admission 2 to hepatic encephalopathy (pt noncompliant with lactulose) presents to the ER due to AMS. In the ER pt was initially tachycardic with stable BP but confused with the following abnormal labs Hb 7.5 Platelets 89 INR 2.7 BUN/Cr 48/1.8 and lactic acid 8.0. s/p paracentesis 2 L. Pt initially admitted to medicine with working dx of 1) sepsis 2) metabolic encephalopathy but later on pt  with melena and a repeat Hb of 5.6. ICU admitting dx: 1) Acute blood loss anemia 2 to UGIB 2) Decompensated cirrhosis with ascites, coagulopathy, thrombocytopenia and hepatic encephalopathy 3) r/o sepsis 4) Lactic acidosis in part 2 to decreased liver clearance and ? sepsis 5) MONI      ## Labs:  CBC:                        6.8    3.57  )-----------( 46       ( 15 Oct 2020 05:32 )             19.8     Chem:  10-15    139  |  107  |  66<H>  ----------------------------<  95  3.8   |  28  |  1.68<H>    Ca    8.1<L>      15 Oct 2020 00:42  Phos  3.4     10-15  Mg     2.0     10-15    TPro  6.8  /  Alb  2.1<L>  /  TBili  4.3<H>  /  DBili  1.22<H>  /  AST  51<H>  /  ALT  43  /  AlkPhos  91  10-15    Coags:  PT/INR - ( 13 Oct 2020 22:57 )   PT: 29.3 sec;   INR: 2.65 ratio         PTT - ( 13 Oct 2020 22:57 )  PTT:37.9 sec  culture blood:  --  10-14 @ 10:16            culture sputum:     <10,000 CFU/mL Normal Urogenital Blanka           culture urine:  -- 10-14 @ 10:16  culture blood:  --  10-14 @ 09:40            culture sputum:     No growth to date.           culture urine:  -- 10-14 @ 09:40  culture blood:  --  10-14 @ 05:40            culture sputum:     Growth in fluid media only Gram Negative Rods           culture urine:  -- 10-14 @ 05:40        ## Imaging:    ## Medications:  cefTRIAXone   IVPB 1000 milliGRAM(s) IV Intermittent every 24 hours        octreotide  Infusion 50 MICROgram(s)/Hr IV Continuous <Continuous>      pantoprazole  Injectable 40 milliGRAM(s) IV Push every 12 hours        ## Vitals:  T(C): 36.5 (10-15-20 @ 11:30), Max: 37.1 (10-14-20 @ 16:00)  HR: 78 (10-15-20 @ 12:00) (77 - 101)  BP: 123/71 (10-15-20 @ 12:00) (107/65 - 153/102)  BP(mean): 86 (10-15-20 @ 12:00) (73 - 127)  RR: 13 (10-15-20 @ 12:00) (12 - 24)  SpO2: 97% (10-15-20 @ 12:00) (91% - 100%)  Wt(kg): --  Vent:   ABG:       10-14 @ 07:01  -  10-15 @ 07:00  --------------------------------------------------------  IN: 2532 mL / OUT: 601 mL / NET: 1931 mL    10-15 @ 07:01  -  10-15 @ 14:53  --------------------------------------------------------  IN: 40 mL / OUT: 200 mL / NET: -160 mL          ## P/E:  Gen: lying comfortably in bed in no apparent distress  Eyes: Sclera icteric  Lungs: CTA  Heart: RRR  Abd: Soft/+BS  Ext: LE edema  Neuro: Awake/alert    CENTRAL LINE: [ ] YES [ ] NO  LOCATION:   DATE INSERTED:  REMOVE: [ ] YES [ ] NO      MCKEE: [ ] YES [ ] NO    DATE INSERTED:  REMOVE:  [ ] YES [ ] NO      A-LINE:  [ ] YES [ ] NO  LOCATION:   DATE INSERTED:  REMOVE:  [ ] YES [ ] NO  EXPLAIN:    CODE STATUS: [ x] full code  [ ] DNR  [ ] DNI  [ ] MOLST  Goals of care discussion: [ ] yes

## 2020-10-15 NOTE — PROGRESS NOTE ADULT - SUBJECTIVE AND OBJECTIVE BOX
Patient is a 60y old  Male who presents with a chief complaint of AMS, sepsis (15 Oct 2020 22:24)      INTERVAL HPI/OVERNIGHT EVENTS:    MEDICATIONS  (STANDING):  cefTRIAXone   IVPB 1000 milliGRAM(s) IV Intermittent every 24 hours  chlorhexidine 4% Liquid 1 Application(s) Topical <User Schedule>  octreotide  Infusion 50 MICROgram(s)/Hr (10 mL/Hr) IV Continuous <Continuous>  pantoprazole  Injectable 40 milliGRAM(s) IV Push every 12 hours    MEDICATIONS  (PRN):      Allergies    No Known Allergies    Intolerances        Vital Signs Last 24 Hrs  T(C): 36.4 (15 Oct 2020 21:40), Max: 36.8 (15 Oct 2020 03:00)  T(F): 97.6 (15 Oct 2020 21:40), Max: 98.2 (15 Oct 2020 03:00)  HR: 82 (15 Oct 2020 22:00) (72 - 101)  BP: 119/59 (15 Oct 2020 22:00) (111/67 - 153/102)  BP(mean): 74 (15 Oct 2020 22:00) (73 - 119)  RR: 14 (15 Oct 2020 22:00) (12 - 23)  SpO2: 97% (15 Oct 2020 22:00) (85% - 100%)    PHYSICAL EXAM:  GENERAL: NAD, well-groomed, well-developed  HEAD:  Atraumatic, Normocephalic  EYES: EOMI, PERRLA, conjunctiva and sclera clear  ENMT: No tonsillar erythema, exudates, or enlargement; Moist mucous membranes, Good dentition, No lesions  NECK: Supple, No JVD, Normal thyroid  NERVOUS SYSTEM:  Alert & Oriented X3, Good concentration; Motor Strength 5/5 B/L upper and lower extremities; DTRs 2+ intact and symmetric  CHEST/LUNG: Clear to auscultation bilaterally; No rales, rhonchi, wheezing, or rubs  HEART: Regular rate and rhythm; No murmurs, rubs, or gallops  ABDOMEN: Soft, Nontender, Nondistended; Bowel sounds present  EXTREMITIES:  2+ Peripheral Pulses, No clubbing, cyanosis, or edema  LYMPH: No lymphadenopathy noted  SKIN: No rashes or lesions    LABS:                        8.4    2.49  )-----------( 45       ( 15 Oct 2020 20:52 )             25.9     10-15    137  |  105  |  57<H>  ----------------------------<  87  3.6   |  29  |  1.47<H>    Ca    7.7<L>      15 Oct 2020 17:45  Phos  3.2     10-15  Mg     1.9     10-15    TPro  5.8<L>  /  Alb  1.9<L>  /  TBili  4.1<H>  /  DBili  x   /  AST  37  /  ALT  33  /  AlkPhos  80  10-15    PT/INR - ( 15 Oct 2020 21:58 )   PT: 19.3 sec;   INR: 1.71 ratio         PTT - ( 15 Oct 2020 21:58 )  PTT:35.9 sec  Urinalysis Basic - ( 14 Oct 2020 03:21 )    Color: Yellow / Appearance: Clear / S.015 / pH: x  Gluc: x / Ketone: Negative  / Bili: Small / Urobili: Negative mg/dL   Blood: x / Protein: 15 mg/dL / Nitrite: Negative   Leuk Esterase: Trace / RBC: 3-5 /HPF / WBC 3-5   Sq Epi: x / Non Sq Epi: Few / Bacteria: Occasional      CAPILLARY BLOOD GLUCOSE          Culture - Urine (collected 14 Oct 2020 10:16)  Source: .Urine Clean Catch (Midstream)  Final Report (15 Oct 2020 10:59):    <10,000 CFU/mL Normal Urogenital Blanka    Culture - Blood (collected 14 Oct 2020 09:40)  Source: .Blood Blood-Peripheral  Preliminary Report (15 Oct 2020 10:01):    No growth to date.    Culture - Blood (collected 14 Oct 2020 09:40)  Source: .Blood Blood-Peripheral  Preliminary Report (15 Oct 2020 10:01):    No growth to date.    Culture - Body Fluid with Gram Stain (collected 14 Oct 2020 05:40)  Source: .Body Fluid Peritoneal Fluid  Gram Stain (prelim) (15 Oct 2020 13:13):    polymorphonuclear leukocytes seen    No organisms seen    by cytocentrifuge  Preliminary Report (15 Oct 2020 13:13):    Growth in fluid media only Gram Negative Rods      RADIOLOGY & ADDITIONAL TESTS:    10-14-20 @ 07:01  -  10-15-20 @ 07:00  --------------------------------------------------------  IN:    IV PiggyBack: 500 mL    Octreotide: 160 mL    Pantoprazole: 160 mL    PRBCs (Packed Red Blood Cells): 1712 mL  Total IN: 2532 mL    OUT:    Voided (mL): 601 mL  Total OUT: 601 mL    Total NET: 1931 mL      10-15-20 @ 07:01  -  10-15-20 @ 22:50  --------------------------------------------------------  IN:    IV PiggyBack: 240 mL    Octreotide: 150 mL    Pantoprazole: 30 mL    Plasma: 400 mL    Platelets - Single Donor: 533 mL    PRBCs (Packed Red Blood Cells): 350 mL  Total IN: 1703 mL    OUT:    Voided (mL): 1150 mL  Total OUT: 1150 mL    Total NET: 553 mL       58 y/o male morbidly obese male w/ PMHx of Hepatic Cirrhosis w/ ascites & Thrombocytopenia, morbid Obesity w/ BMI of 47, chronic diastolic CHF, HTN, HLD, recent admx for Hepatic Encephalopathy, noncompliant with lactulose presented w/ Metabolic Encephalopathy and acute blood loss anemia due to GI bleed. He is lying in bed in NAD.    MEDICATIONS  (STANDING):  cefTRIAXone   IVPB 1000 milliGRAM(s) IV Intermittent every 24 hours  chlorhexidine 4% Liquid 1 Application(s) Topical <User Schedule>  octreotide  Infusion 50 MICROgram(s)/Hr (10 mL/Hr) IV Continuous <Continuous>  pantoprazole  Injectable 40 milliGRAM(s) IV Push every 12 hours    MEDICATIONS  (PRN):      Allergies    No Known Allergies    Intolerances        Vital Signs Last 24 Hrs  T(C): 36.4 (15 Oct 2020 21:40), Max: 36.8 (15 Oct 2020 03:00)  T(F): 97.6 (15 Oct 2020 21:40), Max: 98.2 (15 Oct 2020 03:00)  HR: 82 (15 Oct 2020 22:00) (72 - 101)  BP: 119/59 (15 Oct 2020 22:00) (111/67 - 153/102)  BP(mean): 74 (15 Oct 2020 22:00) (73 - 119)  RR: 14 (15 Oct 2020 22:00) (12 - 23)  SpO2: 97% (15 Oct 2020 22:00) (85% - 100%)    PHYSICAL EXAM:  GENERAL: NAD, well-groomed, well-developed  HEAD:  Atraumatic, Normocephalic  EYES: EOMI, PERRLA   NECK: Supple   NERVOUS SYSTEM:  Alert    CHEST/LUNG: Clear to auscultation bilaterally; No rales, rhonchi, wheezing, or rubs  HEART: Regular rate and rhythm; No murmurs, rubs, or gallops  ABDOMEN: Soft, Nontender, ascitic   EXTREMITIES:  mild b/l edema       LABS:                        8.4    2.49  )-----------( 45       ( 15 Oct 2020 20:52 )             25.9     10-15    137  |  105  |  57<H>  ----------------------------<  87  3.6   |  29  |  1.47<H>    Ca    7.7<L>      15 Oct 2020 17:45  Phos  3.2     10-15  Mg     1.9     10-15    TPro  5.8<L>  /  Alb  1.9<L>  /  TBili  4.1<H>  /  DBili  x   /  AST  37  /  ALT  33  /  AlkPhos  80  10-15    PT/INR - ( 15 Oct 2020 21:58 )   PT: 19.3 sec;   INR: 1.71 ratio         PTT - ( 15 Oct 2020 21:58 )  PTT:35.9 sec  Urinalysis Basic - ( 14 Oct 2020 03:21 )    Color: Yellow / Appearance: Clear / S.015 / pH: x  Gluc: x / Ketone: Negative  / Bili: Small / Urobili: Negative mg/dL   Blood: x / Protein: 15 mg/dL / Nitrite: Negative   Leuk Esterase: Trace / RBC: 3-5 /HPF / WBC 3-5   Sq Epi: x / Non Sq Epi: Few / Bacteria: Occasional      CAPILLARY BLOOD GLUCOSE          Culture - Urine (collected 14 Oct 2020 10:16)  Source: .Urine Clean Catch (Midstream)  Final Report (15 Oct 2020 10:59):    <10,000 CFU/mL Normal Urogenital Blanka    Culture - Blood (collected 14 Oct 2020 09:40)  Source: .Blood Blood-Peripheral  Preliminary Report (15 Oct 2020 10:01):    No growth to date.    Culture - Blood (collected 14 Oct 2020 09:40)  Source: .Blood Blood-Peripheral  Preliminary Report (15 Oct 2020 10:01):    No growth to date.    Culture - Body Fluid with Gram Stain (collected 14 Oct 2020 05:40)  Source: .Body Fluid Peritoneal Fluid  Gram Stain (prelim) (15 Oct 2020 13:13):    polymorphonuclear leukocytes seen    No organisms seen    by cytocentrifuge  Preliminary Report (15 Oct 2020 13:13):    Growth in fluid media only Gram Negative Rods      RADIOLOGY & ADDITIONAL TESTS:    10-14-20 @ 07:01  -  10-15-20 @ 07:00  --------------------------------------------------------  IN:    IV PiggyBack: 500 mL    Octreotide: 160 mL    Pantoprazole: 160 mL    PRBCs (Packed Red Blood Cells): 1712 mL  Total IN: 2532 mL    OUT:    Voided (mL): 601 mL  Total OUT: 601 mL    Total NET: 1931 mL      10-15-20 @ 07:01  -  10-15-20 @ 22:50  --------------------------------------------------------  IN:    IV PiggyBack: 240 mL    Octreotide: 150 mL    Pantoprazole: 30 mL    Plasma: 400 mL    Platelets - Single Donor: 533 mL    PRBCs (Packed Red Blood Cells): 350 mL  Total IN: 1703 mL    OUT:    Voided (mL): 1150 mL  Total OUT: 1150 mL    Total NET: 553 mL

## 2020-10-15 NOTE — PROGRESS NOTE ADULT - ASSESSMENT
60 y/o male morbidly obese male w/ PMHx of Hepatic Cirrhosis w/ ascites & Thrombocytopenia, morbid Obesity w/ BMI of 47, chronic diastolic CHF, HTN, HLD, recent admx for Hepatic Encephalopathy, noncompliant with lactulose presented w/ Metabolic Encephalopathy and suspected SBP w/ acute blood loss anemia due to GI bleed.                        Sepsis, due to unspecified organism, unspecified whether acute organ dysfunction present.  Plan: - start Zosyn and Vanco  - f/u ID consult  - f/u blood cultures  - pt s/p 1.5L bolus in ED, rpt LA trending down. Hold further IVF, pt w/ diffuse anisarca, third spacing will give Albumin instead.      Problem/Plan - 2:  ·  Problem: Alcoholic cirrhosis of liver with ascites.  Plan: - s/p paracentesis in ED, 2L removed  - cell ct not reflective of SBP  - c/w lactulose  - f/u renal fxn in am if stable restart diuretics; pt given 1.5L bolus in ED, has a hx of CHF, ascites and b/l pleural effusions.     Problem/Plan - 3:  ·  Problem: Renal insufficiency.  Plan: - worsening since last admx, no trend consistent w/ hx of CKD  - cont to monitor  - pt third spacing, will give albumin, pt already given 1.5L IVF  - monitor I/O  - avoid nephrotoxins  - f/u renal function in am if stable/improving, restart diuretics.     Problem/Plan - 4:  ·  Problem: Hyperlipidemia, unspecified hyperlipidemia type.  Plan: - c/w statin.      Problem/Plan - 5:  ·  Problem: Thrombocytopenia.  Plan: - also anemic, due to liver dz, both chronic and stable  - cont to monitor.      Problem/Plan - 6:  Problem: DVT prophylaxis. Plan: - pt w/ thromocytopenia, and coagulapathy due to liver dz, SCDs.   58 y/o male morbidly obese male w/ PMHx of Hepatic Cirrhosis w/ ascites & Thrombocytopenia, morbid Obesity w/ BMI of 47, chronic diastolic CHF, HTN, HLD, recent admx for Hepatic Encephalopathy, noncompliant with lactulose presented w/ Metabolic Encephalopathy and suspected SBP w/ acute blood loss anemia due to GI bleed.          Acute blood loss anemia due to GI bleed  - patient is  s/p 5 units PRBC  - patient is on Octreotide and Protonix drips  - GI planning on EGD  - c/w Ceftriaxone prophylaxis     Alcoholic cirrhosis of liver with ascites.  Plan: - s/p paracentesis in ED, 2L removed  - cell ct not reflective of SBP  - c/w lactulose  - f/u renal fxn in am if stable restart diuretics; pt given 1.5L bolus in ED, has a hx of CHF, ascites and b/l pleural effusions.     Renal insufficiency.  Plan: - worsening since last admx, no trend consistent w/ hx of CKD  - cont to monitor  - pt third spacing, will give albumin, pt already given 1.5L IVF  - monitor I/O  - avoid nephrotoxins  - f/u renal function in am if stable/improving, restart diuretics.     Problem/Plan - 4:  ·  Problem: Hyperlipidemia, unspecified hyperlipidemia type.  Plan: - c/w statin.      Problem/Plan - 5:  ·  Problem: Thrombocytopenia.  Plan: - also anemic, due to liver dz, both chronic and stable  - cont to monitor.     DVT prophylaxis. Plan: - pt w/ thromocytopenia, and coagulapathy due to liver dz, SCDs.   60 y/o male morbidly obese male w/ PMHx of Hepatic Cirrhosis w/ ascites & Thrombocytopenia, morbid Obesity w/ BMI of 47, chronic diastolic CHF, HTN, HLD, recent admx for Hepatic Encephalopathy, noncompliant with lactulose presented w/ Metabolic Encephalopathy and acute blood loss anemia due to GI bleed.     Acute blood loss anemia due to GI bleed  - patient is s/p 5 units PRBC  - patient is on Octreotide and Protonix drips  - GI planning on EGD once INR & plts improve  - c/w Ceftriaxone prophylaxis     Alcoholic cirrhosis w/ ascites, coagulopathy, pancytopenia and hepatic encephalopathy   - abd CT showed portal hypertension with cirrhosis, splenomegaly, upper abdominal varices, moderate abdominopelvic ascites, pleural effusions, and anasarca  - s/p 2L paracentesis in ED   - c/w lactulose    MONI  - likely prerenal  - improving post transfusion    Superficial thrombophlebitis of the left basilic and cephalic veins on US  - local management  - cannot give NSAIDS given MONI and GI bleed    DVT prophylaxis  SVT: SCD  GI: Protonix   60 y/o male morbidly obese male w/ PMHx of Hepatic Cirrhosis w/ ascites & Thrombocytopenia, morbid Obesity w/ BMI of 47, chronic diastolic CHF, HTN, HLD, recent admx for Hepatic Encephalopathy, noncompliant with lactulose presented w/ Metabolic Encephalopathy and acute blood loss anemia due to GI bleed.     Acute blood loss anemia due to GI bleed  - patient is s/p 5 units PRBC  - patient is on Octreotide and Protonix drips  - GI planning on EGD once INR & plts improve  - c/w Ceftriaxone prophylaxis     Alcoholic cirrhosis w/ ascites, coagulopathy, pancytopenia and hepatic encephalopathy   - abd CT showed portal hypertension with cirrhosis, splenomegaly, upper abdominal varices, moderate abdominopelvic ascites, pleural effusions, and anasarca  - s/p 2L paracentesis in ED   - c/w lactulose    MONI  - likely prerenal  - improving post transfusion     DVT prophylaxis  SVT: SCD  GI: Protonix   58 y/o male morbidly obese male w/ PMHx of Hepatic Cirrhosis w/ ascites & Thrombocytopenia, morbid Obesity w/ BMI of 47, chronic diastolic CHF, HTN, HLD, recent admx for Hepatic Encephalopathy, noncompliant with lactulose presented w/ Metabolic Encephalopathy and acute blood loss anemia due to GI bleed.     Acute blood loss anemia due to GI bleed  - patient is s/p 5 units PRBC  - patient is on Octreotide and Protonix drips  - GI planning on EGD once INR & plts improve  - c/w Ceftriaxone prophylaxis    Sepsis POA due to Suspected SBP  - c/w Ceftriaxone  - ID following     Alcoholic cirrhosis w/ ascites, coagulopathy, pancytopenia and hepatic encephalopathy   - abd CT showed portal hypertension with cirrhosis, splenomegaly, upper abdominal varices, moderate abdominopelvic ascites, pleural effusions, and anasarca  - s/p 2L paracentesis in ED   - c/w lactulose    MONI  - likely prerenal  - improving post transfusion     DVT prophylaxis  SVT: SCD  GI: Protonix

## 2020-10-15 NOTE — PROGRESS NOTE ADULT - ASSESSMENT
60 M with HLD, CHF (EF 65% Oct 2019), HLD, cirrhosis (2/2 alcohol abuse), and recent admission (Oct 2019) for R pleural effusion s/p chest tube, fluid was exudative but negative cytology and cultures, presented 11/8/2019 with fever and chills, SOB and worked up for TB which was negative., at that time got 7 days of vanco and zosyn. Now her returns not having taken his lactulose and is encephalopathic.   Afebrile but tachycardic   WBC 12, PLT 45, Hb 7.5->5.6, Crea 1.8->1.6, INR 2.65  SAAG 1.8 (>1.1 consistent with portal hypertension)   MELD on admission was 29  Lactate 8.0->6->4  CT (personally reviewed) moderate bilateral pleural effusions, splenomegaly, shriveled liver, mod-large ascites  Blood culture drawn on admission and given vanco, CTX and flagyl   Fluid appears transudative though no serum LDH on this admission   Fluid with less then 250 PMNs goes against SBP and gram stain on fluid is negative   Pt had melena and hb dropped to 5.6 raising concern for variceal bleed    10/15: Still anemic, received PLT, pRBC and FFP today, unable to get EGD due to stability, paracentesis fluid now positive with GNR in liquid broth     Overall, 60M hepatic encephalopathy, acute blood lose anemia (?variceal bleed), high lactate, SBP   -agree with ceftriaxone 1g daily   -f/u cultures ID and sensitivities    -low threshold for broadening antibiotics to Zosyn   -GI consult appreciated, prompt EGD when stable   -transfusions per ICU team   -octreotide, PPI   -if no improvement, consider IR consult for TIPS vs embolization   -May need transfer to [facility] for possible transplant evaluation   -trend MELD   -lactulose enemas for hepatic encephalopathy     Discussed with ICU team     Michael Ragsdale DO  Cell: [phone]  Pager [phone]  Infectious Disease Attending  After 5pm/weekends please call [phone] for all inquiries and new consults

## 2020-10-15 NOTE — PROGRESS NOTE ADULT - ASSESSMENT
Pt is a 60 yo morbidly obese WM with h/o SARKIS CHF, HTN, HLD, cirrhosis (BOB/NOELLE) with ascites and thrombocytopenia with recent admission 2 to hepatic encephalopathy (pt noncompliant with lactulose) presents to the ER due to AMS. In the ER pt was initially tachycardic with stable BP but confused with the following abnormal labs Hb 7.5 Platelets 89 INR 2.7 BUN/Cr 48/1.8 and lactic acid 8.0. s/p paracentesis 2 L. Pt initially admitted to medicine with working dx of 1) sepsis 2) metabolic encephalopathy but later on pt  with melena and a repeat Hb of 5.6. ICU admitting dx: 1) Acute blood loss anemia 2 to UGIB 2) Decompensated cirrhosis with ascites, coagulopathy, thrombocytopenia and hepatic encephalopathy 3) r/o sepsis 4) Lactic acidosis in part 2 to decreased liver clearance and ? sepsis 5) MONI    Resp: Elevated HOB/ Supplemental O2 prn  ID: Cont Ceftriaxone prophylaxis/ F/u as per ID  CVS: Lactate has normalized  Heme: s/p 5 units PRBC with minimal increase in Hb/ Cont to trend Hb and correct coagulopathy for potential EGD  FEN: NPO  GI: Cont PPI  and Octreotide drip/ Possible EGD if coagulopathy corrected  Renal: Follow BUN/Cr and UO  Neuro: MS appears to be at baseline

## 2020-10-16 LAB
ALBUMIN SERPL ELPH-MCNC: 2 G/DL — LOW (ref 3.3–5)
ALP SERPL-CCNC: 82 U/L — SIGNIFICANT CHANGE UP (ref 40–120)
ALT FLD-CCNC: 32 U/L — SIGNIFICANT CHANGE UP (ref 12–78)
AMMONIA BLD-MCNC: 59 UMOL/L — HIGH (ref 11–32)
ANION GAP SERPL CALC-SCNC: 4 MMOL/L — LOW (ref 5–17)
AST SERPL-CCNC: 33 U/L — SIGNIFICANT CHANGE UP (ref 15–37)
BILIRUB SERPL-MCNC: 3.8 MG/DL — HIGH (ref 0.2–1.2)
BUN SERPL-MCNC: 52 MG/DL — HIGH (ref 7–23)
CALCIUM SERPL-MCNC: 7.9 MG/DL — LOW (ref 8.5–10.1)
CHLORIDE SERPL-SCNC: 106 MMOL/L — SIGNIFICANT CHANGE UP (ref 96–108)
CO2 SERPL-SCNC: 28 MMOL/L — SIGNIFICANT CHANGE UP (ref 22–31)
CREAT SERPL-MCNC: 1.34 MG/DL — HIGH (ref 0.5–1.3)
GLUCOSE SERPL-MCNC: 78 MG/DL — SIGNIFICANT CHANGE UP (ref 70–99)
HCT VFR BLD CALC: 21.3 % — LOW (ref 39–50)
HCT VFR BLD CALC: 23.7 % — LOW (ref 39–50)
HGB BLD-MCNC: 6.9 G/DL — CRITICAL LOW (ref 13–17)
HGB BLD-MCNC: 7.8 G/DL — LOW (ref 13–17)
INR BLD: 1.85 RATIO — HIGH (ref 0.88–1.16)
MAGNESIUM SERPL-MCNC: 2.3 MG/DL — SIGNIFICANT CHANGE UP (ref 1.6–2.6)
MCHC RBC-ENTMCNC: 29.5 PG — SIGNIFICANT CHANGE UP (ref 27–34)
MCHC RBC-ENTMCNC: 29.9 PG — SIGNIFICANT CHANGE UP (ref 27–34)
MCHC RBC-ENTMCNC: 32.4 GM/DL — SIGNIFICANT CHANGE UP (ref 32–36)
MCHC RBC-ENTMCNC: 32.9 GM/DL — SIGNIFICANT CHANGE UP (ref 32–36)
MCV RBC AUTO: 90.8 FL — SIGNIFICANT CHANGE UP (ref 80–100)
MCV RBC AUTO: 91 FL — SIGNIFICANT CHANGE UP (ref 80–100)
NRBC # BLD: 0 /100 WBCS — SIGNIFICANT CHANGE UP (ref 0–0)
NRBC # BLD: 0 /100 WBCS — SIGNIFICANT CHANGE UP (ref 0–0)
OB PNL STL: POSITIVE
PHOSPHATE SERPL-MCNC: 2.8 MG/DL — SIGNIFICANT CHANGE UP (ref 2.5–4.5)
PLATELET # BLD AUTO: 44 K/UL — LOW (ref 150–400)
PLATELET # BLD AUTO: 48 K/UL — LOW (ref 150–400)
POTASSIUM SERPL-MCNC: 3.5 MMOL/L — SIGNIFICANT CHANGE UP (ref 3.5–5.3)
POTASSIUM SERPL-SCNC: 3.5 MMOL/L — SIGNIFICANT CHANGE UP (ref 3.5–5.3)
PROT SERPL-MCNC: 5.9 GM/DL — LOW (ref 6–8.3)
PROTHROM AB SERPL-ACNC: 20.8 SEC — HIGH (ref 10.6–13.6)
RBC # BLD: 2.34 M/UL — LOW (ref 4.2–5.8)
RBC # BLD: 2.61 M/UL — LOW (ref 4.2–5.8)
RBC # FLD: 19 % — HIGH (ref 10.3–14.5)
RBC # FLD: 19.6 % — HIGH (ref 10.3–14.5)
SODIUM SERPL-SCNC: 138 MMOL/L — SIGNIFICANT CHANGE UP (ref 135–145)
WBC # BLD: 2.13 K/UL — LOW (ref 3.8–10.5)
WBC # BLD: 2.98 K/UL — LOW (ref 3.8–10.5)
WBC # FLD AUTO: 2.13 K/UL — LOW (ref 3.8–10.5)
WBC # FLD AUTO: 2.98 K/UL — LOW (ref 3.8–10.5)

## 2020-10-16 PROCEDURE — 99291 CRITICAL CARE FIRST HOUR: CPT

## 2020-10-16 PROCEDURE — 93971 EXTREMITY STUDY: CPT | Mod: 26,LT

## 2020-10-16 PROCEDURE — 99233 SBSQ HOSP IP/OBS HIGH 50: CPT

## 2020-10-16 PROCEDURE — 74176 CT ABD & PELVIS W/O CONTRAST: CPT | Mod: 26

## 2020-10-16 PROCEDURE — 99231 SBSQ HOSP IP/OBS SF/LOW 25: CPT

## 2020-10-16 RX ORDER — POTASSIUM CHLORIDE 20 MEQ
40 PACKET (EA) ORAL ONCE
Refills: 0 | Status: COMPLETED | OUTPATIENT
Start: 2020-10-16 | End: 2020-10-16

## 2020-10-16 RX ORDER — MORPHINE SULFATE 50 MG/1
4 CAPSULE, EXTENDED RELEASE ORAL ONCE
Refills: 0 | Status: DISCONTINUED | OUTPATIENT
Start: 2020-10-16 | End: 2020-10-16

## 2020-10-16 RX ORDER — LACTULOSE 10 G/15ML
30 SOLUTION ORAL ONCE
Refills: 0 | Status: COMPLETED | OUTPATIENT
Start: 2020-10-16 | End: 2020-10-16

## 2020-10-16 RX ORDER — MORPHINE SULFATE 50 MG/1
4 CAPSULE, EXTENDED RELEASE ORAL ONCE
Refills: 0 | Status: DISCONTINUED | OUTPATIENT
Start: 2020-10-16 | End: 2020-10-17

## 2020-10-16 RX ADMIN — PANTOPRAZOLE SODIUM 40 MILLIGRAM(S): 20 TABLET, DELAYED RELEASE ORAL at 18:18

## 2020-10-16 RX ADMIN — MORPHINE SULFATE 4 MILLIGRAM(S): 50 CAPSULE, EXTENDED RELEASE ORAL at 07:05

## 2020-10-16 RX ADMIN — CHLORHEXIDINE GLUCONATE 1 APPLICATION(S): 213 SOLUTION TOPICAL at 16:21

## 2020-10-16 RX ADMIN — OCTREOTIDE ACETATE 10 MICROGRAM(S)/HR: 200 INJECTION, SOLUTION INTRAVENOUS; SUBCUTANEOUS at 21:32

## 2020-10-16 RX ADMIN — Medication 40 MILLIEQUIVALENT(S): at 10:58

## 2020-10-16 RX ADMIN — CEFTRIAXONE 100 MILLIGRAM(S): 500 INJECTION, POWDER, FOR SOLUTION INTRAMUSCULAR; INTRAVENOUS at 11:29

## 2020-10-16 RX ADMIN — MORPHINE SULFATE 4 MILLIGRAM(S): 50 CAPSULE, EXTENDED RELEASE ORAL at 07:20

## 2020-10-16 RX ADMIN — PANTOPRAZOLE SODIUM 40 MILLIGRAM(S): 20 TABLET, DELAYED RELEASE ORAL at 05:41

## 2020-10-16 RX ADMIN — LACTULOSE 30 GRAM(S): 10 SOLUTION ORAL at 10:59

## 2020-10-16 NOTE — DIETITIAN INITIAL EVALUATION ADULT. - CONTINUE CURRENT NUTRITION CARE PLAN
advance diet when appropriate to DASH/ TLC diet/yes advance diet when appropriate to DASH/ TLC, soft diet/yes

## 2020-10-16 NOTE — DIETITIAN INITIAL EVALUATION ADULT. - REASON FOR ADMISSION
AMS, sepsis AMS, sepsis  10/14; CT A/P showed portal hypertension with cirrhosis, splenomegaly, upper abdominal varices, moderate abdominopelvic ascites, pleural effusions, and anasarca.

## 2020-10-16 NOTE — PROVIDER CONTACT NOTE (EICU) - BACKGROUND
Contacted by bedside RN, for pain in LUE.  Of note patient noted to have infiltration of IV running Vitamin K.  Noted to have ecchymosis.  Pulses as per bedside RN, noted to be intact, sensation intact as per patient.    Will order morphine 4mg IVP for pain.  - Discuss with bedside NP for further eval for hematoma and possible need for LUE US to further evaluate.  No signs of neurovascular compromise at this time.  Will continue to monitor and evaluate for any change.

## 2020-10-16 NOTE — DIETITIAN INITIAL EVALUATION ADULT. - ORAL INTAKE PTA/DIET HISTORY
Good appetite in general PTA, ate pasta, frozen meals, steak etc.  Pt lived alone, did own shopping/or had food delivered, did own cooking.  Diet PTA: Low sodium c occasional non adherence to low sodium diet c intake of processed meats, 4 x year as per pt.

## 2020-10-16 NOTE — DIETITIAN INITIAL EVALUATION ADULT. - PROBLEM SELECTOR PLAN 2
- s/p paracentesis in ED, 2L removed  - cell ct not reflective of SBP  - c/w lactulose  - f/u renal fxn in am if stable restart diuretics; pt given 1.5L bolus in ED, has a hx of CHF, ascites and b/l pleural effusions

## 2020-10-16 NOTE — PROGRESS NOTE ADULT - ASSESSMENT
59M morbidly obese w/ SARKIS, CHF, HTN, HLD, BOB/NOELLE cirrhosis (BOB/NOELLE) w/ ascites and thrombocytopenia presents w/ AMS, likely due to decompensated cirrhosis/hepatic encephalopathy, anemia likely due to GIB, as well lactic acidosis and SBP. Mental status has improved but continues to be anemic despite transfusions.     #Neuro - AAOx3, at baseline  #CV - HD stable   #Pulm - satting well on RA  #ID- ascites fluid growing E. coli, consistent w/ SBP; awaiting sensitivities, continue w/ ceftriaxone  #Renal/metabolic - MONI improving, electolytes acceptable, good UOP- continue to monitor  #GI- suspect UGIB as source of dropping hgb, on octreotide and protonix; start clear liquid diet; lactulose x1 today; awaiting decision on EGD  #Heme - s/p 6 pRBC, 2 FFP, 1 plt, Vitamin K, DDAVP and Kcentra- continues to drop hgb; will get CT A/P to rule out RP bleed as other possible source of bleeding; transfuse for hgb <7, correct coagulopathies as needed  #Endo - monitor BG  #Skin - s/p IV infiltration, LUE US shows superficial thrombophlebitis- conservative mgmt  #PPx - SCDs for DVT ppx  #Dispo- remains in ICU with contiued blood loss anemia

## 2020-10-16 NOTE — DIETITIAN INITIAL EVALUATION ADULT. - REASON INDICATOR FOR ASSESSMENT
Pt. was seen for RN consult for pressure ulcer stage 2 or greater , CCU adm & BMI>40. Pt. was seen for RN consult for pressure ulcer stage 2 or greater, ( as per notation, pt currently c suspected deep tissue injury ), CCU adm & BMI>40.

## 2020-10-16 NOTE — PROGRESS NOTE ADULT - SUBJECTIVE AND OBJECTIVE BOX
Patient is a 60y old  Male who presents with a chief complaint of AMS, sepsis (15 Oct 2020 22:50)      HPI:     58 y/o male morbidly obese male w/ PMHx of Hepatic Cirrhosis, ascites, Thrombocytopenia, CHF, HTN, HLD, recent admx for Hepatic Encephalopathy, noncompliant with lactulose presents to the ED due to AMS. Pt awake, lethargic only oriented to self, unable to provide any history. History obtained from ED/Chart. Per ED, Brother noticed worsening confusion x 1, noted again pt non-complaint with lactulose thus gave him 1 dose prior to his arrival in ED.    On arrival in ED, initial /59, , pt afebrile. CT head neg. Sig labs include WBC 12.37, h/h 7.5/24.3, PLT 89, LA 8, BUN/Cr 48/1.81, ammonia level 41, see below for rest of sig labs. Pt s/p paracentesis in ED, 2L removed cell ct not indicative of SBP. UA neg, CXR pre-valenzuela unremarkable. CT A/P showed portal hypertension with cirrhosis, splenomegaly, upper abdominal varices, moderate abdominopelvic ascites, pleural effusions, and anasarca.     (14 Oct 2020 03:19)      INTERVAL HPI/OVERNIGHT EVENTS:  CCU # 6  No BM or vomiting since yesterday afternoon. HGB 6.9, platelets 48     MEDICATIONS  (STANDING):  cefTRIAXone   IVPB 1000 milliGRAM(s) IV Intermittent every 24 hours  chlorhexidine 4% Liquid 1 Application(s) Topical <User Schedule>  morphine  - Injectable 4 milliGRAM(s) IV Push once  octreotide  Infusion 50 MICROgram(s)/Hr (10 mL/Hr) IV Continuous <Continuous>  pantoprazole  Injectable 40 milliGRAM(s) IV Push every 12 hours    MEDICATIONS  (PRN):      FAMILY HISTORY:  No pertinent family history in first degree relatives        Allergies    No Known Allergies    Intolerances        PMH/PSH:  HLD (hyperlipidemia)    Hepatic cirrhosis    CHF (congestive heart failure)    Obese    Hyperlipidemia    Pneumonia    CHF (congestive heart failure)    History of chest tube placement    No significant past surgical history          REVIEW OF SYSTEMS:  CONSTITUTIONAL: No fever, weight loss,  EYES: No eye pain, visual disturbances, or discharge  ENMT:  No difficulty hearing, tinnitus, vertigo; No sinus or throat pain  NECK: No pain or stiffness  BREASTS: No pain, masses, or nipple discharge  RESPIRATORY: No cough, wheezing, chills or hemoptysis; No shortness of breath  CARDIOVASCULAR: No chest pain, palpitations, dizziness, or leg swelling  GASTROINTESTINAL: See above  GENITOURINARY: No dysuria, frequency, hematuria, or incontinence  NEUROLOGICAL: No headaches, memory loss, loss of strength, numbness, or tremors  SKIN: No itching, burning, rashes, or lesions   LYMPH NODES: No enlarged glands  ENDOCRINE: No heat or cold intolerance; No hair loss  MUSCULOSKELETAL: No joint pain or swelling; No muscle, back, or extremity pain  PSYCHIATRIC: No depression, anxiety, mood swings, or difficulty sleeping  HEME/LYMPH: No easy bruising, or bleeding gums  ALLERGY AND IMMUNOLOGIC: No hives or eczema    Vital Signs Last 24 Hrs  T(C): 36.6 (16 Oct 2020 08:00), Max: 36.7 (15 Oct 2020 23:30)  T(F): 97.9 (16 Oct 2020 08:00), Max: 98.1 (15 Oct 2020 23:30)  HR: 87 (16 Oct 2020 07:20) (72 - 87)  BP: 127/111 (16 Oct 2020 07:00) (100/88 - 153/102)  BP(mean): 117 (16 Oct 2020 07:00) (67 - 119)  RR: 17 (16 Oct 2020 07:20) (12 - 28)  SpO2: 100% (16 Oct 2020 07:20) (85% - 100%)    PHYSICAL EXAM:  GENERAL: NAD, well-groomed, well-developed  HEAD:  Atraumatic, Normocephalic  EYES: EOMI, PERRLA, conjunctiva and sclera clear  ENMT: No tonsillar erythema, exudates, or enlargement; Moist mucous membranes, Good dentition, No lesions  NECK: Supple, No JVD, Normal thyroid  NERVOUS SYSTEM:  Alert & Oriented X3, Good concentration; Motor Strength 5/5 B/L upper and lower extremities; DTRs 2+ intact and symmetric  CHEST/LUNG: Clear to percussion bilaterally; No rales, rhonchi, wheezing, or rubs  HEART: Regular rate and rhythm; No murmurs, rubs, or gallops  ABDOMEN: Soft, Nontender, Nondistended; Bowel sounds present  EXTREMITIES:  2+ Peripheral Pulses, No clubbing, cyanosis, or edema  LYMPH: No lymphadenopathy noted  SKIN: No rashes or lesions    LAB  10-13 @ 22:57  amylase --   lipase 120                           6.9    2.13  )-----------( 48       ( 16 Oct 2020 05:26 )             21.3       CBC:  10-16 @ 05:26  WBC 2.13   Hgb 6.9   Hct 21.3   Plts 48  MCV 91.0  10-15 @ 20:52  WBC 2.49   Hgb 8.4   Hct 25.9   Plts 45  MCV 91.5  10-15 @ 17:45  WBC 2.57   Hgb 7.1   Hct 21.7   Plts 46  MCV 90.4  10-15 @ 05:32  WBC 3.57   Hgb 6.8   Hct 19.8   Plts 46  MCV 88.8  10-15 @ 00:42  WBC 4.71   Hgb 7.2   Hct 21.6   Plts 54  MCV 89.3  10-14 @ 06:43  WBC 6.74   Hgb 5.6   Hct 17.5   Plts 45  MCV 90.7  10-13 @ 22:57  WBC 12.37   Hgb 7.5   Hct 24.3   Plts 89  MCV 91.0      Chemistry:  10-16 @ 05:26  Na+ 138  K+ 3.5  Cl- 106  CO2 28  BUN 52  Cr 1.34     10-15 @ 17:45  Na+ 137  K+ 3.6  Cl- 105  CO2 29  BUN 57  Cr 1.47     10-15 @ 00:42  Na+ 139  K+ 3.8  Cl- 107  CO2 28  BUN 66  Cr 1.68     10-14 @ 06:43  Na+ 140  K+ 4.0  Cl- 107  CO2 24  BUN 56  Cr 1.69     10-13 @ 22:57  Na+ 138  K+ 4.5  Cl- 104  CO2 21  BUN 48  Cr 1.81         Glucose, Serum: 78 mg/dL (10-16 @ 05:26)  Glucose, Serum: 87 mg/dL (10-15 @ 17:45)  Glucose, Serum: 95 mg/dL (10-15 @ 00:42)  Glucose, Serum: 137 mg/dL (10-14 @ 06:43)  Glucose, Serum: 128 mg/dL (10-13 @ 22:57)      16 Oct 2020 05:26    138    |  106    |  52     ----------------------------<  78     3.5     |  28     |  1.34   15 Oct 2020 17:45    137    |  105    |  57     ----------------------------<  87     3.6     |  29     |  1.47   15 Oct 2020 00:42    139    |  107    |  66     ----------------------------<  95     3.8     |  28     |  1.68   14 Oct 2020 06:43    140    |  107    |  56     ----------------------------<  137    4.0     |  24     |  1.69   13 Oct 2020 22:57    138    |  104    |  48     ----------------------------<  128    4.5     |  21     |  1.81     Ca    7.9        16 Oct 2020 05:26  Ca    7.7        15 Oct 2020 17:45  Ca    8.1        15 Oct 2020 00:42  Ca    8.1        14 Oct 2020 06:43  Ca    8.1        13 Oct 2020 22:57  Phos  2.8       16 Oct 2020 05:26  Phos  3.2       15 Oct 2020 17:45  Phos  3.4       15 Oct 2020 00:42  Mg     2.3       16 Oct 2020 05:26  Mg     1.9       15 Oct 2020 17:45  Mg     2.0       15 Oct 2020 00:42  Mg     1.9       14 Oct 2020 06:43    TPro  5.9    /  Alb  2.0    /  TBili  3.8    /  DBili  x      /  AST  33     /  ALT  32     /  AlkPhos  82     16 Oct 2020 05:26  TPro  5.8    /  Alb  1.9    /  TBili  4.1    /  DBili  x      /  AST  37     /  ALT  33     /  AlkPhos  80     15 Oct 2020 17:45  TPro  6.8    /  Alb  2.1    /  TBili  4.3    /  DBili  1.22   /  AST  51     /  ALT  43     /  AlkPhos  91     15 Oct 2020 00:42  TPro  5.9    /  Alb  2.0    /  TBili  4.8    /  DBili  x      /  AST  54     /  ALT  34     /  AlkPhos  90     14 Oct 2020 06:43  TPro  7.2    /  Alb  2.0    /  TBili  6.4    /  DBili  x      /  AST  49     /  ALT  40     /  AlkPhos  122    13 Oct 2020 22:57      PT/INR - ( 16 Oct 2020 05:26 )   PT: 20.8 sec;   INR: 1.85 ratio         PTT - ( 15 Oct 2020 21:58 )  PTT:35.9 sec        CAPILLARY BLOOD GLUCOSE              RADIOLOGY & ADDITIONAL TESTS:    Imaging Personally Reviewed:  [ ] YES  [ ] NO    Consultant(s) Notes Reviewed:  [ ] YES  [ ] NO    Care Discussed with Consultants/Other Providers [ ] YES  [ ] NO

## 2020-10-16 NOTE — PROGRESS NOTE ADULT - ASSESSMENT
58 y/o male morbidly obese male w/ PMHx of Hepatic Cirrhosis w/ ascites & Thrombocytopenia, morbid Obesity w/ BMI of 47, chronic diastolic CHF, HTN, HLD, recent admx for Hepatic Encephalopathy, noncompliant with lactulose presented w/ Metabolic Encephalopathy and acute blood loss anemia due to GI bleed.     Acute blood loss anemia due to GI bleed  - patient is s/p 5 units PRBC  - patient is on Octreotide and Protonix drips  - GI planning on EGD once INR & plts improve  - c/w Ceftriaxone prophylaxis  - no evidence of retroperitoneal bleed on CT today    Alcoholic cirrhosis w/ ascites, coagulopathy, pancytopenia and hepatic encephalopathy   - abd CT showed portal hypertension with cirrhosis, splenomegaly, upper abdominal varices, moderate abdominopelvic ascites, pleural effusions, and anasarca  - s/p 2L paracentesis in ED   - c/w lactulose    MONI  - likely prerenal  - improving post transfusion    Superficial thrombophlebitis of the left basilic and cephalic veins on US  - local management  - cannot give NSAIDS given MONI and GI bleed    DVT prophylaxis  SVT: SCD  GI: Protonix   58 y/o male morbidly obese male w/ PMHx of Hepatic Cirrhosis w/ ascites & Thrombocytopenia, morbid Obesity w/ BMI of 47, chronic diastolic CHF, HTN, HLD, recent admx for Hepatic Encephalopathy, noncompliant with lactulose presented w/ Metabolic Encephalopathy and acute blood loss anemia due to GI bleed.     Acute blood loss anemia due to GI bleed  - s/p 6 pRBC, 2 FFP, 1 plt, Vitamin K, DDAVP and Kcentra  - patient is on Octreotide and Protonix drips  - GI planning on EGD once INR & plts improve  - c/w Ceftriaxone prophylaxis  - Hgb dropped today, but there was no evidence of retroperitoneal bleed on CT     Sepsis POA due to SBP  - ascites fluid growing E. coli  - c/w Ceftriaxone  - ID following    Septic encephalopathy  - resolved  - due to SBP    Alcoholic cirrhosis w/ ascites, coagulopathy, pancytopenia and hepatic encephalopathy   - abd CT showed portal hypertension with cirrhosis, splenomegaly, upper abdominal varices, moderate abdominopelvic ascites, pleural effusions, and anasarca  - s/p 2L paracentesis in ED   - c/w lactulose    MONI  - likely prerenal  - improving post transfusion    Superficial thrombophlebitis of the left basilic and cephalic veins on US  - local management  - cannot give NSAIDS given MONI and GI bleed    DVT prophylaxis  SVT: SCD  GI: Protonix

## 2020-10-16 NOTE — DIETITIAN INITIAL EVALUATION ADULT. - PERTINENT LABORATORY DATA
10-16 Na138 mmol/L Glu 78 mg/dL K+ 3.5 mmol/L Cr  1.34 mg/dL<H> BUN 52 mg/dL<H> 10-16 Phos 2.8 mg/dL 10-16 Alb 2.0 g/dL<L>10-16 ALT 32 U/L AST 33 U/L Alkaline Phosphatase 82 U/L

## 2020-10-16 NOTE — DIETITIAN INITIAL EVALUATION ADULT. - PROBLEM SELECTOR PLAN 6
- pt w/ thromocytopenia, and coagulapathy due to liver dz, SCDs - pt w/ thrombocytopenia, and coagulapathy due to liver dz, SCDs

## 2020-10-16 NOTE — PROGRESS NOTE ADULT - SUBJECTIVE AND OBJECTIVE BOX
SANTOS BELLE  MRN-[id number]    Follow Up:  ID following for SBP    Interval History: patient reports feeling much better, denies abd pain, awaitin EGD which likely will happen Monday, still pancytopenic and receiving multiple units of blood, plt, FFP and kcentra, the kcentra infiltrated his left upper arm and has extensive ecchymosis      ROS:    [ ] Unobtainable because:  [X ] All other systems negative    Constitutional: no fever, no chills  Head: no trauma  Eyes: no vision changes, no eye pain  ENT:  no sore throat, no rhinorrhea  Cardiovascular:  no chest pain, no palpitation  Respiratory:  no SOB, no cough  GI:  no abd pain, no vomiting, no diarrhea  urinary: no dysuria, no hematuria, no flank pain  musculoskeletal:  no joint pain, no joint swelling  skin:  no rash  neurology:  no headache, no seizure, no change in mental status  psych: no anxiety, no depression         Allergies  No Known Allergies        ANTIMICROBIALS:  cefTRIAXone   IVPB 1000 every 24 hours      MEDICATIONS  (STANDING):  morphine  - Injectable 4 once  octreotide  Infusion 50 <Continuous>  pantoprazole  Injectable 40 every 12 hours      PRN      Vital Signs Last 24 Hrs  T(F): 97.9 (10-16-20 @ 15:24), Max: 98.8 (10-16-20 @ 09:30)  HR: 80 (10-16-20 @ 13:00)  BP: 117/74 (10-16-20 @ 13:00)  RR: 15 (10-16-20 @ 13:00)  SpO2: 99% (10-16-20 @ 13:00) (97% - 100%)  Wt(kg): --      Physical Exam:  General:    NAD,  non toxic, obese  Head: atraumatic, normocephalic  Eye: mildly icteric sclera  ENT:    no oral lesions, neck supple  Cardio:     regular S1, S2,  no murmur  Respiratory:    clear b/l,    no wheezing  abd:     soft,   BS +,   no tenderness, site of paracentesis draining yellow fluid into collection bag, positive fluid wave, obese  :   no CVAT,  no suprapubic tenderness,   Musculoskeletal:   no joint swelling,   +edema  vascular: no central lines, +PIV   Skin:    left upper ext with extensive ecchymosis  Neurologic:     no focal deficit, a lot more coherent today and alert and awake x3  psych: normal affect    Labs:    WBC Count: 2.98 K/uL (10-16 @ 15:49)  WBC Count: 2.13 K/uL (10-16 @ 05:26)  WBC Count: 2.49 K/uL (10-15 @ 20:52)  WBC Count: 2.57 K/uL (10-15 @ 17:45)  WBC Count: 3.57 K/uL (10-15 @ 05:32)  WBC Count: 4.71 K/uL (10-15 @ 00:42)  WBC Count: 6.74 K/uL (10-14 @ 06:43)    Hemoglobin: 7.8 g/dL (10-16 @ 15:49)  Hemoglobin: 6.9 g/dL (10-16 @ 05:26)  Hemoglobin: 8.4 g/dL (10-15 @ 20:52)  Hemoglobin: 7.1 g/dL (10-15 @ 17:45)  Hemoglobin: 6.8 g/dL (10-15 @ 05:32)  Hemoglobin: 7.2 g/dL (10-15 @ 00:42)  Hemoglobin: 5.6 g/dL (10-14 @ 06:43)                            7.8    2.98  )-----------( 44       ( 16 Oct 2020 15:49 )             23.7       10-16    138  |  106  |  52<H>  ----------------------------<  78  3.5   |  28  |  1.34<H>    Ca    7.9<L>      16 Oct 2020 05:26  Phos  2.8     10-16  Mg     2.3     10-16    TPro  5.9<L>  /  Alb  2.0<L>  /  TBili  3.8<H>  /  DBili  x   /  AST  33  /  ALT  32  /  AlkPhos  82  10-16      Creatinine Trend: 1.34<--, 1.47<--, 1.68<--, 1.69<--, 1.81<--    Lactate, Blood: 1.1 mmol/L (10-15-20 @ 05:32)      Urinalysis Basic - ( 14 Oct 2020 03:21 )    Color: Yellow / Appearance: Clear / S.015 / pH: x  Gluc: x / Ketone: Negative  / Bili: Small / Urobili: Negative mg/dL   Blood: x / Protein: 15 mg/dL / Nitrite: Negative   Leuk Esterase: Trace / RBC: 3-5 /HPF / WBC 3-5   Sq Epi: x / Non Sq Epi: Few / Bacteria: Occasional        Creatinine Trend: 1.47<--, 1.68<--, 1.69<--, 1.81<--    Lactate, Blood: 1.1 mmol/L (10-15-20 @ 05:32)  Lactate, Blood: 4.8 mmol/L (10-14-20 @ 06:36)  Lactate, Blood: 6.9 mmol/L (10-14-20 @ 01:21)  Lactate, Blood: 8.0 mmol/L (10-13-20 @ 22:59)      MICROBIOLOGY:  v  .Urine Clean Catch (Midstream)  10-14-20   <10,000 CFU/mL Normal Urogenital Blanka  --  --      .Blood Blood-Peripheral  10-14-20   No growth to date.  --  --      Culture - Body Fluid with Gram Stain (10.14.20 @ 05:40)    Gram Stain:   polymorphonuclear leukocytes seen  No organisms seen  by cytocentrifuge    Specimen Source: .Body Fluid Peritoneal Fluid    Culture Results:   Growth in fluid media only Escherichia coli Susceptibility to follow.    Rapid RVP Result: NotDetec (10-14 @ 01:49)    RADIOLOGY:  < from: CT Abdomen and Pelvis No Cont (10.16.20 @ 13:15) >  There is no retroperitoneal or rectus sheath hematoma. No intraperitoneal blood.  Gallstones noted. No biliary dilatation. Cirrhotic liver with splenomegaly small amount of abdominal pelvic ascites upper abdominal varices similar to prior. Extensive diffuse anasarca similar to prior.  There is a small umbilical hernia containing some fluid and possibly a nonobstructive bowel loop of small bowel. There is no bowelobstruction or gross bowel inflammation. No extraluminal gas.  Remainder study unchanged.    Impression:  Severely compromised by extensive scan artifact.  No evidence of retroperitoneal bleed.  Findings as discussed    < end of copied text >

## 2020-10-16 NOTE — CHART NOTE - TREATMENT: THE FOLLOWING DIET HAS BEEN RECOMMENDED
Diet, Clear Liquid (10-16-20 @ 10:24) [Active]  Recommend advance diet when appropriate to DASH/ TLC, soft

## 2020-10-16 NOTE — PROGRESS NOTE ADULT - SUBJECTIVE AND OBJECTIVE BOX
60 y/o male morbidly obese male w/ PMHx of Hepatic Cirrhosis w/ ascites & Thrombocytopenia, morbid Obesity w/ BMI of 47, chronic diastolic CHF, HTN, HLD, recent admx for Hepatic Encephalopathy, noncompliant with lactulose presented w/ Metabolic Encephalopathy and acute blood loss anemia due to GI bleed. He is lying in bed in NAD.    MEDICATIONS  (STANDING):  cefTRIAXone   IVPB 1000 milliGRAM(s) IV Intermittent every 24 hours  chlorhexidine 4% Liquid 1 Application(s) Topical <User Schedule>  morphine  - Injectable 4 milliGRAM(s) IV Push once  octreotide  Infusion 50 MICROgram(s)/Hr (10 mL/Hr) IV Continuous <Continuous>  pantoprazole  Injectable 40 milliGRAM(s) IV Push every 12 hours    MEDICATIONS  (PRN):      Allergies    No Known Allergies    Intolerances       Vital Signs Last 24 Hrs  T(C): 36.6 (16 Oct 2020 19:35), Max: 37.1 (16 Oct 2020 09:30)  T(F): 97.8 (16 Oct 2020 19:35), Max: 98.8 (16 Oct 2020 09:30)  HR: 66 (16 Oct 2020 20:00) (66 - 89)  BP: 99/53 (16 Oct 2020 20:00) (94/66 - 144/75)  BP(mean): 68 (16 Oct 2020 20:00) (67 - 117)  RR: 15 (16 Oct 2020 20:00) (13 - 28)  SpO2: 96% (16 Oct 2020 20:00) (95% - 100%)    PHYSICAL EXAM:  GENERAL: NAD, well-groomed, well-developed  HEAD:  Atraumatic, Normocephalic  EYES: EOMI, PERRLA   NECK: Supple   NERVOUS SYSTEM:  Alert    CHEST/LUNG: Clear to auscultation bilaterally; No rales, rhonchi, wheezing, or rubs  HEART: Regular rate and rhythm; No murmurs, rubs, or gallops  ABDOMEN: Soft, Nontender, ascitic   EXTREMITIES:  mild b/l edema    LABS:                        7.8    2.98  )-----------( 44       ( 16 Oct 2020 15:49 )             23.7     10-16    138  |  106  |  52<H>  ----------------------------<  78  3.5   |  28  |  1.34<H>    Ca    7.9<L>      16 Oct 2020 05:26  Phos  2.8     10-16  Mg     2.3     10-16    TPro  5.9<L>  /  Alb  2.0<L>  /  TBili  3.8<H>  /  DBili  x   /  AST  33  /  ALT  32  /  AlkPhos  82  10-16    PT/INR - ( 16 Oct 2020 05:26 )   PT: 20.8 sec;   INR: 1.85 ratio         PTT - ( 15 Oct 2020 21:58 )  PTT:35.9 sec    CAPILLARY BLOOD GLUCOSE          Culture - Urine (collected 14 Oct 2020 10:16)  Source: .Urine Clean Catch (Midstream)  Final Report (15 Oct 2020 10:59):    <10,000 CFU/mL Normal Urogenital Blanka    Culture - Blood (collected 14 Oct 2020 09:40)  Source: .Blood Blood-Peripheral  Preliminary Report (15 Oct 2020 10:01):    No growth to date.    Culture - Blood (collected 14 Oct 2020 09:40)  Source: .Blood Blood-Peripheral  Preliminary Report (15 Oct 2020 10:01):    No growth to date.    Culture - Body Fluid with Gram Stain (collected 14 Oct 2020 05:40)  Source: .Body Fluid Peritoneal Fluid  Gram Stain (prelim) (15 Oct 2020 13:13):    polymorphonuclear leukocytes seen    No organisms seen    by cytocentrifuge  Preliminary Report (16 Oct 2020 11:34):    Growth in fluid media only Escherichia coli Susceptibility to follow.      RADIOLOGY & ADDITIONAL TESTS:    10-15-20 @ 07:01  -  10-16-20 @ 07:00  --------------------------------------------------------  IN:    IV PiggyBack: 240 mL    Octreotide: 230 mL    Pantoprazole: 30 mL    Plasma: 400 mL    Platelets - Single Donor: 750 mL    PRBCs (Packed Red Blood Cells): 350 mL  Total IN: 2000 mL    OUT:    Other (mL): 400 mL    Voided (mL): 1550 mL  Total OUT: 1950 mL    Total NET: 50 mL      10-16-20 @ 07:01  -  10-16-20 @ 22:25  --------------------------------------------------------  IN:    IV PiggyBack: 50 mL    Octreotide: 130 mL    Oral Fluid: 720 mL    Plasma: 326 mL    PRBCs (Packed Red Blood Cells): 277 mL  Total IN: 1503 mL    OUT:    Other (mL): 250 mL    Voided (mL): 1400 mL  Total OUT: 1650 mL    Total NET: -147 mL

## 2020-10-16 NOTE — DIETITIAN INITIAL EVALUATION ADULT. - PHYSCIAL ASSESSMENT
obese/BMI=49.1(10/14), 10/14, 2+ generalized edema, 2+ edema of feet & ankles noted BMI=49.1(10/14), morbidly obese, 10/14, 2+ generalized edema, 2+ edema of feet & ankles noted/obese

## 2020-10-16 NOTE — DIETITIAN INITIAL EVALUATION ADULT. - OTHER INFO
Pt stated that he hasn't eaten since adm and requested something to drink/eat.   Pt was not self monitoring daily wt. for hx of CHF.  Loose BMs due to lactulose therapy PTA reported.  Diet therapy for low sodium diet & daily wt. monitoring for CHF reviewed.

## 2020-10-16 NOTE — DIETITIAN INITIAL EVALUATION ADULT. - PROBLEM SELECTOR PLAN 3
- worsening since last admx, no trend consistent w/ hx of CKD  - cont to monitor  - pt third spacing, will give albumin, pt already given 1.5L IVF  - monitor I/O  - avoid nephrotoxins  - f/u renal function in am if stable/improving, restart diuretics

## 2020-10-16 NOTE — PROGRESS NOTE ADULT - SUBJECTIVE AND OBJECTIVE BOX
CHIEF COMPLAINT:    Interval Events:    REVIEW OF SYSTEMS:  Constitutional: [ ] fevers [ ] chills [ ] weight loss [ ] weight gain  HEENT: [ ] dry eyes [ ] eye irritation [ ] postnasal drip [ ] nasal congestion  CV: [ ] chest pain [ ] orthopnea [ ] palpitations [ ] murmur  Resp: [ ] cough [ ] shortness of breath [ ] dyspnea [ ] wheezing [ ] sputum [ ] hemoptysis  GI: [ ] nausea [ ] vomiting [ ] diarrhea [ ] constipation [ ] abd pain [ ] dysphagia   : [ ] dysuria [ ] nocturia [ ] hematuria [ ] increased urinary frequency  Musculoskeletal: [ ] back pain [ ] myalgias [ ] arthralgias [ ] fracture  Skin: [ ] rash [ ] itch  Neurological: [ ] headache [ ] dizziness [ ] syncope [ ] weakness [ ] numbness  Hematologic/Lymphatic: [ ] anemia [ ] bleeding problem  Allergic/Immunologic: [ ] itchy eyes [ ] nasal discharge [ ] hives [ ] angioedema  [ ] All other systems negative  [ ] Unable to assess ROS because ________    OBJECTIVE:  ICU Vital Signs Last 24 Hrs  T(C): 36.4 (16 Oct 2020 11:00), Max: 37.1 (16 Oct 2020 09:30)  T(F): 97.5 (16 Oct 2020 11:00), Max: 98.8 (16 Oct 2020 09:30)  HR: 83 (16 Oct 2020 11:00) (72 - 89)  BP: 115/79 (16 Oct 2020 11:00) (100/88 - 144/75)  BP(mean): 91 (16 Oct 2020 11:00) (67 - 117)  ABP: --  ABP(mean): --  RR: 16 (16 Oct 2020 11:00) (13 - 28)  SpO2: 100% (16 Oct 2020 11:00) (85% - 100%)        10-15 @ 07:01  -  10-16 @ 07:00  --------------------------------------------------------  IN: 2000 mL / OUT: 1950 mL / NET: 50 mL    10-16 @ 07:01  -  10-16 @ 12:35  --------------------------------------------------------  IN: 1003 mL / OUT: 0 mL / NET: 1003 mL      CAPILLARY BLOOD GLUCOSE      POCT Blood Glucose.: 130 mg/dL (14 Oct 2020 19:13)      PHYSICAL EXAM:  General:   HEENT:   Neck:   Respiratory:   Cardiovascular:   Abdomen:   Extremities:   Skin:   Neurological:  Psychiatry:    LINES:    HOSPITAL MEDICATIONS:  MEDICATIONS  (STANDING):  cefTRIAXone   IVPB 1000 milliGRAM(s) IV Intermittent every 24 hours  chlorhexidine 4% Liquid 1 Application(s) Topical <User Schedule>  morphine  - Injectable 4 milliGRAM(s) IV Push once  octreotide  Infusion 50 MICROgram(s)/Hr (10 mL/Hr) IV Continuous <Continuous>  pantoprazole  Injectable 40 milliGRAM(s) IV Push every 12 hours    MEDICATIONS  (PRN):      LABS:                        6.9    2.13  )-----------( 48       ( 16 Oct 2020 05:26 )             21.3     Hgb Trend: 6.9<--, 8.4<--, 7.1<--, 6.8<--, 7.2<--  10-16    138  |  106  |  52<H>  ----------------------------<  78  3.5   |  28  |  1.34<H>    Ca    7.9<L>      16 Oct 2020 05:26  Phos  2.8     10-16  Mg     2.3     10-16    TPro  5.9<L>  /  Alb  2.0<L>  /  TBili  3.8<H>  /  DBili  x   /  AST  33  /  ALT  32  /  AlkPhos  82  10-16    PT/INR - ( 16 Oct 2020 05:26 )   PT: 20.8 sec;   INR: 1.85 ratio         PTT - ( 15 Oct 2020 21:58 )  PTT:35.9 sec          MICROBIOLOGY:     RADIOLOGY:  [ ] Reviewed and interpreted by me CHIEF COMPLAINT:    Interval Events:  IV infiltrated this morning w/ vitamin K  receiving 1 unit pRBC this morning    REVIEW OF SYSTEMS:  Constitutional: [- ] fevers [- ] chills [ ] weight loss [ ] weight gain  HEENT: [ ] dry eyes [ ] eye irritation [ ] postnasal drip [ ] nasal congestion  CV: [ -] chest pain [ -] orthopnea [ -] palpitations [ ] murmur  Resp: [ -] cough [ -] shortness of breath [- ] dyspnea [ -] wheezing [ ] sputum [ ] hemoptysis  GI: [ -] nausea [ -] vomiting [ -] diarrhea [- ] constipation [- ] abd pain [ ] dysphagia   : [ -] dysuria [ ] nocturia [ ] hematuria [ ] increased urinary frequency  Musculoskeletal: [ -] back pain [- ] myalgias [ ] arthralgias [ ] fracture  Skin: [ -] rash [ ] itch  Neurological: [ ] headache [ ] dizziness [ ] syncope [ ] weakness [ ] numbness  Hematologic/Lymphatic: [ ] anemia [ ] bleeding problem  Allergic/Immunologic: [ ] itchy eyes [ ] nasal discharge [ ] hives [ ] angioedema  [x ] All other systems negative  [ ] Unable to assess ROS because ________    OBJECTIVE:  ICU Vital Signs Last 24 Hrs  T(C): 36.4 (16 Oct 2020 11:00), Max: 37.1 (16 Oct 2020 09:30)  T(F): 97.5 (16 Oct 2020 11:00), Max: 98.8 (16 Oct 2020 09:30)  HR: 83 (16 Oct 2020 11:00) (72 - 89)  BP: 115/79 (16 Oct 2020 11:00) (100/88 - 144/75)  BP(mean): 91 (16 Oct 2020 11:00) (67 - 117)  ABP: --  ABP(mean): --  RR: 16 (16 Oct 2020 11:00) (13 - 28)  SpO2: 100% (16 Oct 2020 11:00) (85% - 100%)        10-15 @ 07:01  -  10-16 @ 07:00  --------------------------------------------------------  IN: 2000 mL / OUT: 1950 mL / NET: 50 mL    10-16 @ 07:01  -  10-16 @ 12:35  --------------------------------------------------------  IN: 1003 mL / OUT: 0 mL / NET: 1003 mL      CAPILLARY BLOOD GLUCOSE      POCT Blood Glucose.: 130 mg/dL (14 Oct 2020 19:13)      PHYSICAL EXAM:  General: NAD, non toxic appearing, pale appearing  HEENT: dry MM, EOMI  Neck: supple  Respiratory: CTA b/l  Cardiovascular: s1s2 RRR  Abdomen: soft, non tender, non distended, small amount of ascitic leak  Extremities: warm, trace pitting edema, no clubbing  Skin: intact  Neurological: no focal deficits  Psychiatry: AAOx3    LINES:    HOSPITAL MEDICATIONS:  MEDICATIONS  (STANDING):  cefTRIAXone   IVPB 1000 milliGRAM(s) IV Intermittent every 24 hours  chlorhexidine 4% Liquid 1 Application(s) Topical <User Schedule>  morphine  - Injectable 4 milliGRAM(s) IV Push once  octreotide  Infusion 50 MICROgram(s)/Hr (10 mL/Hr) IV Continuous <Continuous>  pantoprazole  Injectable 40 milliGRAM(s) IV Push every 12 hours    MEDICATIONS  (PRN):      LABS:                        6.9    2.13  )-----------( 48       ( 16 Oct 2020 05:26 )             21.3     Hgb Trend: 6.9<--, 8.4<--, 7.1<--, 6.8<--, 7.2<--  10-16    138  |  106  |  52<H>  ----------------------------<  78  3.5   |  28  |  1.34<H>    Ca    7.9<L>      16 Oct 2020 05:26  Phos  2.8     10-16  Mg     2.3     10-16    TPro  5.9<L>  /  Alb  2.0<L>  /  TBili  3.8<H>  /  DBili  x   /  AST  33  /  ALT  32  /  AlkPhos  82  10-16    PT/INR - ( 16 Oct 2020 05:26 )   PT: 20.8 sec;   INR: 1.85 ratio         PTT - ( 15 Oct 2020 21:58 )  PTT:35.9 sec          MICROBIOLOGY:     RADIOLOGY:  [ ] Reviewed and interpreted by me

## 2020-10-16 NOTE — PROGRESS NOTE ADULT - ASSESSMENT
HPI:     60 y/o male morbidly obese male w/ PMHx of Hepatic Cirrhosis, ascites, Thrombocytopenia, CHF, HTN, HLD, recent admx for Hepatic Encephalopathy, noncompliant with lactulose presents to the ED due to AMS. Pt awake, lethargic only oriented to self, unable to provide any history. History obtained from ED/Chart. Per ED, Brother noticed worsening confusion x 1, noted again pt non-complaint with lactulose thus gave him 1 dose prior to his arrival in ED.    On arrival in ED, initial /59, , pt afebrile. CT head neg. Sig labs include WBC 12.37, h/h 7.5/24.3, PLT 89, LA 8, BUN/Cr 48/1.81, ammonia level 41, see below for rest of sig labs. Pt s/p paracentesis in ED, 2L removed cell ct not indicative of SBP. UA neg, CXR pre-valenzuela unremarkable. CT A/P showed portal hypertension with cirrhosis, splenomegaly, upper abdominal varices, moderate abdominopelvic ascites, pleural effusions, and anasarca.     (14 Oct 2020 03:19)  ----------------- As Above -----------------Patient seen earlier today.   The patient presents with a change in mental status. Patient has a history of Paulino and alcoholic cirrhosis. Patient is non complaint with Lactulose. Patient has had admissions previously for hepatic encephalopathy from non compliance with Lactulose. Patiet was found to have HGB of 7.5 with platelets of 89. INR > 2  The patient presents with severe lethargy. Ammonia only 41 but has elevated lactic acid levels. SBP in ER ruled out SBP  IN ER , patient was passing light brown liquid stool ( visualized )  ---- Patient later developed melena ( WNI ) HGB fell to 5.6, BUN increased to 56 and platelets fell to 45. Patient now in the ICU    Change in MS - . Significantly better. Patient probably had underlying sepsis Appreciate ID consult.   Melena - No N/V or melena since last seen. Patient pancytopenic with elevated INR, low platelets - Patient is not a candidate for immediate  EGD ( high INR, low platelets ) 1) Octreotide 2) Platelets 3) Keep HGB > 8  4) ICU setting 5) Ceftriaxone 6) NPO  === Discussed with staff

## 2020-10-16 NOTE — DIETITIAN INITIAL EVALUATION ADULT. - PERTINENT MEDS FT
MEDICATIONS  (STANDING):  cefTRIAXone   IVPB 1000 milliGRAM(s) IV Intermittent every 24 hours  chlorhexidine 4% Liquid 1 Application(s) Topical <User Schedule>  morphine  - Injectable 4 milliGRAM(s) IV Push once  octreotide  Infusion 50 MICROgram(s)/Hr (10 mL/Hr) IV Continuous <Continuous>  pantoprazole  Injectable 40 milliGRAM(s) IV Push every 12 hours    MEDICATIONS  (PRN):

## 2020-10-16 NOTE — PROGRESS NOTE ADULT - ASSESSMENT
60 M with HLD, CHF (EF 65% Oct 2019), HLD, cirrhosis (2/2 alcohol abuse), and recent admission (Oct 2019) for R pleural effusion s/p chest tube, fluid was exudative but negative cytology and cultures, presented 11/8/2019 with fever and chills, SOB and worked up for TB which was negative., at that time got 7 days of vanco and zosyn. Now her returns not having taken his lactulose and is encephalopathic.   Afebrile but tachycardic   WBC 12, PLT 45, Hb 7.5->5.6, Crea 1.8->1.6, INR 2.65  SAAG 1.8 (>1.1 consistent with portal hypertension)   MELD on admission was 29  Lactate 8.0->6->4  CT (personally reviewed) moderate bilateral pleural effusions, splenomegaly, shriveled liver, mod-large ascites  Blood culture drawn on admission and given vanco, CTX and flagyl   Fluid appears transudative though no serum LDH on this admission   Fluid with less then 250 PMNs goes against SBP and gram stain on fluid is negative   Pt had melena and hb dropped to 5.6 raising concern for variceal bleed    10/15: Still anemic, received PLT, pRBC and FFP today, unable to get EGD due to stability, paracentesis fluid now positive with GNR in liquid broth   10/16: remains with low counts including hb, CT performed today (personally reviewed) no retro bleed but large ascites, splenomegaly, anasarca     Overall, 60M hepatic encephalopathy, acute blood lose anemia (?variceal bleed, GI ulcer), high lactate, SBP   -agree with ceftriaxone 1g daily   -f/u cultures (ecoli) sensitivities    -GI consult appreciated, prompt EGD when stable   -transfusions per ICU team   -octreotide, PPI   -if no improvement, consider IR consult for TIPS vs embolization   -May need transfer to [facility] for possible transplant evaluation   -trend MELD   -lactulose enemas and/or PO if starting to eat for hepatic encephalopathy     Discussed with ICU team/Dr. Carlton/Dr. Melita Ragsdale DO  Cell: [phone]  Pager [phone]  Infectious Disease Attending  After 5pm/weekends please call [phone] for all inquiries and new consults Yes - the patient is able to be screened

## 2020-10-17 LAB
-  AMIKACIN: SIGNIFICANT CHANGE UP
-  AMOXICILLIN/CLAVULANIC ACID: SIGNIFICANT CHANGE UP
-  AMPICILLIN/SULBACTAM: SIGNIFICANT CHANGE UP
-  AMPICILLIN: SIGNIFICANT CHANGE UP
-  AZTREONAM: SIGNIFICANT CHANGE UP
-  CEFAZOLIN: SIGNIFICANT CHANGE UP
-  CEFEPIME: SIGNIFICANT CHANGE UP
-  CEFOXITIN: SIGNIFICANT CHANGE UP
-  CEFTRIAXONE: SIGNIFICANT CHANGE UP
-  CIPROFLOXACIN: SIGNIFICANT CHANGE UP
-  ERTAPENEM: SIGNIFICANT CHANGE UP
-  GENTAMICIN: SIGNIFICANT CHANGE UP
-  IMIPENEM: SIGNIFICANT CHANGE UP
-  LEVOFLOXACIN: SIGNIFICANT CHANGE UP
-  MEROPENEM: SIGNIFICANT CHANGE UP
-  PIPERACILLIN/TAZOBACTAM: SIGNIFICANT CHANGE UP
-  TOBRAMYCIN: SIGNIFICANT CHANGE UP
-  TRIMETHOPRIM/SULFAMETHOXAZOLE: SIGNIFICANT CHANGE UP
ALBUMIN SERPL ELPH-MCNC: 1.9 G/DL — LOW (ref 3.3–5)
ALP SERPL-CCNC: 81 U/L — SIGNIFICANT CHANGE UP (ref 40–120)
ALT FLD-CCNC: 32 U/L — SIGNIFICANT CHANGE UP (ref 12–78)
ANION GAP SERPL CALC-SCNC: 4 MMOL/L — LOW (ref 5–17)
AST SERPL-CCNC: 33 U/L — SIGNIFICANT CHANGE UP (ref 15–37)
BILIRUB SERPL-MCNC: 3.2 MG/DL — HIGH (ref 0.2–1.2)
BUN SERPL-MCNC: 40 MG/DL — HIGH (ref 7–23)
CALCIUM SERPL-MCNC: 7.6 MG/DL — LOW (ref 8.5–10.1)
CHLORIDE SERPL-SCNC: 105 MMOL/L — SIGNIFICANT CHANGE UP (ref 96–108)
CO2 SERPL-SCNC: 26 MMOL/L — SIGNIFICANT CHANGE UP (ref 22–31)
CREAT SERPL-MCNC: 1.31 MG/DL — HIGH (ref 0.5–1.3)
GLUCOSE SERPL-MCNC: 87 MG/DL — SIGNIFICANT CHANGE UP (ref 70–99)
HCT VFR BLD CALC: 23.5 % — LOW (ref 39–50)
HCT VFR BLD CALC: 24.1 % — LOW (ref 39–50)
HGB BLD-MCNC: 7.7 G/DL — LOW (ref 13–17)
HGB BLD-MCNC: 7.9 G/DL — LOW (ref 13–17)
LACTATE SERPL-SCNC: 1 MMOL/L — SIGNIFICANT CHANGE UP (ref 0.7–2)
MAGNESIUM SERPL-MCNC: 2.2 MG/DL — SIGNIFICANT CHANGE UP (ref 1.6–2.6)
MCHC RBC-ENTMCNC: 29.5 PG — SIGNIFICANT CHANGE UP (ref 27–34)
MCHC RBC-ENTMCNC: 30.6 PG — SIGNIFICANT CHANGE UP (ref 27–34)
MCHC RBC-ENTMCNC: 32 GM/DL — SIGNIFICANT CHANGE UP (ref 32–36)
MCHC RBC-ENTMCNC: 33.6 GM/DL — SIGNIFICANT CHANGE UP (ref 32–36)
MCV RBC AUTO: 91.1 FL — SIGNIFICANT CHANGE UP (ref 80–100)
MCV RBC AUTO: 92.3 FL — SIGNIFICANT CHANGE UP (ref 80–100)
METHOD TYPE: SIGNIFICANT CHANGE UP
NRBC # BLD: 0 /100 WBCS — SIGNIFICANT CHANGE UP (ref 0–0)
NRBC # BLD: 0 /100 WBCS — SIGNIFICANT CHANGE UP (ref 0–0)
PHOSPHATE SERPL-MCNC: 2.7 MG/DL — SIGNIFICANT CHANGE UP (ref 2.5–4.5)
PLATELET # BLD AUTO: 42 K/UL — LOW (ref 150–400)
PLATELET # BLD AUTO: 46 K/UL — LOW (ref 150–400)
POTASSIUM SERPL-MCNC: 3.8 MMOL/L — SIGNIFICANT CHANGE UP (ref 3.5–5.3)
POTASSIUM SERPL-SCNC: 3.8 MMOL/L — SIGNIFICANT CHANGE UP (ref 3.5–5.3)
PROT SERPL-MCNC: 6 GM/DL — SIGNIFICANT CHANGE UP (ref 6–8.3)
RBC # BLD: 2.58 M/UL — LOW (ref 4.2–5.8)
RBC # BLD: 2.61 M/UL — LOW (ref 4.2–5.8)
RBC # FLD: 19.4 % — HIGH (ref 10.3–14.5)
RBC # FLD: 19.4 % — HIGH (ref 10.3–14.5)
SODIUM SERPL-SCNC: 135 MMOL/L — SIGNIFICANT CHANGE UP (ref 135–145)
WBC # BLD: 2.74 K/UL — LOW (ref 3.8–10.5)
WBC # BLD: 3.19 K/UL — LOW (ref 3.8–10.5)
WBC # FLD AUTO: 2.74 K/UL — LOW (ref 3.8–10.5)
WBC # FLD AUTO: 3.19 K/UL — LOW (ref 3.8–10.5)

## 2020-10-17 PROCEDURE — 99233 SBSQ HOSP IP/OBS HIGH 50: CPT

## 2020-10-17 PROCEDURE — 99231 SBSQ HOSP IP/OBS SF/LOW 25: CPT

## 2020-10-17 PROCEDURE — 99232 SBSQ HOSP IP/OBS MODERATE 35: CPT

## 2020-10-17 RX ORDER — LANOLIN ALCOHOL/MO/W.PET/CERES
3 CREAM (GRAM) TOPICAL AT BEDTIME
Refills: 0 | Status: DISCONTINUED | OUTPATIENT
Start: 2020-10-17 | End: 2020-10-23

## 2020-10-17 RX ORDER — LACTULOSE 10 G/15ML
20 SOLUTION ORAL
Refills: 0 | Status: DISCONTINUED | OUTPATIENT
Start: 2020-10-17 | End: 2020-10-18

## 2020-10-17 RX ADMIN — OCTREOTIDE ACETATE 10 MICROGRAM(S)/HR: 200 INJECTION, SOLUTION INTRAVENOUS; SUBCUTANEOUS at 12:25

## 2020-10-17 RX ADMIN — MORPHINE SULFATE 4 MILLIGRAM(S): 50 CAPSULE, EXTENDED RELEASE ORAL at 03:18

## 2020-10-17 RX ADMIN — Medication 1 APPLICATION(S): at 17:21

## 2020-10-17 RX ADMIN — Medication 3 MILLIGRAM(S): at 21:16

## 2020-10-17 RX ADMIN — CHLORHEXIDINE GLUCONATE 1 APPLICATION(S): 213 SOLUTION TOPICAL at 00:30

## 2020-10-17 RX ADMIN — PANTOPRAZOLE SODIUM 40 MILLIGRAM(S): 20 TABLET, DELAYED RELEASE ORAL at 05:07

## 2020-10-17 RX ADMIN — MORPHINE SULFATE 4 MILLIGRAM(S): 50 CAPSULE, EXTENDED RELEASE ORAL at 03:06

## 2020-10-17 RX ADMIN — PANTOPRAZOLE SODIUM 40 MILLIGRAM(S): 20 TABLET, DELAYED RELEASE ORAL at 17:22

## 2020-10-17 RX ADMIN — LACTULOSE 20 GRAM(S): 10 SOLUTION ORAL at 23:33

## 2020-10-17 RX ADMIN — CEFTRIAXONE 100 MILLIGRAM(S): 500 INJECTION, POWDER, FOR SOLUTION INTRAMUSCULAR; INTRAVENOUS at 12:24

## 2020-10-17 NOTE — PROGRESS NOTE ADULT - ASSESSMENT
HPI:     60 y/o male morbidly obese male w/ PMHx of Hepatic Cirrhosis, ascites, Thrombocytopenia, CHF, HTN, HLD, recent admx for Hepatic Encephalopathy, noncompliant with lactulose presents to the ED due to AMS. Pt awake, lethargic only oriented to self, unable to provide any history. History obtained from ED/Chart. Per ED, Brother noticed worsening confusion x 1, noted again pt non-complaint with lactulose thus gave him 1 dose prior to his arrival in ED.    On arrival in ED, initial /60, , pt afebrile. CT head neg. Sig labs include WBC 12.37, h/h 7.5/24.3, PLT 89, LA 8, BUN/Cr 48/1.81, ammonia level 41, see below for rest of sig labs. Pt s/p paracentesis in ED, 2L removed cell ct not indicative of SBP. UA neg, CXR pre-valenzuela unremarkable. CT A/P showed portal hypertension with cirrhosis, splenomegaly, upper abdominal varices, moderate abdominopelvic ascites, pleural effusions, and anasarca.     (14 Oct 2020 03:19)  ----------------- As Above -----------------Patient seen earlier today.   The patient presents with a change in mental status. Patient has a history of Paulino and alcoholic cirrhosis. Patient is non complaint with Lactulose. Patient has had admissions previously for hepatic encephalopathy from non compliance with Lactulose. Patiet was found to have HGB of 7.5 with platelets of 89. INR > 2  The patient presents with severe lethargy. Ammonia only 41 but has elevated lactic acid levels. SBP in ER ruled out SBP  IN ER , patient was passing light brown liquid stool ( visualized )  ---- Patient later developed melena ( WNI ) HGB fell to 5.6, BUN increased to 56 and platelets fell to 45. Patient now in the ICU    Change in MS - . Significantly better. Patient probably had underlying sepsis Appreciate ID consult.   Melena - No N/V or melena. HGB stable . Patient pancytopenic with elevated INR, low platelets - Patient is not a candidate for immediate  EGD ( high INR, low platelets ) 1) Octreotide 2) Keep HGB > 8 3) Ceftriaxone 4) Advance diet as tolerated 5) EGD Monday  Elevated LFTs  - 3.2/33/32/81- better  === Discussed with staff

## 2020-10-17 NOTE — PROGRESS NOTE ADULT - ASSESSMENT
59M morbidly obese w/ SARKIS, CHF, HTN, HLD, BOB/NOELLE cirrhosis (BOB/NOELLE) w/ ascites and thrombocytopenia presents w/ AMS, likely due to decompensated cirrhosis/hepatic encephalopathy, anemia likely due to GIB, as well lactic acidosis and SBP. Mental status has improved but continues to be anemic despite transfusions.     #Neuro - AAOx3, at baseline  #CV - HD stable   #Pulm - satting well on RA  #ID- ascites fluid growing E. coli, consistent w/ SBP; pan sensitive, continue w/ ceftriaxone- plan for 5 days  #Renal/metabolic - MONI improving, electolytes acceptable, good UOP- continue to monitor  #GI- suspect UGIB as source of dropping hgb, on octreotide and protonix; advance to full liquid diet; awaiting decision on EGD  #Heme - s/p 6 pRBC, 2 FFP, 1 plt, Vitamin K, DDAVP and Kcentra- hgb has been stable x2; CT A/P did not show evidence of RP bleed; transfuse for hgb <7, correct coagulopathies as needed  #Endo - monitor BG  #Skin - s/p IV infiltration, LUE US shows superficial thrombophlebitis- conservative mgmt  #PPx - SCDs for DVT ppx  #Dispo- will recheck CBC this morning, if hgb remains stable will transfer pt to medicine service for further mgmt 59M morbidly obese w/ SARKIS, CHF, HTN, HLD, BOB/NOELLE cirrhosis (BOB/NOELLE) w/ ascites and thrombocytopenia presents w/ AMS, likely due to decompensated cirrhosis/hepatic encephalopathy, anemia likely due to GIB, as well lactic acidosis and SBP. Mental status has improved but continues to be anemic despite transfusions.     #Neuro - AAOx3, at baseline  #CV - HD stable   #Pulm - satting well on RA  #ID- ascites fluid growing E. coli, consistent w/ SBP; pan sensitive, continue w/ ceftriaxone- plan for 5 days  #Renal/metabolic - MONI improving, electolytes acceptable, good UOP- continue to monitor  #GI- suspect UGIB as source of dropping hgb, on octreotide and protonix; advance to full liquid diet; awaiting decision on EGD  #Heme - s/p 6 pRBC, 2 FFP, 1 plt, Vitamin K, DDAVP and Kcentra- hgb has been stable x2; CT A/P did not show evidence of RP bleed; transfuse for hgb <7, correct coagulopathies as needed  #Endo - monitor BG  #Skin - s/p IV infiltration, LUE US shows superficial thrombophlebitis- conservative mgmt, silvadene to area of skin tears  #PPx - SCDs for DVT ppx  #Dispo- will recheck CBC this morning, if hgb remains stable will transfer pt to medicine service for further mgmt; PT consult

## 2020-10-17 NOTE — CHART NOTE - NSCHARTNOTEFT_GEN_A_CORE
59M morbidly obese w/ SARKIS, CHF, HTN, HLD, BOB/NOELLE cirrhosis (BOB/NOELLE) w/ ascites and thrombocytopenia presents w/ AMS, likely due to decompensated cirrhosis/hepatic encephalopathy, anemia likely due to GIB, as well lactic acidosis and SBP. Mental status has improved but continues to be anemic despite transfusions.  patient had paracentesis for SBP in ER ascites fluid growing E. coli, consistent w/ SBP; pan sensitive, continue w/ ceftriaxone- plan for 5 days. On admission MONI now improving continue to monitor . Patient has good urine output. Continue with octreotide and Protonix; advance to full liquid diet; awaiting decision on EGD.  Now s/p 6 pRBC, 2 FFP, 1 plt, Vitamin K, DDAVP and Kcentra- hgb has been stable x2; CT A/P did not show evidence of RP bleed; transfuse for hgb <7,  IV infiltration, LUE US shows superficial thrombophlebitis- conservative management, silvadene to area of skin tears. PT consult called. Patient seen and examined by ICU attending and stable for transfer to medicine service.     Report given to         hospitalist service    Maliha Esteves, [place]IANA-C   critical care

## 2020-10-17 NOTE — PROGRESS NOTE ADULT - SUBJECTIVE AND OBJECTIVE BOX
SANTOS BELLE  MRN-[id number]    Follow Up:  ID following for SBP    Interval History: feels very hot, denies all other complaints     ROS:    [ ] Unobtainable because:  [X ] All other systems negative    Constitutional: no fever, no chills  Head: no trauma  Eyes: no vision changes, no eye pain  ENT:  no sore throat, no rhinorrhea  Cardiovascular:  no chest pain, no palpitation  Respiratory:  no SOB, no cough  GI:  no abd pain, no vomiting, no diarrhea  urinary: no dysuria, no hematuria, no flank pain  musculoskeletal:  no joint pain, no joint swelling  skin:  no rash  neurology:  no headache, no seizure, no change in mental status  psych: no anxiety, no depression         Allergies  No Known Allergies        ANTIMICROBIALS:  cefTRIAXone   IVPB 1000 every 24 hours      MEDICATIONS  (STANDING):  lactulose Syrup 20 four times a day  melatonin 3 at bedtime  octreotide  Infusion 50 <Continuous>  pantoprazole  Injectable 40 every 12 hours      PRN      Vital Signs Last 24 Hrs  T(F): 98.4 (10-17-20 @ 17:18), Max: 98.7 (10-17-20 @ 03:00)  HR: 74 (10-17-20 @ 17:18)  BP: 106/57 (10-17-20 @ 17:18)  RR: 18 (10-17-20 @ 17:18)  SpO2: 100% (10-17-20 @ 17:18) (92% - 100%)  Wt(kg): --    Physical Exam:  General:    NAD,  non toxic, obese  Head: atraumatic, normocephalic  Eye: mildly icteric sclera  ENT:    no oral lesions, neck supple  Cardio:     regular S1, S2,  no murmur  Respiratory:    clear b/l,    no wheezing  abd:     soft,   BS +,   no tenderness, site of paracentesis draining orange fluid into collection bag, positive fluid wave, obese  :   no CVAT,  no suprapubic tenderness,   Musculoskeletal:   no joint swelling,   +edema  vascular: no central lines, +PIV   Skin:    left upper ext with extensive ecchymosis  Neurologic:     no focal deficit, a lot more coherent today and alert and awake x3  psych: normal affect    Labs:    WBC Count: 3.19 K/uL (10-17 @ 11:45)  WBC Count: 2.74 K/uL (10-17 @ 03:33)  WBC Count: 2.98 K/uL (10-16 @ 15:49)  WBC Count: 2.13 K/uL (10-16 @ 05:26)  WBC Count: 2.49 K/uL (10-15 @ 20:52)  WBC Count: 2.57 K/uL (10-15 @ 17:45)  WBC Count: 3.57 K/uL (10-15 @ 05:32)                            7.9    3.19  )-----------( 42       ( 17 Oct 2020 11:45 )             23.5       10-17    135  |  105  |  40<H>  ----------------------------<  87  3.8   |  26  |  1.31<H>    Ca    7.6<L>      17 Oct 2020 03:33  Phos  2.7     10-17  Mg     2.2     10-17    TPro  6.0  /  Alb  1.9<L>  /  TBili  3.2<H>  /  DBili  x   /  AST  33  /  ALT  32  /  AlkPhos  81  10-17      Creatinine Trend: 1.31<--, 1.34<--, 1.47<--, 1.68<--, 1.69<--, 1.81<--    Lactate, Blood: 1.0 mmol/L (10-17-20 @ 03:33)      Urinalysis Basic - ( 14 Oct 2020 03:21 )    Color: Yellow / Appearance: Clear / S.015 / pH: x  Gluc: x / Ketone: Negative  / Bili: Small / Urobili: Negative mg/dL   Blood: x / Protein: 15 mg/dL / Nitrite: Negative   Leuk Esterase: Trace / RBC: 3-5 /HPF / WBC 3-5   Sq Epi: x / Non Sq Epi: Few / Bacteria: Occasional        Creatinine Trend: 1.47<--, 1.68<--, 1.69<--, 1.81<--    Lactate, Blood: 1.1 mmol/L (10-15-20 @ 05:32)  Lactate, Blood: 4.8 mmol/L (10-14-20 @ 06:36)  Lactate, Blood: 6.9 mmol/L (10-14-20 @ 01:21)  Lactate, Blood: 8.0 mmol/L (10-13-20 @ 22:59)      MICROBIOLOGY:  v  .Urine Clean Catch (Midstream)  10-14-20   <10,000 CFU/mL Normal Urogenital Blanka  --  --      .Blood Blood-Peripheral  10-14-20   No growth to date.  --  --    Culture - Body Fluid with Gram Stain (10.14.20 @ 05:40)    -  Amikacin: S <=16    -  Amoxicillin/Clavulanic Acid: S <=8/4    -  Ampicillin: S <=8 These ampicillin results predict results for amoxicillin    -  Ampicillin/Sulbactam: S <=4/2 Enterobacter, Citrobacter, and Serratia may develop resistance during prolonged therapy (3-4 days)    -  Aztreonam: S <=4    -  Cefazolin: S <=2 Enterobacter, Citrobacter, and Serratia may develop resistance during prolonged therapy (3-4 days)    -  Cefepime: S <=2    -  Cefoxitin: S <=8    -  Ceftriaxone: S <=1 Enterobacter, Citrobacter, and Serratia may develop resistance during prolonged therapy    -  Ciprofloxacin: S <=0.25    -  Ertapenem: S <=0.5    -  Gentamicin: S <=2    -  Imipenem: S <=1    -  Levofloxacin: S <=0.5    -  Meropenem: S <=1    -  Piperacillin/Tazobactam: S <=8    -  Tobramycin: S <=2    -  Trimethoprim/Sulfamethoxazole: S <=0.5/9.5    Gram Stain:   polymorphonuclear leukocytes seen  No organisms seen  by cytocentrifuge    Specimen Source: .Body Fluid Peritoneal Fluid    Culture Results:   Growth in fluid media only Escherichia coli    Organism Identification: Escherichia coli    Organism: Escherichia coli    Method Type: JOSE M      Rapid RVP Result: NotDetec (10-14 @ 01:49)    RADIOLOGY:  < from: CT Abdomen and Pelvis No Cont (10.16.20 @ 13:15) >  There is no retroperitoneal or rectus sheath hematoma. No intraperitoneal blood.  Gallstones noted. No biliary dilatation. Cirrhotic liver with splenomegaly small amount of abdominal pelvic ascites upper abdominal varices similar to prior. Extensive diffuse anasarca similar to prior.  There is a small umbilical hernia containing some fluid and possibly a nonobstructive bowel loop of small bowel. There is no bowelobstruction or gross bowel inflammation. No extraluminal gas.  Remainder study unchanged.    Impression:  Severely compromised by extensive scan artifact.  No evidence of retroperitoneal bleed.  Findings as discussed    < end of copied text >

## 2020-10-17 NOTE — PROGRESS NOTE ADULT - SUBJECTIVE AND OBJECTIVE BOX
58 y/o male morbidly obese male w/ PMHx of Hepatic Cirrhosis w/ ascites & Thrombocytopenia, morbid Obesity w/ BMI of 47, chronic diastolic CHF, HTN, HLD, recent admx for Hepatic Encephalopathy, noncompliant with lactulose presented w/ Metabolic Encephalopathy and acute blood loss anemia due to GI bleed. He is lying in bed in NAD.      MEDICATIONS  (STANDING):  cefTRIAXone   IVPB 1000 milliGRAM(s) IV Intermittent every 24 hours  chlorhexidine 4% Liquid 1 Application(s) Topical <User Schedule>  melatonin 3 milliGRAM(s) Oral at bedtime  octreotide  Infusion 50 MICROgram(s)/Hr (10 mL/Hr) IV Continuous <Continuous>  pantoprazole  Injectable 40 milliGRAM(s) IV Push every 12 hours  silver sulfADIAZINE 1% Cream 1 Application(s) Topical two times a day    MEDICATIONS  (PRN):      Allergies       Vital Signs Last 24 Hrs  T(C): 36.9 (17 Oct 2020 17:18), Max: 37.1 (17 Oct 2020 03:00)  T(F): 98.4 (17 Oct 2020 17:18), Max: 98.7 (17 Oct 2020 03:00)  HR: 74 (17 Oct 2020 17:18) (66 - 81)  BP: 106/57 (17 Oct 2020 17:18) (91/75 - 181/151)  BP(mean): 82 (17 Oct 2020 16:00) (65 - 161)  RR: 18 (17 Oct 2020 17:18) (12 - 20)  SpO2: 100% (17 Oct 2020 17:18) (92% - 100%)    PHYSICAL EXAM:  GENERAL: NAD, well-groomed, well-developed  HEAD:  Atraumatic, Normocephalic  EYES: EOMI, PERRLA   NECK: Supple   NERVOUS SYSTEM:  Alert    CHEST/LUNG: Clear to auscultation bilaterally; No rales, rhonchi, wheezing, or rubs  HEART: Regular rate and rhythm; No murmurs, rubs, or gallops  ABDOMEN: Soft, Nontender, ascitic   EXTREMITIES:  mild b/l edema    LABS:                        7.9    3.19  )-----------( 42       ( 17 Oct 2020 11:45 )             23.5     10-17    135  |  105  |  40<H>  ----------------------------<  87  3.8   |  26  |  1.31<H>    Ca    7.6<L>      17 Oct 2020 03:33  Phos  2.7     10-17  Mg     2.2     10-17    TPro  6.0  /  Alb  1.9<L>  /  TBili  3.2<H>  /  DBili  x   /  AST  33  /  ALT  32  /  AlkPhos  81  10-17    PT/INR - ( 16 Oct 2020 05:26 )   PT: 20.8 sec;   INR: 1.85 ratio         PTT - ( 15 Oct 2020 21:58 )  PTT:35.9 sec      Culture - Urine (collected 14 Oct 2020 10:16)  Source: .Urine Clean Catch (Midstream)  Final Report (15 Oct 2020 10:59):    <10,000 CFU/mL Normal Urogenital Blanka    Culture - Blood (collected 14 Oct 2020 09:40)  Source: .Blood Blood-Peripheral  Preliminary Report (15 Oct 2020 10:01):    No growth to date.    Culture - Blood (collected 14 Oct 2020 09:40)  Source: .Blood Blood-Peripheral  Preliminary Report (15 Oct 2020 10:01):    No growth to date.    Culture - Body Fluid with Gram Stain (collected 14 Oct 2020 05:40)  Source: .Body Fluid Peritoneal Fluid  Gram Stain (prelim) (15 Oct 2020 13:13):    polymorphonuclear leukocytes seen    No organisms seen    by cytocentrifuge  Preliminary Report (17 Oct 2020 09:59):    Growth in fluid media only Escherichia coli  Organism: Escherichia coli (17 Oct 2020 09:59)  Organism: Escherichia coli (17 Oct 2020 09:59)      RADIOLOGY & ADDITIONAL TESTS:    10-16-20 @ 07:01  -  10-17-20 @ 07:00  --------------------------------------------------------  IN:    IV PiggyBack: 50 mL    Octreotide: 230 mL    Oral Fluid: 1720 mL    Plasma: 326 mL    PRBCs (Packed Red Blood Cells): 277 mL  Total IN: 2603 mL    OUT:    Other (mL): 800 mL    Voided (mL): 2100 mL  Total OUT: 2900 mL    Total NET: -297 mL      10-17-20 @ 07:01  -  10-17-20 @ 18:53  --------------------------------------------------------  IN:    Octreotide: 100 mL    Oral Fluid: 930 mL  Total IN: 1030 mL    OUT:    Other (mL): 700 mL    Stool (mL): 1 mL    Voided (mL): 600 mL  Total OUT: 1301 mL    Total NET: -271 mL

## 2020-10-17 NOTE — PROGRESS NOTE ADULT - PROVIDER SPECIALTY LIST ADULT
Daughter of patient, Yaneth called on status of medication refills, she could not say what medications patient needed for sure, just that pharmacy told her they faxed request to us numerous times?  Please advise, I did ask her to find out specific medications and to call us back as well.   Infectious Disease

## 2020-10-17 NOTE — PROGRESS NOTE ADULT - SUBJECTIVE AND OBJECTIVE BOX
Patient is a 60y old  Male who presents with a chief complaint of AMS, sepsis (17 Oct 2020 07:48)      HPI:     60 y/o male morbidly obese male w/ PMHx of Hepatic Cirrhosis, ascites, Thrombocytopenia, CHF, HTN, HLD, recent admx for Hepatic Encephalopathy, noncompliant with lactulose presents to the ED due to AMS. Pt awake, lethargic only oriented to self, unable to provide any history. History obtained from ED/Chart. Per ED, Brother noticed worsening confusion x 1, noted again pt non-complaint with lactulose thus gave him 1 dose prior to his arrival in ED.    On arrival in ED, initial /59, , pt afebrile. CT head neg. Sig labs include WBC 12.37, h/h 7.5/24.3, PLT 89, LA 8, BUN/Cr 48/1.81, ammonia level 41, see below for rest of sig labs. Pt s/p paracentesis in ED, 2L removed cell ct not indicative of SBP. UA neg, CXR pre-valenzuela unremarkable. CT A/P showed portal hypertension with cirrhosis, splenomegaly, upper abdominal varices, moderate abdominopelvic ascites, pleural effusions, and anasarca.     (14 Oct 2020 03:19)      INTERVAL HPI/OVERNIGHT EVENTS:  MS good  The patient denies melena, hematochezia, hematemesis, nausea, vomiting, abdominal pain, constipation, diarrhea, or change in bowel movements     MEDICATIONS  (STANDING):  cefTRIAXone   IVPB 1000 milliGRAM(s) IV Intermittent every 24 hours  chlorhexidine 4% Liquid 1 Application(s) Topical <User Schedule>  melatonin 3 milliGRAM(s) Oral at bedtime  octreotide  Infusion 50 MICROgram(s)/Hr (10 mL/Hr) IV Continuous <Continuous>  pantoprazole  Injectable 40 milliGRAM(s) IV Push every 12 hours  silver sulfADIAZINE 1% Cream 1 Application(s) Topical two times a day    MEDICATIONS  (PRN):      FAMILY HISTORY:  No pertinent family history in first degree relatives        Allergies    No Known Allergies    Intolerances        PMH/PSH:  HLD (hyperlipidemia)    Hepatic cirrhosis    CHF (congestive heart failure)    Obese    Hyperlipidemia    Pneumonia    CHF (congestive heart failure)    History of chest tube placement    No significant past surgical history          REVIEW OF SYSTEMS:  CONSTITUTIONAL: No fever, weight loss, or fatigue  EYES: No eye pain, visual disturbances, or discharge  ENMT:  No difficulty hearing, tinnitus, vertigo; No sinus or throat pain  NECK: No pain or stiffness  BREASTS: No pain, masses, or nipple discharge  RESPIRATORY: No cough, wheezing, chills or hemoptysis; No shortness of breath  CARDIOVASCULAR: No chest pain, palpitations, dizziness, or leg swelling  GASTROINTESTINAL: See above  GENITOURINARY: No dysuria, frequency, hematuria, or incontinence  NEUROLOGICAL: No headaches, memory loss, loss of strength, numbness, or tremors  SKIN: No itching, burning, rashes, or lesions   LYMPH NODES: No enlarged glands  ENDOCRINE: No heat or cold intolerance; No hair loss  MUSCULOSKELETAL: No joint pain or swelling; No muscle, back, or extremity pain  PSYCHIATRIC: No depression, anxiety, mood swings, or difficulty sleeping  HEME/LYMPH: No easy bruising, or bleeding gums  ALLERGY AND IMMUNOLOGIC: No hives or eczema    Vital Signs Last 24 Hrs  T(C): 36.6 (17 Oct 2020 08:00), Max: 37.1 (17 Oct 2020 03:00)  T(F): 97.9 (17 Oct 2020 08:00), Max: 98.7 (17 Oct 2020 03:00)  HR: 67 (17 Oct 2020 14:00) (66 - 81)  BP: 136/53 (17 Oct 2020 14:00) (92/54 - 181/151)  BP(mean): 71 (17 Oct 2020 14:00) (65 - 161)  RR: 15 (17 Oct 2020 14:00) (12 - 21)  SpO2: 98% (17 Oct 2020 14:00) (92% - 100%)    PHYSICAL EXAM:  GENERAL: NAD, well-groomed, well-developed  HEAD:  Atraumatic, Normocephalic  EYES: EOMI, PERRLA, conjunctiva and sclera clear  NECK: Supple, No JVD, Normal thyroid  NERVOUS SYSTEM:  Alert & Oriented X3, Good concentration;  CHEST/LUNG: Clear to percussion bilaterally; No rales, rhonchi, wheezing, or rubs  HEART: Regular rate and rhythm; No murmurs, rubs, or gallops  ABDOMEN: Soft, Nontender, Nondistended; Bowel sounds present  EXTREMITIES:  2+ Peripheral Pulses, No clubbing, cyanosis, or edema  LYMPH: No lymphadenopathy noted  SKIN: No rashes or lesions    LAB  10-13 @ 22:57  amylase --   lipase 120                           7.9    3.19  )-----------( 42       ( 17 Oct 2020 11:45 )             23.5       CBC:  10-17 @ 11:45  WBC 3.19   Hgb 7.9   Hct 23.5   Plts 42  MCV 91.1  10-17 @ 03:33  WBC 2.74   Hgb 7.7   Hct 24.1   Plts 46  MCV 92.3  10-16 @ 15:49  WBC 2.98   Hgb 7.8   Hct 23.7   Plts 44  MCV 90.8  10-16 @ 05:26  WBC 2.13   Hgb 6.9   Hct 21.3   Plts 48  MCV 91.0  10-15 @ 20:52  WBC 2.49   Hgb 8.4   Hct 25.9   Plts 45  MCV 91.5  10-15 @ 17:45  WBC 2.57   Hgb 7.1   Hct 21.7   Plts 46  MCV 90.4  10-15 @ 05:32  WBC 3.57   Hgb 6.8   Hct 19.8   Plts 46  MCV 88.8  10-15 @ 00:42  WBC 4.71   Hgb 7.2   Hct 21.6   Plts 54  MCV 89.3  10-14 @ 06:43  WBC 6.74   Hgb 5.6   Hct 17.5   Plts 45  MCV 90.7  10-13 @ 22:57  WBC 12.37   Hgb 7.5   Hct 24.3   Plts 89  MCV 91.0      Chemistry:  10-17 @ 03:33  Na+ 135  K+ 3.8  Cl- 105  CO2 26  BUN 40  Cr 1.31     10-16 @ 05:26  Na+ 138  K+ 3.5  Cl- 106  CO2 28  BUN 52  Cr 1.34     10-15 @ 17:45  Na+ 137  K+ 3.6  Cl- 105  CO2 29  BUN 57  Cr 1.47     10-15 @ 00:42  Na+ 139  K+ 3.8  Cl- 107  CO2 28  BUN 66  Cr 1.68     10-14 @ 06:43  Na+ 140  K+ 4.0  Cl- 107  CO2 24  BUN 56  Cr 1.69     10-13 @ 22:57  Na+ 138  K+ 4.5  Cl- 104  CO2 21  BUN 48  Cr 1.81         Glucose, Serum: 87 mg/dL (10-17 @ 03:33)  Glucose, Serum: 78 mg/dL (10-16 @ 05:26)  Glucose, Serum: 87 mg/dL (10-15 @ 17:45)  Glucose, Serum: 95 mg/dL (10-15 @ 00:42)  Glucose, Serum: 137 mg/dL (10-14 @ 06:43)  Glucose, Serum: 128 mg/dL (10-13 @ 22:57)      17 Oct 2020 03:33    135    |  105    |  40     ----------------------------<  87     3.8     |  26     |  1.31   16 Oct 2020 05:26    138    |  106    |  52     ----------------------------<  78     3.5     |  28     |  1.34   15 Oct 2020 17:45    137    |  105    |  57     ----------------------------<  87     3.6     |  29     |  1.47   15 Oct 2020 00:42    139    |  107    |  66     ----------------------------<  95     3.8     |  28     |  1.68   14 Oct 2020 06:43    140    |  107    |  56     ----------------------------<  137    4.0     |  24     |  1.69   13 Oct 2020 22:57    138    |  104    |  48     ----------------------------<  128    4.5     |  21     |  1.81     Ca    7.6        17 Oct 2020 03:33  Ca    7.9        16 Oct 2020 05:26  Ca    7.7        15 Oct 2020 17:45  Ca    8.1        15 Oct 2020 00:42  Ca    8.1        14 Oct 2020 06:43  Ca    8.1        13 Oct 2020 22:57  Phos  2.7       17 Oct 2020 03:33  Phos  2.8       16 Oct 2020 05:26  Phos  3.2       15 Oct 2020 17:45  Phos  3.4       15 Oct 2020 00:42  Mg     2.2       17 Oct 2020 03:33  Mg     2.3       16 Oct 2020 05:26  Mg     1.9       15 Oct 2020 17:45  Mg     2.0       15 Oct 2020 00:42  Mg     1.9       14 Oct 2020 06:43    TPro  6.0    /  Alb  1.9    /  TBili  3.2    /  DBili  x      /  AST  33     /  ALT  32     /  AlkPhos  81     17 Oct 2020 03:33  TPro  5.9    /  Alb  2.0    /  TBili  3.8    /  DBili  x      /  AST  33     /  ALT  32     /  AlkPhos  82     16 Oct 2020 05:26  TPro  5.8    /  Alb  1.9    /  TBili  4.1    /  DBili  x      /  AST  37     /  ALT  33     /  AlkPhos  80     15 Oct 2020 17:45  TPro  6.8    /  Alb  2.1    /  TBili  4.3    /  DBili  1.22   /  AST  51     /  ALT  43     /  AlkPhos  91     15 Oct 2020 00:42  TPro  5.9    /  Alb  2.0    /  TBili  4.8    /  DBili  x      /  AST  54     /  ALT  34     /  AlkPhos  90     14 Oct 2020 06:43  TPro  7.2    /  Alb  2.0    /  TBili  6.4    /  DBili  x      /  AST  49     /  ALT  40     /  AlkPhos  122    13 Oct 2020 22:57      PT/INR - ( 16 Oct 2020 05:26 )   PT: 20.8 sec;   INR: 1.85 ratio         PTT - ( 15 Oct 2020 21:58 )  PTT:35.9 sec        CAPILLARY BLOOD GLUCOSE              RADIOLOGY & ADDITIONAL TESTS:    Imaging Personally Reviewed:  [ ] YES  [ ] NO    Consultant(s) Notes Reviewed:  [ ] YES  [ ] NO    Care Discussed with Consultants/Other Providers [ ] YES  [ ] NO

## 2020-10-17 NOTE — PROGRESS NOTE ADULT - ASSESSMENT
58 y/o male morbidly obese male w/ PMHx of Hepatic Cirrhosis w/ ascites & Thrombocytopenia, morbid Obesity w/ BMI of 47, chronic diastolic CHF, HTN, HLD, recent admx for Hepatic Encephalopathy, noncompliant with lactulose presented w/ Metabolic Encephalopathy and acute blood loss anemia due to GI bleed.     Acute blood loss anemia due to GI bleed  - s/p 6 pRBC, 2 FFP, 1 plt, Vitamin K, DDAVP and Kcentra  - patient is on Octreotide and Protonix drips  - GI planning  EGD on Monday  - c/w Ceftriaxone prophylaxis  - Hgb dropped today, but there was no evidence of retroperitoneal bleed on CT     Sepsis POA due to SBP  - ascites fluid growing E. coli  - c/w Ceftriaxone  - ID following    Septic encephalopathy  - resolved  - due to SBP    Alcoholic cirrhosis w/ ascites, coagulopathy, pancytopenia and hepatic encephalopathy   - abd CT showed portal hypertension with cirrhosis, splenomegaly, upper abdominal varices, moderate abdominopelvic ascites, pleural effusions, and anasarca  - s/p 2L paracentesis in ED   - c/w lactulose    MONI  - likely prerenal  - improving post transfusion    Superficial thrombophlebitis of the left basilic and cephalic veins on US  - local management  - cannot give NSAIDS given MONI and GI bleed    DVT prophylaxis  SVT: SCD  GI: Protonix

## 2020-10-17 NOTE — PROGRESS NOTE ADULT - SUBJECTIVE AND OBJECTIVE BOX
CHIEF COMPLAINT:    Interval Events:    REVIEW OF SYSTEMS:  Constitutional: [ ] fevers [ ] chills [ ] weight loss [ ] weight gain  HEENT: [ ] dry eyes [ ] eye irritation [ ] postnasal drip [ ] nasal congestion  CV: [ ] chest pain [ ] orthopnea [ ] palpitations [ ] murmur  Resp: [ ] cough [ ] shortness of breath [ ] dyspnea [ ] wheezing [ ] sputum [ ] hemoptysis  GI: [ ] nausea [ ] vomiting [ ] diarrhea [ ] constipation [ ] abd pain [ ] dysphagia   : [ ] dysuria [ ] nocturia [ ] hematuria [ ] increased urinary frequency  Musculoskeletal: [ ] back pain [ ] myalgias [ ] arthralgias [ ] fracture  Skin: [ ] rash [ ] itch  Neurological: [ ] headache [ ] dizziness [ ] syncope [ ] weakness [ ] numbness  Hematologic/Lymphatic: [ ] anemia [ ] bleeding problem  Allergic/Immunologic: [ ] itchy eyes [ ] nasal discharge [ ] hives [ ] angioedema  [ ] All other systems negative  [ ] Unable to assess ROS because ________    OBJECTIVE:  ICU Vital Signs Last 24 Hrs  T(C): 37.1 (17 Oct 2020 03:00), Max: 37.1 (16 Oct 2020 09:30)  T(F): 98.7 (17 Oct 2020 03:00), Max: 98.8 (16 Oct 2020 09:30)  HR: 71 (17 Oct 2020 07:00) (66 - 89)  BP: 132/63 (17 Oct 2020 07:00) (92/54 - 144/64)  BP(mean): 78 (17 Oct 2020 07:00) (65 - 102)  ABP: --  ABP(mean): --  RR: 12 (17 Oct 2020 07:00) (12 - 27)  SpO2: 100% (17 Oct 2020 07:00) (94% - 100%)        10-16 @ 07:01  -  10-17 @ 07:00  --------------------------------------------------------  IN: 2603 mL / OUT: 2900 mL / NET: -297 mL      CAPILLARY BLOOD GLUCOSE          PHYSICAL EXAM:  General:   HEENT:   Neck:   Respiratory:   Cardiovascular:   Abdomen:   Extremities:   Skin:   Neurological:  Psychiatry:    LINES:    HOSPITAL MEDICATIONS:  MEDICATIONS  (STANDING):  cefTRIAXone   IVPB 1000 milliGRAM(s) IV Intermittent every 24 hours  chlorhexidine 4% Liquid 1 Application(s) Topical <User Schedule>  octreotide  Infusion 50 MICROgram(s)/Hr (10 mL/Hr) IV Continuous <Continuous>  pantoprazole  Injectable 40 milliGRAM(s) IV Push every 12 hours    MEDICATIONS  (PRN):      LABS:                        7.7    2.74  )-----------( 46       ( 17 Oct 2020 03:33 )             24.1     Hgb Trend: 7.7<--, 7.8<--, 6.9<--, 8.4<--, 7.1<--  10-17    135  |  105  |  40<H>  ----------------------------<  87  3.8   |  26  |  1.31<H>    Ca    7.6<L>      17 Oct 2020 03:33  Phos  2.7     10-17  Mg     2.2     10-17    TPro  6.0  /  Alb  1.9<L>  /  TBili  3.2<H>  /  DBili  x   /  AST  33  /  ALT  32  /  AlkPhos  81  10-17    PT/INR - ( 16 Oct 2020 05:26 )   PT: 20.8 sec;   INR: 1.85 ratio         PTT - ( 15 Oct 2020 21:58 )  PTT:35.9 sec          MICROBIOLOGY:     RADIOLOGY:  [ ] Reviewed and interpreted by me CHIEF COMPLAINT:    Interval Events:  no o/n events    REVIEW OF SYSTEMS:  Constitutional: [- ] fevers [- ] chills [ ] weight loss [ ] weight gain  HEENT: [ ] dry eyes [ ] eye irritation [ ] postnasal drip [ ] nasal congestion  CV: [ -] chest pain [- ] orthopnea [ ] palpitations [ ] murmur  Resp: [ -] cough [ -] shortness of breath [ -] dyspnea [- ] wheezing [ ] sputum [ ] hemoptysis  GI: [ -] nausea [ -] vomiting [- ] diarrhea [- ] constipation [- ] abd pain [ ] dysphagia   : [- ] dysuria [ ] nocturia [ ] hematuria [ ] increased urinary frequency  Musculoskeletal: [- ] back pain [ ] myalgias [ ] arthralgias [ ] fracture  Skin: [- ] rash [ ] itch  Neurological: [ -] headache [ ] dizziness [ ] syncope [ -] weakness [ ] numbness  Hematologic/Lymphatic: [+ ] anemia [ +] bleeding problem  Allergic/Immunologic: [ ] itchy eyes [ ] nasal discharge [ ] hives [ ] angioedema  [x ] All other systems negative    OBJECTIVE:  ICU Vital Signs Last 24 Hrs  T(C): 37.1 (17 Oct 2020 03:00), Max: 37.1 (16 Oct 2020 09:30)  T(F): 98.7 (17 Oct 2020 03:00), Max: 98.8 (16 Oct 2020 09:30)  HR: 71 (17 Oct 2020 07:00) (66 - 89)  BP: 132/63 (17 Oct 2020 07:00) (92/54 - 144/64)  BP(mean): 78 (17 Oct 2020 07:00) (65 - 102)  ABP: --  ABP(mean): --  RR: 12 (17 Oct 2020 07:00) (12 - 27)  SpO2: 100% (17 Oct 2020 07:00) (94% - 100%)        10-16 @ 07:01  -  10-17 @ 07:00  --------------------------------------------------------  IN: 2603 mL / OUT: 2900 mL / NET: -297 mL      CAPILLARY BLOOD GLUCOSE          PHYSICAL EXAM:  General: NAD, non toxic appearing, pale appearing  HEENT: dry MM, EOMI  Neck: supple  Respiratory: CTA b/l  Cardiovascular: s1s2 RRR  Abdomen: soft, non tender, non distended, small amount of ascitic leak  Extremities: LUE ecchymosis and skin tearing noted; warm, trace pitting edema, no clubbing  Skin: intact  Neurological: no focal deficits  Psychiatry: AAOx3    LINES:    HOSPITAL MEDICATIONS:  MEDICATIONS  (STANDING):  cefTRIAXone   IVPB 1000 milliGRAM(s) IV Intermittent every 24 hours  chlorhexidine 4% Liquid 1 Application(s) Topical <User Schedule>  octreotide  Infusion 50 MICROgram(s)/Hr (10 mL/Hr) IV Continuous <Continuous>  pantoprazole  Injectable 40 milliGRAM(s) IV Push every 12 hours    MEDICATIONS  (PRN):      LABS:                        7.7    2.74  )-----------( 46       ( 17 Oct 2020 03:33 )             24.1     Hgb Trend: 7.7<--, 7.8<--, 6.9<--, 8.4<--, 7.1<--  10-17    135  |  105  |  40<H>  ----------------------------<  87  3.8   |  26  |  1.31<H>    Ca    7.6<L>      17 Oct 2020 03:33  Phos  2.7     10-17  Mg     2.2     10-17    TPro  6.0  /  Alb  1.9<L>  /  TBili  3.2<H>  /  DBili  x   /  AST  33  /  ALT  32  /  AlkPhos  81  10-17    PT/INR - ( 16 Oct 2020 05:26 )   PT: 20.8 sec;   INR: 1.85 ratio         PTT - ( 15 Oct 2020 21:58 )  PTT:35.9 sec          MICROBIOLOGY:     RADIOLOGY:  [ ] Reviewed and interpreted by me

## 2020-10-18 LAB
ALBUMIN SERPL ELPH-MCNC: 1.8 G/DL — LOW (ref 3.3–5)
ALP SERPL-CCNC: 81 U/L — SIGNIFICANT CHANGE UP (ref 40–120)
ALT FLD-CCNC: 29 U/L — SIGNIFICANT CHANGE UP (ref 12–78)
ANION GAP SERPL CALC-SCNC: 2 MMOL/L — LOW (ref 5–17)
AST SERPL-CCNC: 31 U/L — SIGNIFICANT CHANGE UP (ref 15–37)
BILIRUB SERPL-MCNC: 2.8 MG/DL — HIGH (ref 0.2–1.2)
BUN SERPL-MCNC: 31 MG/DL — HIGH (ref 7–23)
CALCIUM SERPL-MCNC: 7.5 MG/DL — LOW (ref 8.5–10.1)
CHLORIDE SERPL-SCNC: 104 MMOL/L — SIGNIFICANT CHANGE UP (ref 96–108)
CO2 SERPL-SCNC: 29 MMOL/L — SIGNIFICANT CHANGE UP (ref 22–31)
CREAT SERPL-MCNC: 1.22 MG/DL — SIGNIFICANT CHANGE UP (ref 0.5–1.3)
GLUCOSE SERPL-MCNC: 87 MG/DL — SIGNIFICANT CHANGE UP (ref 70–99)
HCT VFR BLD CALC: 23.7 % — LOW (ref 39–50)
HGB BLD-MCNC: 7.9 G/DL — LOW (ref 13–17)
INR BLD: 2.03 RATIO — HIGH (ref 0.88–1.16)
MAGNESIUM SERPL-MCNC: 1.9 MG/DL — SIGNIFICANT CHANGE UP (ref 1.6–2.6)
MCHC RBC-ENTMCNC: 30.5 PG — SIGNIFICANT CHANGE UP (ref 27–34)
MCHC RBC-ENTMCNC: 33.3 GM/DL — SIGNIFICANT CHANGE UP (ref 32–36)
MCV RBC AUTO: 91.5 FL — SIGNIFICANT CHANGE UP (ref 80–100)
NRBC # BLD: 0 /100 WBCS — SIGNIFICANT CHANGE UP (ref 0–0)
PHOSPHATE SERPL-MCNC: 2.7 MG/DL — SIGNIFICANT CHANGE UP (ref 2.5–4.5)
PLATELET # BLD AUTO: 48 K/UL — LOW (ref 150–400)
POTASSIUM SERPL-MCNC: 3.7 MMOL/L — SIGNIFICANT CHANGE UP (ref 3.5–5.3)
POTASSIUM SERPL-SCNC: 3.7 MMOL/L — SIGNIFICANT CHANGE UP (ref 3.5–5.3)
PROT SERPL-MCNC: 5.8 GM/DL — LOW (ref 6–8.3)
PROTHROM AB SERPL-ACNC: 22.8 SEC — HIGH (ref 10.6–13.6)
RBC # BLD: 2.59 M/UL — LOW (ref 4.2–5.8)
RBC # FLD: 19.7 % — HIGH (ref 10.3–14.5)
SODIUM SERPL-SCNC: 135 MMOL/L — SIGNIFICANT CHANGE UP (ref 135–145)
WBC # BLD: 3.3 K/UL — LOW (ref 3.8–10.5)
WBC # FLD AUTO: 3.3 K/UL — LOW (ref 3.8–10.5)

## 2020-10-18 PROCEDURE — 99232 SBSQ HOSP IP/OBS MODERATE 35: CPT

## 2020-10-18 RX ORDER — PHYTONADIONE (VIT K1) 5 MG
10 TABLET ORAL ONCE
Refills: 0 | Status: COMPLETED | OUTPATIENT
Start: 2020-10-18 | End: 2020-10-18

## 2020-10-18 RX ORDER — NYSTATIN CREAM 100000 [USP'U]/G
1 CREAM TOPICAL
Refills: 0 | Status: DISCONTINUED | OUTPATIENT
Start: 2020-10-18 | End: 2020-10-23

## 2020-10-18 RX ORDER — LACTULOSE 10 G/15ML
20 SOLUTION ORAL
Refills: 0 | Status: DISCONTINUED | OUTPATIENT
Start: 2020-10-18 | End: 2020-10-23

## 2020-10-18 RX ADMIN — CEFTRIAXONE 100 MILLIGRAM(S): 500 INJECTION, POWDER, FOR SOLUTION INTRAMUSCULAR; INTRAVENOUS at 11:58

## 2020-10-18 RX ADMIN — Medication 1 APPLICATION(S): at 18:28

## 2020-10-18 RX ADMIN — Medication 3 MILLIGRAM(S): at 23:13

## 2020-10-18 RX ADMIN — PANTOPRAZOLE SODIUM 40 MILLIGRAM(S): 20 TABLET, DELAYED RELEASE ORAL at 18:28

## 2020-10-18 RX ADMIN — Medication 1 APPLICATION(S): at 05:27

## 2020-10-18 RX ADMIN — OCTREOTIDE ACETATE 10 MICROGRAM(S)/HR: 200 INJECTION, SOLUTION INTRAVENOUS; SUBCUTANEOUS at 13:20

## 2020-10-18 RX ADMIN — Medication 10 MILLIGRAM(S): at 16:29

## 2020-10-18 RX ADMIN — NYSTATIN CREAM 1 APPLICATION(S): 100000 CREAM TOPICAL at 18:28

## 2020-10-18 RX ADMIN — CHLORHEXIDINE GLUCONATE 1 APPLICATION(S): 213 SOLUTION TOPICAL at 07:45

## 2020-10-18 RX ADMIN — LACTULOSE 20 GRAM(S): 10 SOLUTION ORAL at 11:58

## 2020-10-18 RX ADMIN — LACTULOSE 20 GRAM(S): 10 SOLUTION ORAL at 05:27

## 2020-10-18 RX ADMIN — PANTOPRAZOLE SODIUM 40 MILLIGRAM(S): 20 TABLET, DELAYED RELEASE ORAL at 05:27

## 2020-10-18 NOTE — PROGRESS NOTE ADULT - SUBJECTIVE AND OBJECTIVE BOX
Patient is a 60y old  Male who presents with a chief complaint of AMS, sepsis (17 Oct 2020 23:20)      HPI:     60 y/o male morbidly obese male w/ PMHx of Hepatic Cirrhosis, ascites, Thrombocytopenia, CHF, HTN, HLD, recent admx for Hepatic Encephalopathy, noncompliant with lactulose presents to the ED due to AMS. Pt awake, lethargic only oriented to self, unable to provide any history. History obtained from ED/Chart. Per ED, Brother noticed worsening confusion x 1, noted again pt non-complaint with lactulose thus gave him 1 dose prior to his arrival in ED.    On arrival in ED, initial /60, , pt afebrile. CT head neg. Sig labs include WBC 12.37, h/h 7.5/24.3, PLT 89, LA 8, BUN/Cr 48/1.81, ammonia level 41, see below for rest of sig labs. Pt s/p paracentesis in ED, 2L removed cell ct not indicative of SBP. UA neg, CXR pre-valenzuela unremarkable. CT A/P showed portal hypertension with cirrhosis, splenomegaly, upper abdominal varices, moderate abdominopelvic ascites, pleural effusions, and anasarca.     (14 Oct 2020 03:19)      INTERVAL HPI/OVERNIGHT EVENTS:  The patient denies melena, hematochezia, hematemesis, nausea, vomiting, abdominal pain, constipation, diarrhea, or change in bowel movements Tolerating regular diet    MEDICATIONS  (STANDING):  cefTRIAXone   IVPB 1000 milliGRAM(s) IV Intermittent every 24 hours  chlorhexidine 4% Liquid 1 Application(s) Topical <User Schedule>  lactulose Syrup 20 Gram(s) Oral four times a day  melatonin 3 milliGRAM(s) Oral at bedtime  octreotide  Infusion 50 MICROgram(s)/Hr (10 mL/Hr) IV Continuous <Continuous>  pantoprazole  Injectable 40 milliGRAM(s) IV Push every 12 hours  phytonadione  IVPB 10 milliGRAM(s) IV Intermittent once  silver sulfADIAZINE 1% Cream 1 Application(s) Topical two times a day    MEDICATIONS  (PRN):      FAMILY HISTORY:  No pertinent family history in first degree relatives        Allergies    No Known Allergies    Intolerances        PMH/PSH:  HLD (hyperlipidemia)    Hepatic cirrhosis    CHF (congestive heart failure)    Obese    Hyperlipidemia    Pneumonia    CHF (congestive heart failure)    History of chest tube placement    No significant past surgical history          REVIEW OF SYSTEMS:  CONSTITUTIONAL: No fever, weight loss, or fatigue  EYES: No eye pain, visual disturbances, or discharge  ENMT:  No difficulty hearing, tinnitus, vertigo; No sinus or throat pain  NECK: No pain or stiffness  BREASTS: No pain, masses, or nipple discharge  RESPIRATORY: No cough, wheezing, chills or hemoptysis; No shortness of breath  CARDIOVASCULAR: No chest pain, palpitations, dizziness, or leg swelling  GASTROINTESTINAL: See above  GENITOURINARY: No dysuria, frequency, hematuria, or incontinence  NEUROLOGICAL: No headaches, memory loss, loss of strength, numbness, or tremors  SKIN: No itching, burning, rashes, or lesions   LYMPH NODES: No enlarged glands  ENDOCRINE: No heat or cold intolerance; No hair loss  MUSCULOSKELETAL: No joint pain or swelling; No muscle, back, or extremity pain  PSYCHIATRIC: No depression, anxiety, mood swings, or difficulty sleeping  HEME/LYMPH: No easy bruising, or bleeding gums  ALLERGY AND IMMUNOLOGIC: No hives or eczema    Vital Signs Last 24 Hrs  T(C): 36.7 (18 Oct 2020 11:43), Max: 37 (17 Oct 2020 16:00)  T(F): 98 (18 Oct 2020 11:43), Max: 98.6 (17 Oct 2020 16:00)  HR: 70 (18 Oct 2020 11:43) (65 - 74)  BP: 149/66 (18 Oct 2020 11:43) (91/75 - 149/66)  BP(mean): 82 (17 Oct 2020 16:00) (71 - 82)  RR: 18 (18 Oct 2020 11:43) (13 - 20)  SpO2: 97% (18 Oct 2020 11:43) (97% - 100%)    PHYSICAL EXAM:  GENERAL: NAD, well-groomed, well-developed  HEAD:  Atraumatic, Normocephalic  EYES: EOMI, PERRLA, conjunctiva and sclera clear  NECK: Supple, No JVD, Normal thyroid  NERVOUS SYSTEM:  Alert & Oriented X3, Good concentration; No asterixis  CHEST/LUNG: Clear to percussion bilaterally; No rales, rhonchi, wheezing, or rubs  HEART: Regular rate and rhythm; No murmurs, rubs, or gallops  ABDOMEN: Soft, Nontender, Nondistended; Bowel sounds present  EXTREMITIES:  2+ Peripheral Pulses, No clubbing, cyanosis, or edema  LYMPH: No lymphadenopathy noted  SKIN: No rashes or lesions    LAB  10-13 @ 22:57  amylase --   lipase 120                           7.9    3.30  )-----------( 48       ( 18 Oct 2020 07:11 )             23.7       CBC:  10-18 @ 07:11  WBC 3.30   Hgb 7.9   Hct 23.7   Plts 48  MCV 91.5  10-17 @ 11:45  WBC 3.19   Hgb 7.9   Hct 23.5   Plts 42  MCV 91.1  10-17 @ 03:33  WBC 2.74   Hgb 7.7   Hct 24.1   Plts 46  MCV 92.3  10-16 @ 15:49  WBC 2.98   Hgb 7.8   Hct 23.7   Plts 44  MCV 90.8  10-16 @ 05:26  WBC 2.13   Hgb 6.9   Hct 21.3   Plts 48  MCV 91.0  10-15 @ 20:52  WBC 2.49   Hgb 8.4   Hct 25.9   Plts 45  MCV 91.5  10-15 @ 17:45  WBC 2.57   Hgb 7.1   Hct 21.7   Plts 46  MCV 90.4  10-15 @ 05:32  WBC 3.57   Hgb 6.8   Hct 19.8   Plts 46  MCV 88.8  10-15 @ 00:42  WBC 4.71   Hgb 7.2   Hct 21.6   Plts 54  MCV 89.3  10-14 @ 06:43  WBC 6.74   Hgb 5.6   Hct 17.5   Plts 45  MCV 90.7      Chemistry:  10-18 @ 07:11  Na+ 135  K+ 3.7  Cl- 104  CO2 29  BUN 31  Cr 1.22     10-17 @ 03:33  Na+ 135  K+ 3.8  Cl- 105  CO2 26  BUN 40  Cr 1.31     10-16 @ 05:26  Na+ 138  K+ 3.5  Cl- 106  CO2 28  BUN 52  Cr 1.34     10-15 @ 17:45  Na+ 137  K+ 3.6  Cl- 105  CO2 29  BUN 57  Cr 1.47     10-15 @ 00:42  Na+ 139  K+ 3.8  Cl- 107  CO2 28  BUN 66  Cr 1.68     10-14 @ 06:43  Na+ 140  K+ 4.0  Cl- 107  CO2 24  BUN 56  Cr 1.69     10-13 @ 22:57  Na+ 138  K+ 4.5  Cl- 104  CO2 21  BUN 48  Cr 1.81         Glucose, Serum: 87 mg/dL (10-18 @ 07:11)  Glucose, Serum: 87 mg/dL (10-17 @ 03:33)  Glucose, Serum: 78 mg/dL (10-16 @ 05:26)  Glucose, Serum: 87 mg/dL (10-15 @ 17:45)  Glucose, Serum: 95 mg/dL (10-15 @ 00:42)  Glucose, Serum: 137 mg/dL (10-14 @ 06:43)  Glucose, Serum: 128 mg/dL (10-13 @ 22:57)      18 Oct 2020 07:11    135    |  104    |  31     ----------------------------<  87     3.7     |  29     |  1.22   17 Oct 2020 03:33    135    |  105    |  40     ----------------------------<  87     3.8     |  26     |  1.31   16 Oct 2020 05:26    138    |  106    |  52     ----------------------------<  78     3.5     |  28     |  1.34   15 Oct 2020 17:45    137    |  105    |  57     ----------------------------<  87     3.6     |  29     |  1.47   15 Oct 2020 00:42    139    |  107    |  66     ----------------------------<  95     3.8     |  28     |  1.68   14 Oct 2020 06:43    140    |  107    |  56     ----------------------------<  137    4.0     |  24     |  1.69   13 Oct 2020 22:57    138    |  104    |  48     ----------------------------<  128    4.5     |  21     |  1.81     Ca    7.5        18 Oct 2020 07:11  Ca    7.6        17 Oct 2020 03:33  Ca    7.9        16 Oct 2020 05:26  Ca    7.7        15 Oct 2020 17:45  Ca    8.1        15 Oct 2020 00:42  Ca    8.1        14 Oct 2020 06:43  Ca    8.1        13 Oct 2020 22:57  Phos  2.7       18 Oct 2020 07:11  Phos  2.7       17 Oct 2020 03:33  Phos  2.8       16 Oct 2020 05:26  Phos  3.2       15 Oct 2020 17:45  Phos  3.4       15 Oct 2020 00:42  Mg     1.9       18 Oct 2020 07:11  Mg     2.2       17 Oct 2020 03:33  Mg     2.3       16 Oct 2020 05:26  Mg     1.9       15 Oct 2020 17:45  Mg     2.0       15 Oct 2020 00:42  Mg     1.9       14 Oct 2020 06:43    TPro  5.8    /  Alb  1.8    /  TBili  2.8    /  DBili  x      /  AST  31     /  ALT  29     /  AlkPhos  81     18 Oct 2020 07:11  TPro  6.0    /  Alb  1.9    /  TBili  3.2    /  DBili  x      /  AST  33     /  ALT  32     /  AlkPhos  81     17 Oct 2020 03:33  TPro  5.9    /  Alb  2.0    /  TBili  3.8    /  DBili  x      /  AST  33     /  ALT  32     /  AlkPhos  82     16 Oct 2020 05:26  TPro  5.8    /  Alb  1.9    /  TBili  4.1    /  DBili  x      /  AST  37     /  ALT  33     /  AlkPhos  80     15 Oct 2020 17:45  TPro  6.8    /  Alb  2.1    /  TBili  4.3    /  DBili  1.22   /  AST  51     /  ALT  43     /  AlkPhos  91     15 Oct 2020 00:42  TPro  5.9    /  Alb  2.0    /  TBili  4.8    /  DBili  x      /  AST  54     /  ALT  34     /  AlkPhos  90     14 Oct 2020 06:43  TPro  7.2    /  Alb  2.0    /  TBili  6.4    /  DBili  x      /  AST  49     /  ALT  40     /  AlkPhos  122    13 Oct 2020 22:57      PT/INR - ( 18 Oct 2020 07:11 )   PT: 22.8 sec;   INR: 2.03 ratio                 CAPILLARY BLOOD GLUCOSE              RADIOLOGY & ADDITIONAL TESTS:    Imaging Personally Reviewed:  [ ] YES  [ ] NO    Consultant(s) Notes Reviewed:  [ ] YES  [ ] NO    Care Discussed with Consultants/Other Providers [ ] YES  [ ] NO

## 2020-10-18 NOTE — PROGRESS NOTE ADULT - ASSESSMENT
60 M with HLD, CHF (EF 65% Oct 2019), HLD, cirrhosis (2/2 alcohol abuse), and recent admission (Oct 2019) for R pleural effusion s/p chest tube, fluid was exudative but negative cytology and cultures, presented 11/8/2019 with fever and chills, SOB and worked up for TB which was negative., at that time got 7 days of vanco and zosyn. Now her returns not having taken his lactulose and is encephalopathic.   Afebrile but tachycardic   WBC 12, PLT 45, Hb 7.5->5.6, Crea 1.8->1.6, INR 2.65  SAAG 1.8 (>1.1 consistent with portal hypertension)   MELD on admission was 29  Lactate 8.0->6->4  CT (personally reviewed) moderate bilateral pleural effusions, splenomegaly, shriveled liver, mod-large ascites  Blood culture drawn on admission and given vanco, CTX and flagyl   Fluid appears transudative though no serum LDH on this admission   Fluid with less then 250 PMNs goes against SBP and gram stain on fluid is negative   Pt had melena and hb dropped to 5.6 raising concern for variceal bleed    10/15: Still anemic, received PLT, pRBC and FFP today, unable to get EGD due to stability, paracentesis fluid now positive with GNR in liquid broth   10/16: remains with low counts including hb, CT performed today (personally reviewed) no retro bleed but large ascites, splenomegaly, anasarca   10/17: transferred out of the ICU, Hb stable, Ecoli S to ceftriaxone, lactulose wasn't given for 24 hrs but has been reordered   10/18: still no BM, NPO after midnight for EGD, continue on ceftriaxone for 1-2 more day until post-egd     Overall, 60M hepatic encephalopathy, acute blood lose anemia (?variceal bleed, GI ulcer), high lactate, SBP   -continue ceftriaxone 1g daily   -GI consult appreciated, EGD Monday  -transfusions per medicine   -octreotide, PPI   -Patients abdomen is getting more firm->may need repeat paracentesis  -trend MELD   -lactulose reordered->titrate to 2-3 soft BMs per day     Discussed with Dr. Hernadez and Dr. Melita Ragsdale, DO  Cell: [phone]  Pager [phone]  Infectious Disease Attending  After 5pm/weekends please call [phone] for all inquiries and new consults

## 2020-10-18 NOTE — PROGRESS NOTE ADULT - ASSESSMENT
HPI:     58 y/o male morbidly obese male w/ PMHx of Hepatic Cirrhosis, ascites, Thrombocytopenia, CHF, HTN, HLD, recent admx for Hepatic Encephalopathy, noncompliant with lactulose presents to the ED due to AMS. Pt awake, lethargic only oriented to self, unable to provide any history. History obtained from ED/Chart. Per ED, Brother noticed worsening confusion x 1, noted again pt non-complaint with lactulose thus gave him 1 dose prior to his arrival in ED.    On arrival in ED, initial /60, , pt afebrile. CT head neg. Sig labs include WBC 12.37, h/h 7.5/24.3, PLT 89, LA 8, BUN/Cr 48/1.81, ammonia level 41, see below for rest of sig labs. Pt s/p paracentesis in ED, 2L removed cell ct not indicative of SBP. UA neg, CXR pre-valenzuela unremarkable. CT A/P showed portal hypertension with cirrhosis, splenomegaly, upper abdominal varices, moderate abdominopelvic ascites, pleural effusions, and anasarca.     (14 Oct 2020 03:19)  ----------------- As Above -----------------Patient seen earlier today.   The patient presents with a change in mental status. Patient has a history of Paulino and alcoholic cirrhosis. Patient is non complaint with Lactulose. Patient has had admissions previously for hepatic encephalopathy from non compliance with Lactulose. Patiet was found to have HGB of 7.5 with platelets of 89. INR > 2  The patient presents with severe lethargy. Ammonia only 41 but has elevated lactic acid levels. SBP in ER ruled out SBP  IN ER , patient was passing light brown liquid stool ( visualized )  ---- Patient later developed melena ( WNI ) HGB fell to 5.6, BUN increased to 56 and platelets fell to 45. Patient now in the ICU    Change in MS - . Patient appears to be at baseline. Lactulose was held, now restarted at 20 MG QID  Decrease to BID once patient starts having diarrhea.  Melena - No N/V or melena. HGB 7.89 stable . Patient pancytopenic with elevated INR, low platelets - Patient is not a candidate for immediate  EGD ( high INR, low platelets ) 1) Octreotide 2) Keep HGB > 8 0) Ceftriaxone 4) Advance diet as tolerated 5) EGD Monday  ( patient be given Vit, ?  FFP tomorrow )  Elevated LFTs  - 3.2/33/32/81- better  === Discussed with staff

## 2020-10-18 NOTE — PROGRESS NOTE ADULT - SUBJECTIVE AND OBJECTIVE BOX
SANTOS BELLE  MRN-[id number]    Follow Up:  ID following for SBP    Interval History: Notes reviewed. Pt doing well, still no BM at time of my exam. Not as hot as yesterday. Denies pain, tolerating PO, planned for EGD tomorrow    ROS:    [ ] Unobtainable because:  [X ] All other systems negative    Constitutional: no fever, no chills  Head: no trauma  Eyes: no vision changes, no eye pain  ENT:  no sore throat, no rhinorrhea  Cardiovascular:  no chest pain, no palpitation  Respiratory:  no SOB, no cough  GI:  no abd pain, no vomiting, no diarrhea  urinary: no dysuria, no hematuria, no flank pain  musculoskeletal:  no joint pain, no joint swelling  skin:  no rash  neurology:  no headache, no seizure, no change in mental status  psych: no anxiety, no depression         Allergies  No Known Allergies      ANTIMICROBIALS: cefTRIAXone   IVPB 1000 every 24 hours      MEDICATIONS  (STANDING):  lactulose Syrup 20 two times a day  melatonin 3 at bedtime  octreotide  Infusion 50 <Continuous>  pantoprazole  Injectable 40 every 12 hours      PRN      Vital Signs Last 24 Hrs  T(F): 97.8 (10-18-20 @ 17:26), Max: 98.2 (10-17-20 @ 23:37)  HR: 68 (10-18-20 @ 17:26)  BP: 117/50 (10-18-20 @ 17:26)  RR: 18 (10-18-20 @ 17:26)  SpO2: 95% (10-18-20 @ 17:26) (95% - 100%)  Wt(kg): --    Physical Exam:  General:    NAD,  non toxic, obese  Head: atraumatic, normocephalic  Eye: mildly icteric sclera  ENT:    no oral lesions, neck supple  Cardio:     regular S1, S2,  no murmur  Respiratory:    clear b/l,    no wheezing  abd:     soft,   BS +,   no tenderness, site of paracentesis draining orange fluid into collection bag, positive fluid wave, obese  :    no suprapubic tenderness,   Musculoskeletal:   no joint swelling,   +edema  vascular: no central lines, +PIV   Skin:    left upper ext with extensive ecchymosis  Neurologic:     no focal deficit, a lot more coherent today and alert and awake x3  psych: normal affect    Labs:    WBC Count: 3.30 K/uL (10-18 @ 07:11)  WBC Count: 3.19 K/uL (10-17 @ 11:45)  WBC Count: 2.74 K/uL (10-17 @ 03:33)  WBC Count: 2.98 K/uL (10-16 @ 15:49)  WBC Count: 2.13 K/uL (10-16 @ 05:26)  WBC Count: 2.49 K/uL (10-15 @ 20:52)  WBC Count: 2.57 K/uL (10-15 @ 17:45)                            7.9    3.30  )-----------( 48       ( 18 Oct 2020 07:11 )             23.7       10-18    135  |  104  |  31<H>  ----------------------------<  87  3.7   |  29  |  1.22    Ca    7.5<L>      18 Oct 2020 07:11  Phos  2.7     10-18  Mg     1.9     10-18    TPro  5.8<L>  /  Alb  1.8<L>  /  TBili  2.8<H>  /  DBili  x   /  AST  31  /  ALT  29  /  AlkPhos  81  10-18      Creatinine Trend: 1.22<--, 1.31<--, 1.34<--, 1.47<--, 1.68<--, 1.69<--    Lactate, Blood: 1.0 mmol/L (10-17-20 @ 03:33)      Urinalysis Basic - ( 14 Oct 2020 03:21 )    Color: Yellow / Appearance: Clear / S.015 / pH: x  Gluc: x / Ketone: Negative  / Bili: Small / Urobili: Negative mg/dL   Blood: x / Protein: 15 mg/dL / Nitrite: Negative   Leuk Esterase: Trace / RBC: 3-5 /HPF / WBC 3-5   Sq Epi: x / Non Sq Epi: Few / Bacteria: Occasional        Creatinine Trend: 1.47<--, 1.68<--, 1.69<--, 1.81<--    Lactate, Blood: 1.1 mmol/L (10-15-20 @ 05:32)  Lactate, Blood: 4.8 mmol/L (10-14-20 @ 06:36)  Lactate, Blood: 6.9 mmol/L (10-14-20 @ 01:21)  Lactate, Blood: 8.0 mmol/L (10-13-20 @ 22:59)      MICROBIOLOGY:  v  .Urine Clean Catch (Midstream)  10-14-20   <10,000 CFU/mL Normal Urogenital Blanka  --  --      .Blood Blood-Peripheral  10-14-20   No growth to date.  --  --    Culture - Body Fluid with Gram Stain (10.14.20 @ 05:40)    -  Amikacin: S <=16    -  Amoxicillin/Clavulanic Acid: S <=8/4    -  Ampicillin: S <=8 These ampicillin results predict results for amoxicillin    -  Ampicillin/Sulbactam: S <=4/2 Enterobacter, Citrobacter, and Serratia may develop resistance during prolonged therapy (3-4 days)    -  Aztreonam: S <=4    -  Cefazolin: S <=2 Enterobacter, Citrobacter, and Serratia may develop resistance during prolonged therapy (3-4 days)    -  Cefepime: S <=2    -  Cefoxitin: S <=8    -  Ceftriaxone: S <=1 Enterobacter, Citrobacter, and Serratia may develop resistance during prolonged therapy    -  Ciprofloxacin: S <=0.25    -  Ertapenem: S <=0.5    -  Gentamicin: S <=2    -  Imipenem: S <=1    -  Levofloxacin: S <=0.5    -  Meropenem: S <=1    -  Piperacillin/Tazobactam: S <=8    -  Tobramycin: S <=2    -  Trimethoprim/Sulfamethoxazole: S <=0.5/9.5    Gram Stain:   polymorphonuclear leukocytes seen  No organisms seen  by cytocentrifuge    Specimen Source: .Body Fluid Peritoneal Fluid    Culture Results:   Growth in fluid media only Escherichia coli    Organism Identification: Escherichia coli    Organism: Escherichia coli    Method Type: JOSE M      Rapid RVP Result: NotDetec (10-14 @ 01:49)    RADIOLOGY:  < from: CT Abdomen and Pelvis No Cont (10.16.20 @ 13:15) >  There is no retroperitoneal or rectus sheath hematoma. No intraperitoneal blood.  Gallstones noted. No biliary dilatation. Cirrhotic liver with splenomegaly small amount of abdominal pelvic ascites upper abdominal varices similar to prior. Extensive diffuse anasarca similar to prior.  There is a small umbilical hernia containing some fluid and possibly a nonobstructive bowel loop of small bowel. There is no bowelobstruction or gross bowel inflammation. No extraluminal gas.  Remainder study unchanged.    Impression:  Severely compromised by extensive scan artifact.  No evidence of retroperitoneal bleed.  Findings as discussed    < end of copied text >

## 2020-10-18 NOTE — PHYSICAL THERAPY INITIAL EVALUATION ADULT - GENERAL OBSERVATIONS, REHAB EVAL
Pt found supine, awake, follow directions, on room air, attached to IV line, not in distress but c/o feeling sleepy and tired.

## 2020-10-18 NOTE — PROGRESS NOTE ADULT - ASSESSMENT
58 y/o male morbidly obese male w/ PMHx of Hepatic Cirrhosis w/ ascites & Thrombocytopenia, morbid Obesity w/ BMI of 47, chronic diastolic CHF, HTN, HLD, recent admx for Hepatic Encephalopathy, noncompliant with lactulose presented w/ Metabolic Encephalopathy and acute blood loss anemia due to GI bleed.     Acute blood loss anemia due to GI bleed  - s/p 6 pRBC, 2 FFP, 1 plt, Vitamin K, DDAVP and Kcentra  - patient is on Octreotide and Protonix drips  - GI planning  EGD on Monday, will give Vit K today (pt refused IV so will give PO) and if INR is still high, will give FFP & plts in AM  - c/w Ceftriaxone prophylaxis  - Hgb dropped today, but there was no evidence of retroperitoneal bleed on CT     Sepsis POA due to SBP  - ascites fluid growing E. coli  - c/w Ceftriaxone  - ID following    Septic encephalopathy  - resolved  - due to SBP    Alcoholic cirrhosis w/ ascites, coagulopathy, pancytopenia and hepatic encephalopathy   - abd CT showed portal hypertension with cirrhosis, splenomegaly, upper abdominal varices, moderate abdominopelvic ascites, pleural effusions, and anasarca  - s/p 2L paracentesis in ED   - c/w lactulose    MONI  - likely prerenal  - improving post transfusion    Superficial thrombophlebitis of the left basilic and cephalic veins on US  - local management  - cannot give NSAIDS given MONI and GI bleed    DVT prophylaxis  SVT: SCD  GI: Protonix

## 2020-10-19 LAB
ALBUMIN SERPL ELPH-MCNC: 1.8 G/DL — LOW (ref 3.3–5)
ALP SERPL-CCNC: 79 U/L — SIGNIFICANT CHANGE UP (ref 40–120)
ALT FLD-CCNC: 28 U/L — SIGNIFICANT CHANGE UP (ref 12–78)
ANION GAP SERPL CALC-SCNC: 2 MMOL/L — LOW (ref 5–17)
APTT BLD: 42.3 SEC — HIGH (ref 27.5–35.5)
AST SERPL-CCNC: 26 U/L — SIGNIFICANT CHANGE UP (ref 15–37)
B PERT DNA SPEC QL NAA+PROBE: SIGNIFICANT CHANGE UP
BILIRUB SERPL-MCNC: 2.7 MG/DL — HIGH (ref 0.2–1.2)
BLD GP AB SCN SERPL QL: SIGNIFICANT CHANGE UP
BUN SERPL-MCNC: 24 MG/DL — HIGH (ref 7–23)
C PNEUM DNA SPEC QL NAA+PROBE: SIGNIFICANT CHANGE UP
CALCIUM SERPL-MCNC: 7.6 MG/DL — LOW (ref 8.5–10.1)
CHLORIDE SERPL-SCNC: 108 MMOL/L — SIGNIFICANT CHANGE UP (ref 96–108)
CO2 SERPL-SCNC: 28 MMOL/L — SIGNIFICANT CHANGE UP (ref 22–31)
CREAT SERPL-MCNC: 1.24 MG/DL — SIGNIFICANT CHANGE UP (ref 0.5–1.3)
CULTURE RESULTS: SIGNIFICANT CHANGE UP
FLUAV H1 2009 PAND RNA SPEC QL NAA+PROBE: SIGNIFICANT CHANGE UP
FLUAV H1 RNA SPEC QL NAA+PROBE: SIGNIFICANT CHANGE UP
FLUAV H3 RNA SPEC QL NAA+PROBE: SIGNIFICANT CHANGE UP
FLUAV SUBTYP SPEC NAA+PROBE: SIGNIFICANT CHANGE UP
FLUBV RNA SPEC QL NAA+PROBE: SIGNIFICANT CHANGE UP
GLUCOSE SERPL-MCNC: 89 MG/DL — SIGNIFICANT CHANGE UP (ref 70–99)
GRAM STN FLD: SIGNIFICANT CHANGE UP
HADV DNA SPEC QL NAA+PROBE: SIGNIFICANT CHANGE UP
HCOV PNL SPEC NAA+PROBE: SIGNIFICANT CHANGE UP
HCT VFR BLD CALC: 24.7 % — LOW (ref 39–50)
HGB BLD-MCNC: 7.8 G/DL — LOW (ref 13–17)
HMPV RNA SPEC QL NAA+PROBE: SIGNIFICANT CHANGE UP
HPIV1 RNA SPEC QL NAA+PROBE: SIGNIFICANT CHANGE UP
HPIV2 RNA SPEC QL NAA+PROBE: SIGNIFICANT CHANGE UP
HPIV3 RNA SPEC QL NAA+PROBE: SIGNIFICANT CHANGE UP
HPIV4 RNA SPEC QL NAA+PROBE: SIGNIFICANT CHANGE UP
INR BLD: 2.26 RATIO — HIGH (ref 0.88–1.16)
MAGNESIUM SERPL-MCNC: 1.9 MG/DL — SIGNIFICANT CHANGE UP (ref 1.6–2.6)
MCHC RBC-ENTMCNC: 29.8 PG — SIGNIFICANT CHANGE UP (ref 27–34)
MCHC RBC-ENTMCNC: 31.6 GM/DL — LOW (ref 32–36)
MCV RBC AUTO: 94.3 FL — SIGNIFICANT CHANGE UP (ref 80–100)
NRBC # BLD: 0 /100 WBCS — SIGNIFICANT CHANGE UP (ref 0–0)
ORGANISM # SPEC MICROSCOPIC CNT: SIGNIFICANT CHANGE UP
ORGANISM # SPEC MICROSCOPIC CNT: SIGNIFICANT CHANGE UP
PHOSPHATE SERPL-MCNC: 2.2 MG/DL — LOW (ref 2.5–4.5)
PLATELET # BLD AUTO: 45 K/UL — LOW (ref 150–400)
POTASSIUM SERPL-MCNC: 3.8 MMOL/L — SIGNIFICANT CHANGE UP (ref 3.5–5.3)
POTASSIUM SERPL-SCNC: 3.8 MMOL/L — SIGNIFICANT CHANGE UP (ref 3.5–5.3)
PROT SERPL-MCNC: 5.5 GM/DL — LOW (ref 6–8.3)
PROTHROM AB SERPL-ACNC: 25.2 SEC — HIGH (ref 10.6–13.6)
RBC # BLD: 2.62 M/UL — LOW (ref 4.2–5.8)
RBC # FLD: 20 % — HIGH (ref 10.3–14.5)
RSV RNA SPEC QL NAA+PROBE: SIGNIFICANT CHANGE UP
RV+EV RNA SPEC QL NAA+PROBE: SIGNIFICANT CHANGE UP
SODIUM SERPL-SCNC: 138 MMOL/L — SIGNIFICANT CHANGE UP (ref 135–145)
SPECIMEN SOURCE: SIGNIFICANT CHANGE UP
WBC # BLD: 3.25 K/UL — LOW (ref 3.8–10.5)
WBC # FLD AUTO: 3.25 K/UL — LOW (ref 3.8–10.5)

## 2020-10-19 PROCEDURE — 99232 SBSQ HOSP IP/OBS MODERATE 35: CPT

## 2020-10-19 RX ORDER — PHYTONADIONE (VIT K1) 5 MG
10 TABLET ORAL ONCE
Refills: 0 | Status: COMPLETED | OUTPATIENT
Start: 2020-10-19 | End: 2020-10-19

## 2020-10-19 RX ORDER — PNEUMOCOCCAL 13-VALENT CONJUGATE VACCINE 2.2; 2.2; 2.2; 2.2; 2.2; 4.4; 2.2; 2.2; 2.2; 2.2; 2.2; 2.2; 2.2 UG/.5ML; UG/.5ML; UG/.5ML; UG/.5ML; UG/.5ML; UG/.5ML; UG/.5ML; UG/.5ML; UG/.5ML; UG/.5ML; UG/.5ML; UG/.5ML; UG/.5ML
0.5 INJECTION, SUSPENSION INTRAMUSCULAR ONCE
Refills: 0 | Status: COMPLETED | OUTPATIENT
Start: 2020-10-19 | End: 2020-10-21

## 2020-10-19 RX ADMIN — Medication 10 MILLIGRAM(S): at 18:09

## 2020-10-19 RX ADMIN — CHLORHEXIDINE GLUCONATE 1 APPLICATION(S): 213 SOLUTION TOPICAL at 08:06

## 2020-10-19 RX ADMIN — LACTULOSE 20 GRAM(S): 10 SOLUTION ORAL at 05:24

## 2020-10-19 RX ADMIN — Medication 3 MILLIGRAM(S): at 21:24

## 2020-10-19 RX ADMIN — NYSTATIN CREAM 1 APPLICATION(S): 100000 CREAM TOPICAL at 05:30

## 2020-10-19 RX ADMIN — Medication 1 APPLICATION(S): at 05:30

## 2020-10-19 RX ADMIN — PANTOPRAZOLE SODIUM 40 MILLIGRAM(S): 20 TABLET, DELAYED RELEASE ORAL at 05:24

## 2020-10-19 RX ADMIN — Medication 62.5 MILLIMOLE(S): at 13:20

## 2020-10-19 RX ADMIN — OCTREOTIDE ACETATE 10 MICROGRAM(S)/HR: 200 INJECTION, SOLUTION INTRAVENOUS; SUBCUTANEOUS at 14:19

## 2020-10-19 RX ADMIN — OCTREOTIDE ACETATE 10 MICROGRAM(S)/HR: 200 INJECTION, SOLUTION INTRAVENOUS; SUBCUTANEOUS at 02:30

## 2020-10-19 RX ADMIN — LACTULOSE 20 GRAM(S): 10 SOLUTION ORAL at 18:09

## 2020-10-19 RX ADMIN — NYSTATIN CREAM 1 APPLICATION(S): 100000 CREAM TOPICAL at 18:09

## 2020-10-19 RX ADMIN — CEFTRIAXONE 100 MILLIGRAM(S): 500 INJECTION, POWDER, FOR SOLUTION INTRAMUSCULAR; INTRAVENOUS at 12:18

## 2020-10-19 RX ADMIN — Medication 1 APPLICATION(S): at 19:06

## 2020-10-19 RX ADMIN — PANTOPRAZOLE SODIUM 40 MILLIGRAM(S): 20 TABLET, DELAYED RELEASE ORAL at 18:09

## 2020-10-19 NOTE — PROGRESS NOTE ADULT - ASSESSMENT
60 M with HLD, CHF (EF 65% Oct 2019), HLD, cirrhosis (2/2 alcohol abuse), and recent admission (Oct 2019) for R pleural effusion s/p chest tube, fluid was exudative but negative cytology and cultures, presented 11/8/2019 with fever and chills, SOB and worked up for TB which was negative., at that time got 7 days of vanco and zosyn. Now her returns not having taken his lactulose and is encephalopathic.   Afebrile but tachycardic   WBC 12, PLT 45, Hb 7.5->5.6, Crea 1.8->1.6, INR 2.65  SAAG 1.8 (>1.1 consistent with portal hypertension)   MELD on admission was 29  Lactate 8.0->6->4  CT (personally reviewed) moderate bilateral pleural effusions, splenomegaly, shriveled liver, mod-large ascites  Blood culture drawn on admission and given vanco, CTX and flagyl   Fluid appears transudative though no serum LDH on this admission   Fluid with less then 250 PMNs goes against SBP and gram stain on fluid is negative   Pt had melena and hb dropped to 5.6 raising concern for variceal bleed    10/15: Still anemic, received PLT, pRBC and FFP today, unable to get EGD due to stability, paracentesis fluid now positive with GNR in liquid broth   10/16: remains with low counts including hb, CT performed today (personally reviewed) no retro bleed but large ascites, splenomegaly, anasarca   10/17: transferred out of the ICU, Hb stable, Ecoli S to ceftriaxone, lactulose wasn't given for 24 hrs but has been reordered   10/18: still no BM, NPO after midnight for EGD, continue on ceftriaxone for 1-2 more day until post-egd   10/19: EGD cancelled due to PLT and INR, last day of antibiotics, hemoglobin stable    Overall, 60M hepatic encephalopathy, acute blood lose anemia (?variceal bleed, GI ulcer), high lactate, SBP   -stopped ceftriaxone  -ideally would still like to get EGD if patient is in agreement and can be coordinated with medicine and GI   -transfusions per medicine   -octreotide, PPI   -consider starting non selective beta-blocker   -Patients abdomen is getting more firm->please consider repeat paracentesis and if pursuing, resend fluid analysis and bacterial gram stain and culture  -lactulose changed to BID and Rifaximin was added today->titrate to 2-3 soft BMs per day   -patient should be set up with [facility] at [address]->Please set up appointment for patient which should be within one week of discharge ([phone])  -Administer Prevnar (I ordered) and influenza prior to discharge  -He can follow up with me in ID office also at [facility] [phone]    Discussed with Dr. Hernadez and Dr. Melita Ragsdale, DO  Cell: [phone]  Pager [phone]  Infectious Disease Attending  After 5pm/weekends please call [phone] for all inquiries and new consults

## 2020-10-19 NOTE — PROGRESS NOTE ADULT - SUBJECTIVE AND OBJECTIVE BOX
Patient is a 60y old  Male who presents with a chief complaint of AMS, sepsis (18 Oct 2020 22:12)      HPI:     58 y/o male morbidly obese male w/ PMHx of Hepatic Cirrhosis, ascites, Thrombocytopenia, CHF, HTN, HLD, recent admx for Hepatic Encephalopathy, noncompliant with lactulose presents to the ED due to AMS. Pt awake, lethargic only oriented to self, unable to provide any history. History obtained from ED/Chart. Per ED, Brother noticed worsening confusion x 1, noted again pt non-complaint with lactulose thus gave him 1 dose prior to his arrival in ED.    On arrival in ED, initial /59, , pt afebrile. CT head neg. Sig labs include WBC 12.37, h/h 7.5/24.3, PLT 89, LA 8, BUN/Cr 48/1.81, ammonia level 41, see below for rest of sig labs. Pt s/p paracentesis in ED, 2L removed cell ct not indicative of SBP. UA neg, CXR pre-valenzuela unremarkable. CT A/P showed portal hypertension with cirrhosis, splenomegaly, upper abdominal varices, moderate abdominopelvic ascites, pleural effusions, and anasarca.     (14 Oct 2020 03:19)      INTERVAL HPI/OVERNIGHT EVENTS:  The patient denies melena, hematochezia, hematemesis, nausea, vomiting, abdominal pain, constipation, diarrhea, or change in bowel movements     MEDICATIONS  (STANDING):  cefTRIAXone   IVPB 1000 milliGRAM(s) IV Intermittent every 24 hours  chlorhexidine 4% Liquid 1 Application(s) Topical <User Schedule>  lactulose Syrup 20 Gram(s) Oral two times a day  melatonin 3 milliGRAM(s) Oral at bedtime  nystatin Powder 1 Application(s) Topical two times a day  octreotide  Infusion 50 MICROgram(s)/Hr (10 mL/Hr) IV Continuous <Continuous>  pantoprazole  Injectable 40 milliGRAM(s) IV Push every 12 hours  silver sulfADIAZINE 1% Cream 1 Application(s) Topical two times a day    MEDICATIONS  (PRN):      FAMILY HISTORY:  No pertinent family history in first degree relatives        Allergies    No Known Allergies    Intolerances        PMH/PSH:  HLD (hyperlipidemia)    Hepatic cirrhosis    CHF (congestive heart failure)    Obese    Hyperlipidemia    Pneumonia    CHF (congestive heart failure)    History of chest tube placement    No significant past surgical history          REVIEW OF SYSTEMS:  CONSTITUTIONAL: No fever, weight loss, or fatigue  EYES: No eye pain, visual disturbances, or discharge  ENMT:  No difficulty hearing, tinnitus, vertigo; No sinus or throat pain  NECK: No pain or stiffness  BREASTS: No pain, masses, or nipple discharge  RESPIRATORY: No cough, wheezing, chills or hemoptysis; No shortness of breath  CARDIOVASCULAR: No chest pain, palpitations, dizziness, or leg swelling  GASTROINTESTINAL: No abdominal or epigastric pain. No nausea, vomiting, or hematemesis; No diarrhea or constipation. No melena or hematochezia.  GENITOURINARY: No dysuria, frequency, hematuria, or incontinence  NEUROLOGICAL: No headaches, memory loss, loss of strength, numbness, or tremors  SKIN: No itching, burning, rashes, or lesions   LYMPH NODES: No enlarged glands  ENDOCRINE: No heat or cold intolerance; No hair loss  MUSCULOSKELETAL: No joint pain or swelling; No muscle, back, or extremity pain  PSYCHIATRIC: No depression, anxiety, mood swings, or difficulty sleeping  HEME/LYMPH: No easy bruising, or bleeding gums  ALLERGY AND IMMUNOLOGIC: No hives or eczema    Vital Signs Last 24 Hrs  T(C): 37.1 (19 Oct 2020 12:45), Max: 37.1 (19 Oct 2020 06:18)  T(F): 98.7 (19 Oct 2020 12:45), Max: 98.7 (19 Oct 2020 06:18)  HR: 75 (19 Oct 2020 12:56) (68 - 76)  BP: 101/51 (19 Oct 2020 12:56) (101/48 - 129/61)  BP(mean): --  RR: 20 (19 Oct 2020 12:45) (18 - 20)  SpO2: 99% (19 Oct 2020 12:45) (95% - 99%)    PHYSICAL EXAM:  GENERAL: NAD, well-groomed, well-developed  HEAD:  Atraumatic, Normocephalic  EYES: EOMI, PERRLA, conjunctiva and sclera clear  ENMT: No tonsillar erythema, exudates, or enlargement; Moist mucous membranes, Good dentition, No lesions  NECK: Supple, No JVD, Normal thyroid  NERVOUS SYSTEM:  Alert & Oriented X3, Good concentration; Motor Strength 5/5 B/L upper and lower extremities; DTRs 2+ intact and symmetric  CHEST/LUNG: Clear to percussion bilaterally; No rales, rhonchi, wheezing, or rubs  HEART: Regular rate and rhythm; No murmurs, rubs, or gallops  ABDOMEN: Soft, Nontender, Nondistended; Bowel sounds present  EXTREMITIES:  2+ Peripheral Pulses, No clubbing, cyanosis, or edema  LYMPH: No lymphadenopathy noted  SKIN: No rashes or lesions    LAB                          7.8    3.25  )-----------( 45       ( 19 Oct 2020 08:21 )             24.7       CBC:  10-19 @ 08:21  WBC 3.25   Hgb 7.8   Hct 24.7   Plts 45  MCV 94.3  10-18 @ 07:11  WBC 3.30   Hgb 7.9   Hct 23.7   Plts 48  MCV 91.5  10-17 @ 11:45  WBC 3.19   Hgb 7.9   Hct 23.5   Plts 42  MCV 91.1  10-17 @ 03:33  WBC 2.74   Hgb 7.7   Hct 24.1   Plts 46  MCV 92.3  10-16 @ 15:49  WBC 2.98   Hgb 7.8   Hct 23.7   Plts 44  MCV 90.8  10-16 @ 05:26  WBC 2.13   Hgb 6.9   Hct 21.3   Plts 48  MCV 91.0  10-15 @ 20:52  WBC 2.49   Hgb 8.4   Hct 25.9   Plts 45  MCV 91.5  10-15 @ 17:45  WBC 2.57   Hgb 7.1   Hct 21.7   Plts 46  MCV 90.4  10-15 @ 05:32  WBC 3.57   Hgb 6.8   Hct 19.8   Plts 46  MCV 88.8  10-15 @ 00:42  WBC 4.71   Hgb 7.2   Hct 21.6   Plts 54  MCV 89.3      Chemistry:  10-19 @ 08:21  Na+ 138  K+ 3.8  Cl- 108  CO2 28  BUN 24  Cr 1.24     10-18 @ 07:11  Na+ 135  K+ 3.7  Cl- 104  CO2 29  BUN 31  Cr 1.22     10-17 @ 03:33  Na+ 135  K+ 3.8  Cl- 105  CO2 26  BUN 40  Cr 1.31     10-16 @ 05:26  Na+ 138  K+ 3.5  Cl- 106  CO2 28  BUN 52  Cr 1.34     10-15 @ 17:45  Na+ 137  K+ 3.6  Cl- 105  CO2 29  BUN 57  Cr 1.47     10-15 @ 00:42  Na+ 139  K+ 3.8  Cl- 107  CO2 28  BUN 66  Cr 1.68     10-14 @ 06:43  Na+ 140  K+ 4.0  Cl- 107  CO2 24  BUN 56  Cr 1.69     10-13 @ 22:57  Na+ 138  K+ 4.5  Cl- 104  CO2 21  BUN 48  Cr 1.81         Glucose, Serum: 89 mg/dL (10-19 @ 08:21)  Glucose, Serum: 87 mg/dL (10-18 @ 07:11)  Glucose, Serum: 87 mg/dL (10-17 @ 03:33)  Glucose, Serum: 78 mg/dL (10-16 @ 05:26)  Glucose, Serum: 87 mg/dL (10-15 @ 17:45)  Glucose, Serum: 95 mg/dL (10-15 @ 00:42)  Glucose, Serum: 137 mg/dL (10-14 @ 06:43)  Glucose, Serum: 128 mg/dL (10-13 @ 22:57)      19 Oct 2020 08:21    138    |  108    |  24     ----------------------------<  89     3.8     |  28     |  1.24   18 Oct 2020 07:11    135    |  104    |  31     ----------------------------<  87     3.7     |  29     |  1.22   17 Oct 2020 03:33    135    |  105    |  40     ----------------------------<  87     3.8     |  26     |  1.31   16 Oct 2020 05:26    138    |  106    |  52     ----------------------------<  78     3.5     |  28     |  1.34   15 Oct 2020 17:45    137    |  105    |  57     ----------------------------<  87     3.6     |  29     |  1.47   15 Oct 2020 00:42    139    |  107    |  66     ----------------------------<  95     3.8     |  28     |  1.68   14 Oct 2020 06:43    140    |  107    |  56     ----------------------------<  137    4.0     |  24     |  1.69     Ca    7.6        19 Oct 2020 08:21  Ca    7.5        18 Oct 2020 07:11  Ca    7.6        17 Oct 2020 03:33  Ca    7.9        16 Oct 2020 05:26  Ca    7.7        15 Oct 2020 17:45  Ca    8.1        15 Oct 2020 00:42  Ca    8.1        14 Oct 2020 06:43  Phos  2.2       19 Oct 2020 08:21  Phos  2.7       18 Oct 2020 07:11  Phos  2.7       17 Oct 2020 03:33  Phos  2.8       16 Oct 2020 05:26  Phos  3.2       15 Oct 2020 17:45  Phos  3.4       15 Oct 2020 00:42  Mg     1.9       19 Oct 2020 08:21  Mg     1.9       18 Oct 2020 07:11  Mg     2.2       17 Oct 2020 03:33  Mg     2.3       16 Oct 2020 05:26  Mg     1.9       15 Oct 2020 17:45  Mg     2.0       15 Oct 2020 00:42  Mg     1.9       14 Oct 2020 06:43    TPro  5.5    /  Alb  1.8    /  TBili  2.7    /  DBili  x      /  AST  26     /  ALT  28     /  AlkPhos  79     19 Oct 2020 08:21  TPro  5.8    /  Alb  1.8    /  TBili  2.8    /  DBili  x      /  AST  31     /  ALT  29     /  AlkPhos  81     18 Oct 2020 07:11  TPro  6.0    /  Alb  1.9    /  TBili  3.2    /  DBili  x      /  AST  33     /  ALT  32     /  AlkPhos  81     17 Oct 2020 03:33  TPro  5.9    /  Alb  2.0    /  TBili  3.8    /  DBili  x      /  AST  33     /  ALT  32     /  AlkPhos  82     16 Oct 2020 05:26  TPro  5.8    /  Alb  1.9    /  TBili  4.1    /  DBili  x      /  AST  37     /  ALT  33     /  AlkPhos  80     15 Oct 2020 17:45  TPro  6.8    /  Alb  2.1    /  TBili  4.3    /  DBili  1.22   /  AST  51     /  ALT  43     /  AlkPhos  91     15 Oct 2020 00:42  TPro  5.9    /  Alb  2.0    /  TBili  4.8    /  DBili  x      /  AST  54     /  ALT  34     /  AlkPhos  90     14 Oct 2020 06:43      PT/INR - ( 19 Oct 2020 08:21 )   PT: 25.2 sec;   INR: 2.26 ratio         PTT - ( 19 Oct 2020 08:21 )  PTT:42.3 sec        CAPILLARY BLOOD GLUCOSE              RADIOLOGY & ADDITIONAL TESTS:    Imaging Personally Reviewed:  [ ] YES  [ ] NO    Consultant(s) Notes Reviewed:  [ ] YES  [ ] NO    Care Discussed with Consultants/Other Providers [ ] YES  [ ] NO

## 2020-10-19 NOTE — PROGRESS NOTE ADULT - SUBJECTIVE AND OBJECTIVE BOX
SANTOS BELLE  MRN-[id number]    Follow Up:  ID following for SBP    Interval History: Notes reviewed. He was scheduled for EGD today but his INR and platelets were too high and low respectively and EGD cancelled. May opt for conservative management.     ROS:    [ ] Unobtainable because:  [X ] All other systems negative    Constitutional: no fever, no chills  Head: no trauma  Eyes: no vision changes, no eye pain  ENT:  no sore throat, no rhinorrhea  Cardiovascular:  no chest pain, no palpitation  Respiratory:  no SOB, no cough  GI:  no abd pain, no vomiting, no diarrhea  urinary: no dysuria, no hematuria, no flank pain  musculoskeletal:  no joint pain, no joint swelling  skin:  no rash  neurology:  no headache, no seizure, no change in mental status  psych: no anxiety, no depression         Allergies  No Known Allergies      ANTIMICROBIALS: cefTRIAXone   IVPB 1000 every 24 hours  rifAXIMin 550 two times a day      MEDICATIONS  (STANDING):  lactulose Syrup 20 two times a day  melatonin 3 at bedtime  octreotide  Infusion 50 <Continuous>  pantoprazole  Injectable 40 every 12 hours      PRN      Vital Signs Last 24 Hrs  T(F): 98 (10-19-20 @ 17:48), Max: 98.7 (10-19-20 @ 06:18)  HR: 74 (10-19-20 @ 17:48)  BP: 113/41 (10-19-20 @ 17:48)  RR: 18 (10-19-20 @ 17:48)  SpO2: 96% (10-19-20 @ 17:48) (96% - 99%)  Wt(kg): --      Physical Exam:  General:    NAD,  non toxic, obese  Head: atraumatic, normocephalic  Eye: mildly icteric sclera  ENT:    no oral lesions, neck supple  Cardio:     regular S1, S2,  no murmur  Respiratory:    clear b/l,    no wheezing  abd:     soft,   BS +,   no tenderness, site of paracentesis draining orange fluid into collection bag, positive fluid wave, obese,   :    no suprapubic tenderness,   Musculoskeletal:   no joint swelling,   +edema  vascular: no central lines, +PIV   Skin:    left upper ext with extensive ecchymosis  Neurologic:     no focal deficit, a lot more coherent today and alert and awake x3  psych: normal affect    Labs:  WBC Count: 3.25 K/uL (10-19 @ 08:21)  WBC Count: 3.30 K/uL (10-18 @ 07:11)  WBC Count: 3.19 K/uL (10-17 @ 11:45)  WBC Count: 2.74 K/uL (10-17 @ 03:33)  WBC Count: 2.98 K/uL (10-16 @ 15:49)  WBC Count: 2.13 K/uL (10-16 @ 05:26)  WBC Count: 2.49 K/uL (10-15 @ 20:52)                            7.8    3.25  )-----------( 45       ( 19 Oct 2020 08:21 )             24.7       10-19    138  |  108  |  24<H>  ----------------------------<  89  3.8   |  28  |  1.24    Ca    7.6<L>      19 Oct 2020 08:21  Phos  2.2     10-19  Mg     1.9     10-19    TPro  5.5<L>  /  Alb  1.8<L>  /  TBili  2.7<H>  /  DBili  x   /  AST  26  /  ALT  28  /  AlkPhos  79  10-19      Creatinine Trend: 1.24<--, 1.22<--, 1.31<--, 1.34<--, 1.47<--, 1.68<--        Urinalysis Basic - ( 14 Oct 2020 03:21 )    Color: Yellow / Appearance: Clear / S.015 / pH: x  Gluc: x / Ketone: Negative  / Bili: Small / Urobili: Negative mg/dL   Blood: x / Protein: 15 mg/dL / Nitrite: Negative   Leuk Esterase: Trace / RBC: 3-5 /HPF / WBC 3-5   Sq Epi: x / Non Sq Epi: Few / Bacteria: Occasional        MICROBIOLOGY:  v  .Urine Clean Catch (Midstream)  10-14-20   <10,000 CFU/mL Normal Urogenital Blanka  --  --      .Blood Blood-Peripheral  10-14-20   No growth to date.  --  --    Culture - Body Fluid with Gram Stain (10.14.20 @ 05:40)    -  Amikacin: S <=16    -  Amoxicillin/Clavulanic Acid: S <=8/4    -  Ampicillin: S <=8 These ampicillin results predict results for amoxicillin    -  Ampicillin/Sulbactam: S <=4/2 Enterobacter, Citrobacter, and Serratia may develop resistance during prolonged therapy (3-4 days)    -  Aztreonam: S <=4    -  Cefazolin: S <=2 Enterobacter, Citrobacter, and Serratia may develop resistance during prolonged therapy (3-4 days)    -  Cefepime: S <=2    -  Cefoxitin: S <=8    -  Ceftriaxone: S <=1 Enterobacter, Citrobacter, and Serratia may develop resistance during prolonged therapy    -  Ciprofloxacin: S <=0.25    -  Ertapenem: S <=0.5    -  Gentamicin: S <=2    -  Imipenem: S <=1    -  Levofloxacin: S <=0.5    -  Meropenem: S <=1    -  Piperacillin/Tazobactam: S <=8    -  Tobramycin: S <=2    -  Trimethoprim/Sulfamethoxazole: S <=0.5/9.5    Gram Stain:   polymorphonuclear leukocytes seen  No organisms seen  by cytocentrifuge    Specimen Source: .Body Fluid Peritoneal Fluid    Culture Results:   Growth in fluid media only Escherichia coli    Organism Identification: Escherichia coli    Organism: Escherichia coli    Method Type: JOSE M      Rapid RVP Result: NotDetec (10-14 @ 01:49)    RADIOLOGY:  < from: CT Abdomen and Pelvis No Cont (10.16.20 @ 13:15) >  There is no retroperitoneal or rectus sheath hematoma. No intraperitoneal blood.  Gallstones noted. No biliary dilatation. Cirrhotic liver with splenomegaly small amount of abdominal pelvic ascites upper abdominal varices similar to prior. Extensive diffuse anasarca similar to prior.  There is a small umbilical hernia containing some fluid and possibly a nonobstructive bowel loop of small bowel. There is no bowelobstruction or gross bowel inflammation. No extraluminal gas.  Remainder study unchanged.    Impression:  Severely compromised by extensive scan artifact.  No evidence of retroperitoneal bleed.  Findings as discussed    < end of copied text >

## 2020-10-19 NOTE — PROGRESS NOTE ADULT - SUBJECTIVE AND OBJECTIVE BOX
Patient is a 60y old  Male who presents with a chief complaint of AMS, sepsis (19 Oct 2020 14:54)      HPI:     60 y/o male morbidly obese male w/ PMHx of Hepatic Cirrhosis, ascites, Thrombocytopenia, CHF, HTN, HLD, recent admx for Hepatic Encephalopathy, noncompliant with lactulose presents to the ED due to AMS. Pt awake, lethargic only oriented to self, unable to provide any history. History obtained from ED/Chart. Per ED, Brother noticed worsening confusion x 1, noted again pt non-complaint with lactulose thus gave him 1 dose prior to his arrival in ED.    On arrival in ED, initial /60, , pt afebrile. CT head neg. Sig labs include WBC 12.37, h/h 7.5/24.3, PLT 89, LA 8, BUN/Cr 48/1.81, ammonia level 41, see below for rest of sig labs. Pt s/p paracentesis in ED, 2L removed cell ct not indicative of SBP. UA neg, CXR pre-valenzuela unremarkable. CT A/P showed portal hypertension with cirrhosis, splenomegaly, upper abdominal varices, moderate abdominopelvic ascites, pleural effusions, and anasarca.     (14 Oct 2020 03:19)      INTERVAL HPI/OVERNIGHT EVENTS:  The patient denies melena, hematochezia, hematemesis, nausea, vomiting, abdominal pain, constipation, diarrhea, or change in bowel movements     MEDICATIONS  (STANDING):  cefTRIAXone   IVPB 1000 milliGRAM(s) IV Intermittent every 24 hours  chlorhexidine 4% Liquid 1 Application(s) Topical <User Schedule>  lactulose Syrup 20 Gram(s) Oral two times a day  melatonin 3 milliGRAM(s) Oral at bedtime  nystatin Powder 1 Application(s) Topical two times a day  octreotide  Infusion 50 MICROgram(s)/Hr (10 mL/Hr) IV Continuous <Continuous>  pantoprazole  Injectable 40 milliGRAM(s) IV Push every 12 hours  silver sulfADIAZINE 1% Cream 1 Application(s) Topical two times a day    MEDICATIONS  (PRN):      FAMILY HISTORY:  No pertinent family history in first degree relatives        Allergies    No Known Allergies    Intolerances        PMH/PSH:  HLD (hyperlipidemia)    Hepatic cirrhosis    CHF (congestive heart failure)    Obese    Hyperlipidemia    Pneumonia    CHF (congestive heart failure)    History of chest tube placement    No significant past surgical history          REVIEW OF SYSTEMS:  CONSTITUTIONAL: No fever, weight loss, or fatigue  EYES: No eye pain, visual disturbances, or discharge  ENMT:  No difficulty hearing, tinnitus, vertigo; No sinus or throat pain  NECK: No pain or stiffness  BREASTS: No pain, masses, or nipple discharge  RESPIRATORY: No cough, wheezing, chills or hemoptysis; No shortness of breath  CARDIOVASCULAR: No chest pain, palpitations, dizziness, or leg swelling  GASTROINTESTINAL: See above  GENITOURINARY: No dysuria, frequency, hematuria, or incontinence  NEUROLOGICAL: No headaches, memory loss, loss of strength, numbness, or tremors  SKIN: No itching, burning, rashes, or lesions   LYMPH NODES: No enlarged glands  ENDOCRINE: No heat or cold intolerance; No hair loss  MUSCULOSKELETAL: No joint pain or swelling; No muscle, back, or extremity pain  PSYCHIATRIC: No depression, anxiety, mood swings, or difficulty sleeping  HEME/LYMPH: No easy bruising, or bleeding gums  ALLERGY AND IMMUNOLOGIC: No hives or eczema    Vital Signs Last 24 Hrs  T(C): 37.1 (19 Oct 2020 12:45), Max: 37.1 (19 Oct 2020 06:18)  T(F): 98.7 (19 Oct 2020 12:45), Max: 98.7 (19 Oct 2020 06:18)  HR: 75 (19 Oct 2020 12:56) (68 - 76)  BP: 101/51 (19 Oct 2020 12:56) (101/48 - 129/61)  BP(mean): --  RR: 20 (19 Oct 2020 12:45) (18 - 20)  SpO2: 99% (19 Oct 2020 12:45) (95% - 99%)    PHYSICAL EXAM:  GENERAL: NAD, well-groomed, well-developed  HEAD:  Atraumatic, Normocephalic  EYES: EOMI, PERRLA, conjunctiva and sclera clear  NECK: Supple, No JVD, Normal thyroid  NERVOUS SYSTEM:  Alert & Oriented X3, Good concentration; No asterixis  CHEST/LUNG: Clear to percussion bilaterally; No rales, rhonchi, wheezing, or rubs  HEART: Regular rate and rhythm; No murmurs, rubs, or gallops  ABDOMEN: Soft, Nontender, Nondistended; Bowel sounds present  EXTREMITIES:  2+ Peripheral Pulses, No clubbing, cyanosis, or edema  LYMPH: No lymphadenopathy noted  SKIN: No rashes or lesions    LAB                          7.8    3.25  )-----------( 45       ( 19 Oct 2020 08:21 )             24.7       CBC:  10-19 @ 08:21  WBC 3.25   Hgb 7.8   Hct 24.7   Plts 45  MCV 94.3  10-18 @ 07:11  WBC 3.30   Hgb 7.9   Hct 23.7   Plts 48  MCV 91.5  10-17 @ 11:45  WBC 3.19   Hgb 7.9   Hct 23.5   Plts 42  MCV 91.1  10-17 @ 03:33  WBC 2.74   Hgb 7.7   Hct 24.1   Plts 46  MCV 92.3  10-16 @ 15:49  WBC 2.98   Hgb 7.8   Hct 23.7   Plts 44  MCV 90.8  10-16 @ 05:26  WBC 2.13   Hgb 6.9   Hct 21.3   Plts 48  MCV 91.0  10-15 @ 20:52  WBC 2.49   Hgb 8.4   Hct 25.9   Plts 45  MCV 91.5  10-15 @ 17:45  WBC 2.57   Hgb 7.1   Hct 21.7   Plts 46  MCV 90.4  10-15 @ 05:32  WBC 3.57   Hgb 6.8   Hct 19.8   Plts 46  MCV 88.8  10-15 @ 00:42  WBC 4.71   Hgb 7.2   Hct 21.6   Plts 54  MCV 89.3      Chemistry:  10-19 @ 08:21  Na+ 138  K+ 3.8  Cl- 108  CO2 28  BUN 24  Cr 1.24     10-18 @ 07:11  Na+ 135  K+ 3.7  Cl- 104  CO2 29  BUN 31  Cr 1.22     10-17 @ 03:33  Na+ 135  K+ 3.8  Cl- 105  CO2 26  BUN 40  Cr 1.31     10-16 @ 05:26  Na+ 138  K+ 3.5  Cl- 106  CO2 28  BUN 52  Cr 1.34     10-15 @ 17:45  Na+ 137  K+ 3.6  Cl- 105  CO2 29  BUN 57  Cr 1.47     10-15 @ 00:42  Na+ 139  K+ 3.8  Cl- 107  CO2 28  BUN 66  Cr 1.68     10-14 @ 06:43  Na+ 140  K+ 4.0  Cl- 107  CO2 24  BUN 56  Cr 1.69     10-13 @ 22:57  Na+ 138  K+ 4.5  Cl- 104  CO2 21  BUN 48  Cr 1.81         Glucose, Serum: 89 mg/dL (10-19 @ 08:21)  Glucose, Serum: 87 mg/dL (10-18 @ 07:11)  Glucose, Serum: 87 mg/dL (10-17 @ 03:33)  Glucose, Serum: 78 mg/dL (10-16 @ 05:26)  Glucose, Serum: 87 mg/dL (10-15 @ 17:45)  Glucose, Serum: 95 mg/dL (10-15 @ 00:42)  Glucose, Serum: 137 mg/dL (10-14 @ 06:43)  Glucose, Serum: 128 mg/dL (10-13 @ 22:57)      19 Oct 2020 08:21    138    |  108    |  24     ----------------------------<  89     3.8     |  28     |  1.24   18 Oct 2020 07:11    135    |  104    |  31     ----------------------------<  87     3.7     |  29     |  1.22   17 Oct 2020 03:33    135    |  105    |  40     ----------------------------<  87     3.8     |  26     |  1.31   16 Oct 2020 05:26    138    |  106    |  52     ----------------------------<  78     3.5     |  28     |  1.34   15 Oct 2020 17:45    137    |  105    |  57     ----------------------------<  87     3.6     |  29     |  1.47   15 Oct 2020 00:42    139    |  107    |  66     ----------------------------<  95     3.8     |  28     |  1.68   14 Oct 2020 06:43    140    |  107    |  56     ----------------------------<  137    4.0     |  24     |  1.69     Ca    7.6        19 Oct 2020 08:21  Ca    7.5        18 Oct 2020 07:11  Ca    7.6        17 Oct 2020 03:33  Ca    7.9        16 Oct 2020 05:26  Ca    7.7        15 Oct 2020 17:45  Ca    8.1        15 Oct 2020 00:42  Ca    8.1        14 Oct 2020 06:43  Phos  2.2       19 Oct 2020 08:21  Phos  2.7       18 Oct 2020 07:11  Phos  2.7       17 Oct 2020 03:33  Phos  2.8       16 Oct 2020 05:26  Phos  3.2       15 Oct 2020 17:45  Phos  3.4       15 Oct 2020 00:42  Mg     1.9       19 Oct 2020 08:21  Mg     1.9       18 Oct 2020 07:11  Mg     2.2       17 Oct 2020 03:33  Mg     2.3       16 Oct 2020 05:26  Mg     1.9       15 Oct 2020 17:45  Mg     2.0       15 Oct 2020 00:42  Mg     1.9       14 Oct 2020 06:43    TPro  5.5    /  Alb  1.8    /  TBili  2.7    /  DBili  x      /  AST  26     /  ALT  28     /  AlkPhos  79     19 Oct 2020 08:21  TPro  5.8    /  Alb  1.8    /  TBili  2.8    /  DBili  x      /  AST  31     /  ALT  29     /  AlkPhos  81     18 Oct 2020 07:11  TPro  6.0    /  Alb  1.9    /  TBili  3.2    /  DBili  x      /  AST  33     /  ALT  32     /  AlkPhos  81     17 Oct 2020 03:33  TPro  5.9    /  Alb  2.0    /  TBili  3.8    /  DBili  x      /  AST  33     /  ALT  32     /  AlkPhos  82     16 Oct 2020 05:26  TPro  5.8    /  Alb  1.9    /  TBili  4.1    /  DBili  x      /  AST  37     /  ALT  33     /  AlkPhos  80     15 Oct 2020 17:45  TPro  6.8    /  Alb  2.1    /  TBili  4.3    /  DBili  1.22   /  AST  51     /  ALT  43     /  AlkPhos  91     15 Oct 2020 00:42  TPro  5.9    /  Alb  2.0    /  TBili  4.8    /  DBili  x      /  AST  54     /  ALT  34     /  AlkPhos  90     14 Oct 2020 06:43      PT/INR - ( 19 Oct 2020 08:21 )   PT: 25.2 sec;   INR: 2.26 ratio         PTT - ( 19 Oct 2020 08:21 )  PTT:42.3 sec        CAPILLARY BLOOD GLUCOSE              RADIOLOGY & ADDITIONAL TESTS:    Imaging Personally Reviewed:  [ ] YES  [ ] NO    Consultant(s) Notes Reviewed:  [ ] YES  [ ] NO    Care Discussed with Consultants/Other Providers [ ] YES  [ ] NO

## 2020-10-19 NOTE — PROGRESS NOTE ADULT - ASSESSMENT
58 y/o male morbidly obese male w/ PMHx of Hepatic Cirrhosis w/ ascites & Thrombocytopenia, morbid Obesity w/ BMI of 47, chronic diastolic CHF, HTN, HLD, recent admx for Hepatic Encephalopathy, noncompliant with lactulose presented w/ Metabolic Encephalopathy and acute blood loss anemia due to GI bleed.     Acute blood loss anemia due to GI bleed  - s/p 6 pRBC, 2 FFP, 1 plt, Vitamin K, DDAVP and Kcentra  - patient is on Octreotide gtt, will stop tomorrow as per GI  - Protonix drip changed to 40mg IV Q12  - GI planning  EGD cancelled today due to high INR & thrombocytopenia; I spoke w/ the patient and Dr. Hua and patient will go for EGD tomorrow after he gets FFP & plts    - c/w PO Vit K as patient is refusing the IV version  - c/w Ceftriaxone prophylaxis  - Hgb dropped today, but there was no evidence of retroperitoneal bleed on CT     Sepsis POA due to SBP  - ascites fluid growing E. coli  - c/w Ceftriaxone  - ID following    Septic encephalopathy  - resolved  - due to SBP    Alcoholic cirrhosis w/ ascites, coagulopathy, pancytopenia and hepatic encephalopathy   - abd CT showed portal hypertension with cirrhosis, splenomegaly, upper abdominal varices, moderate abdominopelvic ascites, pleural effusions, and anasarca  - s/p 2L paracentesis in ED   - c/w lactulose    MONI  - likely prerenal  - improving post transfusion    Cirrhosis w/ edema  - GI following  - will start lasix and aldactone once MONI resolves  - c/w lactulose for encephalopathy and add Rifaximin     Superficial thrombophlebitis of the left basilic and cephalic veins on US  - local management  - cannot give NSAIDS given MONI and GI bleed    DVT prophylaxis  SVT: SCD  GI: Protonix    Dispo: home w/ home PT

## 2020-10-19 NOTE — PROGRESS NOTE ADULT - ASSESSMENT
HPI:     60 y/o male morbidly obese male w/ PMHx of Hepatic Cirrhosis, ascites, Thrombocytopenia, CHF, HTN, HLD, recent admx for Hepatic Encephalopathy, noncompliant with lactulose presents to the ED due to AMS. Pt awake, lethargic only oriented to self, unable to provide any history. History obtained from ED/Chart. Per ED, Brother noticed worsening confusion x 1, noted again pt non-complaint with lactulose thus gave him 1 dose prior to his arrival in ED.    On arrival in ED, initial /59, , pt afebrile. CT head neg. Sig labs include WBC 12.37, h/h 7.5/24.3, PLT 89, LA 8, BUN/Cr 48/1.81, ammonia level 41, see below for rest of sig labs. Pt s/p paracentesis in ED, 2L removed cell ct not indicative of SBP. UA neg, CXR pre-valenzuela unremarkable. CT A/P showed portal hypertension with cirrhosis, splenomegaly, upper abdominal varices, moderate abdominopelvic ascites, pleural effusions, and anasarca.     (14 Oct 2020 03:19)  ----------------- As Above -----------------Patient seen earlier today.   The patient presents with a change in mental status. Patient has a history of Paulino and alcoholic cirrhosis. Patient is non complaint with Lactulose. Patient has had admissions previously for hepatic encephalopathy from non compliance with Lactulose. Patiet was found to have HGB of 7.5 with platelets of 89. INR > 2  The patient presents with severe lethargy. Ammonia only 41 but has elevated lactic acid levels. SBP in ER ruled out SBP  IN ER , patient was passing light brown liquid stool ( visualized )  ---- Patient later developed melena ( WNI ) HGB fell to 5.6, BUN increased to 56 and platelets fell to 45. Patient now in the ICU    Change in MS - . Patient appears to be at baseline. Lactulose was held, now restarted at 20 MG QID  Decrease to BID once patient starts having diarrhea.  Melena - No N/V or melena. HGB 7.8 stable . Patient pancytopenic with elevated INR, low platelets - Patient is not a candidate for EGD ( high INR, low platelets ). Patient now refusing EGD, wants to be treated empirically.  1) Octreotide for 5 days, then D/C  2) Keep HGB > 8 0) Ceftriaxone for 1 week 4) Maintain regular diet  5) Would start non selective BBlockers  Elevated LFTs  - 3.2/33/32/81--> 2.7/ 26 /28 /79  better  === Discussed with patient

## 2020-10-19 NOTE — PROGRESS NOTE ADULT - SUBJECTIVE AND OBJECTIVE BOX
60 y/o male morbidly obese male w/ PMHx of Hepatic Cirrhosis w/ ascites & Thrombocytopenia, morbid Obesity w/ BMI of 47, chronic diastolic CHF, HTN, HLD, recent admx for Hepatic Encephalopathy, noncompliant with lactulose presented w/ Metabolic Encephalopathy and acute blood loss anemia due to GI bleed. He is lying in bed in NAD.      MEDICATIONS  (STANDING):  cefTRIAXone   IVPB 1000 milliGRAM(s) IV Intermittent every 24 hours  chlorhexidine 4% Liquid 1 Application(s) Topical <User Schedule>  lactulose Syrup 20 Gram(s) Oral two times a day  melatonin 3 milliGRAM(s) Oral at bedtime  nystatin Powder 1 Application(s) Topical two times a day  octreotide  Infusion 50 MICROgram(s)/Hr (10 mL/Hr) IV Continuous <Continuous>  pantoprazole  Injectable 40 milliGRAM(s) IV Push every 12 hours  silver sulfADIAZINE 1% Cream 1 Application(s) Topical two times a day    MEDICATIONS  (PRN):      Allergies    No Known Allergies       Vital Signs Last 24 Hrs  T(C): 36.7 (19 Oct 2020 17:48), Max: 37.1 (19 Oct 2020 06:18)  T(F): 98 (19 Oct 2020 17:48), Max: 98.7 (19 Oct 2020 06:18)  HR: 74 (19 Oct 2020 17:48) (70 - 75)  BP: 113/41 (19 Oct 2020 17:48) (101/48 - 117/50)  BP(mean): --  RR: 18 (19 Oct 2020 17:48) (18 - 20)  SpO2: 96% (19 Oct 2020 17:48) (96% - 99%)    PHYSICAL EXAM:  GENERAL: NAD, well-groomed, well-developed  HEAD:  Atraumatic, Normocephalic  EYES: EOMI, PERRLA   NECK: Supple   NERVOUS SYSTEM:  Alert    CHEST/LUNG: Clear to auscultation bilaterally; No rales, rhonchi, wheezing, or rubs  HEART: Regular rate and rhythm; No murmurs, rubs, or gallops  ABDOMEN: Soft, Nontender, ascitic   EXTREMITIES:  mild b/l edema    LABS:                        7.8    3.25  )-----------( 45       ( 19 Oct 2020 08:21 )             24.7     10-19    138  |  108  |  24<H>  ----------------------------<  89  3.8   |  28  |  1.24    Ca    7.6<L>      19 Oct 2020 08:21  Phos  2.2     10-19  Mg     1.9     10-19    TPro  5.5<L>  /  Alb  1.8<L>  /  TBili  2.7<H>  /  DBili  x   /  AST  26  /  ALT  28  /  AlkPhos  79  10-19    PT/INR - ( 19 Oct 2020 08:21 )   PT: 25.2 sec;   INR: 2.26 ratio         PTT - ( 19 Oct 2020 08:21 )  PTT:42.3 sec      RADIOLOGY & ADDITIONAL TESTS:    10-18-20 @ 07:01  -  10-19-20 @ 07:00  --------------------------------------------------------  IN:    Octreotide: 120 mL    Other (mL): 210 mL  Total IN: 330 mL    OUT:    Other (mL): 280 mL  Total OUT: 280 mL    Total NET: 50 mL      10-19-20 @ 07:01  -  10-19-20 @ 18:24  --------------------------------------------------------  IN:    IV PiggyBack: 50 mL    IV PiggyBack: 250 mL  Total IN: 300 mL    OUT:  Total OUT: 0 mL    Total NET: 300 mL

## 2020-10-20 LAB
ALBUMIN SERPL ELPH-MCNC: 1.8 G/DL — LOW (ref 3.3–5)
ALP SERPL-CCNC: 83 U/L — SIGNIFICANT CHANGE UP (ref 40–120)
ALT FLD-CCNC: 31 U/L — SIGNIFICANT CHANGE UP (ref 12–78)
ANION GAP SERPL CALC-SCNC: 4 MMOL/L — LOW (ref 5–17)
APTT BLD: 40.8 SEC — HIGH (ref 27.5–35.5)
AST SERPL-CCNC: 23 U/L — SIGNIFICANT CHANGE UP (ref 15–37)
BILIRUB SERPL-MCNC: 2.6 MG/DL — HIGH (ref 0.2–1.2)
BUN SERPL-MCNC: 18 MG/DL — SIGNIFICANT CHANGE UP (ref 7–23)
CALCIUM SERPL-MCNC: 7.8 MG/DL — LOW (ref 8.5–10.1)
CHLORIDE SERPL-SCNC: 108 MMOL/L — SIGNIFICANT CHANGE UP (ref 96–108)
CO2 SERPL-SCNC: 27 MMOL/L — SIGNIFICANT CHANGE UP (ref 22–31)
CREAT SERPL-MCNC: 1.24 MG/DL — SIGNIFICANT CHANGE UP (ref 0.5–1.3)
GLUCOSE SERPL-MCNC: 84 MG/DL — SIGNIFICANT CHANGE UP (ref 70–99)
HCT VFR BLD CALC: 25.5 % — LOW (ref 39–50)
HGB BLD-MCNC: 8 G/DL — LOW (ref 13–17)
INR BLD: 2.29 RATIO — HIGH (ref 0.88–1.16)
MAGNESIUM SERPL-MCNC: 1.9 MG/DL — SIGNIFICANT CHANGE UP (ref 1.6–2.6)
MCHC RBC-ENTMCNC: 29.6 PG — SIGNIFICANT CHANGE UP (ref 27–34)
MCHC RBC-ENTMCNC: 31.4 GM/DL — LOW (ref 32–36)
MCV RBC AUTO: 94.4 FL — SIGNIFICANT CHANGE UP (ref 80–100)
NRBC # BLD: 0 /100 WBCS — SIGNIFICANT CHANGE UP (ref 0–0)
PHOSPHATE SERPL-MCNC: 2.3 MG/DL — LOW (ref 2.5–4.5)
PLATELET # BLD AUTO: 46 K/UL — LOW (ref 150–400)
POTASSIUM SERPL-MCNC: 3.6 MMOL/L — SIGNIFICANT CHANGE UP (ref 3.5–5.3)
POTASSIUM SERPL-SCNC: 3.6 MMOL/L — SIGNIFICANT CHANGE UP (ref 3.5–5.3)
PROT SERPL-MCNC: 5.9 GM/DL — LOW (ref 6–8.3)
PROTHROM AB SERPL-ACNC: 25.5 SEC — HIGH (ref 10.6–13.6)
RBC # BLD: 2.7 M/UL — LOW (ref 4.2–5.8)
RBC # FLD: 20.4 % — HIGH (ref 10.3–14.5)
SODIUM SERPL-SCNC: 139 MMOL/L — SIGNIFICANT CHANGE UP (ref 135–145)
WBC # BLD: 2.89 K/UL — LOW (ref 3.8–10.5)
WBC # FLD AUTO: 2.89 K/UL — LOW (ref 3.8–10.5)

## 2020-10-20 PROCEDURE — 99232 SBSQ HOSP IP/OBS MODERATE 35: CPT

## 2020-10-20 RX ORDER — SPIRONOLACTONE 25 MG/1
50 TABLET, FILM COATED ORAL DAILY
Refills: 0 | Status: DISCONTINUED | OUTPATIENT
Start: 2020-10-20 | End: 2020-10-20

## 2020-10-20 RX ORDER — SODIUM,POTASSIUM PHOSPHATES 278-250MG
2 POWDER IN PACKET (EA) ORAL
Refills: 0 | Status: COMPLETED | OUTPATIENT
Start: 2020-10-20 | End: 2020-10-21

## 2020-10-20 RX ORDER — POTASSIUM CHLORIDE 20 MEQ
40 PACKET (EA) ORAL EVERY 4 HOURS
Refills: 0 | Status: COMPLETED | OUTPATIENT
Start: 2020-10-20 | End: 2020-10-20

## 2020-10-20 RX ORDER — SPIRONOLACTONE 25 MG/1
12.5 TABLET, FILM COATED ORAL DAILY
Refills: 0 | Status: DISCONTINUED | OUTPATIENT
Start: 2020-10-20 | End: 2020-10-23

## 2020-10-20 RX ADMIN — Medication 1 APPLICATION(S): at 18:40

## 2020-10-20 RX ADMIN — OCTREOTIDE ACETATE 10 MICROGRAM(S)/HR: 200 INJECTION, SOLUTION INTRAVENOUS; SUBCUTANEOUS at 11:40

## 2020-10-20 RX ADMIN — CHLORHEXIDINE GLUCONATE 1 APPLICATION(S): 213 SOLUTION TOPICAL at 07:42

## 2020-10-20 RX ADMIN — PANTOPRAZOLE SODIUM 40 MILLIGRAM(S): 20 TABLET, DELAYED RELEASE ORAL at 06:47

## 2020-10-20 RX ADMIN — SPIRONOLACTONE 12.5 MILLIGRAM(S): 25 TABLET, FILM COATED ORAL at 18:40

## 2020-10-20 RX ADMIN — Medication 62.5 MILLIMOLE(S): at 11:40

## 2020-10-20 RX ADMIN — LACTULOSE 20 GRAM(S): 10 SOLUTION ORAL at 18:40

## 2020-10-20 RX ADMIN — OCTREOTIDE ACETATE 10 MICROGRAM(S)/HR: 200 INJECTION, SOLUTION INTRAVENOUS; SUBCUTANEOUS at 02:36

## 2020-10-20 RX ADMIN — Medication 2 PACKET(S): at 22:12

## 2020-10-20 RX ADMIN — Medication 40 MILLIEQUIVALENT(S): at 22:12

## 2020-10-20 RX ADMIN — NYSTATIN CREAM 1 APPLICATION(S): 100000 CREAM TOPICAL at 06:46

## 2020-10-20 RX ADMIN — NYSTATIN CREAM 1 APPLICATION(S): 100000 CREAM TOPICAL at 18:40

## 2020-10-20 RX ADMIN — Medication 3 MILLIGRAM(S): at 22:12

## 2020-10-20 RX ADMIN — LACTULOSE 20 GRAM(S): 10 SOLUTION ORAL at 06:47

## 2020-10-20 RX ADMIN — Medication 2 PACKET(S): at 18:40

## 2020-10-20 RX ADMIN — PANTOPRAZOLE SODIUM 40 MILLIGRAM(S): 20 TABLET, DELAYED RELEASE ORAL at 18:40

## 2020-10-20 RX ADMIN — Medication 40 MILLIEQUIVALENT(S): at 18:42

## 2020-10-20 RX ADMIN — Medication 1 APPLICATION(S): at 06:46

## 2020-10-20 NOTE — PHYSICAL THERAPY INITIAL EVALUATION ADULT - GAIT TRAINING, PT EVAL
TBA
Pt will be able to ambulate using assistive device up to 200 ft or more, be able to negotiate steps safely observing proper gait, posture and prevent falls.

## 2020-10-20 NOTE — PHYSICAL THERAPY INITIAL EVALUATION ADULT - BED MOBILITY TRAINING, PT EVAL
pt will be independent with bed mobility x8 weeks
Independent in bed mobility- supine<>sit, rolling side<>side observing proper body mechanics, proper positioning, body alignment and precautions.

## 2020-10-20 NOTE — PHYSICAL THERAPY INITIAL EVALUATION ADULT - PASSIVE RANGE OF MOTION EXAMINATION, REHAB EVAL
no Passive ROM deficits were identified
bilateral lower extremity Passive ROM was WFL (within functional limits)/bilateral upper extremity Passive ROM was WFL (within functional limits)

## 2020-10-20 NOTE — PROGRESS NOTE ADULT - ASSESSMENT
HPI:     60 y/o male morbidly obese male w/ PMHx of Hepatic Cirrhosis, ascites, Thrombocytopenia, CHF, HTN, HLD, recent admx for Hepatic Encephalopathy, noncompliant with lactulose presents to the ED due to AMS. Pt awake, lethargic only oriented to self, unable to provide any history. History obtained from ED/Chart. Per ED, Brother noticed worsening confusion x 1, noted again pt non-complaint with lactulose thus gave him 1 dose prior to his arrival in ED.    On arrival in ED, initial /59, , pt afebrile. CT head neg. Sig labs include WBC 12.37, h/h 7.5/24.3, PLT 89, LA 8, BUN/Cr 48/1.81, ammonia level 41, see below for rest of sig labs. Pt s/p paracentesis in ED, 2L removed cell ct not indicative of SBP. UA neg, CXR pre-valenzuela unremarkable. CT A/P showed portal hypertension with cirrhosis, splenomegaly, upper abdominal varices, moderate abdominopelvic ascites, pleural effusions, and anasarca.     (14 Oct 2020 03:19)  ----------------- As Above -----------------Patient seen earlier today.   The patient presents with a change in mental status. Patient has a history of Paulino and alcoholic cirrhosis. Patient is non complaint with Lactulose. Patient has had admissions previously for hepatic encephalopathy from non compliance with Lactulose. Patiet was found to have HGB of 7.5 with platelets of 89. INR > 2  The patient presents with severe lethargy. Ammonia only 41 but has elevated lactic acid levels. SBP in ER ruled out SBP  IN ER , patient was passing light brown liquid stool ( visualized )  ---- Patient later developed melena ( WNI ) HGB fell to 5.6, BUN increased to 56 and platelets fell to 45. Patient now in the ICU    Change in MS - . Patient appears to be at baseline. Lactulose was held, now restarted at 20 MG QID  Decrease to BID once patient starts having diarrhea.  Melena - No N/V or melena. HGB 7.8 stable . Patient pancytopenic with elevated INR, low platelets - Patient is not a candidate for EGD ( high INR, low platelets ). Patient now refusing EGD, wants to be treated empirically.  1) Octreotide for 5 days, then D/C  2) Keep HGB > 8 0) Ceftriaxone for 1 week 4) Maintain regular diet  5) Would start non selective BBlockers  Elevated LFTs  - 3.2/33/32/81--> 2.7/ 26 /28 /79  better  === Discussed with patient HPI:     60 y/o male morbidly obese male w/ PMHx of Hepatic Cirrhosis, ascites, Thrombocytopenia, CHF, HTN, HLD, recent admx for Hepatic Encephalopathy, noncompliant with lactulose presents to the ED due to AMS. Pt awake, lethargic only oriented to self, unable to provide any history. History obtained from ED/Chart. Per ED, Brother noticed worsening confusion x 1, noted again pt non-complaint with lactulose thus gave him 1 dose prior to his arrival in ED.    On arrival in ED, initial /59, , pt afebrile. CT head neg. Sig labs include WBC 12.37, h/h 7.5/24.3, PLT 89, LA 8, BUN/Cr 48/1.81, ammonia level 41, see below for rest of sig labs. Pt s/p paracentesis in ED, 2L removed cell ct not indicative of SBP. UA neg, CXR pre-valenzuela unremarkable. CT A/P showed portal hypertension with cirrhosis, splenomegaly, upper abdominal varices, moderate abdominopelvic ascites, pleural effusions, and anasarca.     (14 Oct 2020 03:19)  ----------------- As Above -----------------Patient seen earlier today.   The patient presents with a change in mental status. Patient has a history of Paulino and alcoholic cirrhosis. Patient is non complaint with Lactulose. Patient has had admissions previously for hepatic encephalopathy from non compliance with Lactulose. Patiet was found to have HGB of 7.5 with platelets of 89. INR > 2  The patient presents with severe lethargy. Ammonia only 41 but has elevated lactic acid levels. SBP in ER ruled out SBP  IN ER , patient was passing light brown liquid stool ( visualized )  ---- Patient later developed melena ( WNI ) HGB fell to 5.6, BUN increased to 56 and platelets fell to 45. Patient now in the ICU    Change in MS - . Patient appears to be at baseline. Lactulose at 20 MG BID    Melena - No N/V or melena. HGB 8.0 stable . Patient pancytopenic with elevated INR, low platelets - EGD cancelled. Patient is a candidate for EGD only if done in OR during the day. Platelets given. FFP on call. ( high INR, low platelets ).  1) Octreotide for 5 days, then D/C  2) Keep HGB > 8 0) Ceftriaxone for 1 week 4) Maintain regular diet  5) Would start non selective BBlockers  Elevated LFTs  - 3.2/33/32/81--> 2.7/ 26 /28 /79 --> 2.6/23/31/83  better  === Will discuss  with patient

## 2020-10-20 NOTE — PHARMACY COMMUNICATION NOTE - COMMENTS
Spoke to MD and verified dose. Patients home med was 100mg. MD agreed to change to 50mg. Original order was 25mg.

## 2020-10-20 NOTE — PROGRESS NOTE ADULT - ASSESSMENT
60 y/o male morbidly obese male w/ PMHx of Hepatic Cirrhosis w/ ascites & Thrombocytopenia, morbid Obesity w/ BMI of 47, chronic diastolic CHF, HTN, HLD, recent admx for Hepatic Encephalopathy, noncompliant with lactulose presented w/ Metabolic Encephalopathy and acute blood loss anemia due to GI bleed.     Acute blood loss anemia due to GI bleed  - s/p 6 pRBC, 2 FFP, 1 plt, Vitamin K, DDAVP and Kcentra  - patient is on Octreotide gtt, will stop tomorrow as per GI  - Protonix drip changed to 40mg IV Q12  - GI planning  EGD cancelled today due to high INR & thrombocytopenia; I spoke w/ the patient and Dr. Hua and patient will go for EGD today after he gets FFP & plts    - c/w Ceftriaxone prophylaxis  - Hgb dropped today, but there was no evidence of retroperitoneal bleed on CT     Sepsis POA due to SBP  - ascites fluid growing E. coli  - c/w Ceftriaxone  - ID following    Septic encephalopathy  - resolved  - due to SBP    Alcoholic cirrhosis w/ ascites, coagulopathy, pancytopenia and hepatic encephalopathy   - abd CT showed portal hypertension with cirrhosis, splenomegaly, upper abdominal varices, moderate abdominopelvic ascites, pleural effusions, and anasarca  - s/p 2L paracentesis in ED   - c/w lactulose    MONI  - resolved, patient is likely at baseline  - improving post transfusion    Cirrhosis w/ edema  - GI following  - start Lasix and aldactone   - c/w lactulose for encephalopathy and Rifaximin     Superficial thrombophlebitis of the left basilic and cephalic veins on US  - local management  - cannot give NSAIDS given MONI and GI bleed    DVT prophylaxis  SVT: SCD  GI: Protonix    Dispo: STR 60 y/o male morbidly obese male w/ PMHx of Hepatic Cirrhosis w/ ascites & Thrombocytopenia, morbid Obesity w/ BMI of 47, chronic diastolic CHF, HTN, HLD, recent admx for Hepatic Encephalopathy, noncompliant with lactulose presented w/ Metabolic Encephalopathy and acute blood loss anemia due to GI bleed.     Acute blood loss anemia due to GI bleed  - s/p 6 pRBC, 2 FFP, 1 plt, Vitamin K, DDAVP and Kcentra  - patient is on Octreotide gtt, will stop tomorrow as per GI  - Protonix drip changed to 40mg IV Q12  - GI planning  EGD cancelled today due to high INR & thrombocytopenia; I spoke w/ the patient and Dr. Hua and patient will go for EGD today after he gets FFP & plts    - c/w Ceftriaxone prophylaxis  - Hgb dropped today, but there was no evidence of retroperitoneal bleed on CT     Sepsis POA due to SBP  - ascites fluid growing E. coli  - c/w Ceftriaxone  - ID following    Septic encephalopathy  - resolved  - due to SBP    Alcoholic cirrhosis w/ ascites, coagulopathy, pancytopenia and hepatic encephalopathy   - abd CT showed portal hypertension with cirrhosis, splenomegaly, upper abdominal varices, moderate abdominopelvic ascites, pleural effusions, and anasarca  - s/p 2L paracentesis in ED   - c/w lactulose    MONI  - resolved, patient is likely at baseline  - improving post transfusion    Cirrhosis w/ edema  - GI following  - start Lasix and aldactone - will stop if BP drops  - c/w lactulose for encephalopathy and Rifaximin     Superficial thrombophlebitis of the left basilic and cephalic veins on US  - local management  - cannot give NSAIDS given MONI and GI bleed    DVT prophylaxis  SVT: SCD  GI: Protonix    Dispo: STR

## 2020-10-20 NOTE — PROGRESS NOTE ADULT - SUBJECTIVE AND OBJECTIVE BOX
58 y/o male morbidly obese male w/ PMHx of Hepatic Cirrhosis w/ ascites & Thrombocytopenia, morbid Obesity w/ BMI of 47, chronic diastolic CHF, HTN, HLD, recent admx for Hepatic Encephalopathy, noncompliant with lactulose presented w/ Metabolic Encephalopathy and acute blood loss anemia due to GI bleed. He is lying in bed in NAD.      MEDICATIONS  (STANDING):  chlorhexidine 4% Liquid 1 Application(s) Topical <User Schedule>  lactulose Syrup 20 Gram(s) Oral two times a day  melatonin 3 milliGRAM(s) Oral at bedtime  nystatin Powder 1 Application(s) Topical two times a day  octreotide  Infusion 50 MICROgram(s)/Hr (10 mL/Hr) IV Continuous <Continuous>  pantoprazole  Injectable 40 milliGRAM(s) IV Push every 12 hours  pneumococcal  13 Vaccine (PREVNAR 13) 0.5 milliLiter(s) IntraMuscular once  rifAXIMin 550 milliGRAM(s) Oral two times a day  silver sulfADIAZINE 1% Cream 1 Application(s) Topical two times a day    MEDICATIONS  (PRN):      Allergies    No Known Allergies    Intolerances      Vital Signs Last 24 Hrs  T(C): 36.6 (20 Oct 2020 11:40), Max: 37.2 (20 Oct 2020 05:56)  T(F): 97.9 (20 Oct 2020 11:40), Max: 98.9 (20 Oct 2020 05:56)  HR: 75 (20 Oct 2020 11:40) (70 - 75)  BP: 112/43 (20 Oct 2020 14:40) (99/46 - 117/49)  BP(mean): --  RR: 18 (20 Oct 2020 14:40) (18 - 18)  SpO2: 98% (20 Oct 2020 14:40) (95% - 98%)    PHYSICAL EXAM:  GENERAL: NAD, well-groomed, well-developed  HEAD:  Atraumatic, Normocephalic  EYES: EOMI, PERRLA   NECK: Supple   NERVOUS SYSTEM:  Alert    CHEST/LUNG: Clear to auscultation bilaterally; No rales, rhonchi, wheezing, or rubs  HEART: Regular rate and rhythm; No murmurs, rubs, or gallops  ABDOMEN: Soft, Nontender, ascitic   EXTREMITIES:  mild b/l edema    LABS:                        8.0    2.89  )-----------( 46       ( 20 Oct 2020 07:41 )             25.5     10-20    139  |  108  |  18  ----------------------------<  84  3.6   |  27  |  1.24    Ca    7.8<L>      20 Oct 2020 07:41  Phos  2.3     10-20  Mg     1.9     10-20    TPro  5.9<L>  /  Alb  1.8<L>  /  TBili  2.6<H>  /  DBili  x   /  AST  23  /  ALT  31  /  AlkPhos  83  10-20    PT/INR - ( 20 Oct 2020 07:41 )   PT: 25.5 sec;   INR: 2.29 ratio         PTT - ( 20 Oct 2020 07:41 )  PTT:40.8 sec       RADIOLOGY & ADDITIONAL TESTS:    10-19-20 @ 07:01  -  10-20-20 @ 07:00  --------------------------------------------------------  IN:    IV PiggyBack: 50 mL    IV PiggyBack: 250 mL    Octreotide: 240 mL    Other (mL): 150 mL  Total IN: 690 mL    OUT:    Other (mL): 200 mL    Stool (mL): 2 mL    Voided (mL): 502 mL  Total OUT: 704 mL    Total NET: -14 mL      10-20-20 @ 07:01  -  10-20-20 @ 16:19  --------------------------------------------------------  IN:  Total IN: 0 mL    OUT:    Other (mL): 140 mL  Total OUT: 140 mL    Total NET: -140 mL       60 y/o male morbidly obese male w/ PMHx of Hepatic Cirrhosis w/ ascites & Thrombocytopenia, morbid Obesity w/ BMI of 47, chronic diastolic CHF, HTN, HLD, recent admx for Hepatic Encephalopathy, noncompliant with lactulose presented w/ Metabolic Encephalopathy and acute blood loss anemia due to GI bleed. He is lying in bed in NAD.      MEDICATIONS  (STANDING):  chlorhexidine 4% Liquid 1 Application(s) Topical <User Schedule>  lactulose Syrup 20 Gram(s) Oral two times a day  melatonin 3 milliGRAM(s) Oral at bedtime  nystatin Powder 1 Application(s) Topical two times a day  octreotide  Infusion 50 MICROgram(s)/Hr (10 mL/Hr) IV Continuous <Continuous>  pantoprazole  Injectable 40 milliGRAM(s) IV Push every 12 hours  pneumococcal  13 Vaccine (PREVNAR 13) 0.5 milliLiter(s) IntraMuscular once  rifAXIMin 550 milliGRAM(s) Oral two times a day  silver sulfADIAZINE 1% Cream 1 Application(s) Topical two times a day    MEDICATIONS  (PRN):      Allergies    No Known Allergies    Intolerances      Vital Signs Last 24 Hrs  T(C): 36.6 (20 Oct 2020 11:40), Max: 37.2 (20 Oct 2020 05:56)  T(F): 97.9 (20 Oct 2020 11:40), Max: 98.9 (20 Oct 2020 05:56)  HR: 75 (20 Oct 2020 11:40) (70 - 75)  BP: 112/43 (20 Oct 2020 14:40) (99/46 - 117/49)  BP(mean): --  RR: 18 (20 Oct 2020 14:40) (18 - 18)  SpO2: 98% (20 Oct 2020 14:40) (95% - 98%)    PHYSICAL EXAM:  GENERAL: NAD, well-groomed, well-developed  HEAD:  Atraumatic, Normocephalic  EYES: EOMI, PERRLA   NECK: Supple   NERVOUS SYSTEM:  Alert    CHEST/LUNG: Clear to auscultation bilaterally; No rales, rhonchi, wheezing, or rubs  HEART: Regular rate and rhythm; No murmurs, rubs, or gallops  ABDOMEN: Soft, Nontender, ascitic   EXTREMITIES:  b/l edema    LABS:                        8.0    2.89  )-----------( 46       ( 20 Oct 2020 07:41 )             25.5     10-20    139  |  108  |  18  ----------------------------<  84  3.6   |  27  |  1.24    Ca    7.8<L>      20 Oct 2020 07:41  Phos  2.3     10-20  Mg     1.9     10-20    TPro  5.9<L>  /  Alb  1.8<L>  /  TBili  2.6<H>  /  DBili  x   /  AST  23  /  ALT  31  /  AlkPhos  83  10-20    PT/INR - ( 20 Oct 2020 07:41 )   PT: 25.5 sec;   INR: 2.29 ratio         PTT - ( 20 Oct 2020 07:41 )  PTT:40.8 sec       RADIOLOGY & ADDITIONAL TESTS:    10-19-20 @ 07:01  -  10-20-20 @ 07:00  --------------------------------------------------------  IN:    IV PiggyBack: 50 mL    IV PiggyBack: 250 mL    Octreotide: 240 mL    Other (mL): 150 mL  Total IN: 690 mL    OUT:    Other (mL): 200 mL    Stool (mL): 2 mL    Voided (mL): 502 mL  Total OUT: 704 mL    Total NET: -14 mL      10-20-20 @ 07:01  -  10-20-20 @ 16:19  --------------------------------------------------------  IN:  Total IN: 0 mL    OUT:    Other (mL): 140 mL  Total OUT: 140 mL    Total NET: -140 mL

## 2020-10-20 NOTE — PHYSICAL THERAPY INITIAL EVALUATION ADULT - TRANSFER TRAINING, PT EVAL
TBA
Independent in  transfer ability bed to chair and vice versa using appropriate assistive device  and prevent falls.

## 2020-10-20 NOTE — PHYSICAL THERAPY INITIAL EVALUATION ADULT - CRITERIA FOR SKILLED THERAPEUTIC INTERVENTIONS
rehab potential/anticipated discharge recommendation/impairments found/therapy frequency
risk reduction/prevention/anticipated discharge recommendation/functional limitations in following categories/impairments found

## 2020-10-20 NOTE — PHYSICAL THERAPY INITIAL EVALUATION ADULT - GENERAL OBSERVATIONS, REHAB EVAL
Pt encountered supine, alert and oriented x4, ivs and pneumatic teds in place, NAD. Pt with INR 2.29, yesterday EGD was postponed secondary to elevated INR.

## 2020-10-20 NOTE — PHYSICAL THERAPY INITIAL EVALUATION ADULT - IMPAIRMENTS FOUND, PT EVAL
gait, locomotion, and balance/muscle strength
ergonomics and body mechanics/gait, locomotion, and balance/muscle strength/aerobic capacity/endurance

## 2020-10-20 NOTE — PHYSICAL THERAPY INITIAL EVALUATION ADULT - LIVES WITH, PROFILE
Pt states that he lives alone in a pvt house, 2 steps to enter and 12 step inside the house with rail.
Pt states that he lives alone in a pvt house, 2 steps to enter and 12 step inside the house with rail.

## 2020-10-20 NOTE — PHYSICAL THERAPY INITIAL EVALUATION ADULT - BALANCE TRAINING, PT EVAL
TBA
Independent sitting, transfers, standing and ambulation with good balance using appropriate assistive device and prevent falls.

## 2020-10-20 NOTE — PHYSICAL THERAPY INITIAL EVALUATION ADULT - PERTINENT HX OF CURRENT PROBLEM, REHAB EVAL
59M morbidly obese w/ SARKIS, CHF, HTN, HLD, BOB/NOELLE cirrhosis (BOB/NOELLE) w/ ascites and thrombocytopenia presents w/ AMS, likely due to decompensated cirrhosis/hepatic encephalopathy, anemia likely due to GIB, as well lactic acidosis and SBP.

## 2020-10-20 NOTE — PHYSICAL THERAPY INITIAL EVALUATION ADULT - PLANNED THERAPY INTERVENTIONS, PT EVAL
balance training/bed mobility training/gait training/strengthening/transfer training
transfer training/stair climbing ex/gait training/strengthening/balance training/bed mobility training

## 2020-10-20 NOTE — PHYSICAL THERAPY INITIAL EVALUATION ADULT - STRENGTHENING, PT EVAL
pt will increase BUE/LE strength by full grade for ambulation, ADLs, transfers x8 weeks
Improve strength in the  and be able to perform functional tasks-bed mobility, sitting, standing, transfers and ambulate in a safe manner with or without  assistive device and prevent falls.

## 2020-10-20 NOTE — PHYSICAL THERAPY INITIAL EVALUATION ADULT - ADDITIONAL COMMENTS
pt states he has a rolling walker but does not use it.
pt states he has a rolling walker but does not use it.

## 2020-10-21 LAB
ALBUMIN SERPL ELPH-MCNC: 1.8 G/DL — LOW (ref 3.3–5)
ALP SERPL-CCNC: 90 U/L — SIGNIFICANT CHANGE UP (ref 40–120)
ALT FLD-CCNC: 28 U/L — SIGNIFICANT CHANGE UP (ref 12–78)
ANION GAP SERPL CALC-SCNC: 4 MMOL/L — LOW (ref 5–17)
AST SERPL-CCNC: 28 U/L — SIGNIFICANT CHANGE UP (ref 15–37)
BILIRUB SERPL-MCNC: 2.6 MG/DL — HIGH (ref 0.2–1.2)
BUN SERPL-MCNC: 16 MG/DL — SIGNIFICANT CHANGE UP (ref 7–23)
CALCIUM SERPL-MCNC: 7.5 MG/DL — LOW (ref 8.5–10.1)
CHLORIDE SERPL-SCNC: 106 MMOL/L — SIGNIFICANT CHANGE UP (ref 96–108)
CO2 SERPL-SCNC: 27 MMOL/L — SIGNIFICANT CHANGE UP (ref 22–31)
CREAT SERPL-MCNC: 1.29 MG/DL — SIGNIFICANT CHANGE UP (ref 0.5–1.3)
GLUCOSE SERPL-MCNC: 114 MG/DL — HIGH (ref 70–99)
HCT VFR BLD CALC: 26 % — LOW (ref 39–50)
HGB BLD-MCNC: 8.4 G/DL — LOW (ref 13–17)
INR BLD: 2.08 RATIO — HIGH (ref 0.88–1.16)
MAGNESIUM SERPL-MCNC: 2.3 MG/DL — SIGNIFICANT CHANGE UP (ref 1.6–2.6)
MCHC RBC-ENTMCNC: 30.7 PG — SIGNIFICANT CHANGE UP (ref 27–34)
MCHC RBC-ENTMCNC: 32.3 GM/DL — SIGNIFICANT CHANGE UP (ref 32–36)
MCV RBC AUTO: 94.9 FL — SIGNIFICANT CHANGE UP (ref 80–100)
NRBC # BLD: 0 /100 WBCS — SIGNIFICANT CHANGE UP (ref 0–0)
PHOSPHATE SERPL-MCNC: 2.6 MG/DL — SIGNIFICANT CHANGE UP (ref 2.5–4.5)
PLATELET # BLD AUTO: 46 K/UL — LOW (ref 150–400)
POTASSIUM SERPL-MCNC: 4.1 MMOL/L — SIGNIFICANT CHANGE UP (ref 3.5–5.3)
POTASSIUM SERPL-SCNC: 4.1 MMOL/L — SIGNIFICANT CHANGE UP (ref 3.5–5.3)
PROT SERPL-MCNC: 6.1 GM/DL — SIGNIFICANT CHANGE UP (ref 6–8.3)
PROTHROM AB SERPL-ACNC: 23.3 SEC — HIGH (ref 10.6–13.6)
RBC # BLD: 2.74 M/UL — LOW (ref 4.2–5.8)
RBC # FLD: 20.8 % — HIGH (ref 10.3–14.5)
SODIUM SERPL-SCNC: 137 MMOL/L — SIGNIFICANT CHANGE UP (ref 135–145)
WBC # BLD: 3.19 K/UL — LOW (ref 3.8–10.5)
WBC # FLD AUTO: 3.19 K/UL — LOW (ref 3.8–10.5)

## 2020-10-21 PROCEDURE — 99233 SBSQ HOSP IP/OBS HIGH 50: CPT

## 2020-10-21 PROCEDURE — 99497 ADVNCD CARE PLAN 30 MIN: CPT

## 2020-10-21 RX ORDER — ALBUMIN HUMAN 25 %
50 VIAL (ML) INTRAVENOUS ONCE
Refills: 0 | Status: DISCONTINUED | OUTPATIENT
Start: 2020-10-21 | End: 2020-10-21

## 2020-10-21 RX ORDER — ALBUMIN HUMAN 25 %
50 VIAL (ML) INTRAVENOUS EVERY 12 HOURS
Refills: 0 | Status: COMPLETED | OUTPATIENT
Start: 2020-10-21 | End: 2020-10-22

## 2020-10-21 RX ORDER — PHYTONADIONE (VIT K1) 5 MG
5 TABLET ORAL DAILY
Refills: 0 | Status: COMPLETED | OUTPATIENT
Start: 2020-10-21 | End: 2020-10-22

## 2020-10-21 RX ADMIN — Medication 2 PACKET(S): at 12:09

## 2020-10-21 RX ADMIN — NYSTATIN CREAM 1 APPLICATION(S): 100000 CREAM TOPICAL at 06:23

## 2020-10-21 RX ADMIN — CHLORHEXIDINE GLUCONATE 1 APPLICATION(S): 213 SOLUTION TOPICAL at 07:53

## 2020-10-21 RX ADMIN — Medication 2 PACKET(S): at 07:53

## 2020-10-21 RX ADMIN — Medication 50 MILLILITER(S): at 18:25

## 2020-10-21 RX ADMIN — PANTOPRAZOLE SODIUM 40 MILLIGRAM(S): 20 TABLET, DELAYED RELEASE ORAL at 06:22

## 2020-10-21 RX ADMIN — PANTOPRAZOLE SODIUM 40 MILLIGRAM(S): 20 TABLET, DELAYED RELEASE ORAL at 18:26

## 2020-10-21 RX ADMIN — Medication 3 MILLIGRAM(S): at 22:02

## 2020-10-21 RX ADMIN — NYSTATIN CREAM 1 APPLICATION(S): 100000 CREAM TOPICAL at 18:27

## 2020-10-21 RX ADMIN — LACTULOSE 20 GRAM(S): 10 SOLUTION ORAL at 06:22

## 2020-10-21 RX ADMIN — PNEUMOCOCCAL 13-VALENT CONJUGATE VACCINE 0.5 MILLILITER(S): 2.2; 2.2; 2.2; 2.2; 2.2; 4.4; 2.2; 2.2; 2.2; 2.2; 2.2; 2.2; 2.2 INJECTION, SUSPENSION INTRAMUSCULAR at 14:35

## 2020-10-21 RX ADMIN — Medication 5 MILLIGRAM(S): at 14:35

## 2020-10-21 RX ADMIN — Medication 1 APPLICATION(S): at 06:23

## 2020-10-21 RX ADMIN — Medication 1 APPLICATION(S): at 18:26

## 2020-10-21 RX ADMIN — LACTULOSE 20 GRAM(S): 10 SOLUTION ORAL at 18:26

## 2020-10-21 NOTE — PROGRESS NOTE ADULT - SUBJECTIVE AND OBJECTIVE BOX
SANTOS BELLE  MRN-[id number]    Follow Up:  ID following for SBP    Interval History: Notes reviewed. Planned for EGD tomorrow in OR. He denies any complaints though seems more tachypneic and short winded when speaking.      ROS:    [ ] Unobtainable because:  [X ] All other systems negative    Constitutional: no fever, no chills  Head: no trauma  Eyes: no vision changes, no eye pain  ENT:  no sore throat, no rhinorrhea  Cardiovascular:  no chest pain, no palpitation  Respiratory:  no SOB, no cough  GI:  no abd pain, no vomiting, no diarrhea  urinary: no dysuria, no hematuria, no flank pain  musculoskeletal:  no joint pain, no joint swelling  skin:  no rash  neurology:  no headache, no seizure, no change in mental status  psych: no anxiety, no depression         Allergies  No Known Allergies      ANTIMICROBIALS:pneumococcal  13 Vaccine (PREVNAR 13) 0.5 once  rifAXIMin 550 two times a day      MEDICATIONS  (STANDING):  lactulose Syrup 20 two times a day  melatonin 3 at bedtime  pantoprazole  Injectable 40 every 12 hours  pneumococcal  13 Vaccine (PREVNAR 13) 0.5 once  spironolactone 12.5 daily      PRN      Vital Signs Last 24 Hrs  T(F): 98.6 (10-21-20 @ 11:28), Max: 98.6 (10-21-20 @ 06:45)  HR: 70 (10-21-20 @ 13:19)  BP: 108/42 (10-21-20 @ 13:19)  RR: 17 (10-21-20 @ 11:28)  SpO2: 98% (10-21-20 @ 11:28) (95% - 98%)  Wt(kg): --    Physical Exam:  General:    NAD,  non toxic, obese, mildly tachypneic   Head: atraumatic, normocephalic  Eye: mildly icteric sclera  ENT:    no oral lesions, neck supple  Cardio:     regular S1, S2,  no murmur  Respiratory:    clear b/l,    no wheezing  abd:     firm,   BS +,   no tenderness, site of paracentesis draining yellow fluid into collection bag, obese, definitely more distended over the last few days  :    no suprapubic tenderness,   Musculoskeletal:   no joint swelling,   +edema  vascular: no central lines, +PIV   Skin:    left upper ext with ecchymosis improving   Neurologic:     no focal deficit, alert and awake x3  psych: normal affect    Labs:  WBC Count: 3.19 K/uL (10-21 @ 08:24)  WBC Count: 2.89 K/uL (10-20 @ 07:41)  WBC Count: 3.25 K/uL (10-19 @ 08:21)  WBC Count: 3.30 K/uL (10-18 @ 07:11)  WBC Count: 3.19 K/uL (10-17 @ 11:45)  WBC Count: 2.74 K/uL (10-17 @ 03:33)  WBC Count: 2.98 K/uL (10-16 @ 15:49)                            8.4    3.19  )-----------( 46       ( 21 Oct 2020 08:24 )             26.0       10-21    137  |  106  |  16  ----------------------------<  114<H>  4.1   |  27  |  1.29    Ca    7.5<L>      21 Oct 2020 08:24  Phos  2.6     10-21  Mg     2.3     10-21    TPro  6.1  /  Alb  1.8<L>  /  TBili  2.6<H>  /  DBili  x   /  AST  28  /  ALT  28  /  AlkPhos  90  10-21      Creatinine Trend: 1.29<--, 1.24<--, 1.24<--, 1.22<--, 1.31<--, 1.34<--        Urinalysis Basic - ( 14 Oct 2020 03:21 )    Color: Yellow / Appearance: Clear / S.015 / pH: x  Gluc: x / Ketone: Negative  / Bili: Small / Urobili: Negative mg/dL   Blood: x / Protein: 15 mg/dL / Nitrite: Negative   Leuk Esterase: Trace / RBC: 3-5 /HPF / WBC 3-5   Sq Epi: x / Non Sq Epi: Few / Bacteria: Occasional        MICROBIOLOGY:  v  .Urine Clean Catch (Midstream)  10-14-20   <10,000 CFU/mL Normal Urogenital Blanka  --  --      .Blood Blood-Peripheral  10-14-20   No growth to date.  --  --    Culture - Body Fluid with Gram Stain (10.14.20 @ 05:40)    -  Amikacin: S <=16    -  Amoxicillin/Clavulanic Acid: S <=8/4    -  Ampicillin: S <=8 These ampicillin results predict results for amoxicillin    -  Ampicillin/Sulbactam: S <=4/2 Enterobacter, Citrobacter, and Serratia may develop resistance during prolonged therapy (3-4 days)    -  Aztreonam: S <=4    -  Cefazolin: S <=2 Enterobacter, Citrobacter, and Serratia may develop resistance during prolonged therapy (3-4 days)    -  Cefepime: S <=2    -  Cefoxitin: S <=8    -  Ceftriaxone: S <=1 Enterobacter, Citrobacter, and Serratia may develop resistance during prolonged therapy    -  Ciprofloxacin: S <=0.25    -  Ertapenem: S <=0.5    -  Gentamicin: S <=2    -  Imipenem: S <=1    -  Levofloxacin: S <=0.5    -  Meropenem: S <=1    -  Piperacillin/Tazobactam: S <=8    -  Tobramycin: S <=2    -  Trimethoprim/Sulfamethoxazole: S <=0.5/9.5    Gram Stain:   polymorphonuclear leukocytes seen  No organisms seen  by cytocentrifuge    Specimen Source: .Body Fluid Peritoneal Fluid    Culture Results:   Growth in fluid media only Escherichia coli    Organism Identification: Escherichia coli    Organism: Escherichia coli    Method Type: JOSE M      Rapid RVP Result: NotDetec (10-14 @ 01:49)    RADIOLOGY:  < from: CT Abdomen and Pelvis No Cont (10.16.20 @ 13:15) >  There is no retroperitoneal or rectus sheath hematoma. No intraperitoneal blood.  Gallstones noted. No biliary dilatation. Cirrhotic liver with splenomegaly small amount of abdominal pelvic ascites upper abdominal varices similar to prior. Extensive diffuse anasarca similar to prior.  There is a small umbilical hernia containing some fluid and possibly a nonobstructive bowel loop of small bowel. There is no bowelobstruction or gross bowel inflammation. No extraluminal gas.  Remainder study unchanged.    Impression:  Severely compromised by extensive scan artifact.  No evidence of retroperitoneal bleed.  Findings as discussed    < end of copied text >

## 2020-10-21 NOTE — PROGRESS NOTE ADULT - SUBJECTIVE AND OBJECTIVE BOX
I am only covering this patient for one day on 10/21/065915    60 y/o male morbidly obese male w/ PMHx of Hepatic Cirrhosis w/ ascites & Thrombocytopenia, morbid Obesity w/ BMI of 47, chronic diastolic CHF, HTN, HLD, recent admx for Hepatic Encephalopathy, noncompliant with lactulose presented w/ Metabolic Encephalopathy and acute blood loss anemia due to GI bleed. He is lying in bed in NAD.        MEDICATIONS  (STANDING):  chlorhexidine 4% Liquid 1 Application(s) Topical <User Schedule>  lactulose Syrup 20 Gram(s) Oral two times a day  melatonin 3 milliGRAM(s) Oral at bedtime  nystatin Powder 1 Application(s) Topical two times a day  pantoprazole  Injectable 40 milliGRAM(s) IV Push every 12 hours  phytonadione   Solution 5 milliGRAM(s) Oral daily  pneumococcal  13 Vaccine (PREVNAR 13) 0.5 milliLiter(s) IntraMuscular once  rifAXIMin 550 milliGRAM(s) Oral two times a day  silver sulfADIAZINE 1% Cream 1 Application(s) Topical two times a day  spironolactone 12.5 milliGRAM(s) Oral daily    MEDICATIONS  (PRN):      Allergies    No Known Allergies    Intolerances        T(C): 37 (21 Oct 2020 11:28), Max: 37 (21 Oct 2020 06:45)  T(F): 98.6 (21 Oct 2020 11:28), Max: 98.6 (21 Oct 2020 06:45)  HR: 86 (21 Oct 2020 11:28) (63 - 86)  BP: 104/47 (21 Oct 2020 11:28) (97/53 - 117/43)  BP(mean): --  ABP: --  ABP(mean): --  RR: 17 (21 Oct 2020 11:28) (17 - 18)  SpO2: 98% (21 Oct 2020 11:28) (95% - 98%)    PHYSICAL EXAM:  GENERAL: NAD, well-groomed, well-developed, morbidly obese   HEAD:  Atraumatic, Normocephalic  EYES: EOMI, PERRLA   NECK: Supple   NERVOUS SYSTEM:  Alert    CHEST/LUNG: Clear to auscultation bilaterally; No rales, rhonchi, wheezing, or rubs  HEART: Regular rate and rhythm; No murmurs, rubs, or gallops  ABDOMEN: Soft, Nontender, ascitic   EXTREMITIES:  b/l edema    LABS:                                  8.4    3.19  )-----------( 46       ( 21 Oct 2020 08:24 )             26.0   10-21    137  |  106  |  16  ----------------------------<  114<H>  4.1   |  27  |  1.29    Ca    7.5<L>      21 Oct 2020 08:24  Phos  2.6     10-21  Mg     2.3     10-21    TPro  6.1  /  Alb  1.8<L>  /  TBili  2.6<H>  /  DBili  x   /  AST  28  /  ALT  28  /  AlkPhos  90  10-21     PT/INR - ( 21 Oct 2020 08:24 )   PT: 23.3 sec;   INR: 2.08 ratio         PTT - ( 20 Oct 2020 07:41 )  PTT:40.8 sec         I am only covering this patient for one day on 10/21/536465    58 y/o male morbidly obese male w/ PMHx of Hepatic Cirrhosis w/ ascites & Thrombocytopenia, morbid Obesity w/ BMI of 47, chronic diastolic CHF, HTN, HLD, recent admx for Hepatic Encephalopathy, noncompliant with lactulose presented w/ Metabolic Encephalopathy and acute blood loss anemia due to GI bleed. He is lying in bed in NAD.        MEDICATIONS  (STANDING):  chlorhexidine 4% Liquid 1 Application(s) Topical <User Schedule>  lactulose Syrup 20 Gram(s) Oral two times a day  melatonin 3 milliGRAM(s) Oral at bedtime  nystatin Powder 1 Application(s) Topical two times a day  pantoprazole  Injectable 40 milliGRAM(s) IV Push every 12 hours  phytonadione   Solution 5 milliGRAM(s) Oral daily  pneumococcal  13 Vaccine (PREVNAR 13) 0.5 milliLiter(s) IntraMuscular once  rifAXIMin 550 milliGRAM(s) Oral two times a day  silver sulfADIAZINE 1% Cream 1 Application(s) Topical two times a day  spironolactone 12.5 milliGRAM(s) Oral daily    MEDICATIONS  (PRN):      Allergies    No Known Allergies    Intolerances        T(C): 37 (21 Oct 2020 11:28), Max: 37 (21 Oct 2020 06:45)  T(F): 98.6 (21 Oct 2020 11:28), Max: 98.6 (21 Oct 2020 06:45)  HR: 86 (21 Oct 2020 11:28) (63 - 86)  BP: 104/47 (21 Oct 2020 11:28) (97/53 - 117/43)  BP(mean): --  ABP: --  ABP(mean): --  RR: 17 (21 Oct 2020 11:28) (17 - 18)  SpO2: 98% (21 Oct 2020 11:28) (95% - 98%)    PHYSICAL EXAM:  GENERAL: NAD, well-groomed, well-developed, morbidly obese   HEAD:  Atraumatic, Normocephalic  EYES: EOMI, PERRLA   NECK: Supple   NERVOUS SYSTEM:  Alert    CHEST/LUNG: Clear to auscultation bilaterally; No rales, rhonchi, wheezing, or rubs  HEART: Regular rate and rhythm; No murmurs, rubs, or gallops  ABDOMEN: distended, has an ostomy bag in luq draining fluid from prior paracentesis sight  and abd wall edema    EXTREMITIES:  b/l edema    LABS:                                  8.4    3.19  )-----------( 46       ( 21 Oct 2020 08:24 )             26.0   10-21    137  |  106  |  16  ----------------------------<  114<H>  4.1   |  27  |  1.29    Ca    7.5<L>      21 Oct 2020 08:24  Phos  2.6     10-21  Mg     2.3     10-21    TPro  6.1  /  Alb  1.8<L>  /  TBili  2.6<H>  /  DBili  x   /  AST  28  /  ALT  28  /  AlkPhos  90  10-21     PT/INR - ( 21 Oct 2020 08:24 )   PT: 23.3 sec;   INR: 2.08 ratio         PTT - ( 20 Oct 2020 07:41 )  PTT:40.8 sec

## 2020-10-21 NOTE — PROGRESS NOTE ADULT - NUTRITIONAL ASSESSMENT
This patient has been assessed with a concern for Malnutrition and has been determined to have a diagnosis/diagnoses of Morbid obesity/BMI > 40.    This patient is being managed with:   Diet Clear Liquid-  Entered: Oct 16 2020 10:24AM    

## 2020-10-21 NOTE — PROGRESS NOTE ADULT - ASSESSMENT
58 y/o male morbidly obese male w/ PMHx of Hepatic Cirrhosis w/ ascites & Thrombocytopenia, morbid Obesity w/ BMI of 47, chronic diastolic CHF, HTN, HLD, recent admx for Hepatic Encephalopathy, noncompliant with lactulose presented w/ Metabolic Encephalopathy and acute blood loss anemia due to GI bleed.     Acute blood loss anemia due to GI bleed  - s/p 6 pRBC, 2 FFP, 1 plt, Vitamin K, DDAVP and Kcentra  - patient was  on Octreotide gtt, was stopped on 10/20/2020  continue with  Protonix drip changed to 40mg IV Q12  - GI planning  EGD cancelled today due to high INR & thrombocytopenia; I spoke w/ the patient and Dr. Hua and patient will go for EGD today after he gets FFP & plts    - c/w Ceftriaxone prophylaxis  -per my colleagues' note from 10/20/2020 there appeared to be  Hgb dropped on 10/20/2020 , but there was no evidence of retroperitoneal bleed on CT    10/21/2020 hgb low but stable when compared to the one on 10/20/2020 and in fact  is slightly better  last prbc was on 10/16/2020    Sepsis POA due to SBP  - ascites fluid growing E. coli  - c/w Ceftriaxone  - ID following    Septic encephalopathy  - resolved  - due to SBP    Alcoholic cirrhosis w/ ascites, coagulopathy, pancytopenia and hepatic encephalopathy   - abd CT showed portal hypertension with cirrhosis, splenomegaly, upper abdominal varices, moderate abdominopelvic ascites, pleural effusions, and anasarca  - s/p 2L paracentesis in ED   - c/w lactulose    MONI  - resolved, patient is likely at baseline  - improving post transfusion    Cirrhosis w/ edema  - GI following  - was started on Lasix and aldactone by my colleague  - will stop if BP drops  - c/w lactulose for encephalopathy and Rifaximin per my colleagues note     Superficial thrombophlebitis of the left basilic and cephalic veins on US  - local management  - cannot give NSAIDS given MONI and GI bleed    DVT prophylaxis  SVT: SCD  GI: Protonix    Dispo: STR 58 y/o male morbidly obese male w/ PMHx of Hepatic Cirrhosis w/ ascites & Thrombocytopenia, morbid Obesity w/ BMI of 47, chronic diastolic CHF, HTN, HLD, recent admx for Hepatic Encephalopathy, noncompliant with lactulose presented w/ Metabolic Encephalopathy and acute blood loss anemia due to GI bleed.     Acute blood loss anemia due to GI bleed  - s/p 6 pRBC, 2 FFP, 1 plt, Vitamin K, DDAVP and Kcentra  - patient was  on Octreotide gtt, was stopped on 10/20/2020  continue with  Protonix drip changed to 40mg IV Q12  - GI planning  EGD cancelled today due to high INR & thrombocytopenia; I spoke w/ the patient and Dr. Hua and patient will go for EGD today after he gets FFP & plts    - c/w Ceftriaxone prophylaxis  -per my colleagues' note from 10/20/2020 there appeared to be  Hgb dropped on 10/20/2020 , but there was no evidence of retroperitoneal bleed on CT    10/21/2020 hgb low but stable when compared to the one on 10/20/2020 and in fact  is slightly better  last prbc was on 10/16/2020    Sepsis POA due to SBP  - ascites fluid growing E. coli  - c/w Ceftriaxone  - ID following    Septic encephalopathy  - resolved  - due to SBP    Alcoholic cirrhosis w/ ascites, coagulopathy, pancytopenia and hepatic encephalopathy   - abd CT showed portal hypertension with cirrhosis, splenomegaly, upper abdominal varices, moderate abdominopelvic ascites, pleural effusions, and anasarca  - s/p 2L paracentesis in ED   - c/w lactulose    MONI  - resolved, patient is likely at baseline  - improving post transfusion    Cirrhosis w/ edema  - GI following  - was started on Lasix and aldactone by my colleague  - will stop if BP drops  - c/w lactulose for encephalopathy and Rifaximin per my colleagues note   10/22/2020 needs another paracentesis, will also need FFP for it on call, starting Vitamin K today check INR -in am , will give albumin times 2 doses    chek ammonia in am     Superficial thrombophlebitis of the left basilic and cephalic veins on US  - local management  - cannot give NSAIDS given MONI and GI bleed    DVT prophylaxis  SVT: SCD  GI: Protonix    Dispo: STR

## 2020-10-21 NOTE — PROGRESS NOTE ADULT - SUBJECTIVE AND OBJECTIVE BOX
Patient is a 60y old  Male who presents with a chief complaint of AMS, sepsis (20 Oct 2020 16:52)      HPI:     58 y/o male morbidly obese male w/ PMHx of Hepatic Cirrhosis, ascites, Thrombocytopenia, CHF, HTN, HLD, recent admx for Hepatic Encephalopathy, noncompliant with lactulose presents to the ED due to AMS. Pt awake, lethargic only oriented to self, unable to provide any history. History obtained from ED/Chart. Per ED, Brother noticed worsening confusion x 1, noted again pt non-complaint with lactulose thus gave him 1 dose prior to his arrival in ED.    On arrival in ED, initial /59, , pt afebrile. CT head neg. Sig labs include WBC 12.37, h/h 7.5/24.3, PLT 89, LA 8, BUN/Cr 48/1.81, ammonia level 41, see below for rest of sig labs. Pt s/p paracentesis in ED, 2L removed cell ct not indicative of SBP. UA neg, CXR pre-valenzuela unremarkable. CT A/P showed portal hypertension with cirrhosis, splenomegaly, upper abdominal varices, moderate abdominopelvic ascites, pleural effusions, and anasarca.     (14 Oct 2020 03:19)      INTERVAL HPI/OVERNIGHT EVENTS:  1 loose BM 8 PM. The patient denies melena, hematochezia, hematemesis, nausea, vomiting, abdominal pain, constipation, diarrhea, or change in bowel movements     MEDICATIONS  (STANDING):  chlorhexidine 4% Liquid 1 Application(s) Topical <User Schedule>  lactulose Syrup 20 Gram(s) Oral two times a day  melatonin 3 milliGRAM(s) Oral at bedtime  nystatin Powder 1 Application(s) Topical two times a day  pantoprazole  Injectable 40 milliGRAM(s) IV Push every 12 hours  pneumococcal  13 Vaccine (PREVNAR 13) 0.5 milliLiter(s) IntraMuscular once  potassium phosphate / sodium phosphate Powder (PHOS-NaK) 2 Packet(s) Oral four times a day with meals  rifAXIMin 550 milliGRAM(s) Oral two times a day  silver sulfADIAZINE 1% Cream 1 Application(s) Topical two times a day  spironolactone 12.5 milliGRAM(s) Oral daily    MEDICATIONS  (PRN):      FAMILY HISTORY:  No pertinent family history in first degree relatives        Allergies    No Known Allergies    Intolerances        PMH/PSH:  HLD (hyperlipidemia)    Hepatic cirrhosis    CHF (congestive heart failure)    Obese    Hyperlipidemia    Pneumonia    CHF (congestive heart failure)    History of chest tube placement    No significant past surgical history          REVIEW OF SYSTEMS:  CONSTITUTIONAL: No fever, weight loss,   EYES: No eye pain, visual disturbances, or discharge  ENMT:  No difficulty hearing, tinnitus, vertigo; No sinus or throat pain  NECK: No pain or stiffness  BREASTS: No pain, masses, or nipple discharge  RESPIRATORY: No cough, wheezing, chills or hemoptysis; No shortness of breath  CARDIOVASCULAR: No chest pain, palpitations, dizziness, or leg swelling  GASTROINTESTINAL: See above  GENITOURINARY: No dysuria, frequency, hematuria, or incontinence  NEUROLOGICAL: No headaches, memory loss, loss of strength, numbness, or tremors  SKIN: No itching, burning, rashes, or lesions   LYMPH NODES: No enlarged glands  ENDOCRINE: No heat or cold intolerance; No hair loss  MUSCULOSKELETAL: No joint pain or swelling; No muscle, back, or extremity pain  PSYCHIATRIC: No depression, anxiety, mood swings, or difficulty sleeping  HEME/LYMPH: No easy bruising, or bleeding gums  ALLERGY AND IMMUNOLOGIC: No hives or eczema    Vital Signs Last 24 Hrs  T(C): 36.9 (21 Oct 2020 01:00), Max: 36.9 (20 Oct 2020 15:20)  T(F): 98.4 (21 Oct 2020 01:00), Max: 98.4 (20 Oct 2020 15:20)  HR: 72 (21 Oct 2020 01:00) (64 - 75)  BP: 117/43 (21 Oct 2020 01:00) (99/46 - 117/43)  BP(mean): --  RR: 18 (21 Oct 2020 01:00) (18 - 18)  SpO2: 98% (21 Oct 2020 01:00) (95% - 98%)    PHYSICAL EXAM:  GENERAL: NAD, well-groomed, well-developed  HEAD:  Atraumatic, Normocephalic  EYES: EOMI, PERRLA, conjunctiva and sclera clear  ENMT: No tonsillar erythema, exudates, or enlargement; Moist mucous membranes, Good dentition, No lesions  NECK: Supple, No JVD, Normal thyroid  NERVOUS SYSTEM:  Alert & Oriented , Good concentration;   CHEST/LUNG: Clear to percussion bilaterally; No rales, rhonchi, wheezing, or rubs  HEART: Regular rate and rhythm; No murmurs, rubs, or gallops  ABDOMEN: Soft, Nontender, Nondistended; Bowel sounds present  EXTREMITIES:  2+ Peripheral Pulses, No clubbing, cyanosis, or edema  LYMPH: No lymphadenopathy noted  SKIN: No rashes or lesions    LAB                          8.0    2.89  )-----------( 46       ( 20 Oct 2020 07:41 )             25.5       CBC:  10-20 @ 07:41  WBC 2.89   Hgb 8.0   Hct 25.5   Plts 46  MCV 94.4  10-19 @ 08:21  WBC 3.25   Hgb 7.8   Hct 24.7   Plts 45  MCV 94.3  10-18 @ 07:11  WBC 3.30   Hgb 7.9   Hct 23.7   Plts 48  MCV 91.5  10-17 @ 11:45  WBC 3.19   Hgb 7.9   Hct 23.5   Plts 42  MCV 91.1  10-17 @ 03:33  WBC 2.74   Hgb 7.7   Hct 24.1   Plts 46  MCV 92.3  10-16 @ 15:49  WBC 2.98   Hgb 7.8   Hct 23.7   Plts 44  MCV 90.8  10-16 @ 05:26  WBC 2.13   Hgb 6.9   Hct 21.3   Plts 48  MCV 91.0  10-15 @ 20:52  WBC 2.49   Hgb 8.4   Hct 25.9   Plts 45  MCV 91.5  10-15 @ 17:45  WBC 2.57   Hgb 7.1   Hct 21.7   Plts 46  MCV 90.4  10-15 @ 05:32  WBC 3.57   Hgb 6.8   Hct 19.8   Plts 46  MCV 88.8      Chemistry:  10-20 @ 07:41  Na+ 139  K+ 3.6  Cl- 108  CO2 27  BUN 18  Cr 1.24     10-19 @ 08:21  Na+ 138  K+ 3.8  Cl- 108  CO2 28  BUN 24  Cr 1.24     10-18 @ 07:11  Na+ 135  K+ 3.7  Cl- 104  CO2 29  BUN 31  Cr 1.22     10-17 @ 03:33  Na+ 135  K+ 3.8  Cl- 105  CO2 26  BUN 40  Cr 1.31     10-16 @ 05:26  Na+ 138  K+ 3.5  Cl- 106  CO2 28  BUN 52  Cr 1.34     10-15 @ 17:45  Na+ 137  K+ 3.6  Cl- 105  CO2 29  BUN 57  Cr 1.47     10-15 @ 00:42  Na+ 139  K+ 3.8  Cl- 107  CO2 28  BUN 66  Cr 1.68     10-14 @ 06:43  Na+ 140  K+ 4.0  Cl- 107  CO2 24  BUN 56  Cr 1.69         Glucose, Serum: 84 mg/dL (10-20 @ 07:41)  Glucose, Serum: 89 mg/dL (10-19 @ 08:21)  Glucose, Serum: 87 mg/dL (10-18 @ 07:11)  Glucose, Serum: 87 mg/dL (10-17 @ 03:33)  Glucose, Serum: 78 mg/dL (10-16 @ 05:26)  Glucose, Serum: 87 mg/dL (10-15 @ 17:45)  Glucose, Serum: 95 mg/dL (10-15 @ 00:42)  Glucose, Serum: 137 mg/dL (10-14 @ 06:43)      20 Oct 2020 07:41    139    |  108    |  18     ----------------------------<  84     3.6     |  27     |  1.24   19 Oct 2020 08:21    138    |  108    |  24     ----------------------------<  89     3.8     |  28     |  1.24   18 Oct 2020 07:11    135    |  104    |  31     ----------------------------<  87     3.7     |  29     |  1.22   17 Oct 2020 03:33    135    |  105    |  40     ----------------------------<  87     3.8     |  26     |  1.31   16 Oct 2020 05:26    138    |  106    |  52     ----------------------------<  78     3.5     |  28     |  1.34   15 Oct 2020 17:45    137    |  105    |  57     ----------------------------<  87     3.6     |  29     |  1.47   15 Oct 2020 00:42    139    |  107    |  66     ----------------------------<  95     3.8     |  28     |  1.68     Ca    7.8        20 Oct 2020 07:41  Ca    7.6        19 Oct 2020 08:21  Ca    7.5        18 Oct 2020 07:11  Ca    7.6        17 Oct 2020 03:33  Ca    7.9        16 Oct 2020 05:26  Ca    7.7        15 Oct 2020 17:45  Ca    8.1        15 Oct 2020 00:42  Phos  2.3       20 Oct 2020 07:41  Phos  2.2       19 Oct 2020 08:21  Phos  2.7       18 Oct 2020 07:11  Phos  2.7       17 Oct 2020 03:33  Phos  2.8       16 Oct 2020 05:26  Phos  3.2       15 Oct 2020 17:45  Phos  3.4       15 Oct 2020 00:42  Mg     1.9       20 Oct 2020 07:41  Mg     1.9       19 Oct 2020 08:21  Mg     1.9       18 Oct 2020 07:11  Mg     2.2       17 Oct 2020 03:33  Mg     2.3       16 Oct 2020 05:26  Mg     1.9       15 Oct 2020 17:45  Mg     2.0       15 Oct 2020 00:42    TPro  5.9    /  Alb  1.8    /  TBili  2.6    /  DBili  x      /  AST  23     /  ALT  31     /  AlkPhos  83     20 Oct 2020 07:41  TPro  5.5    /  Alb  1.8    /  TBili  2.7    /  DBili  x      /  AST  26     /  ALT  28     /  AlkPhos  79     19 Oct 2020 08:21  TPro  5.8    /  Alb  1.8    /  TBili  2.8    /  DBili  x      /  AST  31     /  ALT  29     /  AlkPhos  81     18 Oct 2020 07:11  TPro  6.0    /  Alb  1.9    /  TBili  3.2    /  DBili  x      /  AST  33     /  ALT  32     /  AlkPhos  81     17 Oct 2020 03:33  TPro  5.9    /  Alb  2.0    /  TBili  3.8    /  DBili  x      /  AST  33     /  ALT  32     /  AlkPhos  82     16 Oct 2020 05:26  TPro  5.8    /  Alb  1.9    /  TBili  4.1    /  DBili  x      /  AST  37     /  ALT  33     /  AlkPhos  80     15 Oct 2020 17:45  TPro  6.8    /  Alb  2.1    /  TBili  4.3    /  DBili  1.22   /  AST  51     /  ALT  43     /  AlkPhos  91     15 Oct 2020 00:42      PT/INR - ( 20 Oct 2020 07:41 )   PT: 25.5 sec;   INR: 2.29 ratio         PTT - ( 20 Oct 2020 07:41 )  PTT:40.8 sec        CAPILLARY BLOOD GLUCOSE              RADIOLOGY & ADDITIONAL TESTS:    Imaging Personally Reviewed:  [ ] YES  [ ] NO    Consultant(s) Notes Reviewed:  [ ] YES  [ ] NO    Care Discussed with Consultants/Other Providers [ ] YES  [ ] NO

## 2020-10-21 NOTE — PROGRESS NOTE ADULT - ASSESSMENT
60 M with HLD, CHF (EF 65% Oct 2019), HLD, cirrhosis (2/2 alcohol abuse), and recent admission (Oct 2019) for R pleural effusion s/p chest tube, fluid was exudative but negative cytology and cultures, presented 11/8/2019 with fever and chills, SOB and worked up for TB which was negative., at that time got 7 days of vanco and zosyn. Now her returns not having taken his lactulose and is encephalopathic.   Afebrile but tachycardic   WBC 12, PLT 45, Hb 7.5->5.6, Crea 1.8->1.6, INR 2.65  SAAG 1.8 (>1.1 consistent with portal hypertension)   MELD on admission was 29  Lactate 8.0->6->4  CT (personally reviewed) moderate bilateral pleural effusions, splenomegaly, shriveled liver, mod-large ascites  Blood culture drawn on admission and given vanco, CTX and flagyl   Fluid appears transudative though no serum LDH on this admission   Fluid with less then 250 PMNs goes against SBP and gram stain on fluid is negative   Pt had melena and hb dropped to 5.6 raising concern for variceal bleed    10/15: Still anemic, received PLT, pRBC and FFP today, unable to get EGD due to stability, paracentesis fluid now positive with GNR in liquid broth   10/16: remains with low counts including hb, CT performed today (personally reviewed) no retro bleed but large ascites, splenomegaly, anasarca   10/17: transferred out of the ICU, Hb stable, Ecoli S to ceftriaxone, lactulose wasn't given for 24 hrs but has been reordered   10/18: still no BM, NPO after midnight for EGD, continue on ceftriaxone for 1-2 more day until post-egd   10/19: EGD cancelled due to PLT and INR, last day of antibiotics, hemoglobin stable  10/21: stable off antibiotics, planned for EGD tomorrow, Asked primary team and IR to arrange for large volume paracentesis which will be done tomorrow before egd, Can get Prevnar today    Overall, 60M hepatic encephalopathy, acute blood lose anemia (?variceal bleed, GI ulcer), high lactate, SBP   -stopped ceftriaxone  -Plan for IR paracentesis tomorrow AM-> Please check ascitic fluid for cultures, cell count, albumin, protein, cytology, TG, cholesterol, LDH, PH, glucose  -transfusions per medicine, GI  -octreotide stopped   -PPI   -consider starting non selective beta-blocker if BP can tolerate  -lactulose changed to BID and Rifaximin was added today->titrate to 2-3 soft BMs per day   -patient should be set up with [facility] Center at [address]->Please set up appointment for patient which should be within one week of discharge ([phone])  -Administer Prevnar and influenza prior to discharge (can space out)    -He can follow up with me in ID office also at [address] [phone]    Discussed with Dr. Montanez, medicine PA, Dr. Hua, IR PA    Michael Ragsdale, DO  Cell: [phone]  Pager [phone]  Infectious Disease Attending  After 5pm/weekends please call [phone] for all inquiries and new consults

## 2020-10-21 NOTE — PROGRESS NOTE ADULT - ASSESSMENT
HPI:     58 y/o male morbidly obese male w/ PMHx of Hepatic Cirrhosis, ascites, Thrombocytopenia, CHF, HTN, HLD, recent admx for Hepatic Encephalopathy, noncompliant with lactulose presents to the ED due to AMS. Pt awake, lethargic only oriented to self, unable to provide any history. History obtained from ED/Chart. Per ED, Brother noticed worsening confusion x 1, noted again pt non-complaint with lactulose thus gave him 1 dose prior to his arrival in ED.    On arrival in ED, initial /59, , pt afebrile. CT head neg. Sig labs include WBC 12.37, h/h 7.5/24.3, PLT 89, LA 8, BUN/Cr 48/1.81, ammonia level 41, see below for rest of sig labs. Pt s/p paracentesis in ED, 2L removed cell ct not indicative of SBP. UA neg, CXR pre-valenzuela unremarkable. CT A/P showed portal hypertension with cirrhosis, splenomegaly, upper abdominal varices, moderate abdominopelvic ascites, pleural effusions, and anasarca.     (14 Oct 2020 03:19)  ----------------- As Above -----------------Patient seen earlier today.   The patient presents with a change in mental status. Patient has a history of Paulino and alcoholic cirrhosis. Patient is non complaint with Lactulose. Patient has had admissions previously for hepatic encephalopathy from non compliance with Lactulose. Patiet was found to have HGB of 7.5 with platelets of 89. INR > 2  The patient presents with severe lethargy. Ammonia only 41 but has elevated lactic acid levels. SBP in ER ruled out SBP  IN ER , patient was passing light brown liquid stool ( visualized )  ---- Patient later developed melena ( WNI ) HGB fell to 5.6, BUN increased to 56 and platelets fell to 45. Patient now in the ICU    Change in MS - . Patient appears to be at baseline. Lactulose at 20 MG BID    Melena - No N/V or melena. HGB 8.0 stable . Patient pancytopenic with elevated INR, low platelets - EGD cancelled. Patient is a candidate for EGD only if done in OR during the day. Platelets given. FFP on call. ( high INR, low platelets ).  1) Octreotide for 5 days, then D/C  2) Keep HGB > 8 0) Ceftriaxone for 1 week 4) Maintain regular diet  5) Would start non selective BBlockers  Elevated LFTs  - 3.2/33/32/81--> 2.7/ 26 /28 /79 --> 2.6/23/31/83  better  === Will discuss  with patient HPI:     58 y/o male morbidly obese male w/ PMHx of Hepatic Cirrhosis, ascites, Thrombocytopenia, CHF, HTN, HLD, recent admx for Hepatic Encephalopathy, noncompliant with lactulose presents to the ED due to AMS. Pt awake, lethargic only oriented to self, unable to provide any history. History obtained from ED/Chart. Per ED, Brother noticed worsening confusion x 1, noted again pt non-complaint with lactulose thus gave him 1 dose prior to his arrival in ED.    On arrival in ED, initial /60, , pt afebrile. CT head neg. Sig labs include WBC 12.37, h/h 7.5/24.3, PLT 89, LA 8, BUN/Cr 48/1.81, ammonia level 41, see below for rest of sig labs. Pt s/p paracentesis in ED, 2L removed cell ct not indicative of SBP. UA neg, CXR pre-valenzuela unremarkable. CT A/P showed portal hypertension with cirrhosis, splenomegaly, upper abdominal varices, moderate abdominopelvic ascites, pleural effusions, and anasarca.     (14 Oct 2020 03:19)  ----------------- As Above -----------------Patient seen earlier today.   The patient presents with a change in mental status. Patient has a history of Paulino and alcoholic cirrhosis. Patient is non complaint with Lactulose. Patient has had admissions previously for hepatic encephalopathy from non compliance with Lactulose. Patiet was found to have HGB of 7.5 with platelets of 89. INR > 2  The patient presents with severe lethargy. Ammonia only 41 but has elevated lactic acid levels. SBP in ER ruled out SBP  IN ER , patient was passing light brown liquid stool ( visualized )  ---- Patient later developed melena ( WNI ) HGB fell to 5.6, BUN increased to 56 and platelets fell to 45. Patient now in the ICU    Change in MS - . Patient appears to be at baseline. Lactulose at 20 MG BID    Melena - No N/V or melena. HGB 8.0 stable . Patient pancytopenic with elevated INR, low platelets - EGD cancelled. Patient is a candidate for EGD only if done in OR during the day. Platelets given. FFP on call. ( high INR, low platelets ).  1) Octreotide D/C'd   2) Keep HGB > 8 0) Ceftriaxone for 1 week 4) Maintain regular diet  5) Would start non selective BBlockers  Elevated LFTs  - 3.2/33/32/81--> 2.7/ 26 /28 /79 --> 2.6/23/31/83  better  === Will discuss  with patient

## 2020-10-22 ENCOUNTER — RESULT REVIEW (OUTPATIENT)
Age: 60
End: 2020-10-22

## 2020-10-22 LAB
ALBUMIN FLD-MCNC: 0.4 G/DL — SIGNIFICANT CHANGE UP
AMMONIA BLD-MCNC: 68 UMOL/L — HIGH (ref 11–32)
ANION GAP SERPL CALC-SCNC: 2 MMOL/L — LOW (ref 5–17)
APTT BLD: 40.2 SEC — HIGH (ref 27.5–35.5)
B PERT IGG+IGM PNL SER: CLEAR — SIGNIFICANT CHANGE UP
BLD GP AB SCN SERPL QL: SIGNIFICANT CHANGE UP
BUN SERPL-MCNC: 16 MG/DL — SIGNIFICANT CHANGE UP (ref 7–23)
CALCIUM SERPL-MCNC: 7.8 MG/DL — LOW (ref 8.5–10.1)
CHLORIDE SERPL-SCNC: 108 MMOL/L — SIGNIFICANT CHANGE UP (ref 96–108)
CHOLEST FLD-MCNC: 5 MG/DL — SIGNIFICANT CHANGE UP
CO2 SERPL-SCNC: 30 MMOL/L — SIGNIFICANT CHANGE UP (ref 22–31)
COLOR FLD: YELLOW — SIGNIFICANT CHANGE UP
CREAT SERPL-MCNC: 1.28 MG/DL — SIGNIFICANT CHANGE UP (ref 0.5–1.3)
FLUID INTAKE SUBSTANCE CLASS: SIGNIFICANT CHANGE UP
FLUID SEGMENTED GRANULOCYTES: 16 % — SIGNIFICANT CHANGE UP
GLUCOSE FLD-MCNC: 96 MG/DL — SIGNIFICANT CHANGE UP
GLUCOSE SERPL-MCNC: 81 MG/DL — SIGNIFICANT CHANGE UP (ref 70–99)
GRAM STN FLD: SIGNIFICANT CHANGE UP
HCT VFR BLD CALC: 24.4 % — LOW (ref 39–50)
HGB BLD-MCNC: 7.8 G/DL — LOW (ref 13–17)
INR BLD: 2.22 RATIO — HIGH (ref 0.88–1.16)
LDH SERPL L TO P-CCNC: 37 U/L — SIGNIFICANT CHANGE UP
LYMPHOCYTES # FLD: 20 % — SIGNIFICANT CHANGE UP
MCHC RBC-ENTMCNC: 30.2 PG — SIGNIFICANT CHANGE UP (ref 27–34)
MCHC RBC-ENTMCNC: 32 GM/DL — SIGNIFICANT CHANGE UP (ref 32–36)
MCV RBC AUTO: 94.6 FL — SIGNIFICANT CHANGE UP (ref 80–100)
MESOTHL CELL # FLD: 8 % — SIGNIFICANT CHANGE UP
MONOS+MACROS # FLD: 56 % — SIGNIFICANT CHANGE UP
NRBC # BLD: 0 /100 WBCS — SIGNIFICANT CHANGE UP (ref 0–0)
PLATELET # BLD AUTO: 46 K/UL — LOW (ref 150–400)
POTASSIUM SERPL-MCNC: 4.8 MMOL/L — SIGNIFICANT CHANGE UP (ref 3.5–5.3)
POTASSIUM SERPL-SCNC: 4.8 MMOL/L — SIGNIFICANT CHANGE UP (ref 3.5–5.3)
PROT FLD-MCNC: <1 G/DL — SIGNIFICANT CHANGE UP
PROTHROM AB SERPL-ACNC: 24.8 SEC — HIGH (ref 10.6–13.6)
RBC # BLD: 2.58 M/UL — LOW (ref 4.2–5.8)
RBC # FLD: 20.4 % — HIGH (ref 10.3–14.5)
RCV VOL RI: 138 /UL — HIGH (ref 0–0)
SODIUM SERPL-SCNC: 140 MMOL/L — SIGNIFICANT CHANGE UP (ref 135–145)
SPECIMEN SOURCE FLD: SIGNIFICANT CHANGE UP
SPECIMEN SOURCE: SIGNIFICANT CHANGE UP
TOTAL NUCLEATED CELL COUNT, BODY FLUID: 142 /UL — SIGNIFICANT CHANGE UP
TRIGL FLD-MCNC: 13 MG/DL — SIGNIFICANT CHANGE UP
TUBE TYPE: SIGNIFICANT CHANGE UP
WBC # BLD: 2.61 K/UL — LOW (ref 3.8–10.5)
WBC # FLD AUTO: 2.61 K/UL — LOW (ref 3.8–10.5)

## 2020-10-22 PROCEDURE — 88305 TISSUE EXAM BY PATHOLOGIST: CPT | Mod: 26

## 2020-10-22 PROCEDURE — 49083 ABD PARACENTESIS W/IMAGING: CPT

## 2020-10-22 PROCEDURE — 99233 SBSQ HOSP IP/OBS HIGH 50: CPT

## 2020-10-22 PROCEDURE — 88112 CYTOPATH CELL ENHANCE TECH: CPT | Mod: 26

## 2020-10-22 RX ORDER — ACETAMINOPHEN 500 MG
650 TABLET ORAL EVERY 6 HOURS
Refills: 0 | Status: DISCONTINUED | OUTPATIENT
Start: 2020-10-22 | End: 2020-10-23

## 2020-10-22 RX ORDER — ALBUMIN HUMAN 25 %
50 VIAL (ML) INTRAVENOUS ONCE
Refills: 0 | Status: COMPLETED | OUTPATIENT
Start: 2020-10-22 | End: 2020-10-22

## 2020-10-22 RX ORDER — SUCRALFATE 1 G
1 TABLET ORAL
Refills: 0 | Status: DISCONTINUED | OUTPATIENT
Start: 2020-10-22 | End: 2020-10-23

## 2020-10-22 RX ORDER — IPRATROPIUM/ALBUTEROL SULFATE 18-103MCG
3 AEROSOL WITH ADAPTER (GRAM) INHALATION ONCE
Refills: 0 | Status: DISCONTINUED | OUTPATIENT
Start: 2020-10-22 | End: 2020-10-23

## 2020-10-22 RX ADMIN — Medication 5 MILLIGRAM(S): at 12:24

## 2020-10-22 RX ADMIN — Medication 3 MILLIGRAM(S): at 22:12

## 2020-10-22 RX ADMIN — Medication 1 APPLICATION(S): at 06:25

## 2020-10-22 RX ADMIN — CHLORHEXIDINE GLUCONATE 1 APPLICATION(S): 213 SOLUTION TOPICAL at 07:41

## 2020-10-22 RX ADMIN — NYSTATIN CREAM 1 APPLICATION(S): 100000 CREAM TOPICAL at 06:31

## 2020-10-22 RX ADMIN — LACTULOSE 20 GRAM(S): 10 SOLUTION ORAL at 18:03

## 2020-10-22 RX ADMIN — PANTOPRAZOLE SODIUM 40 MILLIGRAM(S): 20 TABLET, DELAYED RELEASE ORAL at 18:04

## 2020-10-22 RX ADMIN — Medication 50 MILLILITER(S): at 11:38

## 2020-10-22 RX ADMIN — SPIRONOLACTONE 12.5 MILLIGRAM(S): 25 TABLET, FILM COATED ORAL at 06:31

## 2020-10-22 RX ADMIN — NYSTATIN CREAM 1 APPLICATION(S): 100000 CREAM TOPICAL at 18:03

## 2020-10-22 RX ADMIN — Medication 50 MILLILITER(S): at 06:25

## 2020-10-22 RX ADMIN — Medication 650 MILLIGRAM(S): at 07:41

## 2020-10-22 RX ADMIN — PANTOPRAZOLE SODIUM 40 MILLIGRAM(S): 20 TABLET, DELAYED RELEASE ORAL at 06:29

## 2020-10-22 RX ADMIN — Medication 1 GRAM(S): at 18:04

## 2020-10-22 NOTE — PROGRESS NOTE ADULT - ASSESSMENT
61 y/o male morbidly obese male w/ PMHx of Hepatic Cirrhosis w/ ascites & Thrombocytopenia, morbid Obesity w/ BMI of 47, chronic diastolic CHF, HTN, HLD, recent admx for Hepatic Encephalopathy, noncompliant with lactulose presented w/ Metabolic Encephalopathy and acute blood loss anemia due to GI bleed.     Acute blood loss anemia due to GI bleed-? GI ulcer vs. variceal bleed  - s/p 6 pRBC, 2 FFP, 1 plt, Vitamin K, DDAVP and Kcentra  - patient was  on Octreotide gtt, was stopped on 10/20/2020  continue with  Protonix drip changed to 40mg IV Q12  - GI planning  EGD cancelled today due to high INR & thrombocytopenia; I spoke w/ the patient and Dr. Hua and patient will go for EGD today after he gets FFP & plts-EGD still canceled by Anesthesia department due to lab abnormalities.   - s/p Ceftriaxone prophylaxis  -started on Propranolol   -per my colleagues' note from 10/20/2020 there appeared to be  Hgb dropped on 10/20/2020 , but there was no evidence of retroperitoneal bleed on CT    10/21/2020 hgb low but stable when compared to the one on 10/20/2020 and in fact  is slightly better  last prbc was on 10/16/2020-Heglobin continues to be stable    Sepsis POA due to SBP  - ascites fluid growing E. coli  - s/p Ceftriaxone  - ID following    Septic encephalopathy  - resolved-pt AAOx3  - due to SBP  -continue to monitor ammonia levels      Alcoholic cirrhosis w/ ascites, coagulopathy, pancytopenia and hepatic encephalopathy   - abd CT showed portal hypertension with cirrhosis, splenomegaly, upper abdominal varices, moderate abdominopelvic ascites, pleural effusions, and anasarca  - s/p 2L paracentesis in ED   - c/w lactulose    MONI  - resolved, patient is likely at baseline  - improving post transfusion    Cirrhosis w/ edema  - GI following  -lactulose changed to bid and rifaximin added      Superficial thrombophlebitis of the left basilic and cephalic veins on US  - local management  - cannot give NSAIDS given MONI and GI bleed    DVT prophylaxis  SVT: SCD  GI: Protonix    Dispo- might consider transferring patient to [facility] or d/c Home tomorrow with care of Hepatologist and close follow up.

## 2020-10-22 NOTE — PROGRESS NOTE ADULT - ASSESSMENT
60 M with HLD, CHF (EF 65% Oct 2019), HLD, cirrhosis (2/2 alcohol abuse), and recent admission (Oct 2019) for R pleural effusion s/p chest tube, fluid was exudative but negative cytology and cultures, presented 11/8/2019 with fever and chills, SOB and worked up for TB which was negative., at that time got 7 days of vanco and zosyn. Now her returns not having taken his lactulose and is encephalopathic.   Afebrile but tachycardic   WBC 12, PLT 45, Hb 7.5->5.6, Crea 1.8->1.6, INR 2.65  SAAG 1.8 (>1.1 consistent with portal hypertension)   MELD on admission was 29  Lactate 8.0->6->4  CT (personally reviewed) moderate bilateral pleural effusions, splenomegaly, shriveled liver, mod-large ascites  Blood culture drawn on admission and given vanco, CTX and flagyl   Fluid appears transudative though no serum LDH on this admission   Fluid with less then 250 PMNs goes against SBP and gram stain on fluid is negative   Pt had melena and hb dropped to 5.6 raising concern for variceal bleed    10/15: Still anemic, received PLT, pRBC and FFP today, unable to get EGD due to stability, paracentesis fluid now positive with GNR in liquid broth   10/16: remains with low counts including hb, CT performed today (personally reviewed) no retro bleed but large ascites, splenomegaly, anasarca   10/17: transferred out of the ICU, Hb stable, Ecoli S to ceftriaxone, lactulose wasn't given for 24 hrs but has been reordered   10/18: still no BM, NPO after midnight for EGD, continue on ceftriaxone for 1-2 more day until post-egd   10/19: EGD cancelled due to PLT and INR, last day of antibiotics, hemoglobin stable  10/21: stable off antibiotics, planned for EGD tomorrow, Asked primary team and IR to arrange for large volume paracentesis which will be done tomorrow before egd, Can get Prevnar today  10/22: The patient is s/p paracentesis today, 5.7 L of serous fluid removed. The patient is afebrile, WBC 2.61, no distress, awaiting for EGD in OR toady.      Overall, 60M hepatic encephalopathy, acute blood lose anemia (?variceal bleed, GI ulcer), high lactate, SBP   -stopped ceftriaxone  -Plan for IR paracentesis tomorrow AM-> Please check ascitic fluid for cultures, cell count, albumin, protein, cytology, TG, cholesterol, LDH, PH, glucose  -transfusions per medicine, GI  -octreotide stopped   -PPI   -consider starting non selective beta-blocker if BP can tolerate  -lactulose changed to BID and Rifaximin was added today->titrate to 2-3 soft BMs per day   -patient should be set up with [facility] Hepatology/Liver Center at [address]->Please set up appointment for patient which should be within one week of discharge ([phone])  -Administer Prevnar and influenza prior to discharge (can space out)    -He can follow up with me in ID office also at [address] [phone]    Discussed with Dr. Montanez, medicine PA, Dr. Hua, IR PA    Michael Ragsdale, DO  Cell: [phone]  Pager [phone]  Infectious Disease Attending  After 5pm/weekends please call [phone] for all inquiries and new consults 60 M with HLD, CHF (EF 65% Oct 2019), HLD, cirrhosis (2/2 alcohol abuse), and recent admission (Oct 2019) for R pleural effusion s/p chest tube, fluid was exudative but negative cytology and cultures, presented 11/8/2019 with fever and chills, SOB and worked up for TB which was negative., at that time got 7 days of vanco and zosyn. Now her returns not having taken his lactulose and is encephalopathic.   Afebrile but tachycardic   WBC 12, PLT 45, Hb 7.5->5.6, Crea 1.8->1.6, INR 2.65  SAAG 1.8 (>1.1 consistent with portal hypertension)   MELD on admission was 29  Lactate 8.0->6->4  CT (personally reviewed) moderate bilateral pleural effusions, splenomegaly, shriveled liver, mod-large ascites  Blood culture drawn on admission and given vanco, CTX and flagyl   Fluid appears transudative though no serum LDH on this admission   Fluid with less then 250 PMNs goes against SBP and gram stain on fluid is negative   Pt had melena and hb dropped to 5.6 raising concern for variceal bleed    10/15: Still anemic, received PLT, pRBC and FFP today, unable to get EGD due to stability, paracentesis fluid now positive with GNR in liquid broth   10/16: remains with low counts including hb, CT performed today (personally reviewed) no retro bleed but large ascites, splenomegaly, anasarca   10/17: transferred out of the ICU, Hb stable, Ecoli S to ceftriaxone, lactulose wasn't given for 24 hrs but has been reordered   10/18: still no BM, NPO after midnight for EGD, continue on ceftriaxone for 1-2 more day until post-egd   10/19: EGD cancelled due to PLT and INR, last day of antibiotics, hemoglobin stable  10/21: stable off antibiotics, planned for EGD tomorrow, Asked primary team and IR to arrange for large volume paracentesis which will be done tomorrow before egd, Can get Prevnar today  10/22: The patient is s/p paracentesis today, 5.7 L of serous fluid removed. The patient is afebrile, WBC 2.61, no distress, EGD cancelled by anesthesia       Overall, 60M hepatic encephalopathy, acute blood lose anemia (?variceal bleed, GI ulcer), high lactate, SBP   -stopped ceftriaxone  -s/p US guided paracentesis today with removal of 5.7L and a dose of albumin was given   -follow up all studies and cultures from paracentesis today   -given the large volume para would monitor blood pressure closely   -transfusions per medicine, GI  -octreotide stopped   -continue PPI   -EGD was cancelled by anesthesia due to abnormal labs->at this point I think it would be beneficial to transfer the patient to [facility] where he can be evaluated by hepatology/Liver transplant service as well as for the need for endoscopy as he may needs banding regardless of his INR and platelets. Discussed with hepatology at [facility] who is aware of patient and is in agreement that patient would benefit from transfer. This was conveyed to Dr. Wilkins   -started non selective beta-blocker with propanolol  -lactulose changed to BID and Rifaximin was added today->titrate to 2-3 soft BMs per day  -He is on spironolactone and should ideally be on lasix as well but not sure if BP can tolerate    -Administered Prevnar 10/21/20 but also needs influenza and other vaccines over time   -He can follow up with me or Dr. Ashby/Dr. Montero in ID office at [address] [place] [address] [phone] for further transplant evaluation

## 2020-10-22 NOTE — PROGRESS NOTE ADULT - ATTENDING COMMENTS
Discussed with Daria Clemente NP. I have reviewed all pertinent clinical information, including history, physical exam, plan and the NP's note and agree. Note has been reviewed and made any necessary modifications.     Michael Ragsdale, DO  Cell: [phone]  Pager [phone]  Infectious Disease Attending  After 5pm/weekends please call [phone] for all inquiries and new consults

## 2020-10-22 NOTE — PROCEDURE NOTE - ADDITIONAL PROCEDURE DETAILS
s/p large volume diagnostic/therapeutic paracentesis.  5.7 Liters of serous fluid removed and sent to lab for analysis.  Hemostasis achieved.  DSD applied.  No complications.  Pt tolerated procedure well.  25% 50ML albumin ordered    -f/u final ascitic fluid study resutls  -infuse albumin ASAP
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
## 2020-10-22 NOTE — PROGRESS NOTE ADULT - SUBJECTIVE AND OBJECTIVE BOX
SANTOS BELLE  MRN-[id number]    Follow Up:  ID following for SBP    Interval History: Notes reviewed. The patient is s/p paracentesis today, 5.7 L of serous fluid removed. The patient is afebrile, WBC 2.61, no distress, awaiting for EGD in [facility]. The patient has no new complains.         ROS:    [ ] Unobtainable because:  [X ] All other systems negative    Constitutional: no fever, no chills  Head: no trauma  Eyes: no vision changes, no eye pain  ENT:  no sore throat, no rhinorrhea  Cardiovascular:  no chest pain, no palpitation  Respiratory:  no SOB, no cough  GI:  no abd pain, no vomiting, no diarrhea  urinary: no dysuria, no hematuria, no flank pain  musculoskeletal:  no joint pain, no joint swelling  skin:  no rash  neurology:  no headache, no seizure, no change in mental status  psych: no anxiety, no depression         Allergies  No Known Allergies        ANTIMICROBIALS:  rifAXIMin 550 two times a day      OTHER MEDS:  acetaminophen   Tablet .. 650 milliGRAM(s) Oral every 6 hours PRN  chlorhexidine 4% Liquid 1 Application(s) Topical <User Schedule>  lactulose Syrup 20 Gram(s) Oral two times a day  melatonin 3 milliGRAM(s) Oral at bedtime  nystatin Powder 1 Application(s) Topical two times a day  pantoprazole  Injectable 40 milliGRAM(s) IV Push every 12 hours  spironolactone 12.5 milliGRAM(s) Oral daily      Vital Signs Last 24 Hrs  T(C): 36.6 (22 Oct 2020 11:44), Max: 37 (21 Oct 2020 17:19)  T(F): 97.8 (22 Oct 2020 11:44), Max: 98.6 (21 Oct 2020 17:19)  HR: 98 (22 Oct 2020 11:44) (68 - 98)  BP: 121/51 (22 Oct 2020 11:44) (108/42 - 151/68)  BP(mean): --  RR: 17 (22 Oct 2020 11:44) (17 - 18)  SpO2: 97% (22 Oct 2020 11:44) (95% - 98%)    Physical Exam:  General:    NAD,  non toxic, obese, mildly tachypneic   Head: atraumatic, normocephalic  Eye: mildly icteric sclera  ENT:    no oral lesions, neck supple  Cardio:     regular S1, S2,  no murmur  Respiratory:    clear b/l,    no wheezing  abd:     soft,   BS +,   no tenderness, obese  :    no suprapubic tenderness,   Musculoskeletal:   no joint swelling,   + edema of bilateral lower extremities, mild  vascular: no central lines, +PIV   Skin:    left upper ext with ecchymosis improving   Neurologic:     no focal deficit, alert and awake x3  psych: normal affect     WBC Count: 2.61 K/uL (10-22 @ 08:26)  WBC Count: 3.19 K/uL (10-21 @ 08:24)  WBC Count: 2.89 K/uL (10-20 @ 07:41)  WBC Count: 3.25 K/uL (10-19 @ 08:21)  WBC Count: 3.30 K/uL (10-18 @ 07:11)  WBC Count: 3.19 K/uL (10-17 @ 11:45)  WBC Count: 2.74 K/uL (10-17 @ 03:33)                            7.8    2.61  )-----------( 46       ( 22 Oct 2020 08:26 )             24.4       10-22    140  |  108  |  16  ----------------------------<  81  4.8   |  30  |  1.28    Ca    7.8<L>      22 Oct 2020 08:26  Phos  2.6     10-21  Mg     2.3     10-21    TPro  6.1  /  Alb  1.8<L>  /  TBili  2.6<H>  /  DBili  x   /  AST  28  /  ALT  28  /  AlkPhos  90  10-21          Creatinine Trend: 1.28<--, 1.29<--, 1.24<--, 1.24<--, 1.22<--, 1.31<--      MICROBIOLOGY:  v  .Urine Clean Catch (Midstream)  10-14-20   <10,000 CFU/mL Normal Urogenital Blanka  --  --      .Blood Blood-Peripheral  10-14-20   No Growth Final  --  --      .Body Fluid Peritoneal Fluid  10-14-20   Growth in fluid media only Escherichia coli  --  Escherichia coli           SANTOS BELLE  MRN-[id number]    Follow Up:  ID following for SBP    Interval History: Notes reviewed. The patient is s/p paracentesis today, 5.7 L of serous fluid removed. The patient is afebrile, WBC 2.61, no distress, awaiting for EGD in OR . The patient has no new complains.         ROS:    [ ] Unobtainable because:  [X ] All other systems negative    Constitutional: no fever, no chills  Head: no trauma  Eyes: no vision changes, no eye pain  ENT:  no sore throat, no rhinorrhea  Cardiovascular:  no chest pain, no palpitation  Respiratory:  no SOB, no cough  GI:  no abd pain, no vomiting, no diarrhea  urinary: no dysuria, no hematuria, no flank pain  musculoskeletal:  no joint pain, no joint swelling  skin:  no rash  neurology:  no headache, no seizure, no change in mental status  psych: no anxiety, no depression         Allergies  No Known Allergies        ANTIMICROBIALS:  rifAXIMin 550 two times a day      OTHER MEDS:  acetaminophen   Tablet .. 650 milliGRAM(s) Oral every 6 hours PRN  chlorhexidine 4% Liquid 1 Application(s) Topical <User Schedule>  lactulose Syrup 20 Gram(s) Oral two times a day  melatonin 3 milliGRAM(s) Oral at bedtime  nystatin Powder 1 Application(s) Topical two times a day  pantoprazole  Injectable 40 milliGRAM(s) IV Push every 12 hours  spironolactone 12.5 milliGRAM(s) Oral daily      Vital Signs Last 24 Hrs  T(C): 36.6 (22 Oct 2020 11:44), Max: 37 (21 Oct 2020 17:19)  T(F): 97.8 (22 Oct 2020 11:44), Max: 98.6 (21 Oct 2020 17:19)  HR: 98 (22 Oct 2020 11:44) (68 - 98)  BP: 121/51 (22 Oct 2020 11:44) (108/42 - 151/68)  BP(mean): --  RR: 17 (22 Oct 2020 11:44) (17 - 18)  SpO2: 97% (22 Oct 2020 11:44) (95% - 98%)    Physical Exam:  General:    NAD,  non toxic, obese, mildly tachypneic   Head: atraumatic, normocephalic  Eye: mildly icteric sclera  ENT:    no oral lesions, neck supple  Cardio:     regular S1, S2,  no murmur  Respiratory:    clear b/l,    no wheezing  abd:     soft,   BS +,   no tenderness, obese  :    no suprapubic tenderness,   Musculoskeletal:   no joint swelling,   3+ pitting edema of bilateral lower extremities extending up to the abdomen   vascular: no central lines, +PIV   Skin:    left upper ext with ecchymosis improving   Neurologic:     no focal deficit, alert and awake x3  psych: normal affect     WBC Count: 2.61 K/uL (10-22 @ 08:26)  WBC Count: 3.19 K/uL (10-21 @ 08:24)  WBC Count: 2.89 K/uL (10-20 @ 07:41)  WBC Count: 3.25 K/uL (10-19 @ 08:21)  WBC Count: 3.30 K/uL (10-18 @ 07:11)  WBC Count: 3.19 K/uL (10-17 @ 11:45)  WBC Count: 2.74 K/uL (10-17 @ 03:33)                            7.8    2.61  )-----------( 46       ( 22 Oct 2020 08:26 )             24.4       10-22    140  |  108  |  16  ----------------------------<  81  4.8   |  30  |  1.28    Ca    7.8<L>      22 Oct 2020 08:26  Phos  2.6     10-21  Mg     2.3     10-21    TPro  6.1  /  Alb  1.8<L>  /  TBili  2.6<H>  /  DBili  x   /  AST  28  /  ALT  28  /  AlkPhos  90  10-21      PT/INR - ( 22 Oct 2020 08:26 )   PT: 24.8 sec;   INR: 2.22 ratio         PTT - ( 22 Oct 2020 08:26 )  PTT:40.2 sec    Creatinine Trend: 1.28<--, 1.29<--, 1.24<--, 1.24<--, 1.22<--, 1.31<--      MICROBIOLOGY:  v  .Urine Clean Catch (Midstream)  10-14-20   <10,000 CFU/mL Normal Urogenital Blanka  --  --      .Blood Blood-Peripheral  10-14-20   No Growth Final  --  --      .Body Fluid Peritoneal Fluid  10-14-20   Growth in fluid media only Escherichia coli  --  Escherichia coli

## 2020-10-22 NOTE — PROGRESS NOTE ADULT - ASSESSMENT
HPI:     58 y/o male morbidly obese male w/ PMHx of Hepatic Cirrhosis, ascites, Thrombocytopenia, CHF, HTN, HLD, recent admx for Hepatic Encephalopathy, noncompliant with lactulose presents to the ED due to AMS. Pt awake, lethargic only oriented to self, unable to provide any history. History obtained from ED/Chart. Per ED, Brother noticed worsening confusion x 1, noted again pt non-complaint with lactulose thus gave him 1 dose prior to his arrival in ED.    On arrival in ED, initial /59, , pt afebrile. CT head neg. Sig labs include WBC 12.37, h/h 7.5/24.3, PLT 89, LA 8, BUN/Cr 48/1.81, ammonia level 41, see below for rest of sig labs. Pt s/p paracentesis in ED, 2L removed cell ct not indicative of SBP. UA neg, CXR pre-valenzuela unremarkable. CT A/P showed portal hypertension with cirrhosis, splenomegaly, upper abdominal varices, moderate abdominopelvic ascites, pleural effusions, and anasarca.     (14 Oct 2020 03:19)  ----------------- As Above -----------------Patient seen earlier today.   The patient presents with a change in mental status. Patient has a history of Paulino and alcoholic cirrhosis. Patient is non complaint with Lactulose. Patient has had admissions previously for hepatic encephalopathy from non compliance with Lactulose. Patiet was found to have HGB of 7.5 with platelets of 89. INR > 2  The patient presents with severe lethargy. Ammonia only 41 but has elevated lactic acid levels. SBP in ER ruled out SBP  IN ER , patient was passing light brown liquid stool ( visualized )  ---- Patient later developed melena ( [facility] ) HGB fell to 5.6, BUN increased to 56 and platelets fell to 45. Patient now in the ICU    Change in MS - . Patient appears to be at baseline. Lactulose at 20 MG BID    Melena - No N/V or melena. HGB 7.8 stable . Patient pancytopenic with elevated INR, low platelets - Discussed with anesthesia -  Patient not actively bleeding, risks outweigh benefits for EGD. EGD is  cancelled.  Off Octreotide 1)) Keep HGB ~> 8 0  2) Ceftriaxone for 1 week 3) Maintain regular diet  4) PPi / Carafate 5) Would start non selective BBlockers   Elevated LFTs  - 3.2/33/32/81--> 2.7/ 26 /28 /79 --> 2.6/23/31/83  better  === Stable from GI point of view for discharge

## 2020-10-22 NOTE — CONSULT NOTE ADULT - ASSESSMENT
IR consulted for paracentesis  Patient is a 60y old  Male who presents with a chief complaint of AMS, sepsis (22 Oct 2020 10:50)  Pt found to have ecoli SPB.   Pt currently on rocephin and flagyl.    Pt with h/o  morbidly obese male w/ PMHx of Hepatic Cirrhosis, ascites, CHF, HTN, HLD and low albumin, recent admx for Hepatic Encephalopathy    Pt with supratherapeutic INR and thrombocytopenic.  No leukocytosis, afebrile, VSS    Plan  -will perform today  -meds, labs and vitals reviewed  -consent obtained from patient

## 2020-10-22 NOTE — PROGRESS NOTE ADULT - SUBJECTIVE AND OBJECTIVE BOX
Patient is a 60y old  Male who presents with a chief complaint of AMS, sepsis (22 Oct 2020 12:50)      HPI:     58 y/o male morbidly obese male w/ PMHx of Hepatic Cirrhosis, ascites, Thrombocytopenia, CHF, HTN, HLD, recent admx for Hepatic Encephalopathy, noncompliant with lactulose presents to the ED due to AMS. Pt awake, lethargic only oriented to self, unable to provide any history. History obtained from ED/Chart. Per ED, Brother noticed worsening confusion x 1, noted again pt non-complaint with lactulose thus gave him 1 dose prior to his arrival in ED.    On arrival in ED, initial /59, , pt afebrile. CT head neg. Sig labs include WBC 12.37, h/h 7.5/24.3, PLT 89, LA 8, BUN/Cr 48/1.81, ammonia level 41, see below for rest of sig labs. Pt s/p paracentesis in ED, 2L removed cell ct not indicative of SBP. UA neg, CXR pre-valenzuela unremarkable. CT A/P showed portal hypertension with cirrhosis, splenomegaly, upper abdominal varices, moderate abdominopelvic ascites, pleural effusions, and anasarca.     (14 Oct 2020 03:19)      INTERVAL HPI/OVERNIGHT EVENTS:  The patient / nurse denies melena, hematochezia, hematemesis, nausea, vomiting, abdominal pain, constipation, diarrhea, or change in bowel movements     MEDICATIONS  (STANDING):  chlorhexidine 4% Liquid 1 Application(s) Topical <User Schedule>  lactulose Syrup 20 Gram(s) Oral two times a day  melatonin 3 milliGRAM(s) Oral at bedtime  nystatin Powder 1 Application(s) Topical two times a day  pantoprazole  Injectable 40 milliGRAM(s) IV Push every 12 hours  propranolol 10 milliGRAM(s) Oral every 8 hours  rifAXIMin 550 milliGRAM(s) Oral two times a day  spironolactone 12.5 milliGRAM(s) Oral daily  sucralfate 1 Gram(s) Oral four times a day    MEDICATIONS  (PRN):  acetaminophen   Tablet .. 650 milliGRAM(s) Oral every 6 hours PRN Mild Pain (1 - 3), Moderate Pain (4 - 6)      FAMILY HISTORY:  No pertinent family history in first degree relatives        Allergies    No Known Allergies    Intolerances        PMH/PSH:  HLD (hyperlipidemia)    Hepatic cirrhosis    CHF (congestive heart failure)    Obese    Hyperlipidemia    Pneumonia    CHF (congestive heart failure)    History of chest tube placement    No significant past surgical history          REVIEW OF SYSTEMS:  CONSTITUTIONAL: No fever, weight loss,   EYES: No eye pain, visual disturbances, or discharge  ENMT:  No difficulty hearing, tinnitus, vertigo; No sinus or throat pain  NECK: No pain or stiffness  BREASTS: No pain, masses, or nipple discharge  RESPIRATORY: No cough, wheezing, chills or hemoptysis; No shortness of breath  CARDIOVASCULAR: No chest pain, palpitations, dizziness, or leg swelling  GASTROINTESTINAL: See above  GENITOURINARY: No dysuria, frequency, hematuria, or incontinence  NEUROLOGICAL: No headaches, memory loss, loss of strength, numbness, or tremors  SKIN: No itching, burning, rashes, or lesions   LYMPH NODES: No enlarged glands  ENDOCRINE: No heat or cold intolerance; No hair loss  MUSCULOSKELETAL: No joint pain or swelling; No muscle, back, or extremity pain  PSYCHIATRIC: No depression, anxiety, mood swings, or difficulty sleeping  HEME/LYMPH: No easy bruising, or bleeding gums  ALLERGY AND IMMUNOLOGIC: No hives or eczema    Vital Signs Last 24 Hrs  T(C): 36.6 (22 Oct 2020 11:44), Max: 37 (21 Oct 2020 17:19)  T(F): 97.8 (22 Oct 2020 11:44), Max: 98.6 (21 Oct 2020 17:19)  HR: 98 (22 Oct 2020 11:44) (68 - 98)  BP: 121/51 (22 Oct 2020 11:44) (112/63 - 151/68)  BP(mean): --  RR: 17 (22 Oct 2020 11:44) (17 - 18)  SpO2: 97% (22 Oct 2020 11:44) (95% - 98%)    PHYSICAL EXAM:  GENERAL: NAD, well-groomed, well-developed  HEAD:  Atraumatic, Normocephalic  EYES: EOMI, PERRLA, conjunctiva and sclera clear  NECK: Supple, No JVD, Normal thyroid  NERVOUS SYSTEM:  Alert & Oriented X3, Good concentration; Motor Strength 5/5 B/L upper and lower extremities;   CHEST/LUNG: Clear to percussion bilaterally; No rales, rhonchi, wheezing, or rubs  HEART: Regular rate and rhythm; No murmurs, rubs, or gallops  ABDOMEN: Soft, Nontender, Nondistended; Bowel sounds present  EXTREMITIES:  2+ Peripheral Pulses, No clubbing, cyanosis, or edema  LYMPH: No lymphadenopathy noted  SKIN: No rashes or lesions    LAB                          7.8    2.61  )-----------( 46       ( 22 Oct 2020 08:26 )             24.4       CBC:  10-22 @ 08:26  WBC 2.61   Hgb 7.8   Hct 24.4   Plts 46  MCV 94.6  10-21 @ 08:24  WBC 3.19   Hgb 8.4   Hct 26.0   Plts 46  MCV 94.9  10-20 @ 07:41  WBC 2.89   Hgb 8.0   Hct 25.5   Plts 46  MCV 94.4  10-19 @ 08:21  WBC 3.25   Hgb 7.8   Hct 24.7   Plts 45  MCV 94.3  10-18 @ 07:11  WBC 3.30   Hgb 7.9   Hct 23.7   Plts 48  MCV 91.5  10-17 @ 11:45  WBC 3.19   Hgb 7.9   Hct 23.5   Plts 42  MCV 91.1  10-17 @ 03:33  WBC 2.74   Hgb 7.7   Hct 24.1   Plts 46  MCV 92.3  10-16 @ 15:49  WBC 2.98   Hgb 7.8   Hct 23.7   Plts 44  MCV 90.8  10-16 @ 05:26  WBC 2.13   Hgb 6.9   Hct 21.3   Plts 48  MCV 91.0  10-15 @ 20:52  WBC 2.49   Hgb 8.4   Hct 25.9   Plts 45  MCV 91.5      Chemistry:  10-22 @ 08:26  Na+ 140  K+ 4.8  Cl- 108  CO2 30  BUN 16  Cr 1.28     10-21 @ 08:24  Na+ 137  K+ 4.1  Cl- 106  CO2 27  BUN 16  Cr 1.29     10-20 @ 07:41  Na+ 139  K+ 3.6  Cl- 108  CO2 27  BUN 18  Cr 1.24     10-19 @ 08:21  Na+ 138  K+ 3.8  Cl- 108  CO2 28  BUN 24  Cr 1.24     10-18 @ 07:11  Na+ 135  K+ 3.7  Cl- 104  CO2 29  BUN 31  Cr 1.22     10-17 @ 03:33  Na+ 135  K+ 3.8  Cl- 105  CO2 26  BUN 40  Cr 1.31     10-16 @ 05:26  Na+ 138  K+ 3.5  Cl- 106  CO2 28  BUN 52  Cr 1.34     10-15 @ 17:45  Na+ 137  K+ 3.6  Cl- 105  CO2 29  BUN 57  Cr 1.47         Glucose, Serum: 81 mg/dL (10-22 @ 08:26)  Glucose, Serum: 114 mg/dL (10-21 @ 08:24)  Glucose, Serum: 84 mg/dL (10-20 @ 07:41)  Glucose, Serum: 89 mg/dL (10-19 @ 08:21)  Glucose, Serum: 87 mg/dL (10-18 @ 07:11)  Glucose, Serum: 87 mg/dL (10-17 @ 03:33)  Glucose, Serum: 78 mg/dL (10-16 @ 05:26)  Glucose, Serum: 87 mg/dL (10-15 @ 17:45)      22 Oct 2020 08:26    140    |  108    |  16     ----------------------------<  81     4.8     |  30     |  1.28   21 Oct 2020 08:24    137    |  106    |  16     ----------------------------<  114    4.1     |  27     |  1.29   20 Oct 2020 07:41    139    |  108    |  18     ----------------------------<  84     3.6     |  27     |  1.24   19 Oct 2020 08:21    138    |  108    |  24     ----------------------------<  89     3.8     |  28     |  1.24   18 Oct 2020 07:11    135    |  104    |  31     ----------------------------<  87     3.7     |  29     |  1.22   17 Oct 2020 03:33    135    |  105    |  40     ----------------------------<  87     3.8     |  26     |  1.31   16 Oct 2020 05:26    138    |  106    |  52     ----------------------------<  78     3.5     |  28     |  1.34     Ca    7.8        22 Oct 2020 08:26  Ca    7.5        21 Oct 2020 08:24  Ca    7.8        20 Oct 2020 07:41  Ca    7.6        19 Oct 2020 08:21  Ca    7.5        18 Oct 2020 07:11  Ca    7.6        17 Oct 2020 03:33  Ca    7.9        16 Oct 2020 05:26  Phos  2.6       21 Oct 2020 08:24  Phos  2.3       20 Oct 2020 07:41  Phos  2.2       19 Oct 2020 08:21  Phos  2.7       18 Oct 2020 07:11  Phos  2.7       17 Oct 2020 03:33  Phos  2.8       16 Oct 2020 05:26  Phos  3.2       15 Oct 2020 17:45  Mg     2.3       21 Oct 2020 08:24  Mg     1.9       20 Oct 2020 07:41  Mg     1.9       19 Oct 2020 08:21  Mg     1.9       18 Oct 2020 07:11  Mg     2.2       17 Oct 2020 03:33  Mg     2.3       16 Oct 2020 05:26  Mg     1.9       15 Oct 2020 17:45    TPro  6.1    /  Alb  1.8    /  TBili  2.6    /  DBili  x      /  AST  28     /  ALT  28     /  AlkPhos  90     21 Oct 2020 08:24  TPro  5.9    /  Alb  1.8    /  TBili  2.6    /  DBili  x      /  AST  23     /  ALT  31     /  AlkPhos  83     20 Oct 2020 07:41  TPro  5.5    /  Alb  1.8    /  TBili  2.7    /  DBili  x      /  AST  26     /  ALT  28     /  AlkPhos  79     19 Oct 2020 08:21  TPro  5.8    /  Alb  1.8    /  TBili  2.8    /  DBili  x      /  AST  31     /  ALT  29     /  AlkPhos  81     18 Oct 2020 07:11  TPro  6.0    /  Alb  1.9    /  TBili  3.2    /  DBili  x      /  AST  33     /  ALT  32     /  AlkPhos  81     17 Oct 2020 03:33  TPro  5.9    /  Alb  2.0    /  TBili  3.8    /  DBili  x      /  AST  33     /  ALT  32     /  AlkPhos  82     16 Oct 2020 05:26  TPro  5.8    /  Alb  1.9    /  TBili  4.1    /  DBili  x      /  AST  37     /  ALT  33     /  AlkPhos  80     15 Oct 2020 17:45      PT/INR - ( 22 Oct 2020 08:26 )   PT: 24.8 sec;   INR: 2.22 ratio         PTT - ( 22 Oct 2020 08:26 )  PTT:40.2 sec        CAPILLARY BLOOD GLUCOSE              RADIOLOGY & ADDITIONAL TESTS:    Imaging Personally Reviewed:  [ ] YES  [ ] NO    Consultant(s) Notes Reviewed:  [ ] YES  [ ] NO    Care Discussed with Consultants/Other Providers [ ] YES  [ ] NO

## 2020-10-22 NOTE — PROGRESS NOTE ADULT - SUBJECTIVE AND OBJECTIVE BOX
Patient is a 60y old  Male who presents with a chief complaint of AMS, sepsis (22 Oct 2020 12:50)      INTERVAL HPI/OVERNIGHT EVENTS: Pt seen and examined for first time by me. AOx 3, s/p paracentesis this morning, 5.7L- has no pain, no complaints.   Was supposed to have EGD today however canceled by IR.     MEDICATIONS  (STANDING):  chlorhexidine 4% Liquid 1 Application(s) Topical <User Schedule>  lactulose Syrup 20 Gram(s) Oral two times a day  melatonin 3 milliGRAM(s) Oral at bedtime  nystatin Powder 1 Application(s) Topical two times a day  pantoprazole  Injectable 40 milliGRAM(s) IV Push every 12 hours  propranolol 10 milliGRAM(s) Oral every 8 hours  rifAXIMin 550 milliGRAM(s) Oral two times a day  spironolactone 12.5 milliGRAM(s) Oral daily  sucralfate 1 Gram(s) Oral four times a day    MEDICATIONS  (PRN):  acetaminophen   Tablet .. 650 milliGRAM(s) Oral every 6 hours PRN Mild Pain (1 - 3), Moderate Pain (4 - 6)      Allergies    No Known Allergies    Intolerances        REVIEW OF SYSTEMS:  CONSTITUTIONAL: No fever, weight loss, or fatigue  EYES: No eye pain, visual disturbances, or discharge  ENMT:  No difficulty hearing, tinnitus, vertigo; No sinus or throat pain  NECK: No pain or stiffness  BREASTS: No pain, masses, or nipple discharge  RESPIRATORY: No cough, wheezing, chills or hemoptysis; No shortness of breath  CARDIOVASCULAR: No chest pain, palpitations, dizziness, or leg swelling  GASTROINTESTINAL: No abdominal or epigastric pain. No nausea, vomiting, or hematemesis; No diarrhea or constipation. No melena or hematochezia.  GENITOURINARY: No dysuria, frequency, hematuria, or incontinence  NEUROLOGICAL: No headaches, memory loss, loss of strength, numbness, or tremors  SKIN: No itching, burning, rashes, or lesions   LYMPH NODES: No enlarged glands  ENDOCRINE: No heat or cold intolerance; No hair loss  MUSCULOSKELETAL: No joint pain or swelling; No muscle, back, or extremity pain  PSYCHIATRIC: No depression, anxiety, mood swings, or difficulty sleeping  HEME/LYMPH: No easy bruising, or bleeding gums  ALLERGY AND IMMUNOLOGIC: No hives or eczema    Vital Signs Last 24 Hrs  T(C): 36.6 (22 Oct 2020 11:44), Max: 37 (21 Oct 2020 17:19)  T(F): 97.8 (22 Oct 2020 11:44), Max: 98.6 (21 Oct 2020 17:19)  HR: 98 (22 Oct 2020 11:44) (68 - 98)  BP: 121/51 (22 Oct 2020 11:44) (112/63 - 151/68)  BP(mean): --  RR: 17 (22 Oct 2020 11:44) (17 - 18)  SpO2: 97% (22 Oct 2020 11:44) (95% - 98%)    PHYSICAL EXAM:  GENERAL: NAD, well-groomed, well-developed  HEAD:  Atraumatic, Normocephalic  EYES: EOMI, PERRLA, conjunctiva and sclera clear  ENMT: No tonsillar erythema, exudates, or enlargement; Moist mucous membranes, Good dentition, No lesions  NECK: Supple, No JVD, Normal thyroid  NERVOUS SYSTEM:  Alert & Oriented X3, Good concentration; Motor Strength 5/5 B/L upper and lower extremities; DTRs 2+ intact and symmetric  CHEST/LUNG: Clear to percussion bilaterally; No rales, rhonchi, wheezing, or rubs  HEART: Regular rate and rhythm; No murmurs, rubs, or gallops  ABDOMEN: Soft, Nontender, Nondistended; Bowel sounds present  EXTREMITIES:  2+ Peripheral Pulses, No clubbing, cyanosis, or +edema b/l lower extremities  LYMPH: No lymphadenopathy noted  SKIN: No rashes or lesions    LABS:                        7.8    2.61  )-----------( 46       ( 22 Oct 2020 08:26 )             24.4     10-22    140  |  108  |  16  ----------------------------<  81  4.8   |  30  |  1.28    Ca    7.8<L>      22 Oct 2020 08:26  Phos  2.6     10-21  Mg     2.3     10-21    TPro  6.1  /  Alb  1.8<L>  /  TBili  2.6<H>  /  DBili  x   /  AST  28  /  ALT  28  /  AlkPhos  90  10-21    PT/INR - ( 22 Oct 2020 08:26 )   PT: 24.8 sec;   INR: 2.22 ratio         PTT - ( 22 Oct 2020 08:26 )  PTT:40.2 sec    CAPILLARY BLOOD GLUCOSE          RADIOLOGY & ADDITIONAL TESTS:    Imaging Personally Reviewed:  [ ] YES  [ ] NO    Consultant(s) Notes Reviewed:  [ ] YES  [ ] NO    Care Discussed with Consultants/Other Providers [ ] YES  [ ] NO

## 2020-10-23 ENCOUNTER — TRANSCRIPTION ENCOUNTER (OUTPATIENT)
Age: 60
End: 2020-10-23

## 2020-10-23 ENCOUNTER — INPATIENT (INPATIENT)
Facility: HOSPITAL | Age: 60
LOS: 25 days | Discharge: HOME | DRG: 441 | End: 2020-11-18
Attending: HOSPITALIST | Admitting: HOSPITALIST
Payer: COMMERCIAL

## 2020-10-23 VITALS
RESPIRATION RATE: 20 BRPM | SYSTOLIC BLOOD PRESSURE: 119 MMHG | OXYGEN SATURATION: 98 % | HEART RATE: 126 BPM | DIASTOLIC BLOOD PRESSURE: 66 MMHG | TEMPERATURE: 98 F

## 2020-10-23 VITALS — RESPIRATION RATE: 18 BRPM | TEMPERATURE: 99 F | OXYGEN SATURATION: 98 %

## 2020-10-23 DIAGNOSIS — Z29.9 ENCOUNTER FOR PROPHYLACTIC MEASURES, UNSPECIFIED: ICD-10-CM

## 2020-10-23 DIAGNOSIS — Z98.890 OTHER SPECIFIED POSTPROCEDURAL STATES: Chronic | ICD-10-CM

## 2020-10-23 DIAGNOSIS — Z02.9 ENCOUNTER FOR ADMINISTRATIVE EXAMINATIONS, UNSPECIFIED: ICD-10-CM

## 2020-10-23 DIAGNOSIS — I50.9 HEART FAILURE, UNSPECIFIED: ICD-10-CM

## 2020-10-23 DIAGNOSIS — K74.69 OTHER CIRRHOSIS OF LIVER: ICD-10-CM

## 2020-10-23 DIAGNOSIS — K72.90 HEPATIC FAILURE, UNSPECIFIED WITHOUT COMA: ICD-10-CM

## 2020-10-23 DIAGNOSIS — K92.1 MELENA: ICD-10-CM

## 2020-10-23 DIAGNOSIS — D62 ACUTE POSTHEMORRHAGIC ANEMIA: ICD-10-CM

## 2020-10-23 LAB
ALBUMIN SERPL ELPH-MCNC: 2 G/DL — LOW (ref 3.3–5)
ALP SERPL-CCNC: 82 U/L — SIGNIFICANT CHANGE UP (ref 40–120)
ALT FLD-CCNC: 24 U/L — SIGNIFICANT CHANGE UP (ref 12–78)
ANION GAP SERPL CALC-SCNC: 1 MMOL/L — LOW (ref 5–17)
AST SERPL-CCNC: 37 U/L — SIGNIFICANT CHANGE UP (ref 15–37)
BILIRUB SERPL-MCNC: 2.8 MG/DL — HIGH (ref 0.2–1.2)
BUN SERPL-MCNC: 15 MG/DL — SIGNIFICANT CHANGE UP (ref 7–23)
CALCIUM SERPL-MCNC: 7.8 MG/DL — LOW (ref 8.5–10.1)
CHLORIDE SERPL-SCNC: 109 MMOL/L — HIGH (ref 96–108)
CO2 SERPL-SCNC: 29 MMOL/L — SIGNIFICANT CHANGE UP (ref 22–31)
CREAT SERPL-MCNC: 1.14 MG/DL — SIGNIFICANT CHANGE UP (ref 0.5–1.3)
GLUCOSE SERPL-MCNC: 80 MG/DL — SIGNIFICANT CHANGE UP (ref 70–99)
HCT VFR BLD CALC: 24.5 % — LOW (ref 39–50)
HGB BLD-MCNC: 8 G/DL — LOW (ref 13–17)
MAGNESIUM SERPL-MCNC: 2.2 MG/DL — SIGNIFICANT CHANGE UP (ref 1.6–2.6)
MCHC RBC-ENTMCNC: 30.5 PG — SIGNIFICANT CHANGE UP (ref 27–34)
MCHC RBC-ENTMCNC: 32.7 GM/DL — SIGNIFICANT CHANGE UP (ref 32–36)
MCV RBC AUTO: 93.5 FL — SIGNIFICANT CHANGE UP (ref 80–100)
NRBC # BLD: 0 /100 WBCS — SIGNIFICANT CHANGE UP (ref 0–0)
PHOSPHATE SERPL-MCNC: 2.7 MG/DL — SIGNIFICANT CHANGE UP (ref 2.5–4.5)
PLATELET # BLD AUTO: 53 K/UL — LOW (ref 150–400)
POTASSIUM SERPL-MCNC: 4.5 MMOL/L — SIGNIFICANT CHANGE UP (ref 3.5–5.3)
POTASSIUM SERPL-SCNC: 4.5 MMOL/L — SIGNIFICANT CHANGE UP (ref 3.5–5.3)
PROT SERPL-MCNC: 6 GM/DL — SIGNIFICANT CHANGE UP (ref 6–8.3)
RBC # BLD: 2.62 M/UL — LOW (ref 4.2–5.8)
RBC # FLD: 20.5 % — HIGH (ref 10.3–14.5)
SODIUM SERPL-SCNC: 139 MMOL/L — SIGNIFICANT CHANGE UP (ref 135–145)
WBC # BLD: 2.7 K/UL — LOW (ref 3.8–10.5)
WBC # FLD AUTO: 2.7 K/UL — LOW (ref 3.8–10.5)

## 2020-10-23 PROCEDURE — 99232 SBSQ HOSP IP/OBS MODERATE 35: CPT

## 2020-10-23 PROCEDURE — 99239 HOSP IP/OBS DSCHRG MGMT >30: CPT

## 2020-10-23 PROCEDURE — 99223 1ST HOSP IP/OBS HIGH 75: CPT

## 2020-10-23 RX ORDER — MORPHINE SULFATE 50 MG/1
2 CAPSULE, EXTENDED RELEASE ORAL ONCE
Refills: 0 | Status: DISCONTINUED | OUTPATIENT
Start: 2020-10-23 | End: 2020-10-23

## 2020-10-23 RX ORDER — OMEGA-3 ACID ETHYL ESTERS 1 G
1 CAPSULE ORAL
Qty: 0 | Refills: 0 | DISCHARGE

## 2020-10-23 RX ORDER — ONDANSETRON 8 MG/1
4 TABLET, FILM COATED ORAL ONCE
Refills: 0 | Status: COMPLETED | OUTPATIENT
Start: 2020-10-23 | End: 2020-10-23

## 2020-10-23 RX ORDER — ZOLPIDEM TARTRATE 10 MG/1
1 TABLET ORAL
Qty: 0 | Refills: 0 | DISCHARGE

## 2020-10-23 RX ORDER — NYSTATIN CREAM 100000 [USP'U]/G
1 CREAM TOPICAL EVERY 12 HOURS
Refills: 0 | Status: DISCONTINUED | OUTPATIENT
Start: 2020-10-23 | End: 2020-11-18

## 2020-10-23 RX ORDER — ALBUTEROL 90 UG/1
2 AEROSOL, METERED ORAL
Qty: 0 | Refills: 0 | DISCHARGE

## 2020-10-23 RX ORDER — SPIRONOLACTONE 25 MG/1
12.5 TABLET, FILM COATED ORAL DAILY
Refills: 0 | Status: DISCONTINUED | OUTPATIENT
Start: 2020-10-23 | End: 2020-10-28

## 2020-10-23 RX ORDER — IPRATROPIUM/ALBUTEROL SULFATE 18-103MCG
3 AEROSOL WITH ADAPTER (GRAM) INHALATION ONCE
Refills: 0 | Status: COMPLETED | OUTPATIENT
Start: 2020-10-23 | End: 2020-10-23

## 2020-10-23 RX ORDER — FERROUS SULFATE 325(65) MG
1 TABLET ORAL
Qty: 0 | Refills: 0 | DISCHARGE

## 2020-10-23 RX ORDER — LACTULOSE 10 G/15ML
20 SOLUTION ORAL
Refills: 0 | Status: DISCONTINUED | OUTPATIENT
Start: 2020-10-23 | End: 2020-10-25

## 2020-10-23 RX ORDER — IPRATROPIUM/ALBUTEROL SULFATE 18-103MCG
3 AEROSOL WITH ADAPTER (GRAM) INHALATION
Qty: 0 | Refills: 0 | DISCHARGE
Start: 2020-10-23

## 2020-10-23 RX ORDER — SUCRALFATE 1 G
1 TABLET ORAL
Qty: 0 | Refills: 0 | DISCHARGE
Start: 2020-10-23

## 2020-10-23 RX ORDER — PANTOPRAZOLE SODIUM 20 MG/1
40 TABLET, DELAYED RELEASE ORAL EVERY 12 HOURS
Refills: 0 | Status: DISCONTINUED | OUTPATIENT
Start: 2020-10-23 | End: 2020-11-05

## 2020-10-23 RX ORDER — SUCRALFATE 1 G
1 TABLET ORAL
Refills: 0 | Status: DISCONTINUED | OUTPATIENT
Start: 2020-10-23 | End: 2020-11-18

## 2020-10-23 RX ORDER — PANTOPRAZOLE SODIUM 20 MG/1
40 TABLET, DELAYED RELEASE ORAL
Qty: 0 | Refills: 0 | DISCHARGE
Start: 2020-10-23

## 2020-10-23 RX ORDER — NYSTATIN CREAM 100000 [USP'U]/G
1 CREAM TOPICAL
Qty: 0 | Refills: 0 | DISCHARGE
Start: 2020-10-23

## 2020-10-23 RX ORDER — ACETAMINOPHEN 500 MG
650 TABLET ORAL EVERY 6 HOURS
Refills: 0 | Status: DISCONTINUED | OUTPATIENT
Start: 2020-10-23 | End: 2020-11-18

## 2020-10-23 RX ORDER — ATORVASTATIN CALCIUM 80 MG/1
1 TABLET, FILM COATED ORAL
Qty: 0 | Refills: 0 | DISCHARGE

## 2020-10-23 RX ORDER — LIDOCAINE 4 G/100G
1 CREAM TOPICAL
Qty: 0 | Refills: 0 | DISCHARGE

## 2020-10-23 RX ORDER — FUROSEMIDE 40 MG
1 TABLET ORAL
Qty: 0 | Refills: 0 | DISCHARGE

## 2020-10-23 RX ORDER — LANOLIN ALCOHOL/MO/W.PET/CERES
3 CREAM (GRAM) TOPICAL AT BEDTIME
Refills: 0 | Status: DISCONTINUED | OUTPATIENT
Start: 2020-10-23 | End: 2020-10-27

## 2020-10-23 RX ORDER — FLUTICASONE PROPIONATE 50 MCG
1 SPRAY, SUSPENSION NASAL
Qty: 0 | Refills: 0 | DISCHARGE

## 2020-10-23 RX ORDER — MORPHINE SULFATE 50 MG/1
2.5 CAPSULE, EXTENDED RELEASE ORAL ONCE
Refills: 0 | Status: DISCONTINUED | OUTPATIENT
Start: 2020-10-23 | End: 2020-10-23

## 2020-10-23 RX ORDER — ACETAMINOPHEN 500 MG
2 TABLET ORAL
Qty: 0 | Refills: 0 | DISCHARGE
Start: 2020-10-23

## 2020-10-23 RX ADMIN — ONDANSETRON 4 MILLIGRAM(S): 8 TABLET, FILM COATED ORAL at 21:50

## 2020-10-23 RX ADMIN — Medication 1 GRAM(S): at 01:50

## 2020-10-23 RX ADMIN — NYSTATIN CREAM 1 APPLICATION(S): 100000 CREAM TOPICAL at 20:31

## 2020-10-23 RX ADMIN — MORPHINE SULFATE 2 MILLIGRAM(S): 50 CAPSULE, EXTENDED RELEASE ORAL at 21:50

## 2020-10-23 RX ADMIN — Medication 3 MILLIGRAM(S): at 21:51

## 2020-10-23 RX ADMIN — Medication 1 GRAM(S): at 11:03

## 2020-10-23 RX ADMIN — NYSTATIN CREAM 1 APPLICATION(S): 100000 CREAM TOPICAL at 05:56

## 2020-10-23 RX ADMIN — MORPHINE SULFATE 2.5 MILLIGRAM(S): 50 CAPSULE, EXTENDED RELEASE ORAL at 23:53

## 2020-10-23 RX ADMIN — MORPHINE SULFATE 2.5 MILLIGRAM(S): 50 CAPSULE, EXTENDED RELEASE ORAL at 23:23

## 2020-10-23 RX ADMIN — PANTOPRAZOLE SODIUM 40 MILLIGRAM(S): 20 TABLET, DELAYED RELEASE ORAL at 05:34

## 2020-10-23 RX ADMIN — Medication 1 GRAM(S): at 23:25

## 2020-10-23 RX ADMIN — SPIRONOLACTONE 12.5 MILLIGRAM(S): 25 TABLET, FILM COATED ORAL at 05:34

## 2020-10-23 RX ADMIN — Medication 3 MILLILITER(S): at 11:50

## 2020-10-23 RX ADMIN — PANTOPRAZOLE SODIUM 40 MILLIGRAM(S): 20 TABLET, DELAYED RELEASE ORAL at 20:31

## 2020-10-23 RX ADMIN — MORPHINE SULFATE 2 MILLIGRAM(S): 50 CAPSULE, EXTENDED RELEASE ORAL at 22:20

## 2020-10-23 RX ADMIN — Medication 1 GRAM(S): at 05:34

## 2020-10-23 RX ADMIN — CHLORHEXIDINE GLUCONATE 1 APPLICATION(S): 213 SOLUTION TOPICAL at 08:16

## 2020-10-23 RX ADMIN — Medication 30 MILLILITER(S): at 20:31

## 2020-10-23 NOTE — DISCHARGE NOTE PROVIDER - HOSPITAL COURSE
61 y/o male morbidly obese male w/ PMHx of Hepatic Cirrhosis w/ ascites & Thrombocytopenia, morbid Obesity w/ BMI of 47, chronic diastolic CHF, HTN, HLD, recent admx for Hepatic Encephalopathy, noncompliant with lactulose presented w/ Metabolic Encephalopathy and acute blood loss anemia due to GI bleed.     Acute blood loss anemia due to GI bleed-? GI ulcer vs. variceal bleed  - s/p 6 pRBC, 2 FFP, 1 plt, Vitamin K, DDAVP and Kcentra  - patient was  on Octreotide gtt, was stopped on 10/20/2020  continue with  Protonix drip changed to 40mg IV Q12  - GI planning  EGD cancelled today due to high INR & thrombocytopenia;  - s/p Ceftriaxone prophylaxis  -started on Propranolol -but will put on hold for now-due to blood pressure   -per my colleagues' note from 10/20/2020 there appeared to be  Hgb dropped on 10/20/2020 , but there was no evidence of retroperitoneal bleed on CT    10/21/2020 hgb low but stable when compared to the one on 10/20/2020 and in fact  is slightly better  last prbc was on 10/16/2020-Heglobin continues to be stable. Today 10/23/20-8/24    Sepsis POA due to SBP  - ascites fluid growing E. coli  - s/p Ceftriaxone  - ID following patient throughout entire stay- appreciate all recs    Septic encephalopathy  - resolved-pt AAOx3  - due to SBP  -continue to monitor ammonia levels      Alcoholic cirrhosis w/ ascites, coagulopathy, pancytopenia and hepatic encephalopathy   - abd CT showed portal hypertension with cirrhosis, splenomegaly, upper abdominal varices, moderate abdominopelvic ascites, pleural effusions, and anasarca  - s/p 2L paracentesis in ED, another 5.7L on 10/22  - c/w lactulose  -possible candidate for liver transplant    MONI  - resolved, patient is likely at baseline  - improving post transfusion    Cirrhosis w/ edema  - GI following  -lactulose changed to bid and rifaximin added      Superficial thrombophlebitis of the left basilic and cephalic veins on US  - local management  - cannot give NSAIDS given MONI and GI bleed    DVT prophylaxis  SVT: SCD  GI: Protonix    Patient is being transferred to [facility]- for further need of treatment-especially evaluation by hepatology/Liver transplant service and need for endoscopy and treatment for recent upper GI bleed.    Currently patient is hemodynamically stable for transfer.

## 2020-10-23 NOTE — PROGRESS NOTE ADULT - REASON FOR ADMISSION
AMS, sepsis

## 2020-10-23 NOTE — H&P ADULT - HISTORY OF PRESENT ILLNESS
59M morbidly obese male w/ PMHx of Hepatic Cirrhosis, ascites, Thrombocytopenia, CHF, HTN, HLD, recent admx for Hepatic Encephalopathy, noncompliant with lactulose who was admitted to [facility] 10/14 - 10/23 for hepatic encephalopathy, acute blood lose anemia (?variceal bleed, GI ulcer) and possible SBP. Pt completed a course of ceftriaxone. He is s/p US guided paracentesis yesterday with removal of 5.7L. Studies and cultures from paracentesis thus far negative for negative.   EGD was cancelled by anesthesia due to abnormal labs and transfer was initiated to [facility] for evaluation by Hepatology/Liver transplant service as well as for the need for endoscopy as he may need banding regardless of his INR and platelets.   59M morbidly obese male w/ PMHx of Hepatic Cirrhosis, ascites, Thrombocytopenia, CHF, HTN, HLD, recent admx for Hepatic Encephalopathy, noncompliant with lactulose who was admitted to [facility]  10/14 - 10/23 for hepatic encephalopathy, acute blood lose anemia (?variceal bleed, GI ulcer) and possible SBP. Pt completed a course of ceftriaxone. He is s/p US guided paracentesis yesterday with removal of 5.7L. Studies and cultures from paracentesis thus far negative for negative.   EGD was cancelled by anesthesia due to abnormal labs and transfer was initiated to [facility] for evaluation by Hepatology/Liver transplant service as well as for the need for endoscopy as he may need banding regardless of his INR and platelets.   59M morbidly obese male w/ PMHx of Hepatic Cirrhosis, ascites, Thrombocytopenia, CHF, HTN, HLD, recent admx for Hepatic Encephalopathy, noncompliant with lactulose who was admitted to [facility]  10/14 - 10/23 for hepatic encephalopathy, acute blood lose anemia (?variceal bleed, GI ulcer) and possible SBP. Pt has completed a course of ceftriaxone. He is s/p US guided paracentesis yesterday 10/22 with removal of 5.7L. Ascites fluid culture pending.  EGD was cancelled by anesthesia due to abnormal labs and transfer was initiated to [facility] for evaluation by Hepatology/Liver transplant service as well as for the need for endoscopy as he may need banding regardless of his INR and platelets.  Pt currently denies any acute c/o. Denies fever/chills, cp, sob, abd pain. Also denies melena. Last bm was this morning ; described as brownish in color. He reports compliance with lactulose.

## 2020-10-23 NOTE — H&P ADULT - PROBLEM SELECTOR PLAN 1
- AMS resolved  - c/w Rifaximin 550mg po bid  - c/w Lactulose 20g po bid  - c/w Spironolactone 12.5mg po daily  - monitor LFTs  - Hepatology consulted ; will see in AM

## 2020-10-23 NOTE — DISCHARGE NOTE NURSING/CASE MANAGEMENT/SOCIAL WORK - PATIENT PORTAL LINK FT
You can access the FollowMyHealth Patient Portal offered by [facility] by registering at the following website: [email or web address]. By joining [email or web address]’s FollowMyHealth portal, you will also be able to view your health information using other applications (apps) compatible with our system.

## 2020-10-23 NOTE — DISCHARGE NOTE PROVIDER - NSDCCPCAREPLAN_GEN_ALL_CORE_FT
PRINCIPAL DISCHARGE DIAGNOSIS  Diagnosis: Sepsis  Assessment and Plan of Treatment:       SECONDARY DISCHARGE DIAGNOSES  Diagnosis: Delirium  Assessment and Plan of Treatment:     Diagnosis: Lactate blood increased  Assessment and Plan of Treatment:

## 2020-10-23 NOTE — H&P ADULT - PROBLEM SELECTOR PLAN 2
- Hb stable at 8.0  - denies melena  - transferred to [facility] for EGD given that pt is high risk  - c/w Protonix 40mg ivp a q12  - c/w Carafate  - monitor Hb daily  - GI consult in AM

## 2020-10-23 NOTE — DISCHARGE NOTE PROVIDER - PROVIDER TOKENS
FREE:[LAST:[Dr. Bernard-Hepatologist],PHONE:[(   )    -],FAX:[(   )    -]],FREE:[LAST:[Medicine team-[facility]],PHONE:[(   )    -],FAX:[(   )    -]]

## 2020-10-23 NOTE — DISCHARGE NOTE PROVIDER - NSDCMRMEDTOKEN_GEN_ALL_CORE_FT
acetaminophen 325 mg oral tablet: 2 tab(s) orally every 6 hours, As needed, Mild Pain (1 - 3), Moderate Pain (4 - 6)  ipratropium-albuterol 0.5 mg-2.5 mg/3 mLinhalation solution: 3 milliliter(s) inhaled once  lactulose 10 g/15 mL oral syrup: 45 milliliter(s) orally 2 times a day  melatonin 3 mg oral tablet: 1 tab(s) orally once a day (at bedtime)  nystatin 100,000 units/g topical powder: 1 application topically 2 times a day  pantoprazole 40 mg intravenous injection: 40 milligram(s) intravenous every 12 hours  rifAXIMin 550 mg oral tablet: 1 tab(s) orally 2 times a day  spironolactone 100 mg oral tablet: 1 tab(s) orally once a day  sucralfate 1 g oral tablet: 1 tab(s) orally 4 times a day

## 2020-10-23 NOTE — PROGRESS NOTE ADULT - ASSESSMENT
HPI:     60 y/o male morbidly obese male w/ PMHx of Hepatic Cirrhosis, ascites, Thrombocytopenia, CHF, HTN, HLD, recent admx for Hepatic Encephalopathy, noncompliant with lactulose presents to the ED due to AMS. Pt awake, lethargic only oriented to self, unable to provide any history. History obtained from ED/Chart. Per ED, Brother noticed worsening confusion x 1, noted again pt non-complaint with lactulose thus gave him 1 dose prior to his arrival in ED.    On arrival in ED, initial /59, , pt afebrile. CT head neg. Sig labs include WBC 12.37, h/h 7.5/24.3, PLT 89, LA 8, BUN/Cr 48/1.81, ammonia level 41, see below for rest of sig labs. Pt s/p paracentesis in ED, 2L removed cell ct not indicative of SBP. UA neg, CXR pre-valenzuela unremarkable. CT A/P showed portal hypertension with cirrhosis, splenomegaly, upper abdominal varices, moderate abdominopelvic ascites, pleural effusions, and anasarca.     (14 Oct 2020 03:19)  ----------------- As Above -----------------Patient seen earlier today.   The patient presents with a change in mental status. Patient has a history of Paulino and alcoholic cirrhosis. Patient is non complaint with Lactulose. Patient has had admissions previously for hepatic encephalopathy from non compliance with Lactulose. Patiet was found to have HGB of 7.5 with platelets of 89. INR > 2  The patient presents with severe lethargy. Ammonia only 41 but has elevated lactic acid levels. SBP in ER ruled out SBP  IN ER , patient was passing light brown liquid stool ( visualized )  ---- Patient later developed melena ( WNI ) HGB fell to 5.6, BUN increased to 56 and platelets fell to 45. Patient now in the ICU    Change in MS - . Patient appears to be at baseline. Lactulose at 20 MG BID    Melena - No N/V or melena. HGB 87.0  stable . Patient pancytopenic with elevated INR, low platelets - Discussed with anesthesia -  Patient not actively bleeding, risks outweigh benefits for EGD. EGD is  cancelled.  Off Octreotide 1)) Keep HGB ~> 8 0  2) Ceftriaxone for 1 week 3) Maintain regular diet  4) PPi / Carafate 5) Would start non selective BBlockers   Elevated LFTs  - 3.2/33/32/81--> 2.7/ 26 /28 /79 --> 2.6/23/31/83 --> 2.8/37/24/82     === Stable from GI point of view for discharge

## 2020-10-23 NOTE — PROGRESS NOTE ADULT - SUBJECTIVE AND OBJECTIVE BOX
Patient is a 60y old  Male who presents with a chief complaint of AMS, sepsis (23 Oct 2020 13:25)      HPI:     58 y/o male morbidly obese male w/ PMHx of Hepatic Cirrhosis, ascites, Thrombocytopenia, CHF, HTN, HLD, recent admx for Hepatic Encephalopathy, noncompliant with lactulose presents to the ED due to AMS. Pt awake, lethargic only oriented to self, unable to provide any history. History obtained from ED/Chart. Per ED, Brother noticed worsening confusion x 1, noted again pt non-complaint with lactulose thus gave him 1 dose prior to his arrival in ED.    On arrival in ED, initial /59, , pt afebrile. CT head neg. Sig labs include WBC 12.37, h/h 7.5/24.3, PLT 89, LA 8, BUN/Cr 48/1.81, ammonia level 41, see below for rest of sig labs. Pt s/p paracentesis in ED, 2L removed cell ct not indicative of SBP. UA neg, CXR pre-valenzuela unremarkable. CT A/P showed portal hypertension with cirrhosis, splenomegaly, upper abdominal varices, moderate abdominopelvic ascites, pleural effusions, and anasarca.     (14 Oct 2020 03:19)      INTERVAL HPI/OVERNIGHT EVENTS:  The patient denies melena, hematochezia, hematemesis, nausea, vomiting, abdominal pain, constipation, diarrhea, or change in bowel movements. Tolerating diet    MEDICATIONS  (STANDING):  albuterol/ipratropium for Nebulization. 3 milliLiter(s) Nebulizer once  chlorhexidine 4% Liquid 1 Application(s) Topical <User Schedule>  lactulose Syrup 20 Gram(s) Oral two times a day  melatonin 3 milliGRAM(s) Oral at bedtime  nystatin Powder 1 Application(s) Topical two times a day  pantoprazole  Injectable 40 milliGRAM(s) IV Push every 12 hours  rifAXIMin 550 milliGRAM(s) Oral two times a day  spironolactone 12.5 milliGRAM(s) Oral daily  sucralfate 1 Gram(s) Oral four times a day    MEDICATIONS  (PRN):  acetaminophen   Tablet .. 650 milliGRAM(s) Oral every 6 hours PRN Mild Pain (1 - 3), Moderate Pain (4 - 6)      FAMILY HISTORY:  No pertinent family history in first degree relatives        Allergies    No Known Allergies    Intolerances        PMH/PSH:  HLD (hyperlipidemia)    Hepatic cirrhosis    CHF (congestive heart failure)    Obese    Hyperlipidemia    Pneumonia    CHF (congestive heart failure)    History of chest tube placement    No significant past surgical history          REVIEW OF SYSTEMS:  CONSTITUTIONAL: No fever, weight loss,   EYES: No eye pain, visual disturbances, or discharge  ENMT:  No difficulty hearing, tinnitus, vertigo; No sinus or throat pain  NECK: No pain or stiffness  BREASTS: No pain, masses, or nipple discharge  RESPIRATORY: No cough, wheezing, chills or hemoptysis; No shortness of breath  CARDIOVASCULAR: No chest pain, palpitations, dizziness, or leg swelling  GASTROINTESTINAL:  See above   GENITOURINARY: No dysuria, frequency, hematuria, or incontinence  NEUROLOGICAL: No headaches,  numbness, or tremors  SKIN: No itching, burning, rashes, or lesions   LYMPH NODES: No enlarged glands  ENDOCRINE: No heat or cold intolerance; No hair loss  MUSCULOSKELETAL: No joint pain or swelling; No muscle, back, or extremity pain  PSYCHIATRIC: No depression, anxiety, mood swings, or difficulty sleeping  HEME/LYMPH: No easy bruising, or bleeding gums  ALLERGY AND IMMUNOLOGIC: No hives or eczema    Vital Signs Last 24 Hrs  T(C): 36.6 (23 Oct 2020 13:00), Max: 36.9 (23 Oct 2020 06:00)  T(F): 97.8 (23 Oct 2020 13:00), Max: 98.5 (23 Oct 2020 06:00)  HR: 74 (23 Oct 2020 13:45) (65 - 74)  BP: 92/46 (23 Oct 2020 13:00) (92/46 - 109/53)  BP(mean): --  RR: 18 (23 Oct 2020 13:00) (18 - 18)  SpO2: 98% (23 Oct 2020 13:45) (96% - 99%)    PHYSICAL EXAM:  GENERAL: NAD, well-groomed, well-developed  HEAD:  Atraumatic, Normocephalic  EYES: EOMI, PERRLA, conjunctiva and sclera clear  NECK: Supple, No JVD, Normal thyroid  NERVOUS SYSTEM:  Alert & Oriented X3, Good concentration; Motor Strength 5/5 B/L upper and lower extremities;  CHEST/LUNG: Clear to percussion bilaterally; No rales, rhonchi, wheezing, or rubs  HEART: Regular rate and rhythm; No murmurs, rubs, or gallops  ABDOMEN: Soft, Nontender, Nondistended; Bowel sounds present  EXTREMITIES:  2+ Peripheral Pulses, No clubbing, cyanosis, or edema  LYMPH: No lymphadenopathy noted  SKIN: No rashes or lesions    LAB                          8.0    2.70  )-----------( 53       ( 23 Oct 2020 08:58 )             24.5       CBC:  10-23 @ 08:58  WBC 2.70   Hgb 8.0   Hct 24.5   Plts 53  MCV 93.5  10-22 @ 08:26  WBC 2.61   Hgb 7.8   Hct 24.4   Plts 46  MCV 94.6  10-21 @ 08:24  WBC 3.19   Hgb 8.4   Hct 26.0   Plts 46  MCV 94.9  10-20 @ 07:41  WBC 2.89   Hgb 8.0   Hct 25.5   Plts 46  MCV 94.4  10-19 @ 08:21  WBC 3.25   Hgb 7.8   Hct 24.7   Plts 45  MCV 94.3  10-18 @ 07:11  WBC 3.30   Hgb 7.9   Hct 23.7   Plts 48  MCV 91.5  10-17 @ 11:45  WBC 3.19   Hgb 7.9   Hct 23.5   Plts 42  MCV 91.1  10-17 @ 03:33  WBC 2.74   Hgb 7.7   Hct 24.1   Plts 46  MCV 92.3  10-16 @ 15:49  WBC 2.98   Hgb 7.8   Hct 23.7   Plts 44  MCV 90.8      Chemistry:  10-23 @ 08:58  Na+ 139  K+ 4.5  Cl- 109  CO2 29  BUN 15  Cr 1.14     10-22 @ 08:26  Na+ 140  K+ 4.8  Cl- 108  CO2 30  BUN 16  Cr 1.28     10-21 @ 08:24  Na+ 137  K+ 4.1  Cl- 106  CO2 27  BUN 16  Cr 1.29     10-20 @ 07:41  Na+ 139  K+ 3.6  Cl- 108  CO2 27  BUN 18  Cr 1.24     10-19 @ 08:21  Na+ 138  K+ 3.8  Cl- 108  CO2 28  BUN 24  Cr 1.24     10-18 @ 07:11  Na+ 135  K+ 3.7  Cl- 104  CO2 29  BUN 31  Cr 1.22     10-17 @ 03:33  Na+ 135  K+ 3.8  Cl- 105  CO2 26  BUN 40  Cr 1.31         Glucose, Serum: 80 mg/dL (10-23 @ 08:58)  Glucose, Serum: 81 mg/dL (10-22 @ 08:26)  Glucose, Serum: 114 mg/dL (10-21 @ 08:24)  Glucose, Serum: 84 mg/dL (10-20 @ 07:41)  Glucose, Serum: 89 mg/dL (10-19 @ 08:21)  Glucose, Serum: 87 mg/dL (10-18 @ 07:11)  Glucose, Serum: 87 mg/dL (10-17 @ 03:33)      23 Oct 2020 08:58    139    |  109    |  15     ----------------------------<  80     4.5     |  29     |  1.14   22 Oct 2020 08:26    140    |  108    |  16     ----------------------------<  81     4.8     |  30     |  1.28   21 Oct 2020 08:24    137    |  106    |  16     ----------------------------<  114    4.1     |  27     |  1.29   20 Oct 2020 07:41    139    |  108    |  18     ----------------------------<  84     3.6     |  27     |  1.24   19 Oct 2020 08:21    138    |  108    |  24     ----------------------------<  89     3.8     |  28     |  1.24   18 Oct 2020 07:11    135    |  104    |  31     ----------------------------<  87     3.7     |  29     |  1.22   17 Oct 2020 03:33    135    |  105    |  40     ----------------------------<  87     3.8     |  26     |  1.31     Ca    7.8        23 Oct 2020 08:58  Ca    7.8        22 Oct 2020 08:26  Ca    7.5        21 Oct 2020 08:24  Ca    7.8        20 Oct 2020 07:41  Ca    7.6        19 Oct 2020 08:21  Ca    7.5        18 Oct 2020 07:11  Ca    7.6        17 Oct 2020 03:33  Phos  2.7       23 Oct 2020 08:58  Phos  2.6       21 Oct 2020 08:24  Phos  2.3       20 Oct 2020 07:41  Phos  2.2       19 Oct 2020 08:21  Phos  2.7       18 Oct 2020 07:11  Phos  2.7       17 Oct 2020 03:33  Mg     2.2       23 Oct 2020 08:58  Mg     2.3       21 Oct 2020 08:24  Mg     1.9       20 Oct 2020 07:41  Mg     1.9       19 Oct 2020 08:21  Mg     1.9       18 Oct 2020 07:11  Mg     2.2       17 Oct 2020 03:33    TPro  6.0    /  Alb  2.0    /  TBili  2.8    /  DBili  x      /  AST  37     /  ALT  24     /  AlkPhos  82     23 Oct 2020 08:58  TPro  6.1    /  Alb  1.8    /  TBili  2.6    /  DBili  x      /  AST  28     /  ALT  28     /  AlkPhos  90     21 Oct 2020 08:24  TPro  5.9    /  Alb  1.8    /  TBili  2.6    /  DBili  x      /  AST  23     /  ALT  31     /  AlkPhos  83     20 Oct 2020 07:41  TPro  5.5    /  Alb  1.8    /  TBili  2.7    /  DBili  x      /  AST  26     /  ALT  28     /  AlkPhos  79     19 Oct 2020 08:21  TPro  5.8    /  Alb  1.8    /  TBili  2.8    /  DBili  x      /  AST  31     /  ALT  29     /  AlkPhos  81     18 Oct 2020 07:11  TPro  6.0    /  Alb  1.9    /  TBili  3.2    /  DBili  x      /  AST  33     /  ALT  32     /  AlkPhos  81     17 Oct 2020 03:33      PT/INR - ( 22 Oct 2020 08:26 )   PT: 24.8 sec;   INR: 2.22 ratio         PTT - ( 22 Oct 2020 08:26 )  PTT:40.2 sec        CAPILLARY BLOOD GLUCOSE              RADIOLOGY & ADDITIONAL TESTS:    Imaging Personally Reviewed:  [ ] YES  [ ] NO    Consultant(s) Notes Reviewed:  [ ] YES  [ ] NO    Care Discussed with Consultants/Other Providers [ ] YES  [ ] NO

## 2020-10-23 NOTE — DISCHARGE NOTE PROVIDER - CARE PROVIDER_API CALL
Dr. Bernard-Hepatologist,   Phone: (   )    -  Fax: (   )    -  Follow Up Time:     Medicine team-[facility],   Phone: (   )    -  Fax: (   )    -  Follow Up Time:

## 2020-10-23 NOTE — H&P ADULT - NSICDXPASTMEDICALHX_GEN_ALL_CORE_FT
PAST MEDICAL HISTORY:  CHF (congestive heart failure) EF 65% Oct 2019    Hepatic cirrhosis     HLD (hyperlipidemia)     Hyperlipidemia Atorvastatin 20    Obese     Pneumonia

## 2020-10-23 NOTE — PROGRESS NOTE ADULT - ASSESSMENT
60 M with HLD, CHF (EF 65% Oct 2019), HLD, cirrhosis (2/2 alcohol abuse), and recent admission (Oct 2019) for R pleural effusion s/p chest tube, fluid was exudative but negative cytology and cultures, presented 11/8/2019 with fever and chills, SOB and worked up for TB which was negative., at that time got 7 days of vanco and zosyn. Now her returns not having taken his lactulose and is encephalopathic.   Afebrile but tachycardic   WBC 12, PLT 45, Hb 7.5->5.6, Crea 1.8->1.6, INR 2.65  SAAG 1.8 (>1.1 consistent with portal hypertension)   MELD on admission was 29  Lactate 8.0->6->4  CT (personally reviewed) moderate bilateral pleural effusions, splenomegaly, shriveled liver, mod-large ascites  Blood culture drawn on admission and given vanco, CTX and flagyl   Fluid appears transudative though no serum LDH on this admission   Fluid with less then 250 PMNs goes against SBP and gram stain on fluid is negative   Pt had melena and hb dropped to 5.6 raising concern for variceal bleed    10/15: Still anemic, received PLT, pRBC and FFP today, unable to get EGD due to stability, paracentesis fluid now positive with GNR in liquid broth   10/16: remains with low counts including hb, CT performed today (personally reviewed) no retro bleed but large ascites, splenomegaly, anasarca   10/17: transferred out of the ICU, Hb stable, Ecoli S to ceftriaxone, lactulose wasn't given for 24 hrs but has been reordered   10/18: still no BM, NPO after midnight for EGD, continue on ceftriaxone for 1-2 more day until post-egd   10/19: EGD cancelled due to PLT and INR, last day of antibiotics, hemoglobin stable  10/21: stable off antibiotics, planned for EGD tomorrow, Asked primary team and IR to arrange for large volume paracentesis which will be done tomorrow before egd, Can get Prevnar today  10/22: The patient is s/p paracentesis today, 5.7 L of serous fluid removed. The patient is afebrile, WBC 2.61, no distress, EGD cancelled by anesthesia   10/23: awaiting transfer to [facility], to be evaluated by hepatology with plan for EGD and possible transplant eval, appears to be compensated at the moment, Hb stable, PLT above 50k, no INR today, ascites fluid from yesterday para SAAG 1.6 consistent with portal hypertension       Overall, 60M hepatic encephalopathy, acute blood lose anemia (?variceal bleed, GI ulcer), high lactate, SBP   -completed ceftriaxone  -s/p US guided paracentesis yesterday with removal of 5.7L and a dose of albumin was given   -follow up all studies and cultures from paracentesis today (thus far negative)  -given the large volume para would monitor blood pressure closely   -transfusions per medicine, GI  -octreotide stopped   -continue PPI   -EGD was cancelled by anesthesia due to abnormal labs->Transfer initiated to [facility] where he can be evaluated by hepatology/Liver transplant service as well as for the need for endoscopy as he may needs banding regardless of his INR and platelets. Discussed with hepatology at [facility] who is aware of patient and is in agreement that patient would benefit from transfer.  -started non selective beta-blocker with propanolol  -lactulose changed to BID and Rifaximin was added today->titrate to 2-3 soft BMs per day  -He is on spironolactone and should ideally be on lasix as well but not sure if BP can tolerate    -Administered Prevnar 10/21/20 but also needs influenza and other vaccines over time   -He can follow up with me or Dr. Ashby/Dr. Montero in ID office at [address] [place] [address] Drive [phone] for further transplant evaluation     Discussed in detail with patient need for transfer and hepatology eval as well as answered questions he had about transplant and weight loss etc. Patient expressed a good understanding of the situation and is in agreement to proceed with the transfer as well as would be interested in transplant eval and seems motivated to proceed.     Discussed with Dr. Wilkins  Will sign off. Please call with questions    Michael Ragsdale DO  Cell: [phone]  Pager [phone]  Infectious Disease Attending  After 5pm/weekends please call [phone] for all inquiries and new consults

## 2020-10-23 NOTE — PROGRESS NOTE ADULT - SUBJECTIVE AND OBJECTIVE BOX
SANTOS BELLE  MRN-[id number]    Follow Up:  ID following for SBP    Interval History: Notes reviewed. The patient is s/p paracentesis yesterday, 5.7 L of serous fluid removed. Fluid analysis not consistent with SBP. Remains pancytopenic. No distress, Awaiting transfer to [facility] . The patient has no new complains.         ROS:    [ ] Unobtainable because:  [X ] All other systems negative    Constitutional: no fever, no chills  Head: no trauma  Eyes: no vision changes, no eye pain  ENT:  no sore throat, no rhinorrhea  Cardiovascular:  no chest pain, no palpitation  Respiratory:  no SOB, no cough  GI:  no abd pain, no vomiting, no diarrhea  urinary: no dysuria, no hematuria, no flank pain  musculoskeletal:  no joint pain, no joint swelling  skin:  no rash  neurology:  no headache, no seizure, no change in mental status  psych: no anxiety, no depression         Allergies  No Known Allergies        ANTIMICROBIALS: rifAXIMin 550 two times a day      MEDICATIONS  (STANDING):  albuterol/ipratropium for Nebulization. 3 once  lactulose Syrup 20 two times a day  melatonin 3 at bedtime  pantoprazole  Injectable 40 every 12 hours  propranolol 10 every 8 hours  spironolactone 12.5 daily  sucralfate 1 four times a day      PRN  acetaminophen   Tablet .. 650 milliGRAM(s) Oral every 6 hours PRN      Vital Signs Last 24 Hrs  T(F): 98.5 (10-23-20 @ 06:00), Max: 98.5 (10-23-20 @ 06:00)  HR: 65 (10-23-20 @ 12:05)  BP: 109/53 (10-23-20 @ 06:00)  RR: 18 (10-23-20 @ 06:00)  SpO2: 99% (10-23-20 @ 12:05) (96% - 99%)  Wt(kg): --    Physical Exam:  General:    NAD,  non toxic, obese, breathing comfortably on RA    Head: atraumatic, normocephalic  Eye: mildly icteric sclera  ENT:    no oral lesions, neck supple  Cardio:     regular S1, S2,  no murmur  Respiratory:    clear b/l,    no wheezing  abd:     soft,   BS +,   no tenderness, obese  :    no suprapubic tenderness,   Musculoskeletal:   no joint swelling,   3+ pitting edema of bilateral lower extremities extending up to the abdomen   vascular: no central lines, +PIV   Skin:    left and right upper ext with ecchymosis improving, + spider angiomata   Neurologic:     no focal deficit, alert and awake x3  psych: normal affect     LABS:  WBC Count: 2.70 K/uL (10-23 @ 08:58)  WBC Count: 2.61 K/uL (10-22 @ 08:26)  WBC Count: 3.19 K/uL (10-21 @ 08:24)  WBC Count: 2.89 K/uL (10-20 @ 07:41)  WBC Count: 3.25 K/uL (10-19 @ 08:21)  WBC Count: 3.30 K/uL (10-18 @ 07:11)  WBC Count: 3.19 K/uL (10-17 @ 11:45)    Plt Trend: 53<--, 46<--, 46<--, 46<--, 45<--                        8.0    2.70  )-----------( 53       ( 23 Oct 2020 08:58 )             24.5       10-23    139  |  109<H>  |  15  ----------------------------<  80  4.5   |  29  |  1.14    Ca    7.8<L>      23 Oct 2020 08:58  Phos  2.7     10-23  Mg     2.2     10-23    TPro  6.0  /  Alb  2.0<L>  /  TBili  2.8<H>  /  DBili  x   /  AST  37  /  ALT  24  /  AlkPhos  82  10-23      Creatinine Trend: 1.14<--, 1.28<--, 1.29<--, 1.24<--, 1.24<--, 1.22<--    PT/INR - ( 22 Oct 2020 08:26 )   PT: 24.8 sec;   INR: 2.22 ratio         PTT - ( 22 Oct 2020 08:26 )  PTT:40.2 sec    Cell Count, Body Fluid (10.22.20 @ 11:08)    Mesothelial Cells - Body Fluid: 8 %    Body Fluid Appearance: Clear    BF Lymphocytes: 20 %    Monocyte/Macrophage Count - Body Fluid: 56 %    Fluid Segmented Granulocytes: 16 %    Fluid Source: Paracentesis    Fluid Type: Paracentesis    Tube Type: Lavender    Color - Body Fluid: Yellow    Total Nucleated Cell Count, Body Fluid: 142: Peritoneal/Pleural/ Pericardial  Body Fluid Types            Total Nucleated cells: <500/uL            Neutrophils: <25%          Synovial Body Fluid Type           Total Nucleated cells: <150/uL           Neutrophils: <25%           Lymphocytes: <75%           Moncytes/Macrophages: </=70% /uL    Total RBC Count: 138 /uL        MICROBIOLOGY:  v  .Urine Clean Catch (Midstream)  10-14-20   <10,000 CFU/mL Normal Urogenital Blanka  --  --      .Blood Blood-Peripheral  10-14-20   No Growth Final  --  --      .Body Fluid Peritoneal Fluid  10-14-20   Growth in fluid media only Escherichia coli  --  Escherichia coli

## 2020-10-24 LAB
ALBUMIN SERPL ELPH-MCNC: 2.3 G/DL — LOW (ref 3.3–5)
ALP SERPL-CCNC: 90 U/L — SIGNIFICANT CHANGE UP (ref 40–120)
ALT FLD-CCNC: 20 U/L — SIGNIFICANT CHANGE UP (ref 10–45)
ANION GAP SERPL CALC-SCNC: 3 MMOL/L — LOW (ref 5–17)
AST SERPL-CCNC: 33 U/L — SIGNIFICANT CHANGE UP (ref 10–40)
BILIRUB SERPL-MCNC: 3.9 MG/DL — HIGH (ref 0.2–1.2)
BUN SERPL-MCNC: 16 MG/DL — SIGNIFICANT CHANGE UP (ref 7–23)
CALCIUM SERPL-MCNC: 8.1 MG/DL — LOW (ref 8.4–10.5)
CHLORIDE SERPL-SCNC: 102 MMOL/L — SIGNIFICANT CHANGE UP (ref 96–108)
CO2 SERPL-SCNC: 28 MMOL/L — SIGNIFICANT CHANGE UP (ref 22–31)
CREAT SERPL-MCNC: 1.28 MG/DL — SIGNIFICANT CHANGE UP (ref 0.5–1.3)
GLUCOSE SERPL-MCNC: 100 MG/DL — HIGH (ref 70–99)
HCT VFR BLD CALC: 27.6 % — LOW (ref 39–50)
HGB BLD-MCNC: 8.8 G/DL — LOW (ref 13–17)
INR BLD: 2.11 RATIO — HIGH (ref 0.88–1.16)
MCHC RBC-ENTMCNC: 30.2 PG — SIGNIFICANT CHANGE UP (ref 27–34)
MCHC RBC-ENTMCNC: 31.9 GM/DL — LOW (ref 32–36)
MCV RBC AUTO: 94.8 FL — SIGNIFICANT CHANGE UP (ref 80–100)
NRBC # BLD: 0 /100 WBCS — SIGNIFICANT CHANGE UP (ref 0–0)
PLATELET # BLD AUTO: 60 K/UL — LOW (ref 150–400)
POTASSIUM SERPL-MCNC: 4.9 MMOL/L — SIGNIFICANT CHANGE UP (ref 3.5–5.3)
POTASSIUM SERPL-SCNC: 4.9 MMOL/L — SIGNIFICANT CHANGE UP (ref 3.5–5.3)
PROT SERPL-MCNC: 6.2 G/DL — SIGNIFICANT CHANGE UP (ref 6–8.3)
PROTHROM AB SERPL-ACNC: 24.2 SEC — HIGH (ref 10.6–13.6)
RBC # BLD: 2.91 M/UL — LOW (ref 4.2–5.8)
RBC # FLD: 20.4 % — HIGH (ref 10.3–14.5)
SODIUM SERPL-SCNC: 133 MMOL/L — LOW (ref 135–145)
WBC # BLD: 4.3 K/UL — SIGNIFICANT CHANGE UP (ref 3.8–10.5)
WBC # FLD AUTO: 4.3 K/UL — SIGNIFICANT CHANGE UP (ref 3.8–10.5)

## 2020-10-24 PROCEDURE — 99233 SBSQ HOSP IP/OBS HIGH 50: CPT

## 2020-10-24 RX ORDER — CEFTRIAXONE 500 MG/1
1000 INJECTION, POWDER, FOR SOLUTION INTRAMUSCULAR; INTRAVENOUS EVERY 24 HOURS
Refills: 0 | Status: COMPLETED | OUTPATIENT
Start: 2020-10-24 | End: 2020-10-30

## 2020-10-24 RX ORDER — LANOLIN ALCOHOL/MO/W.PET/CERES
3 CREAM (GRAM) TOPICAL ONCE
Refills: 0 | Status: COMPLETED | OUTPATIENT
Start: 2020-10-24 | End: 2020-10-24

## 2020-10-24 RX ADMIN — Medication 1 GRAM(S): at 17:25

## 2020-10-24 RX ADMIN — Medication 1 GRAM(S): at 23:01

## 2020-10-24 RX ADMIN — LACTULOSE 20 GRAM(S): 10 SOLUTION ORAL at 07:06

## 2020-10-24 RX ADMIN — PANTOPRAZOLE SODIUM 40 MILLIGRAM(S): 20 TABLET, DELAYED RELEASE ORAL at 08:08

## 2020-10-24 RX ADMIN — SPIRONOLACTONE 12.5 MILLIGRAM(S): 25 TABLET, FILM COATED ORAL at 08:08

## 2020-10-24 RX ADMIN — LACTULOSE 20 GRAM(S): 10 SOLUTION ORAL at 17:26

## 2020-10-24 RX ADMIN — NYSTATIN CREAM 1 APPLICATION(S): 100000 CREAM TOPICAL at 08:08

## 2020-10-24 RX ADMIN — Medication 3 MILLIGRAM(S): at 23:59

## 2020-10-24 RX ADMIN — NYSTATIN CREAM 1 APPLICATION(S): 100000 CREAM TOPICAL at 20:55

## 2020-10-24 RX ADMIN — Medication 3 MILLIGRAM(S): at 23:01

## 2020-10-24 RX ADMIN — PANTOPRAZOLE SODIUM 40 MILLIGRAM(S): 20 TABLET, DELAYED RELEASE ORAL at 20:55

## 2020-10-24 RX ADMIN — Medication 1 GRAM(S): at 06:31

## 2020-10-24 RX ADMIN — Medication 1 GRAM(S): at 11:47

## 2020-10-24 RX ADMIN — CEFTRIAXONE 100 MILLIGRAM(S): 500 INJECTION, POWDER, FOR SOLUTION INTRAMUSCULAR; INTRAVENOUS at 21:00

## 2020-10-24 NOTE — PROGRESS NOTE ADULT - SUBJECTIVE AND OBJECTIVE BOX
[facility] Division of Hospital Medicine  Gamal NOLASCOGeorges Reyes  Pager:[phone]    Patient is a 60y old  Male who presents with a chief complaint of hepatic encephalopathy (24 Oct 2020 13:57)      SUBJECTIVE / OVERNIGHT EVENTS:    no acute complaints  denies any melena, last bowel movement with brown colored stool.     ADDITIONAL REVIEW OF SYSTEMS:    MEDICATIONS  (STANDING):  cefTRIAXone   IVPB 1000 milliGRAM(s) IV Intermittent every 24 hours  lactulose Syrup 20 Gram(s) Oral two times a day  melatonin 3 milliGRAM(s) Oral at bedtime  nystatin Powder 1 Application(s) Topical every 12 hours  pantoprazole  Injectable 40 milliGRAM(s) IV Push every 12 hours  rifAXIMin 550 milliGRAM(s) Oral two times a day  spironolactone 12.5 milliGRAM(s) Oral daily  sucralfate 1 Gram(s) Oral four times a day    MEDICATIONS  (PRN):  acetaminophen   Tablet .. 650 milliGRAM(s) Oral every 6 hours PRN Mild Pain (1 - 3), Moderate Pain (4 - 6)      T(C): 37 (10-24 @ 11:45), Max: 37 (10-24 @ 11:45)   HR: 94   BP: 100/57   RR: 20   SpO2: 94%    PHYSICAL EXAM:    CONSTITUTIONAL: NAD, well-developed, well-groomed  EYES: PERRLA; conjunctiva and sclera clear  ENMT: Moist oral mucosa, no pharyngeal injection or exudates; normal dentition  NECK: Supple, no palpable masses; no thyromegaly  RESPIRATORY: Normal respiratory effort; lungs are clear to auscultation bilaterally  CARDIOVASCULAR: Regular rate and rhythm, normal S1 and S2, no murmur/rub/gallop; 2+ lower extremity edema; Peripheral pulses are 2+ bilaterally  ABDOMEN: Nontender to palpation, normoactive bowel sounds, no rebound/guarding; No hepatosplenomegaly  MUSCULOSKELETAL:  Normal gait; no clubbing or cyanosis of digits; no joint swelling or tenderness to palpation  PSYCH: A+O to person, place, and time; affect appropriate  NEUROLOGY: CN 2-12 are intact and symmetric; no gross sensory deficits   SKIN: No rashes; no palpable lesions    I&O's Summary    24 Oct 2020 07:01  -  24 Oct 2020 19:00  --------------------------------------------------------  IN: 600 mL / OUT: 700 mL / NET: -100 mL        LABS:                        8.8    4.30  )-----------( 60       ( 24 Oct 2020 09:18 )             27.6     10-24    133<L>  |  102  |  16  ----------------------------<  100<H>  4.9   |  28  |  1.28    Ca    8.1<L>      24 Oct 2020 09:18  Phos  2.7     10-23  Mg     2.2     10-23    TPro  6.2  /  Alb  2.3<L>  /  TBili  3.9<H>  /  DBili  x   /  AST  33  /  ALT  20  /  AlkPhos  90  10-24    PT/INR - ( 24 Oct 2020 11:43 )   PT: 24.2 sec;   INR: 2.11 ratio                   Culture - Fungal, Body Fluid (collected 22 Oct 2020 14:55)  Source: Ascites Fl ascites-paracentesis  Preliminary Report (23 Oct 2020 08:40):    Testing in progress    Culture - Body Fluid with Gram Stain (collected 22 Oct 2020 14:55)  Source: Ascites Fl ascites-paracentesis  Gram Stain (prelim) (23 Oct 2020 12:57):    polymorphonuclear leukocytes    No organisms seen    by cytocentrifuge  Preliminary Report (24 Oct 2020 10:03):    No growth      CAPILLARY BLOOD GLUCOSE          RADIOLOGY & ADDITIONAL TESTS:  Results Reviewed:   Imaging Personally Reviewed:  Electrocardiogram Personally Reviewed:    COORDINATION OF CARE:  Care Discussed with Consultants/Other Providers [Y/N]:  Prior or Outpatient Records Reviewed [Y/N]:

## 2020-10-24 NOTE — PROGRESS NOTE ADULT - ASSESSMENT
59M morbidly obese male w/ PMHx of Hepatic Cirrhosis, ascites, Thrombocytopenia, CHF, HTN, HLD, recent admx for Hepatic Encephalopathy, noncompliant with lactulose who was admitted to OSH  for hepatic encephalopathy, acute blood lose anemia (?variceal bleed, GI ulcer) and possible SBP. He was transferred to [facility] for endoscopy since he is high risk for planned procedure. He is also here for evaluation by Liver Transplant team.

## 2020-10-24 NOTE — CONSULT NOTE ADULT - SUBJECTIVE AND OBJECTIVE BOX
Chief Complaint:  Patient is a 60y old  Male who presents with a chief complaint of hepatic encephalopathy (23 Oct 2020 18:32)      HPI:    Allergies:  No Known Allergies      Home Medications:    Hospital Medications:  acetaminophen   Tablet .. 650 milliGRAM(s) Oral every 6 hours PRN  lactulose Syrup 20 Gram(s) Oral two times a day  melatonin 3 milliGRAM(s) Oral at bedtime  nystatin Powder 1 Application(s) Topical every 12 hours  pantoprazole  Injectable 40 milliGRAM(s) IV Push every 12 hours  rifAXIMin 550 milliGRAM(s) Oral two times a day  spironolactone 12.5 milliGRAM(s) Oral daily  sucralfate 1 Gram(s) Oral four times a day      PMHX/PSHX:  HLD (hyperlipidemia)    Hepatic cirrhosis    CHF (congestive heart failure)    Obese    Hyperlipidemia    Pneumonia    CHF (congestive heart failure)    History of chest tube placement    No significant past surgical history        Family history:  No pertinent family history in first degree relatives        Social History:     ROS:     General:  No weight loss, fevers, chills, night sweats, fatigue  Eyes:  No vision changes, no yellowing of eyes   ENT:  No throat pain, runny nose  CV:  No chest pain, palpitations  Resp:  No SOB, cough, wheezing  GI:  See HPI  :  No burning with urination, no hematuria   Muscle:  No muscle pain, weakness  Neuro:  No numbness/tingling, memory problems  Psych:  No fatigue, insomnia, mood problems  Heme:  No easy bruisability  Skin:  No rash, itching       PHYSICAL EXAM:     GENERAL:  Appears stated age, well-groomed, well-nourished, no distress  HEENT:  NC/AT,  conjunctivae clear and pink,  no JVD  CHEST:  Full & symmetric excursion, no increased effort, breath sounds clear  HEART:  Regular rhythm, S1, S2, no murmur/rub/S3/S4, no abdominal bruit, no edema  ABDOMEN:  Soft, non-tender, non-distended, normoactive bowel sounds,  no masses ,  EXTREMITIES:  no cyanosis,clubbing or edema  SKIN:  No rash/erythema/ecchymoses/petechiae/wounds/abscess/warm/dry  NEURO:  Alert, oriented    Vital Signs:  Vital Signs Last 24 Hrs  T(C): 37 (24 Oct 2020 11:45), Max: 37.2 (23 Oct 2020 17:00)  T(F): 98.6 (24 Oct 2020 11:45), Max: 99 (23 Oct 2020 17:00)  HR: 94 (24 Oct 2020 11:45) (58 - 126)  BP: 100/57 (24 Oct 2020 11:45) (96/60 - 119/66)  BP(mean): 83 (23 Oct 2020 18:32) (83 - 83)  RR: 20 (24 Oct 2020 11:45) (18 - 20)  SpO2: 94% (24 Oct 2020 11:45) (94% - 98%)  Daily     Daily     LABS:                        8.8    4.30  )-----------( 60       ( 24 Oct 2020 09:18 )             27.6     10-24    133<L>  |  102  |  16  ----------------------------<  100<H>  4.9   |  28  |  1.28    Ca    8.1<L>      24 Oct 2020 09:18  Phos  2.7     10-23  Mg     2.2     10-23    TPro  6.2  /  Alb  2.3<L>  /  TBili  3.9<H>  /  DBili  x   /  AST  33  /  ALT  20  /  AlkPhos  90  10-24    LIVER FUNCTIONS - ( 24 Oct 2020 09:18 )  Alb: 2.3 g/dL / Pro: 6.2 g/dL / ALK PHOS: 90 U/L / ALT: 20 U/L / AST: 33 U/L / GGT: x           PT/INR - ( 24 Oct 2020 11:43 )   PT: 24.2 sec;   INR: 2.11 ratio                 Imaging:             Chief Complaint:  Patient is a 60y old  Male who presents with a chief complaint of hepatic encephalopathy (23 Oct 2020 18:32)      HPI: 59M morbidly obese male with Hepatic Cirrhosis 2/2 EtOH, ascites, Thrombocytopenia, CHF, HTN, HLD, recent admx for Hepatic Encephalopathy, noncompliant with lactulose who was admitted to [facility]  10/14 - 10/23 for hepatic encephalopathy, acute blood lose anemia with hematochezia and melena; and SBP with ascitic cultures +for Ecoli. He presented on 10/13 with encephalopathy, had 2L removed in ED on that day with negative cell count however cultures grew Ecoli. He was treated with ceftriaxone. Course was c/b melena with drop in hgb from 7.5 to 5.6. Patient was transferred to ICU and was on octreotide, PPI drips; given total 6U prbcs, 2U ffp, 1u platelets, kcentra, ddavp and vit K. EGD was originally planned however cancelled on 10/22 as patient had no further overt bleeding with stable hgb. He is s/p US guided paracentesis 10/22 with removal of 5.7L. Ascites fluid culture NGTD.  Patient was transferred to [facility] for evaluation by Hepatology/Liver transplant service as well as for the need for endoscopy.  Denies fever/chills, cp, sob, abd pain. Also denies melena. Last bm he says was yesterday, brown in color. He reports compliance with lactulose outpatient. He reports he has never had an EGD before.     Allergies:  No Known Allergies      Home Medications:    Hospital Medications:  acetaminophen   Tablet .. 650 milliGRAM(s) Oral every 6 hours PRN  lactulose Syrup 20 Gram(s) Oral two times a day  melatonin 3 milliGRAM(s) Oral at bedtime  nystatin Powder 1 Application(s) Topical every 12 hours  pantoprazole  Injectable 40 milliGRAM(s) IV Push every 12 hours  rifAXIMin 550 milliGRAM(s) Oral two times a day  spironolactone 12.5 milliGRAM(s) Oral daily  sucralfate 1 Gram(s) Oral four times a day      PMHX/PSHX:  HLD (hyperlipidemia)    Hepatic cirrhosis    CHF (congestive heart failure)    Obese    Hyperlipidemia    Pneumonia    CHF (congestive heart failure)    History of chest tube placement    No significant past surgical history        Family history:  No pertinent family history in first degree relatives        Social History:     ROS:     General:  No weight loss, fevers, chills, night sweats, fatigue  Eyes:  No vision changes, no yellowing of eyes   ENT:  No throat pain, runny nose  CV:  No chest pain, palpitations  Resp:  No SOB, cough, wheezing  GI:  See HPI  :  No burning with urination, no hematuria   Muscle:  No muscle pain, weakness  Neuro:  No numbness/tingling, memory problems  Psych:  No fatigue, insomnia, mood problems  Heme:  No easy bruisability  Skin:  No rash, itching       PHYSICAL EXAM:     GENERAL: drowsy but arousable and answering questions appropriately, obese  HEENT:  NC/AT,  conjunctivae clear and pink  CHEST:  Full & symmetric excursion, no increased effort, breath sounds clear  HEART:  Regular rhythm, S1, S2, no murmur/rub/S3/S4, no abdominal bruit, no edema  ABDOMEN:  Soft, non-tender, distended, normoactive bowel sounds,  no masses  EXTREMITIES:  no cyanosis, clubbing or edema  SKIN:  No rash/erythema  NEURO:  Alert, orientedx3    Vital Signs:  Vital Signs Last 24 Hrs  T(C): 37 (24 Oct 2020 11:45), Max: 37.2 (23 Oct 2020 17:00)  T(F): 98.6 (24 Oct 2020 11:45), Max: 99 (23 Oct 2020 17:00)  HR: 94 (24 Oct 2020 11:45) (58 - 126)  BP: 100/57 (24 Oct 2020 11:45) (96/60 - 119/66)  BP(mean): 83 (23 Oct 2020 18:32) (83 - 83)  RR: 20 (24 Oct 2020 11:45) (18 - 20)  SpO2: 94% (24 Oct 2020 11:45) (94% - 98%)  Daily     Daily     LABS:                        8.8    4.30  )-----------( 60       ( 24 Oct 2020 09:18 )             27.6     10-24    133<L>  |  102  |  16  ----------------------------<  100<H>  4.9   |  28  |  1.28    Ca    8.1<L>      24 Oct 2020 09:18  Phos  2.7     10-23  Mg     2.2     10-23    TPro  6.2  /  Alb  2.3<L>  /  TBili  3.9<H>  /  DBili  x   /  AST  33  /  ALT  20  /  AlkPhos  90  10-24    LIVER FUNCTIONS - ( 24 Oct 2020 09:18 )  Alb: 2.3 g/dL / Pro: 6.2 g/dL / ALK PHOS: 90 U/L / ALT: 20 U/L / AST: 33 U/L / GGT: x           PT/INR - ( 24 Oct 2020 11:43 )   PT: 24.2 sec;   INR: 2.11 ratio                 Imaging:  < from: CT Abdomen and Pelvis w/ IV Cont (10.14.20 @ 02:02) >  FINDINGS:    Portions of the left abdomen and pelvis are excluded from this can due to body habitus. Patient's arms overlying the upper abdomen and lower chest produce local streak artifact and degrading image quality.    LOWER CHEST: Moderate left greater than right pleural effusions withcompressive atelectasis again noted.. 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    LIVER/GALLBLADDER: Shrunken liver, in keeping with known history of cirrhotic morphology. Moderate perihepatic ascites. Gallstones are noted.    PANCREAS: No pancreatic ductal dilatation or peripancreatic fluid collection.    SPLEEN: Marked splenomegaly measuring up to 17 cm in maximal dimension.    KIDNEYS: No hydronephrosis. Subcentimeter hypoenhancing focus in the left kidney, too small to characterize.    ADRENAL GLANDS: Unremarkable.    BOWEL: Normal appendix. No bowel obstruction or free intraperitoneal air.    URINARY BLADDER: Mildly distended.    PELVIC ORGANS: Unremarkable.    LYMPH NODES: No mesenteric or para-aortic lymphadenopathy.    BONES/SOFT TISSUES: Subcutaneous edema in the ventral abdominal wall and bilateral flank soft tissues may reflect changes from anasarca.    VASCULAR: Upper abdominal ascites noted.    IMPRESSION: Limited exam due to portions of theleft abdomen pelvis excluded from the study.    Redemonstrated changes from portal hypertension with cirrhosis, splenomegaly, upper abdominal varices, moderate abdominopelvic ascites, pleural effusions, and anasarca.    < end of copied text >

## 2020-10-24 NOTE — PROGRESS NOTE ADULT - PROBLEM SELECTOR PLAN 1
- AMS resolved  - c/w Rifaximin 550mg po bid  - c/w Lactulose 20g po bid  - c/w Spironolactone 12.5mg po daily  - monitor LFTs  - Hepatology nayeli appreciated

## 2020-10-24 NOTE — CONSULT NOTE ADULT - ASSESSMENT
59M morbidly obese male with Hepatic Cirrhosis 2/2 EtOH, ascites, Thrombocytopenia, CHF, HTN, HLD, recent admx for Hepatic Encephalopathy, noncompliant with lactulose who was admitted to [facility]  10/14 - 10/23 for hepatic encephalopathy, acute blood lose anemia with hematochezia and melena; and SBP with ascitic cultures +for Ecoli.     Impression:  #Decompensated EtOH cirrhosis 2/2 GIB and SBP  - varices: present on CT, patient denies prior EGDs  - HE: present on admission to [facility], currently grade I on exam  - HCC: no focal lesions on CT  -+ascites s/p para x2, last on 10/22 with 5.7L removed, +SBP on 10/14 with ascitic cultures +Ecoli  #GIB - per chart review had melena at [facility] received 6U prbcs, 2U ffp, 1U platelets; now with stable hemoglobin and no reports of further melena, concern for variceal bleed vs ulcer vs portal HTN gastropathy vs angioectasia vs dieulafoy  #CHF - last TTE 2019 with EF 65%, normal RV function, normal pulmonary pressures      Recommendation:   - c/w lactulose, would increase to 20g q8  - c/w rifaximin 550 BID  - NPO at midnight, EGD tomorrow to assess for varices and bleeding source  - given +SBP and GIB would restart ceftriaxone 1g daily and will eventually need to switch to PO ppx  - given large volume ascites will likely need initiation of diuretic regimen with lasix/spironolactone as well however will hold off until after EGD  - daily CBC, CMP, coags      Marlen Ventura PGY-4  Gastroenterology Fellow  Pager #[phone] or [phone]  Please page on-call Fellow on weekends/after 5 pm on weekdays   Please call answering service for on-call GI fellow ([phone]) after 5pm and before 8am, and on weekends.

## 2020-10-25 DIAGNOSIS — N17.9 ACUTE KIDNEY FAILURE, UNSPECIFIED: ICD-10-CM

## 2020-10-25 LAB
ALBUMIN SERPL ELPH-MCNC: 2.3 G/DL — LOW (ref 3.3–5)
ALP SERPL-CCNC: 90 U/L — SIGNIFICANT CHANGE UP (ref 40–120)
ALT FLD-CCNC: 18 U/L — SIGNIFICANT CHANGE UP (ref 10–45)
ANION GAP SERPL CALC-SCNC: 6 MMOL/L — SIGNIFICANT CHANGE UP (ref 5–17)
AST SERPL-CCNC: 30 U/L — SIGNIFICANT CHANGE UP (ref 10–40)
BILIRUB SERPL-MCNC: 3.1 MG/DL — HIGH (ref 0.2–1.2)
BLD GP AB SCN SERPL QL: NEGATIVE — SIGNIFICANT CHANGE UP
BUN SERPL-MCNC: 20 MG/DL — SIGNIFICANT CHANGE UP (ref 7–23)
CALCIUM SERPL-MCNC: 8.2 MG/DL — LOW (ref 8.4–10.5)
CHLORIDE SERPL-SCNC: 101 MMOL/L — SIGNIFICANT CHANGE UP (ref 96–108)
CHLORIDE UR-SCNC: 103 MMOL/L — SIGNIFICANT CHANGE UP
CO2 SERPL-SCNC: 26 MMOL/L — SIGNIFICANT CHANGE UP (ref 22–31)
CREAT ?TM UR-MCNC: <2 MG/DL — SIGNIFICANT CHANGE UP
CREAT SERPL-MCNC: 1.55 MG/DL — HIGH (ref 0.5–1.3)
GLUCOSE SERPL-MCNC: 85 MG/DL — SIGNIFICANT CHANGE UP (ref 70–99)
HCT VFR BLD CALC: 26.8 % — LOW (ref 39–50)
HGB BLD-MCNC: 8.5 G/DL — LOW (ref 13–17)
INR BLD: 2.01 RATIO — HIGH (ref 0.88–1.16)
MCHC RBC-ENTMCNC: 29.8 PG — SIGNIFICANT CHANGE UP (ref 27–34)
MCHC RBC-ENTMCNC: 31.7 GM/DL — LOW (ref 32–36)
MCV RBC AUTO: 94 FL — SIGNIFICANT CHANGE UP (ref 80–100)
NRBC # BLD: 0 /100 WBCS — SIGNIFICANT CHANGE UP (ref 0–0)
PLATELET # BLD AUTO: 53 K/UL — LOW (ref 150–400)
POTASSIUM SERPL-MCNC: 5.2 MMOL/L — SIGNIFICANT CHANGE UP (ref 3.5–5.3)
POTASSIUM SERPL-SCNC: 5.2 MMOL/L — SIGNIFICANT CHANGE UP (ref 3.5–5.3)
POTASSIUM UR-SCNC: <5 MMOL/L — SIGNIFICANT CHANGE UP
PROT SERPL-MCNC: 6.3 G/DL — SIGNIFICANT CHANGE UP (ref 6–8.3)
PROTHROM AB SERPL-ACNC: 22.9 SEC — HIGH (ref 10.6–13.6)
RBC # BLD: 2.85 M/UL — LOW (ref 4.2–5.8)
RBC # FLD: 19.9 % — HIGH (ref 10.3–14.5)
RH IG SCN BLD-IMP: NEGATIVE — SIGNIFICANT CHANGE UP
SARS-COV-2 IGG SERPL QL IA: NEGATIVE — SIGNIFICANT CHANGE UP
SARS-COV-2 IGM SERPL IA-ACNC: 0.09 INDEX — SIGNIFICANT CHANGE UP
SARS-COV-2 RNA SPEC QL NAA+PROBE: SIGNIFICANT CHANGE UP
SODIUM SERPL-SCNC: 133 MMOL/L — LOW (ref 135–145)
SODIUM UR-SCNC: 131 MMOL/L — SIGNIFICANT CHANGE UP
WBC # BLD: 3.45 K/UL — LOW (ref 3.8–10.5)
WBC # FLD AUTO: 3.45 K/UL — LOW (ref 3.8–10.5)

## 2020-10-25 PROCEDURE — 43235 EGD DIAGNOSTIC BRUSH WASH: CPT | Mod: GC

## 2020-10-25 PROCEDURE — 99223 1ST HOSP IP/OBS HIGH 75: CPT | Mod: GC

## 2020-10-25 PROCEDURE — 99233 SBSQ HOSP IP/OBS HIGH 50: CPT

## 2020-10-25 RX ORDER — SOD SULF/SODIUM/NAHCO3/KCL/PEG
4000 SOLUTION, RECONSTITUTED, ORAL ORAL ONCE
Refills: 0 | Status: COMPLETED | OUTPATIENT
Start: 2020-10-25 | End: 2020-10-25

## 2020-10-25 RX ORDER — ACETAMINOPHEN 500 MG
1000 TABLET ORAL ONCE
Refills: 0 | Status: DISCONTINUED | OUTPATIENT
Start: 2020-10-25 | End: 2020-10-25

## 2020-10-25 RX ORDER — LACTULOSE 10 G/15ML
20 SOLUTION ORAL EVERY 8 HOURS
Refills: 0 | Status: DISCONTINUED | OUTPATIENT
Start: 2020-10-25 | End: 2020-11-18

## 2020-10-25 RX ORDER — ALBUMIN HUMAN 25 %
100 VIAL (ML) INTRAVENOUS EVERY 6 HOURS
Refills: 0 | Status: COMPLETED | OUTPATIENT
Start: 2020-10-25 | End: 2020-10-27

## 2020-10-25 RX ORDER — SODIUM CHLORIDE 9 MG/ML
1000 INJECTION, SOLUTION INTRAVENOUS
Refills: 0 | Status: DISCONTINUED | OUTPATIENT
Start: 2020-10-25 | End: 2020-10-25

## 2020-10-25 RX ORDER — LACTULOSE 10 G/15ML
20 SOLUTION ORAL EVERY 8 HOURS
Refills: 0 | Status: DISCONTINUED | OUTPATIENT
Start: 2020-10-25 | End: 2020-10-25

## 2020-10-25 RX ORDER — FUROSEMIDE 40 MG
40 TABLET ORAL ONCE
Refills: 0 | Status: COMPLETED | OUTPATIENT
Start: 2020-10-25 | End: 2020-10-25

## 2020-10-25 RX ADMIN — SODIUM CHLORIDE 75 MILLILITER(S): 9 INJECTION, SOLUTION INTRAVENOUS at 12:40

## 2020-10-25 RX ADMIN — Medication 50 MILLILITER(S): at 18:30

## 2020-10-25 RX ADMIN — NYSTATIN CREAM 1 APPLICATION(S): 100000 CREAM TOPICAL at 20:30

## 2020-10-25 RX ADMIN — Medication 4000 MILLILITER(S): at 17:34

## 2020-10-25 RX ADMIN — CEFTRIAXONE 100 MILLIGRAM(S): 500 INJECTION, POWDER, FOR SOLUTION INTRAMUSCULAR; INTRAVENOUS at 17:33

## 2020-10-25 RX ADMIN — Medication 10 MILLIGRAM(S): at 20:32

## 2020-10-25 RX ADMIN — Medication 1 GRAM(S): at 05:44

## 2020-10-25 RX ADMIN — SPIRONOLACTONE 12.5 MILLIGRAM(S): 25 TABLET, FILM COATED ORAL at 05:45

## 2020-10-25 RX ADMIN — PANTOPRAZOLE SODIUM 40 MILLIGRAM(S): 20 TABLET, DELAYED RELEASE ORAL at 08:41

## 2020-10-25 RX ADMIN — Medication 40 MILLIGRAM(S): at 20:29

## 2020-10-25 RX ADMIN — PANTOPRAZOLE SODIUM 40 MILLIGRAM(S): 20 TABLET, DELAYED RELEASE ORAL at 20:30

## 2020-10-25 RX ADMIN — LACTULOSE 20 GRAM(S): 10 SOLUTION ORAL at 22:28

## 2020-10-25 RX ADMIN — NYSTATIN CREAM 1 APPLICATION(S): 100000 CREAM TOPICAL at 08:42

## 2020-10-25 RX ADMIN — Medication 1 GRAM(S): at 17:34

## 2020-10-25 RX ADMIN — LACTULOSE 20 GRAM(S): 10 SOLUTION ORAL at 15:45

## 2020-10-25 NOTE — PROGRESS NOTE ADULT - PROBLEM SELECTOR PLAN 4
Cr is uptrending, presumed to be from intravascular depletion.  will give albumin for intravascular repletion.

## 2020-10-25 NOTE — PROGRESS NOTE ADULT - ATTENDING COMMENTS
Patient was seen and examined with hepatology team on rounds on 10/25/2020 for a new consultation.   A 59 year-old man with morbid obesity, CHF, HTN, HLD, alcohol use disorder, Hepatic Cirrhosis complicated by ascites, Thrombocytopenia, PSE noncompliant with lactulose and acute blood loss anemia with hematochezia and melena s/p BT, FFP and PLT transfusion and E. coli SBP, transferred from [facility] was seen for higher level of care regarding decompensated liver disease, SBP,  GI bleed and Amie.   Patient remained hemodynamically stable, Awake, alert and oriented x3,  hemodynamically stable , EGD today was unrevealing.   Would recommend-workup albumin infusion, Darcy, FENa, FEurea, continue rifaximin, titrate lactulose according to BMs and mental status, continue antibiotics due to recent acute GI bleeding and SBP, trend liver tests, Cr, and INR, and CBC, Colonoscopy tomorrow, therapeutic paracentesis and initiation of diuretics after SBP treatment completed, continue care per primary team.
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
## 2020-10-25 NOTE — PROGRESS NOTE ADULT - ASSESSMENT
59M morbidly obese male with Hepatic Cirrhosis 2/2 EtOH, ascites, Thrombocytopenia, CHF, HTN, HLD, recent admx for Hepatic Encephalopathy, noncompliant with lactulose who was admitted to [facility]  10/14 - 10/23 for hepatic encephalopathy, acute blood lose anemia with hematochezia and melena; and SBP with ascitic cultures +for Ecoli.     Impression:  #Decompensated EtOH cirrhosis 2/2 GIB and SBP  - varices: present on CT, patient denies prior EGDs  - HE: present on admission to [facility], currently grade I on exam  - HCC: no focal lesions on CT  -+ascites s/p para x2, last on 10/22 with 5.7L removed, +SBP on 10/14 with ascitic cultures +Ecoli  #GIB - per chart review had melena at [facility] received 6U prbcs, 2U ffp, 1U platelets; now with stable hemoglobin and no reports of further melena, concern for variceal bleed vs ulcer vs portal HTN gastropathy vs angioectasia vs dieulafoy  #CHF - last TTE 2019 with EF 65%, normal RV function, normal pulmonary pressures      Recommendation:   - EGD neg for varices, will need colonoscopy tomorrow  - please keep on clear liquid diet today, NPO at midnight  - golytely prep ordered with dulcolax  - c/w lactulose, titrate to 3-4BM/day  - c/w rifaximin 550 BID  - given +SBP and GIB would restart ceftriaxone 1g daily and will eventually need to switch to PO ppx  - given large volume ascites will likely need initiation of diuretic regimen with lasix/spironolactone as well however will hold off until after endoscopic evaluations  - daily CBC, CMP, coags      Marlen Ventura PGY-4  Gastroenterology Fellow  Pager #[phone] or [phone]  Please page on-call Fellow on weekends/after 5 pm on weekdays   Please call answering service for on-call GI fellow ([phone]) after 5pm and before 8am, and on weekends. 59M morbidly obese male with Hepatic Cirrhosis 2/2 EtOH, ascites, Thrombocytopenia, CHF, HTN, HLD, recent admx for Hepatic Encephalopathy, noncompliant with lactulose who was admitted to [facility]  10/14 - 10/23 for hepatic encephalopathy, acute blood lose anemia with hematochezia and melena; and SBP with ascitic cultures +for Ecoli.     Impression:  #Decompensated EtOH cirrhosis 2/2 GIB and SBP  - varices: present on CT, patient denies prior EGDs  - HE: present on admission to [facility], currently grade I on exam  - HCC: no focal lesions on CT  -+ascites s/p para x2, last on 10/22 with 5.7L removed, +SBP on 10/14 with ascitic cultures +Ecoli  #GIB - per chart review had melena at [facility] received 6U prbcs, 2U ffp, 1U platelets; now with stable hemoglobin and no reports of further melena, concern for variceal bleed vs ulcer vs portal HTN gastropathy vs angioectasia vs dieulafoy  #CHF - last TTE 2019 with EF 65%, normal RV function, normal pulmonary pressures  #MONI      Recommendation:   - EGD neg for varices, will need colonoscopy tomorrow  - please keep on clear liquid diet today, NPO at midnight  - golytely prep ordered with dulcolax  - start albumin 25% 100cc q6 for MONI  - please send urine electrolytes, urea, creatinine  - c/w lactulose, titrate to 3-4BM/day  - c/w rifaximin 550 BID  - given +SBP and GIB would restart ceftriaxone 1g daily and will eventually need to switch to PO ppx  - given large volume ascites will likely need initiation of diuretic regimen with lasix/spironolactone as well however will hold off until after endoscopic evaluations  - daily CBC, CMP, coags      Marlen Ventura PGY-4  Gastroenterology Fellow  Pager #[phone] or [phone]  Please page on-call Fellow on weekends/after 5 pm on weekdays   Please call answering service for on-call GI fellow ([phone]) after 5pm and before 8am, and on weekends.

## 2020-10-25 NOTE — PROGRESS NOTE ADULT - SUBJECTIVE AND OBJECTIVE BOX
Chief Complaint:  Patient is a 60y old  Male who presents with a chief complaint of hepatic encephalopathy (24 Oct 2020 18:59)      Interval Events: no acute events overnight  - patient seen this morning, awake, alert and oriented  - reports some blood on carlos on chair however no bloody stools or melena  - no n/v, abd pain      Hospital Medications:  acetaminophen   Tablet .. 650 milliGRAM(s) Oral every 6 hours PRN  cefTRIAXone   IVPB 1000 milliGRAM(s) IV Intermittent every 24 hours  lactulose Syrup 20 Gram(s) Oral every 8 hours  melatonin 3 milliGRAM(s) Oral at bedtime  nystatin Powder 1 Application(s) Topical every 12 hours  pantoprazole  Injectable 40 milliGRAM(s) IV Push every 12 hours  rifAXIMin 550 milliGRAM(s) Oral two times a day  spironolactone 12.5 milliGRAM(s) Oral daily  sucralfate 1 Gram(s) Oral four times a day      PMHX/PSHX:  HLD (hyperlipidemia)    Hepatic cirrhosis    CHF (congestive heart failure)    Obese    Hyperlipidemia    Pneumonia    CHF (congestive heart failure)    History of chest tube placement    No significant past surgical history            ROS:     General:  No weight loss, fevers, chills, night sweats, fatigue   Eyes:  No vision changes  ENT:  No sore throat, pain, runny nose  CV:  No chest pain, palpitations, dizziness   Resp:  No SOB, cough, wheezing  GI:  See HPI  :  No burning with urination, hematuria  Muscle:  No pain, weakness  Neuro:  No weakness/tingling, memory problems  Psych:  No fatigue, insomnia, mood problems, depression  Heme:  No easy bruisability  Skin:  No rash, edema      PHYSICAL EXAM:   GENERAL: no distress, obese  HEENT:  NC/AT,  conjunctivae clear and pink  CHEST:  Full & symmetric excursion, no increased effort, breath sounds clear  HEART:  Regular rhythm, S1, S2, no murmur/rub/S3/S4, no abdominal bruit, no edema  ABDOMEN:  Soft, non-tender, distended, normoactive bowel sounds,  no masses  EXTREMITIES:  no cyanosis, clubbing or edema  SKIN:  No rash/erythema  NEURO:  Alert, orientedx3    Vital Signs:  Vital Signs Last 24 Hrs  T(C): 36.6 (25 Oct 2020 09:56), Max: 36.8 (25 Oct 2020 04:55)  T(F): 97.8 (25 Oct 2020 09:56), Max: 98.2 (25 Oct 2020 04:55)  HR: 74 (25 Oct 2020 09:56) (68 - 74)  BP: 101/58 (25 Oct 2020 09:56) (101/58 - 110/70)  BP(mean): --  RR: 18 (25 Oct 2020 09:56) (18 - 19)  SpO2: 99% (25 Oct 2020 09:56) (96% - 99%)  Daily     Daily Weight in k.5 (25 Oct 2020 09:26)    LABS:                        8.5    3.45  )-----------( 53       ( 25 Oct 2020 06:51 )             26.8     10-25    133<L>  |  101  |  20  ----------------------------<  85  5.2   |  26  |  1.55<H>    Ca    8.2<L>      25 Oct 2020 06:51    TPro  6.3  /  Alb  2.3<L>  /  TBili  3.1<H>  /  DBili  x   /  AST  30  /  ALT  18  /  AlkPhos  90  10-25    LIVER FUNCTIONS - ( 25 Oct 2020 06:51 )  Alb: 2.3 g/dL / Pro: 6.3 g/dL / ALK PHOS: 90 U/L / ALT: 18 U/L / AST: 30 U/L / GGT: x           PT/INR - ( 25 Oct 2020 08:17 )   PT: 22.9 sec;   INR: 2.01 ratio                 Imaging:

## 2020-10-25 NOTE — PROGRESS NOTE ADULT - SUBJECTIVE AND OBJECTIVE BOX
[facility] Division of Hospital Medicine  Gamal NOLASCOGeorges Reyes  Pager:[phone]    Patient is a 60y old  Male who presents with a chief complaint of hepatic encephalopathy (25 Oct 2020 11:47)      SUBJECTIVE / OVERNIGHT EVENTS:    no new complaints  no events overnight  denies any melena    ADDITIONAL REVIEW OF SYSTEMS:    MEDICATIONS  (STANDING):  albumin human 25% IVPB 100 milliLiter(s) IV Intermittent every 6 hours  bisacodyl 10 milliGRAM(s) Oral once  cefTRIAXone   IVPB 1000 milliGRAM(s) IV Intermittent every 24 hours  lactulose Syrup 20 Gram(s) Oral every 8 hours  melatonin 3 milliGRAM(s) Oral at bedtime  nystatin Powder 1 Application(s) Topical every 12 hours  pantoprazole  Injectable 40 milliGRAM(s) IV Push every 12 hours  polyethylene glycol/electrolyte Solution. 4000 milliLiter(s) Oral once  rifAXIMin 550 milliGRAM(s) Oral two times a day  spironolactone 12.5 milliGRAM(s) Oral daily  sucralfate 1 Gram(s) Oral four times a day    MEDICATIONS  (PRN):  acetaminophen   Tablet .. 650 milliGRAM(s) Oral every 6 hours PRN Mild Pain (1 - 3), Moderate Pain (4 - 6)      T(C): 36.9 (10-25 @ 13:00), Max: 36.9 (10-25 @ 13:00)   HR: 66   BP: 123/58   RR: 20   SpO2: 100%    PHYSICAL EXAM:    CONSTITUTIONAL: NAD, well-developed, well-groomed  ENMT: Moist oral mucosa, no pharyngeal injection or exudates; normal dentition  NECK: Supple, no palpable masses; no thyromegaly  RESPIRATORY: Normal respiratory effort; lungs are clear to auscultation bilaterally  CARDIOVASCULAR: Regular rate and rhythm, normal S1 and S2, no murmur/rub/gallop; 2+ lower extremity edema; Peripheral pulses are 2+ bilaterally  ABDOMEN: Nontender to palpation, normoactive bowel sounds, no rebound/guarding; No hepatosplenomegaly  MUSCULOSKELETAL:  Normal gait; no clubbing or cyanosis of digits; no joint swelling or tenderness to palpation  PSYCH: A+O to person, place, and time; affect appropriate  NEUROLOGY: CN 2-12 are intact and symmetric; no gross sensory deficits   SKIN: No rashes; no palpable lesions    I&O's Summary    24 Oct 2020 07:01  -  25 Oct 2020 07:00  --------------------------------------------------------  IN: 840 mL / OUT: 700 mL / NET: 140 mL    25 Oct 2020 07:01  -  25 Oct 2020 15:51  --------------------------------------------------------  IN: 50 mL / OUT: 100 mL / NET: -50 mL        LABS:                        8.5    3.45  )-----------( 53       ( 25 Oct 2020 06:51 )             26.8     10-25    133<L>  |  101  |  20  ----------------------------<  85  5.2   |  26  |  1.55<H>    Ca    8.2<L>      25 Oct 2020 06:51    TPro  6.3  /  Alb  2.3<L>  /  TBili  3.1<H>  /  DBili  x   /  AST  30  /  ALT  18  /  AlkPhos  90  10-25    PT/INR - ( 25 Oct 2020 08:17 )   PT: 22.9 sec;   INR: 2.01 ratio                   CAPILLARY BLOOD GLUCOSE          RADIOLOGY & ADDITIONAL TESTS:  Results Reviewed:   Imaging Personally Reviewed:  Electrocardiogram Personally Reviewed:    COORDINATION OF CARE:  Care Discussed with Consultants/Other Providers [Y/N]:  Prior or Outpatient Records Reviewed [Y/N]:

## 2020-10-26 DIAGNOSIS — D62 ACUTE POSTHEMORRHAGIC ANEMIA: ICD-10-CM

## 2020-10-26 DIAGNOSIS — Z91.14 PATIENT'S OTHER NONCOMPLIANCE WITH MEDICATION REGIMEN: ICD-10-CM

## 2020-10-26 DIAGNOSIS — J91.8 PLEURAL EFFUSION IN OTHER CONDITIONS CLASSIFIED ELSEWHERE: ICD-10-CM

## 2020-10-26 DIAGNOSIS — E78.5 HYPERLIPIDEMIA, UNSPECIFIED: ICD-10-CM

## 2020-10-26 DIAGNOSIS — K72.00 ACUTE AND SUBACUTE HEPATIC FAILURE WITHOUT COMA: ICD-10-CM

## 2020-10-26 DIAGNOSIS — Z66 DO NOT RESUSCITATE: ICD-10-CM

## 2020-10-26 DIAGNOSIS — D63.8 ANEMIA IN OTHER CHRONIC DISEASES CLASSIFIED ELSEWHERE: ICD-10-CM

## 2020-10-26 DIAGNOSIS — D61.818 OTHER PANCYTOPENIA: ICD-10-CM

## 2020-10-26 DIAGNOSIS — K92.2 GASTROINTESTINAL HEMORRHAGE, UNSPECIFIED: ICD-10-CM

## 2020-10-26 DIAGNOSIS — K76.6 PORTAL HYPERTENSION: ICD-10-CM

## 2020-10-26 DIAGNOSIS — B96.20 UNSPECIFIED ESCHERICHIA COLI [E. COLI] AS THE CAUSE OF DISEASES CLASSIFIED ELSEWHERE: ICD-10-CM

## 2020-10-26 DIAGNOSIS — N17.9 ACUTE KIDNEY FAILURE, UNSPECIFIED: ICD-10-CM

## 2020-10-26 DIAGNOSIS — E66.01 MORBID (SEVERE) OBESITY DUE TO EXCESS CALORIES: ICD-10-CM

## 2020-10-26 DIAGNOSIS — A41.51 SEPSIS DUE TO ESCHERICHIA COLI [E. COLI]: ICD-10-CM

## 2020-10-26 DIAGNOSIS — K25.4 CHRONIC OR UNSPECIFIED GASTRIC ULCER WITH HEMORRHAGE: ICD-10-CM

## 2020-10-26 DIAGNOSIS — I86.4 GASTRIC VARICES: ICD-10-CM

## 2020-10-26 DIAGNOSIS — K65.0 GENERALIZED (ACUTE) PERITONITIS: ICD-10-CM

## 2020-10-26 DIAGNOSIS — F10.10 ALCOHOL ABUSE, UNCOMPLICATED: ICD-10-CM

## 2020-10-26 DIAGNOSIS — D69.59 OTHER SECONDARY THROMBOCYTOPENIA: ICD-10-CM

## 2020-10-26 DIAGNOSIS — I50.32 CHRONIC DIASTOLIC (CONGESTIVE) HEART FAILURE: ICD-10-CM

## 2020-10-26 DIAGNOSIS — D68.9 COAGULATION DEFECT, UNSPECIFIED: ICD-10-CM

## 2020-10-26 DIAGNOSIS — K75.81 NONALCOHOLIC STEATOHEPATITIS (NASH): ICD-10-CM

## 2020-10-26 DIAGNOSIS — R18.8 OTHER ASCITES: ICD-10-CM

## 2020-10-26 DIAGNOSIS — R79.89 OTHER SPECIFIED ABNORMAL FINDINGS OF BLOOD CHEMISTRY: ICD-10-CM

## 2020-10-26 DIAGNOSIS — I80.8 PHLEBITIS AND THROMBOPHLEBITIS OF OTHER SITES: ICD-10-CM

## 2020-10-26 DIAGNOSIS — G93.41 METABOLIC ENCEPHALOPATHY: ICD-10-CM

## 2020-10-26 DIAGNOSIS — K70.31 ALCOHOLIC CIRRHOSIS OF LIVER WITH ASCITES: ICD-10-CM

## 2020-10-26 DIAGNOSIS — E87.2 ACIDOSIS: ICD-10-CM

## 2020-10-26 DIAGNOSIS — R09.02 HYPOXEMIA: ICD-10-CM

## 2020-10-26 DIAGNOSIS — I11.0 HYPERTENSIVE HEART DISEASE WITH HEART FAILURE: ICD-10-CM

## 2020-10-26 DIAGNOSIS — K70.40 ALCOHOLIC HEPATIC FAILURE WITHOUT COMA: ICD-10-CM

## 2020-10-26 LAB
ALBUMIN SERPL ELPH-MCNC: 2.7 G/DL — LOW (ref 3.3–5)
ALP SERPL-CCNC: 83 U/L — SIGNIFICANT CHANGE UP (ref 40–120)
ALT FLD-CCNC: 20 U/L — SIGNIFICANT CHANGE UP (ref 10–45)
ANION GAP SERPL CALC-SCNC: 10 MMOL/L — SIGNIFICANT CHANGE UP (ref 5–17)
AST SERPL-CCNC: 32 U/L — SIGNIFICANT CHANGE UP (ref 10–40)
BILIRUB SERPL-MCNC: 2.9 MG/DL — HIGH (ref 0.2–1.2)
BUN SERPL-MCNC: 19 MG/DL — SIGNIFICANT CHANGE UP (ref 7–23)
CALCIUM SERPL-MCNC: 8.5 MG/DL — SIGNIFICANT CHANGE UP (ref 8.4–10.5)
CHLORIDE SERPL-SCNC: 100 MMOL/L — SIGNIFICANT CHANGE UP (ref 96–108)
CO2 SERPL-SCNC: 23 MMOL/L — SIGNIFICANT CHANGE UP (ref 22–31)
CREAT SERPL-MCNC: 1.63 MG/DL — HIGH (ref 0.5–1.3)
GLUCOSE SERPL-MCNC: 77 MG/DL — SIGNIFICANT CHANGE UP (ref 70–99)
HCT VFR BLD CALC: 24.5 % — LOW (ref 39–50)
HGB BLD-MCNC: 7.9 G/DL — LOW (ref 13–17)
INR BLD: 1.98 RATIO — HIGH (ref 0.88–1.16)
MAGNESIUM SERPL-MCNC: 1.8 MG/DL — SIGNIFICANT CHANGE UP (ref 1.6–2.6)
MCHC RBC-ENTMCNC: 30.6 PG — SIGNIFICANT CHANGE UP (ref 27–34)
MCHC RBC-ENTMCNC: 32.2 GM/DL — SIGNIFICANT CHANGE UP (ref 32–36)
MCV RBC AUTO: 95 FL — SIGNIFICANT CHANGE UP (ref 80–100)
NRBC # BLD: 0 /100 WBCS — SIGNIFICANT CHANGE UP (ref 0–0)
PHOSPHATE SERPL-MCNC: 2.2 MG/DL — LOW (ref 2.5–4.5)
PLATELET # BLD AUTO: 55 K/UL — LOW (ref 150–400)
POTASSIUM SERPL-MCNC: 4.7 MMOL/L — SIGNIFICANT CHANGE UP (ref 3.5–5.3)
POTASSIUM SERPL-SCNC: 4.7 MMOL/L — SIGNIFICANT CHANGE UP (ref 3.5–5.3)
PROT SERPL-MCNC: 6.3 G/DL — SIGNIFICANT CHANGE UP (ref 6–8.3)
PROTHROM AB SERPL-ACNC: 22.6 SEC — HIGH (ref 10.6–13.6)
RBC # BLD: 2.58 M/UL — LOW (ref 4.2–5.8)
RBC # FLD: 20.2 % — HIGH (ref 10.3–14.5)
SODIUM SERPL-SCNC: 133 MMOL/L — LOW (ref 135–145)
UUN UR-MCNC: 20 MG/DL — SIGNIFICANT CHANGE UP
WBC # BLD: 3.35 K/UL — LOW (ref 3.8–10.5)
WBC # FLD AUTO: 3.35 K/UL — LOW (ref 3.8–10.5)

## 2020-10-26 PROCEDURE — 99233 SBSQ HOSP IP/OBS HIGH 50: CPT

## 2020-10-26 PROCEDURE — 99232 SBSQ HOSP IP/OBS MODERATE 35: CPT | Mod: GC

## 2020-10-26 PROCEDURE — 45378 DIAGNOSTIC COLONOSCOPY: CPT

## 2020-10-26 PROCEDURE — 99222 1ST HOSP IP/OBS MODERATE 55: CPT | Mod: GC

## 2020-10-26 RX ORDER — SODIUM CHLORIDE 9 MG/ML
1000 INJECTION INTRAMUSCULAR; INTRAVENOUS; SUBCUTANEOUS
Refills: 0 | Status: DISCONTINUED | OUTPATIENT
Start: 2020-10-26 | End: 2020-10-26

## 2020-10-26 RX ORDER — LACTULOSE 10 G/15ML
45 SOLUTION ORAL
Qty: 0 | Refills: 0 | DISCHARGE

## 2020-10-26 RX ORDER — FERROUS SULFATE 325(65) MG
325 TABLET ORAL DAILY
Refills: 0 | Status: DISCONTINUED | OUTPATIENT
Start: 2020-10-26 | End: 2020-11-18

## 2020-10-26 RX ORDER — ATORVASTATIN CALCIUM 80 MG/1
20 TABLET, FILM COATED ORAL AT BEDTIME
Refills: 0 | Status: DISCONTINUED | OUTPATIENT
Start: 2020-10-26 | End: 2020-11-18

## 2020-10-26 RX ORDER — LACTULOSE 10 G/15ML
15 SOLUTION ORAL
Qty: 0 | Refills: 0 | DISCHARGE

## 2020-10-26 RX ORDER — LANOLIN ALCOHOL/MO/W.PET/CERES
1 CREAM (GRAM) TOPICAL
Qty: 0 | Refills: 0 | DISCHARGE

## 2020-10-26 RX ADMIN — Medication 1 GRAM(S): at 06:21

## 2020-10-26 RX ADMIN — CEFTRIAXONE 100 MILLIGRAM(S): 500 INJECTION, POWDER, FOR SOLUTION INTRAMUSCULAR; INTRAVENOUS at 18:43

## 2020-10-26 RX ADMIN — Medication 50 MILLILITER(S): at 18:41

## 2020-10-26 RX ADMIN — Medication 1 GRAM(S): at 13:24

## 2020-10-26 RX ADMIN — Medication 50 MILLILITER(S): at 06:20

## 2020-10-26 RX ADMIN — Medication 1 GRAM(S): at 00:36

## 2020-10-26 RX ADMIN — ATORVASTATIN CALCIUM 20 MILLIGRAM(S): 80 TABLET, FILM COATED ORAL at 22:39

## 2020-10-26 RX ADMIN — Medication 50 MILLILITER(S): at 00:36

## 2020-10-26 RX ADMIN — Medication 1 GRAM(S): at 18:39

## 2020-10-26 RX ADMIN — NYSTATIN CREAM 1 APPLICATION(S): 100000 CREAM TOPICAL at 20:41

## 2020-10-26 RX ADMIN — NYSTATIN CREAM 1 APPLICATION(S): 100000 CREAM TOPICAL at 13:25

## 2020-10-26 RX ADMIN — LACTULOSE 20 GRAM(S): 10 SOLUTION ORAL at 22:39

## 2020-10-26 RX ADMIN — PANTOPRAZOLE SODIUM 40 MILLIGRAM(S): 20 TABLET, DELAYED RELEASE ORAL at 13:25

## 2020-10-26 RX ADMIN — LACTULOSE 20 GRAM(S): 10 SOLUTION ORAL at 13:25

## 2020-10-26 RX ADMIN — Medication 3 MILLIGRAM(S): at 00:36

## 2020-10-26 RX ADMIN — PANTOPRAZOLE SODIUM 40 MILLIGRAM(S): 20 TABLET, DELAYED RELEASE ORAL at 20:41

## 2020-10-26 RX ADMIN — SPIRONOLACTONE 12.5 MILLIGRAM(S): 25 TABLET, FILM COATED ORAL at 06:22

## 2020-10-26 RX ADMIN — Medication 3 MILLIGRAM(S): at 22:39

## 2020-10-26 RX ADMIN — Medication 50 MILLILITER(S): at 13:27

## 2020-10-26 NOTE — PHARMACOTHERAPY INTERVENTION NOTE - COMMENTS
Medication reconciliation completed. Please refer to specifics in home medication list (outpatient medication review). Medications verified with patient and [[facility] , spoke to Joyce, [phone]].    Home Medications:  pantoprazole 40 mg intravenous injection: 40 milligram(s) intravenous every 12 hours   rifAXIMin 550 mg oral tablet: 1 tab(s) orally 2 times a day (last fill 3/9/2020)  spironolactone 100 mg oral tablet: 1 tab(s) orally once a day (last fill 4/29/2020)    Added:   atorvastatin 20 mg oral tablet: 1 tab(s) orally once a day (as per pharmacy records and patient)  ferrous sulfate 325 mg (65 mg elemental iron) oral tablet: 1 tab(s) orally once a day (as per pharmacy records and patient)  furosemide 40 mg oral tablet: 1 tab(s) orally 2 times a day (as per pharmacy records and patient)  Omega-3 oral capsule: 1 cap(s) orally once a day (as per patient)    Changed:  lactulose 10 g/15 mL oral syrup: 15 milliliter(s) orally 2 times a day (previously 45 milliliters orally 2 times a day)    Removed: (as per patient)  melatonin 3 mg tab orally once a day at bedtime  sucralfate 1 g tab orally 4 times a day  acetaminophen 325 mg 2 tabs orally every 6 hours as needed for mild-moderate pain  ipratropium-albuterol 0.5 mg-2.5 mg/3 mL inhalation solution: 3 milliliters inhaled once  nystatin 100,000 units/g topical powder: 1 application topically 2 times a day  zolpidem 10 mg tab orally at bedtime (this medication was on the pharmacy records but patient is no longer on it)    Time spent: 20 minutes    Geovanna Winslow, PharmD candidate  Arik Rod, PharmD, BCPS  [phone] Medication reconciliation completed. Please refer to specifics in home medication list (outpatient medication review). Medications verified with patient and [[facility] , spoke to Joyce, [phone]].    Home Medications:  pantoprazole 40 mg intravenous injection: 40 milligram(s) intravenous every 12 hours   rifAXIMin 550 mg oral tablet: 1 tab(s) orally 2 times a day (last fill 3/9/2020)  spironolactone 100 mg oral tablet: 1 tab(s) orally once a day (last fill 4/29/2020)    Added:   atorvastatin 20 mg oral tablet: 1 tab(s) orally once a day (as per pharmacy records and patient)  ferrous sulfate 325 mg (65 mg elemental iron) oral tablet: 1 tab(s) orally once a day (as per pharmacy records and patient)  furosemide 40 mg oral tablet: 1 tab(s) orally 2 times a day (as per pharmacy records and patient)  Omega-3 oral capsule: 1 cap(s) orally once a day (as per patient)    Changed:  lactulose 10 g/15 mL oral syrup: 30 milliliter(s) orally 2 times a day (previously 45 milliliters orally 2 times a day)    Removed: (as per patient)  melatonin 3 mg tab orally once a day at bedtime  sucralfate 1 g tab orally 4 times a day  acetaminophen 325 mg 2 tabs orally every 6 hours as needed for mild-moderate pain  ipratropium-albuterol 0.5 mg-2.5 mg/3 mL inhalation solution: 3 milliliters inhaled once  nystatin 100,000 units/g topical powder: 1 application topically 2 times a day  zolpidem 10 mg tab orally at bedtime (this medication was on the pharmacy records but patient is no longer on it)    Time spent: 20 minutes    Geovanna Winslow, PharmD candidate  Arik Rod, PharmD, BCPS  [phone] Medication reconciliation completed. Please refer to specifics in home medication list (outpatient medication review). Medications verified with patient and [[facility] , spoke to Joyce, [phone]].    Home Medications:  pantoprazole 40 mg intravenous injection: 40 milligram(s) intravenous every 12 hours   rifAXIMin 550 mg oral tablet: 1 tab(s) orally 2 times a day (last fill 3/9/2020)  spironolactone 100 mg oral tablet: 1 tab(s) orally once a day (last fill 4/29/2020)    Added:   atorvastatin 20 mg oral tablet: 1 tab(s) orally once a day (as per pharmacy records and patient)  ferrous sulfate 325 mg (65 mg elemental iron) oral tablet: 1 tab(s) orally once a day (as per pharmacy records and patient)  furosemide 40 mg oral tablet: 1 tab(s) orally 2 times a day (as per pharmacy records and patient)  Omega-3 oral capsule: 1 cap(s) orally once a day (as per patient)    Changed:  lactulose 10 g/15 mL oral syrup: 30 milliliter(s) orally 2 times a day (previously 45 milliliters orally 2 times a day)    Removed: (as per patient)  melatonin 3 mg tab orally once a day at bedtime  sucralfate 1 g tab orally 4 times a day  acetaminophen 325 mg 2 tabs orally every 6 hours as needed for mild-moderate pain  ipratropium-albuterol 0.5 mg-2.5 mg/3 mL inhalation solution: 3 milliliters inhaled once  nystatin 100,000 units/g topical powder: 1 application topically 2 times a day  zolpidem 10 mg tab orally at bedtime (this medication was on the pharmacy records but patient is no longer on it)    Recommendation:  Add ferrous sulfate 325 mg tab orally once daily (iron deficiency anemia and on at home)  Add atorvastatin 20 mg tab orally once daily (hyperlipidemia and on at home)    Time spent: 20 minutes    Geovanna Winslow, PharmD candidate  Arik Rod, PharmD, BCPS  [phone]

## 2020-10-26 NOTE — CONSULT NOTE ADULT - ASSESSMENT
59M morbidly obese male w/ PMHx of Hepatic Cirrhosis, ascites, Thrombocytopenia, CHF, HTN, HLD, recent admit for Hepatic Encephalopathy, presents as transfer from OSH for advanced GI and hepatology eval, with recent HSE, anemia, and SBP.  ID consulted for Pre-Transplant Evaluation.      #SBP in setting of decompensated hepatic cirrhosis - Paracentesis on 10/14 with Ecoli, s/p 7 days of ceftriaxone. Repeat Para on 10/22 abd fluid with NGTD.  No current abd pain, and nontoxic appearing without fevers.   - f/u repeat abd fluid clx on 10/22 to finalize  - agree with ceftriaxone for ppx for suspected variceal bleed. Would d/c if capsule endoscopy is negative  - send blood clx if febrile    #transplant eval- Will send the following serologies:   - HAV IgG  - HBsAb, HBsAg, HBVc total Ab (Please do not send acute hepatitis panel)  - HCV Ab  - HSV 1/2 IgG  - EBV Serology  - CMV IgG  - VZV IgG  - Measles, Mumps and Rubella IgG serologies  - Quantiferon Gold  - HIV Test  - Syphilis Screen  - Toxoplasma IgG  - Strongyloides Ab    Natanael Shine MD  Fellow, Infectious Diseases, PGY-4  Pager: [phone]  Before 9am or after 5pm/Weekends: Call [phone]     59M morbidly obese male w/ PMHx of Hepatic Cirrhosis, ascites, Thrombocytopenia, CHF, HTN, HLD, recent admit for Hepatic Encephalopathy, presents as transfer from OSH for advanced GI and hepatology eval, admitted initially 10/14-10/23 with HSE, anemia, and SBP, s/p LVP,   ID consulted for Pre-Transplant Evaluation.      #SBP in setting of decompensated ETOH hepatic cirrhosis - Paracentesis on 10/14 with Ecoli, s/p 7 days of ceftriaxone. Repeat Para on 10/22 abd fluid with NGTD.  No current abd pain, and nontoxic appearing without fevers.   - f/u repeat abd fluid clx on 10/22 to finalize  - agree with ceftriaxone for ppx for suspected variceal bleed. Would d/c if capsule endoscopy is negative  - send blood clx if febrile    #transplant eval- Will send the following serologies:   - HAV IgG  - HBsAb, HBsAg, HBVc total Ab (Please do not send acute hepatitis panel)  - HCV Ab  - HSV 1/2 IgG  - EBV Serology  - CMV IgG  - VZV IgG  - Measles, Mumps and Rubella IgG serologies  - Quantiferon Gold  - HIV Test  - Syphilis Screen  - Toxoplasma IgG  - Strongyloides Ab    #Vaccines -  Received prevnar 10/21/2020  - will need influenza vaccine  - other pending serologies     Natanael Shine MD  Fellow, Infectious Diseases, PGY-4  Pager: [phone]  Before 9am or after 5pm/Weekends: Call [phone]

## 2020-10-26 NOTE — PROGRESS NOTE ADULT - ATTENDING COMMENTS
Patient with decompensated cirrhosis associated with alcohol and obesity. now with renal insufficiency.  Advise albumin support and repeat urinary studies U/A and urinary electrolytes.  Await GI evaluation of GI bleed.
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
## 2020-10-26 NOTE — PROGRESS NOTE ADULT - ASSESSMENT
59M morbidly obese male w/ PMHx of Hepatic Cirrhosis, ascites, Thrombocytopenia, CHF, HTN, HLD, recent admx for Hepatic Encephalopathy, noncompliant with lactulose who was admitted to OSH  for hepatic encephalopathy, acute blood lose anemia (?variceal bleed, GI ulcer) and possible SBP. He was transferred to [facility] for endoscopy since he is high risk for planned procedure. He is also here for evaluation by Liver Transplant team, now s/p EGD showing gastritis no varices, colonoscopy without any source of bleed, now with VCE

## 2020-10-26 NOTE — PROGRESS NOTE ADULT - ASSESSMENT
59M morbidly obese male with Hepatic Cirrhosis 2/2 EtOH, ascites, Thrombocytopenia, CHF, HTN, HLD, recent admx for Hepatic Encephalopathy, noncompliant with lactulose who was admitted to [facility]  10/14 - 10/23 for hepatic encephalopathy, acute blood lose anemia with hematochezia and melena; and SBP with ascitic cultures +for Ecoli.     Impression:  #Decompensated EtOH cirrhosis 2/2 GIB and SBP  - varices: present on CT, patient denies prior EGDs  - HE: present on admission to [facility], currently grade I on exam  - HCC: no focal lesions on CT  -+ascites s/p para x2, last on 10/22 with 5.7L removed, +SBP on 10/14 with ascitic cultures +Ecoli  #GIB - per chart review had melena at [facility] received 6U prbcs, 2U ffp, 1U platelets; now with stable hemoglobin and no reports of further melena, concern for variceal bleed vs ulcer vs portal HTN gastropathy vs angioectasia vs dieulafoy  #CHF - last TTE 2019 with EF 65%, normal RV function, normal pulmonary pressures  #MONI      Recommendation:   - c/w albumin 25% 100cc q6 for MONI  - please resend urine electrolyte, urea, creatinine as well as UA (urine sodium very high)  - c/w lactulose, titrate to 3-4BM/day  - c/w rifaximin 550 BID  - given +SBP and GIB would restart ceftriaxone 1g daily and evelyn eventually need to switch to PO ppx  - given large volume ascites will likely need initiation of diuretic regimen with lasix/spironolactone as well however will hold for now given new MONI  - f/u capsule endoscopy as no source of bleeding identified on colonoscopy  - daily CBC, CMP, coags      Marlen Ventura PGY-4  Gastroenterology Fellow  Pager #[phone] or [phone]  Please page on-call Fellow on weekends/after 5 pm on weekdays   Please call answering service for on-call GI fellow ([phone]) after 5pm and before 8am, and on weekends.

## 2020-10-26 NOTE — PROGRESS NOTE ADULT - PROBLEM SELECTOR PLAN 4
Cr is uptrending, presumed to be from intravascular depletion. Baseline appears to be around 1.4-1.6  will give albumin for intravascular repletion.

## 2020-10-26 NOTE — CHART NOTE - NSCHARTNOTEFT_GEN_A_CORE
Risks of video capsule endoscopy explained, including risk of retained capsule, potentially requiring surgery for removal.  Patient understands and agrees to risks.    Capsule swallowed at: 10: 00 am     NPO now   Resume Clear liquid diet at:  12: 00 pm   Resume regular consistency diet at: 2: 00 pm      Please keep equipment on patient's body. GI fellow will retrieve equipment in the morning.    NO MRI UNTIL CAPSULE CLEARANCE CONFIRMED. CAPSULE IS NOT MRI COMPATIBLE.

## 2020-10-27 ENCOUNTER — TRANSCRIPTION ENCOUNTER (OUTPATIENT)
Age: 60
End: 2020-10-27

## 2020-10-27 LAB
ALBUMIN SERPL ELPH-MCNC: 3.5 G/DL — SIGNIFICANT CHANGE UP (ref 3.3–5)
ALP SERPL-CCNC: 76 U/L — SIGNIFICANT CHANGE UP (ref 40–120)
ALT FLD-CCNC: 19 U/L — SIGNIFICANT CHANGE UP (ref 10–45)
ANION GAP SERPL CALC-SCNC: 9 MMOL/L — SIGNIFICANT CHANGE UP (ref 5–17)
AST SERPL-CCNC: 28 U/L — SIGNIFICANT CHANGE UP (ref 10–40)
BILIRUB SERPL-MCNC: 2.9 MG/DL — HIGH (ref 0.2–1.2)
BUN SERPL-MCNC: 16 MG/DL — SIGNIFICANT CHANGE UP (ref 7–23)
CALCIUM SERPL-MCNC: 8.9 MG/DL — SIGNIFICANT CHANGE UP (ref 8.4–10.5)
CHLORIDE SERPL-SCNC: 100 MMOL/L — SIGNIFICANT CHANGE UP (ref 96–108)
CMV IGG FLD QL: 0.27 U/ML — SIGNIFICANT CHANGE UP
CMV IGG SERPL-IMP: NEGATIVE — SIGNIFICANT CHANGE UP
CO2 SERPL-SCNC: 24 MMOL/L — SIGNIFICANT CHANGE UP (ref 22–31)
CREAT SERPL-MCNC: 1.59 MG/DL — HIGH (ref 0.5–1.3)
CULTURE RESULTS: SIGNIFICANT CHANGE UP
EBV EA AB SER IA-ACNC: >150 U/ML — HIGH
EBV EA AB TITR SER IF: POSITIVE
EBV EA IGG SER-ACNC: POSITIVE
EBV NA IGG SER IA-ACNC: >600 U/ML — HIGH
EBV PATRN SPEC IB-IMP: SIGNIFICANT CHANGE UP
EBV VCA IGG AVIDITY SER QL IA: POSITIVE
EBV VCA IGM SER IA-ACNC: <10 U/ML — SIGNIFICANT CHANGE UP
EBV VCA IGM SER IA-ACNC: >750 U/ML — HIGH
EBV VCA IGM TITR FLD: NEGATIVE — SIGNIFICANT CHANGE UP
GLUCOSE SERPL-MCNC: 88 MG/DL — SIGNIFICANT CHANGE UP (ref 70–99)
GRAM STN FLD: SIGNIFICANT CHANGE UP
HAV IGG SER QL IA: REACTIVE
HBV CORE AB SER-ACNC: SIGNIFICANT CHANGE UP
HBV SURFACE AB SER-ACNC: ABNORMAL
HBV SURFACE AG SER-ACNC: SIGNIFICANT CHANGE UP
HCT VFR BLD CALC: 23.3 % — LOW (ref 39–50)
HCV AB S/CO SERPL IA: 0.15 S/CO — SIGNIFICANT CHANGE UP (ref 0–0.99)
HCV AB SERPL-IMP: SIGNIFICANT CHANGE UP
HGB BLD-MCNC: 7.4 G/DL — LOW (ref 13–17)
HIV 1+2 AB+HIV1 P24 AG SERPL QL IA: SIGNIFICANT CHANGE UP
HSV1 IGG SER-ACNC: 0.49 INDEX — SIGNIFICANT CHANGE UP
HSV1 IGG SERPL QL IA: NEGATIVE — SIGNIFICANT CHANGE UP
HSV2 IGG FLD-ACNC: 0.35 INDEX — SIGNIFICANT CHANGE UP
HSV2 IGG SERPL QL IA: NEGATIVE — SIGNIFICANT CHANGE UP
MAGNESIUM SERPL-MCNC: 1.9 MG/DL — SIGNIFICANT CHANGE UP (ref 1.6–2.6)
MCHC RBC-ENTMCNC: 30.3 PG — SIGNIFICANT CHANGE UP (ref 27–34)
MCHC RBC-ENTMCNC: 31.8 GM/DL — LOW (ref 32–36)
MCV RBC AUTO: 95.5 FL — SIGNIFICANT CHANGE UP (ref 80–100)
MEV IGG SER-ACNC: >300 AU/ML — SIGNIFICANT CHANGE UP
MEV IGG+IGM SER-IMP: POSITIVE — SIGNIFICANT CHANGE UP
MUV AB SER-ACNC: POSITIVE — SIGNIFICANT CHANGE UP
MUV IGG FLD-ACNC: >300 AU/ML — SIGNIFICANT CHANGE UP
NON-GYNECOLOGICAL CYTOLOGY STUDY: SIGNIFICANT CHANGE UP
NRBC # BLD: 0 /100 WBCS — SIGNIFICANT CHANGE UP (ref 0–0)
PLATELET # BLD AUTO: 50 K/UL — LOW (ref 150–400)
POTASSIUM SERPL-MCNC: 4.2 MMOL/L — SIGNIFICANT CHANGE UP (ref 3.5–5.3)
POTASSIUM SERPL-SCNC: 4.2 MMOL/L — SIGNIFICANT CHANGE UP (ref 3.5–5.3)
PROT SERPL-MCNC: 6.7 G/DL — SIGNIFICANT CHANGE UP (ref 6–8.3)
RBC # BLD: 2.44 M/UL — LOW (ref 4.2–5.8)
RBC # FLD: 20.1 % — HIGH (ref 10.3–14.5)
RUBV IGG SER-ACNC: 30 INDEX — SIGNIFICANT CHANGE UP
RUBV IGG SER-IMP: POSITIVE — SIGNIFICANT CHANGE UP
SODIUM SERPL-SCNC: 133 MMOL/L — LOW (ref 135–145)
SPECIMEN SOURCE: SIGNIFICANT CHANGE UP
T GONDII IGG SER QL: <3 IU/ML — SIGNIFICANT CHANGE UP
T GONDII IGG SER QL: NEGATIVE — SIGNIFICANT CHANGE UP
T PALLIDUM AB TITR SER: NEGATIVE — SIGNIFICANT CHANGE UP
VZV IGG FLD QL IA: >4000 INDEX — SIGNIFICANT CHANGE UP
VZV IGG FLD QL IA: POSITIVE — SIGNIFICANT CHANGE UP
WBC # BLD: 2.72 K/UL — LOW (ref 3.8–10.5)
WBC # FLD AUTO: 2.72 K/UL — LOW (ref 3.8–10.5)

## 2020-10-27 PROCEDURE — 99232 SBSQ HOSP IP/OBS MODERATE 35: CPT | Mod: GC

## 2020-10-27 PROCEDURE — 99233 SBSQ HOSP IP/OBS HIGH 50: CPT

## 2020-10-27 RX ORDER — LANOLIN ALCOHOL/MO/W.PET/CERES
5 CREAM (GRAM) TOPICAL AT BEDTIME
Refills: 0 | Status: DISCONTINUED | OUTPATIENT
Start: 2020-10-27 | End: 2020-11-18

## 2020-10-27 RX ADMIN — ATORVASTATIN CALCIUM 20 MILLIGRAM(S): 80 TABLET, FILM COATED ORAL at 22:11

## 2020-10-27 RX ADMIN — LACTULOSE 20 GRAM(S): 10 SOLUTION ORAL at 05:31

## 2020-10-27 RX ADMIN — Medication 50 MILLILITER(S): at 14:01

## 2020-10-27 RX ADMIN — Medication 1 GRAM(S): at 18:45

## 2020-10-27 RX ADMIN — Medication 5 MILLIGRAM(S): at 22:39

## 2020-10-27 RX ADMIN — Medication 1 GRAM(S): at 05:31

## 2020-10-27 RX ADMIN — NYSTATIN CREAM 1 APPLICATION(S): 100000 CREAM TOPICAL at 10:02

## 2020-10-27 RX ADMIN — Medication 50 MILLILITER(S): at 01:19

## 2020-10-27 RX ADMIN — Medication 325 MILLIGRAM(S): at 10:02

## 2020-10-27 RX ADMIN — CEFTRIAXONE 100 MILLIGRAM(S): 500 INJECTION, POWDER, FOR SOLUTION INTRAMUSCULAR; INTRAVENOUS at 18:45

## 2020-10-27 RX ADMIN — Medication 1 GRAM(S): at 23:09

## 2020-10-27 RX ADMIN — PANTOPRAZOLE SODIUM 40 MILLIGRAM(S): 20 TABLET, DELAYED RELEASE ORAL at 20:33

## 2020-10-27 RX ADMIN — Medication 1 GRAM(S): at 14:01

## 2020-10-27 RX ADMIN — Medication 50 MILLILITER(S): at 05:31

## 2020-10-27 RX ADMIN — SPIRONOLACTONE 12.5 MILLIGRAM(S): 25 TABLET, FILM COATED ORAL at 05:31

## 2020-10-27 RX ADMIN — PANTOPRAZOLE SODIUM 40 MILLIGRAM(S): 20 TABLET, DELAYED RELEASE ORAL at 10:01

## 2020-10-27 NOTE — PHYSICAL THERAPY INITIAL EVALUATION ADULT - STRENGTHENING, PT EVAL
GOAL: Increase to 4+/5 throughout to assist with safe bed mobility, transfers, amb and functional activities.

## 2020-10-27 NOTE — PROGRESS NOTE ADULT - ASSESSMENT
59M morbidly obese male with Hepatic Cirrhosis 2/2 EtOH, ascites, Thrombocytopenia, CHF, HTN, HLD, recent admx for Hepatic Encephalopathy, noncompliant with lactulose who was admitted to [facility]  10/14 - 10/23 for hepatic encephalopathy, acute blood lose anemia with hematochezia and melena; and SBP with ascitic cultures +for Ecoli.     Impression:  #Decompensated EtOH cirrhosis 2/2 GIB now resolved  - varices: present on CT, but no varices on recent EGD  - HE: present on admission to [facility], now resolved  - HCC: no focal lesions on CT  -+ascites s/p para x2, last on 10/22 with 5.7L removed, +SBP on 10/14 with ascitic cultures +Ecoli  #CHF - last TTE 2019 with EF 65%, normal RV function, normal pulmonary pressures  #MONI: slightly worsening (UA neg for protein or RBC), BRANNON 135.      Recommendation:   - c/w albumin 25% 100cc q6 for MONI  - please resend urine electrolyte, urea, creatinine as well as UA (urine sodium very high)  - c/w lactulose, titrate to 2-3 BM/day  - c/w rifaximin 550 BID  - given +SBP and GIB would restart ceftriaxone 1g daily and will eventually need to switch to PO ppx for longterm  - On aldactone 12.5 mg daily, avoid diuretics, on albumin replacement      # GIB: melena  Hg stable, brown stools.  EGD 10/25 showed mild esophagitis, no varices.  Colonoscopy 10/26: no blood, erythematous area on ascending colon s/p 1 endoclip. 2 subcentimeter polyp not removed, rectal varices.  Capsule performed: pending read.  Rec: Currently c/w regular diet, as Hg is stable and brown stools.       59M morbidly obese male with Hepatic Cirrhosis 2/2 EtOH, ascites, Thrombocytopenia, CHF, HTN, HLD, recent admx for Hepatic Encephalopathy, noncompliant with lactulose who was admitted to [facility]  10/14 - 10/23 for hepatic encephalopathy, acute blood lose anemia with hematochezia and melena; and SBP with ascitic cultures +for Ecoli.     Impression:  #Decompensated EtOH cirrhosis 2/2 GIB now resolved  - varices: present on CT, but no varices on recent EGD  - HE: present on admission to [facility], now resolved  - HCC: no focal lesions on CT  -+ascites s/p para x2, last on 10/22 with 5.7L removed, +SBP on 10/14 with ascitic cultures +Ecoli  #CHF - last TTE 2019 with EF 65%, normal RV function, normal pulmonary pressures  #MONI: slightly worsening (UA neg for protein or RBC), BRANNON 135.      Recommendation:   - c/w albumin 25% 100cc q6 for MONI  - please resend urine electrolyte, urea, creatinine as well as UA (urine sodium very high)  - c/w lactulose, titrate to 2-3 BM/day  - c/w rifaximin 550 BID  - given +SBP and GIB would restart ceftriaxone 1g daily and will eventually need to switch to PO ppx for longterm  - On aldactone 12.5 mg daily, avoid diuretics including aldactone, on albumin replacement  -continue to monitor Cr daily.  mediterranean diet      # GIB: melena  Hg stable, brown stools.  EGD 10/25 showed mild esophagitis, no varices.  Colonoscopy 10/26: no blood, erythematous area on ascending colon s/p 1 endoclip. 2 subcentimeter polyp not removed, rectal varices.  Capsule performed: pending read.  Rec: Currently c/w regular diet, as Hg is stable and brown stools.

## 2020-10-27 NOTE — PROGRESS NOTE ADULT - ATTENDING COMMENTS
Patient seen and examined with Dr Shine    I agree with his interval history exam and plans as noted above    Significant amount of ascites reaccumulation- draining from pig tail catheter straw colored fluid  increased lower extremity edema  Can complete Ceftriaxone therapy today and begin secondary prophylaxis with oral quinolone    Max Ashby MD  [phone]  After 5pm/weekends [phone]

## 2020-10-27 NOTE — PROGRESS NOTE ADULT - ASSESSMENT
59M morbidly obese male w/ PMHx of Hepatic Cirrhosis, ascites, Thrombocytopenia, CHF, HTN, HLD, recent admit for Hepatic Encephalopathy, presents as transfer from OSH for advanced GI and hepatology eval, admitted initially 10/14-10/23 with HSE, anemia, and SBP, s/p LVP,   ID consulted for Pre-Transplant Evaluation.      #SBP in setting of decompensated ETOH hepatic cirrhosis - Paracentesis on 10/14 with Ecoli, s/p 7 days of ceftriaxone. Repeat Para on 10/22 abd fluid with NGTD.  No current abd pain, and nontoxic appearing without fevers.   - f/u repeat abd fluid clx on 10/22 to finalize  - cont ceftriaxone until capsule study results  - can switch to levofloxacin or ciprofloxacin for secondary SBP ppx if capsule study negative  - send blood clx if febrile    #Leukopenia - has chronic leukopenia of unknown etiology. Could be acute in setting of infection and chronic due to advanced cirrhosis but has been never been formally evaluated.  - Consider BM Bx after infection resolves    #transplant eval- Will send the following serologies:   - HAV IgG  - HBsAb, HBsAg, HBVc total Ab (Please do not send acute hepatitis panel)  - HCV Ab  - HSV 1/2 IgG  - EBV Serology  - CMV IgG  - VZV IgG  - Measles, Mumps and Rubella IgG serologies  - Quantiferon Gold  - HIV Test Neg  - Syphilis Screen  - Toxoplasma IgG  - Strongyloides Ab    #Vaccines -  Received prevnar 10/21/2020  - will need influenza vaccine  - other pending serologies       Natanael Shine MD  Fellow, Infectious Diseases, PGY-4  Pager: [phone]  Before 9am or after 5pm/Weekends: Call [phone]

## 2020-10-27 NOTE — PHYSICAL THERAPY INITIAL EVALUATION ADULT - BALANCE TRAINING, PT EVAL
GOAL: Pt will demonstrate improved static/dynamic balance to good, in order to improve stability, decrease fall risk and increase independence with ADLs within 2 weeks

## 2020-10-27 NOTE — PROGRESS NOTE ADULT - SUBJECTIVE AND OBJECTIVE BOX
Lewis Leon MD  Division of Hospital Medicine  Pager: [phone]  If no response or off-hours, page [phone]  -------------------------------------    Patient is a 60y old  Male who presents with a chief complaint of hepatic encephalopathy (27 Oct 2020 10:25)      SUBJECTIVE / OVERNIGHT EVENTS: none acute  ADDITIONAL REVIEW OF SYSTEMS: pt denies any aches/pains, no fevers/chills, no sob/cough, no n/v. No further bleeding episodes noted. ros otherwise negative.     MEDICATIONS  (STANDING):  atorvastatin 20 milliGRAM(s) Oral at bedtime  cefTRIAXone   IVPB 1000 milliGRAM(s) IV Intermittent every 24 hours  ferrous    sulfate 325 milliGRAM(s) Oral daily  lactulose Syrup 20 Gram(s) Oral every 8 hours  melatonin 3 milliGRAM(s) Oral at bedtime  nystatin Powder 1 Application(s) Topical every 12 hours  pantoprazole  Injectable 40 milliGRAM(s) IV Push every 12 hours  rifAXIMin 550 milliGRAM(s) Oral two times a day  spironolactone 12.5 milliGRAM(s) Oral daily  sucralfate 1 Gram(s) Oral four times a day    MEDICATIONS  (PRN):  acetaminophen   Tablet .. 650 milliGRAM(s) Oral every 6 hours PRN Mild Pain (1 - 3), Moderate Pain (4 - 6)      CAPILLARY BLOOD GLUCOSE        I&O's Summary    26 Oct 2020 07:01  -  27 Oct 2020 07:00  --------------------------------------------------------  IN: 550 mL / OUT: 1200 mL / NET: -650 mL    27 Oct 2020 07:01  -  27 Oct 2020 14:10  --------------------------------------------------------  IN: 240 mL / OUT: 600 mL / NET: -360 mL        PHYSICAL EXAM:  Vital Signs Last 24 Hrs  T(C): 36.8 (27 Oct 2020 05:08), Max: 36.8 (27 Oct 2020 05:08)  T(F): 98.3 (27 Oct 2020 05:08), Max: 98.3 (27 Oct 2020 05:08)  HR: 90 (27 Oct 2020 10:32) (71 - 90)  BP: 124/65 (27 Oct 2020 10:32) (113/64 - 124/65)  BP(mean): --  RR: 17 (27 Oct 2020 05:08) (17 - 17)  SpO2: 98% (27 Oct 2020 10:32) (96% - 99%)  CONSTITUTIONAL: NAD, well-developed, well-groomed  EYES: PERRLA; conjunctiva and sclera clear  ENMT: Moist oral mucosa, no pharyngeal injection or exudates; normal dentition  NECK: Supple, no palpable masses; no thyromegaly  RESPIRATORY: Normal respiratory effort; lungs are clear to auscultation bilaterally  CARDIOVASCULAR: Regular rate and rhythm, normal S1 and S2, no murmur/rub/gallop; +2 pitting edema; Peripheral pulses are 2+ bilaterally  ABDOMEN: +distended, Nontender to palpation, normoactive bowel sounds, no rebound/guarding; No hepatosplenomegaly  MUSCLOSKELETAL:  Normal gait; no clubbing or cyanosis of digits; no joint swelling or tenderness to palpation  PSYCH: A+O to person, place, and time; affect appropriate  NEUROLOGY: CN 2-12 are intact and symmetric; no gross sensory deficits;   SKIN: No rashes; no palpable lesions    LABS:                        7.4    2.72  )-----------( 50       ( 27 Oct 2020 09:48 )             23.3     10-27    133<L>  |  100  |  16  ----------------------------<  88  4.2   |  24  |  1.59<H>    Ca    8.9      27 Oct 2020 09:48  Phos  2.2     10-26  Mg     1.9     10-27    TPro  6.7  /  Alb  3.5  /  TBili  2.9<H>  /  DBili  x   /  AST  28  /  ALT  19  /  AlkPhos  76  10-27    PT/INR - ( 26 Oct 2020 10:37 )   PT: 22.6 sec;   INR: 1.98 ratio                     RADIOLOGY & ADDITIONAL TESTS:  Results Reviewed:   Imaging Personally Reviewed:  Electrocardiogram Personally Reviewed:    COORDINATION OF CARE:  Care Discussed with Consultants/Other Providers [Y/N]:  Prior or Outpatient Records Reviewed [Y/N]:

## 2020-10-27 NOTE — PHYSICAL THERAPY INITIAL EVALUATION ADULT - DISCHARGE DISPOSITION, PT EVAL
Home with Home PT to assess safety, increase strength and endurance assisting with functional activities and ADLs. Assist from pt's brother as needed. Pt owns RW.

## 2020-10-27 NOTE — PHYSICAL THERAPY INITIAL EVALUATION ADULT - ADDITIONAL COMMENTS
Pt lives alone on second floor of , +12 steps to 2nd floor. PTA pt was independent with all mobility and ADLs. Pt owns a RW.
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
## 2020-10-27 NOTE — DISCHARGE NOTE NURSING/CASE MANAGEMENT/SOCIAL WORK - PATIENT PORTAL LINK FT
You can access the FollowMyHealth Patient Portal offered by [facility] by registering at the following website: [email or web address]. By joining [employer]’s FollowMyHealth portal, you will also be able to view your health information using other applications (apps) compatible with our system.

## 2020-10-27 NOTE — PROGRESS NOTE ADULT - SUBJECTIVE AND OBJECTIVE BOX
ID Follow Up  for Pre-liver transplant eval, SBP    Interval History/ROS:Patient is a 60y old  Male who presents with a chief complaint of hepatic encephalopathy (27 Oct 2020 14:10)  No events overnight. Pending result of capsule study. Denies fever, chills, nausea, vomiting. Ongoing loose stools due to lactulose. Worsening LE swelling.     Allergies  No Known Allergies    Intolerances  None      ANTIMICROBIALS:  cefTRIAXone   IVPB 1000 every 24 hours  rifAXIMin 550 two times a day      OTHER MEDS:  acetaminophen   Tablet .. 650 milliGRAM(s) Oral every 6 hours PRN  atorvastatin 20 milliGRAM(s) Oral at bedtime  ferrous    sulfate 325 milliGRAM(s) Oral daily  lactulose Syrup 20 Gram(s) Oral every 8 hours  melatonin 3 milliGRAM(s) Oral at bedtime  nystatin Powder 1 Application(s) Topical every 12 hours  pantoprazole  Injectable 40 milliGRAM(s) IV Push every 12 hours  spironolactone 12.5 milliGRAM(s) Oral daily  sucralfate 1 Gram(s) Oral four times a day      Vital Signs Last 24 Hrs  T(C): 36.8 (27 Oct 2020 05:08), Max: 36.8 (27 Oct 2020 05:08)  T(F): 98.3 (27 Oct 2020 05:08), Max: 98.3 (27 Oct 2020 05:08)  HR: 90 (27 Oct 2020 10:32) (71 - 90)  BP: 124/65 (27 Oct 2020 10:32) (113/64 - 124/65)  BP(mean): --  RR: 17 (27 Oct 2020 05:08) (17 - 17)  SpO2: 98% (27 Oct 2020 10:32) (96% - 99%)    EXAM:  Constitutional: non-toxic, no distress, obese  HEAD/EYES: anicteric, no conjunctival injection  ENT:  supple, no thrush  Cardiovascular:   normal S1, S2, no murmur, no edema  Respiratory:  clear BS bilaterally, no wheezes, no rales  GI:  soft, non-tender, normal bowel sounds, distended, left sided abd drain colostomy bag in place  :  no rod  Musculoskeletal:  no synovitis, normal ROM; LE b/l pitting edema +3  Neurologic: awake and alert, normal strength, no focal findings  Skin:  no rash, no erythema, no phlebitis  Heme/Onc: no lymphadenopathy   Psychiatric:  awake, alert, appropriate mood    Labs:                        7.4    2.72  )-----------( 50       ( 27 Oct 2020 09:48 )             23.3       10-27    133<L>  |  100  |  16  ----------------------------<  88  4.2   |  24  |  1.59<H>    Ca    8.9      27 Oct 2020 09:48  Phos  2.2     10-26  Mg     1.9     10-27    TPro  6.7  /  Alb  3.5  /  TBili  2.9<H>  /  DBili  x   /  AST  28  /  ALT  19  /  AlkPhos  76  10-27          MICROBIOLOGY:Culture Results:   No growth at 5 days (10-22 @ 14:55)  Culture Results:   Testing in progress (10-22 @ 14:55)      RADIOLOGY:  None

## 2020-10-27 NOTE — PROGRESS NOTE ADULT - ASSESSMENT
59M morbidly obese male w/ PMHx of Hepatic Cirrhosis, ascites, Thrombocytopenia, CHF, HTN, HLD, recent admx for Hepatic Encephalopathy, noncompliant with lactulose who was admitted to OSH  for hepatic encephalopathy, acute blood lose anemia (?variceal bleed, GI ulcer) and possible SBP. He was transferred to [facility] for endoscopy since he is high risk for planned procedure. He is also here for evaluation by Liver Transplant team, now s/p EGD showing gastritis no varices, colonoscopy without any source of bleed, now with VCE

## 2020-10-27 NOTE — PHYSICAL THERAPY INITIAL EVALUATION ADULT - PERTINENT HX OF CURRENT PROBLEM, REHAB EVAL
59M morbidly obese male w/ PMHx of Hepatic Cirrhosis, ascites, Thrombocytopenia, CHF, HTN, HLD, recent admx for Hepatic Encephalopathy, noncompliant with lactulose who was admitted to OSH  for hepatic encephalopathy, acute blood lose anemia (?variceal bleed, GI ulcer) and possible SBP. He was transferred to [facility] for endoscopy since he is high risk for planned procedure. He is also here for evaluation by Liver Transplant team. 59M morbidly obese male w/ PMHx of Hepatic Cirrhosis, ascites, Thrombocytopenia, CHF, HTN, HLD, recent admx for Hepatic Encephalopathy, noncompliant with lactulose who was admitted to OSH  for hepatic encephalopathy, acute blood lose anemia (?variceal bleed, GI ulcer) and possible SBP. He was transferred to [facility] for endoscopy since he is high risk for planned procedure. He is also here for evaluation by Liver Transplant team. 10/26 s/p colonoscopy no bleed source.

## 2020-10-27 NOTE — PHYSICAL THERAPY INITIAL EVALUATION ADULT - PLANNED THERAPY INTERVENTIONS, PT EVAL
stair training: GOAL: Pt will negotiate up/down 12 steps, w/ SUP assist, w/ Right rails and appropriate AD, w/ step-to pattern in 2 weeks/strengthening/transfer training/balance training/gait training/bed mobility training

## 2020-10-27 NOTE — PROGRESS NOTE ADULT - ATTENDING COMMENTS
Patient with decompensated cirrhosis. mental status now clear. slight rise in creat. suggest hold aldactone and recheck renal tests. Patient will need completion of SBP treatment then prophylactic cipro for preventing future SBP.  capsule report pending.

## 2020-10-28 LAB
ALBUMIN SERPL ELPH-MCNC: 3.3 G/DL — SIGNIFICANT CHANGE UP (ref 3.3–5)
ALP SERPL-CCNC: 85 U/L — SIGNIFICANT CHANGE UP (ref 40–120)
ALT FLD-CCNC: 15 U/L — SIGNIFICANT CHANGE UP (ref 10–45)
ANION GAP SERPL CALC-SCNC: 7 MMOL/L — SIGNIFICANT CHANGE UP (ref 5–17)
AST SERPL-CCNC: 27 U/L — SIGNIFICANT CHANGE UP (ref 10–40)
BILIRUB SERPL-MCNC: 2.1 MG/DL — HIGH (ref 0.2–1.2)
BUN SERPL-MCNC: 15 MG/DL — SIGNIFICANT CHANGE UP (ref 7–23)
CALCIUM SERPL-MCNC: 8.5 MG/DL — SIGNIFICANT CHANGE UP (ref 8.4–10.5)
CHLORIDE SERPL-SCNC: 103 MMOL/L — SIGNIFICANT CHANGE UP (ref 96–108)
CO2 SERPL-SCNC: 24 MMOL/L — SIGNIFICANT CHANGE UP (ref 22–31)
CREAT SERPL-MCNC: 1.75 MG/DL — HIGH (ref 0.5–1.3)
GAMMA INTERFERON BACKGROUND BLD IA-ACNC: 0.01 IU/ML — SIGNIFICANT CHANGE UP
GLUCOSE SERPL-MCNC: 104 MG/DL — HIGH (ref 70–99)
HCT VFR BLD CALC: 22.5 % — LOW (ref 39–50)
HGB BLD-MCNC: 7.3 G/DL — LOW (ref 13–17)
INR BLD: 2.21 RATIO — HIGH (ref 0.88–1.16)
M TB IFN-G BLD-IMP: NEGATIVE — SIGNIFICANT CHANGE UP
M TB IFN-G CD4+ BCKGRND COR BLD-ACNC: 0.01 IU/ML — SIGNIFICANT CHANGE UP
M TB IFN-G CD4+CD8+ BCKGRND COR BLD-ACNC: 0.01 IU/ML — SIGNIFICANT CHANGE UP
MCHC RBC-ENTMCNC: 30.7 PG — SIGNIFICANT CHANGE UP (ref 27–34)
MCHC RBC-ENTMCNC: 32.4 GM/DL — SIGNIFICANT CHANGE UP (ref 32–36)
MCV RBC AUTO: 94.5 FL — SIGNIFICANT CHANGE UP (ref 80–100)
NRBC # BLD: 0 /100 WBCS — SIGNIFICANT CHANGE UP (ref 0–0)
PLATELET # BLD AUTO: 47 K/UL — LOW (ref 150–400)
POTASSIUM SERPL-MCNC: 4.2 MMOL/L — SIGNIFICANT CHANGE UP (ref 3.5–5.3)
POTASSIUM SERPL-SCNC: 4.2 MMOL/L — SIGNIFICANT CHANGE UP (ref 3.5–5.3)
PROT SERPL-MCNC: 6.1 G/DL — SIGNIFICANT CHANGE UP (ref 6–8.3)
PROTHROM AB SERPL-ACNC: 25.1 SEC — HIGH (ref 10.6–13.6)
QUANT TB PLUS MITOGEN MINUS NIL: 0.6 IU/ML — SIGNIFICANT CHANGE UP
RBC # BLD: 2.38 M/UL — LOW (ref 4.2–5.8)
RBC # FLD: 20.1 % — HIGH (ref 10.3–14.5)
SODIUM SERPL-SCNC: 134 MMOL/L — LOW (ref 135–145)
WBC # BLD: 2.09 K/UL — LOW (ref 3.8–10.5)
WBC # FLD AUTO: 2.09 K/UL — LOW (ref 3.8–10.5)

## 2020-10-28 PROCEDURE — 99232 SBSQ HOSP IP/OBS MODERATE 35: CPT | Mod: GC

## 2020-10-28 PROCEDURE — 99233 SBSQ HOSP IP/OBS HIGH 50: CPT

## 2020-10-28 RX ORDER — ALBUMIN HUMAN 25 %
100 VIAL (ML) INTRAVENOUS EVERY 6 HOURS
Refills: 0 | Status: DISCONTINUED | OUTPATIENT
Start: 2020-10-28 | End: 2020-11-02

## 2020-10-28 RX ORDER — MORPHINE SULFATE 50 MG/1
1 CAPSULE, EXTENDED RELEASE ORAL ONCE
Refills: 0 | Status: DISCONTINUED | OUTPATIENT
Start: 2020-10-28 | End: 2020-10-28

## 2020-10-28 RX ADMIN — MORPHINE SULFATE 1 MILLIGRAM(S): 50 CAPSULE, EXTENDED RELEASE ORAL at 22:52

## 2020-10-28 RX ADMIN — ATORVASTATIN CALCIUM 20 MILLIGRAM(S): 80 TABLET, FILM COATED ORAL at 21:06

## 2020-10-28 RX ADMIN — NYSTATIN CREAM 1 APPLICATION(S): 100000 CREAM TOPICAL at 21:06

## 2020-10-28 RX ADMIN — CEFTRIAXONE 100 MILLIGRAM(S): 500 INJECTION, POWDER, FOR SOLUTION INTRAMUSCULAR; INTRAVENOUS at 17:13

## 2020-10-28 RX ADMIN — Medication 1 GRAM(S): at 17:12

## 2020-10-28 RX ADMIN — PANTOPRAZOLE SODIUM 40 MILLIGRAM(S): 20 TABLET, DELAYED RELEASE ORAL at 21:06

## 2020-10-28 RX ADMIN — LACTULOSE 20 GRAM(S): 10 SOLUTION ORAL at 13:17

## 2020-10-28 RX ADMIN — Medication 5 MILLIGRAM(S): at 21:06

## 2020-10-28 RX ADMIN — SPIRONOLACTONE 12.5 MILLIGRAM(S): 25 TABLET, FILM COATED ORAL at 06:11

## 2020-10-28 RX ADMIN — Medication 1 GRAM(S): at 13:17

## 2020-10-28 RX ADMIN — NYSTATIN CREAM 1 APPLICATION(S): 100000 CREAM TOPICAL at 09:14

## 2020-10-28 RX ADMIN — LACTULOSE 20 GRAM(S): 10 SOLUTION ORAL at 06:11

## 2020-10-28 RX ADMIN — PANTOPRAZOLE SODIUM 40 MILLIGRAM(S): 20 TABLET, DELAYED RELEASE ORAL at 09:13

## 2020-10-28 RX ADMIN — Medication 50 MILLILITER(S): at 18:08

## 2020-10-28 RX ADMIN — MORPHINE SULFATE 1 MILLIGRAM(S): 50 CAPSULE, EXTENDED RELEASE ORAL at 23:05

## 2020-10-28 RX ADMIN — Medication 1 GRAM(S): at 06:11

## 2020-10-28 RX ADMIN — Medication 325 MILLIGRAM(S): at 13:17

## 2020-10-28 NOTE — PROGRESS NOTE ADULT - ASSESSMENT
59M morbidly obese male w/ PMHx of Hepatic Cirrhosis, ascites, Thrombocytopenia, CHF, HTN, HLD, recent admit for Hepatic Encephalopathy, presents as transfer from OSH for advanced GI and hepatology eval, admitted initially 10/14-10/23 with HSE, anemia, and SBP, s/p LVP.   ID consulted for Pre-Transplant Evaluation, SBP.  S/p EGD, colonoscopy, capsule study with no significant findings.     #SBP in setting of decompensated ETOH hepatic cirrhosis - Paracentesis on 10/14 with Ecoli, s/p 7 days of ceftriaxone. Repeat Para on 10/22 abd fluid still NGTD.  No current abd pain, and nontoxic appearing without fevers.   - recommend to discontinue ceftriaxone  - can switch to levofloxacin or ciprofloxacin for secondary SBP ppx   - send blood clx if febrile    #Leukopenia - has chronic leukopenia of unknown etiology. Could be acute in setting of infection and chronic due to advanced cirrhosis but has been never been formally evaluated.  - Consider BM Bx after infection resolves    #pre-transplant eval- Will send the following serologies:   - HAV IgG pos  - HBsAb, HBsAg, HBVc total Ab: ind/neg/nneg  - HCV Ab neg  - HSV 1/2 IgG neg/neg  - EBV Serology: past EBV infection  - CMV IgG neg  - VZV IgG pos  - Measles, Mumps and Rubella IgG serologies: pos/pos/pos  - Quantiferon Gold pending  - HIV Test Neg  - Syphilis Screen neg  - Toxoplasma IgG neg  - Strongyloides Ab pending    #Vaccines -  Received prevnar 10/21/2020  - will need influenza vaccine  - other pending serologies       Natanael Shine MD  Fellow, Infectious Diseases, PGY-4  Pager: [phone]  Before 9am or after 5pm/Weekends: Call [phone]   59M morbidly obese male w/ PMHx of Hepatic Cirrhosis, ascites, Thrombocytopenia, CHF, HTN, HLD, recent admit for Hepatic Encephalopathy, presents as transfer from OSH for advanced GI and hepatology eval, admitted initially 10/14-10/23 with HSE, anemia, and SBP, s/p LVP.   ID consulted for Pre-Transplant Evaluation, SBP.  S/p EGD, colonoscopy, capsule study with no significant findings.     #SBP in setting of decompensated ETOH hepatic cirrhosis - Paracentesis on 10/14 with Ecoli, s/p 7 days of ceftriaxone. Repeat Para on 10/22 abd fluid still NGTD.  No current abd pain, and nontoxic appearing without fevers.   - recommend to discontinue ceftriaxone  - can switch to levofloxacin or ciprofloxacin for secondary SBP ppx if QTC is normal, if not can use bactrim   - send blood clx if febrile    #Leukopenia - has chronic leukopenia of unknown etiology. Could be acute in setting of infection and chronic due to advanced cirrhosis but has been never been formally evaluated.  - Consider BM Bx after infection resolves    #pre-transplant eval- Will send the following serologies:   - HAV IgG pos  - HBsAb, HBsAg, HBVc total Ab: ind/neg/nneg  - HCV Ab neg  - HSV 1/2 IgG neg/neg  - EBV Serology: past EBV infection  - CMV IgG neg  - VZV IgG pos  - Measles, Mumps and Rubella IgG serologies: pos/pos/pos  - Quantiferon Gold pending  - HIV Test Neg  - Syphilis Screen neg  - Toxoplasma IgG neg  - Strongyloides Ab pending    #Vaccines -  Received prevnar 10/21/2020  - will need influenza vaccine  - other pending serologies       Natanael Shine MD  Fellow, Infectious Diseases, PGY-4  Pager: [phone]  Before 9am or after 5pm/Weekends: Call [phone]

## 2020-10-28 NOTE — DIETITIAN INITIAL EVALUATION ADULT. - NS FNS WEIGHT CHANGE REASON
Pt reports 10-20 pounds weight gain x 3 weeks PTA due to fluid accumulation, from dry--325 pounds to 342 pounds. Weight per previous RD notes (11/11/2019) 319.9 pounds with edema -> (02/24/2020) 316.3 pounds with edema -> (10/16/2020) 342.8 pounds with edema. Weight as per flow sheets (10/25) 342.8 pounds -> (10/27) 346 pounds -?accuracy of weights due to fluid accumulation, will continue to monitor.

## 2020-10-28 NOTE — PROGRESS NOTE ADULT - ASSESSMENT
59M morbidly obese male w/ PMHx of Hepatic Cirrhosis, ascites, Thrombocytopenia, CHF, HTN, HLD, recent admx for Hepatic Encephalopathy, noncompliant with lactulose who was admitted to OSH  for hepatic encephalopathy, acute blood lose anemia (?variceal bleed, GI ulcer) and possible SBP. He was transferred to [facility] for endoscopy since he is high risk for planned procedure. He is also here for evaluation by Liver Transplant team, now s/p EGD showing gastritis no varices, colonoscopy without any source of bleed, now s/p VCE without source of bleed

## 2020-10-28 NOTE — DIETITIAN INITIAL EVALUATION ADULT. - ADD RECOMMEND
1. Will continue to monitor PO intake, weight, labs, skin, GI status, diet. 2. Provided education weight loss and heart failure nutrition therapy - made aware RD remains available. 3. BMI >40 notification placed in chart - spoke to NP. 1. Will continue to monitor PO intake, weight, labs, skin, GI status, diet. 2. Provided education weight loss and heart failure nutrition therapy - made aware RD remains available. 3. BMI >40 notification placed in chart - spoke to PA.
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
## 2020-10-28 NOTE — PROGRESS NOTE ADULT - ASSESSMENT
59M morbidly obese male with Hepatic Cirrhosis 2/2 EtOH, ascites, Thrombocytopenia, CHF, HTN, HLD, recent admx for Hepatic Encephalopathy, noncompliant with lactulose who was admitted to [facility]  10/14 - 10/23 for hepatic encephalopathy, acute blood lose anemia with hematochezia and melena; and SBP with ascitic cultures +for Ecoli.     Impression:  #Decompensated EtOH cirrhosis 2/2 GIB now resolved  - varices: present on CT, but no varices on recent EGD  - HE: present on admission to [facility], now resolved  - HCC: no focal lesions on CT  -+ascites s/p para x2, last on 10/22 with 5.7L removed, +SBP on 10/14 with ascitic cultures +Ecoli  #CHF - last TTE 2019 with EF 65%, normal RV function, normal pulmonary pressures  #MONI: slightly worsening (UA neg for protein or RBC), BRANNON 135.      Recommendation:   - c/w albumin 25% 100cc q6 for MONI  - please resend urine electrolyte, urea, creatinine as well as UA (urine sodium very high), US renal  - c/w lactulose, titrate to 2-3 BM/day  - c/w rifaximin 550 BID  - given +SBP and GIB would restart ceftriaxone 1g daily and will eventually need to switch to PO ppx for longterm on discharge  - Hold diuretics as his creatinine is worsening  -continue to monitor Cr daily.  mediterranean diet      # GIB: melena  Hg stable, brown stools.  EGD 10/25 showed mild esophagitis, no varices.  Colonoscopy 10/26: no blood, erythematous area on ascending colon s/p 1 endoclip. 2 subcentimeter polyp not removed, rectal varices.  Capsule performed: Brown stools noted in the small intestine and colon, no blood seen, although study was limited.  Rec: Currently c/w regular diet, as Hg is slightly down and brown stools. 59M morbidly obese male with Hepatic Cirrhosis 2/2 EtOH, ascites, Thrombocytopenia, CHF, HTN, HLD, recent admx for Hepatic Encephalopathy, noncompliant with lactulose who was admitted to [facility]  10/14 - 10/23 for hepatic encephalopathy, acute blood lose anemia with hematochezia and melena; and SBP with ascitic cultures +for Ecoli.     Impression:  #Decompensated EtOH cirrhosis 2/2 GIB now resolved  - varices: present on CT, but no varices on recent EGD  - HE: present on admission to [facility], now resolved  - HCC: no focal lesions on CT  -+ascites s/p para x2, last on 10/22 with 5.7L removed, +SBP on 10/14 with ascitic cultures +Ecoli  #CHF - last TTE 2019 with EF 65%, normal RV function, normal pulmonary pressures  #MONI: slightly worsening (UA neg for protein or RBC), BRANNON 135.      Recommendation:   - s/p day # 3 albumin but Cr worsening so possible HRS. nephrology consult.  - please resend urine electrolyte, urea, creatinine as well as UA (urine sodium very high), US renal  -- Hold diuretics as his creatinine is worsening  -continue to monitor Cr daily.  - c/w lactulose, titrate to 2-3 BM/day  - c/w rifaximin 550 BID  - given +SBP and GIB would restart ceftriaxone 1g daily and will eventually need to switch to PO ppx for longterm on discharge        # GIB: melena  Hg stable, brown stools.  EGD 10/25 showed mild esophagitis, no varices.  Colonoscopy 10/26: no blood, erythematous area on ascending colon s/p 1 endoclip. 2 subcentimeter polyp not removed, rectal varices.  Capsule performed: Brown stools noted in the small intestine and colon, no blood seen, although study was limited.  Rec: Currently c/w regular diet, as of now, trend CBC q12H. Keep hg > 8 (Hg slightly worsened but brown stools)   59M morbidly obese male with Hepatic Cirrhosis 2/2 EtOH, ascites, Thrombocytopenia, CHF, HTN, HLD, recent admx for Hepatic Encephalopathy, noncompliant with lactulose who was admitted to [facility]  10/14 - 10/23 for hepatic encephalopathy, acute blood lose anemia with hematochezia and melena; and SBP with ascitic cultures +for Ecoli.     Impression:  #Decompensated EtOH cirrhosis 2/2 GIB now resolved  - varices: present on CT, but no varices on recent EGD  - HE: present on admission to [facility], now resolved  - HCC: no focal lesions on CT  -+ascites s/p para x2, last on 10/22 with 5.7L removed, +SBP on 10/14 with ascitic cultures +Ecoli  #CHF - last TTE 2019 with EF 65%, normal RV function, normal pulmonary pressures  #MONI: slightly worsening (UA neg for protein or RBC), BRANNON 135.      Recommendation:   - s/p day # 3 albumin but Cr worsening so possible HRS Type 2, SBP . nephrology consult.  - f/u US renal, Urine studies does not show protein or cast  - Hold diuretics as his creatinine is worsening  -continue to monitor Cr daily.  - c/w lactulose, titrate to 2-3 BM/day  - c/w rifaximin 550 BID  - given +SBP and GIB would restart ceftriaxone 1g daily and will eventually need to switch to PO ppx for longterm on discharge        # GIB: melena  Hg stable, brown stools.  EGD 10/25 showed mild esophagitis, no varices.  Colonoscopy 10/26: no blood, erythematous area on ascending colon s/p 1 endoclip. 2 subcentimeter polyp not removed, rectal varices.  Capsule performed: Brown stools noted in the small intestine and colon, no blood seen, although study was limited.  Rec: Currently c/w regular diet, as of now, trend CBC q12H. Keep hg > 8 (Hg slightly worsened but brown stools)   59M morbidly obese male with Hepatic Cirrhosis 2/2 EtOH, ascites, Thrombocytopenia, CHF, HTN, HLD, recent admx for Hepatic Encephalopathy, noncompliant with lactulose who was admitted to [facility]  10/14 - 10/23 for hepatic encephalopathy, acute blood lose anemia with hematochezia and melena; and SBP with ascitic cultures +for Ecoli.     Impression:  #Decompensated EtOH cirrhosis 2/2 GIB now resolved  - varices: present on CT, but no varices on recent EGD  - HE: present on admission to [facility], now resolved  - HCC: no focal lesions on CT  -+ascites s/p para x2, last on 10/22 with 5.7L removed, +SBP on 10/14 with ascitic cultures +Ecoli  #CHF - last TTE 2019 with EF 65%, normal RV function, normal pulmonary pressures  #MONI: slightly worsening (UA neg for protein or RBC), Amada Fractionation is high      Recommendation:   - s/p day # 3 albumin but Cr worsening so possible HRS Type 2, SBP . nephrology consult. start albumin replacement 50gms daily.  - f/u US renal, Urine studies does not show protein or cast  - Hold diuretics as his creatinine is worsening  -continue to monitor Cr daily.  - c/w lactulose, titrate to 2-3 BM/day  - c/w rifaximin 550 BID  - given +SBP and GIB would restart ceftriaxone 1g daily and will eventually need to switch to PO ppx for longterm on discharge        # GIB: melena  Hg stable, brown stools.  EGD 10/25 showed mild esophagitis, no varices.  Colonoscopy 10/26: no blood, erythematous area on ascending colon s/p 1 endoclip. 2 subcentimeter polyp not removed, rectal varices.  Capsule performed: Brown stools noted in the small intestine and colon, no blood seen, although study was limited.  Rec: Currently c/w regular diet, as of now, trend CBC q12H. Keep hg > 8 (Hg slightly worsened but brown stools)

## 2020-10-28 NOTE — DIETITIAN INITIAL EVALUATION ADULT. - ORAL INTAKE PTA/DIET HISTORY
Pt reports good appetite and PO intake at home. Confirms NKFA. Pt reports not following any type of diet or restriction at home but states not adding salt to foods. Reports taking Folic Acid PTA; denies drinking any nutritional supplements.

## 2020-10-28 NOTE — DIETITIAN INITIAL EVALUATION ADULT. - REASON INDICATOR FOR ASSESSMENT
Pt seen for BMI >40 referral and length of stay initial assessment.   Information obtained from: medical record, previous RD notes, and pt.

## 2020-10-28 NOTE — PROGRESS NOTE ADULT - ATTENDING COMMENTS
Patient with cirrhosis now mentally clear. has decline in renal function and holding diuretics. Suggest urine studies to evaluate for HRS or volume contraction. Suggest renal consult.  Support with IV albumin. On antibiotics for SBP will require prophylaxis. No clinical GI bleeding.

## 2020-10-28 NOTE — DIETITIAN INITIAL EVALUATION ADULT. - OTHER INFO
Pt reports good appetite and PO intake in house. Noted % PO intake as per flow sheets. Denies difficulty chewing/swallowing. Pt denies nausea, vomiting, diarrhea, or constipation, reports last BM today (10/28).     Provided thorough education on weight loss and heart failure nutrition therapy. Provided education on foods containing carbohydrates, foods containing proteins, and portion sizes. Stressed the importance of a balanced meal to help with gradual/healthy weight loss. Encouraged vegetables consumption. Recommended water consumption with avoidance of soda and juice. Discussed to avoid concentrated sweets. Stressed the importance of limited salt intake. Reviewed foods high in salt and amount of salt recommended per day. Discussed nutrition label reading. Stressed the importance of limited fried foods and saturated fat consumption. Discussed how to adjust diet recall and food preferences to recommendations. Pt amenable with multiple questions - all answered; made aware RD remains available.

## 2020-10-28 NOTE — PROGRESS NOTE ADULT - PROBLEM SELECTOR PLAN 4
Cr is uptrending, presumed to be from intravascular depletion. Baseline appears to be around 1.4-1.6  will give albumin for intravascular repletion- repeat urine electrolytes and renal us to eval for possible HRS  - renal consult to be called

## 2020-10-28 NOTE — DIETITIAN INITIAL EVALUATION ADULT. - PROBLEM SELECTOR PLAN 2
- Hb stable at 8.0  - denies melena  - transferred to [facility] for EGD given that pt is high risk  - c/w Protonix 40mg ivp a q12  - c/w Carafate  - monitor Hb daily  - GI consult in AM

## 2020-10-28 NOTE — PROGRESS NOTE ADULT - SUBJECTIVE AND OBJECTIVE BOX
ID Follow Up For     Patient is a 60y old  Male who presents with a chief complaint of hepatic encephalopathy (28 Oct 2020 09:39)    Interval History/ROS:     Allergies    No Known Allergies    Intolerances        ANTIMICROBIALS:  cefTRIAXone   IVPB 1000 every 24 hours  rifAXIMin 550 two times a day      OTHER MEDS:  acetaminophen   Tablet .. 650 milliGRAM(s) Oral every 6 hours PRN  atorvastatin 20 milliGRAM(s) Oral at bedtime  ferrous    sulfate 325 milliGRAM(s) Oral daily  lactulose Syrup 20 Gram(s) Oral every 8 hours  melatonin 5 milliGRAM(s) Oral at bedtime  nystatin Powder 1 Application(s) Topical every 12 hours  pantoprazole  Injectable 40 milliGRAM(s) IV Push every 12 hours  spironolactone 12.5 milliGRAM(s) Oral daily  sucralfate 1 Gram(s) Oral four times a day      Vital Signs Last 24 Hrs  T(C): 36.5 (28 Oct 2020 04:47), Max: 36.5 (28 Oct 2020 04:47)  T(F): 97.7 (28 Oct 2020 04:47), Max: 97.7 (28 Oct 2020 04:47)  HR: 78 (28 Oct 2020 04:47) (78 - 80)  BP: 121/61 (28 Oct 2020 04:47) (113/61 - 121/61)  BP(mean): --  RR: 18 (28 Oct 2020 04:47) (18 - 18)  SpO2: 97% (28 Oct 2020 04:47) (97% - 97%)    EXAM:  Constitutional: Not in acute distress  Eyes: No icterus. Pupils b/l reactive to light.  Oral cavity: Clear, no lesions  Neck: No neck vein distension noted  RS: Chest clear to auscultation bilaterally. No wheeze/rhonchi/crepitations.  CVS: S1, S2 heard. Regular rate and rhythm. No murmurs/rubs/gallops.  Abdomen: Soft. No guarding/rigidity/tenderness.  : No acute abnormalities  Extremities: Warm. No pedal edema  Skin: No lesions noted  Vascular: No evidence of phlebitis  Neuro: Alert, oriented to time/place/person    Labs:                        7.3    2.09  )-----------( 47       ( 28 Oct 2020 06:53 )             22.5       10-28    134<L>  |  103  |  15  ----------------------------<  104<H>  4.2   |  24  |  1.75<H>    Ca    8.5      28 Oct 2020 06:53  Mg     1.9     10-27    TPro  6.1  /  Alb  3.3  /  TBili  2.1<H>  /  DBili  x   /  AST  27  /  ALT  15  /  AlkPhos  85  10-28          MICROBIOLOGY:Culture Results:   No growth at 5 days (10-22 @ 14:55)  Culture Results:   Testing in progress (10-22 @ 14:55)      RADIOLOGY:    OTHER INVESTIGATIONS: ID Follow Up For SBP, pre-OLT eval    Patient is a 60y old  Male who presents with a chief complaint of hepatic encephalopathy (28 Oct 2020 09:39)    Interval History/ROS: Endorses no events overnight. Capsule study unremarkable. Still with LE swelling. Tolerating food. No fever, chills, nausea, vomiting, abd pain.    Allergies  No Known Allergies    Intolerances  None      ANTIMICROBIALS:  cefTRIAXone   IVPB 1000 every 24 hours  rifAXIMin 550 two times a day      OTHER MEDS:  acetaminophen   Tablet .. 650 milliGRAM(s) Oral every 6 hours PRN  atorvastatin 20 milliGRAM(s) Oral at bedtime  ferrous    sulfate 325 milliGRAM(s) Oral daily  lactulose Syrup 20 Gram(s) Oral every 8 hours  melatonin 5 milliGRAM(s) Oral at bedtime  nystatin Powder 1 Application(s) Topical every 12 hours  pantoprazole  Injectable 40 milliGRAM(s) IV Push every 12 hours  spironolactone 12.5 milliGRAM(s) Oral daily  sucralfate 1 Gram(s) Oral four times a day      Vital Signs Last 24 Hrs  T(C): 36.5 (28 Oct 2020 04:47), Max: 36.5 (28 Oct 2020 04:47)  T(F): 97.7 (28 Oct 2020 04:47), Max: 97.7 (28 Oct 2020 04:47)  HR: 78 (28 Oct 2020 04:47) (78 - 80)  BP: 121/61 (28 Oct 2020 04:47) (113/61 - 121/61)  BP(mean): --  RR: 18 (28 Oct 2020 04:47) (18 - 18)  SpO2: 97% (28 Oct 2020 04:47) (97% - 97%)    EXAM:  Constitutional: Not in acute distress  Eyes: No icterus. Pupils b/l reactive to light.  Oral cavity: Clear, no lesions  Neck: No neck vein distension noted  RS: Chest clear to auscultation bilaterally. No wheeze/rhonchi/crepitations.  CVS: S1, S2 heard. Regular rate and rhythm. No murmurs/rubs/gallops.  Abdomen: distended, Left abd bag in place with ascitic fluid  : No acute abnormalities  Extremities: Warm. No pedal edema  Skin: No lesions noted  Vascular: No evidence of phlebitis  Neuro: Alert, oriented to time/place/person    Labs:                        7.3    2.09  )-----------( 47       ( 28 Oct 2020 06:53 )             22.5       10-28    134<L>  |  103  |  15  ----------------------------<  104<H>  4.2   |  24  |  1.75<H>    Ca    8.5      28 Oct 2020 06:53  Mg     1.9     10-27    TPro  6.1  /  Alb  3.3  /  TBili  2.1<H>  /  DBili  x   /  AST  27  /  ALT  15  /  AlkPhos  85  10-28        MICROBIOLOGY:Culture Results:   No growth at 5 days (10-22 @ 14:55)  Culture Results:   Testing in progress (10-22 @ 14:55)      RADIOLOGY:  None new

## 2020-10-28 NOTE — DIETITIAN INITIAL EVALUATION ADULT. - REASON FOR ADMISSION
Pt 61 y/o M with PMH: morbidly obese, hepatic Cirrhosis, ascites, Thrombocytopenia, CHF, HTN, HLD, Hepatic Encephalopathy, noncompliant with lactulose, admitted to OSH  for hepatic encephalopathy, acute blood lose anemia and possible SBP; transferred to [facility] for endoscopy and evaluation by Liver Transplant team, ascites S/P paracentesis x2 - last on (10/22), S/P EGD showing gastritis no varices, colonoscopy without any source of bleed (10/25-26), now with video capsule endoscopy.

## 2020-10-28 NOTE — PROGRESS NOTE ADULT - PROBLEM SELECTOR PLAN 1
- AMS resolved  - c/w Rifaximin 550mg po bid  - c/w Lactulose 20g po bid  - monitor LFTs  - Hepatology nayeli galvan

## 2020-10-28 NOTE — DIETITIAN INITIAL EVALUATION ADULT. - PHYSCIAL ASSESSMENT
obese Skin: no noted pressure injuries as per documentation.   No visual signs of muscle/fat loss noted.

## 2020-10-28 NOTE — PROGRESS NOTE ADULT - SUBJECTIVE AND OBJECTIVE BOX
Lewis Leon MD  Division of Hospital Medicine  Pager: [phone]  If no response or off-hours, page [phone]  -------------------------------------    Patient is a 60y old  Male who presents with a chief complaint of Pt 59 y/o M with PMH: morbidly obese, hepatic Cirrhosis, ascites, Thrombocytopenia, CHF, HTN, HLD, Hepatic Encephalopathy, noncompliant with lactulose, admitted to OSH  for hepatic encephalopathy, acute blood lose anemia and possible SBP; transferred to [facility] for endoscopy and evaluation by Liver Transplant team, ascites S/P paracentesis x2 - last on (10/22), S/P EGD showing gastritis no varices, colonoscopy without any source of bleed (10/25-26), now with video capsule endoscopy. (28 Oct 2020 11:25)      SUBJECTIVE / OVERNIGHT EVENTS: none acute  ADDITIONAL REVIEW OF SYSTEMS: pt feels well, has not noticed capsule being passes, notes no further bleeding episodes. ros otherwise unchanged.     MEDICATIONS  (STANDING):  albumin human 25% IVPB 100 milliLiter(s) IV Intermittent every 6 hours  atorvastatin 20 milliGRAM(s) Oral at bedtime  cefTRIAXone   IVPB 1000 milliGRAM(s) IV Intermittent every 24 hours  ferrous    sulfate 325 milliGRAM(s) Oral daily  lactulose Syrup 20 Gram(s) Oral every 8 hours  melatonin 5 milliGRAM(s) Oral at bedtime  nystatin Powder 1 Application(s) Topical every 12 hours  pantoprazole  Injectable 40 milliGRAM(s) IV Push every 12 hours  rifAXIMin 550 milliGRAM(s) Oral two times a day  sucralfate 1 Gram(s) Oral four times a day    MEDICATIONS  (PRN):  acetaminophen   Tablet .. 650 milliGRAM(s) Oral every 6 hours PRN Mild Pain (1 - 3), Moderate Pain (4 - 6)      CAPILLARY BLOOD GLUCOSE        I&O's Summary    27 Oct 2020 07:01  -  28 Oct 2020 07:00  --------------------------------------------------------  IN: 960 mL / OUT: 1850 mL / NET: -890 mL    28 Oct 2020 07:01  -  28 Oct 2020 16:21  --------------------------------------------------------  IN: 720 mL / OUT: 1100 mL / NET: -380 mL        PHYSICAL EXAM:  Vital Signs Last 24 Hrs  T(C): 36.9 (28 Oct 2020 13:10), Max: 36.9 (28 Oct 2020 13:10)  T(F): 98.4 (28 Oct 2020 13:10), Max: 98.4 (28 Oct 2020 13:10)  HR: 74 (28 Oct 2020 13:10) (74 - 80)  BP: 103/62 (28 Oct 2020 13:10) (103/62 - 121/61)  BP(mean): --  RR: 18 (28 Oct 2020 13:10) (18 - 18)  SpO2: 97% (28 Oct 2020 13:10) (97% - 97%)  CONSTITUTIONAL: NAD, well-developed, well-groomed  EYES: PERRLA; conjunctiva and sclera clear  ENMT: Moist oral mucosa, no pharyngeal injection or exudates; normal dentition  NECK: Supple, no palpable masses; no thyromegaly  RESPIRATORY: Normal respiratory effort; lungs are clear to auscultation bilaterally  CARDIOVASCULAR: Regular rate and rhythm, normal S1 and S2, no murmur/rub/gallop; +3 pitting edema,; Peripheral pulses are 2+ bilaterally  ABDOMEN: +distended, Nontender to palpation, normoactive bowel sounds, no rebound/guarding; No hepatosplenomegaly  MUSCLOSKELETAL:  Normal gait; no clubbing or cyanosis of digits; no joint swelling or tenderness to palpation  PSYCH: A+O to person, place, and time; affect appropriate  NEUROLOGY: CN 2-12 are intact and symmetric; no gross sensory deficits;   SKIN: No rashes; no palpable lesions    LABS:                        7.3    2.09  )-----------( 47       ( 28 Oct 2020 06:53 )             22.5     10-28    134<L>  |  103  |  15  ----------------------------<  104<H>  4.2   |  24  |  1.75<H>    Ca    8.5      28 Oct 2020 06:53  Mg     1.9     10-27    TPro  6.1  /  Alb  3.3  /  TBili  2.1<H>  /  DBili  x   /  AST  27  /  ALT  15  /  AlkPhos  85  10-28    PT/INR - ( 28 Oct 2020 08:44 )   PT: 25.1 sec;   INR: 2.21 ratio                     RADIOLOGY & ADDITIONAL TESTS:  Results Reviewed:   Imaging Personally Reviewed:  Electrocardiogram Personally Reviewed:    COORDINATION OF CARE:  Care Discussed with Consultants/Other Providers [Y/N]:  Prior or Outpatient Records Reviewed [Y/N]:

## 2020-10-29 LAB
ALBUMIN SERPL ELPH-MCNC: 3.4 G/DL — SIGNIFICANT CHANGE UP (ref 3.3–5)
ALP SERPL-CCNC: 82 U/L — SIGNIFICANT CHANGE UP (ref 40–120)
ALT FLD-CCNC: 16 U/L — SIGNIFICANT CHANGE UP (ref 10–45)
ANION GAP SERPL CALC-SCNC: 7 MMOL/L — SIGNIFICANT CHANGE UP (ref 5–17)
APPEARANCE UR: CLEAR — SIGNIFICANT CHANGE UP
AST SERPL-CCNC: 26 U/L — SIGNIFICANT CHANGE UP (ref 10–40)
BASOPHILS # BLD AUTO: 0 K/UL — SIGNIFICANT CHANGE UP (ref 0–0.2)
BASOPHILS NFR BLD AUTO: 0 % — SIGNIFICANT CHANGE UP (ref 0–2)
BILIRUB SERPL-MCNC: 2 MG/DL — HIGH (ref 0.2–1.2)
BILIRUB UR-MCNC: NEGATIVE — SIGNIFICANT CHANGE UP
BUN SERPL-MCNC: 15 MG/DL — SIGNIFICANT CHANGE UP (ref 7–23)
CALCIUM SERPL-MCNC: 8.6 MG/DL — SIGNIFICANT CHANGE UP (ref 8.4–10.5)
CHLORIDE SERPL-SCNC: 101 MMOL/L — SIGNIFICANT CHANGE UP (ref 96–108)
CO2 SERPL-SCNC: 25 MMOL/L — SIGNIFICANT CHANGE UP (ref 22–31)
COLOR SPEC: YELLOW — SIGNIFICANT CHANGE UP
CREAT ?TM UR-MCNC: 199 MG/DL — SIGNIFICANT CHANGE UP
CREAT SERPL-MCNC: 1.81 MG/DL — HIGH (ref 0.5–1.3)
DIFF PNL FLD: NEGATIVE — SIGNIFICANT CHANGE UP
EOSINOPHIL # BLD AUTO: 0.03 K/UL — SIGNIFICANT CHANGE UP (ref 0–0.5)
EOSINOPHIL NFR BLD AUTO: 1.4 % — SIGNIFICANT CHANGE UP (ref 0–6)
GLUCOSE SERPL-MCNC: 96 MG/DL — SIGNIFICANT CHANGE UP (ref 70–99)
GLUCOSE UR QL: NEGATIVE — SIGNIFICANT CHANGE UP
HCT VFR BLD CALC: 22.3 % — LOW (ref 39–50)
HGB BLD-MCNC: 7.1 G/DL — LOW (ref 13–17)
IMM GRANULOCYTES NFR BLD AUTO: 0.5 % — SIGNIFICANT CHANGE UP (ref 0–1.5)
KETONES UR-MCNC: SIGNIFICANT CHANGE UP
LEUKOCYTE ESTERASE UR-ACNC: ABNORMAL
LYMPHOCYTES # BLD AUTO: 0.54 K/UL — LOW (ref 1–3.3)
LYMPHOCYTES # BLD AUTO: 25.6 % — SIGNIFICANT CHANGE UP (ref 13–44)
MCHC RBC-ENTMCNC: 30.6 PG — SIGNIFICANT CHANGE UP (ref 27–34)
MCHC RBC-ENTMCNC: 31.8 GM/DL — LOW (ref 32–36)
MCV RBC AUTO: 96.1 FL — SIGNIFICANT CHANGE UP (ref 80–100)
MONOCYTES # BLD AUTO: 0.21 K/UL — SIGNIFICANT CHANGE UP (ref 0–0.9)
MONOCYTES NFR BLD AUTO: 10 % — SIGNIFICANT CHANGE UP (ref 2–14)
NEUTROPHILS # BLD AUTO: 1.32 K/UL — LOW (ref 1.8–7.4)
NEUTROPHILS NFR BLD AUTO: 62.5 % — SIGNIFICANT CHANGE UP (ref 43–77)
NITRITE UR-MCNC: NEGATIVE — SIGNIFICANT CHANGE UP
NRBC # BLD: 0 /100 WBCS — SIGNIFICANT CHANGE UP (ref 0–0)
OSMOLALITY UR: 321 MOS/KG — SIGNIFICANT CHANGE UP (ref 300–900)
PH UR: 6 — SIGNIFICANT CHANGE UP (ref 5–8)
PLATELET # BLD AUTO: 52 K/UL — LOW (ref 150–400)
POTASSIUM SERPL-MCNC: 4.2 MMOL/L — SIGNIFICANT CHANGE UP (ref 3.5–5.3)
POTASSIUM SERPL-SCNC: 4.2 MMOL/L — SIGNIFICANT CHANGE UP (ref 3.5–5.3)
PROT SERPL-MCNC: 6.6 G/DL — SIGNIFICANT CHANGE UP (ref 6–8.3)
PROT UR-MCNC: SIGNIFICANT CHANGE UP
RBC # BLD: 2.32 M/UL — LOW (ref 4.2–5.8)
RBC # FLD: 20.4 % — HIGH (ref 10.3–14.5)
SODIUM SERPL-SCNC: 133 MMOL/L — LOW (ref 135–145)
SODIUM UR-SCNC: <35 MMOL/L — SIGNIFICANT CHANGE UP
SP GR SPEC: 1.02 — SIGNIFICANT CHANGE UP (ref 1.01–1.02)
STRONGYLOIDES AB SER-ACNC: NEGATIVE — SIGNIFICANT CHANGE UP
UROBILINOGEN FLD QL: NEGATIVE — SIGNIFICANT CHANGE UP
UUN UR-MCNC: 469 MG/DL — SIGNIFICANT CHANGE UP
WBC # BLD: 2.11 K/UL — LOW (ref 3.8–10.5)
WBC # FLD AUTO: 2.11 K/UL — LOW (ref 3.8–10.5)

## 2020-10-29 PROCEDURE — 99232 SBSQ HOSP IP/OBS MODERATE 35: CPT | Mod: GC

## 2020-10-29 PROCEDURE — 99233 SBSQ HOSP IP/OBS HIGH 50: CPT

## 2020-10-29 PROCEDURE — 99255 IP/OBS CONSLTJ NEW/EST HI 80: CPT | Mod: GC

## 2020-10-29 RX ORDER — MORPHINE SULFATE 50 MG/1
1 CAPSULE, EXTENDED RELEASE ORAL ONCE
Refills: 0 | Status: DISCONTINUED | OUTPATIENT
Start: 2020-10-29 | End: 2020-10-29

## 2020-10-29 RX ORDER — ACETAMINOPHEN 500 MG
1000 TABLET ORAL ONCE
Refills: 0 | Status: COMPLETED | OUTPATIENT
Start: 2020-10-29 | End: 2020-10-29

## 2020-10-29 RX ORDER — MIDODRINE HYDROCHLORIDE 2.5 MG/1
5 TABLET ORAL THREE TIMES A DAY
Refills: 0 | Status: DISCONTINUED | OUTPATIENT
Start: 2020-10-29 | End: 2020-11-05

## 2020-10-29 RX ORDER — MORPHINE SULFATE 50 MG/1
1.5 CAPSULE, EXTENDED RELEASE ORAL ONCE
Refills: 0 | Status: DISCONTINUED | OUTPATIENT
Start: 2020-10-29 | End: 2020-10-29

## 2020-10-29 RX ORDER — OCTREOTIDE ACETATE 200 UG/ML
100 INJECTION, SOLUTION INTRAVENOUS; SUBCUTANEOUS THREE TIMES A DAY
Refills: 0 | Status: DISCONTINUED | OUTPATIENT
Start: 2020-10-29 | End: 2020-11-05

## 2020-10-29 RX ADMIN — Medication 1000 MILLIGRAM(S): at 01:18

## 2020-10-29 RX ADMIN — Medication 5 MILLIGRAM(S): at 21:21

## 2020-10-29 RX ADMIN — ATORVASTATIN CALCIUM 20 MILLIGRAM(S): 80 TABLET, FILM COATED ORAL at 21:21

## 2020-10-29 RX ADMIN — MORPHINE SULFATE 1.5 MILLIGRAM(S): 50 CAPSULE, EXTENDED RELEASE ORAL at 23:50

## 2020-10-29 RX ADMIN — MORPHINE SULFATE 1 MILLIGRAM(S): 50 CAPSULE, EXTENDED RELEASE ORAL at 00:47

## 2020-10-29 RX ADMIN — OCTREOTIDE ACETATE 100 MICROGRAM(S): 200 INJECTION, SOLUTION INTRAVENOUS; SUBCUTANEOUS at 23:44

## 2020-10-29 RX ADMIN — NYSTATIN CREAM 1 APPLICATION(S): 100000 CREAM TOPICAL at 09:12

## 2020-10-29 RX ADMIN — MORPHINE SULFATE 1 MILLIGRAM(S): 50 CAPSULE, EXTENDED RELEASE ORAL at 08:50

## 2020-10-29 RX ADMIN — Medication 1 GRAM(S): at 23:46

## 2020-10-29 RX ADMIN — PANTOPRAZOLE SODIUM 40 MILLIGRAM(S): 20 TABLET, DELAYED RELEASE ORAL at 08:21

## 2020-10-29 RX ADMIN — Medication 50 MILLILITER(S): at 23:47

## 2020-10-29 RX ADMIN — Medication 1 GRAM(S): at 00:24

## 2020-10-29 RX ADMIN — LACTULOSE 20 GRAM(S): 10 SOLUTION ORAL at 06:33

## 2020-10-29 RX ADMIN — Medication 1 GRAM(S): at 06:32

## 2020-10-29 RX ADMIN — Medication 325 MILLIGRAM(S): at 12:32

## 2020-10-29 RX ADMIN — MIDODRINE HYDROCHLORIDE 5 MILLIGRAM(S): 2.5 TABLET ORAL at 10:40

## 2020-10-29 RX ADMIN — PANTOPRAZOLE SODIUM 40 MILLIGRAM(S): 20 TABLET, DELAYED RELEASE ORAL at 21:19

## 2020-10-29 RX ADMIN — Medication 1 GRAM(S): at 12:33

## 2020-10-29 RX ADMIN — CEFTRIAXONE 100 MILLIGRAM(S): 500 INJECTION, POWDER, FOR SOLUTION INTRAMUSCULAR; INTRAVENOUS at 17:58

## 2020-10-29 RX ADMIN — MORPHINE SULFATE 1 MILLIGRAM(S): 50 CAPSULE, EXTENDED RELEASE ORAL at 09:05

## 2020-10-29 RX ADMIN — LACTULOSE 20 GRAM(S): 10 SOLUTION ORAL at 14:00

## 2020-10-29 RX ADMIN — Medication 400 MILLIGRAM(S): at 00:34

## 2020-10-29 RX ADMIN — Medication 50 MILLILITER(S): at 06:31

## 2020-10-29 RX ADMIN — OCTREOTIDE ACETATE 100 MICROGRAM(S): 200 INJECTION, SOLUTION INTRAVENOUS; SUBCUTANEOUS at 10:40

## 2020-10-29 RX ADMIN — Medication 50 MILLILITER(S): at 18:40

## 2020-10-29 RX ADMIN — Medication 50 MILLILITER(S): at 12:33

## 2020-10-29 RX ADMIN — Medication 1 GRAM(S): at 17:57

## 2020-10-29 RX ADMIN — Medication 50 MILLILITER(S): at 00:22

## 2020-10-29 RX ADMIN — MIDODRINE HYDROCHLORIDE 5 MILLIGRAM(S): 2.5 TABLET ORAL at 17:57

## 2020-10-29 RX ADMIN — MORPHINE SULFATE 1 MILLIGRAM(S): 50 CAPSULE, EXTENDED RELEASE ORAL at 00:34

## 2020-10-29 NOTE — CONSULT NOTE ADULT - ASSESSMENT
59 M morbidly obese male with  PMHx of Hepatic Cirrhosis, Thrombocytopenia, CHF, HTN, HLD, admitted for, acute blood lose anemia and SBP with ascitic cultures +for Ecoli (10/14). Nephrology team consulted for MONI and concern for HRS.    #Acute Kidney Injury  - Patient with MONI differentials could be pre-renal vs HRS. Patient received IV albumin infusion since 10/24 however with rising serum creatinine. Although patient was also receiving diuretics (Spironolactone and Lasix) at the time. MONI can also be contributed to diuretic use    Recommendations:  - Agree with holding spironolactone  - Urine lytes suggestive of pre-renal state. UA bland  - Check bladder scan/PVR. Consider rod catheter placement  - Check renal sonogram   - Agree with IV albumin infusion, Octreotide and Midodrine   -  Monitor labs and urine output. Avoid NSAIDs, ACEI/ARBS, RCA and nephrotoxins. Dose medications as per eGFR.   59 M morbidly obese male with  PMHx of Hepatic Cirrhosis, Thrombocytopenia, CHF, HTN, HLD, admitted for, acute blood lose anemia and SBP with ascitic cultures +for Ecoli (10/14). Nephrology team consulted for MONI and concern for HRS.    #Acute Kidney Injury  - Patient with MONI differentials could be pre-renal (in the setting of GIB, SBP, diuretic use) vs HRS. Patient received IV albumin infusion since 10/24 however with rising serum creatinine. Although patient was also receiving diuretics (Spironolactone and Lasix) at the time.     Recommendations:  - Agree with holding spironolactone  - Urine lytes suggestive of pre-renal state. UA bland  - Check bladder scan/PVR. Consider rod catheter placement  - Check renal sonogram   - Agree with IV albumin infusion, Octreotide and Midodrine   -  Monitor labs and urine output. Avoid NSAIDs, ACEI/ARBS, RCA and nephrotoxins. Dose medications as per eGFR.   59 M morbidly obese male with  PMHx of Hepatic Cirrhosis, Thrombocytopenia, CHF, HTN, HLD, admitted for, acute blood lose anemia and SBP with ascitic cultures +for Ecoli (10/14). Nephrology team consulted for MONI and concern for HRS.    #Acute Kidney Injury  - Patient with MONI differentials could be pre-renal (in the setting of GIB, SBP, diuretic use) vs HRS. Patient received IV albumin infusion since 10/24 however with rising serum creatinine. Although patient was also receiving diuretics (Spironolactone and Lasix) at the time.     Recommendations:  - Agree with holding spironolactone  - Urine lytes suggestive of pre-renal state. UA bland  - Check bladder scan/PVR. Consider rod catheter placement  - Check renal sonogram   - Agree with IV albumin infusion, Octreotide and Midodrine   -  Monitor labs and urine output. Avoid NSAIDs, ACEI/ARBS, RCA and nephrotoxins. Dose medications as per eGFR.    If you have any questions, please feel free to contact me  Doron Juarez  Nephrology Fellow  Pager: [phone] / [phone]  (After 5pm or on weekends please page the on-call fellow)

## 2020-10-29 NOTE — PROGRESS NOTE ADULT - PROBLEM SELECTOR PLAN 4
Cr is uptrending, presumed to be from intravascular depletion. Baseline appears to be around 1.4-1.6. Uptrending despite holding diuretics and starting albumin  - renal consulted- continue midodrine/octretide/albumin for presumed HRS  - f/u u-lytes  - rod recommended, patient refusing, f/u PVR/bladder scan to r/o obstruction  - f/u renal US to r/o hydro  - strictt I/O monitoring

## 2020-10-29 NOTE — PROGRESS NOTE ADULT - SUBJECTIVE AND OBJECTIVE BOX
Lewis Leon MD  Division of Hospital Medicine  Pager: [phone]  If no response or off-hours, page [phone]  -------------------------------------    Patient is a 60y old  Male who presents with a chief complaint of hepatic encephalopathy (29 Oct 2020 15:10)      SUBJECTIVE / OVERNIGHT EVENTS: none acute. Cr uptrending despite holding diuretics and giving albumin  ADDITIONAL REVIEW OF SYSTEMS: Pt feels well, no new complaints. Denies aches/pains, no fevers/chills. No n/v, having BMs. Does not want rod catheter placed.    MEDICATIONS  (STANDING):  albumin human 25% IVPB 100 milliLiter(s) IV Intermittent every 6 hours  atorvastatin 20 milliGRAM(s) Oral at bedtime  cefTRIAXone   IVPB 1000 milliGRAM(s) IV Intermittent every 24 hours  ferrous    sulfate 325 milliGRAM(s) Oral daily  lactulose Syrup 20 Gram(s) Oral every 8 hours  melatonin 5 milliGRAM(s) Oral at bedtime  midodrine. 5 milliGRAM(s) Oral three times a day  nystatin Powder 1 Application(s) Topical every 12 hours  octreotide  Injectable 100 MICROGram(s) SubCutaneous three times a day  pantoprazole  Injectable 40 milliGRAM(s) IV Push every 12 hours  rifAXIMin 550 milliGRAM(s) Oral two times a day  sucralfate 1 Gram(s) Oral four times a day    MEDICATIONS  (PRN):  acetaminophen   Tablet .. 650 milliGRAM(s) Oral every 6 hours PRN Mild Pain (1 - 3), Moderate Pain (4 - 6)      CAPILLARY BLOOD GLUCOSE        I&O's Summary    28 Oct 2020 07:01  -  29 Oct 2020 07:00  --------------------------------------------------------  IN: 1280 mL / OUT: 1900 mL / NET: -620 mL    29 Oct 2020 07:01  -  29 Oct 2020 16:16  --------------------------------------------------------  IN: 820 mL / OUT: 1050 mL / NET: -230 mL        PHYSICAL EXAM:  Vital Signs Last 24 Hrs  T(C): 36.6 (29 Oct 2020 11:48), Max: 36.8 (28 Oct 2020 20:46)  T(F): 97.8 (29 Oct 2020 11:48), Max: 98.2 (28 Oct 2020 20:46)  HR: 70 (29 Oct 2020 12:50) (70 - 97)  BP: 115/61 (29 Oct 2020 12:50) (106/60 - 115/61)  BP(mean): --  RR: 18 (29 Oct 2020 12:50) (17 - 18)  SpO2: 96% (29 Oct 2020 12:50) (95% - 99%)  CONSTITUTIONAL: NAD, well-developed, well-groomed  EYES: PERRLA; conjunctiva and sclera clear  ENMT: Moist oral mucosa, no pharyngeal injection or exudates; normal dentition  NECK: Supple, no palpable masses; no thyromegaly  RESPIRATORY: Normal respiratory effort; lungs are clear to auscultation bilaterally  CARDIOVASCULAR: Regular rate and rhythm, normal S1 and S2, no murmur/rub/gallop; +3 pitting edema, Peripheral pulses are 2+ bilaterally  ABDOMEN: +distedned, Nontender to palpation, normoactive bowel sounds, no rebound/guarding; No hepatosplenomegaly  MUSCLOSKELETAL:  Normal gait; no clubbing or cyanosis of digits; no joint swelling or tenderness to palpation  PSYCH: A+O to person, place, and time; affect appropriate  NEUROLOGY: CN 2-12 are intact and symmetric; no gross sensory deficits;   SKIN: No rashes; no palpable lesions    LABS:                        7.1    2.11  )-----------( 52       ( 29 Oct 2020 06:51 )             22.3     10-29    133<L>  |  101  |  15  ----------------------------<  96  4.2   |  25  |  1.81<H>    Ca    8.6      29 Oct 2020 06:51    TPro  6.6  /  Alb  3.4  /  TBili  2.0<H>  /  DBili  x   /  AST  26  /  ALT  16  /  AlkPhos  82  10-29    PT/INR - ( 28 Oct 2020 08:44 )   PT: 25.1 sec;   INR: 2.21 ratio               Urinalysis Basic - ( 29 Oct 2020 07:08 )    Color: Yellow / Appearance: Clear / S.016 / pH: x  Gluc: x / Ketone: Trace  / Bili: Negative / Urobili: Negative   Blood: x / Protein: Trace / Nitrite: Negative   Leuk Esterase: Small / RBC: 0 /hpf / WBC 3 /HPF   Sq Epi: x / Non Sq Epi: 1 /hpf / Bacteria: Negative          RADIOLOGY & ADDITIONAL TESTS:  Results Reviewed:   Imaging Personally Reviewed:  Electrocardiogram Personally Reviewed:    COORDINATION OF CARE:  Care Discussed with Consultants/Other Providers [Y/N]: hepatology  Prior or Outpatient Records Reviewed [Y/N]:
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
## 2020-10-29 NOTE — PROGRESS NOTE ADULT - ASSESSMENT
59M morbidly obese male with Hepatic Cirrhosis 2/2 EtOH, ascites, Thrombocytopenia, CHF, HTN, HLD, recent admx for Hepatic Encephalopathy, noncompliant with lactulose who was admitted to [facility]  10/14 - 10/23 for hepatic encephalopathy, acute blood lose anemia with hematochezia and melena; and SBP with ascitic cultures +for Ecoli.     Impression:  #Decompensated EtOH cirrhosis 2/2 GIB now resolved  - varices: present on CT, but no varices on recent EGD  - HE: present on admission to [facility], now resolved  - HCC: no focal lesions on CT  -+ascites s/p para x2, last on 10/22 with 5.7L removed, +SBP on 10/14 with ascitic cultures +Ecoli  #CHF - last TTE 2019 with EF 65%, normal RV function, normal pulmonary pressures  #MONI: slightly worsening (UA neg for protein or RBC), Amada Fractionation is high      Recommendation:   - s/p day # 3 albumin but Cr worsening so possible HRS Type 2, SBP . nephrology consult. start albumin replacement 50gms daily.  - f/u US renal, Urine studies does not show protein or cast, follow up nephrology  - Hold diuretics as his creatinine is worsening  -continue to monitor Cr daily.  - c/w lactulose, titrate to 2-3 BM/day  - c/w rifaximin 550 BID  - given +SBP and GIB would restart ceftriaxone 1g daily and will eventually need to switch to PO ppx for longterm on discharge  -Please follow with nephrology for MONI.    If you have any questions from 7am to 5pm through Monday to Thursday, please contact on my cell phone ([phone])

## 2020-10-29 NOTE — PROGRESS NOTE ADULT - ASSESSMENT
59M morbidly obese male w/ PMHx of Hepatic Cirrhosis, ascites, Thrombocytopenia, CHF, HTN, HLD, recent admit for Hepatic Encephalopathy, presents as transfer from OSH for advanced GI and hepatology eval, admitted initially 10/14-10/23 with HSE, anemia, and SBP, s/p LVP.   ID consulted for Pre-Transplant Evaluation, SBP.  S/p EGD, colonoscopy, capsule study with no significant findings.     #SBP in setting of decompensated ETOH hepatic cirrhosis -   Paracentesis on 10/14 with Ecoli, s/p 7 days of ceftriaxone. Repeat Para on 10/22 abd fluid still NGTD.  No current abd pain, and nontoxic appearing without fevers.   - recommend to discontinue ceftriaxone  - can switch to levofloxacin for secondary SBP ppx if QTC is normal, if not can use bactrim   - send blood clx if febrile    #Leukopenia - has chronic leukopenia of unknown etiology. Could be acute in setting of infection and chronic due to advanced cirrhosis but has been never been formally evaluated.  - Consider BM Bx after infection resolves    #pre-transplant eval-   - HAV IgG pos  - HBsAb, HBsAg, HBVc total Ab: ind/neg/nneg  - HCV Ab neg  - HSV 1/2 IgG neg/neg  - EBV Serology: past EBV infection  - CMV IgG neg  - VZV IgG pos  - Measles, Mumps and Rubella IgG serologies: pos/pos/pos  - Quantiferon Gold pending  - HIV Test Neg  - Syphilis Screen neg  - Toxoplasma IgG neg  - Strongyloides Ab pending    #Vaccines -  Received prevnar 10/21/2020  - will need influenza vaccine  - will need Hep B series  - pneumovax 8 weeks from prevnar       Natanael Shine MD  Fellow, Infectious Diseases, PGY-4  Pager: [phone]  Before 9am or after 5pm/Weekends: Call [phone]

## 2020-10-29 NOTE — PROGRESS NOTE ADULT - ATTENDING COMMENTS
Patient with fatty cirrhosis and now has rising creat.  Receiving albumin. suggest urine studies and renal consult advice. Hold diuretics and maintain low salt diet.  s/p SBP. if further renal deterioration suggest ultrasound and re-evaluation of ascites.

## 2020-10-29 NOTE — PROGRESS NOTE ADULT - SUBJECTIVE AND OBJECTIVE BOX
ID Follow Up For SBP    Patient is a 60y old  Male who presents with a chief complaint of hepatic encephalopathy (28 Oct 2020 16:21)    Interval History/ROS: No events overnight. Remains with left abd output and significant leg swelling. tolerating PO. Denies fevers, chills, nausea, vomiting, abd pain.    Allergies  No Known Allergies    Intolerances  None      ANTIMICROBIALS:  cefTRIAXone   IVPB 1000 every 24 hours  rifAXIMin 550 two times a day      OTHER MEDS:  acetaminophen   Tablet .. 650 milliGRAM(s) Oral every 6 hours PRN  albumin human 25% IVPB 100 milliLiter(s) IV Intermittent every 6 hours  atorvastatin 20 milliGRAM(s) Oral at bedtime  ferrous    sulfate 325 milliGRAM(s) Oral daily  lactulose Syrup 20 Gram(s) Oral every 8 hours  melatonin 5 milliGRAM(s) Oral at bedtime  midodrine. 5 milliGRAM(s) Oral three times a day  nystatin Powder 1 Application(s) Topical every 12 hours  octreotide  Injectable 100 MICROGram(s) SubCutaneous three times a day  pantoprazole  Injectable 40 milliGRAM(s) IV Push every 12 hours  sucralfate 1 Gram(s) Oral four times a day      Vital Signs Last 24 Hrs  T(C): 36.6 (29 Oct 2020 11:48), Max: 36.8 (28 Oct 2020 20:46)  T(F): 97.8 (29 Oct 2020 11:48), Max: 98.2 (28 Oct 2020 20:46)  HR: 72 (29 Oct 2020 11:48) (72 - 97)  BP: 111/65 (29 Oct 2020 11:48) (106/60 - 111/65)  BP(mean): --  RR: 18 (29 Oct 2020 11:48) (17 - 18)  SpO2: 95% (29 Oct 2020 11:48) (95% - 99%)    EXAM:  Constitutional: Not in acute distress  Eyes: No icterus. Pupils b/l reactive to light.  Oral cavity: Clear, no lesions  Neck: No neck vein distension noted  RS: Chest clear to auscultation bilaterally. No wheeze/rhonchi/crepitations.  CVS: S1, S2 heard. Regular rate and rhythm. No murmurs/rubs/gallops.  Abdomen: Soft. distended. No guarding/rigidity/tenderness. Left sided ascites bag attached  : No acute abnormalities  Extremities: Warm. +3 pitting edema.   Skin: No lesions noted  Vascular: No evidence of phlebitis  Neuro: Alert, oriented to time/place/person    Labs:                        7.1    2.11  )-----------( 52       ( 29 Oct 2020 06:51 )             22.3       10-29    133<L>  |  101  |  15  ----------------------------<  96  4.2   |  25  |  1.81<H>    Ca    8.6      29 Oct 2020 06:51    TPro  6.6  /  Alb  3.4  /  TBili  2.0<H>  /  DBili  x   /  AST  26  /  ALT  16  /  AlkPhos  82  10-29      Urinalysis Basic - ( 29 Oct 2020 07:08 )    Color: Yellow / Appearance: Clear / S.016 / pH: x  Gluc: x / Ketone: Trace  / Bili: Negative / Urobili: Negative   Blood: x / Protein: Trace / Nitrite: Negative   Leuk Esterase: Small / RBC: 0 /hpf / WBC 3 /HPF   Sq Epi: x / Non Sq Epi: 1 /hpf / Bacteria: Negative      MICROBIOLOGY:Culture Results:   No growth at 5 days (10-22 @ 14:55)  Culture Results:   Testing in progress (10-22 @ 14:55)      RADIOLOGY:  None new
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
## 2020-10-29 NOTE — PROGRESS NOTE ADULT - ASSESSMENT
59M morbidly obese male w/ PMHx of Hepatic Cirrhosis, ascites, Thrombocytopenia, CHF, HTN, HLD, recent admx for Hepatic Encephalopathy, noncompliant with lactulose who was admitted to OSH  for hepatic encephalopathy, acute blood lose anemia (?variceal bleed, GI ulcer) and possible SBP. He was transferred to [facility] for endoscopy since he is high risk for planned procedure. He is also here for evaluation by Liver Transplant team, now s/p EGD showing gastritis no varices, colonoscopy without any source of bleed, now s/p VCE without source of bleed. Now with uptrending Cr concerning for pre-renal vs. HRS.

## 2020-10-29 NOTE — PROGRESS NOTE ADULT - ATTENDING COMMENTS
Patient seen and examined    Case discussed with Dr Shine    I agree with his interval history exam and plans as noted above    Remains with significant abdominal ascites, slowed mentation and elevated creatinine suggesting hepatorenal syndrome  Remains on IV ceftriaxone for secondary SBP prophylaxis but could consider changing to oral levaquin 250mg/day  ? benefit of a TIPs procedure in this setting to help manage the ascites  continues to receive albumin to help improve intravascular volume    prognosis poor    Max Ashby MD  [phone]  After 5pm/weekends [phone]

## 2020-10-30 DIAGNOSIS — E87.1 HYPO-OSMOLALITY AND HYPONATREMIA: ICD-10-CM

## 2020-10-30 LAB
ANION GAP SERPL CALC-SCNC: 9 MMOL/L — SIGNIFICANT CHANGE UP (ref 5–17)
APPEARANCE UR: CLEAR — SIGNIFICANT CHANGE UP
BILIRUB UR-MCNC: NEGATIVE — SIGNIFICANT CHANGE UP
BUN SERPL-MCNC: 16 MG/DL — SIGNIFICANT CHANGE UP (ref 7–23)
CALCIUM SERPL-MCNC: 8.7 MG/DL — SIGNIFICANT CHANGE UP (ref 8.4–10.5)
CHLORIDE SERPL-SCNC: 98 MMOL/L — SIGNIFICANT CHANGE UP (ref 96–108)
CHLORIDE UR-SCNC: <35 MMOL/L — SIGNIFICANT CHANGE UP
CO2 SERPL-SCNC: 23 MMOL/L — SIGNIFICANT CHANGE UP (ref 22–31)
COLOR SPEC: YELLOW — SIGNIFICANT CHANGE UP
CREAT SERPL-MCNC: 1.87 MG/DL — HIGH (ref 0.5–1.3)
DIFF PNL FLD: NEGATIVE — SIGNIFICANT CHANGE UP
GLUCOSE SERPL-MCNC: 113 MG/DL — HIGH (ref 70–99)
GLUCOSE UR QL: NEGATIVE — SIGNIFICANT CHANGE UP
HCT VFR BLD CALC: 22.3 % — LOW (ref 39–50)
HGB BLD-MCNC: 7.1 G/DL — LOW (ref 13–17)
KETONES UR-MCNC: SIGNIFICANT CHANGE UP
LEUKOCYTE ESTERASE UR-ACNC: NEGATIVE — SIGNIFICANT CHANGE UP
MCHC RBC-ENTMCNC: 30.6 PG — SIGNIFICANT CHANGE UP (ref 27–34)
MCHC RBC-ENTMCNC: 31.8 GM/DL — LOW (ref 32–36)
MCV RBC AUTO: 96.1 FL — SIGNIFICANT CHANGE UP (ref 80–100)
NITRITE UR-MCNC: NEGATIVE — SIGNIFICANT CHANGE UP
NRBC # BLD: 0 /100 WBCS — SIGNIFICANT CHANGE UP (ref 0–0)
OSMOLALITY UR: 296 MOSM/KG — SIGNIFICANT CHANGE UP (ref 50–1200)
PH UR: 5.5 — SIGNIFICANT CHANGE UP (ref 5–8)
PLATELET # BLD AUTO: 44 K/UL — LOW (ref 150–400)
POTASSIUM SERPL-MCNC: 4.4 MMOL/L — SIGNIFICANT CHANGE UP (ref 3.5–5.3)
POTASSIUM SERPL-SCNC: 4.4 MMOL/L — SIGNIFICANT CHANGE UP (ref 3.5–5.3)
PROT UR-MCNC: SIGNIFICANT CHANGE UP
RBC # BLD: 2.32 M/UL — LOW (ref 4.2–5.8)
RBC # FLD: 20.1 % — HIGH (ref 10.3–14.5)
SODIUM SERPL-SCNC: 130 MMOL/L — LOW (ref 135–145)
SODIUM UR-SCNC: <35 MMOL/L — SIGNIFICANT CHANGE UP
SP GR SPEC: 1.02 — SIGNIFICANT CHANGE UP (ref 1.01–1.02)
UROBILINOGEN FLD QL: SIGNIFICANT CHANGE UP
UUN UR-MCNC: 373 MG/DL — SIGNIFICANT CHANGE UP
WBC # BLD: 1.72 K/UL — LOW (ref 3.8–10.5)
WBC # FLD AUTO: 1.72 K/UL — LOW (ref 3.8–10.5)

## 2020-10-30 PROCEDURE — 76775 US EXAM ABDO BACK WALL LIM: CPT | Mod: 26

## 2020-10-30 PROCEDURE — 99233 SBSQ HOSP IP/OBS HIGH 50: CPT

## 2020-10-30 PROCEDURE — 99233 SBSQ HOSP IP/OBS HIGH 50: CPT | Mod: GC

## 2020-10-30 PROCEDURE — 99232 SBSQ HOSP IP/OBS MODERATE 35: CPT | Mod: GC

## 2020-10-30 RX ADMIN — Medication 50 MILLILITER(S): at 05:18

## 2020-10-30 RX ADMIN — OCTREOTIDE ACETATE 100 MICROGRAM(S): 200 INJECTION, SOLUTION INTRAVENOUS; SUBCUTANEOUS at 13:35

## 2020-10-30 RX ADMIN — PANTOPRAZOLE SODIUM 40 MILLIGRAM(S): 20 TABLET, DELAYED RELEASE ORAL at 20:53

## 2020-10-30 RX ADMIN — OCTREOTIDE ACETATE 100 MICROGRAM(S): 200 INJECTION, SOLUTION INTRAVENOUS; SUBCUTANEOUS at 05:19

## 2020-10-30 RX ADMIN — NYSTATIN CREAM 1 APPLICATION(S): 100000 CREAM TOPICAL at 09:30

## 2020-10-30 RX ADMIN — Medication 50 MILLILITER(S): at 12:49

## 2020-10-30 RX ADMIN — MIDODRINE HYDROCHLORIDE 5 MILLIGRAM(S): 2.5 TABLET ORAL at 12:50

## 2020-10-30 RX ADMIN — CEFTRIAXONE 100 MILLIGRAM(S): 500 INJECTION, POWDER, FOR SOLUTION INTRAMUSCULAR; INTRAVENOUS at 17:01

## 2020-10-30 RX ADMIN — Medication 325 MILLIGRAM(S): at 12:51

## 2020-10-30 RX ADMIN — MIDODRINE HYDROCHLORIDE 5 MILLIGRAM(S): 2.5 TABLET ORAL at 05:19

## 2020-10-30 RX ADMIN — Medication 5 MILLIGRAM(S): at 21:03

## 2020-10-30 RX ADMIN — ATORVASTATIN CALCIUM 20 MILLIGRAM(S): 80 TABLET, FILM COATED ORAL at 21:03

## 2020-10-30 RX ADMIN — MORPHINE SULFATE 1.5 MILLIGRAM(S): 50 CAPSULE, EXTENDED RELEASE ORAL at 02:00

## 2020-10-30 RX ADMIN — Medication 1 GRAM(S): at 05:19

## 2020-10-30 RX ADMIN — OCTREOTIDE ACETATE 100 MICROGRAM(S): 200 INJECTION, SOLUTION INTRAVENOUS; SUBCUTANEOUS at 21:04

## 2020-10-30 RX ADMIN — NYSTATIN CREAM 1 APPLICATION(S): 100000 CREAM TOPICAL at 20:53

## 2020-10-30 RX ADMIN — LACTULOSE 20 GRAM(S): 10 SOLUTION ORAL at 13:35

## 2020-10-30 RX ADMIN — Medication 1 GRAM(S): at 17:01

## 2020-10-30 RX ADMIN — Medication 1 GRAM(S): at 12:50

## 2020-10-30 RX ADMIN — MIDODRINE HYDROCHLORIDE 5 MILLIGRAM(S): 2.5 TABLET ORAL at 17:01

## 2020-10-30 RX ADMIN — PANTOPRAZOLE SODIUM 40 MILLIGRAM(S): 20 TABLET, DELAYED RELEASE ORAL at 09:20

## 2020-10-30 RX ADMIN — Medication 50 MILLILITER(S): at 17:02

## 2020-10-30 NOTE — PROGRESS NOTE ADULT - ATTENDING COMMENTS
Patient seen and examined with Dr. Shine    I agree with his interval history exam and plans as noted above    Remains with significant ascites with [place] skin changes  distal edema 3+  slow mentation with encephalopathy    on Ceftriaxone for SBP secondary prophylaxis in te setting of poor GI absorption and third spacing    Max Ashby MD  [phone]  After 5pm/weekends [phone]

## 2020-10-30 NOTE — PROGRESS NOTE ADULT - SUBJECTIVE AND OBJECTIVE BOX
ID Follow Up For     Patient is a 60y old  Male who presents with a chief complaint of hepatic encephalopathy (30 Oct 2020 09:04)    Interval History/ROS: No events overnight.     Allergies    No Known Allergies    Intolerances        ANTIMICROBIALS:  cefTRIAXone   IVPB 1000 every 24 hours  rifAXIMin 550 two times a day      OTHER MEDS:  acetaminophen   Tablet .. 650 milliGRAM(s) Oral every 6 hours PRN  albumin human 25% IVPB 100 milliLiter(s) IV Intermittent every 6 hours  atorvastatin 20 milliGRAM(s) Oral at bedtime  ferrous    sulfate 325 milliGRAM(s) Oral daily  lactulose Syrup 20 Gram(s) Oral every 8 hours  melatonin 5 milliGRAM(s) Oral at bedtime  midodrine. 5 milliGRAM(s) Oral three times a day  nystatin Powder 1 Application(s) Topical every 12 hours  octreotide  Injectable 100 MICROGram(s) SubCutaneous three times a day  pantoprazole  Injectable 40 milliGRAM(s) IV Push every 12 hours  sucralfate 1 Gram(s) Oral four times a day      Vital Signs Last 24 Hrs  T(C): 36.9 (30 Oct 2020 13:50), Max: 36.9 (30 Oct 2020 13:50)  T(F): 98.5 (30 Oct 2020 13:50), Max: 98.5 (30 Oct 2020 13:50)  HR: 66 (30 Oct 2020 13:50) (66 - 67)  BP: 104/61 (30 Oct 2020 13:50) (103/67 - 120/64)  BP(mean): --  RR: 18 (30 Oct 2020 13:50) (18 - 18)  SpO2: 96% (30 Oct 2020 13:50) (95% - 97%)    EXAM:  Constitutional: Not in acute distress  Eyes: No icterus. Pupils b/l reactive to light.  Oral cavity: Clear, no lesions  Neck: No neck vein distension noted  RS: Chest clear to auscultation bilaterally. No wheeze/rhonchi/crepitations.  CVS: S1, S2 heard. Regular rate and rhythm. No murmurs/rubs/gallops.  Abdomen: Soft. No guarding/rigidity/tenderness.  : No acute abnormalities  Extremities: Warm. No pedal edema  Skin: No lesions noted  Vascular: No evidence of phlebitis  Neuro: Alert, oriented to time/place/person    Labs:                        7.1    1.72  )-----------( 44       ( 30 Oct 2020 07:06 )             22.3       10-30    130<L>  |  98  |  16  ----------------------------<  113<H>  4.4   |  23  |  1.87<H>    Ca    8.7      30 Oct 2020 07:04    TPro  6.6  /  Alb  3.4  /  TBili  2.0<H>  /  DBili  x   /  AST  26  /  ALT  16  /  AlkPhos  82  10-29      Urinalysis Basic - ( 29 Oct 2020 07:08 )    Color: Yellow / Appearance: Clear / S.016 / pH: x  Gluc: x / Ketone: Trace  / Bili: Negative / Urobili: Negative   Blood: x / Protein: Trace / Nitrite: Negative   Leuk Esterase: Small / RBC: 0 /hpf / WBC 3 /HPF   Sq Epi: x / Non Sq Epi: 1 /hpf / Bacteria: Negative        MICROBIOLOGY:    RADIOLOGY:    OTHER INVESTIGATIONS: ID Follow Up For SBP    Patient is a 60y old  Male who presents with a chief complaint of hepatic encephalopathy (30 Oct 2020 09:04)    Interval History/ROS: No events overnight. Tolerating diet. Still with significant abdominal swelling and output on left abdominal bag. Denies fever, chills, nausea, vomiting.     Allergies  No Known Allergies    Intolerances  None      ANTIMICROBIALS:  cefTRIAXone   IVPB 1000 every 24 hours  rifAXIMin 550 two times a day      OTHER MEDS:  acetaminophen   Tablet .. 650 milliGRAM(s) Oral every 6 hours PRN  albumin human 25% IVPB 100 milliLiter(s) IV Intermittent every 6 hours  atorvastatin 20 milliGRAM(s) Oral at bedtime  ferrous    sulfate 325 milliGRAM(s) Oral daily  lactulose Syrup 20 Gram(s) Oral every 8 hours  melatonin 5 milliGRAM(s) Oral at bedtime  midodrine. 5 milliGRAM(s) Oral three times a day  nystatin Powder 1 Application(s) Topical every 12 hours  octreotide  Injectable 100 MICROGram(s) SubCutaneous three times a day  pantoprazole  Injectable 40 milliGRAM(s) IV Push every 12 hours  sucralfate 1 Gram(s) Oral four times a day      Vital Signs Last 24 Hrs  T(C): 36.9 (30 Oct 2020 13:50), Max: 36.9 (30 Oct 2020 13:50)  T(F): 98.5 (30 Oct 2020 13:50), Max: 98.5 (30 Oct 2020 13:50)  HR: 66 (30 Oct 2020 13:50) (66 - 67)  BP: 104/61 (30 Oct 2020 13:50) (103/67 - 120/64)  BP(mean): --  RR: 18 (30 Oct 2020 13:50) (18 - 18)  SpO2: 96% (30 Oct 2020 13:50) (95% - 97%)    EXAM:  Constitutional: Not in acute distress  Eyes: No icterus. Pupils b/l reactive to light.  Oral cavity: Clear, no lesions  Neck: No neck vein distension noted  RS: Chest clear to auscultation bilaterally. No wheeze/rhonchi/crepitations.  CVS: S1, S2 heard. Regular rate and rhythm. No murmurs/rubs/gallops.  Abdomen: Soft. distended, no guarding. Left sided ascites bag attached  : No acute abnormalities  Extremities: Warm. No pedal edema  Skin: No lesions noted  Vascular: No evidence of phlebitis  Neuro: Alert, oriented to time/place/person    Labs:                        7.1    1.72  )-----------( 44       ( 30 Oct 2020 07:06 )             22.3       10-30    130<L>  |  98  |  16  ----------------------------<  113<H>  4.4   |  23  |  1.87<H>    Ca    8.7      30 Oct 2020 07:04    TPro  6.6  /  Alb  3.4  /  TBili  2.0<H>  /  DBili  x   /  AST  26  /  ALT  16  /  AlkPhos  82  10-29      Urinalysis Basic - ( 29 Oct 2020 07:08 )    Color: Yellow / Appearance: Clear / S.016 / pH: x  Gluc: x / Ketone: Trace  / Bili: Negative / Urobili: Negative   Blood: x / Protein: Trace / Nitrite: Negative   Leuk Esterase: Small / RBC: 0 /hpf / WBC 3 /HPF   Sq Epi: x / Non Sq Epi: 1 /hpf / Bacteria: Negative        MICROBIOLOGY:    RADIOLOGY:  < from: US Renal (10.30.20 @ 10:01) >  IMPRESSION:  Unremarkable renal ultrasound.    Bilateral pleural effusions and ascites.  Cholelithiasis.  Splenomegaly.

## 2020-10-30 NOTE — PROGRESS NOTE ADULT - ASSESSMENT
59M morbidly obese male w/ PMHx of Hepatic Cirrhosis, ascites, Thrombocytopenia, CHF, HTN, HLD, recent admx for Hepatic Encephalopathy, noncompliant with lactulose who was admitted to OSH  for hepatic encephalopathy, acute blood lose anemia (?variceal bleed, GI ulcer) and possible SBP. He was transferred to [facility] for endoscopy since he is high risk for planned procedure. He is also here for evaluation by Liver Transplant team, now s/p EGD showing gastritis no varices, colonoscopy without any source of bleed, now s/p VCE without source of bleed. Now with uptrending Cr concerning for pre-renal vs. HRS.

## 2020-10-30 NOTE — PROGRESS NOTE ADULT - SUBJECTIVE AND OBJECTIVE BOX
[facility] Division of Hospital Medicine  Gamal NOLASCOGeorges Reyes  Pager:[phone]    Patient is a 60y old  Male who presents with a chief complaint of hepatic encephalopathy (30 Oct 2020 22:14)      SUBJECTIVE / OVERNIGHT EVENTS:    pt without any complains.  continues to have significant LE swelling.      ADDITIONAL REVIEW OF SYSTEMS:    MEDICATIONS  (STANDING):  albumin human 25% IVPB 100 milliLiter(s) IV Intermittent every 6 hours  atorvastatin 20 milliGRAM(s) Oral at bedtime  ferrous    sulfate 325 milliGRAM(s) Oral daily  lactulose Syrup 20 Gram(s) Oral every 8 hours  melatonin 5 milliGRAM(s) Oral at bedtime  midodrine. 5 milliGRAM(s) Oral three times a day  nystatin Powder 1 Application(s) Topical every 12 hours  octreotide  Injectable 100 MICROGram(s) SubCutaneous three times a day  pantoprazole  Injectable 40 milliGRAM(s) IV Push every 12 hours  rifAXIMin 550 milliGRAM(s) Oral two times a day  sucralfate 1 Gram(s) Oral four times a day    MEDICATIONS  (PRN):  acetaminophen   Tablet .. 650 milliGRAM(s) Oral every 6 hours PRN Mild Pain (1 - 3), Moderate Pain (4 - 6)      T(C): 36.8 (10-30 @ 20:33), Max: 36.9 (10-30 @ 13:50)   HR: 62   BP: 103/56   RR: 18   SpO2: 97%    PHYSICAL EXAM:    CONSTITUTIONAL: NAD, well-developed, well-groomed  EYES: PERRLA; conjunctiva and sclera clear  ENMT: Moist oral mucosa, no pharyngeal injection or exudates; normal dentition  NECK: Supple, no palpable masses; no thyromegaly  RESPIRATORY: Normal respiratory effort; lungs are clear to auscultation bilaterally  CARDIOVASCULAR: Regular rate and rhythm, normal S1 and S2, no murmur/rub/gallop; +3 pitting LE edema, Peripheral pulses are 2+ bilaterally  ABDOMEN: +distended, Nontender to palpation, normoactive bowel sounds, no rebound/guarding; No hepatosplenomegaly  MUSCLOSKELETAL:  Normal gait; no clubbing or cyanosis of digits; no joint swelling or tenderness to palpation  PSYCH: A+O to person, place, and time; affect appropriate  NEUROLOGY: CN 2-12 are intact and symmetric; no gross sensory deficits;   SKIN: No rashes; no palpable lesions    I&O's Summary    29 Oct 2020 07:01  -  30 Oct 2020 07:00  --------------------------------------------------------  IN: 970 mL / OUT: 1800 mL / NET: -830 mL    30 Oct 2020 07:01  -  30 Oct 2020 23:25  --------------------------------------------------------  IN: 720 mL / OUT: 850 mL / NET: -130 mL        LABS:                        7.1    1.72  )-----------( 44       ( 30 Oct 2020 07:06 )             22.3     10-30    130<L>  |  98  |  16  ----------------------------<  113<H>  4.4   |  23  |  1.87<H>    Ca    8.7      30 Oct 2020 07:04    TPro  6.6  /  Alb  3.4  /  TBili  2.0<H>  /  DBili  x   /  AST  26  /  ALT  16  /  AlkPhos  82  10-29          Urinalysis Basic - ( 30 Oct 2020 12:33 )    Color: Yellow / Appearance: Clear / S.016 / pH: x  Gluc: x / Ketone: Trace  / Bili: Negative / Urobili: <2 mg/dL   Blood: x / Protein: Trace / Nitrite: Negative   Leuk Esterase: Negative / RBC: x / WBC x   Sq Epi: x / Non Sq Epi: x / Bacteria: x        CAPILLARY BLOOD GLUCOSE          RADIOLOGY & ADDITIONAL TESTS:  Results Reviewed:   Imaging Personally Reviewed:  Electrocardiogram Personally Reviewed:    COORDINATION OF CARE:  Care Discussed with Consultants/Other Providers [Y/N]:  Prior or Outpatient Records Reviewed [Y/N]:

## 2020-10-30 NOTE — PROGRESS NOTE ADULT - ASSESSMENT
59 M morbidly obese male with  PMHx of Hepatic Cirrhosis, Thrombocytopenia, CHF, HTN, HLD, admitted for, acute blood lose anemia and SBP with ascitic cultures +for Ecoli (10/14). Nephrology team consulted for MONI and concern for HRS.    #Acute Kidney Injury  - Patient with MONI differentials could be pre-renal (in the setting of GIB, SBP, diuretic use) vs HRS. Patient received IV albumin infusion since 10/24 however with rising serum creatinine. Although patient was also receiving diuretics (Spironolactone and Lasix) at the time. Serum creatinine increasing since admission and is at 1.87 today (10/30/20)    Recommendations:  - Continue holding diuretics  - Urine lytes suggestive of pre-renal state. UA bland  - Agree with IV albumin infusion, Octreotide and Midodrine   -  Monitor labs and urine output.   - Avoid NSAIDs, ACEI/ARBS, RCA and nephrotoxins. Dose medications as per eGFR.    #Hyponatremia - Hypervolemic hyponatremia in the setting of liver cirrhosis  - Fluid restrict 1L/day  - Monitor serum Na    If you have any questions, please feel free to contact me  Doron Juarez  Nephrology Fellow  Pager: [phone] / [phone]  (After 5pm or on weekends please page the on-call fellow)     59 M morbidly obese male with  PMHx of Hepatic Cirrhosis, Thrombocytopenia, CHF, HTN, HLD, admitted for, acute blood lose anemia and SBP with ascitic cultures +for Ecoli (10/14). Nephrology team consulted for MONI and concern for HRS.    #Acute Kidney Injury  - Patient with MONI differentials could be pre-renal (in the setting of GIB, SBP, diuretic use) vs HRS. Patient received IV albumin infusion since 10/24 however with rising serum creatinine. Although patient was also receiving diuretics (Spironolactone and Lasix) at the time. Serum creatinine increasing since admission and is at 1.87 today (10/30/20)    Recommendations:  - Continue holding diuretics  - Urine lytes suggestive of pre-renal state. UA bland  - Agree with IV albumin infusion, Octreotide and Midodrine   -  Monitor labs and urine output.   - Avoid NSAIDs, ACEI/ARBS, RCA and nephrotoxins. Dose medications as per eGFR.    #Hyponatremia - Hypervolemic hyponatremia in the setting of liver cirrhosis  - Fluid restrict 1L/day  - Monitor serum Na  - continue albumin    If you have any questions, please feel free to contact me  Doron Juarez  Nephrology Fellow  Pager: [phone] / [phone]  (After 5pm or on weekends please page the on-call fellow)

## 2020-10-30 NOTE — PROGRESS NOTE ADULT - PROBLEM SELECTOR PLAN 4
Cr is uptrending, presumed to be from intravascular depletion. Baseline appears to be around 1.4-1.6. Uptrending despite holding diuretics and starting albumin  - renal consulted- continue midodrine/octretide/albumin for presumed HRS  - f/u u-lytes  - rod recommended, patient refusing, f/u PVR/bladder scan to r/o obstruction  - renal US - has ruled out hydro, unremarkable kidneys  - strict I/O monitoring  - Creatinine Trend: 1.87<--, 1.81<--, 1.75<--, 1.59<--, 1.63<--, 1.55<--

## 2020-10-30 NOTE — PROGRESS NOTE ADULT - SUBJECTIVE AND OBJECTIVE BOX
[facility] Division of Kidney Diseases & Hypertension  FOLLOW UP NOTE  [phone]--------------------------------------------------------------------------------  24 hour events/subjective:    - Seen this morning without any complaints  - No acute significant    PAST HISTORY  --------------------------------------------------------------------------------  No significant changes to PMH, PSH, FHx, SHx, unless otherwise noted    ALLERGIES & MEDICATIONS  --------------------------------------------------------------------------------  Allergies    No Known Allergies    Intolerances      Standing Inpatient Medications  albumin human 25% IVPB 100 milliLiter(s) IV Intermittent every 6 hours  atorvastatin 20 milliGRAM(s) Oral at bedtime  cefTRIAXone   IVPB 1000 milliGRAM(s) IV Intermittent every 24 hours  ferrous    sulfate 325 milliGRAM(s) Oral daily  lactulose Syrup 20 Gram(s) Oral every 8 hours  melatonin 5 milliGRAM(s) Oral at bedtime  midodrine. 5 milliGRAM(s) Oral three times a day  nystatin Powder 1 Application(s) Topical every 12 hours  octreotide  Injectable 100 MICROGram(s) SubCutaneous three times a day  pantoprazole  Injectable 40 milliGRAM(s) IV Push every 12 hours  rifAXIMin 550 milliGRAM(s) Oral two times a day  sucralfate 1 Gram(s) Oral four times a day    PRN Inpatient Medications  acetaminophen   Tablet .. 650 milliGRAM(s) Oral every 6 hours PRN      REVIEW OF SYSTEMS  --------------------------------------------------------------------------------  Gen: No  fevers/chills  Skin: No rashes  Head/Eyes/Ears/Mouth: No headache; Normal hearing; Normal vision w/o blurriness  Respiratory: No dyspnea, cough, wheezing, hemoptysis  CV: No chest pain, PND, orthopnea  GI: No abdominal pain, diarrhea, constipation, nausea, vomiting  : No increased frequency, dysuria, hematuria, nocturia  MSK: No joint pain/swelling; no back pain; no edema  Neuro: No dizziness/lightheadedness, weakness, seizures, numbness, tingling      All other systems were reviewed and are negative, except as noted.    VITALS/PHYSICAL EXAM  --------------------------------------------------------------------------------  T(C): 36.5 (10-30-20 @ 04:47), Max: 36.8 (10-29-20 @ 23:53)  HR: 66 (10-30-20 @ 04:47) (66 - 72)  BP: 103/67 (10-30-20 @ 04:47) (103/67 - 120/64)  RR: 18 (10-30-20 @ 04:47) (18 - 18)  SpO2: 95% (10-30-20 @ 04:47) (95% - 97%)  Wt(kg): --        10-29-20 @ 07:01  -  10-30-20 @ 07:00  --------------------------------------------------------  IN: 970 mL / OUT: 1800 mL / NET: -830 mL      Physical Exam:  	Gen: NAD, well-appearing  	HEENT: PERRL, supple neck, clear oropharynx  	Pulm: CTA B/L  	CV: RRR, S1S2;  	Back: No spinal or CVA tenderness  	Abd: +BS, soft, nontender/nondistended  	: No suprapubic tenderness                      Extremities: no bilateral LE edema noted.                       Neuro: No focal deficits, intact gait  	Skin: Warm, without rashes  	Vascular access:    LABS/STUDIES  --------------------------------------------------------------------------------              7.1    1.72  >-----------<  44       [10-30-20 @ 07:06]              22.3     130  |  98  |  16  ----------------------------<  113      [10-30-20 @ 07:04]  4.4   |  23  |  1.87        Ca     8.7     [10-30-20 @ 07:04]    TPro  6.6  /  Alb  3.4  /  TBili  2.0  /  DBili  x   /  AST  26  /  ALT  16  /  AlkPhos  82  [10-29-20 @ 06:51]    Creatinine Trend:  SCr 1.87 [10-30 @ 07:04]  SCr 1.81 [10-29 @ 06:51]  SCr 1.75 [10-28 @ 06:53]  SCr 1.59 [10-27 @ 09:48]  SCr 1.63 [10-26 @ 08:28]    Urinalysis - [10-29-20 @ 07:08]      Color Yellow / Appearance Clear / SG 1.016 / pH 6.0      Gluc Negative / Ketone Trace  / Bili Negative / Urobili Negative       Blood Negative / Protein Trace / Leuk Est Small / Nitrite Negative      RBC 0 / WBC 3 / Hyaline 1 / Gran  / Sq Epi  / Non Sq Epi 1 / Bacteria Negative    Urine Creatinine 199      [10-29-20 @ 07:08]  Urine Sodium <35      [10-30-20 @ 09:39]  Urine Urea Nitrogen 469      [10-29-20 @ 09:31]  Urine Potassium <5      [10-25-20 @ 22:08]  Urine Chloride <35      [10-30-20 @ 09:39]  Urine Osmolality 321      [10-29-20 @ 07:08]      HBsAb Indeterminate Test repeated.      [10-27-20 @ 12:32]  HBsAg Nonreact      [10-27-20 @ 12:32]  HBcAb Nonreact      [10-27-20 @ 12:32]  HCV 0.15, Nonreact      [10-27-20 @ 12:32]  HIV Nonreact      [10-27-20 @ 12:54]    Syphilis Screen (Treponema Pallidum Ab) Negative      [10-27-20 @ 12:32]   [facility] Division of Kidney Diseases & Hypertension  FOLLOW UP NOTE  [phone]--------------------------------------------------------------------------------  24 hour events/subjective:    - Seen this morning without any complaints  - No acute significant events overnight  - Patient still reports having diarrhea 4-5 episodes/day    PAST HISTORY  --------------------------------------------------------------------------------  No significant changes to PMH, PSH, FHx, SHx, unless otherwise noted    ALLERGIES & MEDICATIONS  --------------------------------------------------------------------------------  Allergies    No Known Allergies    Intolerances    Standing Inpatient Medications  albumin human 25% IVPB 100 milliLiter(s) IV Intermittent every 6 hours  atorvastatin 20 milliGRAM(s) Oral at bedtime  cefTRIAXone   IVPB 1000 milliGRAM(s) IV Intermittent every 24 hours  ferrous    sulfate 325 milliGRAM(s) Oral daily  lactulose Syrup 20 Gram(s) Oral every 8 hours  melatonin 5 milliGRAM(s) Oral at bedtime  midodrine. 5 milliGRAM(s) Oral three times a day  nystatin Powder 1 Application(s) Topical every 12 hours  octreotide  Injectable 100 MICROGram(s) SubCutaneous three times a day  pantoprazole  Injectable 40 milliGRAM(s) IV Push every 12 hours  rifAXIMin 550 milliGRAM(s) Oral two times a day  sucralfate 1 Gram(s) Oral four times a day    PRN Inpatient Medications  acetaminophen   Tablet . 650 milliGRAM(s) Oral every 6 hours PRN    REVIEW OF SYSTEMS  --------------------------------------------------------------------------------  Gen: No  fevers/chills, weakness  Skin: No rashes  Head/Eyes/Ears/Mouth: No headache  Respiratory: No dyspnea, + cough  CV: No chest pain,   GI: + abdominal distention, + diarrhea  : No increased frequency, dysuria, hematuria, nocturia  MSK: + LE edema  Neuro: No dizziness/lightheadedness, weakness, seizures    All other systems were reviewed and are negative, except as noted.    VITALS/PHYSICAL EXAM  --------------------------------------------------------------------------------  T(C): 36.5 (10-30-20 @ 04:47), Max: 36.8 (10-29-20 @ 23:53)  HR: 66 (10-30-20 @ 04:47) (66 - 72)  BP: 103/67 (10-30-20 @ 04:47) (103/67 - 120/64)  RR: 18 (10-30-20 @ 04:47) (18 - 18)  SpO2: 95% (10-30-20 @ 04:47) (95% - 97%)  Wt(kg): --    10-29-20 @ 07:01  -  10-30-20 @ 07:00  --------------------------------------------------------  IN: 970 mL / OUT: 1800 mL / NET: -830 mL    Physical Exam:  	Gen: morbidly obese  	HEENT: PERRL, supple neck  	Pulm: CTA B/L  	CV:  S1S2  	Abd: +BS, distended, + pouch on Right quadrant with clear fluid. Mildly soaked dressing on the left abdomen  	Ext: ++ Bilateral LE edema, + anasarca  	Neuro: No focal deficits  	Skin: Warm, without rashes    LABS/STUDIES  --------------------------------------------------------------------------------              7.1    1.72  >-----------<  44       [10-30-20 @ 07:06]              22.3     130  |  98  |  16  ----------------------------<  113      [10-30-20 @ 07:04]  4.4   |  23  |  1.87        Ca     8.7     [10-30-20 @ 07:04]    TPro  6.6  /  Alb  3.4  /  TBili  2.0  /  DBili  x   /  AST  26  /  ALT  16  /  AlkPhos  82  [10-29-20 @ 06:51]    Creatinine Trend:  SCr 1.87 [10-30 @ 07:04]  SCr 1.81 [10-29 @ 06:51]  SCr 1.75 [10-28 @ 06:53]  SCr 1.59 [10-27 @ 09:48]  SCr 1.63 [10-26 @ 08:28]    Urinalysis - [10-29-20 @ 07:08]      Color Yellow / Appearance Clear / SG 1.016 / pH 6.0      Gluc Negative / Ketone Trace  / Bili Negative / Urobili Negative       Blood Negative / Protein Trace / Leuk Est Small / Nitrite Negative      RBC 0 / WBC 3 / Hyaline 1 / Gran  / Sq Epi  / Non Sq Epi 1 / Bacteria Negative    Urine Creatinine 199      [10-29-20 @ 07:08]  Urine Sodium <35      [10-30-20 @ 09:39]  Urine Urea Nitrogen 469      [10-29-20 @ 09:31]  Urine Potassium <5      [10-25-20 @ 22:08]  Urine Chloride <35      [10-30-20 @ 09:39]  Urine Osmolality 321      [10-29-20 @ 07:08]    HBsAb Indeterminate Test repeated.      [10-27-20 @ 12:32]  HBsAg Nonreact      [10-27-20 @ 12:32]  HBcAb Nonreact      [10-27-20 @ 12:32]  HCV 0.15, Nonreact      [10-27-20 @ 12:32]  HIV Nonreact      [10-27-20 @ 12:54]    Syphilis Screen (Treponema Pallidum Ab) Negative      [10-27-20 @ 12:32]

## 2020-10-30 NOTE — PROGRESS NOTE ADULT - ASSESSMENT
59M morbidly obese male w/ PMHx of Hepatic Cirrhosis, ascites, Thrombocytopenia, CHF, HTN, HLD, recent admit for Hepatic Encephalopathy, presents as transfer from OSH for advanced GI and hepatology eval, admitted initially 10/14-10/23 with HSE, anemia, and SBP, s/p LVP.   ID consulted for Pre-Transplant Evaluation, SBP.  S/p EGD, colonoscopy, capsule study with no significant findings.     #SBP in setting of decompensated ETOH hepatic cirrhosis -   Paracentesis on 10/14 with Ecoli, s/p 7 days of ceftriaxone. Repeat Para on 10/22 abd fluid still NGTD.  No current abd pain, and nontoxic appearing without fevers.   - can switch to levofloxacin for secondary SBP ppx if QTC is normal, if not can use bactrim   - send blood clx if febrile    #Leukopenia - has chronic leukopenia of unknown etiology. Could be acute in setting of infection and chronic due to advanced cirrhosis but has been never been formally evaluated.  - Consider BM Bx after infection resolves    #pre-transplant eval-   - HAV IgG pos  - HBsAb, HBsAg, HBVc total Ab: ind/neg/nneg  - HCV Ab neg  - HSV 1/2 IgG neg/neg  - EBV Serology: past EBV infection  - CMV IgG neg  - VZV IgG pos  - Measles, Mumps and Rubella IgG serologies: pos/pos/pos  - Quantiferon Gold pending  - HIV Test Neg  - Syphilis Screen neg  - Toxoplasma IgG neg  - Strongyloides Ab pending    #Vaccines -  Received prevnar 10/21/2020  - will need influenza vaccine  - will need Hep B series  - pneumovax 8 weeks from prevnar       Natanael Shine MD  Fellow, Infectious Diseases, PGY-4  Pager: [phone]  Before 9am or after 5pm/Weekends: Call [phone]

## 2020-10-31 LAB
ALBUMIN SERPL ELPH-MCNC: 3.9 G/DL — SIGNIFICANT CHANGE UP (ref 3.3–5)
ALP SERPL-CCNC: 67 U/L — SIGNIFICANT CHANGE UP (ref 40–120)
ALT FLD-CCNC: 14 U/L — SIGNIFICANT CHANGE UP (ref 10–45)
ANION GAP SERPL CALC-SCNC: 10 MMOL/L — SIGNIFICANT CHANGE UP (ref 5–17)
AST SERPL-CCNC: 22 U/L — SIGNIFICANT CHANGE UP (ref 10–40)
BILIRUB SERPL-MCNC: 2.4 MG/DL — HIGH (ref 0.2–1.2)
BUN SERPL-MCNC: 16 MG/DL — SIGNIFICANT CHANGE UP (ref 7–23)
CALCIUM SERPL-MCNC: 9 MG/DL — SIGNIFICANT CHANGE UP (ref 8.4–10.5)
CHLORIDE SERPL-SCNC: 100 MMOL/L — SIGNIFICANT CHANGE UP (ref 96–108)
CO2 SERPL-SCNC: 24 MMOL/L — SIGNIFICANT CHANGE UP (ref 22–31)
CREAT SERPL-MCNC: 1.66 MG/DL — HIGH (ref 0.5–1.3)
GLUCOSE SERPL-MCNC: 96 MG/DL — SIGNIFICANT CHANGE UP (ref 70–99)
HCT VFR BLD CALC: 22.7 % — LOW (ref 39–50)
HGB BLD-MCNC: 7 G/DL — CRITICAL LOW (ref 13–17)
MCHC RBC-ENTMCNC: 30.3 PG — SIGNIFICANT CHANGE UP (ref 27–34)
MCHC RBC-ENTMCNC: 30.8 GM/DL — LOW (ref 32–36)
MCV RBC AUTO: 98.3 FL — SIGNIFICANT CHANGE UP (ref 80–100)
NRBC # BLD: 0 /100 WBCS — SIGNIFICANT CHANGE UP (ref 0–0)
PLATELET # BLD AUTO: 46 K/UL — LOW (ref 150–400)
POTASSIUM SERPL-MCNC: 4.6 MMOL/L — SIGNIFICANT CHANGE UP (ref 3.5–5.3)
POTASSIUM SERPL-SCNC: 4.6 MMOL/L — SIGNIFICANT CHANGE UP (ref 3.5–5.3)
PROT SERPL-MCNC: 6.6 G/DL — SIGNIFICANT CHANGE UP (ref 6–8.3)
RBC # BLD: 2.31 M/UL — LOW (ref 4.2–5.8)
RBC # FLD: 20.1 % — HIGH (ref 10.3–14.5)
SARS-COV-2 RNA SPEC QL NAA+PROBE: SIGNIFICANT CHANGE UP
SODIUM SERPL-SCNC: 134 MMOL/L — LOW (ref 135–145)
WBC # BLD: 1.41 K/UL — LOW (ref 3.8–10.5)
WBC # FLD AUTO: 1.41 K/UL — LOW (ref 3.8–10.5)

## 2020-10-31 PROCEDURE — 99233 SBSQ HOSP IP/OBS HIGH 50: CPT

## 2020-10-31 RX ORDER — CEFTRIAXONE 500 MG/1
1000 INJECTION, POWDER, FOR SOLUTION INTRAMUSCULAR; INTRAVENOUS EVERY 24 HOURS
Refills: 0 | Status: DISCONTINUED | OUTPATIENT
Start: 2020-10-31 | End: 2020-11-02

## 2020-10-31 RX ORDER — ACETAMINOPHEN 500 MG
1000 TABLET ORAL ONCE
Refills: 0 | Status: COMPLETED | OUTPATIENT
Start: 2020-10-31 | End: 2020-10-31

## 2020-10-31 RX ORDER — MORPHINE SULFATE 50 MG/1
1 CAPSULE, EXTENDED RELEASE ORAL ONCE
Refills: 0 | Status: DISCONTINUED | OUTPATIENT
Start: 2020-10-31 | End: 2020-10-31

## 2020-10-31 RX ADMIN — NYSTATIN CREAM 1 APPLICATION(S): 100000 CREAM TOPICAL at 08:55

## 2020-10-31 RX ADMIN — OCTREOTIDE ACETATE 100 MICROGRAM(S): 200 INJECTION, SOLUTION INTRAVENOUS; SUBCUTANEOUS at 06:37

## 2020-10-31 RX ADMIN — Medication 325 MILLIGRAM(S): at 12:07

## 2020-10-31 RX ADMIN — OCTREOTIDE ACETATE 100 MICROGRAM(S): 200 INJECTION, SOLUTION INTRAVENOUS; SUBCUTANEOUS at 15:17

## 2020-10-31 RX ADMIN — Medication 1 GRAM(S): at 12:07

## 2020-10-31 RX ADMIN — MIDODRINE HYDROCHLORIDE 5 MILLIGRAM(S): 2.5 TABLET ORAL at 18:50

## 2020-10-31 RX ADMIN — LACTULOSE 20 GRAM(S): 10 SOLUTION ORAL at 06:31

## 2020-10-31 RX ADMIN — Medication 50 MILLILITER(S): at 06:30

## 2020-10-31 RX ADMIN — CEFTRIAXONE 100 MILLIGRAM(S): 500 INJECTION, POWDER, FOR SOLUTION INTRAMUSCULAR; INTRAVENOUS at 09:43

## 2020-10-31 RX ADMIN — Medication 50 MILLILITER(S): at 12:10

## 2020-10-31 RX ADMIN — OCTREOTIDE ACETATE 100 MICROGRAM(S): 200 INJECTION, SOLUTION INTRAVENOUS; SUBCUTANEOUS at 22:55

## 2020-10-31 RX ADMIN — MIDODRINE HYDROCHLORIDE 5 MILLIGRAM(S): 2.5 TABLET ORAL at 06:30

## 2020-10-31 RX ADMIN — Medication 50 MILLILITER(S): at 18:45

## 2020-10-31 RX ADMIN — Medication 1 GRAM(S): at 01:04

## 2020-10-31 RX ADMIN — MORPHINE SULFATE 1 MILLIGRAM(S): 50 CAPSULE, EXTENDED RELEASE ORAL at 12:05

## 2020-10-31 RX ADMIN — NYSTATIN CREAM 1 APPLICATION(S): 100000 CREAM TOPICAL at 22:43

## 2020-10-31 RX ADMIN — PANTOPRAZOLE SODIUM 40 MILLIGRAM(S): 20 TABLET, DELAYED RELEASE ORAL at 22:41

## 2020-10-31 RX ADMIN — ATORVASTATIN CALCIUM 20 MILLIGRAM(S): 80 TABLET, FILM COATED ORAL at 22:41

## 2020-10-31 RX ADMIN — PANTOPRAZOLE SODIUM 40 MILLIGRAM(S): 20 TABLET, DELAYED RELEASE ORAL at 08:51

## 2020-10-31 RX ADMIN — Medication 1 GRAM(S): at 06:30

## 2020-10-31 RX ADMIN — LACTULOSE 20 GRAM(S): 10 SOLUTION ORAL at 15:18

## 2020-10-31 RX ADMIN — MIDODRINE HYDROCHLORIDE 5 MILLIGRAM(S): 2.5 TABLET ORAL at 12:07

## 2020-10-31 RX ADMIN — MORPHINE SULFATE 1 MILLIGRAM(S): 50 CAPSULE, EXTENDED RELEASE ORAL at 12:35

## 2020-10-31 RX ADMIN — Medication 5 MILLIGRAM(S): at 22:41

## 2020-10-31 RX ADMIN — Medication 1 GRAM(S): at 18:49

## 2020-10-31 RX ADMIN — Medication 50 MILLILITER(S): at 01:01

## 2020-10-31 NOTE — PROGRESS NOTE ADULT - SUBJECTIVE AND OBJECTIVE BOX
[facility] Division of Hospital Medicine  Raymundo Vora MD  Pager (MK, 6J-1P): [phone]  Other Times:  [phone]    Patient is a 60y old  Male who presents with a chief complaint of hepatic encephalopathy (30 Oct 2020 22:14)      SUBJECTIVE / OVERNIGHT EVENTS:    Overnight, no significant acute event reported.     VS: afebrile, 59, 111/63, RA    Patient was examined this morning. He is complaining of chronic back and pain and significant edema in his LE which is causing him to have discomfort in his LE as well. He denies any headache/fever/chills/dizziness/chest pain/cough./sob/abdominal pain.          MEDICATIONS  (STANDING):  albumin human 25% IVPB 100 milliLiter(s) IV Intermittent every 6 hours  atorvastatin 20 milliGRAM(s) Oral at bedtime  cefTRIAXone   IVPB 1000 milliGRAM(s) IV Intermittent every 24 hours  ferrous    sulfate 325 milliGRAM(s) Oral daily  lactulose Syrup 20 Gram(s) Oral every 8 hours  melatonin 5 milliGRAM(s) Oral at bedtime  midodrine. 5 milliGRAM(s) Oral three times a day  nystatin Powder 1 Application(s) Topical every 12 hours  octreotide  Injectable 100 MICROGram(s) SubCutaneous three times a day  pantoprazole  Injectable 40 milliGRAM(s) IV Push every 12 hours  rifAXIMin 550 milliGRAM(s) Oral two times a day  sucralfate 1 Gram(s) Oral four times a day    MEDICATIONS  (PRN):  acetaminophen   Tablet .. 650 milliGRAM(s) Oral every 6 hours PRN Mild Pain (1 - 3), Moderate Pain (4 - 6)      CAPILLARY BLOOD GLUCOSE        I&O's Summary    30 Oct 2020 07:01  -  31 Oct 2020 07:00  --------------------------------------------------------  IN: 720 mL / OUT: 1175 mL / NET: -455 mL    31 Oct 2020 08  -  31 Oct 2020 16:40  --------------------------------------------------------  IN: 530 mL / OUT: 400 mL / NET: 130 mL        PHYSICAL EXAM:  Vital Signs Last 24 Hrs  T(C): 36.8 (31 Oct 2020 12:05), Max: 36.8 (30 Oct 2020 20:33)  T(F): 98.3 (31 Oct 2020 12:05), Max: 98.3 (31 Oct 2020 12:05)  HR: 63 (31 Oct 2020 12:05) (59 - 63)  BP: 123/69 (31 Oct 2020 12:05) (103/56 - 123/69)  BP(mean): --  RR: 18 (31 Oct 2020 12:05) (18 - 18)  SpO2: 96% (31 Oct 2020 12:05) (96% - 97%)      CONSTITUTIONAL: NAD, obese  EYES: PERRL; conjunctiva and sclera clear  ENMT: Moist oral mucosa, normal dentition  NECK: Supple, no palpable masses;  RESPIRATORY: Normal respiratory effort; lungs are clear to auscultation bilaterally  CARDIOVASCULAR: Regular rate and rhythm, normal S1 and S2, no murmur/rub/gallop;  EXTREMITIES: +3 pitting LE edema upto upper thighs BL, difficult to palpate LE peripheral pulses given edema, radial pulses 2+ BL  ABDOMEN: drain in LLQ draining clear yellow fluid; RLQ dressing- no discharge,. +distended, soft Nontender to palpation, normoactive bowel sounds, no rebound/guarding;   PSYCH: A+O to person, place, and time; affect appropriate  SKIN: No rashes; no palpable lesions        LABS:                        7.0    1.41  )-----------( 46       ( 31 Oct 2020 07:09 )             22.7     10-31    134<L>  |  100  |  16  ----------------------------<  96  4.6   |  24  |  1.66<H>    Ca    9.0      31 Oct 2020 07:07    TPro  6.6  /  Alb  3.9  /  TBili  2.4<H>  /  DBili  x   /  AST  22  /  ALT  14  /  AlkPhos  67  10-31          Urinalysis Basic - ( 30 Oct 2020 12:33 )    Color: Yellow / Appearance: Clear / S.016 / pH: x  Gluc: x / Ketone: Trace  / Bili: Negative / Urobili: <2 mg/dL   Blood: x / Protein: Trace / Nitrite: Negative   Leuk Esterase: Negative / RBC: x / WBC x   Sq Epi: x / Non Sq Epi: x / Bacteria: x          RADIOLOGY & ADDITIONAL TESTS:  Results Reviewed:   Imaging Personally Reviewed:  Electrocardiogram Personally Reviewed:    COORDINATION OF CARE:  Care Discussed with Consultants/Other Providers [Y/N]:  Prior or Outpatient Records Reviewed [Y/N]:

## 2020-10-31 NOTE — PROGRESS NOTE ADULT - ASSESSMENT
59M morbidly obese male w/ PMHx of Hepatic Cirrhosis, ascites, Thrombocytopenia, CHF, HTN, HLD, recent admx for Hepatic Encephalopathy, noncompliant with lactulose who was admitted to OSH  for hepatic encephalopathy, acute blood lose anemia (?variceal bleed, GI ulcer) and possible SBP. He was transferred to [facility] for endoscopy since he is high risk for planned procedure. He is also here for evaluation by Liver Transplant team, now s/p EGD showing gastritis no varices, colonoscopy without any source of bleed, now s/p VCE without source of bleed. Now with uptrending Cr concerning for pre-renal vs. HRS.

## 2020-10-31 NOTE — PROVIDER CONTACT NOTE (CRITICAL VALUE NOTIFICATION) - ASSESSMENT
Alert and oriented X 4. Skin color good warm and dry to touch. Respirations regular and unlabored. No active bleeding noted.

## 2020-10-31 NOTE — PROGRESS NOTE ADULT - PROBLEM SELECTOR PLAN 4
Cr elevated, presumed to be from intravascular depletion. Baseline appears to be around 1.4-1.6. Uptrending despite holding diuretics and starting albumin  - renal US - has ruled out hydro, unremarkable kidneys  - Creatinine Trend: 1.66<1.87<--, 1.81<--, 1.75<--, 1.59<--, 1.63<--, 1.55<--  - Appreciate nephrology recs    Plan  - continue midodrine/octretide/albumin for presumed HRS  - f/u u-lytes  - rod recommended, patient refusing, f/u PVR/bladder scan to r/o obstruction  - strict I/O monitoring

## 2020-10-31 NOTE — PROGRESS NOTE ADULT - PROBLEM SELECTOR PLAN 1
- AMS resolved  - c/w Rifaximin 550mg po bid  - c/w Lactulose 20g po q8h  - monitor LFTs  - Hepatology nayeli galvan

## 2020-11-01 LAB
ALBUMIN SERPL ELPH-MCNC: 4.2 G/DL — SIGNIFICANT CHANGE UP (ref 3.3–5)
ALP SERPL-CCNC: 66 U/L — SIGNIFICANT CHANGE UP (ref 40–120)
ALT FLD-CCNC: 12 U/L — SIGNIFICANT CHANGE UP (ref 10–45)
ANION GAP SERPL CALC-SCNC: 11 MMOL/L — SIGNIFICANT CHANGE UP (ref 5–17)
AST SERPL-CCNC: 19 U/L — SIGNIFICANT CHANGE UP (ref 10–40)
BILIRUB SERPL-MCNC: 2.8 MG/DL — HIGH (ref 0.2–1.2)
BLD GP AB SCN SERPL QL: NEGATIVE — SIGNIFICANT CHANGE UP
BUN SERPL-MCNC: 15 MG/DL — SIGNIFICANT CHANGE UP (ref 7–23)
CALCIUM SERPL-MCNC: 9.1 MG/DL — SIGNIFICANT CHANGE UP (ref 8.4–10.5)
CHLORIDE SERPL-SCNC: 100 MMOL/L — SIGNIFICANT CHANGE UP (ref 96–108)
CO2 SERPL-SCNC: 24 MMOL/L — SIGNIFICANT CHANGE UP (ref 22–31)
CREAT SERPL-MCNC: 1.59 MG/DL — HIGH (ref 0.5–1.3)
GLUCOSE SERPL-MCNC: 99 MG/DL — SIGNIFICANT CHANGE UP (ref 70–99)
HCT VFR BLD CALC: 21.6 % — LOW (ref 39–50)
HCT VFR BLD CALC: 22 % — LOW (ref 39–50)
HGB BLD-MCNC: 6.7 G/DL — CRITICAL LOW (ref 13–17)
HGB BLD-MCNC: 6.9 G/DL — CRITICAL LOW (ref 13–17)
MCHC RBC-ENTMCNC: 31.1 PG — SIGNIFICANT CHANGE UP (ref 27–34)
MCHC RBC-ENTMCNC: 31.4 GM/DL — LOW (ref 32–36)
MCV RBC AUTO: 99.1 FL — SIGNIFICANT CHANGE UP (ref 80–100)
NRBC # BLD: 0 /100 WBCS — SIGNIFICANT CHANGE UP (ref 0–0)
PLATELET # BLD AUTO: 45 K/UL — LOW (ref 150–400)
POTASSIUM SERPL-MCNC: 4.5 MMOL/L — SIGNIFICANT CHANGE UP (ref 3.5–5.3)
POTASSIUM SERPL-SCNC: 4.5 MMOL/L — SIGNIFICANT CHANGE UP (ref 3.5–5.3)
PROT SERPL-MCNC: 6.9 G/DL — SIGNIFICANT CHANGE UP (ref 6–8.3)
RBC # BLD: 2.22 M/UL — LOW (ref 4.2–5.8)
RBC # FLD: 19.8 % — HIGH (ref 10.3–14.5)
RH IG SCN BLD-IMP: NEGATIVE — SIGNIFICANT CHANGE UP
SODIUM SERPL-SCNC: 135 MMOL/L — SIGNIFICANT CHANGE UP (ref 135–145)
WBC # BLD: 1.85 K/UL — LOW (ref 3.8–10.5)
WBC # FLD AUTO: 1.85 K/UL — LOW (ref 3.8–10.5)

## 2020-11-01 PROCEDURE — 99233 SBSQ HOSP IP/OBS HIGH 50: CPT

## 2020-11-01 PROCEDURE — 99232 SBSQ HOSP IP/OBS MODERATE 35: CPT

## 2020-11-01 RX ORDER — INFLUENZA VIRUS VACCINE 15; 15; 15; 15 UG/.5ML; UG/.5ML; UG/.5ML; UG/.5ML
0.5 SUSPENSION INTRAMUSCULAR ONCE
Refills: 0 | Status: DISCONTINUED | OUTPATIENT
Start: 2020-11-01 | End: 2020-11-18

## 2020-11-01 RX ORDER — MORPHINE SULFATE 50 MG/1
1 CAPSULE, EXTENDED RELEASE ORAL ONCE
Refills: 0 | Status: DISCONTINUED | OUTPATIENT
Start: 2020-11-01 | End: 2020-11-01

## 2020-11-01 RX ADMIN — PANTOPRAZOLE SODIUM 40 MILLIGRAM(S): 20 TABLET, DELAYED RELEASE ORAL at 21:27

## 2020-11-01 RX ADMIN — Medication 5 MILLIGRAM(S): at 21:20

## 2020-11-01 RX ADMIN — MORPHINE SULFATE 1 MILLIGRAM(S): 50 CAPSULE, EXTENDED RELEASE ORAL at 02:03

## 2020-11-01 RX ADMIN — ATORVASTATIN CALCIUM 20 MILLIGRAM(S): 80 TABLET, FILM COATED ORAL at 21:20

## 2020-11-01 RX ADMIN — MIDODRINE HYDROCHLORIDE 5 MILLIGRAM(S): 2.5 TABLET ORAL at 13:08

## 2020-11-01 RX ADMIN — Medication 325 MILLIGRAM(S): at 13:08

## 2020-11-01 RX ADMIN — Medication 50 MILLILITER(S): at 23:18

## 2020-11-01 RX ADMIN — OCTREOTIDE ACETATE 100 MICROGRAM(S): 200 INJECTION, SOLUTION INTRAVENOUS; SUBCUTANEOUS at 06:34

## 2020-11-01 RX ADMIN — MORPHINE SULFATE 1 MILLIGRAM(S): 50 CAPSULE, EXTENDED RELEASE ORAL at 02:23

## 2020-11-01 RX ADMIN — Medication 1 GRAM(S): at 13:08

## 2020-11-01 RX ADMIN — Medication 400 MILLIGRAM(S): at 00:23

## 2020-11-01 RX ADMIN — Medication 50 MILLILITER(S): at 17:21

## 2020-11-01 RX ADMIN — Medication 50 MILLILITER(S): at 13:07

## 2020-11-01 RX ADMIN — Medication 1 GRAM(S): at 06:25

## 2020-11-01 RX ADMIN — OCTREOTIDE ACETATE 100 MICROGRAM(S): 200 INJECTION, SOLUTION INTRAVENOUS; SUBCUTANEOUS at 21:27

## 2020-11-01 RX ADMIN — NYSTATIN CREAM 1 APPLICATION(S): 100000 CREAM TOPICAL at 08:49

## 2020-11-01 RX ADMIN — CEFTRIAXONE 100 MILLIGRAM(S): 500 INJECTION, POWDER, FOR SOLUTION INTRAMUSCULAR; INTRAVENOUS at 08:49

## 2020-11-01 RX ADMIN — Medication 50 MILLILITER(S): at 00:22

## 2020-11-01 RX ADMIN — LACTULOSE 20 GRAM(S): 10 SOLUTION ORAL at 13:08

## 2020-11-01 RX ADMIN — Medication 1000 MILLIGRAM(S): at 01:20

## 2020-11-01 RX ADMIN — Medication 1 GRAM(S): at 23:19

## 2020-11-01 RX ADMIN — Medication 50 MILLILITER(S): at 06:25

## 2020-11-01 RX ADMIN — LACTULOSE 20 GRAM(S): 10 SOLUTION ORAL at 06:25

## 2020-11-01 RX ADMIN — PANTOPRAZOLE SODIUM 40 MILLIGRAM(S): 20 TABLET, DELAYED RELEASE ORAL at 08:49

## 2020-11-01 RX ADMIN — Medication 1 GRAM(S): at 00:21

## 2020-11-01 RX ADMIN — NYSTATIN CREAM 1 APPLICATION(S): 100000 CREAM TOPICAL at 21:20

## 2020-11-01 RX ADMIN — OCTREOTIDE ACETATE 100 MICROGRAM(S): 200 INJECTION, SOLUTION INTRAVENOUS; SUBCUTANEOUS at 13:08

## 2020-11-01 RX ADMIN — Medication 1 GRAM(S): at 17:22

## 2020-11-01 RX ADMIN — MIDODRINE HYDROCHLORIDE 5 MILLIGRAM(S): 2.5 TABLET ORAL at 06:25

## 2020-11-01 RX ADMIN — MIDODRINE HYDROCHLORIDE 5 MILLIGRAM(S): 2.5 TABLET ORAL at 21:21

## 2020-11-01 NOTE — PROGRESS NOTE ADULT - SUBJECTIVE AND OBJECTIVE BOX
[facility] Division of Hospital Medicine  Raymundo Vora MD  Pager (MK, 6O-9P): [phone]  Other Times:  [phone]    Patient is a 60y old  Male who presents with a chief complaint of hepatic encephalopathy (01 Nov 2020 12:35)      SUBJECTIVE / OVERNIGHT EVENTS:    Overnight, no significant acute event reported.     VS: afebrile, 58, 110/58, RA    Patient was examined this morning. He reports no new issue/complaint. Continues to have LE edema which causes leg discomfort. He denies any headache/fever/chills/dizziness/chest pain/cough./sob/abdominal pain.          MEDICATIONS  (STANDING):  albumin human 25% IVPB 100 milliLiter(s) IV Intermittent every 6 hours  atorvastatin 20 milliGRAM(s) Oral at bedtime  cefTRIAXone   IVPB 1000 milliGRAM(s) IV Intermittent every 24 hours  ferrous    sulfate 325 milliGRAM(s) Oral daily  influenza   Vaccine 0.5 milliLiter(s) IntraMuscular once  lactulose Syrup 20 Gram(s) Oral every 8 hours  melatonin 5 milliGRAM(s) Oral at bedtime  midodrine. 5 milliGRAM(s) Oral three times a day  nystatin Powder 1 Application(s) Topical every 12 hours  octreotide  Injectable 100 MICROGram(s) SubCutaneous three times a day  pantoprazole  Injectable 40 milliGRAM(s) IV Push every 12 hours  rifAXIMin 550 milliGRAM(s) Oral two times a day  sucralfate 1 Gram(s) Oral four times a day    MEDICATIONS  (PRN):  acetaminophen   Tablet .. 650 milliGRAM(s) Oral every 6 hours PRN Mild Pain (1 - 3), Moderate Pain (4 - 6)      CAPILLARY BLOOD GLUCOSE        I&O's Summary    31 Oct 2020 08:01 - 01 Nov 2020 07:00  --------------------------------------------------------  IN: 1230 mL / OUT: 1275 mL / NET: -45 mL    01 Nov 2020 07:01  -  01 Nov 2020 15:24  --------------------------------------------------------  IN: 480 mL / OUT: 450 mL / NET: 30 mL        PHYSICAL EXAM:  Vital Signs Last 24 Hrs  T(C): 36.8 (01 Nov 2020 13:08), Max: 36.9 (01 Nov 2020 08:18)  T(F): 98.2 (01 Nov 2020 13:08), Max: 98.4 (01 Nov 2020 08:18)  HR: 80 (01 Nov 2020 13:08) (58 - 80)  BP: 107/57 (01 Nov 2020 13:08) (107/57 - 123/60)  BP(mean): --  RR: 18 (01 Nov 2020 13:08) (18 - 18)  SpO2: 97% (01 Nov 2020 13:08) (95% - 98%)      CONSTITUTIONAL: NAD, obese  EYES: PERRL; conjunctiva and sclera clear  ENMT: Moist oral mucosa, normal dentition  NECK: Supple, no palpable masses;  RESPIRATORY: Normal respiratory effort; lungs are clear to auscultation bilaterally  CARDIOVASCULAR: Regular rate and rhythm, normal S1 and S2, no murmur/rub/gallop;  EXTREMITIES: +3 pitting LE edema upto upper thighs BL, difficult to palpate LE peripheral pulses given edema, radial pulses 2+ BL  ABDOMEN: drain in LLQ; RLQ dressing- no discharge,. +distended, soft, Nontender to palpation, normoactive bowel sounds, no rebound/guarding;   PSYCH: A+O to person, place, and time; affect appropriate  SKIN: No rashes; no palpable lesions        LABS:                        6.7    x     )-----------( x        ( 01 Nov 2020 11:47 )             21.6     11-01    135  |  100  |  15  ----------------------------<  99  4.5   |  24  |  1.59<H>    Ca    9.1      01 Nov 2020 07:02    TPro  6.9  /  Alb  4.2  /  TBili  2.8<H>  /  DBili  x   /  AST  19  /  ALT  12  /  AlkPhos  66  11-01                RADIOLOGY & ADDITIONAL TESTS:  Results Reviewed:   Imaging Personally Reviewed:  Electrocardiogram Personally Reviewed:    COORDINATION OF CARE:  Care Discussed with Consultants/Other Providers [Y/N]:  Prior or Outpatient Records Reviewed [Y/N]:

## 2020-11-01 NOTE — PROGRESS NOTE ADULT - ASSESSMENT
59M morbidly obese male w/ PMHx of Hepatic Cirrhosis, ascites, Thrombocytopenia, CHF, HTN, HLD, recent admit for Hepatic Encephalopathy, presents as transfer from OSH for advanced GI and hepatology eval, admitted initially 10/14-10/23 with HSE, anemia, and SBP, s/p LVP.   ID consulted for Pre-Transplant Evaluation, SBP.  S/p EGD, colonoscopy, capsule study with no significant findings.     #SBP in setting of decompensated ETOH hepatic cirrhosis -   Paracentesis on 10/14 with Ecoli, s/p 7 days of ceftriaxone. Repeat Para on 10/22 abd fluid still NGTD.  No current abd pain, and nontoxic appearing without fevers.   - remains on IV Ceftriaxone at this point because concern for poor absorption of oral medications- will change to oral option at time of discharge  - send blood clx if febrile    #Leukopenia - has chronic leukopenia of unknown etiology. Could be acute in setting of infection and chronic due to advanced cirrhosis but has been never been formally evaluated.  anemia- ? slow GI bleed no melena reported  would transfuse PRBCs    #pre-transplant eval-   - HAV IgG pos  - HBsAb, HBsAg, HBVc total Ab: ind/neg/nneg  - HCV Ab neg  - HSV 1/2 IgG neg/neg  - EBV Serology: past EBV infection  - CMV IgG neg  - VZV IgG pos  - Measles, Mumps and Rubella IgG serologies: pos/pos/pos  - Quantiferon Gold pending  - HIV Test Neg  - Syphilis Screen neg  - Toxoplasma IgG neg  - Strongyloides Ab pending    #Vaccines -  Received prevnar 10/21/2020  - will need influenza vaccine- please administer  - will need Hep B series- can give first dose in hospital  - pneumovax 8 weeks from prevnar       Max Ashby MD  [phone]  After 5pm/weekends [phone]

## 2020-11-01 NOTE — PROGRESS NOTE ADULT - PROBLEM SELECTOR PLAN 4
Cr elevated, presumed to be from intravascular depletion. Baseline appears to be around 1.4-1.6. Uptrending despite holding diuretics and starting albumin  - renal US - has ruled out hydro, unremarkable kidneys  - Creatinine Trend: 1.59<1.66<1.87<--, 1.81<--, 1.75<--, 1.59<--, 1.63<--, 1.55<--  - Appreciate nephrology recs    Plan  - continue midodrine/octretide/albumin for presumed HRS  - rod recommended, patient refusing, f/u PVR/bladder scan to r/o obstruction  - strict I/O monitoring

## 2020-11-01 NOTE — PROGRESS NOTE ADULT - PROBLEM SELECTOR PLAN 1
- AMS resolved  - c/w Rifaximin 550mg po bid  - c/w Lactulose 20g po q8h  - monitor LFTs  - Hepatology eval appreciated  - Pre transplant ID eval: F/U Strongyloides Ab, Quant gold; vaccinations per ID recs

## 2020-11-01 NOTE — PROGRESS NOTE ADULT - ASSESSMENT
59M morbidly obese male w/ PMHx of Hepatic Cirrhosis, ascites, Thrombocytopenia, CHF, HTN, HLD, recent admx for Hepatic Encephalopathy, noncompliant with lactulose who was admitted to OSH  for hepatic encephalopathy, acute blood lose anemia (?variceal bleed, GI ulcer) and possible SBP. He was transferred to [facility] for endoscopy since he is high risk for planned procedure. He is also here for evaluation by Liver Transplant team, now s/p EGD showing gastritis no varices, colonoscopy without any source of bleed, now s/p VCE without source of bleed. Now with uptrending Cr concerning for pre-renal vs. HRS.

## 2020-11-01 NOTE — PROGRESS NOTE ADULT - SUBJECTIVE AND OBJECTIVE BOX
INFECTIOUS DISEASES FOLLOW UP-- Lela Ashby  [phone]    This is a follow up note for this  60yMale with  Liver failure without hepatic coma  mental status improved today        ROS:  CONSTITUTIONAL:  No fever, good appetite  CARDIOVASCULAR:  No chest pain or palpitations  RESPIRATORY:  No dyspnea  GASTROINTESTINAL:  No nausea, vomiting, has abdominal distension with the ascites  GENITOURINARY:  No dysuria  NEUROLOGIC:  No headache,     Allergies    No Known Allergies    Intolerances        ANTIBIOTICS/RELEVANT:  antimicrobials  cefTRIAXone   IVPB 1000 milliGRAM(s) IV Intermittent every 24 hours  rifAXIMin 550 milliGRAM(s) Oral two times a day    immunologic:    OTHER:  acetaminophen   Tablet .. 650 milliGRAM(s) Oral every 6 hours PRN  albumin human 25% IVPB 100 milliLiter(s) IV Intermittent every 6 hours  atorvastatin 20 milliGRAM(s) Oral at bedtime  ferrous    sulfate 325 milliGRAM(s) Oral daily  lactulose Syrup 20 Gram(s) Oral every 8 hours  melatonin 5 milliGRAM(s) Oral at bedtime  midodrine. 5 milliGRAM(s) Oral three times a day  nystatin Powder 1 Application(s) Topical every 12 hours  octreotide  Injectable 100 MICROGram(s) SubCutaneous three times a day  pantoprazole  Injectable 40 milliGRAM(s) IV Push every 12 hours  sucralfate 1 Gram(s) Oral four times a day      Objective:  Vital Signs Last 24 Hrs  T(C): 36.9 (01 Nov 2020 08:18), Max: 36.9 (01 Nov 2020 08:18)  T(F): 98.4 (01 Nov 2020 08:18), Max: 98.4 (01 Nov 2020 08:18)  HR: 58 (01 Nov 2020 08:18) (58 - 68)  BP: 110/58 (01 Nov 2020 08:18) (110/58 - 123/60)  BP(mean): --  RR: 18 (01 Nov 2020 08:18) (18 - 18)  SpO2: 96% (01 Nov 2020 08:18) (95% - 98%)    PHYSICAL EXAM:  Constitutional:no acute distress  Eyes:MICHELLE, EOMI  Ear/Nose/Throat: no oral lesions, 	  Respiratory: clear BL  Cardiovascular: S1S2  Gastrointestinal:soft, (+) BS, no tenderness, marked distension, ascites, serous drainage in the left sided bag  Extremities:no e/e/c anasarca  No Lymphadenopathy  IV sites not inflammed.    LABS:                        6.7    x     )-----------( x        ( 01 Nov 2020 11:47 )             21.6     11-01    135  |  100  |  15  ----------------------------<  99  4.5   |  24  |  1.59<H>    Ca    9.1      01 Nov 2020 07:02    TPro  6.9  /  Alb  4.2  /  TBili  2.8<H>  /  DBili  x   /  AST  19  /  ALT  12  /  AlkPhos  66  11-01          MICROBIOLOGY:      < from: US Renal (10.30.20 @ 10:01) >  IMPRESSION:  Unremarkable renal ultrasound.    Bilateral pleural effusions and ascites.  Cholelithiasis.  Splenomegaly.    < end of copied text >        RECENT CULTURES:      RADIOLOGY & ADDITIONAL STUDIES:    COVID-19 PCR: NotDetec: Testing is performed using polymerase chain reaction (PCR) or  transcription mediated amplification (TMA). This COVID-19 (SARS-CoV-2)  nucleic acid amplification test was validated by [employer] and is  in use under the FDA Emergency Use Authorization (EUA) for clinical labs  CLIA-certified to perform high complexity testing. Test results should be  correlated with clinical presentation, patient history, and epidemiology. (10.31.20 @ 14:07)

## 2020-11-02 LAB
ALBUMIN SERPL ELPH-MCNC: 4.5 G/DL — SIGNIFICANT CHANGE UP (ref 3.3–5)
ALP SERPL-CCNC: 63 U/L — SIGNIFICANT CHANGE UP (ref 40–120)
ALT FLD-CCNC: 11 U/L — SIGNIFICANT CHANGE UP (ref 10–45)
ANION GAP SERPL CALC-SCNC: 10 MMOL/L — SIGNIFICANT CHANGE UP (ref 5–17)
AST SERPL-CCNC: 19 U/L — SIGNIFICANT CHANGE UP (ref 10–40)
BILIRUB SERPL-MCNC: 3.4 MG/DL — HIGH (ref 0.2–1.2)
BUN SERPL-MCNC: 14 MG/DL — SIGNIFICANT CHANGE UP (ref 7–23)
CALCIUM SERPL-MCNC: 9.4 MG/DL — SIGNIFICANT CHANGE UP (ref 8.4–10.5)
CHLORIDE SERPL-SCNC: 102 MMOL/L — SIGNIFICANT CHANGE UP (ref 96–108)
CO2 SERPL-SCNC: 24 MMOL/L — SIGNIFICANT CHANGE UP (ref 22–31)
CREAT SERPL-MCNC: 1.57 MG/DL — HIGH (ref 0.5–1.3)
GLUCOSE SERPL-MCNC: 153 MG/DL — HIGH (ref 70–99)
HCT VFR BLD CALC: 22.4 % — LOW (ref 39–50)
HCT VFR BLD CALC: 23 % — LOW (ref 39–50)
HGB BLD-MCNC: 6.9 G/DL — CRITICAL LOW (ref 13–17)
HGB BLD-MCNC: 7.2 G/DL — LOW (ref 13–17)
MAGNESIUM SERPL-MCNC: 2 MG/DL — SIGNIFICANT CHANGE UP (ref 1.6–2.6)
MCHC RBC-ENTMCNC: 30.6 PG — SIGNIFICANT CHANGE UP (ref 27–34)
MCHC RBC-ENTMCNC: 30.7 PG — SIGNIFICANT CHANGE UP (ref 27–34)
MCHC RBC-ENTMCNC: 30.8 GM/DL — LOW (ref 32–36)
MCHC RBC-ENTMCNC: 31.3 GM/DL — LOW (ref 32–36)
MCV RBC AUTO: 97.9 FL — SIGNIFICANT CHANGE UP (ref 80–100)
MCV RBC AUTO: 99.6 FL — SIGNIFICANT CHANGE UP (ref 80–100)
NRBC # BLD: 0 /100 WBCS — SIGNIFICANT CHANGE UP (ref 0–0)
NRBC # BLD: 0 /100 WBCS — SIGNIFICANT CHANGE UP (ref 0–0)
PLATELET # BLD AUTO: 39 K/UL — LOW (ref 150–400)
PLATELET # BLD AUTO: 41 K/UL — LOW (ref 150–400)
POTASSIUM SERPL-MCNC: 4.7 MMOL/L — SIGNIFICANT CHANGE UP (ref 3.5–5.3)
POTASSIUM SERPL-SCNC: 4.7 MMOL/L — SIGNIFICANT CHANGE UP (ref 3.5–5.3)
PROT SERPL-MCNC: 7 G/DL — SIGNIFICANT CHANGE UP (ref 6–8.3)
RBC # BLD: 2.25 M/UL — LOW (ref 4.2–5.8)
RBC # BLD: 2.35 M/UL — LOW (ref 4.2–5.8)
RBC # FLD: 19.5 % — HIGH (ref 10.3–14.5)
RBC # FLD: 19.8 % — HIGH (ref 10.3–14.5)
SODIUM SERPL-SCNC: 136 MMOL/L — SIGNIFICANT CHANGE UP (ref 135–145)
WBC # BLD: 1.64 K/UL — LOW (ref 3.8–10.5)
WBC # BLD: 2.12 K/UL — LOW (ref 3.8–10.5)
WBC # FLD AUTO: 1.64 K/UL — LOW (ref 3.8–10.5)
WBC # FLD AUTO: 2.12 K/UL — LOW (ref 3.8–10.5)

## 2020-11-02 PROCEDURE — 93010 ELECTROCARDIOGRAM REPORT: CPT

## 2020-11-02 PROCEDURE — 99233 SBSQ HOSP IP/OBS HIGH 50: CPT

## 2020-11-02 PROCEDURE — 99232 SBSQ HOSP IP/OBS MODERATE 35: CPT

## 2020-11-02 RX ORDER — ACETAMINOPHEN 500 MG
1000 TABLET ORAL ONCE
Refills: 0 | Status: COMPLETED | OUTPATIENT
Start: 2020-11-02 | End: 2020-11-03

## 2020-11-02 RX ORDER — FUROSEMIDE 40 MG
40 TABLET ORAL DAILY
Refills: 0 | Status: DISCONTINUED | OUTPATIENT
Start: 2020-11-02 | End: 2020-11-04

## 2020-11-02 RX ORDER — LIDOCAINE 4 G/100G
1 CREAM TOPICAL DAILY
Refills: 0 | Status: DISCONTINUED | OUTPATIENT
Start: 2020-11-02 | End: 2020-11-18

## 2020-11-02 RX ORDER — ALBUMIN HUMAN 25 %
50 VIAL (ML) INTRAVENOUS EVERY 12 HOURS
Refills: 0 | Status: DISCONTINUED | OUTPATIENT
Start: 2020-11-02 | End: 2020-11-03

## 2020-11-02 RX ORDER — CIPROFLOXACIN LACTATE 400MG/40ML
500 VIAL (ML) INTRAVENOUS EVERY 24 HOURS
Refills: 0 | Status: DISCONTINUED | OUTPATIENT
Start: 2020-11-03 | End: 2020-11-18

## 2020-11-02 RX ORDER — MORPHINE SULFATE 50 MG/1
1 CAPSULE, EXTENDED RELEASE ORAL ONCE
Refills: 0 | Status: DISCONTINUED | OUTPATIENT
Start: 2020-11-02 | End: 2020-11-02

## 2020-11-02 RX ORDER — FUROSEMIDE 40 MG
40 TABLET ORAL DAILY
Refills: 0 | Status: DISCONTINUED | OUTPATIENT
Start: 2020-11-02 | End: 2020-11-02

## 2020-11-02 RX ADMIN — MIDODRINE HYDROCHLORIDE 5 MILLIGRAM(S): 2.5 TABLET ORAL at 21:47

## 2020-11-02 RX ADMIN — NYSTATIN CREAM 1 APPLICATION(S): 100000 CREAM TOPICAL at 21:48

## 2020-11-02 RX ADMIN — Medication 50 MILLILITER(S): at 06:38

## 2020-11-02 RX ADMIN — LIDOCAINE 1 PATCH: 4 CREAM TOPICAL at 18:59

## 2020-11-02 RX ADMIN — Medication 1 GRAM(S): at 18:47

## 2020-11-02 RX ADMIN — Medication 5 MILLIGRAM(S): at 21:47

## 2020-11-02 RX ADMIN — PANTOPRAZOLE SODIUM 40 MILLIGRAM(S): 20 TABLET, DELAYED RELEASE ORAL at 07:50

## 2020-11-02 RX ADMIN — Medication 650 MILLIGRAM(S): at 07:50

## 2020-11-02 RX ADMIN — Medication 650 MILLIGRAM(S): at 08:50

## 2020-11-02 RX ADMIN — ATORVASTATIN CALCIUM 20 MILLIGRAM(S): 80 TABLET, FILM COATED ORAL at 21:47

## 2020-11-02 RX ADMIN — Medication 50 MILLILITER(S): at 18:45

## 2020-11-02 RX ADMIN — MORPHINE SULFATE 1 MILLIGRAM(S): 50 CAPSULE, EXTENDED RELEASE ORAL at 01:01

## 2020-11-02 RX ADMIN — LIDOCAINE 1 PATCH: 4 CREAM TOPICAL at 19:19

## 2020-11-02 RX ADMIN — OCTREOTIDE ACETATE 100 MICROGRAM(S): 200 INJECTION, SOLUTION INTRAVENOUS; SUBCUTANEOUS at 14:37

## 2020-11-02 RX ADMIN — Medication 40 MILLIGRAM(S): at 16:34

## 2020-11-02 RX ADMIN — LACTULOSE 20 GRAM(S): 10 SOLUTION ORAL at 06:40

## 2020-11-02 RX ADMIN — NYSTATIN CREAM 1 APPLICATION(S): 100000 CREAM TOPICAL at 07:50

## 2020-11-02 RX ADMIN — Medication 325 MILLIGRAM(S): at 12:09

## 2020-11-02 RX ADMIN — Medication 1 GRAM(S): at 12:09

## 2020-11-02 RX ADMIN — PANTOPRAZOLE SODIUM 40 MILLIGRAM(S): 20 TABLET, DELAYED RELEASE ORAL at 21:47

## 2020-11-02 RX ADMIN — MIDODRINE HYDROCHLORIDE 5 MILLIGRAM(S): 2.5 TABLET ORAL at 12:09

## 2020-11-02 RX ADMIN — OCTREOTIDE ACETATE 100 MICROGRAM(S): 200 INJECTION, SOLUTION INTRAVENOUS; SUBCUTANEOUS at 06:43

## 2020-11-02 RX ADMIN — MORPHINE SULFATE 1 MILLIGRAM(S): 50 CAPSULE, EXTENDED RELEASE ORAL at 01:17

## 2020-11-02 RX ADMIN — CEFTRIAXONE 100 MILLIGRAM(S): 500 INJECTION, POWDER, FOR SOLUTION INTRAMUSCULAR; INTRAVENOUS at 09:37

## 2020-11-02 RX ADMIN — OCTREOTIDE ACETATE 100 MICROGRAM(S): 200 INJECTION, SOLUTION INTRAVENOUS; SUBCUTANEOUS at 21:47

## 2020-11-02 RX ADMIN — MIDODRINE HYDROCHLORIDE 5 MILLIGRAM(S): 2.5 TABLET ORAL at 06:40

## 2020-11-02 RX ADMIN — Medication 1 GRAM(S): at 06:40

## 2020-11-02 NOTE — PROGRESS NOTE ADULT - ASSESSMENT
59M morbidly obese male w/ PMHx of Hepatic Cirrhosis, ascites, Thrombocytopenia, CHF, HTN, HLD, recent admx for Hepatic Encephalopathy, noncompliant with lactulose who was admitted to OSH  for hepatic encephalopathy, acute blood lose anemia (?variceal bleed, GI ulcer) and possible SBP. He was transferred to [facility] for endoscopy since he is high risk for planned procedure. He is also here for evaluation by Liver Transplant team, now s/p EGD showing gastritis no varices, colonoscopy without any source of bleed, now s/p VCE without source of bleed. Now with uptrending Cr concerning for pre-renal vs. HRS.

## 2020-11-02 NOTE — PROGRESS NOTE ADULT - SUBJECTIVE AND OBJECTIVE BOX
[facility] Division of Hospital Medicine  Raul Jane DO  Pager (MK, 3W-8N): [phone]  Other Times:  [phone]    Patient is a 60y old  Male who presents with a chief complaint of hepatic encephalopathy (01 Nov 2020 15:23)      SUBJECTIVE / OVERNIGHT EVENTS:    patient seen ans examined at bedside.      MEDICATIONS  (STANDING):  atorvastatin 20 milliGRAM(s) Oral at bedtime  cefTRIAXone   IVPB 1000 milliGRAM(s) IV Intermittent every 24 hours  ferrous    sulfate 325 milliGRAM(s) Oral daily  influenza   Vaccine 0.5 milliLiter(s) IntraMuscular once  lactulose Syrup 20 Gram(s) Oral every 8 hours  melatonin 5 milliGRAM(s) Oral at bedtime  midodrine. 5 milliGRAM(s) Oral three times a day  nystatin Powder 1 Application(s) Topical every 12 hours  octreotide  Injectable 100 MICROGram(s) SubCutaneous three times a day  pantoprazole  Injectable 40 milliGRAM(s) IV Push every 12 hours  rifAXIMin 550 milliGRAM(s) Oral two times a day  sucralfate 1 Gram(s) Oral four times a day    MEDICATIONS  (PRN):  acetaminophen   Tablet .. 650 milliGRAM(s) Oral every 6 hours PRN Mild Pain (1 - 3), Moderate Pain (4 - 6)      CAPILLARY BLOOD GLUCOSE        I&O's Summary    01 Nov 2020 07:01  -  02 Nov 2020 07:00  --------------------------------------------------------  IN: 1230 mL / OUT: 1450 mL / NET: -220 mL    02 Nov 2020 07:01  -  02 Nov 2020 11:52  --------------------------------------------------------  IN: 480 mL / OUT: 0 mL / NET: 480 mL        PHYSICAL EXAM:  Vital Signs Last 24 Hrs  T(C): 36.8 (02 Nov 2020 11:33), Max: 36.9 (02 Nov 2020 04:59)  T(F): 98.3 (02 Nov 2020 11:33), Max: 98.4 (02 Nov 2020 04:59)  HR: 74 (02 Nov 2020 11:33) (70 - 80)  BP: 124/57 (02 Nov 2020 11:33) (107/57 - 124/57)  BP(mean): --  RR: 18 (02 Nov 2020 11:33) (17 - 18)  SpO2: 95% (02 Nov 2020 11:33) (95% - 98%)  CONSTITUTIONAL: NAD, well-developed, well-groomed  EYES: PERRLA; conjunctiva and sclera clear  NECK: Supple, no palpable masses; no thyromegaly  RESPIRATORY: Normal respiratory effort; lungs are clear to auscultation bilaterally  CARDIOVASCULAR: Regular rate and rhythm, normal S1 and S2, no murmur/rub/gallop; No lower extremity edema; Peripheral pulses are 2+ bilaterally  ABDOMEN: Nontender to palpation, normoactive bowel sounds, no rebound/guarding; No hepatosplenomegaly  MUSCULOSKELETAL:  Normal gait; no clubbing or cyanosis of digits; no joint swelling or tenderness to palpation  PSYCH: A+O to person, place, and time; affect appropriate  NEUROLOGY: CN 2-12 are intact and symmetric; no gross sensory deficits   SKIN: No rashes; no palpable lesions    LABS:                        6.9    1.64  )-----------( 41       ( 02 Nov 2020 10:14 )             22.4     11-02    136  |  102  |  14  ----------------------------<  153<H>  4.7   |  24  |  1.57<H>    Ca    9.4      02 Nov 2020 10:14  Mg     2.0     11-02    TPro  7.0  /  Alb  4.5  /  TBili  3.4<H>  /  DBili  x   /  AST  19  /  ALT  11  /  AlkPhos  63  11-02                RADIOLOGY & ADDITIONAL TESTS:  Results Reviewed:   Imaging Personally Reviewed:  Electrocardiogram Personally Reviewed:    COORDINATION OF CARE:  Care Discussed with Consultants/Other Providers [Y/N]:  Prior or Outpatient Records Reviewed [Y/N]:

## 2020-11-02 NOTE — PROGRESS NOTE ADULT - SUBJECTIVE AND OBJECTIVE BOX
[facility] Division of Hospital Medicine  Raul Jane DO  Pager (MK, 2K-7H): [phone]  Other Times:  [phone]    Patient is a 60y old  Male who presents with a chief complaint of hepatic encephalopathy (02 Nov 2020 11:51)      SUBJECTIVE / OVERNIGHT EVENTS:    patient seen and examined at bedside.  feels ok. stating swelling is worsening. no fevers, chills, sob.     MEDICATIONS  (STANDING):  atorvastatin 20 milliGRAM(s) Oral at bedtime  cefTRIAXone   IVPB 1000 milliGRAM(s) IV Intermittent every 24 hours  ferrous    sulfate 325 milliGRAM(s) Oral daily  influenza   Vaccine 0.5 milliLiter(s) IntraMuscular once  lactulose Syrup 20 Gram(s) Oral every 8 hours  melatonin 5 milliGRAM(s) Oral at bedtime  midodrine. 5 milliGRAM(s) Oral three times a day  nystatin Powder 1 Application(s) Topical every 12 hours  octreotide  Injectable 100 MICROGram(s) SubCutaneous three times a day  pantoprazole  Injectable 40 milliGRAM(s) IV Push every 12 hours  rifAXIMin 550 milliGRAM(s) Oral two times a day  sucralfate 1 Gram(s) Oral four times a day    MEDICATIONS  (PRN):  acetaminophen   Tablet .. 650 milliGRAM(s) Oral every 6 hours PRN Mild Pain (1 - 3), Moderate Pain (4 - 6)      CAPILLARY BLOOD GLUCOSE        I&O's Summary    01 Nov 2020 07:01  -  02 Nov 2020 07:00  --------------------------------------------------------  IN: 1230 mL / OUT: 1450 mL / NET: -220 mL    02 Nov 2020 07:01  -  02 Nov 2020 11:55  --------------------------------------------------------  IN: 480 mL / OUT: 0 mL / NET: 480 mL        PHYSICAL EXAM:  Vital Signs Last 24 Hrs  T(C): 36.8 (02 Nov 2020 11:33), Max: 36.9 (02 Nov 2020 04:59)  T(F): 98.3 (02 Nov 2020 11:33), Max: 98.4 (02 Nov 2020 04:59)  HR: 74 (02 Nov 2020 11:33) (70 - 80)  BP: 124/57 (02 Nov 2020 11:33) (107/57 - 124/57)  BP(mean): --  RR: 18 (02 Nov 2020 11:33) (17 - 18)  SpO2: 95% (02 Nov 2020 11:33) (95% - 98%)    CONSTITUTIONAL: NAD, obese  EYES: PERRL; conjunctiva and sclera clear  ENMT: Moist oral mucosa, normal dentition  NECK: Supple, no palpable masses;  RESPIRATORY: Normal respiratory effort; lungs are clear to auscultation bilaterally  CARDIOVASCULAR: Regular rate and rhythm, normal S1 and S2, no murmur/rub/gallop;  EXTREMITIES: +3 pitting LE edema upto upper thighs BL, difficult to palpate LE peripheral pulses given edema, radial pulses 2+ BL  ABDOMEN: drain in LLQ; RLQ dressing- no discharge,. +distended, soft, Nontender to palpation, normoactive bowel sounds, no rebound/guarding;   PSYCH: A+O to person, place, and time; affect appropriate  SKIN: No rashes; no palpable lesions      LABS:                        6.9    1.64  )-----------( 41       ( 02 Nov 2020 10:14 )             22.4     11-02    136  |  102  |  14  ----------------------------<  153<H>  4.7   |  24  |  1.57<H>    Ca    9.4      02 Nov 2020 10:14  Mg     2.0     11-02    TPro  7.0  /  Alb  4.5  /  TBili  3.4<H>  /  DBili  x   /  AST  19  /  ALT  11  /  AlkPhos  63  11-02                RADIOLOGY & ADDITIONAL TESTS:  Results Reviewed:   Imaging Personally Reviewed:  Electrocardiogram Personally Reviewed:    COORDINATION OF CARE:  Care Discussed with Consultants/Other Providers [Y/N]:  Prior or Outpatient Records Reviewed [Y/N]:  james Pérez

## 2020-11-02 NOTE — PROGRESS NOTE ADULT - ASSESSMENT
59 M morbidly obese male with  PMHx of Hepatic Cirrhosis, Thrombocytopenia, CHF, HTN, HLD, admitted for, acute blood lose anemia and SBP with ascitic cultures +for Ecoli (10/14). Nephrology team consulted for MONI and concern for HRS.    #Acute Kidney Injury   renal function improving slowly   Remains with significant edema: ok to diurese with lasix along with albumin     #Hyponatremia - Hypervolemic hyponatremia in the setting of liver cirrhosis   Now resolved  - Fluid restrict 1L/day  - Monitor serum Na  - continue albumin

## 2020-11-02 NOTE — PROGRESS NOTE ADULT - SUBJECTIVE AND OBJECTIVE BOX
[facility] DIVISION OF KIDNEY DISEASES AND HYPERTENSION -- FOLLOW UP NOTE  --------------------------------------------------------------------------------  Chief Complaint: AMS    24 hour events/subjective:  Worsening leg edema        PAST HISTORY  --------------------------------------------------------------------------------  No significant changes to PMH, PSH, FHx, SHx, unless otherwise noted    ALLERGIES & MEDICATIONS  --------------------------------------------------------------------------------  Allergies    No Known Allergies    Intolerances      Standing Inpatient Medications  albumin human 25% IVPB 50 milliLiter(s) IV Intermittent every 12 hours  atorvastatin 20 milliGRAM(s) Oral at bedtime  cefTRIAXone   IVPB 1000 milliGRAM(s) IV Intermittent every 24 hours  ferrous    sulfate 325 milliGRAM(s) Oral daily  furosemide   Injectable 40 milliGRAM(s) IV Push daily  influenza   Vaccine 0.5 milliLiter(s) IntraMuscular once  lactulose Syrup 20 Gram(s) Oral every 8 hours  melatonin 5 milliGRAM(s) Oral at bedtime  midodrine. 5 milliGRAM(s) Oral three times a day  nystatin Powder 1 Application(s) Topical every 12 hours  octreotide  Injectable 100 MICROGram(s) SubCutaneous three times a day  pantoprazole  Injectable 40 milliGRAM(s) IV Push every 12 hours  rifAXIMin 550 milliGRAM(s) Oral two times a day  sucralfate 1 Gram(s) Oral four times a day    PRN Inpatient Medications  acetaminophen   Tablet .. 650 milliGRAM(s) Oral every 6 hours PRN      REVIEW OF SYSTEMS  --------------------------------------------------------------------------------  Gen: No  fevers/chills, weakness  Skin: No rashes  Head/Eyes/Ears/Mouth: No headache  Respiratory: No dyspnea, + cough  CV: No chest pain,   GI: + abdominal distention, + diarrhea  : No increased frequency, dysuria, hematuria, nocturia  MSK: + LE edema  Neuro: No dizziness/lightheadedness, weakness, seizures    All other systems were reviewed and are negative, except as noted.    VITALS/PHYSICAL EXAM  --------------------------------------------------------------------------------  T(C): 36.8 (11-02-20 @ 11:33), Max: 36.9 (11-02-20 @ 04:59)  HR: 74 (11-02-20 @ 11:33) (70 - 74)  BP: 124/57 (11-02-20 @ 11:33) (115/62 - 124/57)  RR: 18 (11-02-20 @ 11:33) (17 - 18)  SpO2: 95% (11-02-20 @ 11:33) (95% - 98%)  Wt(kg): --        11-01-20 @ 07:01  -  11-02-20 @ 07:00  --------------------------------------------------------  IN: 1230 mL / OUT: 1450 mL / NET: -220 mL    11-02-20 @ 07:01 - 11-02-20 @ 14:29  --------------------------------------------------------  IN: 840 mL / OUT: 0 mL / NET: 840 mL      Physical Exam:  	 Gen: morbidly obese  	HEENT: PERRL, supple neck  	Pulm: CTA B/L  	CV:  S1S2  	Abd: +BS, distended, + pouch on Right quadrant with clear fluid. Mildly soaked dressing on the left abdomen  	Ext: ++ Bilateral LE edema, + anasarca, 	Neuro: No focal deficits  	Skin: Warm, +erythema rt leg         LABS/STUDIES  --------------------------------------------------------------------------------              6.9    1.64  >-----------<  41       [11-02-20 @ 10:14]              22.4     136  |  102  |  14  ----------------------------<  153      [11-02-20 @ 10:14]  4.7   |  24  |  1.57        Ca     9.4     [11-02-20 @ 10:14]      Mg     2.0     [11-02-20 @ 10:14]    TPro  7.0  /  Alb  4.5  /  TBili  3.4  /  DBili  x   /  AST  19  /  ALT  11  /  AlkPhos  63  [11-02-20 @ 10:14]          Creatinine Trend:  SCr 1.57 [11-02 @ 10:14]  SCr 1.59 [11-01 @ 07:02]  SCr 1.66 [10-31 @ 07:07]  SCr 1.87 [10-30 @ 07:04]  SCr 1.81 [10-29 @ 06:51]    Urinalysis - [10-30-20 @ 12:33]      Color Yellow / Appearance Clear / SG 1.016 / pH 5.5      Gluc Negative / Ketone Trace  / Bili Negative / Urobili <2 mg/dL       Blood Negative / Protein Trace / Leuk Est Negative / Nitrite Negative      RBC  / WBC  / Hyaline  / Gran  / Sq Epi  / Non Sq Epi  / Bacteria     Urine Creatinine 199      [10-29-20 @ 07:08]  Urine Sodium <35      [10-30-20 @ 09:39]  Urine Urea Nitrogen 373      [10-30-20 @ 12:33]  Urine Chloride <35      [10-30-20 @ 09:39]  Urine Osmolality 296      [10-30-20 @ 12:33]    Iron 32, TIBC 241, %sat 13      [09-08-20 @ 10:41]  Ferritin 24      [09-08-20 @ 10:41]    HBsAb Indeterminate Test repeated.      [10-27-20 @ 12:32]  HBsAg Nonreact      [10-27-20 @ 12:32]  HBcAb Nonreact      [10-27-20 @ 12:32]  HCV 0.15, Nonreact      [10-27-20 @ 12:32]  HIV Nonreact      [10-27-20 @ 12:54]    Syphilis Screen (Treponema Pallidum Ab) Negative      [10-27-20 @ 12:32]

## 2020-11-02 NOTE — PROGRESS NOTE ADULT - PROBLEM SELECTOR PLAN 4
Cr elevated, presumed to be from intravascular depletion. Baseline appears to be around 1.4-  -MONI improving  -severe volume overloaded- holding albumin, d/w renal fellow ? need for IV diuresis  - renal US - has ruled out hydro, unremarkable kidneys  - continue midodrine/octretide/ for presumed HRS  - rod recommended, patient refusing,  -PVRs have been normal- 1L UO  - strict I/O monitoring

## 2020-11-02 NOTE — PROGRESS NOTE ADULT - PROBLEM SELECTOR PLAN 1
- Acute encephalopathy 2/2 to PSE-resolved  - c/w Rifaximin 550mg po bid  - c/w Lactulose 20g po q8h  - monitor LFTs  - Hepatology eval appreciated  - Pre transplant ID eval: F/U Strongyloides Ab, Quant gold; vaccinations per ID recs

## 2020-11-03 DIAGNOSIS — R60.0 LOCALIZED EDEMA: ICD-10-CM

## 2020-11-03 LAB
ALBUMIN SERPL ELPH-MCNC: 4.5 G/DL — SIGNIFICANT CHANGE UP (ref 3.3–5)
ALP SERPL-CCNC: 69 U/L — SIGNIFICANT CHANGE UP (ref 40–120)
ALT FLD-CCNC: 9 U/L — LOW (ref 10–45)
ANION GAP SERPL CALC-SCNC: 11 MMOL/L — SIGNIFICANT CHANGE UP (ref 5–17)
AST SERPL-CCNC: 18 U/L — SIGNIFICANT CHANGE UP (ref 10–40)
BASOPHILS # BLD AUTO: 0 K/UL — SIGNIFICANT CHANGE UP (ref 0–0.2)
BASOPHILS NFR BLD AUTO: 0 % — SIGNIFICANT CHANGE UP (ref 0–2)
BILIRUB SERPL-MCNC: 3.3 MG/DL — HIGH (ref 0.2–1.2)
BUN SERPL-MCNC: 15 MG/DL — SIGNIFICANT CHANGE UP (ref 7–23)
CALCIUM SERPL-MCNC: 9.5 MG/DL — SIGNIFICANT CHANGE UP (ref 8.4–10.5)
CHLORIDE SERPL-SCNC: 101 MMOL/L — SIGNIFICANT CHANGE UP (ref 96–108)
CO2 SERPL-SCNC: 25 MMOL/L — SIGNIFICANT CHANGE UP (ref 22–31)
CREAT SERPL-MCNC: 1.7 MG/DL — HIGH (ref 0.5–1.3)
EOSINOPHIL # BLD AUTO: 0.03 K/UL — SIGNIFICANT CHANGE UP (ref 0–0.5)
EOSINOPHIL NFR BLD AUTO: 1.8 % — SIGNIFICANT CHANGE UP (ref 0–6)
GLUCOSE SERPL-MCNC: 92 MG/DL — SIGNIFICANT CHANGE UP (ref 70–99)
HCT VFR BLD CALC: 23.7 % — LOW (ref 39–50)
HGB BLD-MCNC: 7.4 G/DL — LOW (ref 13–17)
INR BLD: 2.71 RATIO — HIGH (ref 0.88–1.16)
LYMPHOCYTES # BLD AUTO: 0.4 K/UL — LOW (ref 1–3.3)
LYMPHOCYTES # BLD AUTO: 23.8 % — SIGNIFICANT CHANGE UP (ref 13–44)
MCHC RBC-ENTMCNC: 30.6 PG — SIGNIFICANT CHANGE UP (ref 27–34)
MCHC RBC-ENTMCNC: 31.2 GM/DL — LOW (ref 32–36)
MCV RBC AUTO: 97.9 FL — SIGNIFICANT CHANGE UP (ref 80–100)
MONOCYTES # BLD AUTO: 0.18 K/UL — SIGNIFICANT CHANGE UP (ref 0–0.9)
MONOCYTES NFR BLD AUTO: 10.7 % — SIGNIFICANT CHANGE UP (ref 2–14)
NEUTROPHILS # BLD AUTO: 1.07 K/UL — LOW (ref 1.8–7.4)
NEUTROPHILS NFR BLD AUTO: 63.7 % — SIGNIFICANT CHANGE UP (ref 43–77)
NRBC # BLD: 0 /100 WBCS — SIGNIFICANT CHANGE UP (ref 0–0)
PLATELET # BLD AUTO: 43 K/UL — LOW (ref 150–400)
POTASSIUM SERPL-MCNC: 4.9 MMOL/L — SIGNIFICANT CHANGE UP (ref 3.5–5.3)
POTASSIUM SERPL-SCNC: 4.9 MMOL/L — SIGNIFICANT CHANGE UP (ref 3.5–5.3)
PROT SERPL-MCNC: 7.2 G/DL — SIGNIFICANT CHANGE UP (ref 6–8.3)
PROTHROM AB SERPL-ACNC: 30.5 SEC — HIGH (ref 10.6–13.6)
RBC # BLD: 2.42 M/UL — LOW (ref 4.2–5.8)
RBC # FLD: 19.3 % — HIGH (ref 10.3–14.5)
SODIUM SERPL-SCNC: 137 MMOL/L — SIGNIFICANT CHANGE UP (ref 135–145)
WBC # BLD: 1.68 K/UL — LOW (ref 3.8–10.5)
WBC # FLD AUTO: 1.68 K/UL — LOW (ref 3.8–10.5)

## 2020-11-03 PROCEDURE — 93970 EXTREMITY STUDY: CPT | Mod: 26

## 2020-11-03 PROCEDURE — 99233 SBSQ HOSP IP/OBS HIGH 50: CPT | Mod: GC

## 2020-11-03 PROCEDURE — 99232 SBSQ HOSP IP/OBS MODERATE 35: CPT

## 2020-11-03 PROCEDURE — 99232 SBSQ HOSP IP/OBS MODERATE 35: CPT | Mod: GC

## 2020-11-03 RX ORDER — VANCOMYCIN HCL 1 G
1000 VIAL (EA) INTRAVENOUS ONCE
Refills: 0 | Status: COMPLETED | OUTPATIENT
Start: 2020-11-03 | End: 2020-11-03

## 2020-11-03 RX ADMIN — Medication 500 MILLIGRAM(S): at 08:46

## 2020-11-03 RX ADMIN — Medication 50 MILLILITER(S): at 05:18

## 2020-11-03 RX ADMIN — Medication 1 GRAM(S): at 17:55

## 2020-11-03 RX ADMIN — MIDODRINE HYDROCHLORIDE 5 MILLIGRAM(S): 2.5 TABLET ORAL at 05:17

## 2020-11-03 RX ADMIN — Medication 325 MILLIGRAM(S): at 12:34

## 2020-11-03 RX ADMIN — LIDOCAINE 1 PATCH: 4 CREAM TOPICAL at 17:55

## 2020-11-03 RX ADMIN — PANTOPRAZOLE SODIUM 40 MILLIGRAM(S): 20 TABLET, DELAYED RELEASE ORAL at 08:47

## 2020-11-03 RX ADMIN — LACTULOSE 20 GRAM(S): 10 SOLUTION ORAL at 05:17

## 2020-11-03 RX ADMIN — Medication 5 MILLIGRAM(S): at 21:04

## 2020-11-03 RX ADMIN — OCTREOTIDE ACETATE 100 MICROGRAM(S): 200 INJECTION, SOLUTION INTRAVENOUS; SUBCUTANEOUS at 21:09

## 2020-11-03 RX ADMIN — MIDODRINE HYDROCHLORIDE 5 MILLIGRAM(S): 2.5 TABLET ORAL at 17:55

## 2020-11-03 RX ADMIN — NYSTATIN CREAM 1 APPLICATION(S): 100000 CREAM TOPICAL at 08:48

## 2020-11-03 RX ADMIN — ATORVASTATIN CALCIUM 20 MILLIGRAM(S): 80 TABLET, FILM COATED ORAL at 21:04

## 2020-11-03 RX ADMIN — OCTREOTIDE ACETATE 100 MICROGRAM(S): 200 INJECTION, SOLUTION INTRAVENOUS; SUBCUTANEOUS at 16:13

## 2020-11-03 RX ADMIN — NYSTATIN CREAM 1 APPLICATION(S): 100000 CREAM TOPICAL at 21:05

## 2020-11-03 RX ADMIN — LACTULOSE 20 GRAM(S): 10 SOLUTION ORAL at 16:14

## 2020-11-03 RX ADMIN — Medication 1 GRAM(S): at 12:34

## 2020-11-03 RX ADMIN — OCTREOTIDE ACETATE 100 MICROGRAM(S): 200 INJECTION, SOLUTION INTRAVENOUS; SUBCUTANEOUS at 05:17

## 2020-11-03 RX ADMIN — Medication 650 MILLIGRAM(S): at 13:57

## 2020-11-03 RX ADMIN — Medication 1 GRAM(S): at 21:09

## 2020-11-03 RX ADMIN — Medication 1 GRAM(S): at 05:17

## 2020-11-03 RX ADMIN — MIDODRINE HYDROCHLORIDE 5 MILLIGRAM(S): 2.5 TABLET ORAL at 12:33

## 2020-11-03 RX ADMIN — Medication 250 MILLIGRAM(S): at 12:53

## 2020-11-03 RX ADMIN — PANTOPRAZOLE SODIUM 40 MILLIGRAM(S): 20 TABLET, DELAYED RELEASE ORAL at 21:04

## 2020-11-03 RX ADMIN — Medication 1 GRAM(S): at 01:07

## 2020-11-03 RX ADMIN — Medication 650 MILLIGRAM(S): at 12:57

## 2020-11-03 RX ADMIN — Medication 40 MILLIGRAM(S): at 05:17

## 2020-11-03 NOTE — PROGRESS NOTE ADULT - PROBLEM SELECTOR PLAN 1
will r/o dvt, follow up on duplex  net neg 1L, c/w lasix 40mg IV daily  concern also for RLE cellulitis? as more erythematous and warm, would like to start IV vanco, will touch base with ID

## 2020-11-03 NOTE — PROGRESS NOTE ADULT - SUBJECTIVE AND OBJECTIVE BOX
INFECTIOUS DISEASES FOLLOW UP-- Lela Ashby  [phone]    This is a follow up note for this  60yMale with  Liver failure without hepatic coma    interval event- with development of RLE erythema and some warmth        ROS:  CONSTITUTIONAL:  No fever, good appetite  CARDIOVASCULAR:  No chest pain or palpitations  RESPIRATORY:  No dyspnea  GASTROINTESTINAL:  No nausea, vomiting, diarrhea, or abdominal pain  GENITOURINARY:  No dysuria  NEUROLOGIC:  No headache,     Allergies    No Known Allergies    Intolerances        ANTIBIOTICS/RELEVANT:  antimicrobials  ciprofloxacin     Tablet 500 milliGRAM(s) Oral every 24 hours  rifAXIMin 550 milliGRAM(s) Oral two times a day    immunologic:  influenza   Vaccine 0.5 milliLiter(s) IntraMuscular once    OTHER:  acetaminophen   Tablet .. 650 milliGRAM(s) Oral every 6 hours PRN  atorvastatin 20 milliGRAM(s) Oral at bedtime  ferrous    sulfate 325 milliGRAM(s) Oral daily  furosemide   Injectable 40 milliGRAM(s) IV Push daily  lactulose Syrup 20 Gram(s) Oral every 8 hours  lidocaine   Patch 1 Patch Transdermal daily  melatonin 5 milliGRAM(s) Oral at bedtime  midodrine. 5 milliGRAM(s) Oral three times a day  nystatin Powder 1 Application(s) Topical every 12 hours  octreotide  Injectable 100 MICROGram(s) SubCutaneous three times a day  pantoprazole  Injectable 40 milliGRAM(s) IV Push every 12 hours  sucralfate 1 Gram(s) Oral four times a day      Objective:  Vital Signs Last 24 Hrs  T(C): 36.9 (03 Nov 2020 11:44), Max: 36.9 (03 Nov 2020 11:44)  T(F): 98.4 (03 Nov 2020 11:44), Max: 98.4 (03 Nov 2020 11:44)  HR: 71 (03 Nov 2020 11:44) (67 - 71)  BP: 122/62 (03 Nov 2020 11:44) (111/60 - 122/62)  BP(mean): --  RR: 18 (03 Nov 2020 11:44) (18 - 20)  SpO2: 95% (03 Nov 2020 11:44) (95% - 99%)    PHYSICAL EXAM:  Constitutional:no acute distress, mentation satisfactory able to answer questions correctly  Eyes:MICHELLE, EOMI  Ear/Nose/Throat: no oral lesions, 	  Respiratory: clear BL  Cardiovascular: S1S2  Gastrointestinal:soft, (+) BS, no tenderness  Extremities:no e/e/c marked bilateral edema 4+ to above knees both legs with some erythema but RLE with more warmth anterior portion around medially  No Lymphadenopathy  IV sites not inflammed.    LABS:                        7.4    1.68  )-----------( 43       ( 03 Nov 2020 07:25 )             23.7     11-03    137  |  101  |  15  ----------------------------<  92  4.9   |  25  |  1.70<H>    Ca    9.5      03 Nov 2020 07:25  Mg     2.0     11-02    TPro  7.2  /  Alb  4.5  /  TBili  3.3<H>  /  DBili  x   /  AST  18  /  ALT  9<L>  /  AlkPhos  69  11-03    PT/INR - ( 03 Nov 2020 08:26 )   PT: 30.5 sec;   INR: 2.71 ratio               MICROBIOLOGY:    COVID-19 PCR: NotDetec: Testing is performed using polymerase chain reaction (PCR) or  transcription mediated amplification (TMA). This COVID-19 (SARS-CoV-2)  nucleic acid amplification test was validated by [employer] and is  in use under the FDA Emergency Use Authorization (EUA) for clinical labs  CLIA-certified to perform high complexity testing. Test results should be  correlated with clinical presentation, patient history, and epidemiology. (10.31.20 @ 14:07)            RECENT CULTURES:      RADIOLOGY & ADDITIONAL STUDIES:    < from: [facility] Duplex Lower Ext Vein Scan, Bilat (11.03.20 @ 15:00) >  IMPRESSION:  No evidence of deep venous thrombosis in eitherlower extremity.    < end of copied text >

## 2020-11-03 NOTE — PROGRESS NOTE ADULT - ASSESSMENT
59M morbidly obese male w/ PMHx of Hepatic Cirrhosis, ascites, Thrombocytopenia, CHF, HTN, HLD, recent admx for Hepatic Encephalopathy, noncompliant with lactulose who was admitted to OSH  for hepatic encephalopathy, acute blood lose anemia (?variceal bleed, GI ulcer) and possible SBP. He was transferred to [facility] for endoscopy since he is high risk for planned procedure. He is also here for evaluation by Liver Transplant team, now s/p EGD showing gastritis no varices, colonoscopy without any source of bleed, now s/p VCE without source of bleed. Now with uptrending Cr concerning for pre-renal vs. HRS.

## 2020-11-03 NOTE — PROGRESS NOTE ADULT - ASSESSMENT
59 M morbidly obese male with  PMHx of Hepatic Cirrhosis, Thrombocytopenia, CHF, HTN, HLD, admitted for, acute blood lose anemia and SBP with ascitic cultures +for Ecoli (10/14). Nephrology team consulted for MONI and concern for HRS.    #Acute Kidney Injury   renal function improving slowly   Remains with significant edema: ok to diurese with lasix along with albumin     #Hyponatremia - Hypervolemic hyponatremia in the setting of liver cirrhosis   Now resolved  - Fluid restrict 1L/day  - Monitor serum Na  - continue albumin          59 M morbidly obese male with  PMHx of Hepatic Cirrhosis, Thrombocytopenia, CHF, HTN, HLD, admitted for, acute blood lose anemia and SBP with ascitic cultures +for Ecoli (10/14). Nephrology team consulted for MONI and concern for HRS.    #Acute Kidney Injury  renal function stable  Remains with significant edema: Maintain  diureses with lasix along with albumin   Can likely stop octreotide in am     #Hyponatremia - Hypervolemic hyponatremia in the setting of liver cirrhosis   Now resolved  - Fluid restrict 1L/day  - Monitor serum Na  - continue albumin

## 2020-11-03 NOTE — PROGRESS NOTE ADULT - PROBLEM SELECTOR PLAN 4
- secondary to GI bleed now resolved, suspect hemorrhoidal. Pt reports episode occurred 2 weeks ago. Since resolved.  - s/p EGD, colonoscopy and VCE without source of bleed- c-scope showing internal hemorrhoids, nonbleeding. Suspect this was source of previous blood.  - PPI as above  - Monitor HH

## 2020-11-03 NOTE — PROGRESS NOTE ADULT - ASSESSMENT
59M morbidly obese male w/ PMHx of Hepatic Cirrhosis, ascites, Thrombocytopenia, CHF, HTN, HLD, recent admit for Hepatic Encephalopathy, presents as transfer from OSH for advanced GI and hepatology eval, admitted initially 10/14-10/23 with HSE, anemia, and SBP, s/p LVP.   ID consulted for Pre-Transplant Evaluation, SBP.  S/p EGD, colonoscopy, capsule study with no significant findings.     # r/o cellulitis of RLE  would send blood cultures x2 sets  Would give a dose of Vancomycin 1 gram and monitor level and clinical exam  nasal swab for MRSA    #SBP in setting of decompensated ETOH hepatic cirrhosis -   Paracentesis on 10/14 with Ecoli, s/p 7 days of ceftriaxone. Repeat Para on 10/22 abd fluid still NGTD.  No current abd pain, and nontoxic appearing without fevers.   - left abdomen bag draining yellow seorus fluid  - switched to  oral ciprofloxacin for secondary prophylaxis    #Leukopenia - has chronic leukopenia of unknown etiology. Could be acute in setting of infection and chronic due to advanced cirrhosis but has been never been formally evaluated.  anemia- ? slow GI bleed no melena reported  would transfuse PRBCs    #pre-transplant eval-   - HAV IgG pos  - HBsAb, HBsAg, HBVc total Ab: ind/neg/nneg  - HCV Ab neg  - HSV 1/2 IgG neg/neg  - EBV Serology: past EBV infection  - CMV IgG neg  - VZV IgG pos  - Measles, Mumps and Rubella IgG serologies: pos/pos/pos  - Quantiferon Gold pending  - HIV Test Neg  - Syphilis Screen neg  - Toxoplasma IgG neg  - Strongyloides Ab pending    #Vaccines -  Received prevnar 10/21/2020  - will need influenza vaccine- please administer  - will need Hep B series- can give first dose in hospital  - pneumovax 8 weeks from prevnar       Max Ashby MD  [phone]  After 5pm/weekends [phone]

## 2020-11-03 NOTE — CHART NOTE - NSCHARTNOTEFT_GEN_A_CORE
Nutrition follow up     Hospital course as per chart: Pt 59 y/o M with PMH: morbidly obese, hepatic Cirrhosis, ascites, Thrombocytopenia, CHF, HTN, HLD, Hepatic Encephalopathy, noncompliant with lactulose, admitted to OSH  for hepatic encephalopathy, acute blood lose anemia and possible SBP; transferred to [facility] for endoscopy and evaluation by Liver Transplant team, ascites S/P paracentesis x2 - last on (10/22), S/P EGD showing gastritis no varices, colonoscopy without any source of bleed (10/25-26), S/P video capsule endoscopy (10/28) - without source of bleed, MONI.    Source: Patient [x]    Family [ ]     other [x]; Medical record    Pt reports good appetite and PO intake. Noted % PO intake as per flow sheets. Denies difficulty chewing/swallowing. Pt denies nausea, vomiting, diarrhea, or constipation, last BM today (11/03). Pt received thorough education on weight loss and heart failure nutrition therapy in previous RD visit - pt denies having questions/concerns about diet and nutrition education provided; made aware RD remains available.     Diet: DASH/TLC     Enteral /Parenteral Nutrition: n/a    Weight as per flow sheets: (10/25) 342.8 pounds -> (10/27) 346 pounds -?accuracy due to fluid accumulation, will continue to monitor (pt sitting in chair).   % Weight Change: n/a    Pertinent Medications: MEDICATIONS  (STANDING):  atorvastatin 20 milliGRAM(s) Oral at bedtime  ciprofloxacin     Tablet 500 milliGRAM(s) Oral every 24 hours  ferrous    sulfate 325 milliGRAM(s) Oral daily  furosemide   Injectable 40 milliGRAM(s) IV Push daily  influenza   Vaccine 0.5 milliLiter(s) IntraMuscular once  lactulose Syrup 20 Gram(s) Oral every 8 hours  lidocaine   Patch 1 Patch Transdermal daily  melatonin 5 milliGRAM(s) Oral at bedtime  midodrine. 5 milliGRAM(s) Oral three times a day  nystatin Powder 1 Application(s) Topical every 12 hours  octreotide  Injectable 100 MICROGram(s) SubCutaneous three times a day  pantoprazole  Injectable 40 milliGRAM(s) IV Push every 12 hours  rifAXIMin 550 milliGRAM(s) Oral two times a day  sucralfate 1 Gram(s) Oral four times a day    MEDICATIONS  (PRN):  acetaminophen   Tablet .. 650 milliGRAM(s) Oral every 6 hours PRN Mild Pain (1 - 3), Moderate Pain (4 - 6)    Pertinent Labs: (11/03) Cr  1.70 mg/dL<H>     Skin: no noted pressure injuries as per documentation.   Noted +3 charisma. leg and +4 charisma. foot edema as per flow sheets (previously +2 charisma. foot and ankle edema).     Estimated Needs:   [x] no change since previous assessment  [ ] recalculated:     Previous Nutrition Diagnosis: [x] Overweight/Obesity.      Nutrition Diagnosis is [x] ongoing - addressed with nutrition therapy education.   Goal: pt to meet >75% of estimated nutritional needs during hospital stay.     New Nutrition Diagnosis: [x] not applicable    Interventions:     1. Recommend continue DASH/TLC diet. Will continue to monitor and adjust as needed.  2. Review education on weight loss and heart failure nutrition therapy as needed/requested.   3. Obtain current weight to identify changes if any.      Monitoring and Evaluation:     [x] PO intake [x] Tolerance to diet prescription [x] weights [x] follow up per protocol    RD remains available.  Natali Esquivel MS RDN CDN #[phone].

## 2020-11-03 NOTE — PROGRESS NOTE ADULT - PROBLEM SELECTOR PLAN 8
Transitions of Care Status:  1.  Name of PCP: Dr. White. Pt reports difficulty getting [facility] hepatology follow up.  2.  PCP Contacted on Admission: [ ] Y    [ ] N    3.  PCP contacted at Discharge: [ ] Y    [ ] N    [ ] N/A  4.  Post-Discharge Appointment Date and Location:  5.  Summary of Handoff given to PCP:    Left VM for family member Fritz Trujillo to discuss.   Dispo: likely home with home PT pending improvement in MONI, hepatology/renal clearance.

## 2020-11-03 NOTE — PROGRESS NOTE ADULT - SUBJECTIVE AND OBJECTIVE BOX
[facility] Division of Hospital Medicine  Raul Jane DO  Pager ([facility], 8A-5P): [phone]  Other Times:  [phone]    Patient is a 60y old  Male who presents with a chief complaint of hepatic encephalopathy (02 Nov 2020 14:29)      SUBJECTIVE / OVERNIGHT EVENTS:    patient feels ok. states swelling isnt much better. having BMs. no fevers, or chills.    MEDICATIONS  (STANDING):  atorvastatin 20 milliGRAM(s) Oral at bedtime  ciprofloxacin     Tablet 500 milliGRAM(s) Oral every 24 hours  ferrous    sulfate 325 milliGRAM(s) Oral daily  furosemide   Injectable 40 milliGRAM(s) IV Push daily  influenza   Vaccine 0.5 milliLiter(s) IntraMuscular once  lactulose Syrup 20 Gram(s) Oral every 8 hours  lidocaine   Patch 1 Patch Transdermal daily  melatonin 5 milliGRAM(s) Oral at bedtime  midodrine. 5 milliGRAM(s) Oral three times a day  nystatin Powder 1 Application(s) Topical every 12 hours  octreotide  Injectable 100 MICROGram(s) SubCutaneous three times a day  pantoprazole  Injectable 40 milliGRAM(s) IV Push every 12 hours  rifAXIMin 550 milliGRAM(s) Oral two times a day  sucralfate 1 Gram(s) Oral four times a day  vancomycin  IVPB 1000 milliGRAM(s) IV Intermittent once    MEDICATIONS  (PRN):  acetaminophen   Tablet .. 650 milliGRAM(s) Oral every 6 hours PRN Mild Pain (1 - 3), Moderate Pain (4 - 6)      CAPILLARY BLOOD GLUCOSE        I&O's Summary    02 Nov 2020 07:01  -  03 Nov 2020 07:00  --------------------------------------------------------  IN: 990 mL / OUT: 1100 mL / NET: -110 mL        PHYSICAL EXAM:  Vital Signs Last 24 Hrs  T(C): 36.4 (03 Nov 2020 05:37), Max: 36.8 (02 Nov 2020 11:33)  T(F): 97.6 (03 Nov 2020 05:37), Max: 98.3 (02 Nov 2020 11:33)  HR: 71 (03 Nov 2020 05:37) (67 - 74)  BP: 119/60 (03 Nov 2020 05:37) (111/60 - 124/57)  BP(mean): --  RR: 20 (03 Nov 2020 05:37) (18 - 20)  SpO2: 98% (03 Nov 2020 05:37) (95% - 99%)    CONSTITUTIONAL: NAD, obese  EYES: PERRL; conjunctiva and sclera clear  ENMT: Moist oral mucosa, normal dentition  NECK: Supple, no palpable masses;  RESPIRATORY: Normal respiratory effort; lungs are clear to auscultation bilaterally  CARDIOVASCULAR: Regular rate and rhythm, normal S1 and S2, no murmur/rub/gallop;  EXTREMITIES: +3 pitting LE edema upto upper thighs BL, difficult to palpate LE peripheral pulses given edema, radial pulses 2+ BL RLE erythematous and warm, ttp  ABDOMEN: drain in LLQ; RLQ dressing- no discharge,. +distended, soft, Nontender to palpation, normoactive bowel sounds, no rebound/guarding;   PSYCH: A+O to person, place, and time; affect appropriate  SKIN: No rashes; no palpable lesions      LABS:                        7.4    1.68  )-----------( 43       ( 03 Nov 2020 07:25 )             23.7     11-03    137  |  101  |  15  ----------------------------<  92  4.9   |  25  |  1.70<H>    Ca    9.5      03 Nov 2020 07:25  Mg     2.0     11-02    TPro  7.2  /  Alb  4.5  /  TBili  3.3<H>  /  DBili  x   /  AST  18  /  ALT  9<L>  /  AlkPhos  69  11-03    PT/INR - ( 03 Nov 2020 08:26 )   PT: 30.5 sec;   INR: 2.71 ratio                     RADIOLOGY & ADDITIONAL TESTS:  Results Reviewed:   Imaging Personally Reviewed:  Electrocardiogram Personally Reviewed:    COORDINATION OF CARE:  Care Discussed with Consultants/Other Providers [Y/N]:  Prior or Outpatient Records Reviewed [Y/N]:  med NP Lexy

## 2020-11-03 NOTE — PROGRESS NOTE ADULT - SUBJECTIVE AND OBJECTIVE BOX
[facility] DIVISION OF KIDNEY DISEASES AND HYPERTENSION -- FOLLOW UP NOTE  --------------------------------------------------------------------------------    24 hour events/subjective: Currently on lasix 40 mg IVP daily with albumin. Patient reported LE swelling. Denies fever, chills, CP, SOB, dysuria. .        PAST HISTORY  --------------------------------------------------------------------------------  No significant changes to PMH, PSH, FHx, SHx, unless otherwise noted    ALLERGIES & MEDICATIONS  --------------------------------------------------------------------------------  Allergies    No Known Allergies    Intolerances      Standing Inpatient Medications  atorvastatin 20 milliGRAM(s) Oral at bedtime  ciprofloxacin     Tablet 500 milliGRAM(s) Oral every 24 hours  ferrous    sulfate 325 milliGRAM(s) Oral daily  furosemide   Injectable 40 milliGRAM(s) IV Push daily  influenza   Vaccine 0.5 milliLiter(s) IntraMuscular once  lactulose Syrup 20 Gram(s) Oral every 8 hours  lidocaine   Patch 1 Patch Transdermal daily  melatonin 5 milliGRAM(s) Oral at bedtime  midodrine. 5 milliGRAM(s) Oral three times a day  nystatin Powder 1 Application(s) Topical every 12 hours  octreotide  Injectable 100 MICROGram(s) SubCutaneous three times a day  pantoprazole  Injectable 40 milliGRAM(s) IV Push every 12 hours  rifAXIMin 550 milliGRAM(s) Oral two times a day  sucralfate 1 Gram(s) Oral four times a day    PRN Inpatient Medications  acetaminophen   Tablet .. 650 milliGRAM(s) Oral every 6 hours PRN      REVIEW OF SYSTEMS  --------------------------------------------------------------------------------  Gen: No fevers/chills  Respiratory: No dyspnea, cough  CV: No chest pain  GI: No abdominal pain, diarrhea  : No dysuria, hematuria  MSK: +++  edema and itchiness in LE       All other systems were reviewed and are negative, except as noted.    VITALS/PHYSICAL EXAM  --------------------------------------------------------------------------------  T(C): 36.9 (11-03-20 @ 11:44), Max: 36.9 (11-03-20 @ 11:44)  HR: 71 (11-03-20 @ 11:44) (67 - 71)  BP: 122/62 (11-03-20 @ 11:44) (111/60 - 122/62)  RR: 18 (11-03-20 @ 11:44) (18 - 20)  SpO2: 95% (11-03-20 @ 11:44) (95% - 99%)  Wt(kg): --        11-02-20 @ 07:01  -  11-03-20 @ 07:00  --------------------------------------------------------  IN: 990 mL / OUT: 1100 mL / NET: -110 mL    11-03-20 @ 07:01  -  11-03-20 @ 15:02  --------------------------------------------------------  IN: 730 mL / OUT: 500 mL / NET: 230 mL        Physical Exam:  	Gen: NAD  	HEENT: MMM  	Pulm: CTA B/L  	CV: S1S2  	Abd: Soft, +BS   	Ext: 4+ pitting edema of LE b/l with redness   	Neuro: Awake  	Skin: Warm and dry  	Vascular access: IV lines       LABS/STUDIES  --------------------------------------------------------------------------------              7.4    1.68  >-----------<  43       [11-03-20 @ 07:25]              23.7     137  |  101  |  15  ----------------------------<  92      [11-03-20 @ 07:25]  4.9   |  25  |  1.70        Ca     9.5     [11-03-20 @ 07:25]      Mg     2.0     [11-02-20 @ 10:14]    TPro  7.2  /  Alb  4.5  /  TBili  3.3  /  DBili  x   /  AST  18  /  ALT  9   /  AlkPhos  69  [11-03-20 @ 07:25]    PT/INR: PT 30.5 , INR 2.71       [11-03-20 @ 08:26]      Creatinine Trend:  SCr 1.70 [11-03 @ 07:25]  SCr 1.57 [11-02 @ 10:14]  SCr 1.59 [11-01 @ 07:02]  SCr 1.66 [10-31 @ 07:07]  SCr 1.87 [10-30 @ 07:04]    Urinalysis - [10-30-20 @ 12:33]      Color Yellow / Appearance Clear / SG 1.016 / pH 5.5      Gluc Negative / Ketone Trace  / Bili Negative / Urobili <2 mg/dL       Blood Negative / Protein Trace / Leuk Est Negative / Nitrite Negative      RBC  / WBC  / Hyaline  / Gran  / Sq Epi  / Non Sq Epi  / Bacteria     Urine Creatinine 199      [10-29-20 @ 07:08]  Urine Sodium <35      [10-30-20 @ 09:39]  Urine Urea Nitrogen 373      [10-30-20 @ 12:33]  Urine Chloride <35      [10-30-20 @ 09:39]  Urine Osmolality 296      [10-30-20 @ 12:33]    Iron 32, TIBC 241, %sat 13      [09-08-20 @ 10:41]  Ferritin 24      [09-08-20 @ 10:41]    HBsAb Indeterminate Test repeated.      [10-27-20 @ 12:32]  HBsAg Nonreact      [10-27-20 @ 12:32]  HBcAb Nonreact      [10-27-20 @ 12:32]  HCV 0.15, Nonreact      [10-27-20 @ 12:32]  HIV Nonreact      [10-27-20 @ 12:54]    Syphilis Screen (Treponema Pallidum Ab) Negative      [10-27-20 @ 12:32]

## 2020-11-03 NOTE — PROGRESS NOTE ADULT - PROBLEM SELECTOR PLAN 6
- TTE done October 2019 shows normal systolic and diastolic function  - repeat 2d echo, unclear what function was and given LE edema will need to assess - TTE done October 2019 shows normal systolic and diastolic function  - EF 65%  - repeat 2d echo, unclear what function was and given LE edema will need to assess

## 2020-11-04 LAB
ANION GAP SERPL CALC-SCNC: 12 MMOL/L — SIGNIFICANT CHANGE UP (ref 5–17)
BUN SERPL-MCNC: 15 MG/DL — SIGNIFICANT CHANGE UP (ref 7–23)
CALCIUM SERPL-MCNC: 9.7 MG/DL — SIGNIFICANT CHANGE UP (ref 8.4–10.5)
CHLORIDE SERPL-SCNC: 102 MMOL/L — SIGNIFICANT CHANGE UP (ref 96–108)
CO2 SERPL-SCNC: 25 MMOL/L — SIGNIFICANT CHANGE UP (ref 22–31)
CREAT SERPL-MCNC: 1.79 MG/DL — HIGH (ref 0.5–1.3)
GLUCOSE SERPL-MCNC: 116 MG/DL — HIGH (ref 70–99)
HCT VFR BLD CALC: 23.7 % — LOW (ref 39–50)
HGB BLD-MCNC: 7.5 G/DL — LOW (ref 13–17)
MCHC RBC-ENTMCNC: 31.5 PG — SIGNIFICANT CHANGE UP (ref 27–34)
MCHC RBC-ENTMCNC: 31.6 GM/DL — LOW (ref 32–36)
MCV RBC AUTO: 99.6 FL — SIGNIFICANT CHANGE UP (ref 80–100)
MRSA PCR RESULT.: SIGNIFICANT CHANGE UP
NRBC # BLD: 0 /100 WBCS — SIGNIFICANT CHANGE UP (ref 0–0)
PLATELET # BLD AUTO: 46 K/UL — LOW (ref 150–400)
POTASSIUM SERPL-MCNC: 4.7 MMOL/L — SIGNIFICANT CHANGE UP (ref 3.5–5.3)
POTASSIUM SERPL-SCNC: 4.7 MMOL/L — SIGNIFICANT CHANGE UP (ref 3.5–5.3)
RBC # BLD: 2.38 M/UL — LOW (ref 4.2–5.8)
RBC # FLD: 19.3 % — HIGH (ref 10.3–14.5)
S AUREUS DNA NOSE QL NAA+PROBE: SIGNIFICANT CHANGE UP
SODIUM SERPL-SCNC: 139 MMOL/L — SIGNIFICANT CHANGE UP (ref 135–145)
VANCOMYCIN FLD-MCNC: 4.4 UG/ML — SIGNIFICANT CHANGE UP
WBC # BLD: 1.65 K/UL — LOW (ref 3.8–10.5)
WBC # FLD AUTO: 1.65 K/UL — LOW (ref 3.8–10.5)

## 2020-11-04 PROCEDURE — 99232 SBSQ HOSP IP/OBS MODERATE 35: CPT | Mod: GC

## 2020-11-04 PROCEDURE — 99233 SBSQ HOSP IP/OBS HIGH 50: CPT

## 2020-11-04 RX ORDER — FUROSEMIDE 40 MG
60 TABLET ORAL DAILY
Refills: 0 | Status: DISCONTINUED | OUTPATIENT
Start: 2020-11-04 | End: 2020-11-07

## 2020-11-04 RX ORDER — MORPHINE SULFATE 50 MG/1
1 CAPSULE, EXTENDED RELEASE ORAL ONCE
Refills: 0 | Status: DISCONTINUED | OUTPATIENT
Start: 2020-11-04 | End: 2020-11-04

## 2020-11-04 RX ORDER — ACETAMINOPHEN 500 MG
1000 TABLET ORAL ONCE
Refills: 0 | Status: COMPLETED | OUTPATIENT
Start: 2020-11-04 | End: 2020-11-04

## 2020-11-04 RX ORDER — VANCOMYCIN HCL 1 G
VIAL (EA) INTRAVENOUS
Refills: 0 | Status: DISCONTINUED | OUTPATIENT
Start: 2020-11-04 | End: 2020-11-04

## 2020-11-04 RX ORDER — VANCOMYCIN HCL 1 G
1250 VIAL (EA) INTRAVENOUS ONCE
Refills: 0 | Status: COMPLETED | OUTPATIENT
Start: 2020-11-04 | End: 2020-11-04

## 2020-11-04 RX ADMIN — Medication 500 MILLIGRAM(S): at 09:11

## 2020-11-04 RX ADMIN — PANTOPRAZOLE SODIUM 40 MILLIGRAM(S): 20 TABLET, DELAYED RELEASE ORAL at 20:57

## 2020-11-04 RX ADMIN — LACTULOSE 20 GRAM(S): 10 SOLUTION ORAL at 14:56

## 2020-11-04 RX ADMIN — Medication 325 MILLIGRAM(S): at 11:50

## 2020-11-04 RX ADMIN — Medication 1 GRAM(S): at 05:11

## 2020-11-04 RX ADMIN — NYSTATIN CREAM 1 APPLICATION(S): 100000 CREAM TOPICAL at 20:57

## 2020-11-04 RX ADMIN — LACTULOSE 20 GRAM(S): 10 SOLUTION ORAL at 05:11

## 2020-11-04 RX ADMIN — OCTREOTIDE ACETATE 100 MICROGRAM(S): 200 INJECTION, SOLUTION INTRAVENOUS; SUBCUTANEOUS at 14:59

## 2020-11-04 RX ADMIN — OCTREOTIDE ACETATE 100 MICROGRAM(S): 200 INJECTION, SOLUTION INTRAVENOUS; SUBCUTANEOUS at 22:03

## 2020-11-04 RX ADMIN — LACTULOSE 20 GRAM(S): 10 SOLUTION ORAL at 22:03

## 2020-11-04 RX ADMIN — OCTREOTIDE ACETATE 100 MICROGRAM(S): 200 INJECTION, SOLUTION INTRAVENOUS; SUBCUTANEOUS at 05:11

## 2020-11-04 RX ADMIN — Medication 1000 MILLIGRAM(S): at 23:00

## 2020-11-04 RX ADMIN — MORPHINE SULFATE 1 MILLIGRAM(S): 50 CAPSULE, EXTENDED RELEASE ORAL at 05:50

## 2020-11-04 RX ADMIN — PANTOPRAZOLE SODIUM 40 MILLIGRAM(S): 20 TABLET, DELAYED RELEASE ORAL at 09:11

## 2020-11-04 RX ADMIN — Medication 5 MILLIGRAM(S): at 22:03

## 2020-11-04 RX ADMIN — Medication 1 GRAM(S): at 17:29

## 2020-11-04 RX ADMIN — Medication 40 MILLIGRAM(S): at 05:11

## 2020-11-04 RX ADMIN — Medication 250 MILLIGRAM(S): at 17:32

## 2020-11-04 RX ADMIN — MIDODRINE HYDROCHLORIDE 5 MILLIGRAM(S): 2.5 TABLET ORAL at 11:50

## 2020-11-04 RX ADMIN — ATORVASTATIN CALCIUM 20 MILLIGRAM(S): 80 TABLET, FILM COATED ORAL at 22:03

## 2020-11-04 RX ADMIN — Medication 1 GRAM(S): at 11:50

## 2020-11-04 RX ADMIN — NYSTATIN CREAM 1 APPLICATION(S): 100000 CREAM TOPICAL at 09:12

## 2020-11-04 RX ADMIN — LIDOCAINE 1 PATCH: 4 CREAM TOPICAL at 05:10

## 2020-11-04 RX ADMIN — MIDODRINE HYDROCHLORIDE 5 MILLIGRAM(S): 2.5 TABLET ORAL at 05:12

## 2020-11-04 RX ADMIN — MORPHINE SULFATE 1 MILLIGRAM(S): 50 CAPSULE, EXTENDED RELEASE ORAL at 06:10

## 2020-11-04 RX ADMIN — MIDODRINE HYDROCHLORIDE 5 MILLIGRAM(S): 2.5 TABLET ORAL at 17:28

## 2020-11-04 RX ADMIN — Medication 400 MILLIGRAM(S): at 22:03

## 2020-11-04 NOTE — PROGRESS NOTE ADULT - ASSESSMENT
59 M morbidly obese male with  PMHx of Hepatic Cirrhosis, Thrombocytopenia, CHF, HTN, HLD, admitted for, acute blood lose anemia and SBP with ascitic cultures +for Ecoli (10/14). Nephrology team consulted for MONI and concern for HRS.    #Acute Kidney Injury  renal function mildly trended up/stable   Remains with significant edema: Maintain  diureses with lasix along with albumin   Can likely stop octreotide today     #Hyponatremia - Hypervolemic hyponatremia in the setting of liver cirrhosis   Now resolved  - Fluid restrict 1L/day  - Monitor serum Na  - continue albumin    Case seen and discussed with attending     Suha Navarro   Nephrology Fellow  [facility] Pager: [phone]  [facility] Pager: [phone]

## 2020-11-04 NOTE — PROGRESS NOTE ADULT - SUBJECTIVE AND OBJECTIVE BOX
[facility] DIVISION OF KIDNEY DISEASES AND HYPERTENSION -- FOLLOW UP NOTE  --------------------------------------------------------------------------------    24 hour events/subjective: Patient was seen and examined at bedside. Continues to c/o of b/l LE swelling with pain and redness. Denies fever, chills, nausea, vomiting, diarrhea, dysuria.         PAST HISTORY  --------------------------------------------------------------------------------  No significant changes to PMH, PSH, FHx, SHx, unless otherwise noted    ALLERGIES & MEDICATIONS  --------------------------------------------------------------------------------  Allergies    No Known Allergies    Intolerances      Standing Inpatient Medications  atorvastatin 20 milliGRAM(s) Oral at bedtime  ciprofloxacin     Tablet 500 milliGRAM(s) Oral every 24 hours  ferrous    sulfate 325 milliGRAM(s) Oral daily  furosemide   Injectable 40 milliGRAM(s) IV Push daily  influenza   Vaccine 0.5 milliLiter(s) IntraMuscular once  lactulose Syrup 20 Gram(s) Oral every 8 hours  lidocaine   Patch 1 Patch Transdermal daily  melatonin 5 milliGRAM(s) Oral at bedtime  midodrine. 5 milliGRAM(s) Oral three times a day  nystatin Powder 1 Application(s) Topical every 12 hours  octreotide  Injectable 100 MICROGram(s) SubCutaneous three times a day  pantoprazole  Injectable 40 milliGRAM(s) IV Push every 12 hours  rifAXIMin 550 milliGRAM(s) Oral two times a day  sucralfate 1 Gram(s) Oral four times a day    PRN Inpatient Medications  acetaminophen   Tablet .. 650 milliGRAM(s) Oral every 6 hours PRN      REVIEW OF SYSTEMS  --------------------------------------------------------------------------------  Gen: No fevers/chills  Respiratory: No dyspnea, cough  CV: No chest pain  GI: No abdominal pain, diarrhea  : No dysuria, hematuria  MSK: ++ edema        All other systems were reviewed and are negative, except as noted.    VITALS/PHYSICAL EXAM  --------------------------------------------------------------------------------  T(C): 36.8 (11-04-20 @ 05:09), Max: 36.9 (11-03-20 @ 11:44)  HR: 68 (11-04-20 @ 05:09) (68 - 71)  BP: 122/64 (11-04-20 @ 05:09) (112/48 - 122/64)  RR: 18 (11-04-20 @ 05:09) (18 - 18)  SpO2: 94% (11-04-20 @ 05:09) (94% - 95%)  Wt(kg): --        11-03-20 @ 07:01  -  11-04-20 @ 07:00  --------------------------------------------------------  IN: 830 mL / OUT: 650 mL / NET: 180 mL    11-04-20 @ 07:01  -  11-04-20 @ 09:43  --------------------------------------------------------  IN: 0 mL / OUT: 400 mL / NET: -400 mL        Physical Exam:  	Gen: NAD  	HEENT: MMM  	Pulm: CTA B/L, no crackles or wheezing   	CV: S1S2  	Abd: Soft, +BS   	Ext: 4+ pitting edema of LE b/l with b/l redness on shin area  	Neuro: Awake  	Skin: Warm and dry  	Vascular access: IV lines       LABS/STUDIES  --------------------------------------------------------------------------------              7.5    1.65  >-----------<  46       [11-04-20 @ 07:01]              23.7     139  |  102  |  15  ----------------------------<  116      [11-04-20 @ 06:58]  4.7   |  25  |  1.79        Ca     9.7     [11-04-20 @ 06:58]      Mg     2.0     [11-02-20 @ 10:14]    TPro  7.2  /  Alb  4.5  /  TBili  3.3  /  DBili  x   /  AST  18  /  ALT  9   /  AlkPhos  69  [11-03-20 @ 07:25]    PT/INR: PT 30.5 , INR 2.71       [11-03-20 @ 08:26]      Creatinine Trend:  SCr 1.79 [11-04 @ 06:58]  SCr 1.70 [11-03 @ 07:25]  SCr 1.57 [11-02 @ 10:14]  SCr 1.59 [11-01 @ 07:02]  SCr 1.66 [10-31 @ 07:07]    Urinalysis - [10-30-20 @ 12:33]      Color Yellow / Appearance Clear / SG 1.016 / pH 5.5      Gluc Negative / Ketone Trace  / Bili Negative / Urobili <2 mg/dL       Blood Negative / Protein Trace / Leuk Est Negative / Nitrite Negative      RBC  / WBC  / Hyaline  / Gran  / Sq Epi  / Non Sq Epi  / Bacteria     Urine Creatinine 199      [10-29-20 @ 07:08]  Urine Sodium <35      [10-30-20 @ 09:39]  Urine Urea Nitrogen 373      [10-30-20 @ 12:33]  Urine Chloride <35      [10-30-20 @ 09:39]  Urine Osmolality 296      [10-30-20 @ 12:33]    Iron 32, TIBC 241, %sat 13      [09-08-20 @ 10:41]  Ferritin 24      [09-08-20 @ 10:41]    HBsAb Indeterminate Test repeated.      [10-27-20 @ 12:32]  HBsAg Nonreact      [10-27-20 @ 12:32]  HBcAb Nonreact      [10-27-20 @ 12:32]  HCV 0.15, Nonreact      [10-27-20 @ 12:32]  HIV Nonreact      [10-27-20 @ 12:54]    Syphilis Screen (Treponema Pallidum Ab) Negative      [10-27-20 @ 12:32]

## 2020-11-04 NOTE — PROGRESS NOTE ADULT - PROBLEM SELECTOR PLAN 8
Transitions of Care Status:  1.  Name of PCP: Dr. White. Pt reports difficulty getting [facility] hepatology follow up.  2.  PCP Contacted on Admission: [ ] Y    [ ] N    3.  PCP contacted at Discharge: [ ] Y    [ ] N    [ ] N/A  4.  Post-Discharge Appointment Date and Location:  5.  Summary of Handoff given to PCP:    Left VM for family member Fritz Trujillo to discuss.   Dispo: likely home with home PT pending improvement in MONI, hepatology/renal clearance. Transitions of Care Status:  1.  Name of PCP: Dr. White. Pt reports difficulty getting [facility] hepatology follow up.  2.  PCP Contacted on Admission: [ ] Y    [ ] N    3.  PCP contacted at Discharge: [ ] Y    [ ] N    [ ] N/A  4.  Post-Discharge Appointment Date and Location:  5.  Summary of Handoff given to PCP:    Dispo: likely home with home PT pending improvement in MONI, hepatology/renal clearance.

## 2020-11-04 NOTE — PROGRESS NOTE ADULT - PROBLEM SELECTOR PLAN 6
- TTE done October 2019 shows normal systolic and diastolic function  - EF 65%  - repeat 2d echo, unclear what function was and given LE edema will need to assess

## 2020-11-04 NOTE — PROGRESS NOTE ADULT - PROBLEM SELECTOR PLAN 1
duplex negative for DVT  net neg 1L,  will increase lasix 60mg IV daily  concern also for RLE cellulitis? as more erythematous and warm, s/p IV vancomycin- check vanc level

## 2020-11-04 NOTE — PROGRESS NOTE ADULT - ATTENDING COMMENTS
Patient seen and examined with Dr Shine    I agree with his interval history exam and plans as noted above  Extensive anasarca on exam  ascites draining- serous fluid  Lower extremities with 4+ bilateral edema and right >left cellulitis    Max Ashby MD  [phone]  After 5pm/weekends [phone] Patient seen and examined with Dr Shine    I agree with his interval history exam and plans as noted above  Extensive anasarca on exam  ascites draining- serous fluid  Lower extremities with 4+ bilateral edema and right >left cellulitis    Vanco trough low will re-dose    Max Ashby MD  [phone]  After 5pm/weekends [phone]

## 2020-11-04 NOTE — PROGRESS NOTE ADULT - ASSESSMENT
59M morbidly obese male w/ PMHx of Hepatic Cirrhosis, ascites, Thrombocytopenia, CHF, HTN, HLD, recent admx for Hepatic Encephalopathy, noncompliant with lactulose who was admitted to OSH  for hepatic encephalopathy, acute blood lose anemia (?variceal bleed, GI ulcer) and possible SBP. He was transferred to [facility] for endoscopy since he is high risk for planned procedure. He is also here for evaluation by Liver Transplant team, now s/p EGD showing gastritis no varices, colonoscopy without any source of bleed, now s/p VCE without source of bleed. Now with uptrending Cr concerning for pre-renal vs. HRS.

## 2020-11-04 NOTE — PROGRESS NOTE ADULT - ASSESSMENT
59M morbidly obese male w/ PMHx of Hepatic Cirrhosis, ascites, Thrombocytopenia, CHF, HTN, HLD, recent admit for Hepatic Encephalopathy, presents as transfer from OSH for advanced GI and hepatology eval, admitted initially 10/14-10/23 with HSE, anemia, and SBP, s/p LVP.   ID consulted for Pre-Transplant Evaluation, SBP.  S/p EGD, colonoscopy, capsule study with no significant findings  Course c/b MONI concerning for HRS, now with LE erythema    # r/o cellulitis of RLE - with erythema bilaterally but Right worse than left, possibly with skin infection on top of swollen legs with stasis. s/p vanc 1g. DVT study negative  - f/u bl clx sent 11/4   - f/u vanc level  - f/u MRSA PCR    #SBP in setting of decompensated ETOH hepatic cirrhosis -   Paracentesis on 10/14 with Ecoli, s/p 7 days of ceftriaxone. Repeat Para on 10/22 abd fluid still NGTD.  No current abd pain, and nontoxic appearing without fevers. received ctx until 11/2  - left abdomen bag draining yellow serous fluid  - cont oral ciprofloxacin for secondary prophylaxis    #Leukopenia - has chronic leukopenia of unknown etiology. Could be acute in setting of infection and chronic due to advanced cirrhosis but has been never been formally evaluated.  - cont to monitor    #Anemia - received 1U PRBC 11/2  - cont to monitor    #pre-transplant eval-   - HAV IgG pos  - HBsAb, HBsAg, HBVc total Ab: ind/neg/nneg  - HCV Ab neg  - HSV 1/2 IgG neg/neg  - EBV Serology: past EBV infection  - CMV IgG neg  - VZV IgG pos  - Measles, Mumps and Rubella IgG serologies: pos/pos/pos  - Quantiferon Gold pending  - HIV Test Neg  - Syphilis Screen neg  - Toxoplasma IgG neg  - Strongyloides Ab pending    #Vaccines -  Received prevnar 10/21/2020  - will need influenza vaccine- please administer  - will need Hep B series- can give first dose in hospital  - pneumovax 8 weeks from prevnar    Natanael Shine MD  Fellow, Infectious Diseases, PGY-4  Pager: [phone]  Before 9am or after 5pm/Weekends: Call [phone]   59M morbidly obese male w/ PMHx of Hepatic Cirrhosis, ascites, Thrombocytopenia, CHF, HTN, HLD, recent admit for Hepatic Encephalopathy, presents as transfer from OSH for advanced GI and hepatology eval, admitted initially 10/14-10/23 with HSE, anemia, and SBP, s/p LVP.   ID consulted for Pre-Transplant Evaluation, SBP.  S/p EGD, colonoscopy, capsule study with no significant findings  Course c/b MONI concerning for HRS, now with LE erythema    # r/o cellulitis of RLE - with erythema bilaterally but Right worse than left, possibly with skin infection on top of swollen legs with stasis. s/p vanc 1g. DVT study negative  - f/u bl clx sent 11/4   - f/u vanc level  - f/u MRSA PCR    #MONI - 2/2 HRS?  - care per primary team  - renally dose meds    #SBP in setting of decompensated ETOH hepatic cirrhosis -   Paracentesis on 10/14 with Ecoli, s/p 7 days of ceftriaxone. Repeat Para on 10/22 abd fluid still NGTD.  No current abd pain, and nontoxic appearing without fevers. received ctx until 11/2  - left abdomen bag draining yellow serous fluid  - cont oral ciprofloxacin for secondary prophylaxis    #Leukopenia - has chronic leukopenia of unknown etiology. Could be acute in setting of infection and chronic due to advanced cirrhosis but has been never been formally evaluated.  - cont to monitor    #Anemia - received 1U PRBC 11/2  - cont to monitor    #pre-transplant eval-   - HAV IgG pos  - HBsAb, HBsAg, HBVc total Ab: ind/neg/nneg  - HCV Ab neg  - HSV 1/2 IgG neg/neg  - EBV Serology: past EBV infection  - CMV IgG neg  - VZV IgG pos  - Measles, Mumps and Rubella IgG serologies: pos/pos/pos  - Quantiferon Gold pending  - HIV Test Neg  - Syphilis Screen neg  - Toxoplasma IgG neg  - Strongyloides Ab pending    #Vaccines -  Received prevnar 10/21/2020  - will need influenza vaccine- please administer  - will need Hep B series- can give first dose in hospital  - pneumovax 8 weeks from prevnar    Natanael Shine MD  Fellow, Infectious Diseases, PGY-4  Pager: [phone]  Before 9am or after 5pm/Weekends: Call [phone]   59M morbidly obese male w/ PMHx of Hepatic Cirrhosis, ascites, Thrombocytopenia, CHF, HTN, HLD, recent admit for Hepatic Encephalopathy, presents as transfer from OSH for advanced GI and hepatology eval, admitted initially 10/14-10/23 with HSE, anemia, and SBP, s/p LVP.   ID consulted for Pre-Transplant Evaluation, SBP.  S/p EGD, colonoscopy, capsule study with no significant findings  Course c/b MONI concerning for HRS, now with LE erythema    # r/o cellulitis of RLE - with erythema bilaterally but Right worse than left, possibly with skin infection on top of swollen legs with stasis. s/p vanc 1g. DVT study negative  - f/u bl clx sent 11/4   - f/u vanc level  - f/u MRSA PCR    #MONI - 2/2 HRS?  - care per primary team  - renally dose meds    #SBP in setting of decompensated ETOH hepatic cirrhosis -   Paracentesis on 10/14 with Ecoli, s/p 7 days of ceftriaxone. Repeat Para on 10/22 abd fluid still NGTD.  No current abd pain, and nontoxic appearing without fevers. received ctx until 11/2  - left abdomen bag draining yellow serous fluid  - cont oral ciprofloxacin for secondary prophylaxis    #Leukopenia - has chronic leukopenia of unknown etiology. Could be acute in setting of infection and chronic due to advanced cirrhosis but has been never been formally evaluated.  - cont to monitor    #Anemia - received 1U PRBC 11/2  - cont to monitor    #pre-transplant eval-   - HAV IgG pos  - HBsAb, HBsAg, HBVc total Ab: ind/neg/nneg  - HCV Ab neg  - HSV 1/2 IgG neg/neg  - EBV Serology: past EBV infection  - CMV IgG neg  - VZV IgG pos  - Measles, Mumps and Rubella IgG serologies: pos/pos/pos  - Quantiferon Gold pending  - HIV Test Neg  - Syphilis Screen neg  - Toxoplasma IgG neg  - Strongyloides Ab pending    #Vaccines -  Received prevnar 10/21/2020  - will need influenza vaccine- please administer  - will need Hep B series- can give first dose in hospital  - pneumovax 8 weeks from prevnar      Natanael Shine MD  Fellow, Infectious Diseases, PGY-4  Pager: [phone]  Before 9am or after 5pm/Weekends: Call [phone]

## 2020-11-04 NOTE — PROGRESS NOTE ADULT - SUBJECTIVE AND OBJECTIVE BOX
ID Follow Up For SBP, acute lvier failure    Patient is a 60y old  Male who presents with a chief complaint of hepatic encephalopathy (04 Nov 2020 09:42)    Interval History/ROS:     Allergies    No Known Allergies    Intolerances        ANTIMICROBIALS:  ciprofloxacin     Tablet 500 every 24 hours  rifAXIMin 550 two times a day      OTHER MEDS:  acetaminophen   Tablet .. 650 milliGRAM(s) Oral every 6 hours PRN  atorvastatin 20 milliGRAM(s) Oral at bedtime  ferrous    sulfate 325 milliGRAM(s) Oral daily  furosemide   Injectable 40 milliGRAM(s) IV Push daily  influenza   Vaccine 0.5 milliLiter(s) IntraMuscular once  lactulose Syrup 20 Gram(s) Oral every 8 hours  lidocaine   Patch 1 Patch Transdermal daily  melatonin 5 milliGRAM(s) Oral at bedtime  midodrine. 5 milliGRAM(s) Oral three times a day  nystatin Powder 1 Application(s) Topical every 12 hours  octreotide  Injectable 100 MICROGram(s) SubCutaneous three times a day  pantoprazole  Injectable 40 milliGRAM(s) IV Push every 12 hours  sucralfate 1 Gram(s) Oral four times a day      Vital Signs Last 24 Hrs  T(C): 36.8 (04 Nov 2020 05:09), Max: 36.9 (03 Nov 2020 11:44)  T(F): 98.2 (04 Nov 2020 05:09), Max: 98.4 (03 Nov 2020 11:44)  HR: 68 (04 Nov 2020 05:09) (68 - 71)  BP: 122/64 (04 Nov 2020 05:09) (112/48 - 122/64)  BP(mean): --  RR: 18 (04 Nov 2020 05:09) (18 - 18)  SpO2: 94% (04 Nov 2020 05:09) (94% - 95%)    EXAM:  Constitutional: Not in acute distress  Eyes: No icterus. Pupils b/l reactive to light.  Oral cavity: Clear, no lesions  Neck: No neck vein distension noted  RS: Chest clear to auscultation bilaterally. No wheeze/rhonchi/crepitations.  CVS: S1, S2 heard. Regular rate and rhythm. No murmurs/rubs/gallops.  Abdomen: Soft. No guarding/rigidity/tenderness.  : No acute abnormalities  Extremities: Warm. No pedal edema  Skin: No lesions noted  Vascular: No evidence of phlebitis  Neuro: Alert, oriented to time/place/person    Labs:                        7.5    1.65  )-----------( 46       ( 04 Nov 2020 07:01 )             23.7       11-04    139  |  102  |  15  ----------------------------<  116<H>  4.7   |  25  |  1.79<H>    Ca    9.7      04 Nov 2020 06:58    TPro  7.2  /  Alb  4.5  /  TBili  3.3<H>  /  DBili  x   /  AST  18  /  ALT  9<L>  /  AlkPhos  69  11-03          MICROBIOLOGY:  Blood clx pending  MRSA swab pending    RADIOLOGY:  < from: [facility] Duplex Lower Ext Vein Scan, [place] (11.03.20 @ 15:00) >  IMPRESSION:  No evidence of deep venous thrombosis in eitherlower extremity.    < end of copied text >    OTHER INVESTIGATIONS: ID Follow Up For SBP, acute liver failure    Patient is a 60y old  Male who presents with a chief complaint of hepatic encephalopathy (04 Nov 2020 09:42)    Interval History/ROS: No events overnight. Endorses itching around legs. Denies fever, chills, nausea, vomiting, diarrhea. Tolerating food. Still with significant b/l LE swelling.    Allergies  No Known Allergies    Intolerances  None      ANTIMICROBIALS:  ciprofloxacin     Tablet 500 every 24 hours  rifAXIMin 550 two times a day      OTHER MEDS:  acetaminophen   Tablet .. 650 milliGRAM(s) Oral every 6 hours PRN  atorvastatin 20 milliGRAM(s) Oral at bedtime  ferrous    sulfate 325 milliGRAM(s) Oral daily  furosemide   Injectable 40 milliGRAM(s) IV Push daily  influenza   Vaccine 0.5 milliLiter(s) IntraMuscular once  lactulose Syrup 20 Gram(s) Oral every 8 hours  lidocaine   Patch 1 Patch Transdermal daily  melatonin 5 milliGRAM(s) Oral at bedtime  midodrine. 5 milliGRAM(s) Oral three times a day  nystatin Powder 1 Application(s) Topical every 12 hours  octreotide  Injectable 100 MICROGram(s) SubCutaneous three times a day  pantoprazole  Injectable 40 milliGRAM(s) IV Push every 12 hours  sucralfate 1 Gram(s) Oral four times a day      Vital Signs Last 24 Hrs  T(C): 36.8 (04 Nov 2020 05:09), Max: 36.9 (03 Nov 2020 11:44)  T(F): 98.2 (04 Nov 2020 05:09), Max: 98.4 (03 Nov 2020 11:44)  HR: 68 (04 Nov 2020 05:09) (68 - 71)  BP: 122/64 (04 Nov 2020 05:09) (112/48 - 122/64)  BP(mean): --  RR: 18 (04 Nov 2020 05:09) (18 - 18)  SpO2: 94% (04 Nov 2020 05:09) (94% - 95%)    EXAM:  Constitutional: Not in acute distress  Eyes: No icterus. Pupils b/l reactive to light.  Oral cavity: Clear, no lesions  Neck: No neck vein distension noted  RS: Chest clear to auscultation bilaterally. No wheeze/rhonchi/crepitations.  CVS: S1, S2 heard. Regular rate and rhythm. No murmurs/rubs/gallops.  Abdomen: Soft. Distended. No guarding/rigidity/tenderness.  : No acute abnormalities  Extremities: Warm. +3 pitting edema to knees.   Skin: b/l Lower extremity erythema, R>L  Vascular: No evidence of phlebitis  Neuro: Alert, oriented to time/place/person    Labs:                        7.5    1.65  )-----------( 46       ( 04 Nov 2020 07:01 )             23.7       11-04    139  |  102  |  15  ----------------------------<  116<H>  4.7   |  25  |  1.79<H>    Ca    9.7      04 Nov 2020 06:58    TPro  7.2  /  Alb  4.5  /  TBili  3.3<H>  /  DBili  x   /  AST  18  /  ALT  9<L>  /  AlkPhos  69  11-03          MICROBIOLOGY:  Blood clx pending  MRSA swab pending    RADIOLOGY:  < from: [facility] Duplex Lower Ext Vein Scan, [place] (11.03.20 @ 15:00) >  IMPRESSION:  No evidence of deep venous thrombosis in eitherlower extremity.

## 2020-11-04 NOTE — PROGRESS NOTE ADULT - SUBJECTIVE AND OBJECTIVE BOX
[facility] Division of Hospital Medicine  Raul Jane DO  Pager (MK, 1R-7L): [phone]  Other Times:  [phone]    Patient is a 60y old  Male who presents with a chief complaint of hepatic encephalopathy (04 Nov 2020 10:44)      SUBJECTIVE / OVERNIGHT EVENTS:    patient seen and examined.  feels ok. no complaints. feels swelling is a bit improved. having BM+    MEDICATIONS  (STANDING):  atorvastatin 20 milliGRAM(s) Oral at bedtime  ciprofloxacin     Tablet 500 milliGRAM(s) Oral every 24 hours  ferrous    sulfate 325 milliGRAM(s) Oral daily  furosemide   Injectable 60 milliGRAM(s) IV Push daily  influenza   Vaccine 0.5 milliLiter(s) IntraMuscular once  lactulose Syrup 20 Gram(s) Oral every 8 hours  lidocaine   Patch 1 Patch Transdermal daily  melatonin 5 milliGRAM(s) Oral at bedtime  midodrine. 5 milliGRAM(s) Oral three times a day  nystatin Powder 1 Application(s) Topical every 12 hours  octreotide  Injectable 100 MICROGram(s) SubCutaneous three times a day  pantoprazole  Injectable 40 milliGRAM(s) IV Push every 12 hours  rifAXIMin 550 milliGRAM(s) Oral two times a day  sucralfate 1 Gram(s) Oral four times a day    MEDICATIONS  (PRN):  acetaminophen   Tablet .. 650 milliGRAM(s) Oral every 6 hours PRN Mild Pain (1 - 3), Moderate Pain (4 - 6)      CAPILLARY BLOOD GLUCOSE        I&O's Summary    03 Nov 2020 07:01  -  04 Nov 2020 07:00  --------------------------------------------------------  IN: 830 mL / OUT: 650 mL / NET: 180 mL    04 Nov 2020 07:01  -  04 Nov 2020 10:54  --------------------------------------------------------  IN: 0 mL / OUT: 900 mL / NET: -900 mL        PHYSICAL EXAM:  Vital Signs Last 24 Hrs  T(C): 36.8 (04 Nov 2020 05:09), Max: 36.9 (03 Nov 2020 11:44)  T(F): 98.2 (04 Nov 2020 05:09), Max: 98.4 (03 Nov 2020 11:44)  HR: 68 (04 Nov 2020 05:09) (68 - 71)  BP: 122/64 (04 Nov 2020 05:09) (112/48 - 122/64)  BP(mean): --  RR: 18 (04 Nov 2020 05:09) (18 - 18)  SpO2: 94% (04 Nov 2020 05:09) (94% - 95%)      CONSTITUTIONAL: NAD, obese  EYES: PERRL; conjunctiva and sclera clear  ENMT: Moist oral mucosa, normal dentition  NECK: Supple, no palpable masses;  RESPIRATORY: Normal respiratory effort; lungs are clear to auscultation bilaterally  CARDIOVASCULAR: Regular rate and rhythm, normal S1 and S2, no murmur/rub/gallop;  EXTREMITIES: +3 pitting LE edema upto upper thighs BL, difficult to palpate LE peripheral pulses given edema, radial pulses 2+ BL RLE erythematous and warm, ttp  ABDOMEN: drain in LLQ; RLQ dressing- no discharge,. +distended, soft, Nontender to palpation, normoactive bowel sounds, no rebound/guarding;   PSYCH: A+O to person, place, and time; affect appropriate  SKIN: No rashes; no palpable lesions    LABS:                        7.5    1.65  )-----------( 46       ( 04 Nov 2020 07:01 )             23.7     11-04    139  |  102  |  15  ----------------------------<  116<H>  4.7   |  25  |  1.79<H>    Ca    9.7      04 Nov 2020 06:58    TPro  7.2  /  Alb  4.5  /  TBili  3.3<H>  /  DBili  x   /  AST  18  /  ALT  9<L>  /  AlkPhos  69  11-03    PT/INR - ( 03 Nov 2020 08:26 )   PT: 30.5 sec;   INR: 2.71 ratio                     RADIOLOGY & ADDITIONAL TESTS:  Results Reviewed:   Imaging Personally Reviewed:  Electrocardiogram Personally Reviewed:    COORDINATION OF CARE:  Care Discussed with Consultants/Other Providers [Y/N]:  Prior or Outpatient Records Reviewed [Y/N]:  med NP Sowmya

## 2020-11-05 DIAGNOSIS — L03.115 CELLULITIS OF RIGHT LOWER LIMB: ICD-10-CM

## 2020-11-05 LAB
ANION GAP SERPL CALC-SCNC: 10 MMOL/L — SIGNIFICANT CHANGE UP (ref 5–17)
BLD GP AB SCN SERPL QL: NEGATIVE — SIGNIFICANT CHANGE UP
BUN SERPL-MCNC: 16 MG/DL — SIGNIFICANT CHANGE UP (ref 7–23)
CALCIUM SERPL-MCNC: 9.1 MG/DL — SIGNIFICANT CHANGE UP (ref 8.4–10.5)
CHLORIDE SERPL-SCNC: 103 MMOL/L — SIGNIFICANT CHANGE UP (ref 96–108)
CO2 SERPL-SCNC: 26 MMOL/L — SIGNIFICANT CHANGE UP (ref 22–31)
CREAT SERPL-MCNC: 1.73 MG/DL — HIGH (ref 0.5–1.3)
GLUCOSE SERPL-MCNC: 88 MG/DL — SIGNIFICANT CHANGE UP (ref 70–99)
HCT VFR BLD CALC: 22.4 % — LOW (ref 39–50)
HCT VFR BLD CALC: 23.8 % — LOW (ref 39–50)
HGB BLD-MCNC: 7 G/DL — CRITICAL LOW (ref 13–17)
HGB BLD-MCNC: 7.3 G/DL — LOW (ref 13–17)
MAGNESIUM SERPL-MCNC: 1.8 MG/DL — SIGNIFICANT CHANGE UP (ref 1.6–2.6)
MCHC RBC-ENTMCNC: 29.9 PG — SIGNIFICANT CHANGE UP (ref 27–34)
MCHC RBC-ENTMCNC: 30.7 GM/DL — LOW (ref 32–36)
MCHC RBC-ENTMCNC: 31 PG — SIGNIFICANT CHANGE UP (ref 27–34)
MCHC RBC-ENTMCNC: 31.3 GM/DL — LOW (ref 32–36)
MCV RBC AUTO: 97.5 FL — SIGNIFICANT CHANGE UP (ref 80–100)
MCV RBC AUTO: 99.1 FL — SIGNIFICANT CHANGE UP (ref 80–100)
NRBC # BLD: 0 /100 WBCS — SIGNIFICANT CHANGE UP (ref 0–0)
NRBC # BLD: 0 /100 WBCS — SIGNIFICANT CHANGE UP (ref 0–0)
PHOSPHATE SERPL-MCNC: 2.7 MG/DL — SIGNIFICANT CHANGE UP (ref 2.5–4.5)
PLATELET # BLD AUTO: 43 K/UL — LOW (ref 150–400)
PLATELET # BLD AUTO: 44 K/UL — LOW (ref 150–400)
POTASSIUM SERPL-MCNC: 4.8 MMOL/L — SIGNIFICANT CHANGE UP (ref 3.5–5.3)
POTASSIUM SERPL-SCNC: 4.8 MMOL/L — SIGNIFICANT CHANGE UP (ref 3.5–5.3)
RBC # BLD: 2.26 M/UL — LOW (ref 4.2–5.8)
RBC # BLD: 2.44 M/UL — LOW (ref 4.2–5.8)
RBC # FLD: 19.1 % — HIGH (ref 10.3–14.5)
RBC # FLD: 19.4 % — HIGH (ref 10.3–14.5)
RH IG SCN BLD-IMP: NEGATIVE — SIGNIFICANT CHANGE UP
SODIUM SERPL-SCNC: 139 MMOL/L — SIGNIFICANT CHANGE UP (ref 135–145)
WBC # BLD: 1.32 K/UL — LOW (ref 3.8–10.5)
WBC # BLD: 1.65 K/UL — LOW (ref 3.8–10.5)
WBC # FLD AUTO: 1.32 K/UL — LOW (ref 3.8–10.5)
WBC # FLD AUTO: 1.65 K/UL — LOW (ref 3.8–10.5)

## 2020-11-05 PROCEDURE — 99232 SBSQ HOSP IP/OBS MODERATE 35: CPT | Mod: GC

## 2020-11-05 PROCEDURE — 99233 SBSQ HOSP IP/OBS HIGH 50: CPT

## 2020-11-05 RX ORDER — MIDODRINE HYDROCHLORIDE 2.5 MG/1
10 TABLET ORAL THREE TIMES A DAY
Refills: 0 | Status: DISCONTINUED | OUTPATIENT
Start: 2020-11-05 | End: 2020-11-17

## 2020-11-05 RX ORDER — CEFAZOLIN SODIUM 1 G
1000 VIAL (EA) INJECTION EVERY 8 HOURS
Refills: 0 | Status: DISCONTINUED | OUTPATIENT
Start: 2020-11-05 | End: 2020-11-06

## 2020-11-05 RX ORDER — PANTOPRAZOLE SODIUM 20 MG/1
40 TABLET, DELAYED RELEASE ORAL
Refills: 0 | Status: DISCONTINUED | OUTPATIENT
Start: 2020-11-05 | End: 2020-11-18

## 2020-11-05 RX ADMIN — Medication 100 MILLIGRAM(S): at 22:05

## 2020-11-05 RX ADMIN — NYSTATIN CREAM 1 APPLICATION(S): 100000 CREAM TOPICAL at 09:28

## 2020-11-05 RX ADMIN — LACTULOSE 20 GRAM(S): 10 SOLUTION ORAL at 06:09

## 2020-11-05 RX ADMIN — Medication 60 MILLIGRAM(S): at 06:10

## 2020-11-05 RX ADMIN — NYSTATIN CREAM 1 APPLICATION(S): 100000 CREAM TOPICAL at 20:19

## 2020-11-05 RX ADMIN — ATORVASTATIN CALCIUM 20 MILLIGRAM(S): 80 TABLET, FILM COATED ORAL at 22:05

## 2020-11-05 RX ADMIN — Medication 1 GRAM(S): at 06:09

## 2020-11-05 RX ADMIN — MIDODRINE HYDROCHLORIDE 5 MILLIGRAM(S): 2.5 TABLET ORAL at 06:09

## 2020-11-05 RX ADMIN — Medication 500 MILLIGRAM(S): at 09:27

## 2020-11-05 RX ADMIN — PANTOPRAZOLE SODIUM 40 MILLIGRAM(S): 20 TABLET, DELAYED RELEASE ORAL at 09:28

## 2020-11-05 RX ADMIN — Medication 1 GRAM(S): at 12:46

## 2020-11-05 RX ADMIN — Medication 1 GRAM(S): at 18:19

## 2020-11-05 RX ADMIN — MIDODRINE HYDROCHLORIDE 10 MILLIGRAM(S): 2.5 TABLET ORAL at 18:19

## 2020-11-05 RX ADMIN — Medication 325 MILLIGRAM(S): at 12:46

## 2020-11-05 RX ADMIN — Medication 5 MILLIGRAM(S): at 22:05

## 2020-11-05 RX ADMIN — LACTULOSE 20 GRAM(S): 10 SOLUTION ORAL at 15:01

## 2020-11-05 RX ADMIN — Medication 1 GRAM(S): at 23:38

## 2020-11-05 RX ADMIN — Medication 1 GRAM(S): at 00:14

## 2020-11-05 RX ADMIN — OCTREOTIDE ACETATE 100 MICROGRAM(S): 200 INJECTION, SOLUTION INTRAVENOUS; SUBCUTANEOUS at 06:10

## 2020-11-05 RX ADMIN — Medication 100 MILLIGRAM(S): at 15:02

## 2020-11-05 NOTE — PROGRESS NOTE ADULT - SUBJECTIVE AND OBJECTIVE BOX
[facility] DIVISION OF KIDNEY DISEASES AND HYPERTENSION -- FOLLOW UP NOTE  --------------------------------------------------------------------------------    24 hour events/subjective: Patient reported LE swelling somewhat improving. Denies fever, chills, nausea, vomiting, SOB, dysuria.         PAST HISTORY  --------------------------------------------------------------------------------  No significant changes to PMH, PSH, FHx, SHx, unless otherwise noted    ALLERGIES & MEDICATIONS  --------------------------------------------------------------------------------  Allergies    No Known Allergies    Intolerances      Standing Inpatient Medications  atorvastatin 20 milliGRAM(s) Oral at bedtime  ceFAZolin   IVPB 1000 milliGRAM(s) IV Intermittent every 8 hours  ciprofloxacin     Tablet 500 milliGRAM(s) Oral every 24 hours  ferrous    sulfate 325 milliGRAM(s) Oral daily  furosemide   Injectable 60 milliGRAM(s) IV Push daily  influenza   Vaccine 0.5 milliLiter(s) IntraMuscular once  lactulose Syrup 20 Gram(s) Oral every 8 hours  lidocaine   Patch 1 Patch Transdermal daily  melatonin 5 milliGRAM(s) Oral at bedtime  midodrine. 5 milliGRAM(s) Oral three times a day  nystatin Powder 1 Application(s) Topical every 12 hours  pantoprazole    Tablet 40 milliGRAM(s) Oral before breakfast  rifAXIMin 550 milliGRAM(s) Oral two times a day  sucralfate 1 Gram(s) Oral four times a day    PRN Inpatient Medications  acetaminophen   Tablet .. 650 milliGRAM(s) Oral every 6 hours PRN      REVIEW OF SYSTEMS  --------------------------------------------------------------------------------  Gen: No fevers/chills  Respiratory: No dyspnea, cough  CV: No chest pain  GI: No abdominal pain, diarrhea  : No dysuria, hematuria  MSK: ++ edema      All other systems were reviewed and are negative, except as noted.    VITALS/PHYSICAL EXAM  --------------------------------------------------------------------------------  T(C): 36.7 (11-05-20 @ 11:01), Max: 36.9 (11-04-20 @ 14:02)  HR: 70 (11-05-20 @ 11:01) (70 - 85)  BP: 126/63 (11-05-20 @ 11:01) (102/56 - 134/64)  RR: 17 (11-05-20 @ 11:01) (17 - 18)  SpO2: 96% (11-05-20 @ 11:01) (93% - 96%)  Wt(kg): --        11-04-20 @ 07:01  -  11-05-20 @ 07:00  --------------------------------------------------------  IN: 1400 mL / OUT: 2600 mL / NET: -1200 mL    11-05-20 @ 07:01  -  11-05-20 @ 12:00  --------------------------------------------------------  IN: 240 mL / OUT: 600 mL / NET: -360 mL        Physical Exam:  	Gen: NAD  	HEENT: MMM  	Pulm: CTA B/L, no crackles or wheezing   	CV: S1S2  	Abd: Soft, +BS   	Ext: 3+ pitting edema of LE b/l with worsening diffuse redness in the shin area  	Neuro: Awake  	Skin: Warm and dry  	Vascular access: IV lines       LABS/STUDIES  --------------------------------------------------------------------------------              7.0    1.32  >-----------<  43       [11-05-20 @ 07:30]              22.4     139  |  103  |  16  ----------------------------<  88      [11-05-20 @ 07:28]  4.8   |  26  |  1.73        Ca     9.1     [11-05-20 @ 07:28]            Creatinine Trend:  SCr 1.73 [11-05 @ 07:28]  SCr 1.79 [11-04 @ 06:58]  SCr 1.70 [11-03 @ 07:25]  SCr 1.57 [11-02 @ 10:14]  SCr 1.59 [11-01 @ 07:02]    Urinalysis - [10-30-20 @ 12:33]      Color Yellow / Appearance Clear / SG 1.016 / pH 5.5      Gluc Negative / Ketone Trace  / Bili Negative / Urobili <2 mg/dL       Blood Negative / Protein Trace / Leuk Est Negative / Nitrite Negative      RBC  / WBC  / Hyaline  / Gran  / Sq Epi  / Non Sq Epi  / Bacteria     Urine Sodium <35      [10-30-20 @ 09:39]  Urine Urea Nitrogen 373      [10-30-20 @ 12:33]  Urine Chloride <35      [10-30-20 @ 09:39]  Urine Osmolality 296      [10-30-20 @ 12:33]    Iron 32, TIBC 241, %sat 13      [09-08-20 @ 10:41]  Ferritin 24      [09-08-20 @ 10:41]    HBsAb Indeterminate Test repeated.      [10-27-20 @ 12:32]  HBsAg Nonreact      [10-27-20 @ 12:32]  HBcAb Nonreact      [10-27-20 @ 12:32]  HCV 0.15, Nonreact      [10-27-20 @ 12:32]  HIV Nonreact      [10-27-20 @ 12:54]    Syphilis Screen (Treponema Pallidum Ab) Negative      [10-27-20 @ 12:32]

## 2020-11-05 NOTE — PROGRESS NOTE ADULT - PROBLEM SELECTOR PLAN 4
- s/p egd and colonoscopy, no bleed source, VCE showing brown stool throughout without source of bleed. Likely hemorrhoidal- now resolved.  - Protonix PO daily  - c/w Carafate  - sbp ppx on cipro 500mg daily

## 2020-11-05 NOTE — PROGRESS NOTE ADULT - ASSESSMENT
59M morbidly obese male w/ PMHx of Hepatic Cirrhosis, ascites, Thrombocytopenia, CHF, HTN, HLD, recent admit for Hepatic Encephalopathy, presents as transfer from OSH for advanced GI and hepatology eval, admitted initially 10/14-10/23 with HSE, anemia, and SBP, s/p LVP.   ID consulted for Pre-Transplant Evaluation, SBP.  S/p EGD, colonoscopy, capsule study with no significant findings  Course c/b MONI concerning for HRS, now with LE erythema    # r/o cellulitis of RLE - with erythema bilaterally but Right worse than left, possibly with skin infection on top of swollen legs with stasis. s/p vanc 1g. DVT study negative.  MRSA PCR neg.  - Can switch to Ancef 1g q8  - f/u bl clx sent 11/4       #MONI - 2/2 HRS?. Cr is improving.  - care per primary team  - renally dose meds    #SBP in setting of decompensated ETOH hepatic cirrhosis -   Paracentesis on 10/14 with Ecoli, s/p 7 days of ceftriaxone. Repeat Para on 10/22 abd fluid still NGTD.  No current abd pain, and nontoxic appearing without fevers. received ctx until 11/2  - left abdomen bag draining yellow serous fluid  - cont oral ciprofloxacin for secondary prophylaxis    #Leukopenia - has chronic leukopenia of unknown etiology. Could be acute in setting of infection and chronic due to advanced cirrhosis but has been never been formally evaluated.  - cont to monitor    #Anemia - received 1U PRBC 11/2  - cont to monitor    #pre-transplant eval- No current plan for further transplant eval  - HAV IgG pos  - HBsAb, HBsAg, HBVc total Ab: ind/neg/nneg  - HCV Ab neg  - HSV 1/2 IgG neg/neg  - EBV Serology: past EBV infection  - CMV IgG neg  - VZV IgG pos  - Measles, Mumps and Rubella IgG serologies: pos/pos/pos  - Quantiferon Gold neg  - HIV Test Neg  - Syphilis Screen neg  - Toxoplasma IgG neg  - Strongyloides Ab neg     #Vaccines -  Received prevnar 10/21/2020  - will need influenza vaccine- please administer  - will need Hep B series- can give first dose in hospital  - pneumovax 8 weeks from prevnar      Natanael Shine MD  Fellow, Infectious Diseases, PGY-4  Pager: [phone]  Before 9am or after 5pm/Weekends: Call [phone]   59M morbidly obese male w/ PMHx of Hepatic Cirrhosis, ascites, Thrombocytopenia, CHF, HTN, HLD, recent admit for Hepatic Encephalopathy, presents as transfer from OSH for advanced GI and hepatology eval, admitted initially 10/14-10/23 with HSE, anemia, and SBP, s/p LVP.   ID consulted for Pre-Transplant Evaluation, SBP.  S/p EGD, colonoscopy, capsule study with no significant findings  Course c/b MONI concerning for HRS, now with LE erythema    #cellulitis of RLE - with erythema bilaterally but Right worse than left, possibly with skin infection on top of swollen legs with stasis. s/p vanc 1g. DVT study negative.  MRSA PCR neg.  - Can switch to Ancef 1g q8  - f/u bl clx sent 11/4       #MONI - 2/2 HRS?. Cr is improving.  - care per primary team  - renally dose meds    #SBP in setting of decompensated ETOH hepatic cirrhosis -   Paracentesis on 10/14 with Ecoli, s/p 7 days of ceftriaxone. Repeat Para on 10/22 abd fluid still NGTD.  No current abd pain, and nontoxic appearing without fevers. received ctx until 11/2  - left abdomen bag draining yellow serous fluid  - cont oral ciprofloxacin for secondary prophylaxis    #Leukopenia - has chronic leukopenia of unknown etiology. Could be acute in setting of infection and chronic due to advanced cirrhosis but has been never been formally evaluated.  - cont to monitor    #Anemia - received 1U PRBC 11/2  - cont to monitor    #pre-transplant eval- No current plan for further transplant eval  - HAV IgG pos  - HBsAb, HBsAg, HBVc total Ab: ind/neg/nneg  - HCV Ab neg  - HSV 1/2 IgG neg/neg  - EBV Serology: past EBV infection  - CMV IgG neg  - VZV IgG pos  - Measles, Mumps and Rubella IgG serologies: pos/pos/pos  - Quantiferon Gold neg  - HIV Test Neg  - Syphilis Screen neg  - Toxoplasma IgG neg  - Strongyloides Ab neg     #Vaccines -  Received prevnar 10/21/2020  - will need influenza vaccine- please administer  - will need Hep B series- can give first dose in hospital  - pneumovax 8 weeks from prevnar      Natanael Shine MD  Fellow, Infectious Diseases, PGY-4  Pager: [phone]  Before 9am or after 5pm/Weekends: Call [phone]

## 2020-11-05 NOTE — PROGRESS NOTE ADULT - ASSESSMENT
59 M morbidly obese male with  PMHx of Hepatic Cirrhosis, Thrombocytopenia, CHF, HTN, HLD, admitted for, acute blood lose anemia and SBP with ascitic cultures +for Ecoli (10/14). Nephrology team consulted for MONI and concern for HRS.    #Acute Kidney Injury  renal function mildly trended up/stable   Remains with significant edema: Maintain  diureses with lasix along with albumin   Suggest to stop octreotide, does not suspect likely HRS  -Suggest to increase midodrine to 10 mg PO TID     #Hyponatremia - Hypervolemic hyponatremia in the setting of liver cirrhosis   Now resolved  - Fluid restrict 1L/day  - Monitor serum Na  - continue albumin    Case seen and discussed with attending     Suha Navarro   Nephrology Fellow  [facility] Pager: [phone]  [facility] Pager: [phone]            59 M morbidly obese male with  PMHx of Hepatic Cirrhosis, Thrombocytopenia, CHF, HTN, HLD, admitted for, acute blood lose anemia and SBP with ascitic cultures +for Ecoli (10/14). Nephrology team consulted for MONI and concern for HRS.    #Acute Kidney Injury  renal function  table   Remains with significant edema: Maintain  diureses with lasix along with albumin   Suggest to stop octreotide, does not suspect likely HRS  -Suggest to increase midodrine to 10 mg PO TID     #Hyponatremia - Hypervolemic hyponatremia in the setting of liver cirrhosis   Now resolved  - Fluid restrict 1L/day  - Monitor serum Na  - continue albumin    Case seen and discussed with attending     Suha Navarro   Nephrology Fellow  [facility] Pager: [phone]  [facility] Pager: [phone]

## 2020-11-05 NOTE — PROGRESS NOTE ADULT - SUBJECTIVE AND OBJECTIVE BOX
[facility] Division of Hospital Medicine  Raul Jane DO  Pager (MK, 0Z-5I): [phone]  Other Times:  [phone]    Patient is a 60y old  Male who presents with a chief complaint of hepatic encephalopathy (05 Nov 2020 11:59)      SUBJECTIVE / OVERNIGHT EVENTS:    patient seen and examined at bedside.  feels well. no complaints.  no fevers, chills, abdominal pain.    MEDICATIONS  (STANDING):  atorvastatin 20 milliGRAM(s) Oral at bedtime  ceFAZolin   IVPB 1000 milliGRAM(s) IV Intermittent every 8 hours  ciprofloxacin     Tablet 500 milliGRAM(s) Oral every 24 hours  ferrous    sulfate 325 milliGRAM(s) Oral daily  furosemide   Injectable 60 milliGRAM(s) IV Push daily  influenza   Vaccine 0.5 milliLiter(s) IntraMuscular once  lactulose Syrup 20 Gram(s) Oral every 8 hours  lidocaine   Patch 1 Patch Transdermal daily  melatonin 5 milliGRAM(s) Oral at bedtime  midodrine. 10 milliGRAM(s) Oral three times a day  nystatin Powder 1 Application(s) Topical every 12 hours  pantoprazole    Tablet 40 milliGRAM(s) Oral before breakfast  rifAXIMin 550 milliGRAM(s) Oral two times a day  sucralfate 1 Gram(s) Oral four times a day    MEDICATIONS  (PRN):  acetaminophen   Tablet .. 650 milliGRAM(s) Oral every 6 hours PRN Mild Pain (1 - 3), Moderate Pain (4 - 6)      CAPILLARY BLOOD GLUCOSE        I&O's Summary    04 Nov 2020 07:01  -  05 Nov 2020 07:00  --------------------------------------------------------  IN: 1400 mL / OUT: 2600 mL / NET: -1200 mL    05 Nov 2020 07:01  -  05 Nov 2020 12:20  --------------------------------------------------------  IN: 240 mL / OUT: 600 mL / NET: -360 mL        PHYSICAL EXAM:  Vital Signs Last 24 Hrs  T(C): 36.7 (05 Nov 2020 11:01), Max: 36.9 (04 Nov 2020 14:02)  T(F): 98 (05 Nov 2020 11:01), Max: 98.4 (04 Nov 2020 14:02)  HR: 70 (05 Nov 2020 11:01) (70 - 85)  BP: 126/63 (05 Nov 2020 11:01) (102/56 - 134/64)  BP(mean): --  RR: 17 (05 Nov 2020 11:01) (17 - 18)  SpO2: 96% (05 Nov 2020 11:01) (93% - 96%)    CONSTITUTIONAL: NAD, obese  EYES: PERRL; conjunctiva and sclera clear  ENMT: Moist oral mucosa, normal dentition  NECK: Supple, no palpable masses;  RESPIRATORY: Normal respiratory effort; lungs are clear to auscultation bilaterally  CARDIOVASCULAR: Regular rate and rhythm, normal S1 and S2, no murmur/rub/gallop;  EXTREMITIES: +3 pitting LE edema up to upper thighs BL, difficult to palpate LE peripheral pulses given edema, radial pulses 2+ BL RLE erythematous and warm, ttp  ABDOMEN: drain in LLQ; RLQ dressing- no discharge,. +distended, soft, Nontender to palpation, normoactive bowel sounds, no rebound/guarding;   PSYCH: A+O to person, place, and time; affect appropriate  SKIN: No rashes; no palpable lesions      LABS:                        7.0    1.32  )-----------( 43       ( 05 Nov 2020 07:30 )             22.4     11-05    139  |  103  |  16  ----------------------------<  88  4.8   |  26  |  1.73<H>    Ca    9.1      05 Nov 2020 07:28                  RADIOLOGY & ADDITIONAL TESTS:  Results Reviewed:   Imaging Personally Reviewed:  Electrocardiogram Personally Reviewed:    COORDINATION OF CARE:  Care Discussed with Consultants/Other Providers [Y/N]:  Prior or Outpatient Records Reviewed [Y/N]:  james Zapata

## 2020-11-05 NOTE — PROGRESS NOTE ADULT - ATTENDING COMMENTS
Patient seen and examined    Case discussed with Dr Shine    I agree with his interval history exam and plans as noted above    Cellulitis of bilateral lower extremities with marked anasarca in the setting of liver failure  Left leg looks brighter with some streaking  Will discontinue the Vancomycin as his MRSA nasal PCR swab is negative  Will change to Cefazolin 1 gram IV q 8hr    Repeat blood cultures    Max Ashby MD  [phone]  After 5pm/weekends [phone]

## 2020-11-05 NOTE — PROGRESS NOTE ADULT - SUBJECTIVE AND OBJECTIVE BOX
ID Follow Up For SBP, pre-OLT    Patient is a 60y old  Male who presents with a chief complaint of hepatic encephalopathy (04 Nov 2020 10:50)    Interval History/ROS:     Allergies    No Known Allergies    Intolerances        ANTIMICROBIALS:  ciprofloxacin     Tablet 500 every 24 hours  rifAXIMin 550 two times a day      OTHER MEDS:  acetaminophen   Tablet .. 650 milliGRAM(s) Oral every 6 hours PRN  atorvastatin 20 milliGRAM(s) Oral at bedtime  ferrous    sulfate 325 milliGRAM(s) Oral daily  furosemide   Injectable 60 milliGRAM(s) IV Push daily  influenza   Vaccine 0.5 milliLiter(s) IntraMuscular once  lactulose Syrup 20 Gram(s) Oral every 8 hours  lidocaine   Patch 1 Patch Transdermal daily  melatonin 5 milliGRAM(s) Oral at bedtime  midodrine. 5 milliGRAM(s) Oral three times a day  nystatin Powder 1 Application(s) Topical every 12 hours  pantoprazole  Injectable 40 milliGRAM(s) IV Push every 12 hours  sucralfate 1 Gram(s) Oral four times a day      Vital Signs Last 24 Hrs  T(C): 36.8 (05 Nov 2020 04:30), Max: 36.9 (04 Nov 2020 14:02)  T(F): 98.3 (05 Nov 2020 04:30), Max: 98.4 (04 Nov 2020 14:02)  HR: 76 (05 Nov 2020 06:10) (76 - 85)  BP: 108/62 (05 Nov 2020 06:10) (102/56 - 134/64)  BP(mean): --  RR: 17 (05 Nov 2020 04:30) (17 - 18)  SpO2: 96% (05 Nov 2020 04:30) (93% - 96%)    EXAM:  Constitutional: Not in acute distress  Eyes: No icterus. Pupils b/l reactive to light.  Oral cavity: Clear, no lesions  Neck: No neck vein distension noted  RS: Chest clear to auscultation bilaterally. No wheeze/rhonchi/crepitations.  CVS: S1, S2 heard. Regular rate and rhythm. No murmurs/rubs/gallops.  Abdomen: Soft. No guarding/rigidity/tenderness.  : No acute abnormalities  Extremities: Warm. No pedal edema  Skin: No lesions noted  Vascular: No evidence of phlebitis  Neuro: Alert, oriented to time/place/person    Labs:                        7.0    1.32  )-----------( 43       ( 05 Nov 2020 07:30 )             22.4       11-05    139  |  103  |  16  ----------------------------<  88  4.8   |  26  |  1.73<H>    Ca    9.1      05 Nov 2020 07:28            MICROBIOLOGY:    RADIOLOGY:    OTHER INVESTIGATIONS: ID Follow Up For SBP, pre-OLT    Patient is a 60y old  Male who presents with a chief complaint of hepatic encephalopathy (04 Nov 2020 10:50)    Interval History/ROS: No events overnight. Still with LE swelling, he reports no change. Left abd drainage has stopped. Endorses itching around legs.     Allergies  No Known Allergies    Intolerances  None      ANTIMICROBIALS:  ciprofloxacin     Tablet 500 every 24 hours  rifAXIMin 550 two times a day      OTHER MEDS:  acetaminophen   Tablet .. 650 milliGRAM(s) Oral every 6 hours PRN  atorvastatin 20 milliGRAM(s) Oral at bedtime  ferrous    sulfate 325 milliGRAM(s) Oral daily  furosemide   Injectable 60 milliGRAM(s) IV Push daily  influenza   Vaccine 0.5 milliLiter(s) IntraMuscular once  lactulose Syrup 20 Gram(s) Oral every 8 hours  lidocaine   Patch 1 Patch Transdermal daily  melatonin 5 milliGRAM(s) Oral at bedtime  midodrine. 5 milliGRAM(s) Oral three times a day  nystatin Powder 1 Application(s) Topical every 12 hours  pantoprazole  Injectable 40 milliGRAM(s) IV Push every 12 hours  sucralfate 1 Gram(s) Oral four times a day      Vital Signs Last 24 Hrs  T(C): 36.8 (05 Nov 2020 04:30), Max: 36.9 (04 Nov 2020 14:02)  T(F): 98.3 (05 Nov 2020 04:30), Max: 98.4 (04 Nov 2020 14:02)  HR: 76 (05 Nov 2020 06:10) (76 - 85)  BP: 108/62 (05 Nov 2020 06:10) (102/56 - 134/64)  BP(mean): --  RR: 17 (05 Nov 2020 04:30) (17 - 18)  SpO2: 96% (05 Nov 2020 04:30) (93% - 96%)    EXAM:  Constitutional: Not in acute distress  Eyes: No icterus. Pupils b/l reactive to light.  Oral cavity: Clear, no lesions  Neck: No neck vein distension noted  RS: Chest clear to auscultation bilaterally. No wheeze/rhonchi/crepitations.  CVS: S1, S2 heard. Regular rate and rhythm. No murmurs/rubs/gallops.  Abdomen: Soft. Distended. No guarding/rigidity/tenderness.  : No acute abnormalities  Extremities: Warm. +3 pitting edema to knees.   Skin: b/l Lower extremity erythema, R>L  Vascular: No evidence of phlebitis  Neuro: Alert, oriented to time/place/person    Labs:                        7.0    1.32  )-----------( 43       ( 05 Nov 2020 07:30 )             22.4       11-05    139  |  103  |  16  ----------------------------<  88  4.8   |  26  |  1.73<H>    Ca    9.1      05 Nov 2020 07:28            MICROBIOLOGY:  MRSA PCR Result.: NotDetec: MRSA/MSSA PCR assay is a qualitative in vitro diagnostic test for the  direct detection and differentiation of methicillin-resistant  Staphylococcus aureus (MRSA) from Staphylococcus aureus (SA). The assay  detects DNA from nasal swabs in patients atrisk for nasal colonization.  It is not intended to diagnose MRSA or SA infections nor guide or monitor  treatment for MRSA/SA infections. A negative result does not preclude  nasal colonization. The assay is FDA-approved and its performance has  been established by [employer], [place] and the [facility], [place], [place]. (11.04.20 @ 15:26)      RADIOLOGY:  None new

## 2020-11-05 NOTE — PROGRESS NOTE ADULT - PROBLEM SELECTOR PLAN 6
Cr elevated, presumed to be from intravascular depletion. Baseline appears to be around 1.4-  -severe volume overloaded- holding albumin- level is normal-  - renal US - has ruled out hydro, unremarkable kidneys  - increase midodrine to 10mg tid /dc octretide/ for presumed HRS, will follow up renal when dc octeotride  - rod recommended, patient refusing,  - PVRs have been normal- 1L UO  - strict I/O monitoring

## 2020-11-05 NOTE — PROGRESS NOTE ADULT - PROBLEM SELECTOR PLAN 9
Transitions of Care Status:  1.  Name of PCP: Dr. White. Pt reports difficulty getting [facility] hepatology follow up.  2.  PCP Contacted on Admission: [ ] Y    [ ] N    3.  PCP contacted at Discharge: [ ] Y    [ ] N    [ ] N/A  4.  Post-Discharge Appointment Date and Location:  5.  Summary of Handoff given to PCP:    Dispo: likely home with home PT pending improvement in MONI, hepatology/renal clearance.

## 2020-11-06 LAB
ALBUMIN SERPL ELPH-MCNC: 3.8 G/DL — SIGNIFICANT CHANGE UP (ref 3.3–5)
ALP SERPL-CCNC: 73 U/L — SIGNIFICANT CHANGE UP (ref 40–120)
ALT FLD-CCNC: 10 U/L — SIGNIFICANT CHANGE UP (ref 10–45)
ANION GAP SERPL CALC-SCNC: 5 MMOL/L — SIGNIFICANT CHANGE UP (ref 5–17)
AST SERPL-CCNC: 23 U/L — SIGNIFICANT CHANGE UP (ref 10–40)
BASOPHILS # BLD AUTO: 0.01 K/UL — SIGNIFICANT CHANGE UP (ref 0–0.2)
BASOPHILS NFR BLD AUTO: 0.9 % — SIGNIFICANT CHANGE UP (ref 0–2)
BILIRUB SERPL-MCNC: 3.5 MG/DL — HIGH (ref 0.2–1.2)
BUN SERPL-MCNC: 15 MG/DL — SIGNIFICANT CHANGE UP (ref 7–23)
CALCIUM SERPL-MCNC: 9.2 MG/DL — SIGNIFICANT CHANGE UP (ref 8.4–10.5)
CHLORIDE SERPL-SCNC: 102 MMOL/L — SIGNIFICANT CHANGE UP (ref 96–108)
CO2 SERPL-SCNC: 32 MMOL/L — HIGH (ref 22–31)
CREAT SERPL-MCNC: 1.69 MG/DL — HIGH (ref 0.5–1.3)
EOSINOPHIL # BLD AUTO: 0.07 K/UL — SIGNIFICANT CHANGE UP (ref 0–0.5)
EOSINOPHIL NFR BLD AUTO: 4.4 % — SIGNIFICANT CHANGE UP (ref 0–6)
GLUCOSE SERPL-MCNC: 82 MG/DL — SIGNIFICANT CHANGE UP (ref 70–99)
HCT VFR BLD CALC: 22.5 % — LOW (ref 39–50)
HGB BLD-MCNC: 7 G/DL — CRITICAL LOW (ref 13–17)
LYMPHOCYTES # BLD AUTO: 0.39 K/UL — LOW (ref 1–3.3)
LYMPHOCYTES # BLD AUTO: 23.9 % — SIGNIFICANT CHANGE UP (ref 13–44)
MAGNESIUM SERPL-MCNC: 1.8 MG/DL — SIGNIFICANT CHANGE UP (ref 1.6–2.6)
MANUAL SMEAR VERIFICATION: SIGNIFICANT CHANGE UP
MCHC RBC-ENTMCNC: 30.8 PG — SIGNIFICANT CHANGE UP (ref 27–34)
MCHC RBC-ENTMCNC: 31.1 GM/DL — LOW (ref 32–36)
MCV RBC AUTO: 99.1 FL — SIGNIFICANT CHANGE UP (ref 80–100)
MONOCYTES # BLD AUTO: 0.07 K/UL — SIGNIFICANT CHANGE UP (ref 0–0.9)
MONOCYTES NFR BLD AUTO: 4.4 % — SIGNIFICANT CHANGE UP (ref 2–14)
NEUTROPHILS # BLD AUTO: 1.1 K/UL — LOW (ref 1.8–7.4)
NEUTROPHILS NFR BLD AUTO: 65.5 % — SIGNIFICANT CHANGE UP (ref 43–77)
NEUTS BAND # BLD: 0.9 % — SIGNIFICANT CHANGE UP (ref 0–8)
PHOSPHATE SERPL-MCNC: 3.1 MG/DL — SIGNIFICANT CHANGE UP (ref 2.5–4.5)
PLAT MORPH BLD: NORMAL — SIGNIFICANT CHANGE UP
PLATELET # BLD AUTO: 45 K/UL — LOW (ref 150–400)
POTASSIUM SERPL-MCNC: 4.5 MMOL/L — SIGNIFICANT CHANGE UP (ref 3.5–5.3)
POTASSIUM SERPL-SCNC: 4.5 MMOL/L — SIGNIFICANT CHANGE UP (ref 3.5–5.3)
PROT SERPL-MCNC: 6.9 G/DL — SIGNIFICANT CHANGE UP (ref 6–8.3)
RBC # BLD: 2.27 M/UL — LOW (ref 4.2–5.8)
RBC # FLD: 19.6 % — HIGH (ref 10.3–14.5)
RBC BLD AUTO: SIGNIFICANT CHANGE UP
SODIUM SERPL-SCNC: 139 MMOL/L — SIGNIFICANT CHANGE UP (ref 135–145)
WBC # BLD: 1.65 K/UL — LOW (ref 3.8–10.5)
WBC # FLD AUTO: 1.65 K/UL — LOW (ref 3.8–10.5)

## 2020-11-06 PROCEDURE — 93306 TTE W/DOPPLER COMPLETE: CPT | Mod: 26

## 2020-11-06 PROCEDURE — 99233 SBSQ HOSP IP/OBS HIGH 50: CPT

## 2020-11-06 PROCEDURE — 99232 SBSQ HOSP IP/OBS MODERATE 35: CPT

## 2020-11-06 PROCEDURE — 99232 SBSQ HOSP IP/OBS MODERATE 35: CPT | Mod: GC

## 2020-11-06 RX ORDER — CEFAZOLIN SODIUM 1 G
2000 VIAL (EA) INJECTION EVERY 8 HOURS
Refills: 0 | Status: DISCONTINUED | OUTPATIENT
Start: 2020-11-06 | End: 2020-11-16

## 2020-11-06 RX ORDER — CEFAZOLIN SODIUM 1 G
1000 VIAL (EA) INJECTION ONCE
Refills: 0 | Status: COMPLETED | OUTPATIENT
Start: 2020-11-06 | End: 2020-11-06

## 2020-11-06 RX ADMIN — ATORVASTATIN CALCIUM 20 MILLIGRAM(S): 80 TABLET, FILM COATED ORAL at 21:18

## 2020-11-06 RX ADMIN — Medication 5 MILLIGRAM(S): at 21:19

## 2020-11-06 RX ADMIN — NYSTATIN CREAM 1 APPLICATION(S): 100000 CREAM TOPICAL at 21:19

## 2020-11-06 RX ADMIN — Medication 60 MILLIGRAM(S): at 05:17

## 2020-11-06 RX ADMIN — Medication 100 MILLIGRAM(S): at 22:41

## 2020-11-06 RX ADMIN — LACTULOSE 20 GRAM(S): 10 SOLUTION ORAL at 05:17

## 2020-11-06 RX ADMIN — MIDODRINE HYDROCHLORIDE 10 MILLIGRAM(S): 2.5 TABLET ORAL at 05:16

## 2020-11-06 RX ADMIN — Medication 1 GRAM(S): at 17:37

## 2020-11-06 RX ADMIN — Medication 100 MILLIGRAM(S): at 05:17

## 2020-11-06 RX ADMIN — MIDODRINE HYDROCHLORIDE 10 MILLIGRAM(S): 2.5 TABLET ORAL at 17:37

## 2020-11-06 RX ADMIN — Medication 650 MILLIGRAM(S): at 22:00

## 2020-11-06 RX ADMIN — Medication 1 GRAM(S): at 21:22

## 2020-11-06 RX ADMIN — MIDODRINE HYDROCHLORIDE 10 MILLIGRAM(S): 2.5 TABLET ORAL at 12:18

## 2020-11-06 RX ADMIN — Medication 650 MILLIGRAM(S): at 21:20

## 2020-11-06 RX ADMIN — NYSTATIN CREAM 1 APPLICATION(S): 100000 CREAM TOPICAL at 09:46

## 2020-11-06 RX ADMIN — Medication 1 GRAM(S): at 05:16

## 2020-11-06 RX ADMIN — Medication 325 MILLIGRAM(S): at 12:18

## 2020-11-06 RX ADMIN — Medication 100 MILLIGRAM(S): at 14:56

## 2020-11-06 RX ADMIN — Medication 500 MILLIGRAM(S): at 09:46

## 2020-11-06 RX ADMIN — Medication 1 GRAM(S): at 12:18

## 2020-11-06 RX ADMIN — Medication 100 MILLIGRAM(S): at 17:37

## 2020-11-06 RX ADMIN — PANTOPRAZOLE SODIUM 40 MILLIGRAM(S): 20 TABLET, DELAYED RELEASE ORAL at 05:17

## 2020-11-06 RX ADMIN — LACTULOSE 20 GRAM(S): 10 SOLUTION ORAL at 14:54

## 2020-11-06 NOTE — PHARMACOTHERAPY INTERVENTION NOTE - COMMENTS
Patient on cefazolin for cellulitis.  Because patient weighs 155kg, recommend increasing cefazolin to 2g q8h and re-assess for improvement.    Selina Greenberg, PharmD  Clinical Pharmacist, Infectious Diseases  [phone]

## 2020-11-06 NOTE — PROGRESS NOTE ADULT - ASSESSMENT
59 M morbidly obese male with  PMHx of Hepatic Cirrhosis, Thrombocytopenia, CHF, HTN, HLD, admitted for, acute blood lose anemia and SBP with ascitic cultures +for Ecoli (10/14). Nephrology team consulted for MONI and concern for HRS.    #Acute Kidney Injury  renal function  table   Remains with significant edema: Maintain  diureses with lasix along with albumin   Maintain on midodrine    Remains with significant edema: agree with 2D echo  ?repeat paracentesis    #Hyponatremia - Hypervolemic hyponatremia in the setting of liver cirrhosis   Now resolved  - Fluid restrict 1L/day  - Monitor serum Na  - continue albumin

## 2020-11-06 NOTE — PROGRESS NOTE ADULT - PROBLEM SELECTOR PLAN 9
Transitions of Care Status:  1.  Name of PCP: Dr. White. Pt reports difficulty getting [facility] hepatology follow up.  2.  PCP Contacted on Admission: [ ] Y    [ ] N    3.  PCP contacted at Discharge: [ ] Y    [ ] N    [ ] N/A  4.  Post-Discharge Appointment Date and Location:  5.  Summary of Handoff given to PCP:    Dispo: likely home with home PT pending swelling improvement

## 2020-11-06 NOTE — PROGRESS NOTE ADULT - ATTENDING COMMENTS
Patient seen and examined with Dr Shine    I agree with his interval history exam and plans as noted above    Bilateral lower extremity anasarca in the setting of ESLD/cirrhosis with ascites and bilateral lower extremity erythema and cellulitis  Continue Cefazolin but increase dose to 2 gram IV q8hr  demarcation of anterior border to follow path of cellulitis    Max Ashby MD  [phone]  After 5pm/weekends [phone]

## 2020-11-06 NOTE — PROGRESS NOTE ADULT - ASSESSMENT
59M morbidly obese male w/ PMHx of Hepatic Cirrhosis, ascites, Thrombocytopenia, CHF, HTN, HLD, recent admit for Hepatic Encephalopathy, presents as transfer from OSH for advanced GI and hepatology eval, admitted initially 10/14-10/23 with HSE, anemia, and SBP, s/p LVP.   ID consulted for Pre-Transplant Evaluation, SBP.  S/p EGD, colonoscopy, capsule study with no significant findings  Course c/b MONI concerning for HRS, now with LE erythema    #cellulitis of RLE - with erythema bilaterally but Right worse than left, possibly with skin infection on top of swollen legs with stasis. s/p vanc 1g. DVT study negative.  MRSA PCR neg.  No improvement in erythema.   - Rec to increase Ancef to 2g q8 based on BMI  - f/u bl clx sent 11/4 - NGTD      #MONI - Cr is improving now with diuresis  - care per primary team  - renally dose meds    #SBP in setting of decompensated ETOH hepatic cirrhosis -   Paracentesis on 10/14 with Ecoli, s/p 7 days of ceftriaxone. Repeat Para on 10/22 abd fluid still NGTD.  No current abd pain, and nontoxic appearing without fevers. received ctx until 11/2  - left abdomen bag draining yellow serous fluid  - cont oral ciprofloxacin for secondary prophylaxis    #Leukopenia - has chronic leukopenia of unknown etiology. Could be acute in setting of infection and chronic due to advanced cirrhosis but has been never been formally evaluated.  - cont to monitor    #Anemia - received 1U PRBC 11/2  - cont to monitor    #pre-transplant eval- No current plan for further transplant eval  - HAV IgG pos  - HBsAb, HBsAg, HBVc total Ab: ind/neg/nneg  - HCV Ab neg  - HSV 1/2 IgG neg/neg  - EBV Serology: past EBV infection  - CMV IgG neg  - VZV IgG pos  - Measles, Mumps and Rubella IgG serologies: pos/pos/pos  - Quantiferon Gold neg  - HIV Test Neg  - Syphilis Screen neg  - Toxoplasma IgG neg  - Strongyloides Ab neg     #Vaccines -  Received prevnar 10/21/2020  - will need influenza vaccine- please administer  - will need Hep B series- can give first dose in hospital  - pneumovax 8 weeks from prevnar      Natanael Shine MD  Fellow, Infectious Diseases, PGY-4  Pager: [phone]  Before 9am or after 5pm/Weekends: Call [phone]

## 2020-11-06 NOTE — PROGRESS NOTE ADULT - ASSESSMENT
59M morbidly obese male w/ PMHx of Hepatic Cirrhosis, ascites, Thrombocytopenia, CHF, HTN, HLD, recent admx for Hepatic Encephalopathy, noncompliant with lactulose who was admitted to OSH  for hepatic encephalopathy, acute blood lose anemia (?variceal bleed, GI ulcer) and possible SBP. He was transferred to [facility] for endoscopy since he is high risk for planned procedure. He is also here for evaluation by Liver Transplant team, now s/p EGD showing gastritis no varices, colonoscopy without any source of bleed, now s/p VCE without source of bleed. Now with uptrending Cr concerning for pre-renal vs. HRS.

## 2020-11-06 NOTE — PROGRESS NOTE ADULT - SUBJECTIVE AND OBJECTIVE BOX
[facility] DIVISION OF KIDNEY DISEASES AND HYPERTENSION -- FOLLOW UP NOTE  --------------------------------------------------------------------------------  Chief Complaint: AMS    24 hour events/subjective:  none        PAST HISTORY  --------------------------------------------------------------------------------  No significant changes to PMH, PSH, FHx, SHx, unless otherwise noted    ALLERGIES & MEDICATIONS  --------------------------------------------------------------------------------  Allergies    No Known Allergies    Intolerances      Standing Inpatient Medications  atorvastatin 20 milliGRAM(s) Oral at bedtime  ceFAZolin   IVPB 1000 milliGRAM(s) IV Intermittent every 8 hours  ciprofloxacin     Tablet 500 milliGRAM(s) Oral every 24 hours  ferrous    sulfate 325 milliGRAM(s) Oral daily  furosemide   Injectable 60 milliGRAM(s) IV Push daily  influenza   Vaccine 0.5 milliLiter(s) IntraMuscular once  lactulose Syrup 20 Gram(s) Oral every 8 hours  lidocaine   Patch 1 Patch Transdermal daily  melatonin 5 milliGRAM(s) Oral at bedtime  midodrine. 10 milliGRAM(s) Oral three times a day  nystatin Powder 1 Application(s) Topical every 12 hours  pantoprazole    Tablet 40 milliGRAM(s) Oral before breakfast  rifAXIMin 550 milliGRAM(s) Oral two times a day  sucralfate 1 Gram(s) Oral four times a day    PRN Inpatient Medications  acetaminophen   Tablet .. 650 milliGRAM(s) Oral every 6 hours PRN      REVIEW OF SYSTEMS  --------------------------------------------------------------------------------  Gen: No fevers/chills  Respiratory: No dyspnea, cough  CV: No chest pain  GI: No abdominal pain, diarrhea  : No dysuria, hematuria  MSK: ++ edema    All other systems were reviewed and are negative, except as noted.    VITALS/PHYSICAL EXAM  --------------------------------------------------------------------------------  T(C): 37.4 (11-06-20 @ 12:04), Max: 37.4 (11-06-20 @ 12:04)  HR: 75 (11-06-20 @ 12:04) (69 - 77)  BP: 123/61 (11-06-20 @ 12:04) (120/68 - 123/61)  RR: 18 (11-06-20 @ 12:04) (18 - 18)  SpO2: 96% (11-06-20 @ 12:04) (95% - 98%)  Wt(kg): --        11-05-20 @ 07:01  -  11-06-20 @ 07:00  --------------------------------------------------------  IN: 1010 mL / OUT: 1750 mL / NET: -740 mL    11-06-20 @ 07:01  -  11-06-20 @ 15:01  --------------------------------------------------------  IN: 240 mL / OUT: 900 mL / NET: -660 mL      Physical Exam:  	Gen: NAD  	HEENT: MMM  	Pulm: CTA B/L, no crackles or wheezing   	CV: S1S2  	Abd: Soft, +BS   	Ext: 3+ pitting edema of LE b/l with worsening diffuse redness in the shin area  	Neuro: Awake  	Skin: Warm and dry      LABS/STUDIES  --------------------------------------------------------------------------------              7.0    1.65  >-----------<  45       [11-06-20 @ 08:47]              22.5     139  |  102  |  15  ----------------------------<  82      [11-06-20 @ 08:47]  4.5   |  32  |  1.69        Ca     9.2     [11-06-20 @ 08:47]      Mg     1.8     [11-06-20 @ 08:47]      Phos  3.1     [11-06-20 @ 08:47]    TPro  6.9  /  Alb  3.8  /  TBili  3.5  /  DBili  x   /  AST  23  /  ALT  10  /  AlkPhos  73  [11-06-20 @ 08:47]          Creatinine Trend:  SCr 1.69 [11-06 @ 08:47]  SCr 1.73 [11-05 @ 07:28]  SCr 1.79 [11-04 @ 06:58]  SCr 1.70 [11-03 @ 07:25]  SCr 1.57 [11-02 @ 10:14]    Urinalysis - [10-30-20 @ 12:33]      Color Yellow / Appearance Clear / SG 1.016 / pH 5.5      Gluc Negative / Ketone Trace  / Bili Negative / Urobili <2 mg/dL       Blood Negative / Protein Trace / Leuk Est Negative / Nitrite Negative      RBC  / WBC  / Hyaline  / Gran  / Sq Epi  / Non Sq Epi  / Bacteria       Iron 32, TIBC 241, %sat 13      [09-08-20 @ 10:41]  Ferritin 24      [09-08-20 @ 10:41]    HBsAb Indeterminate Test repeated.      [10-27-20 @ 12:32]  HBsAg Nonreact      [10-27-20 @ 12:32]  HBcAb Nonreact      [10-27-20 @ 12:32]  HCV 0.15, Nonreact      [10-27-20 @ 12:32]  HIV Nonreact      [10-27-20 @ 12:54]    Syphilis Screen (Treponema Pallidum Ab) Negative      [10-27-20 @ 12:32]

## 2020-11-06 NOTE — PROGRESS NOTE ADULT - SUBJECTIVE AND OBJECTIVE BOX
[facility] Division of Hospital Medicine  Raul Jane DO  Pager (MEGAN-F, 9I-1B): [phone]  Other Times:  [phone]    Patient is a 60y old  Male who presents with a chief complaint of hepatic encephalopathy (06 Nov 2020 10:48)      SUBJECTIVE / OVERNIGHT EVENTS:    patient seen and examined at bedside.  feels ok. no chest pain, sob, palpitations.     MEDICATIONS  (STANDING):  atorvastatin 20 milliGRAM(s) Oral at bedtime  ceFAZolin   IVPB 1000 milliGRAM(s) IV Intermittent every 8 hours  ciprofloxacin     Tablet 500 milliGRAM(s) Oral every 24 hours  ferrous    sulfate 325 milliGRAM(s) Oral daily  furosemide   Injectable 60 milliGRAM(s) IV Push daily  influenza   Vaccine 0.5 milliLiter(s) IntraMuscular once  lactulose Syrup 20 Gram(s) Oral every 8 hours  lidocaine   Patch 1 Patch Transdermal daily  melatonin 5 milliGRAM(s) Oral at bedtime  midodrine. 10 milliGRAM(s) Oral three times a day  nystatin Powder 1 Application(s) Topical every 12 hours  pantoprazole    Tablet 40 milliGRAM(s) Oral before breakfast  rifAXIMin 550 milliGRAM(s) Oral two times a day  sucralfate 1 Gram(s) Oral four times a day    MEDICATIONS  (PRN):  acetaminophen   Tablet .. 650 milliGRAM(s) Oral every 6 hours PRN Mild Pain (1 - 3), Moderate Pain (4 - 6)      CAPILLARY BLOOD GLUCOSE        I&O's Summary    05 Nov 2020 07:01  -  06 Nov 2020 07:00  --------------------------------------------------------  IN: 1010 mL / OUT: 1750 mL / NET: -740 mL        PHYSICAL EXAM:  Vital Signs Last 24 Hrs  T(C): 36.7 (06 Nov 2020 04:57), Max: 36.8 (05 Nov 2020 20:46)  T(F): 98 (06 Nov 2020 04:57), Max: 98.2 (05 Nov 2020 20:46)  HR: 77 (06 Nov 2020 04:57) (69 - 77)  BP: 122/68 (06 Nov 2020 04:57) (120/68 - 126/63)  BP(mean): --  RR: 18 (06 Nov 2020 04:57) (17 - 18)  SpO2: 98% (06 Nov 2020 04:57) (95% - 98%)    CONSTITUTIONAL: NAD, obese  EYES: PERRL; conjunctiva and sclera clear  RESPIRATORY: Normal respiratory effort; lungs are clear to auscultation bilaterally  CARDIOVASCULAR: Regular rate and rhythm, normal S1 and S2, no murmur/rub/gallop;  EXTREMITIES: +3 pitting LE edema up to upper thighs BL, difficult to palpate LE peripheral pulses given edema, radial pulses 2+ BL RLE erythematous and warm, ttp  ABDOMEN: drain in LLQ; RLQ dressing- no discharge,. +distended, soft, Nontender to palpation, normoactive bowel sounds, no rebound/guarding;   PSYCH: A+O to person, place, and time; affect appropriate  SKIN: No rashes; no palpable lesions      LABS:                        7.0    1.65  )-----------( 45       ( 06 Nov 2020 08:47 )             22.5     11-06    139  |  102  |  15  ----------------------------<  82  4.5   |  32<H>  |  1.69<H>    Ca    9.2      06 Nov 2020 08:47  Phos  3.1     11-06  Mg     1.8     11-06    TPro  6.9  /  Alb  3.8  /  TBili  3.5<H>  /  DBili  x   /  AST  23  /  ALT  10  /  AlkPhos  73  11-06              Culture - Blood (collected 04 Nov 2020 13:22)  Source: .Blood Blood  Preliminary Report (05 Nov 2020 14:01):    No growth to date.    Culture - Blood (collected 04 Nov 2020 13:22)  Source: .Blood Blood  Preliminary Report (05 Nov 2020 14:01):    No growth to date.        RADIOLOGY & ADDITIONAL TESTS:  Results Reviewed:   Imaging Personally Reviewed:  Electrocardiogram Personally Reviewed:    COORDINATION OF CARE:  Care Discussed with Consultants/Other Providers [Y/N]:  Prior or Outpatient Records Reviewed [Y/N]:  james Chi

## 2020-11-06 NOTE — PROGRESS NOTE ADULT - SUBJECTIVE AND OBJECTIVE BOX
ID Follow Up For     Patient is a 60y old  Male who presents with a chief complaint of hepatic encephalopathy (05 Nov 2020 12:19)    Interval History/ROS: No events overnight.     Allergies    No Known Allergies    Intolerances        ANTIMICROBIALS:  ceFAZolin   IVPB 1000 every 8 hours  ciprofloxacin     Tablet 500 every 24 hours  rifAXIMin 550 two times a day      OTHER MEDS:  acetaminophen   Tablet .. 650 milliGRAM(s) Oral every 6 hours PRN  atorvastatin 20 milliGRAM(s) Oral at bedtime  ferrous    sulfate 325 milliGRAM(s) Oral daily  furosemide   Injectable 60 milliGRAM(s) IV Push daily  influenza   Vaccine 0.5 milliLiter(s) IntraMuscular once  lactulose Syrup 20 Gram(s) Oral every 8 hours  lidocaine   Patch 1 Patch Transdermal daily  melatonin 5 milliGRAM(s) Oral at bedtime  midodrine. 10 milliGRAM(s) Oral three times a day  nystatin Powder 1 Application(s) Topical every 12 hours  pantoprazole    Tablet 40 milliGRAM(s) Oral before breakfast  sucralfate 1 Gram(s) Oral four times a day      Vital Signs Last 24 Hrs  T(C): 36.7 (06 Nov 2020 04:57), Max: 36.8 (05 Nov 2020 20:46)  T(F): 98 (06 Nov 2020 04:57), Max: 98.2 (05 Nov 2020 20:46)  HR: 77 (06 Nov 2020 04:57) (69 - 77)  BP: 122/68 (06 Nov 2020 04:57) (120/68 - 126/63)  BP(mean): --  RR: 18 (06 Nov 2020 04:57) (17 - 18)  SpO2: 98% (06 Nov 2020 04:57) (95% - 98%)    EXAM:  Constitutional: Not in acute distress  Eyes: No icterus. Pupils b/l reactive to light.  Oral cavity: Clear, no lesions  Neck: No neck vein distension noted  RS: Chest clear to auscultation bilaterally. No wheeze/rhonchi/crepitations.  CVS: S1, S2 heard. Regular rate and rhythm. No murmurs/rubs/gallops.  Abdomen: Soft. No guarding/rigidity/tenderness.  : No acute abnormalities  Extremities: Warm. No pedal edema  Skin: No lesions noted  Vascular: No evidence of phlebitis  Neuro: Alert, oriented to time/place/person    Labs:                        7.0    1.65  )-----------( 45       ( 06 Nov 2020 08:47 )             22.5       11-06    139  |  102  |  15  ----------------------------<  82  4.5   |  32<H>  |  1.69<H>    Ca    9.2      06 Nov 2020 08:47  Phos  3.1     11-06  Mg     1.8     11-06    TPro  6.9  /  Alb  3.8  /  TBili  3.5<H>  /  DBili  x   /  AST  23  /  ALT  10  /  AlkPhos  73  11-06          MICROBIOLOGY:Culture Results:   No growth to date. (11-04 @ 13:22)  Culture Results:   No growth to date. (11-04 @ 13:22)      RADIOLOGY:    OTHER INVESTIGATIONS: ID Follow Up For cellulitis, SBP    Patient is a 60y old  Male who presents with a chief complaint of hepatic encephalopathy (05 Nov 2020 12:19)    Interval History/ROS: No events overnight. Denies fevers, chills, nausea, vomiting, diarrhea. Still with similar LE swelling and erythema.     Allergies  No Known Allergies    Intolerances  None      ANTIMICROBIALS:  ceFAZolin   IVPB 1000 every 8 hours  ciprofloxacin     Tablet 500 every 24 hours  rifAXIMin 550 two times a day      OTHER MEDS:  acetaminophen   Tablet .. 650 milliGRAM(s) Oral every 6 hours PRN  atorvastatin 20 milliGRAM(s) Oral at bedtime  ferrous    sulfate 325 milliGRAM(s) Oral daily  furosemide   Injectable 60 milliGRAM(s) IV Push daily  influenza   Vaccine 0.5 milliLiter(s) IntraMuscular once  lactulose Syrup 20 Gram(s) Oral every 8 hours  lidocaine   Patch 1 Patch Transdermal daily  melatonin 5 milliGRAM(s) Oral at bedtime  midodrine. 10 milliGRAM(s) Oral three times a day  nystatin Powder 1 Application(s) Topical every 12 hours  pantoprazole    Tablet 40 milliGRAM(s) Oral before breakfast  sucralfate 1 Gram(s) Oral four times a day      Vital Signs Last 24 Hrs  T(C): 36.7 (06 Nov 2020 04:57), Max: 36.8 (05 Nov 2020 20:46)  T(F): 98 (06 Nov 2020 04:57), Max: 98.2 (05 Nov 2020 20:46)  HR: 77 (06 Nov 2020 04:57) (69 - 77)  BP: 122/68 (06 Nov 2020 04:57) (120/68 - 126/63)  BP(mean): --  RR: 18 (06 Nov 2020 04:57) (17 - 18)  SpO2: 98% (06 Nov 2020 04:57) (95% - 98%)    EXAM:  Constitutional: Not in acute distress  Eyes: No icterus. Pupils b/l reactive to light.  Oral cavity: Clear, no lesions  Neck: No neck vein distension noted  RS: Chest clear to auscultation bilaterally. No wheeze/rhonchi/crepitations.  CVS: S1, S2 heard. Regular rate and rhythm. No murmurs/rubs/gallops.  Abdomen: Soft. Distended. No guarding/rigidity/tenderness.  : No acute abnormalities  Extremities: Warm. +3 pitting edema to knees.   Skin: b/l Lower extremity erythema, R>L  Vascular: No evidence of phlebitis  Neuro: Alert, oriented to time/place/person      Labs:                        7.0    1.65  )-----------( 45       ( 06 Nov 2020 08:47 )             22.5       11-06    139  |  102  |  15  ----------------------------<  82  4.5   |  32<H>  |  1.69<H>    Ca    9.2      06 Nov 2020 08:47  Phos  3.1     11-06  Mg     1.8     11-06    TPro  6.9  /  Alb  3.8  /  TBili  3.5<H>  /  DBili  x   /  AST  23  /  ALT  10  /  AlkPhos  73  11-06          MICROBIOLOGY:Culture Results:   No growth to date. (11-04 @ 13:22)  Culture Results:   No growth to date. (11-04 @ 13:22)      RADIOLOGY:    OTHER INVESTIGATIONS:

## 2020-11-07 LAB
ANION GAP SERPL CALC-SCNC: 11 MMOL/L — SIGNIFICANT CHANGE UP (ref 5–17)
BUN SERPL-MCNC: 16 MG/DL — SIGNIFICANT CHANGE UP (ref 7–23)
CALCIUM SERPL-MCNC: 9.1 MG/DL — SIGNIFICANT CHANGE UP (ref 8.4–10.5)
CHLORIDE SERPL-SCNC: 100 MMOL/L — SIGNIFICANT CHANGE UP (ref 96–108)
CO2 SERPL-SCNC: 27 MMOL/L — SIGNIFICANT CHANGE UP (ref 22–31)
CREAT SERPL-MCNC: 1.63 MG/DL — HIGH (ref 0.5–1.3)
GLUCOSE SERPL-MCNC: 96 MG/DL — SIGNIFICANT CHANGE UP (ref 70–99)
HCT VFR BLD CALC: 23 % — LOW (ref 39–50)
HGB BLD-MCNC: 7.2 G/DL — LOW (ref 13–17)
MCHC RBC-ENTMCNC: 30.6 PG — SIGNIFICANT CHANGE UP (ref 27–34)
MCHC RBC-ENTMCNC: 31.3 GM/DL — LOW (ref 32–36)
MCV RBC AUTO: 97.9 FL — SIGNIFICANT CHANGE UP (ref 80–100)
NRBC # BLD: 0 /100 WBCS — SIGNIFICANT CHANGE UP (ref 0–0)
PLATELET # BLD AUTO: 42 K/UL — LOW (ref 150–400)
POTASSIUM SERPL-MCNC: 4 MMOL/L — SIGNIFICANT CHANGE UP (ref 3.5–5.3)
POTASSIUM SERPL-SCNC: 4 MMOL/L — SIGNIFICANT CHANGE UP (ref 3.5–5.3)
RBC # BLD: 2.35 M/UL — LOW (ref 4.2–5.8)
RBC # FLD: 19.5 % — HIGH (ref 10.3–14.5)
SODIUM SERPL-SCNC: 138 MMOL/L — SIGNIFICANT CHANGE UP (ref 135–145)
WBC # BLD: 1.71 K/UL — LOW (ref 3.8–10.5)
WBC # FLD AUTO: 1.71 K/UL — LOW (ref 3.8–10.5)

## 2020-11-07 PROCEDURE — 99233 SBSQ HOSP IP/OBS HIGH 50: CPT

## 2020-11-07 RX ORDER — FUROSEMIDE 40 MG
60 TABLET ORAL DAILY
Refills: 0 | Status: DISCONTINUED | OUTPATIENT
Start: 2020-11-07 | End: 2020-11-07

## 2020-11-07 RX ORDER — FUROSEMIDE 40 MG
60 TABLET ORAL DAILY
Refills: 0 | Status: DISCONTINUED | OUTPATIENT
Start: 2020-11-07 | End: 2020-11-11

## 2020-11-07 RX ADMIN — Medication 100 MILLIGRAM(S): at 14:07

## 2020-11-07 RX ADMIN — Medication 100 MILLIGRAM(S): at 21:09

## 2020-11-07 RX ADMIN — MIDODRINE HYDROCHLORIDE 10 MILLIGRAM(S): 2.5 TABLET ORAL at 17:43

## 2020-11-07 RX ADMIN — Medication 1 GRAM(S): at 17:43

## 2020-11-07 RX ADMIN — Medication 60 MILLIGRAM(S): at 05:37

## 2020-11-07 RX ADMIN — PANTOPRAZOLE SODIUM 40 MILLIGRAM(S): 20 TABLET, DELAYED RELEASE ORAL at 05:38

## 2020-11-07 RX ADMIN — Medication 1 GRAM(S): at 23:55

## 2020-11-07 RX ADMIN — LACTULOSE 20 GRAM(S): 10 SOLUTION ORAL at 05:37

## 2020-11-07 RX ADMIN — Medication 5 MILLIGRAM(S): at 22:39

## 2020-11-07 RX ADMIN — Medication 1 GRAM(S): at 11:48

## 2020-11-07 RX ADMIN — Medication 325 MILLIGRAM(S): at 11:48

## 2020-11-07 RX ADMIN — NYSTATIN CREAM 1 APPLICATION(S): 100000 CREAM TOPICAL at 21:03

## 2020-11-07 RX ADMIN — Medication 500 MILLIGRAM(S): at 08:27

## 2020-11-07 RX ADMIN — MIDODRINE HYDROCHLORIDE 10 MILLIGRAM(S): 2.5 TABLET ORAL at 11:48

## 2020-11-07 RX ADMIN — Medication 1 GRAM(S): at 05:38

## 2020-11-07 RX ADMIN — ATORVASTATIN CALCIUM 20 MILLIGRAM(S): 80 TABLET, FILM COATED ORAL at 21:04

## 2020-11-07 RX ADMIN — MIDODRINE HYDROCHLORIDE 10 MILLIGRAM(S): 2.5 TABLET ORAL at 05:38

## 2020-11-07 RX ADMIN — Medication 100 MILLIGRAM(S): at 05:37

## 2020-11-07 NOTE — PROGRESS NOTE ADULT - ASSESSMENT
59M morbidly obese male w/ PMHx of Hepatic Cirrhosis, ascites, Thrombocytopenia, CHF, HTN, HLD, recent admx for Hepatic Encephalopathy, noncompliant with lactulose who was admitted to OSH  for hepatic encephalopathy, acute blood lose anemia (?variceal bleed, GI ulcer) and possible SBP. He was transferred to [facility] for endoscopy since he is high risk for planned procedure. He is also here for evaluation by Liver Transplant team, now s/p EGD showing gastritis no varices, colonoscopy without any source of bleed, now s/p VCE without source of bleed. Now with uptrending Cr concerning for pre-renal vs. HRS.

## 2020-11-07 NOTE — PROGRESS NOTE ADULT - PROBLEM SELECTOR PLAN 1
s/p vancomycin  appreciate ID recs  c/w Ancef 2gm q8hr 11/6, was on ancef 1g q8hr previously, end date to be determined by ID, follow up

## 2020-11-07 NOTE — PROGRESS NOTE ADULT - PROBLEM SELECTOR PLAN 6
Cr elevated, presumed to be from intravascular depletion. Baseline appears to be around 1.4-  -severe volume overloaded- holding albumin- level is normal-  - renal US - has ruled out hydro, unremarkable kidneys  - c/w midodrine to 10mg tid /dc octretide/ for presumed HRS but likely due to fluid overloa  - PVRs have been rosy  - net negative 1 L  - strict I/O monitoring

## 2020-11-07 NOTE — PROGRESS NOTE ADULT - SUBJECTIVE AND OBJECTIVE BOX
[facility] Division of Hospital Medicine  Raul Jane DO  Pager (MK, 8I-4O): [phone]  Other Times:  [phone]    Patient is a 60y old  Male who presents with a chief complaint of hepatic encephalopathy (06 Nov 2020 15:00)      SUBJECTIVE / OVERNIGHT EVENTS:    patient seen and examined at bedside.  feels well. no complaints.     MEDICATIONS  (STANDING):  atorvastatin 20 milliGRAM(s) Oral at bedtime  ceFAZolin   IVPB 2000 milliGRAM(s) IV Intermittent every 8 hours  ciprofloxacin     Tablet 500 milliGRAM(s) Oral every 24 hours  ferrous    sulfate 325 milliGRAM(s) Oral daily  furosemide   Injectable 60 milliGRAM(s) IV Push daily  influenza   Vaccine 0.5 milliLiter(s) IntraMuscular once  lactulose Syrup 20 Gram(s) Oral every 8 hours  lidocaine   Patch 1 Patch Transdermal daily  melatonin 5 milliGRAM(s) Oral at bedtime  midodrine. 10 milliGRAM(s) Oral three times a day  nystatin Powder 1 Application(s) Topical every 12 hours  pantoprazole    Tablet 40 milliGRAM(s) Oral before breakfast  rifAXIMin 550 milliGRAM(s) Oral two times a day  sucralfate 1 Gram(s) Oral four times a day    MEDICATIONS  (PRN):  acetaminophen   Tablet .. 650 milliGRAM(s) Oral every 6 hours PRN Mild Pain (1 - 3), Moderate Pain (4 - 6)      CAPILLARY BLOOD GLUCOSE        I&O's Summary    06 Nov 2020 07:01  -  07 Nov 2020 07:00  --------------------------------------------------------  IN: 740 mL / OUT: 1000 mL / NET: -260 mL        PHYSICAL EXAM:  Vital Signs Last 24 Hrs  T(C): 36.8 (07 Nov 2020 04:47), Max: 37.4 (06 Nov 2020 12:04)  T(F): 98.3 (07 Nov 2020 04:47), Max: 99.3 (06 Nov 2020 12:04)  HR: 66 (07 Nov 2020 04:47) (66 - 75)  BP: 106/57 (07 Nov 2020 04:47) (106/57 - 123/61)  BP(mean): --  RR: 18 (07 Nov 2020 04:47) (18 - 19)  SpO2: 96% (07 Nov 2020 04:47) (95% - 96%)    CONSTITUTIONAL: NAD, obese  EYES: PERRL; conjunctiva and sclera clear  RESPIRATORY clear to auscultation bilaterally no wheezes, rales or rhonchi  CARDIOVASCULAR: Regular rate and rhythm, normal S1 and S2, no murmur/rub/gallop;  EXTREMITIES: +3 pitting LE edema up to upper thighs BL, difficult to palpate LE peripheral pulses given edema, radial pulses 2+ BL LE erythematous and warm, ttp  ABDOMEN: drain in LLQ; RLQ dressing- no discharge,. +distended, soft, Nontender to palpation, normoactive bowel sounds, no rebound/guarding;   PSYCH: A+O to person, place, and time; affect appropriate      LABS:                        7.2    1.71  )-----------( 42       ( 07 Nov 2020 07:37 )             23.0     11-07    138  |  100  |  16  ----------------------------<  96  4.0   |  27  |  1.63<H>    Ca    9.1      07 Nov 2020 07:37  Phos  3.1     11-06  Mg     1.8     11-06    TPro  6.9  /  Alb  3.8  /  TBili  3.5<H>  /  DBili  x   /  AST  23  /  ALT  10  /  AlkPhos  73  11-06              Culture - Blood (collected 04 Nov 2020 13:22)  Source: .Blood Blood  Preliminary Report (05 Nov 2020 14:01):    No growth to date.    Culture - Blood (collected 04 Nov 2020 13:22)  Source: .Blood Blood  Preliminary Report (05 Nov 2020 14:01):    No growth to date.        RADIOLOGY & ADDITIONAL TESTS:  Results Reviewed:   Imaging Personally Reviewed:  Electrocardiogram Personally Reviewed:    COORDINATION OF CARE:  Care Discussed with Consultants/Other Providers [Y/N]:  Prior or Outpatient Records Reviewed [Y/N]:  med NP Lexy

## 2020-11-07 NOTE — PROGRESS NOTE ADULT - PROBLEM SELECTOR PLAN 9
Transitions of Care Status:  1.  Name of PCP: Dr. White. Pt reports difficulty getting [facility] hepatology follow up.  2.  PCP Contacted on Admission: [ ] Y    [ ] N    3.  PCP contacted at Discharge: [ ] Y    [ ] N    [ ] N/A  4.  Post-Discharge Appointment Date and Location:  5.  Summary of Handoff given to PCP:    Dispo: likely home with home PT pending swelling improvement

## 2020-11-08 LAB
ANION GAP SERPL CALC-SCNC: 10 MMOL/L — SIGNIFICANT CHANGE UP (ref 5–17)
BUN SERPL-MCNC: 18 MG/DL — SIGNIFICANT CHANGE UP (ref 7–23)
CALCIUM SERPL-MCNC: 9.2 MG/DL — SIGNIFICANT CHANGE UP (ref 8.4–10.5)
CHLORIDE SERPL-SCNC: 100 MMOL/L — SIGNIFICANT CHANGE UP (ref 96–108)
CO2 SERPL-SCNC: 27 MMOL/L — SIGNIFICANT CHANGE UP (ref 22–31)
CREAT SERPL-MCNC: 1.66 MG/DL — HIGH (ref 0.5–1.3)
GLUCOSE SERPL-MCNC: 87 MG/DL — SIGNIFICANT CHANGE UP (ref 70–99)
HCT VFR BLD CALC: 22.9 % — LOW (ref 39–50)
HGB BLD-MCNC: 7.2 G/DL — LOW (ref 13–17)
MCHC RBC-ENTMCNC: 30.4 PG — SIGNIFICANT CHANGE UP (ref 27–34)
MCHC RBC-ENTMCNC: 31.4 GM/DL — LOW (ref 32–36)
MCV RBC AUTO: 96.6 FL — SIGNIFICANT CHANGE UP (ref 80–100)
NRBC # BLD: 0 /100 WBCS — SIGNIFICANT CHANGE UP (ref 0–0)
PLATELET # BLD AUTO: 38 K/UL — LOW (ref 150–400)
POTASSIUM SERPL-MCNC: 4.2 MMOL/L — SIGNIFICANT CHANGE UP (ref 3.5–5.3)
POTASSIUM SERPL-SCNC: 4.2 MMOL/L — SIGNIFICANT CHANGE UP (ref 3.5–5.3)
RBC # BLD: 2.37 M/UL — LOW (ref 4.2–5.8)
RBC # FLD: 19.4 % — HIGH (ref 10.3–14.5)
SARS-COV-2 RNA SPEC QL NAA+PROBE: SIGNIFICANT CHANGE UP
SODIUM SERPL-SCNC: 137 MMOL/L — SIGNIFICANT CHANGE UP (ref 135–145)
WBC # BLD: 1.61 K/UL — LOW (ref 3.8–10.5)
WBC # FLD AUTO: 1.61 K/UL — LOW (ref 3.8–10.5)

## 2020-11-08 PROCEDURE — 99233 SBSQ HOSP IP/OBS HIGH 50: CPT

## 2020-11-08 PROCEDURE — 99231 SBSQ HOSP IP/OBS SF/LOW 25: CPT

## 2020-11-08 RX ORDER — ACETAMINOPHEN 500 MG
1000 TABLET ORAL ONCE
Refills: 0 | Status: COMPLETED | OUTPATIENT
Start: 2020-11-08 | End: 2020-11-08

## 2020-11-08 RX ADMIN — MIDODRINE HYDROCHLORIDE 10 MILLIGRAM(S): 2.5 TABLET ORAL at 11:45

## 2020-11-08 RX ADMIN — NYSTATIN CREAM 1 APPLICATION(S): 100000 CREAM TOPICAL at 19:36

## 2020-11-08 RX ADMIN — ATORVASTATIN CALCIUM 20 MILLIGRAM(S): 80 TABLET, FILM COATED ORAL at 21:46

## 2020-11-08 RX ADMIN — Medication 100 MILLIGRAM(S): at 14:33

## 2020-11-08 RX ADMIN — MIDODRINE HYDROCHLORIDE 10 MILLIGRAM(S): 2.5 TABLET ORAL at 05:58

## 2020-11-08 RX ADMIN — Medication 500 MILLIGRAM(S): at 08:25

## 2020-11-08 RX ADMIN — Medication 60 MILLIGRAM(S): at 05:57

## 2020-11-08 RX ADMIN — Medication 1 GRAM(S): at 21:46

## 2020-11-08 RX ADMIN — Medication 5 MILLIGRAM(S): at 21:46

## 2020-11-08 RX ADMIN — Medication 1 GRAM(S): at 06:00

## 2020-11-08 RX ADMIN — Medication 1000 MILLIGRAM(S): at 07:37

## 2020-11-08 RX ADMIN — Medication 400 MILLIGRAM(S): at 07:03

## 2020-11-08 RX ADMIN — LACTULOSE 20 GRAM(S): 10 SOLUTION ORAL at 21:46

## 2020-11-08 RX ADMIN — Medication 325 MILLIGRAM(S): at 11:45

## 2020-11-08 RX ADMIN — LACTULOSE 20 GRAM(S): 10 SOLUTION ORAL at 05:57

## 2020-11-08 RX ADMIN — MIDODRINE HYDROCHLORIDE 10 MILLIGRAM(S): 2.5 TABLET ORAL at 17:39

## 2020-11-08 RX ADMIN — Medication 100 MILLIGRAM(S): at 21:48

## 2020-11-08 RX ADMIN — Medication 1 GRAM(S): at 11:46

## 2020-11-08 RX ADMIN — NYSTATIN CREAM 1 APPLICATION(S): 100000 CREAM TOPICAL at 08:25

## 2020-11-08 RX ADMIN — Medication 1 GRAM(S): at 17:39

## 2020-11-08 RX ADMIN — PANTOPRAZOLE SODIUM 40 MILLIGRAM(S): 20 TABLET, DELAYED RELEASE ORAL at 06:02

## 2020-11-08 RX ADMIN — Medication 100 MILLIGRAM(S): at 05:56

## 2020-11-08 NOTE — PROGRESS NOTE ADULT - ASSESSMENT
59M morbidly obese male w/ PMHx of Hepatic Cirrhosis, ascites, Thrombocytopenia, CHF, HTN, HLD, recent admit for Hepatic Encephalopathy, presents as transfer from OSH for advanced GI and hepatology eval, admitted initially 10/14-10/23 with HSE, anemia, and SBP, s/p LVP.   ID consulted for Pre-Transplant Evaluation, SBP.  S/p EGD, colonoscopy, capsule study with no significant findings  Course c/b MONI concerning for HRS, now with LE erythema    #cellulitis of RLE - with erythema bilaterally but Right worse than left, possibly with skin infection on top of swollen legs with stasis. s/p vanc 1g. DVT study negative.  MRSA PCR neg.  Pt notes some improvement in erythema  On Ancef to 2g q8 based on BMI  - f/u bl clx sent 11/4 - NGTD      #MONI - Cr is improving now with diuresis  - care per primary team  - renally dose meds    #SBP in setting of decompensated ETOH hepatic cirrhosis -   Paracentesis on 10/14 with Ecoli, s/p 7 days of ceftriaxone. Repeat Para on 10/22 abd fluid still NGTD.  No current abd pain, and nontoxic appearing without fevers. received ctx until 11/2  - left abdomen bag draining yellow serous fluid  - cont oral ciprofloxacin for secondary prophylaxis    #Leukopenia - has chronic leukopenia of unknown etiology. Could be acute in setting of infection and chronic due to advanced cirrhosis but has been never been formally evaluated.  - cont to monitor    #Anemia - received 1U PRBC 11/2  - cont to monitor    #pre-transplant eval- No current plan for further transplant eval  - HAV IgG pos  - HBsAb, HBsAg, HBVc total Ab: ind/neg/nneg  - HCV Ab neg  - HSV 1/2 IgG neg/neg  - EBV Serology: past EBV infection  - CMV IgG neg  - VZV IgG pos  - Measles, Mumps and Rubella IgG serologies: pos/pos/pos  - Quantiferon Gold neg  - HIV Test Neg  - Syphilis Screen neg  - Toxoplasma IgG neg  - Strongyloides Ab neg     #Vaccines -  Received prevnar 10/21/2020  - will need influenza vaccine- please administer  - will need Hep B series- can give first dose in hospital  - pneumovax 8 weeks from prevnar

## 2020-11-08 NOTE — PROGRESS NOTE ADULT - ASSESSMENT
59M morbidly obese male w/ PMHx of Hepatic Cirrhosis, ascites, Thrombocytopenia, CHF, HTN, HLD, recent admx for Hepatic Encephalopathy, noncompliant with lactulose who was admitted to OSH  for hepatic encephalopathy, acute blood lose anemia (?variceal bleed, GI ulcer) and possible SBP. He was transferred to [facility] for endoscopy since he is high risk for planned procedure. He is also here for evaluation by Liver Transplant team, now s/p EGD showing gastritis no varices, colonoscopy without any source of bleed, now s/p VCE without source of bleed. Now with uptrending Cr concerning for pre-renal vs. HRS. New B'/L LE cellulitis on ancef.

## 2020-11-08 NOTE — PROGRESS NOTE ADULT - PROBLEM SELECTOR PLAN 9
Transitions of Care Status:  1.  Name of PCP: Dr. White. Pt reports difficulty getting [facility] hepatology follow up.  2.  PCP Contacted on Admission: [ ] Y    [ ] N    3.  PCP contacted at Discharge: [ ] Y    [ ] N    [ ] N/A  4.  Post-Discharge Appointment Date and Location:  5.  Summary of Handoff given to PCP:    Dispo: likely home with home PT pending swelling improvement
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
## 2020-11-08 NOTE — PROGRESS NOTE ADULT - SUBJECTIVE AND OBJECTIVE BOX
Lewis Leon MD  Division of Hospital Medicine  Pager: [phone]  If no response or off-hours, page [phone]  -------------------------------------    Patient is a 60y old  Male who presents with a chief complaint of hepatic encephalopathy (08 Nov 2020 11:03)      SUBJECTIVE / OVERNIGHT EVENTS: none acute  ADDITIONAL REVIEW OF SYSTEMS: pt reports improvement in skin redness, no fevers/chills, no other new symptoms, pain controlled. ros otherwise unchanged.    MEDICATIONS  (STANDING):  atorvastatin 20 milliGRAM(s) Oral at bedtime  ceFAZolin   IVPB 2000 milliGRAM(s) IV Intermittent every 8 hours  ciprofloxacin     Tablet 500 milliGRAM(s) Oral every 24 hours  ferrous    sulfate 325 milliGRAM(s) Oral daily  furosemide   Injectable 60 milliGRAM(s) IV Push daily  influenza   Vaccine 0.5 milliLiter(s) IntraMuscular once  lactulose Syrup 20 Gram(s) Oral every 8 hours  lidocaine   Patch 1 Patch Transdermal daily  melatonin 5 milliGRAM(s) Oral at bedtime  midodrine. 10 milliGRAM(s) Oral three times a day  nystatin Powder 1 Application(s) Topical every 12 hours  pantoprazole    Tablet 40 milliGRAM(s) Oral before breakfast  rifAXIMin 550 milliGRAM(s) Oral two times a day  sucralfate 1 Gram(s) Oral four times a day    MEDICATIONS  (PRN):  acetaminophen   Tablet .. 650 milliGRAM(s) Oral every 6 hours PRN Mild Pain (1 - 3), Moderate Pain (4 - 6)      CAPILLARY BLOOD GLUCOSE        I&O's Summary    07 Nov 2020 07:01  -  08 Nov 2020 07:00  --------------------------------------------------------  IN: 1400 mL / OUT: 1875 mL / NET: -475 mL        PHYSICAL EXAM:  Vital Signs Last 24 Hrs  T(C): 37 (08 Nov 2020 04:48), Max: 37.1 (07 Nov 2020 20:48)  T(F): 98.6 (08 Nov 2020 04:48), Max: 98.8 (07 Nov 2020 20:48)  HR: 70 (08 Nov 2020 04:48) (70 - 79)  BP: 118/64 (08 Nov 2020 04:48) (118/64 - 125/63)  BP(mean): --  RR: 18 (08 Nov 2020 04:48) (17 - 19)  SpO2: 98% (08 Nov 2020 04:48) (94% - 98%)  CONSTITUTIONAL: NAD, well-developed, well-groomed  EYES: PERRLA; conjunctiva and sclera clear  ENMT: Moist oral mucosa, no pharyngeal injection or exudates; normal dentition  NECK: Supple, no palpable masses; no thyromegaly  RESPIRATORY: Normal respiratory effort; lungs are clear to auscultation bilaterally  CARDIOVASCULAR: Regular rate and rhythm, normal S1 and S2, no murmur/rub/gallop; +3 pitting edema, Peripheral pulses are 2+ bilaterally  ABDOMEN: +softly distended, +L sided puncture site scant serous drainage covered with colostomy bag. Nontender to palpation, normoactive bowel sounds, no rebound/guarding; No hepatosplenomegaly  MUSCLOSKELETAL:  Normal gait; no clubbing or cyanosis of digits; no joint swelling or tenderness to palpation  PSYCH: A+O to person, place, and time; affect appropriate  NEUROLOGY: CN 2-12 are intact and symmetric; no gross sensory deficits;   SKIN: +B/L le nonblanching erythema minimal warmt    LABS:                        7.2    1.61  )-----------( 38       ( 08 Nov 2020 06:53 )             22.9     11-08    137  |  100  |  18  ----------------------------<  87  4.2   |  27  |  1.66<H>    Ca    9.2      08 Nov 2020 06:53                  RADIOLOGY & ADDITIONAL TESTS:  Results Reviewed:   Imaging Personally Reviewed:  Electrocardiogram Personally Reviewed:    COORDINATION OF CARE:  Care Discussed with Consultants/Other Providers [Y/N]:  Prior or Outpatient Records Reviewed [Y/N]:

## 2020-11-09 LAB
ALBUMIN SERPL ELPH-MCNC: 4 G/DL — SIGNIFICANT CHANGE UP (ref 3.3–5)
ALP SERPL-CCNC: 97 U/L — SIGNIFICANT CHANGE UP (ref 40–120)
ALT FLD-CCNC: 7 U/L — LOW (ref 10–45)
ANION GAP SERPL CALC-SCNC: 10 MMOL/L — SIGNIFICANT CHANGE UP (ref 5–17)
ANISOCYTOSIS BLD QL: SIGNIFICANT CHANGE UP
AST SERPL-CCNC: 36 U/L — SIGNIFICANT CHANGE UP (ref 10–40)
BASOPHILS # BLD AUTO: 0.01 K/UL — SIGNIFICANT CHANGE UP (ref 0–0.2)
BASOPHILS NFR BLD AUTO: 0.9 % — SIGNIFICANT CHANGE UP (ref 0–2)
BILIRUB SERPL-MCNC: 3.8 MG/DL — HIGH (ref 0.2–1.2)
BUN SERPL-MCNC: 18 MG/DL — SIGNIFICANT CHANGE UP (ref 7–23)
CALCIUM SERPL-MCNC: 8.9 MG/DL — SIGNIFICANT CHANGE UP (ref 8.4–10.5)
CHLORIDE SERPL-SCNC: 99 MMOL/L — SIGNIFICANT CHANGE UP (ref 96–108)
CO2 SERPL-SCNC: 27 MMOL/L — SIGNIFICANT CHANGE UP (ref 22–31)
CREAT SERPL-MCNC: 1.67 MG/DL — HIGH (ref 0.5–1.3)
CULTURE RESULTS: SIGNIFICANT CHANGE UP
CULTURE RESULTS: SIGNIFICANT CHANGE UP
ELLIPTOCYTES BLD QL SMEAR: SIGNIFICANT CHANGE UP
EOSINOPHIL # BLD AUTO: 0.08 K/UL — SIGNIFICANT CHANGE UP (ref 0–0.5)
EOSINOPHIL NFR BLD AUTO: 5.2 % — SIGNIFICANT CHANGE UP (ref 0–6)
GIANT PLATELETS BLD QL SMEAR: PRESENT — SIGNIFICANT CHANGE UP
GLUCOSE SERPL-MCNC: 111 MG/DL — HIGH (ref 70–99)
HCT VFR BLD CALC: 23.9 % — LOW (ref 39–50)
HGB BLD-MCNC: 7.5 G/DL — LOW (ref 13–17)
INR BLD: 2.91 RATIO — HIGH (ref 0.88–1.16)
LYMPHOCYTES # BLD AUTO: 0.37 K/UL — LOW (ref 1–3.3)
LYMPHOCYTES # BLD AUTO: 23.7 % — SIGNIFICANT CHANGE UP (ref 13–44)
MACROCYTES BLD QL: SIGNIFICANT CHANGE UP
MAGNESIUM SERPL-MCNC: 1.8 MG/DL — SIGNIFICANT CHANGE UP (ref 1.6–2.6)
MANUAL SMEAR VERIFICATION: SIGNIFICANT CHANGE UP
MCHC RBC-ENTMCNC: 30.6 PG — SIGNIFICANT CHANGE UP (ref 27–34)
MCHC RBC-ENTMCNC: 31.4 GM/DL — LOW (ref 32–36)
MCV RBC AUTO: 97.6 FL — SIGNIFICANT CHANGE UP (ref 80–100)
MONOCYTES # BLD AUTO: 0.05 K/UL — SIGNIFICANT CHANGE UP (ref 0–0.9)
MONOCYTES NFR BLD AUTO: 3.5 % — SIGNIFICANT CHANGE UP (ref 2–14)
NEUTROPHILS # BLD AUTO: 1.03 K/UL — LOW (ref 1.8–7.4)
NEUTROPHILS NFR BLD AUTO: 66.7 % — SIGNIFICANT CHANGE UP (ref 43–77)
PHOSPHATE SERPL-MCNC: 2.5 MG/DL — SIGNIFICANT CHANGE UP (ref 2.5–4.5)
PLAT MORPH BLD: ABNORMAL
PLATELET # BLD AUTO: 41 K/UL — LOW (ref 150–400)
POIKILOCYTOSIS BLD QL AUTO: SIGNIFICANT CHANGE UP
POLYCHROMASIA BLD QL SMEAR: SLIGHT — SIGNIFICANT CHANGE UP
POTASSIUM SERPL-MCNC: 4 MMOL/L — SIGNIFICANT CHANGE UP (ref 3.5–5.3)
POTASSIUM SERPL-SCNC: 4 MMOL/L — SIGNIFICANT CHANGE UP (ref 3.5–5.3)
PROT SERPL-MCNC: 7.2 G/DL — SIGNIFICANT CHANGE UP (ref 6–8.3)
PROTHROM AB SERPL-ACNC: 32.6 SEC — HIGH (ref 10.6–13.6)
RBC # BLD: 2.45 M/UL — LOW (ref 4.2–5.8)
RBC # FLD: 19.4 % — HIGH (ref 10.3–14.5)
RBC BLD AUTO: ABNORMAL
SODIUM SERPL-SCNC: 136 MMOL/L — SIGNIFICANT CHANGE UP (ref 135–145)
SPECIMEN SOURCE: SIGNIFICANT CHANGE UP
SPECIMEN SOURCE: SIGNIFICANT CHANGE UP
WBC # BLD: 1.55 K/UL — LOW (ref 3.8–10.5)
WBC # FLD AUTO: 1.55 K/UL — LOW (ref 3.8–10.5)

## 2020-11-09 PROCEDURE — 99232 SBSQ HOSP IP/OBS MODERATE 35: CPT

## 2020-11-09 PROCEDURE — 99233 SBSQ HOSP IP/OBS HIGH 50: CPT

## 2020-11-09 RX ORDER — ALBUMIN HUMAN 25 %
50 VIAL (ML) INTRAVENOUS EVERY 12 HOURS
Refills: 0 | Status: DISCONTINUED | OUTPATIENT
Start: 2020-11-09 | End: 2020-11-16

## 2020-11-09 RX ADMIN — Medication 60 MILLIGRAM(S): at 07:26

## 2020-11-09 RX ADMIN — MIDODRINE HYDROCHLORIDE 10 MILLIGRAM(S): 2.5 TABLET ORAL at 06:05

## 2020-11-09 RX ADMIN — NYSTATIN CREAM 1 APPLICATION(S): 100000 CREAM TOPICAL at 20:20

## 2020-11-09 RX ADMIN — Medication 100 MILLIGRAM(S): at 21:31

## 2020-11-09 RX ADMIN — Medication 650 MILLIGRAM(S): at 18:44

## 2020-11-09 RX ADMIN — Medication 1 GRAM(S): at 06:05

## 2020-11-09 RX ADMIN — Medication 5 MILLIGRAM(S): at 21:31

## 2020-11-09 RX ADMIN — Medication 1 GRAM(S): at 11:10

## 2020-11-09 RX ADMIN — MIDODRINE HYDROCHLORIDE 10 MILLIGRAM(S): 2.5 TABLET ORAL at 17:55

## 2020-11-09 RX ADMIN — Medication 100 MILLIGRAM(S): at 14:11

## 2020-11-09 RX ADMIN — Medication 1 GRAM(S): at 21:31

## 2020-11-09 RX ADMIN — LACTULOSE 20 GRAM(S): 10 SOLUTION ORAL at 06:04

## 2020-11-09 RX ADMIN — PANTOPRAZOLE SODIUM 40 MILLIGRAM(S): 20 TABLET, DELAYED RELEASE ORAL at 06:05

## 2020-11-09 RX ADMIN — MIDODRINE HYDROCHLORIDE 10 MILLIGRAM(S): 2.5 TABLET ORAL at 11:10

## 2020-11-09 RX ADMIN — Medication 50 MILLILITER(S): at 17:55

## 2020-11-09 RX ADMIN — ATORVASTATIN CALCIUM 20 MILLIGRAM(S): 80 TABLET, FILM COATED ORAL at 21:31

## 2020-11-09 RX ADMIN — Medication 500 MILLIGRAM(S): at 08:16

## 2020-11-09 RX ADMIN — Medication 650 MILLIGRAM(S): at 19:20

## 2020-11-09 RX ADMIN — Medication 1 GRAM(S): at 17:54

## 2020-11-09 RX ADMIN — Medication 325 MILLIGRAM(S): at 11:10

## 2020-11-09 RX ADMIN — Medication 100 MILLIGRAM(S): at 07:27

## 2020-11-09 NOTE — PROGRESS NOTE ADULT - PROBLEM SELECTOR PLAN 6
Cr elevated, presumed to be from intravascular depletion. Baseline appears to be around 1.4-   - renal US - has ruled out hydro, unremarkable kidneys  - c/w midodrine to 10mg tid /dc octretide/ for presumed HRS but likely due to fluid overload  - PVRs have been normal  - strict I/O monitoring

## 2020-11-09 NOTE — PROGRESS NOTE ADULT - SUBJECTIVE AND OBJECTIVE BOX
INFECTIOUS DISEASES FOLLOW UP-- Lela Ashby  [phone]    This is a follow up note for this  60yMale with  Liver failure without hepatic coma  remains with significant anasarca and minimal improvement in his erythema of lower extremities bilateral        ROS:  CONSTITUTIONAL:  No fever, good appetite  CARDIOVASCULAR:  No chest pain or palpitations  RESPIRATORY:  No dyspnea  GASTROINTESTINAL:  No nausea, vomiting, diarrhea, or abdominal pain  GENITOURINARY:  No dysuria  NEUROLOGIC:  No headache,     Allergies    No Known Allergies    Intolerances        ANTIBIOTICS/RELEVANT:  antimicrobials  ceFAZolin   IVPB 2000 milliGRAM(s) IV Intermittent every 8 hours  ciprofloxacin     Tablet 500 milliGRAM(s) Oral every 24 hours  rifAXIMin 550 milliGRAM(s) Oral two times a day    immunologic:  influenza   Vaccine 0.5 milliLiter(s) IntraMuscular once    OTHER:  acetaminophen   Tablet .. 650 milliGRAM(s) Oral every 6 hours PRN  albumin human 25% IVPB 50 milliLiter(s) IV Intermittent every 12 hours  atorvastatin 20 milliGRAM(s) Oral at bedtime  ferrous    sulfate 325 milliGRAM(s) Oral daily  furosemide   Injectable 60 milliGRAM(s) IV Push daily  lactulose Syrup 20 Gram(s) Oral every 8 hours  lidocaine   Patch 1 Patch Transdermal daily  melatonin 5 milliGRAM(s) Oral at bedtime  midodrine. 10 milliGRAM(s) Oral three times a day  nystatin Powder 1 Application(s) Topical every 12 hours  pantoprazole    Tablet 40 milliGRAM(s) Oral before breakfast  sucralfate 1 Gram(s) Oral four times a day      Objective:  Vital Signs Last 24 Hrs  T(C): 37.3 (09 Nov 2020 11:44), Max: 37.3 (08 Nov 2020 20:37)  T(F): 99.2 (09 Nov 2020 11:44), Max: 99.2 (09 Nov 2020 11:44)  HR: 79 (09 Nov 2020 11:44) (71 - 80)  BP: 117/67 (09 Nov 2020 11:44) (101/56 - 130/67)  BP(mean): --  RR: 18 (09 Nov 2020 11:44) (18 - 18)  SpO2: 95% (09 Nov 2020 11:44) (95% - 97%)    PHYSICAL EXAM:  Constitutional:no acute distress  Eyes:MICHELLE, EOMI  Ear/Nose/Throat: no oral lesions, 	  Respiratory: clear BL  Cardiovascular: S1S2  Gastrointestinal:soft, (+) BS, no tenderness  Extremities: minimal improvement in his anasarca, or cellulitis appearing bilateral lower extremities to his knees  No Lymphadenopathy  IV sites not inflammed.    LABS:                        7.5    1.55  )-----------( 41       ( 09 Nov 2020 11:42 )             23.9     11-09    136  |  99  |  18  ----------------------------<  111<H>  4.0   |  27  |  1.67<H>    Ca    8.9      09 Nov 2020 11:42  Phos  2.5     11-09  Mg     1.8     11-09    TPro  7.2  /  Alb  4.0  /  TBili  3.8<H>  /  DBili  x   /  AST  36  /  ALT  7<L>  /  AlkPhos  97  11-09    PT/INR - ( 09 Nov 2020 14:57 )   PT: 32.6 sec;   INR: 2.91 ratio               MICROBIOLOGY:            RECENT CULTURES:  11-04 @ 13:22  .Blood Blood  --  --  --    No Growth Final  --      RADIOLOGY & ADDITIONAL STUDIES:

## 2020-11-09 NOTE — CHART NOTE - NSCHARTNOTEFT_GEN_A_CORE
Nutrition follow up     Hospital course as per chart: Pt 61 y/o M with PMH: morbidly obese, hepatic Cirrhosis, ascites, Thrombocytopenia, CHF, HTN, HLD, Hepatic Encephalopathy, noncompliant with lactulose, admitted to OSH  for hepatic encephalopathy, acute blood lose anemia and possible SBP; transferred to [facility] for endoscopy and evaluation by Liver Transplant team, ascites S/P paracentesis x2 - last on (10/22), S/P EGD showing gastritis no varices, colonoscopy without any source of bleed (10/25-26), S/P video capsule endoscopy (10/28) - without source of bleed, MONI.    Source: Patient [x]    Family [ ]     other [x]; Medical record    Pt reports good appetite and PO intake. Noted 100% PO intake as per flow sheets. Denies difficulty chewing/swallowing. Pt denies nausea, vomiting, diarrhea, or constipation, last BM today (11/09) x 3 due to lactulose. Reviewed education on weight loss and heart failure nutrition therapy per pt's request; made aware RD remains available.     Diet: DASH/TLC     Enteral /Parenteral Nutrition: n/a    Weight as per flow sheets: (10/25) 342.8 pounds -> (10/27) 346 pounds -?accuracy due to fluid accumulation, will continue to monitor (pt sitting in chair).   % Weight Change: n/a    Pertinent Medications: MEDICATIONS  (STANDING):  albumin human 25% IVPB 50 milliLiter(s) IV Intermittent every 12 hours  atorvastatin 20 milliGRAM(s) Oral at bedtime  ceFAZolin   IVPB 2000 milliGRAM(s) IV Intermittent every 8 hours  ciprofloxacin     Tablet 500 milliGRAM(s) Oral every 24 hours  ferrous    sulfate 325 milliGRAM(s) Oral daily  furosemide   Injectable 60 milliGRAM(s) IV Push daily  influenza   Vaccine 0.5 milliLiter(s) IntraMuscular once  lactulose Syrup 20 Gram(s) Oral every 8 hours  lidocaine   Patch 1 Patch Transdermal daily  melatonin 5 milliGRAM(s) Oral at bedtime  midodrine. 10 milliGRAM(s) Oral three times a day  nystatin Powder 1 Application(s) Topical every 12 hours  pantoprazole    Tablet 40 milliGRAM(s) Oral before breakfast  rifAXIMin 550 milliGRAM(s) Oral two times a day  sucralfate 1 Gram(s) Oral four times a day    MEDICATIONS  (PRN):  acetaminophen   Tablet .. 650 milliGRAM(s) Oral every 6 hours PRN Mild Pain (1 - 3), Moderate Pain (4 - 6)    Pertinent Labs: (11/09) Glu 111 mg/dL<H> Cr  1.67 mg/dL<H>     Skin: no noted pressure injuries as per documentation.   Noted nonpitting charisma. foot and leg edema as per flow sheets (previously +2 charisma. foot and ankle edema).     Estimated Needs:   [x] no change since previous assessment  [ ] recalculated:     Previous Nutrition Diagnosis: [x] Overweight/Obesity.      Nutrition Diagnosis is [x] ongoing - being addressed with nutrition therapy education.   Goal: pt to meet >75% of estimated nutritional needs during hospital stay.     New Nutrition Diagnosis: [x] not applicable    Interventions:     1. Recommend continue DASH/TLC diet. Will continue to monitor and adjust as needed.  2. Reviewed education on weight loss and heart failure nutrition therapy per pt's request. Reviewed foods containing carbohydrates, foods containing proteins, and portion sizes. Stressed the importance of a balanced meal for healthy/gradual weight loss. Encouraged vegetables consumption. Recommended water consumption with avoidance of soda and juice. Discussed to avoid concentrated sweets. Recommended limited salt intake. Reviewed foods high in salt and amount of salt recommended per day. Discussed nutrition label reading. Stressed the importance of limited fried foods and saturated fat consumption. Discussed how to adjust diet recall and food preferences to recommendations. Pt with multiple questions; all answered.   3. Obtain current weight to identify changes if any.      Monitoring and Evaluation:     [x] PO intake [x] Tolerance to diet prescription [x] weights [x] follow up per protocol    RD remains available.  Natali Esquivel MS RDN CDN #[phone].

## 2020-11-09 NOTE — PROGRESS NOTE ADULT - SUBJECTIVE AND OBJECTIVE BOX
PROGRESS NOTE:     Patient is a 60y old  Male who presents with a chief complaint of hepatic encephalopathy (08 Nov 2020 12:10)      SUBJECTIVE / OVERNIGHT EVENTS:  KYREE feeling well just still swollen.     ADDITIONAL REVIEW OF SYSTEMS:  No fever or chills  No n/v/d    MEDICATIONS  (STANDING):  atorvastatin 20 milliGRAM(s) Oral at bedtime  ceFAZolin   IVPB 2000 milliGRAM(s) IV Intermittent every 8 hours  ciprofloxacin     Tablet 500 milliGRAM(s) Oral every 24 hours  ferrous    sulfate 325 milliGRAM(s) Oral daily  furosemide   Injectable 60 milliGRAM(s) IV Push daily  influenza   Vaccine 0.5 milliLiter(s) IntraMuscular once  lactulose Syrup 20 Gram(s) Oral every 8 hours  lidocaine   Patch 1 Patch Transdermal daily  melatonin 5 milliGRAM(s) Oral at bedtime  midodrine. 10 milliGRAM(s) Oral three times a day  nystatin Powder 1 Application(s) Topical every 12 hours  pantoprazole    Tablet 40 milliGRAM(s) Oral before breakfast  rifAXIMin 550 milliGRAM(s) Oral two times a day  sucralfate 1 Gram(s) Oral four times a day    MEDICATIONS  (PRN):  acetaminophen   Tablet .. 650 milliGRAM(s) Oral every 6 hours PRN Mild Pain (1 - 3), Moderate Pain (4 - 6)      CAPILLARY BLOOD GLUCOSE        I&O's Summary    08 Nov 2020 07:01  -  09 Nov 2020 07:00  --------------------------------------------------------  IN: 1080 mL / OUT: 1365 mL / NET: -285 mL    09 Nov 2020 07:01  -  09 Nov 2020 12:27  --------------------------------------------------------  IN: 240 mL / OUT: 0 mL / NET: 240 mL        PHYSICAL EXAM:  Vital Signs Last 24 Hrs  T(C): 37.3 (09 Nov 2020 11:44), Max: 37.3 (08 Nov 2020 20:37)  T(F): 99.2 (09 Nov 2020 11:44), Max: 99.2 (09 Nov 2020 11:44)  HR: 79 (09 Nov 2020 11:44) (71 - 80)  BP: 117/67 (09 Nov 2020 11:44) (101/56 - 130/67)  BP(mean): --  RR: 18 (09 Nov 2020 11:44) (18 - 18)  SpO2: 95% (09 Nov 2020 11:44) (95% - 97%)    CONSTITUTIONAL: NAD, well-developed, non toxic, obese  RESPIRATORY: Normal respiratory effort; lungs are clear to auscultation bilaterally  CARDIOVASCULAR: Regular rate and rhythm, normal S1 and S2, no murmur/rub/gallop; +4 lower extremity edema   ABDOMEN: Nontender to palpation, normoactive bowel sounds, no rebound/guarding; No hepatosplenomegaly  MUSCLOSKELETAL: no clubbing or cyanosis of digits; no joint swelling or tenderness to palpation  SKIN: B/L LE erythema that is inside the circumscribed outline but bright red and warm to touch  PSYCH: A+O to person, place, and time; affect appropriate    LABS:                        7.5    1.55  )-----------( 41       ( 09 Nov 2020 11:42 )             23.9     11-09    136  |  99  |  18  ----------------------------<  111<H>  4.0   |  27  |  1.67<H>    Ca    8.9      09 Nov 2020 11:42  Phos  2.5     11-09  Mg     1.8     11-09    TPro  7.2  /  Alb  4.0  /  TBili  3.8<H>  /  DBili  x   /  AST  36  /  ALT  7<L>  /  AlkPhos  97  11-09                RADIOLOGY & ADDITIONAL TESTS:  Results Reviewed:   Imaging Personally Reviewed:  Electrocardiogram Personally Reviewed:    COORDINATION OF CARE:  Care Discussed with Consultants/Other Providers [Y/N]:  Prior or Outpatient Records Reviewed [Y/N]:

## 2020-11-09 NOTE — PROGRESS NOTE ADULT - PROBLEM SELECTOR PLAN 9
Transitions of Care Status:  1.  Name of PCP: Dr. White. Pt reports difficulty getting [facility] hepatology follow up.  2.  PCP Contacted on Admission: [ ] Y    [ ] N    3.  PCP contacted at Discharge: [ ] Y    [ ] N    [ ] N/A  4.  Post-Discharge Appointment Date and Location:  5.  Summary of Handoff given to PCP:    Dispo: likely home with home PT pending swelling improvement

## 2020-11-09 NOTE — PROGRESS NOTE ADULT - ASSESSMENT
59M morbidly obese male w/ PMHx of Hepatic Cirrhosis, ascites, Thrombocytopenia, CHF, HTN, HLD, recent admit for Hepatic Encephalopathy, presents as transfer from OSH for advanced GI and hepatology eval, admitted initially 10/14-10/23 with HSE, anemia, and SBP, s/p LVP.   ID consulted for Pre-Transplant Evaluation, SBP.  S/p EGD, colonoscopy, capsule study with no significant findings  Course c/b MONI concerning for HRS, now with LE erythema    #cellulitis of RLE - with erythema bilaterally but Right worse than left, possibly with skin infection on top of swollen legs with stasis. s/p vanc 1g. DVT study negative.  MRSA PCR neg.  Pt notes some improvement in erythema  On Ancef to 2g q8 based on BMI  - f/u bl clx sent 11/4 - NGTD      #MONI - Cr is improving now with diuresis  - care per primary team  - renally dose meds    #SBP in setting of decompensated ETOH hepatic cirrhosis -   Paracentesis on 10/14 with Ecoli, s/p 7 days of ceftriaxone. Repeat Para on 10/22 abd fluid still NGTD.  No current abd pain, and nontoxic appearing without fevers. received ctx until 11/2  - left abdomen bag draining yellow serous fluid  - cont oral ciprofloxacin for secondary prophylaxis    #Leukopenia - has chronic leukopenia of unknown etiology. Could be acute in setting of infection and chronic due to advanced cirrhosis but has been never been formally evaluated.  - cont to monitor    #Anemia - received 1U PRBC 11/2  - cont to monitor    #pre-transplant eval- No current plan for further transplant eval  - HAV IgG pos  - HBsAb, HBsAg, HBVc total Ab: ind/neg/nneg  - HCV Ab neg  - HSV 1/2 IgG neg/neg  - EBV Serology: past EBV infection  - CMV IgG neg  - VZV IgG pos  - Measles, Mumps and Rubella IgG serologies: pos/pos/pos  - Quantiferon Gold neg  - HIV Test Neg  - Syphilis Screen neg  - Toxoplasma IgG neg  - Strongyloides Ab neg     #Vaccines -  Received prevnar 10/21/2020  - will need influenza vaccine- please administer  - will need Hep B series- can give first dose in hospital  - pneumovax 8 weeks from prevnar    minimal improvement overall  continue LE elevation    prognosis poor

## 2020-11-09 NOTE — PROGRESS NOTE ADULT - ASSESSMENT
59M morbidly obese male w/ PMHx of Hepatic Cirrhosis, ascites, Thrombocytopenia, CHF, HTN, HLD, recent admx for Hepatic Encephalopathy, noncompliant with lactulose who was admitted to OSH  for hepatic encephalopathy, acute blood lose anemia (?variceal bleed, GI ulcer) and possible SBP. He was transferred to [facility] for endoscopy since he is high risk for planned procedure. He is also here for evaluation by Liver Transplant team, now s/p EGD showing gastritis no varices, colonoscopy without any source of bleed, now s/p VCE without source of bleed. Now with uptrending Cr concerning for pre-renal vs. HRS. New B'/L LE cellulitis on ancef.

## 2020-11-10 DIAGNOSIS — M54.9 DORSALGIA, UNSPECIFIED: ICD-10-CM

## 2020-11-10 LAB
ALBUMIN SERPL ELPH-MCNC: 3.6 G/DL — SIGNIFICANT CHANGE UP (ref 3.3–5)
ALP SERPL-CCNC: 92 U/L — SIGNIFICANT CHANGE UP (ref 40–120)
ALT FLD-CCNC: <5 U/L — LOW (ref 10–45)
AMMONIA BLD-MCNC: 62 UMOL/L — HIGH (ref 11–55)
ANION GAP SERPL CALC-SCNC: 12 MMOL/L — SIGNIFICANT CHANGE UP (ref 5–17)
AST SERPL-CCNC: 28 U/L — SIGNIFICANT CHANGE UP (ref 10–40)
BASOPHILS # BLD AUTO: 0 K/UL — SIGNIFICANT CHANGE UP (ref 0–0.2)
BASOPHILS NFR BLD AUTO: 0 % — SIGNIFICANT CHANGE UP (ref 0–2)
BILIRUB SERPL-MCNC: 3.6 MG/DL — HIGH (ref 0.2–1.2)
BLD GP AB SCN SERPL QL: NEGATIVE — SIGNIFICANT CHANGE UP
BUN SERPL-MCNC: 20 MG/DL — SIGNIFICANT CHANGE UP (ref 7–23)
CALCIUM SERPL-MCNC: 8.7 MG/DL — SIGNIFICANT CHANGE UP (ref 8.4–10.5)
CHLORIDE SERPL-SCNC: 98 MMOL/L — SIGNIFICANT CHANGE UP (ref 96–108)
CO2 SERPL-SCNC: 27 MMOL/L — SIGNIFICANT CHANGE UP (ref 22–31)
CREAT SERPL-MCNC: 1.64 MG/DL — HIGH (ref 0.5–1.3)
EOSINOPHIL # BLD AUTO: 0.09 K/UL — SIGNIFICANT CHANGE UP (ref 0–0.5)
EOSINOPHIL NFR BLD AUTO: 8.4 % — HIGH (ref 0–6)
GLUCOSE SERPL-MCNC: 79 MG/DL — SIGNIFICANT CHANGE UP (ref 70–99)
HCT VFR BLD CALC: 21 % — CRITICAL LOW (ref 39–50)
HCT VFR BLD CALC: 22.8 % — LOW (ref 39–50)
HGB BLD-MCNC: 6.6 G/DL — CRITICAL LOW (ref 13–17)
HGB BLD-MCNC: 7.1 G/DL — LOW (ref 13–17)
INR BLD: 3.13 RATIO — HIGH (ref 0.88–1.16)
LYMPHOCYTES # BLD AUTO: 0.37 K/UL — LOW (ref 1–3.3)
LYMPHOCYTES # BLD AUTO: 33.8 % — SIGNIFICANT CHANGE UP (ref 13–44)
MAGNESIUM SERPL-MCNC: 1.8 MG/DL — SIGNIFICANT CHANGE UP (ref 1.6–2.6)
MANUAL SMEAR VERIFICATION: SIGNIFICANT CHANGE UP
MCHC RBC-ENTMCNC: 30.3 PG — SIGNIFICANT CHANGE UP (ref 27–34)
MCHC RBC-ENTMCNC: 30.7 PG — SIGNIFICANT CHANGE UP (ref 27–34)
MCHC RBC-ENTMCNC: 31.1 GM/DL — LOW (ref 32–36)
MCHC RBC-ENTMCNC: 31.4 GM/DL — LOW (ref 32–36)
MCV RBC AUTO: 97.4 FL — SIGNIFICANT CHANGE UP (ref 80–100)
MCV RBC AUTO: 97.7 FL — SIGNIFICANT CHANGE UP (ref 80–100)
MONOCYTES # BLD AUTO: 0.04 K/UL — SIGNIFICANT CHANGE UP (ref 0–0.9)
MONOCYTES NFR BLD AUTO: 3.6 % — SIGNIFICANT CHANGE UP (ref 2–14)
NEUTROPHILS # BLD AUTO: 0.59 K/UL — LOW (ref 1.8–7.4)
NEUTROPHILS NFR BLD AUTO: 54.2 % — SIGNIFICANT CHANGE UP (ref 43–77)
NRBC # BLD: 0 /100 WBCS — SIGNIFICANT CHANGE UP (ref 0–0)
NRBC # BLD: 1 /100 — HIGH (ref 0–0)
PHOSPHATE SERPL-MCNC: 3 MG/DL — SIGNIFICANT CHANGE UP (ref 2.5–4.5)
PLAT MORPH BLD: NORMAL — SIGNIFICANT CHANGE UP
PLATELET # BLD AUTO: 35 K/UL — LOW (ref 150–400)
PLATELET # BLD AUTO: 41 K/UL — LOW (ref 150–400)
POTASSIUM SERPL-MCNC: 3.9 MMOL/L — SIGNIFICANT CHANGE UP (ref 3.5–5.3)
POTASSIUM SERPL-SCNC: 3.9 MMOL/L — SIGNIFICANT CHANGE UP (ref 3.5–5.3)
PROT SERPL-MCNC: 6.7 G/DL — SIGNIFICANT CHANGE UP (ref 6–8.3)
PROTHROM AB SERPL-ACNC: 35 SEC — HIGH (ref 10.6–13.6)
RBC # BLD: 2.15 M/UL — LOW (ref 4.2–5.8)
RBC # BLD: 2.34 M/UL — LOW (ref 4.2–5.8)
RBC # FLD: 19.6 % — HIGH (ref 10.3–14.5)
RBC # FLD: 19.7 % — HIGH (ref 10.3–14.5)
RBC BLD AUTO: SIGNIFICANT CHANGE UP
RH IG SCN BLD-IMP: NEGATIVE — SIGNIFICANT CHANGE UP
SODIUM SERPL-SCNC: 137 MMOL/L — SIGNIFICANT CHANGE UP (ref 135–145)
WBC # BLD: 1.08 K/UL — CRITICAL LOW (ref 3.8–10.5)
WBC # BLD: 1.44 K/UL — LOW (ref 3.8–10.5)
WBC # FLD AUTO: 1.08 K/UL — CRITICAL LOW (ref 3.8–10.5)
WBC # FLD AUTO: 1.44 K/UL — LOW (ref 3.8–10.5)

## 2020-11-10 PROCEDURE — 99233 SBSQ HOSP IP/OBS HIGH 50: CPT

## 2020-11-10 PROCEDURE — 99232 SBSQ HOSP IP/OBS MODERATE 35: CPT | Mod: GC

## 2020-11-10 PROCEDURE — 99232 SBSQ HOSP IP/OBS MODERATE 35: CPT

## 2020-11-10 RX ORDER — TRAMADOL HYDROCHLORIDE 50 MG/1
25 TABLET ORAL THREE TIMES A DAY
Refills: 0 | Status: DISCONTINUED | OUTPATIENT
Start: 2020-11-10 | End: 2020-11-11

## 2020-11-10 RX ADMIN — Medication 1 GRAM(S): at 05:30

## 2020-11-10 RX ADMIN — Medication 1 GRAM(S): at 12:09

## 2020-11-10 RX ADMIN — Medication 5 MILLIGRAM(S): at 22:07

## 2020-11-10 RX ADMIN — TRAMADOL HYDROCHLORIDE 25 MILLIGRAM(S): 50 TABLET ORAL at 16:43

## 2020-11-10 RX ADMIN — LACTULOSE 20 GRAM(S): 10 SOLUTION ORAL at 16:43

## 2020-11-10 RX ADMIN — MIDODRINE HYDROCHLORIDE 10 MILLIGRAM(S): 2.5 TABLET ORAL at 17:11

## 2020-11-10 RX ADMIN — Medication 60 MILLIGRAM(S): at 05:30

## 2020-11-10 RX ADMIN — TRAMADOL HYDROCHLORIDE 25 MILLIGRAM(S): 50 TABLET ORAL at 20:35

## 2020-11-10 RX ADMIN — MIDODRINE HYDROCHLORIDE 10 MILLIGRAM(S): 2.5 TABLET ORAL at 12:09

## 2020-11-10 RX ADMIN — Medication 100 MILLIGRAM(S): at 05:32

## 2020-11-10 RX ADMIN — TRAMADOL HYDROCHLORIDE 25 MILLIGRAM(S): 50 TABLET ORAL at 17:08

## 2020-11-10 RX ADMIN — Medication 100 MILLIGRAM(S): at 14:14

## 2020-11-10 RX ADMIN — Medication 100 MILLIGRAM(S): at 22:07

## 2020-11-10 RX ADMIN — MIDODRINE HYDROCHLORIDE 10 MILLIGRAM(S): 2.5 TABLET ORAL at 05:30

## 2020-11-10 RX ADMIN — Medication 50 MILLILITER(S): at 17:11

## 2020-11-10 RX ADMIN — Medication 325 MILLIGRAM(S): at 12:09

## 2020-11-10 RX ADMIN — Medication 50 MILLILITER(S): at 05:31

## 2020-11-10 RX ADMIN — NYSTATIN CREAM 1 APPLICATION(S): 100000 CREAM TOPICAL at 20:35

## 2020-11-10 RX ADMIN — Medication 1 GRAM(S): at 17:11

## 2020-11-10 RX ADMIN — ATORVASTATIN CALCIUM 20 MILLIGRAM(S): 80 TABLET, FILM COATED ORAL at 22:07

## 2020-11-10 RX ADMIN — TRAMADOL HYDROCHLORIDE 25 MILLIGRAM(S): 50 TABLET ORAL at 21:15

## 2020-11-10 RX ADMIN — LACTULOSE 20 GRAM(S): 10 SOLUTION ORAL at 05:30

## 2020-11-10 RX ADMIN — PANTOPRAZOLE SODIUM 40 MILLIGRAM(S): 20 TABLET, DELAYED RELEASE ORAL at 05:30

## 2020-11-10 RX ADMIN — Medication 500 MILLIGRAM(S): at 09:19

## 2020-11-10 NOTE — PROGRESS NOTE ADULT - ASSESSMENT
59M morbidly obese male w/ PMHx of Hepatic Cirrhosis, ascites, Thrombocytopenia, CHF, HTN, HLD, recent admit for Hepatic Encephalopathy, presents as transfer from OSH for advanced GI and hepatology eval, admitted initially 10/14-10/23 with HSE, anemia, and SBP, s/p LVP.   ID consulted for Pre-Transplant Evaluation, SBP.  S/p EGD, colonoscopy, capsule study with no significant findings  Course c/b MONI concerning for HRS, now with LE erythema and possible cellulitis    #cellulitis of RLE - Still with erythema, slightly improved from friday. No fevers. Remains very edematous.   DVT study negative.  MRSA PCR neg.  - Cont Ancef to 2g q8 based on BMI ( 11/5- )  - f/u bl clx sent 11/4 - NGTD  - likely plan for 7 course of abx   - cont leg elevations    #MONI - Cr is improving now with diuresis  - care per primary team  - renally dose meds    #SBP in setting of decompensated ETOH hepatic cirrhosis -   Paracentesis on 10/14 with Ecoli, s/p 7 days of ceftriaxone. Repeat Para on 10/22 abd fluid still NGTD.  No current abd pain, and nontoxic appearing without fevers. received ctx until 11/2  - left abdomen bag no longer draining  - cont oral ciprofloxacin for secondary prophylaxis    #Leukopenia - has chronic leukopenia of unknown etiology. Could be acute in setting of infection and chronic due to advanced cirrhosis but has been never been formally evaluated.  - cont to monitor    #Anemia - received 1U PRBC 11/2  - cont to monitor    #pre-transplant eval- No current plan for further transplant eval  - HAV IgG pos  - HBsAb, HBsAg, HBVc total Ab: ind/neg/nneg  - HCV Ab neg  - HSV 1/2 IgG neg/neg  - EBV Serology: past EBV infection  - CMV IgG neg  - VZV IgG pos  - Measles, Mumps and Rubella IgG serologies: pos/pos/pos  - Quantiferon Gold neg  - HIV Test Neg  - Syphilis Screen neg  - Toxoplasma IgG neg  - Strongyloides Ab neg     #Vaccines -  Received prevnar 10/21/2020  - will need influenza vaccine- please administer  - will need Hep B series- can give first dose in hospital  - pneumovax 8 weeks from prevnar      Natanael Shine MD  Fellow, Infectious Diseases, PGY-4  Pager: [phone]  Before 9am or after 5pm/Weekends: Call [phone]

## 2020-11-10 NOTE — PROGRESS NOTE ADULT - PROBLEM SELECTOR PLAN 10
Transitions of Care Status:  1.  Name of PCP: Dr. White. Pt reports difficulty getting [facility] hepatology follow up.  2.  PCP Contacted on Admission: [ ] Y    [ ] N    3.  PCP contacted at Discharge: [ ] Y    [ ] N    [ ] N/A  4.  Post-Discharge Appointment Date and Location:  5.  Summary of Handoff given to PCP:    Dispo: likely home with home PT pending swelling improvement

## 2020-11-10 NOTE — PROGRESS NOTE ADULT - ASSESSMENT
59M morbidly obese male w/ PMHx of Hepatic Cirrhosis, ascites, Thrombocytopenia, CHF, HTN, HLD, recent admx for Hepatic Encephalopathy, noncompliant with lactulose who was admitted to OSH  for hepatic encephalopathy, acute blood lose anemia (?variceal bleed, GI ulcer) and possible SBP. He was transferred to [facility] for endoscopy since he is high risk for planned procedure. He is also here for evaluation by Liver Transplant team, now s/p EGD showing gastritis no varices, colonoscopy without any source of bleed, now s/p VCE without source of bleed. Now with uptrending Cr concerning for pre-renal vs. HRS. New B'/L LE cellulitis on ancef.

## 2020-11-10 NOTE — PROGRESS NOTE ADULT - ATTENDING COMMENTS
Patient seen and examined with DR Shine    I agree with his itnerval history exam and plans as noted above    Remains with advanced cirrhosis and anasarca  bilateral erythema and possible cellulitis of lower extremities- slow improvement with Cefazolin will cotninue for 7 days  Increase diuretics if possible  Leg elevation  Liver follow up    Max Ashby MD  [phone]  After 5pm/weekends [phone]

## 2020-11-10 NOTE — PROGRESS NOTE ADULT - ASSESSMENT
59 M morbidly obese male with  PMHx of Hepatic Cirrhosis, Thrombocytopenia, CHF, HTN, HLD, admitted for, acute blood lose anemia and SBP with ascitic cultures +for Ecoli (10/14). Nephrology team consulted for MONI and concern for HRS.    #Acute Kidney Injury  renal function  table   Remains with significant edema: Maintain  diureses with lasix   Maintain on midodrine    2D echo with hyperdynamic LV with no outflow obstruction  ?repeat paracentesis    #Hyponatremia - Hypervolemic hyponatremia in the setting of liver cirrhosis   Now resolved  - Fluid restrict 1L/day  - Monitor serum Na  -

## 2020-11-10 NOTE — PROGRESS NOTE ADULT - PROBLEM SELECTOR PLAN 9
Chronic back pain worse with hospital bed.  Tylenol at home will try Tramadol here.  Does not need pain meds on DC he attributes it to his lack of mobility in hospital.

## 2020-11-10 NOTE — PROVIDER CONTACT NOTE (CRITICAL VALUE NOTIFICATION) - BACKGROUND
Patient admitted for liver failure and being treated for cellulitis history of obesity, CHF, HLD, Hepatic cirrohsis

## 2020-11-10 NOTE — PROGRESS NOTE ADULT - SUBJECTIVE AND OBJECTIVE BOX
PROGRESS NOTE:     Patient is a 60y old  Male who presents with a chief complaint of hepatic encephalopathy (10 Nov 2020 12:59)      SUBJECTIVE / OVERNIGHT EVENTS: KYREE    ADDITIONAL REVIEW OF SYSTEMS:  No fever or chills  No n/v/d    MEDICATIONS  (STANDING):  albumin human 25% IVPB 50 milliLiter(s) IV Intermittent every 12 hours  atorvastatin 20 milliGRAM(s) Oral at bedtime  ceFAZolin   IVPB 2000 milliGRAM(s) IV Intermittent every 8 hours  ciprofloxacin     Tablet 500 milliGRAM(s) Oral every 24 hours  ferrous    sulfate 325 milliGRAM(s) Oral daily  furosemide   Injectable 60 milliGRAM(s) IV Push daily  influenza   Vaccine 0.5 milliLiter(s) IntraMuscular once  lactulose Syrup 20 Gram(s) Oral every 8 hours  lidocaine   Patch 1 Patch Transdermal daily  melatonin 5 milliGRAM(s) Oral at bedtime  midodrine. 10 milliGRAM(s) Oral three times a day  nystatin Powder 1 Application(s) Topical every 12 hours  pantoprazole    Tablet 40 milliGRAM(s) Oral before breakfast  rifAXIMin 550 milliGRAM(s) Oral two times a day  sucralfate 1 Gram(s) Oral four times a day    MEDICATIONS  (PRN):  acetaminophen   Tablet .. 650 milliGRAM(s) Oral every 6 hours PRN Mild Pain (1 - 3), Moderate Pain (4 - 6)  traMADol 25 milliGRAM(s) Oral three times a day PRN Severe Pain (7 - 10)      CAPILLARY BLOOD GLUCOSE        I&O's Summary    09 Nov 2020 07:01  -  10 Nov 2020 07:00  --------------------------------------------------------  IN: 600 mL / OUT: 1250 mL / NET: -650 mL    10 Nov 2020 07:01  -  10 Nov 2020 13:04  --------------------------------------------------------  IN: 360 mL / OUT: 600 mL / NET: -240 mL        PHYSICAL EXAM:  Vital Signs Last 24 Hrs  T(C): 36.8 (10 Nov 2020 04:19), Max: 37.3 (09 Nov 2020 20:38)  T(F): 98.3 (10 Nov 2020 04:19), Max: 99.2 (09 Nov 2020 20:38)  HR: 80 (10 Nov 2020 11:30) (72 - 80)  BP: 110/65 (10 Nov 2020 11:30) (110/65 - 116/65)  BP(mean): --  RR: 18 (10 Nov 2020 11:30) (18 - 18)  SpO2: 95% (10 Nov 2020 11:30) (94% - 95%)    CONSTITUTIONAL: NAD, well-developed, non toxic, obese  RESPIRATORY: Normal respiratory effort; lungs are clear to auscultation bilaterally  CARDIOVASCULAR: Regular rate and rhythm, normal S1 and S2, no murmur/rub/gallop; +4 lower extremity edema   ABDOMEN: Nontender to palpation, normoactive bowel sounds, no rebound/guarding; No hepatosplenomegaly  MUSCLOSKELETAL: no clubbing or cyanosis of digits; no joint swelling or tenderness to palpation  SKIN: B/L LE erythema that is inside the circumscribed outline but bright red and warm to touch  PSYCH: A+O to person, place, and time; affect appropriate      LABS:                        7.1    1.44  )-----------( 41       ( 10 Nov 2020 09:35 )             22.8     11-10    137  |  98  |  20  ----------------------------<  79  3.9   |  27  |  1.64<H>    Ca    8.7      10 Nov 2020 06:48  Phos  3.0     11-10  Mg     1.8     11-10    TPro  6.7  /  Alb  3.6  /  TBili  3.6<H>  /  DBili  x   /  AST  28  /  ALT  <5<L>  /  AlkPhos  92  11-10    PT/INR - ( 10 Nov 2020 09:08 )   PT: 35.0 sec;   INR: 3.13 ratio                     RADIOLOGY & ADDITIONAL TESTS:  Results Reviewed:   Imaging Personally Reviewed:  Electrocardiogram Personally Reviewed:    COORDINATION OF CARE:  Care Discussed with Consultants/Other Providers [Y/N]:  Prior or Outpatient Records Reviewed [Y/N]:

## 2020-11-10 NOTE — PROGRESS NOTE ADULT - SUBJECTIVE AND OBJECTIVE BOX
ID Follow Up For     Patient is a 60y old  Male who presents with a chief complaint of hepatic encephalopathy (10 Nov 2020 13:04)    Interval History/ROS:     Allergies    No Known Allergies    Intolerances        ANTIMICROBIALS:  ceFAZolin   IVPB 2000 every 8 hours  ciprofloxacin     Tablet 500 every 24 hours  rifAXIMin 550 two times a day      OTHER MEDS:  acetaminophen   Tablet .. 650 milliGRAM(s) Oral every 6 hours PRN  albumin human 25% IVPB 50 milliLiter(s) IV Intermittent every 12 hours  atorvastatin 20 milliGRAM(s) Oral at bedtime  ferrous    sulfate 325 milliGRAM(s) Oral daily  furosemide   Injectable 60 milliGRAM(s) IV Push daily  influenza   Vaccine 0.5 milliLiter(s) IntraMuscular once  lactulose Syrup 20 Gram(s) Oral every 8 hours  lidocaine   Patch 1 Patch Transdermal daily  melatonin 5 milliGRAM(s) Oral at bedtime  midodrine. 10 milliGRAM(s) Oral three times a day  nystatin Powder 1 Application(s) Topical every 12 hours  pantoprazole    Tablet 40 milliGRAM(s) Oral before breakfast  sucralfate 1 Gram(s) Oral four times a day  traMADol 25 milliGRAM(s) Oral three times a day PRN      Vital Signs Last 24 Hrs  T(C): 36.8 (10 Nov 2020 04:19), Max: 37.3 (09 Nov 2020 20:38)  T(F): 98.3 (10 Nov 2020 04:19), Max: 99.2 (09 Nov 2020 20:38)  HR: 80 (10 Nov 2020 11:30) (72 - 80)  BP: 110/65 (10 Nov 2020 11:30) (110/65 - 116/65)  BP(mean): --  RR: 18 (10 Nov 2020 11:30) (18 - 18)  SpO2: 95% (10 Nov 2020 11:30) (94% - 95%)    EXAM:  Constitutional: Not in acute distress  Eyes: No icterus. Pupils b/l reactive to light.  Oral cavity: Clear, no lesions  Neck: No neck vein distension noted  RS: Chest clear to auscultation bilaterally. No wheeze/rhonchi/crepitations.  CVS: S1, S2 heard. Regular rate and rhythm. No murmurs/rubs/gallops.  Abdomen: Soft. No guarding/rigidity/tenderness.  : No acute abnormalities  Extremities: Warm. No pedal edema  Skin: No lesions noted  Vascular: No evidence of phlebitis  Neuro: Alert, oriented to time/place/person    Labs:                        7.1    1.44  )-----------( 41       ( 10 Nov 2020 09:35 )             22.8       11-10    137  |  98  |  20  ----------------------------<  79  3.9   |  27  |  1.64<H>    Ca    8.7      10 Nov 2020 06:48  Phos  3.0     11-10  Mg     1.8     11-10    TPro  6.7  /  Alb  3.6  /  TBili  3.6<H>  /  DBili  x   /  AST  28  /  ALT  <5<L>  /  AlkPhos  92  11-10          MICROBIOLOGY:Culture Results:   No Growth Final (11-04 @ 13:22)  Culture Results:   No Growth Final (11-04 @ 13:22)      RADIOLOGY:    OTHER INVESTIGATIONS: ID Follow Up For SBP, cellulitis    Patient is a 60y old  Male who presents with a chief complaint of hepatic encephalopathy (10 Nov 2020 13:04)    Interval History/ROS: Still with LE swelling. Eating pizza today. Does not believe erythema of LEs has changed. No fevers, chills, n/v/d.     Allergies  No Known Allergies    Intolerances  None      ANTIMICROBIALS:  ceFAZolin   IVPB 2000 every 8 hours  ciprofloxacin     Tablet 500 every 24 hours  rifAXIMin 550 two times a day      OTHER MEDS:  acetaminophen   Tablet .. 650 milliGRAM(s) Oral every 6 hours PRN  albumin human 25% IVPB 50 milliLiter(s) IV Intermittent every 12 hours  atorvastatin 20 milliGRAM(s) Oral at bedtime  ferrous    sulfate 325 milliGRAM(s) Oral daily  furosemide   Injectable 60 milliGRAM(s) IV Push daily  influenza   Vaccine 0.5 milliLiter(s) IntraMuscular once  lactulose Syrup 20 Gram(s) Oral every 8 hours  lidocaine   Patch 1 Patch Transdermal daily  melatonin 5 milliGRAM(s) Oral at bedtime  midodrine. 10 milliGRAM(s) Oral three times a day  nystatin Powder 1 Application(s) Topical every 12 hours  pantoprazole    Tablet 40 milliGRAM(s) Oral before breakfast  sucralfate 1 Gram(s) Oral four times a day  traMADol 25 milliGRAM(s) Oral three times a day PRN      Vital Signs Last 24 Hrs  T(C): 36.8 (10 Nov 2020 04:19), Max: 37.3 (09 Nov 2020 20:38)  T(F): 98.3 (10 Nov 2020 04:19), Max: 99.2 (09 Nov 2020 20:38)  HR: 80 (10 Nov 2020 11:30) (72 - 80)  BP: 110/65 (10 Nov 2020 11:30) (110/65 - 116/65)  BP(mean): --  RR: 18 (10 Nov 2020 11:30) (18 - 18)  SpO2: 95% (10 Nov 2020 11:30) (94% - 95%)    EXAM:  Constitutional: Not in acute distress  Eyes: No icterus. Pupils b/l reactive to light.  Oral cavity: Clear, no lesions  Neck: No neck vein distension noted  RS: Chest clear to auscultation bilaterally. No wheeze/rhonchi/crepitations.  CVS: S1, S2 heard. Regular rate and rhythm. No murmurs/rubs/gallops.  Abdomen: Soft. significantly distended.   : No acute abnormalities  Extremities: Warm. +3 pitting edema  Skin: bilateral circumferential erythema of lower legs to ankle  Vascular: No evidence of phlebitis  Neuro: Alert, oriented to time/place/person    Labs:                        7.1    1.44  )-----------( 41       ( 10 Nov 2020 09:35 )             22.8       11-10    137  |  98  |  20  ----------------------------<  79  3.9   |  27  |  1.64<H>    Ca    8.7      10 Nov 2020 06:48  Phos  3.0     11-10  Mg     1.8     11-10    TPro  6.7  /  Alb  3.6  /  TBili  3.6<H>  /  DBili  x   /  AST  28  /  ALT  <5<L>  /  AlkPhos  92  11-10          MICROBIOLOGY:Culture Results:   No Growth Final (11-04 @ 13:22)  Culture Results:   No Growth Final (11-04 @ 13:22)    RADIOLOGY:

## 2020-11-10 NOTE — PROGRESS NOTE ADULT - SUBJECTIVE AND OBJECTIVE BOX
[facility] DIVISION OF KIDNEY DISEASES AND HYPERTENSION -- FOLLOW UP NOTE  --------------------------------------------------------------------------------    24 hour events/subjective: urineout was 1.25L over 24h period.    Patient was seen and examined at bedside. C/o of L hip pain. Denies fever, chills, nausea, vomiting, diarrhea, or dysuria          PAST HISTORY  --------------------------------------------------------------------------------  No significant changes to PMH, PSH, FHx, SHx, unless otherwise noted    ALLERGIES & MEDICATIONS  --------------------------------------------------------------------------------  Allergies    No Known Allergies    Intolerances      Standing Inpatient Medications  albumin human 25% IVPB 50 milliLiter(s) IV Intermittent every 12 hours  atorvastatin 20 milliGRAM(s) Oral at bedtime  ceFAZolin   IVPB 2000 milliGRAM(s) IV Intermittent every 8 hours  ciprofloxacin     Tablet 500 milliGRAM(s) Oral every 24 hours  ferrous    sulfate 325 milliGRAM(s) Oral daily  furosemide   Injectable 60 milliGRAM(s) IV Push daily  influenza   Vaccine 0.5 milliLiter(s) IntraMuscular once  lactulose Syrup 20 Gram(s) Oral every 8 hours  lidocaine   Patch 1 Patch Transdermal daily  melatonin 5 milliGRAM(s) Oral at bedtime  midodrine 10 milliGRAM(s) Oral three times a day  nystatin Powder 1 Application(s) Topical every 12 hours  pantoprazole    Tablet 40 milliGRAM(s) Oral before breakfast  rifAXIMin 550 milliGRAM(s) Oral two times a day  sucralfate 1 Gram(s) Oral four times a day    PRN Inpatient Medications  acetaminophen   Tablet .. 650 milliGRAM(s) Oral every 6 hours PRN  traMADol 25 milliGRAM(s) Oral three times a day PRN      REVIEW OF SYSTEMS  --------------------------------------------------------------------------------  Gen: No fevers/chills  Respiratory: No dyspnea, cough  CV: No chest pain  GI: No abdominal pain, diarrhea  : No dysuria, hematuria  MSK: ++ edema and redness, L hip pain       All other systems were reviewed and are negative, except as noted.    VITALS/PHYSICAL EXAM  --------------------------------------------------------------------------------  T(C): 36.8 (11-10-20 @ 04:19), Max: 37.3 (11-09-20 @ 20:38)  HR: 80 (11-10-20 @ 11:30) (72 - 80)  BP: 110/65 (11-10-20 @ 11:30) (110/65 - 116/65)  RR: 18 (11-10-20 @ 11:30) (18 - 18)  SpO2: 95% (11-10-20 @ 11:30) (94% - 95%)  Wt(kg): --        11-09-20 @ 07:01  -  11-10-20 @ 07:00  --------------------------------------------------------  IN: 600 mL / OUT: 1250 mL / NET: -650 mL    11-10-20 @ 07:01  -  11-10-20 @ 12:59  --------------------------------------------------------  IN: 360 mL / OUT: 600 mL / NET: -240 mL        Physical Exam:  	Gen: NAD  	HEENT: MMM  	Pulm: CTA B/L  	CV: S1S2  	Abd: Soft, +BS   	Ext: 3+ LE edema B/L w b/l erythema of shin   	Neuro: Awake  	Skin: Warm and dry  	Vascular access: IV lines       LABS/STUDIES  --------------------------------------------------------------------------------              7.1    1.44  >-----------<  41       [11-10-20 @ 09:35]              22.8     137  |  98  |  20  ----------------------------<  79      [11-10-20 @ 06:48]  3.9   |  27  |  1.64        Ca     8.7     [11-10-20 @ 06:48]      Mg     1.8     [11-10-20 @ 06:48]      Phos  3.0     [11-10-20 @ 06:48]    TPro  6.7  /  Alb  3.6  /  TBili  3.6  /  DBili  x   /  AST  28  /  ALT  <5  /  AlkPhos  92  [11-10-20 @ 06:48]    PT/INR: PT 35.0 , INR 3.13       [11-10-20 @ 09:08]      Creatinine Trend:  SCr 1.64 [11-10 @ 06:48]  SCr 1.67 [11-09 @ 11:42]  SCr 1.66 [11-08 @ 06:53]  SCr 1.63 [11-07 @ 07:37]  SCr 1.69 [11-06 @ 08:47]    Urinalysis - [10-30-20 @ 12:33]      Color Yellow / Appearance Clear / SG 1.016 / pH 5.5      Gluc Negative / Ketone Trace  / Bili Negative / Urobili <2 mg/dL       Blood Negative / Protein Trace / Leuk Est Negative / Nitrite Negative      RBC  / WBC  / Hyaline  / Gran  / Sq Epi  / Non Sq Epi  / Bacteria       Iron 32, TIBC 241, %sat 13      [09-08-20 @ 10:41]  Ferritin 24      [09-08-20 @ 10:41]    HBsAb Indeterminate Test repeated.      [10-27-20 @ 12:32]  HBsAg Nonreact      [10-27-20 @ 12:32]  HBcAb Nonreact      [10-27-20 @ 12:32]  HCV 0.15, Nonreact      [10-27-20 @ 12:32]  HIV Nonreact      [10-27-20 @ 12:54]    Syphilis Screen (Treponema Pallidum Ab) Negative      [10-27-20 @ 12:32]

## 2020-11-11 LAB
ANION GAP SERPL CALC-SCNC: 14 MMOL/L — SIGNIFICANT CHANGE UP (ref 5–17)
BUN SERPL-MCNC: 19 MG/DL — SIGNIFICANT CHANGE UP (ref 7–23)
CALCIUM SERPL-MCNC: 8.9 MG/DL — SIGNIFICANT CHANGE UP (ref 8.4–10.5)
CHLORIDE SERPL-SCNC: 98 MMOL/L — SIGNIFICANT CHANGE UP (ref 96–108)
CO2 SERPL-SCNC: 26 MMOL/L — SIGNIFICANT CHANGE UP (ref 22–31)
CREAT SERPL-MCNC: 1.45 MG/DL — HIGH (ref 0.5–1.3)
GLUCOSE SERPL-MCNC: 158 MG/DL — HIGH (ref 70–99)
HCT VFR BLD CALC: 23.2 % — LOW (ref 39–50)
HGB BLD-MCNC: 7.1 G/DL — LOW (ref 13–17)
INR BLD: 2.96 RATIO — HIGH (ref 0.88–1.16)
MCHC RBC-ENTMCNC: 30.6 GM/DL — LOW (ref 32–36)
MCHC RBC-ENTMCNC: 30.6 PG — SIGNIFICANT CHANGE UP (ref 27–34)
MCV RBC AUTO: 100 FL — SIGNIFICANT CHANGE UP (ref 80–100)
NRBC # BLD: 0 /100 WBCS — SIGNIFICANT CHANGE UP (ref 0–0)
PLATELET # BLD AUTO: 44 K/UL — LOW (ref 150–400)
POTASSIUM SERPL-MCNC: 3.6 MMOL/L — SIGNIFICANT CHANGE UP (ref 3.5–5.3)
POTASSIUM SERPL-SCNC: 3.6 MMOL/L — SIGNIFICANT CHANGE UP (ref 3.5–5.3)
PROTHROM AB SERPL-ACNC: 33.7 SEC — HIGH (ref 10.6–13.6)
RBC # BLD: 2.32 M/UL — LOW (ref 4.2–5.8)
RBC # FLD: 19.9 % — HIGH (ref 10.3–14.5)
SODIUM SERPL-SCNC: 138 MMOL/L — SIGNIFICANT CHANGE UP (ref 135–145)
WBC # BLD: 1.25 K/UL — LOW (ref 3.8–10.5)
WBC # FLD AUTO: 1.25 K/UL — LOW (ref 3.8–10.5)

## 2020-11-11 PROCEDURE — 99232 SBSQ HOSP IP/OBS MODERATE 35: CPT | Mod: GC

## 2020-11-11 PROCEDURE — 99233 SBSQ HOSP IP/OBS HIGH 50: CPT

## 2020-11-11 RX ORDER — PHYTONADIONE (VIT K1) 5 MG
5 TABLET ORAL DAILY
Refills: 0 | Status: DISCONTINUED | OUTPATIENT
Start: 2020-11-11 | End: 2020-11-11

## 2020-11-11 RX ORDER — PHYTONADIONE (VIT K1) 5 MG
5 TABLET ORAL ONCE
Refills: 0 | Status: DISCONTINUED | OUTPATIENT
Start: 2020-11-11 | End: 2020-11-11

## 2020-11-11 RX ORDER — PHYTONADIONE (VIT K1) 5 MG
5 TABLET ORAL ONCE
Refills: 0 | Status: COMPLETED | OUTPATIENT
Start: 2020-11-11 | End: 2020-11-11

## 2020-11-11 RX ORDER — TRAMADOL HYDROCHLORIDE 50 MG/1
50 TABLET ORAL THREE TIMES A DAY
Refills: 0 | Status: DISCONTINUED | OUTPATIENT
Start: 2020-11-11 | End: 2020-11-18

## 2020-11-11 RX ORDER — FUROSEMIDE 40 MG
40 TABLET ORAL
Refills: 0 | Status: DISCONTINUED | OUTPATIENT
Start: 2020-11-11 | End: 2020-11-12

## 2020-11-11 RX ADMIN — Medication 50 MILLILITER(S): at 06:19

## 2020-11-11 RX ADMIN — Medication 325 MILLIGRAM(S): at 12:03

## 2020-11-11 RX ADMIN — MIDODRINE HYDROCHLORIDE 10 MILLIGRAM(S): 2.5 TABLET ORAL at 05:17

## 2020-11-11 RX ADMIN — MIDODRINE HYDROCHLORIDE 10 MILLIGRAM(S): 2.5 TABLET ORAL at 12:03

## 2020-11-11 RX ADMIN — TRAMADOL HYDROCHLORIDE 50 MILLIGRAM(S): 50 TABLET ORAL at 12:03

## 2020-11-11 RX ADMIN — NYSTATIN CREAM 1 APPLICATION(S): 100000 CREAM TOPICAL at 09:53

## 2020-11-11 RX ADMIN — Medication 100 MILLIGRAM(S): at 14:55

## 2020-11-11 RX ADMIN — Medication 100 MILLIGRAM(S): at 21:29

## 2020-11-11 RX ADMIN — LACTULOSE 20 GRAM(S): 10 SOLUTION ORAL at 05:17

## 2020-11-11 RX ADMIN — Medication 1 GRAM(S): at 05:17

## 2020-11-11 RX ADMIN — Medication 40 MILLIGRAM(S): at 17:01

## 2020-11-11 RX ADMIN — Medication 100 MILLIGRAM(S): at 12:02

## 2020-11-11 RX ADMIN — Medication 1 GRAM(S): at 17:01

## 2020-11-11 RX ADMIN — PANTOPRAZOLE SODIUM 40 MILLIGRAM(S): 20 TABLET, DELAYED RELEASE ORAL at 05:18

## 2020-11-11 RX ADMIN — Medication 100 MILLIGRAM(S): at 23:01

## 2020-11-11 RX ADMIN — Medication 5 MILLIGRAM(S): at 21:28

## 2020-11-11 RX ADMIN — Medication 60 MILLIGRAM(S): at 05:18

## 2020-11-11 RX ADMIN — MIDODRINE HYDROCHLORIDE 10 MILLIGRAM(S): 2.5 TABLET ORAL at 17:01

## 2020-11-11 RX ADMIN — LACTULOSE 20 GRAM(S): 10 SOLUTION ORAL at 12:02

## 2020-11-11 RX ADMIN — LIDOCAINE 1 PATCH: 4 CREAM TOPICAL at 12:03

## 2020-11-11 RX ADMIN — Medication 500 MILLIGRAM(S): at 09:53

## 2020-11-11 RX ADMIN — LIDOCAINE 1 PATCH: 4 CREAM TOPICAL at 19:15

## 2020-11-11 RX ADMIN — NYSTATIN CREAM 1 APPLICATION(S): 100000 CREAM TOPICAL at 21:29

## 2020-11-11 RX ADMIN — ATORVASTATIN CALCIUM 20 MILLIGRAM(S): 80 TABLET, FILM COATED ORAL at 21:28

## 2020-11-11 RX ADMIN — Medication 1 GRAM(S): at 00:28

## 2020-11-11 RX ADMIN — Medication 5 MILLIGRAM(S): at 12:02

## 2020-11-11 RX ADMIN — Medication 100 MILLIGRAM(S): at 05:18

## 2020-11-11 RX ADMIN — Medication 50 MILLILITER(S): at 17:00

## 2020-11-11 RX ADMIN — LACTULOSE 20 GRAM(S): 10 SOLUTION ORAL at 21:28

## 2020-11-11 RX ADMIN — Medication 1 GRAM(S): at 12:03

## 2020-11-11 RX ADMIN — Medication 1 GRAM(S): at 23:02

## 2020-11-11 NOTE — PROGRESS NOTE ADULT - SUBJECTIVE AND OBJECTIVE BOX
ID Follow Up For     Patient is a 60y old  Male who presents with a chief complaint of hepatic encephalopathy (11 Nov 2020 11:15)    Interval History/ROS:     Allergies    No Known Allergies    Intolerances        ANTIMICROBIALS:  ceFAZolin   IVPB 2000 every 8 hours  ciprofloxacin     Tablet 500 every 24 hours  rifAXIMin 550 two times a day      OTHER MEDS:  acetaminophen   Tablet .. 650 milliGRAM(s) Oral every 6 hours PRN  albumin human 25% IVPB 50 milliLiter(s) IV Intermittent every 12 hours  atorvastatin 20 milliGRAM(s) Oral at bedtime  ferrous    sulfate 325 milliGRAM(s) Oral daily  furosemide   Injectable 60 milliGRAM(s) IV Push daily  influenza   Vaccine 0.5 milliLiter(s) IntraMuscular once  lactulose Syrup 20 Gram(s) Oral every 8 hours  lidocaine   Patch 1 Patch Transdermal daily  melatonin 5 milliGRAM(s) Oral at bedtime  midodrine. 10 milliGRAM(s) Oral three times a day  nystatin Powder 1 Application(s) Topical every 12 hours  pantoprazole    Tablet 40 milliGRAM(s) Oral before breakfast  sucralfate 1 Gram(s) Oral four times a day  traMADol 50 milliGRAM(s) Oral three times a day PRN      Vital Signs Last 24 Hrs  T(C): 36.9 (11 Nov 2020 12:02), Max: 37.1 (10 Nov 2020 20:36)  T(F): 98.5 (11 Nov 2020 12:02), Max: 98.7 (10 Nov 2020 20:36)  HR: 77 (11 Nov 2020 12:02) (74 - 78)  BP: 119/64 (11 Nov 2020 12:02) (117/56 - 121/65)  BP(mean): --  RR: 18 (11 Nov 2020 12:02) (18 - 18)  SpO2: 98% (11 Nov 2020 12:02) (94% - 98%)    EXAM:  Constitutional: Not in acute distress  Eyes: No icterus. Pupils b/l reactive to light.  Oral cavity: Clear, no lesions  Neck: No neck vein distension noted  RS: Chest clear to auscultation bilaterally. No wheeze/rhonchi/crepitations.  CVS: S1, S2 heard. Regular rate and rhythm. No murmurs/rubs/gallops.  Abdomen: Soft. No guarding/rigidity/tenderness.  : No acute abnormalities  Extremities: Warm. No pedal edema  Skin: No lesions noted  Vascular: No evidence of phlebitis  Neuro: Alert, oriented to time/place/person    Labs:                        7.1    1.25  )-----------( 44       ( 11 Nov 2020 10:27 )             23.2       11-11    138  |  98  |  19  ----------------------------<  158<H>  3.6   |  26  |  1.45<H>    Ca    8.9      11 Nov 2020 10:27  Phos  3.0     11-10  Mg     1.8     11-10    TPro  6.7  /  Alb  3.6  /  TBili  3.6<H>  /  DBili  x   /  AST  28  /  ALT  <5<L>  /  AlkPhos  92  11-10          MICROBIOLOGY:    RADIOLOGY:    OTHER INVESTIGATIONS: ID Follow Up For cellulitis    Patient is a 60y old  Male who presents with a chief complaint of hepatic encephalopathy (11 Nov 2020 11:15)    Interval History/ROS: reports imporvement in leg erythema. Denies fevers, chills, nausea, vomiting.    Allergies  No Known Allergies    Intolerances  None      ANTIMICROBIALS:  ceFAZolin   IVPB 2000 every 8 hours  ciprofloxacin     Tablet 500 every 24 hours  rifAXIMin 550 two times a day      OTHER MEDS:  acetaminophen   Tablet .. 650 milliGRAM(s) Oral every 6 hours PRN  albumin human 25% IVPB 50 milliLiter(s) IV Intermittent every 12 hours  atorvastatin 20 milliGRAM(s) Oral at bedtime  ferrous    sulfate 325 milliGRAM(s) Oral daily  furosemide   Injectable 60 milliGRAM(s) IV Push daily  influenza   Vaccine 0.5 milliLiter(s) IntraMuscular once  lactulose Syrup 20 Gram(s) Oral every 8 hours  lidocaine   Patch 1 Patch Transdermal daily  melatonin 5 milliGRAM(s) Oral at bedtime  midodrine. 10 milliGRAM(s) Oral three times a day  nystatin Powder 1 Application(s) Topical every 12 hours  pantoprazole    Tablet 40 milliGRAM(s) Oral before breakfast  sucralfate 1 Gram(s) Oral four times a day  traMADol 50 milliGRAM(s) Oral three times a day PRN      Vital Signs Last 24 Hrs  T(C): 36.9 (11 Nov 2020 12:02), Max: 37.1 (10 Nov 2020 20:36)  T(F): 98.5 (11 Nov 2020 12:02), Max: 98.7 (10 Nov 2020 20:36)  HR: 77 (11 Nov 2020 12:02) (74 - 78)  BP: 119/64 (11 Nov 2020 12:02) (117/56 - 121/65)  BP(mean): --  RR: 18 (11 Nov 2020 12:02) (18 - 18)  SpO2: 98% (11 Nov 2020 12:02) (94% - 98%)    EXAM:  Constitutional: Not in acute distress  Eyes: No icterus. Pupils b/l reactive to light.  Oral cavity: Clear, no lesions  Neck: No neck vein distension noted  RS: Chest clear to auscultation bilaterally. No wheeze/rhonchi/crepitations.  CVS: S1, S2 heard. Regular rate and rhythm. No murmurs/rubs/gallops.  Abdomen: Soft. significantly distended.   : No acute abnormalities  Extremities: Warm. +3 pitting edema  Skin: improving erythema of lower legs to ankle  Vascular: No evidence of phlebitis  Neuro: Alert, oriented to time/place/person    Labs:                        7.1    1.25  )-----------( 44       ( 11 Nov 2020 10:27 )             23.2       11-11    138  |  98  |  19  ----------------------------<  158<H>  3.6   |  26  |  1.45<H>    Ca    8.9      11 Nov 2020 10:27  Phos  3.0     11-10  Mg     1.8     11-10    TPro  6.7  /  Alb  3.6  /  TBili  3.6<H>  /  DBili  x   /  AST  28  /  ALT  <5<L>  /  AlkPhos  92  11-10          MICROBIOLOGY:  None new    RADIOLOGY:  None new

## 2020-11-11 NOTE — PROGRESS NOTE ADULT - SUBJECTIVE AND OBJECTIVE BOX
PROGRESS NOTE:     Patient is a 60y old  Male who presents with a chief complaint of hepatic encephalopathy (10 Nov 2020 16:26)      SUBJECTIVE / OVERNIGHT EVENTS: KYREE    ADDITIONAL REVIEW OF SYSTEMS:  No fever or chills  No n/v/d    MEDICATIONS  (STANDING):  albumin human 25% IVPB 50 milliLiter(s) IV Intermittent every 12 hours  atorvastatin 20 milliGRAM(s) Oral at bedtime  ceFAZolin   IVPB 2000 milliGRAM(s) IV Intermittent every 8 hours  ciprofloxacin     Tablet 500 milliGRAM(s) Oral every 24 hours  ferrous    sulfate 325 milliGRAM(s) Oral daily  furosemide   Injectable 60 milliGRAM(s) IV Push daily  influenza   Vaccine 0.5 milliLiter(s) IntraMuscular once  lactulose Syrup 20 Gram(s) Oral every 8 hours  lidocaine   Patch 1 Patch Transdermal daily  melatonin 5 milliGRAM(s) Oral at bedtime  midodrine. 10 milliGRAM(s) Oral three times a day  nystatin Powder 1 Application(s) Topical every 12 hours  pantoprazole    Tablet 40 milliGRAM(s) Oral before breakfast  phytonadione   Solution 5 milliGRAM(s) Oral once  rifAXIMin 550 milliGRAM(s) Oral two times a day  sucralfate 1 Gram(s) Oral four times a day    MEDICATIONS  (PRN):  acetaminophen   Tablet .. 650 milliGRAM(s) Oral every 6 hours PRN Mild Pain (1 - 3), Moderate Pain (4 - 6)  traMADol 50 milliGRAM(s) Oral three times a day PRN Severe Pain (7 - 10)      CAPILLARY BLOOD GLUCOSE        I&O's Summary    10 Nov 2020 07:01  -  11 Nov 2020 07:00  --------------------------------------------------------  IN: 1520 mL / OUT: 2250 mL / NET: -730 mL        PHYSICAL EXAM:  Vital Signs Last 24 Hrs  T(C): 36.9 (11 Nov 2020 04:12), Max: 37.1 (10 Nov 2020 20:36)  T(F): 98.4 (11 Nov 2020 04:12), Max: 98.7 (10 Nov 2020 20:36)  HR: 78 (11 Nov 2020 04:12) (74 - 80)  BP: 117/56 (11 Nov 2020 04:12) (110/65 - 121/65)  BP(mean): --  RR: 18 (11 Nov 2020 04:12) (18 - 18)  SpO2: 95% (11 Nov 2020 04:12) (94% - 95%)    CONSTITUTIONAL: NAD, well-developed, non toxic, obese  RESPIRATORY: Normal respiratory effort; lungs are clear to auscultation bilaterally  CARDIOVASCULAR: Regular rate and rhythm, normal S1 and S2, no murmur/rub/gallop; +4 lower extremity edema   ABDOMEN: Nontender to palpation, normoactive bowel sounds, no rebound/guarding; No hepatosplenomegaly  MUSCLOSKELETAL: no clubbing or cyanosis of digits; no joint swelling or tenderness to palpation  SKIN: B/L LE erythema that is inside the circumscribed outline but bright red and warm to touch  PSYCH: A+O to person, place, and time; affect appropriate    LABS:                        7.1    1.25  )-----------( 44       ( 11 Nov 2020 10:27 )             23.2     11-11    138  |  98  |  19  ----------------------------<  158<H>  3.6   |  26  |  1.45<H>    Ca    8.9      11 Nov 2020 10:27  Phos  3.0     11-10  Mg     1.8     11-10    TPro  6.7  /  Alb  3.6  /  TBili  3.6<H>  /  DBili  x   /  AST  28  /  ALT  <5<L>  /  AlkPhos  92  11-10    PT/INR - ( 11 Nov 2020 10:27 )   PT: 33.7 sec;   INR: 2.96 ratio                     RADIOLOGY & ADDITIONAL TESTS:  Results Reviewed:   Imaging Personally Reviewed:  Electrocardiogram Personally Reviewed:    COORDINATION OF CARE:  Care Discussed with Consultants/Other Providers [Y/N]:  Prior or Outpatient Records Reviewed [Y/N]:

## 2020-11-11 NOTE — PROGRESS NOTE ADULT - PROBLEM SELECTOR PLAN 10
Transitions of Care Status:  1.  Name of PCP: Dr. White. Pt reports difficulty getting [facility] hepatology follow up.  2.  PCP Contacted on Admission: [ ] Y    [ ] N    3.  PCP contacted at Discharge: [ ] Y    [ ] N    [ ] N/A  4.  Post-Discharge Appointment Date and Location:  5.  Summary of Handoff given to PCP:    Dispo: likely home with home PT pending swelling improvement

## 2020-11-11 NOTE — CHART NOTE - NSCHARTNOTEFT_GEN_A_CORE
59 M morbidly obese male with  PMHx of Hepatic Cirrhosis, Thrombocytopenia, CHF, HTN, HLD, admitted for, acute blood lose anemia and SBP with ascitic cultures +for Ecoli (10/14). Nephrology team consulted for MONI and concern for HRS. Scr continues to be stable, but above baseline, last Scr actually improved to 1.45, suspecting that patient had MONI, now w likely CKD. LE edema is disproportionate to the kidney function. Can continue diuretics.    Nephrology will sign off. Please feel free to call or reconsult if needed.     Suha Navarro   Nephrology Fellow  [facility] Pager: [phone]  [facility] Pager: [phone]

## 2020-11-11 NOTE — PROGRESS NOTE ADULT - ATTENDING COMMENTS
Patient seen and examined with Dr Shine    I agree with his interval history exam and plans as noted above    Slowly improving lower extremity cellulitis in terms of erythema and receding from pen marks  Minimal improvement in ascites and LE edema- diuresis in progress    Max Ashby MD  [phone]  After 5pm/weekends [phone]

## 2020-11-11 NOTE — PROGRESS NOTE ADULT - ASSESSMENT
59M morbidly obese male w/ PMHx of Hepatic Cirrhosis, ascites, Thrombocytopenia, CHF, HTN, HLD, recent admit for Hepatic Encephalopathy, presents as transfer from OSH for advanced GI and hepatology eval, admitted initially 10/14-10/23 with HSE, anemia, and SBP, s/p LVP.   ID consulted for Pre-Transplant Evaluation, SBP.  S/p EGD, colonoscopy, capsule study with no significant findings  Course c/b MONI concerning for HRS, now with LE erythema and possible cellulitis    #cellulitis of RLE - improving  DVT study negative.  MRSA PCR neg.  - Cont Ancef to 2g q8 based on BMI ( 11/5- )  - f/u bl clx sent 11/4 - NGTD  - likely plan for 7 course of abx   - cont leg elevations    #Ascites - undergoing diuresis.  - is there a role for repeat paracentesis    #MONI - Cr is improving now with diuresis  - care per primary team  - renally dose meds    #SBP in setting of decompensated ETOH hepatic cirrhosis -   Paracentesis on 10/14 with Ecoli, s/p 7 days of ceftriaxone. Repeat Para on 10/22 abd fluid still NGTD.  No current abd pain, and nontoxic appearing without fevers. received ctx until 11/2  - left abdomen bag no longer draining  - cont oral ciprofloxacin for secondary prophylaxis    #Leukopenia - has chronic leukopenia of unknown etiology. Could be acute in setting of infection and chronic due to advanced cirrhosis but has been never been formally evaluated.  - cont to monitor    #Anemia - received 1U PRBC 11/2  - cont to monitor    #pre-transplant eval- No current plan for further transplant eval  - HAV IgG pos  - HBsAb, HBsAg, HBVc total Ab: ind/neg/nneg  - HCV Ab neg  - HSV 1/2 IgG neg/neg  - EBV Serology: past EBV infection  - CMV IgG neg  - VZV IgG pos  - Measles, Mumps and Rubella IgG serologies: pos/pos/pos  - Quantiferon Gold neg  - HIV Test Neg  - Syphilis Screen neg  - Toxoplasma IgG neg  - Strongyloides Ab neg     #Vaccines -  Received prevnar 10/21/2020  - will need influenza vaccine- please administer  - will need Hep B series- can give first dose in hospital  - pneumovax 8 weeks from prevnar      Natanael Shine MD  Fellow, Infectious Diseases, PGY-4  Pager: [phone]  Before 9am or after 5pm/Weekends: Call [phone]         59M morbidly obese male w/ PMHx of Hepatic Cirrhosis, ascites, Thrombocytopenia, CHF, HTN, HLD, recent admit for Hepatic Encephalopathy, presents as transfer from OSH for advanced GI and hepatology eval, admitted initially 10/14-10/23 with HSE, anemia, and SBP, s/p LVP.   ID consulted for Pre-Transplant Evaluation, SBP.  S/p EGD, colonoscopy, capsule study with no significant findings  Course c/b MONI concerning for HRS, now with LE erythema and possible cellulitis    #cellulitis of RLE - improving  DVT study negative.  MRSA PCR neg.  - Cont Ancef to 2g q8 based on BMI likely for 7-10 days( 11/5- )  - f/u bl clx sent 11/4 - NGTD  - likely plan for 7 course of abx   - cont leg elevations    #Ascites - undergoing diuresis.  - is there a role for repeat paracentesis    #MONI - Cr is improving now with diuresis  - care per primary team  - renally dose meds    #SBP in setting of decompensated ETOH hepatic cirrhosis -   Paracentesis on 10/14 with Ecoli, s/p 7 days of ceftriaxone. Repeat Para on 10/22 abd fluid still NGTD.  No current abd pain, and nontoxic appearing without fevers. received ctx until 11/2  - left abdomen bag no longer draining  - cont oral ciprofloxacin for secondary prophylaxis    #Leukopenia - has chronic leukopenia of unknown etiology. Could be acute in setting of infection and chronic due to advanced cirrhosis but has been never been formally evaluated.  - cont to monitor    #Anemia - received 1U PRBC 11/2  - cont to monitor    #pre-transplant eval- No current plan for further transplant eval  - HAV IgG pos  - HBsAb, HBsAg, HBVc total Ab: ind/neg/nneg  - HCV Ab neg  - HSV 1/2 IgG neg/neg  - EBV Serology: past EBV infection  - CMV IgG neg  - VZV IgG pos  - Measles, Mumps and Rubella IgG serologies: pos/pos/pos  - Quantiferon Gold neg  - HIV Test Neg  - Syphilis Screen neg  - Toxoplasma IgG neg  - Strongyloides Ab neg     #Vaccines -  Received prevnar 10/21/2020  - will need influenza vaccine- please administer  - will need Hep B series- can give first dose in hospital  - pneumovax 8 weeks from prevnar      Natanael Shine MD  Fellow, Infectious Diseases, PGY-4  Pager: [phone]  Before 9am or after 5pm/Weekends: Call [phone]

## 2020-11-12 LAB
ANION GAP SERPL CALC-SCNC: 10 MMOL/L — SIGNIFICANT CHANGE UP (ref 5–17)
BUN SERPL-MCNC: 19 MG/DL — SIGNIFICANT CHANGE UP (ref 7–23)
CALCIUM SERPL-MCNC: 8.7 MG/DL — SIGNIFICANT CHANGE UP (ref 8.4–10.5)
CHLORIDE SERPL-SCNC: 97 MMOL/L — SIGNIFICANT CHANGE UP (ref 96–108)
CO2 SERPL-SCNC: 29 MMOL/L — SIGNIFICANT CHANGE UP (ref 22–31)
CREAT SERPL-MCNC: 1.56 MG/DL — HIGH (ref 0.5–1.3)
GLUCOSE SERPL-MCNC: 81 MG/DL — SIGNIFICANT CHANGE UP (ref 70–99)
HCT VFR BLD CALC: 20.9 % — CRITICAL LOW (ref 39–50)
HGB BLD-MCNC: 6.5 G/DL — CRITICAL LOW (ref 13–17)
MAGNESIUM SERPL-MCNC: 1.6 MG/DL — SIGNIFICANT CHANGE UP (ref 1.6–2.6)
MCHC RBC-ENTMCNC: 30.4 PG — SIGNIFICANT CHANGE UP (ref 27–34)
MCHC RBC-ENTMCNC: 31.1 GM/DL — LOW (ref 32–36)
MCV RBC AUTO: 97.7 FL — SIGNIFICANT CHANGE UP (ref 80–100)
NRBC # BLD: 0 /100 WBCS — SIGNIFICANT CHANGE UP (ref 0–0)
PLATELET # BLD AUTO: 39 K/UL — LOW (ref 150–400)
POTASSIUM SERPL-MCNC: 3.7 MMOL/L — SIGNIFICANT CHANGE UP (ref 3.5–5.3)
POTASSIUM SERPL-SCNC: 3.7 MMOL/L — SIGNIFICANT CHANGE UP (ref 3.5–5.3)
RBC # BLD: 2.14 M/UL — LOW (ref 4.2–5.8)
RBC # FLD: 19.6 % — HIGH (ref 10.3–14.5)
SODIUM SERPL-SCNC: 136 MMOL/L — SIGNIFICANT CHANGE UP (ref 135–145)
WBC # BLD: 1.27 K/UL — LOW (ref 3.8–10.5)
WBC # FLD AUTO: 1.27 K/UL — LOW (ref 3.8–10.5)

## 2020-11-12 PROCEDURE — 99233 SBSQ HOSP IP/OBS HIGH 50: CPT

## 2020-11-12 PROCEDURE — 99232 SBSQ HOSP IP/OBS MODERATE 35: CPT | Mod: GC

## 2020-11-12 RX ORDER — FUROSEMIDE 40 MG
60 TABLET ORAL
Refills: 0 | Status: DISCONTINUED | OUTPATIENT
Start: 2020-11-12 | End: 2020-11-17

## 2020-11-12 RX ORDER — ALBUTEROL 90 UG/1
2 AEROSOL, METERED ORAL EVERY 6 HOURS
Refills: 0 | Status: DISCONTINUED | OUTPATIENT
Start: 2020-11-12 | End: 2020-11-18

## 2020-11-12 RX ADMIN — Medication 325 MILLIGRAM(S): at 12:53

## 2020-11-12 RX ADMIN — Medication 5 MILLIGRAM(S): at 22:18

## 2020-11-12 RX ADMIN — NYSTATIN CREAM 1 APPLICATION(S): 100000 CREAM TOPICAL at 22:19

## 2020-11-12 RX ADMIN — MIDODRINE HYDROCHLORIDE 10 MILLIGRAM(S): 2.5 TABLET ORAL at 05:36

## 2020-11-12 RX ADMIN — PANTOPRAZOLE SODIUM 40 MILLIGRAM(S): 20 TABLET, DELAYED RELEASE ORAL at 05:37

## 2020-11-12 RX ADMIN — Medication 60 MILLIGRAM(S): at 16:34

## 2020-11-12 RX ADMIN — Medication 1 GRAM(S): at 12:52

## 2020-11-12 RX ADMIN — Medication 1 GRAM(S): at 16:35

## 2020-11-12 RX ADMIN — Medication 100 MILLIGRAM(S): at 22:18

## 2020-11-12 RX ADMIN — Medication 1 GRAM(S): at 05:36

## 2020-11-12 RX ADMIN — Medication 100 MILLIGRAM(S): at 06:32

## 2020-11-12 RX ADMIN — NYSTATIN CREAM 1 APPLICATION(S): 100000 CREAM TOPICAL at 10:53

## 2020-11-12 RX ADMIN — TRAMADOL HYDROCHLORIDE 50 MILLIGRAM(S): 50 TABLET ORAL at 23:41

## 2020-11-12 RX ADMIN — Medication 40 MILLIGRAM(S): at 05:36

## 2020-11-12 RX ADMIN — Medication 500 MILLIGRAM(S): at 10:53

## 2020-11-12 RX ADMIN — LACTULOSE 20 GRAM(S): 10 SOLUTION ORAL at 12:53

## 2020-11-12 RX ADMIN — Medication 100 MILLIGRAM(S): at 05:37

## 2020-11-12 RX ADMIN — MIDODRINE HYDROCHLORIDE 10 MILLIGRAM(S): 2.5 TABLET ORAL at 12:53

## 2020-11-12 RX ADMIN — TRAMADOL HYDROCHLORIDE 50 MILLIGRAM(S): 50 TABLET ORAL at 22:28

## 2020-11-12 RX ADMIN — ATORVASTATIN CALCIUM 20 MILLIGRAM(S): 80 TABLET, FILM COATED ORAL at 22:18

## 2020-11-12 RX ADMIN — Medication 50 MILLILITER(S): at 16:39

## 2020-11-12 RX ADMIN — MIDODRINE HYDROCHLORIDE 10 MILLIGRAM(S): 2.5 TABLET ORAL at 16:34

## 2020-11-12 RX ADMIN — LIDOCAINE 1 PATCH: 4 CREAM TOPICAL at 00:28

## 2020-11-12 RX ADMIN — Medication 100 MILLIGRAM(S): at 12:13

## 2020-11-12 RX ADMIN — Medication 50 MILLILITER(S): at 06:20

## 2020-11-12 NOTE — PROGRESS NOTE ADULT - SUBJECTIVE AND OBJECTIVE BOX
ID Follow Up For cellulitis    Patient is a 60y old  Male who presents with a chief complaint of hepatic encephalopathy (11 Nov 2020 13:46)    Interval History/ROS:     Allergies    No Known Allergies    Intolerances        ANTIMICROBIALS:  ceFAZolin   IVPB 2000 every 8 hours  ciprofloxacin     Tablet 500 every 24 hours  rifAXIMin 550 two times a day      OTHER MEDS:  acetaminophen   Tablet .. 650 milliGRAM(s) Oral every 6 hours PRN  albumin human 25% IVPB 50 milliLiter(s) IV Intermittent every 12 hours  atorvastatin 20 milliGRAM(s) Oral at bedtime  benzonatate 100 milliGRAM(s) Oral every 8 hours PRN  ferrous    sulfate 325 milliGRAM(s) Oral daily  furosemide   Injectable 40 milliGRAM(s) IV Push two times a day  guaiFENesin   Syrup  (Sugar-Free) 100 milliGRAM(s) Oral every 6 hours PRN  influenza   Vaccine 0.5 milliLiter(s) IntraMuscular once  lactulose Syrup 20 Gram(s) Oral every 8 hours  lidocaine   Patch 1 Patch Transdermal daily  melatonin 5 milliGRAM(s) Oral at bedtime  midodrine. 10 milliGRAM(s) Oral three times a day  nystatin Powder 1 Application(s) Topical every 12 hours  pantoprazole    Tablet 40 milliGRAM(s) Oral before breakfast  sucralfate 1 Gram(s) Oral four times a day  traMADol 50 milliGRAM(s) Oral three times a day PRN      Vital Signs Last 24 Hrs  T(C): 36.8 (12 Nov 2020 04:21), Max: 36.9 (11 Nov 2020 12:02)  T(F): 98.3 (12 Nov 2020 04:21), Max: 98.5 (11 Nov 2020 12:02)  HR: 79 (12 Nov 2020 04:21) (74 - 79)  BP: 108/58 (12 Nov 2020 04:21) (108/58 - 119/64)  BP(mean): --  RR: 18 (12 Nov 2020 04:21) (18 - 18)  SpO2: 96% (12 Nov 2020 04:21) (95% - 98%)    EXAM:  Constitutional: Not in acute distress  Eyes: No icterus. Pupils b/l reactive to light.  Oral cavity: Clear, no lesions  Neck: No neck vein distension noted  RS: Chest clear to auscultation bilaterally. No wheeze/rhonchi/crepitations.  CVS: S1, S2 heard. Regular rate and rhythm. No murmurs/rubs/gallops.  Abdomen: Soft. No guarding/rigidity/tenderness.  : No acute abnormalities  Extremities: Warm. No pedal edema  Skin: No lesions noted  Vascular: No evidence of phlebitis  Neuro: Alert, oriented to time/place/person    Labs:                        6.5    1.27  )-----------( 39       ( 12 Nov 2020 06:55 )             20.9       11-12    136  |  97  |  19  ----------------------------<  81  3.7   |  29  |  1.56<H>    Ca    8.7      12 Nov 2020 06:52  Mg     1.6     11-12            MICROBIOLOGY:    RADIOLOGY:    OTHER INVESTIGATIONS: ID Follow Up For cellulitis    Patient is a 60y old  Male who presents with a chief complaint of hepatic encephalopathy (11 Nov 2020 13:46)    Interval History/ROS: No events overnight. Feel the erythema of LEs is improving. No fevers, chills, nausea, vomiting, diarrhea.    Allergies  No Known Allergies    Intolerances  None      ANTIMICROBIALS:  ceFAZolin   IVPB 2000 every 8 hours  ciprofloxacin     Tablet 500 every 24 hours  rifAXIMin 550 two times a day      OTHER MEDS:  acetaminophen   Tablet .. 650 milliGRAM(s) Oral every 6 hours PRN  albumin human 25% IVPB 50 milliLiter(s) IV Intermittent every 12 hours  atorvastatin 20 milliGRAM(s) Oral at bedtime  benzonatate 100 milliGRAM(s) Oral every 8 hours PRN  ferrous    sulfate 325 milliGRAM(s) Oral daily  furosemide   Injectable 40 milliGRAM(s) IV Push two times a day  guaiFENesin   Syrup  (Sugar-Free) 100 milliGRAM(s) Oral every 6 hours PRN  influenza   Vaccine 0.5 milliLiter(s) IntraMuscular once  lactulose Syrup 20 Gram(s) Oral every 8 hours  lidocaine   Patch 1 Patch Transdermal daily  melatonin 5 milliGRAM(s) Oral at bedtime  midodrine. 10 milliGRAM(s) Oral three times a day  nystatin Powder 1 Application(s) Topical every 12 hours  pantoprazole    Tablet 40 milliGRAM(s) Oral before breakfast  sucralfate 1 Gram(s) Oral four times a day  traMADol 50 milliGRAM(s) Oral three times a day PRN      Vital Signs Last 24 Hrs  T(C): 36.8 (12 Nov 2020 04:21), Max: 36.9 (11 Nov 2020 12:02)  T(F): 98.3 (12 Nov 2020 04:21), Max: 98.5 (11 Nov 2020 12:02)  HR: 79 (12 Nov 2020 04:21) (74 - 79)  BP: 108/58 (12 Nov 2020 04:21) (108/58 - 119/64)  BP(mean): --  RR: 18 (12 Nov 2020 04:21) (18 - 18)  SpO2: 96% (12 Nov 2020 04:21) (95% - 98%)    EXAM:  Constitutional: Not in acute distress  Eyes: No icterus. Pupils b/l reactive to light.  Oral cavity: Clear, no lesions  Neck: No neck vein distension noted  RS: Chest clear to auscultation bilaterally. No wheeze/rhonchi/crepitations.  CVS: S1, S2 heard. Regular rate and rhythm. No murmurs/rubs/gallops.  Abdomen: Soft. distended.   : No acute abnormalities  Extremities: Warm. +3 pitting edema  Skin: erythema of lower legs to ankle, similar to day prior  Vascular: No evidence of phlebitis  Neuro: Alert, oriented to time/place/person      Labs:                        6.5    1.27  )-----------( 39       ( 12 Nov 2020 06:55 )             20.9       11-12    136  |  97  |  19  ----------------------------<  81  3.7   |  29  |  1.56<H>    Ca    8.7      12 Nov 2020 06:52  Mg     1.6     11-12      MICROBIOLOGY:  None new    RADIOLOGY:  None new  OTHER INVESTIGATIONS:

## 2020-11-12 NOTE — PROGRESS NOTE ADULT - ATTENDING COMMENTS
Patient seen and examined    Case discussed with Dr Shine  Remains with extensive ascites and bilateral lower extremity edema  slight improvement in cellulitis  continue Cefazolin- plan to complete ten days    Max Ashby MD  [phone]  After 5pm/weekends [phone]

## 2020-11-12 NOTE — PROGRESS NOTE ADULT - PROBLEM SELECTOR PLAN 10
Transitions of Care Status:  1.  Name of PCP: Dr. White. Pt reports difficulty getting [facility] hepatology follow up.  2.  PCP Contacted on Admission: [ ] Y    [ ] N    3.  PCP contacted at Discharge: [ ] Y    [ ] N    [ ] N/A  4.  Post-Discharge Appointment Date and Location:  5.  Summary of Handoff given to PCP:    Dispo: likely home with home PT pending swelling improvement

## 2020-11-12 NOTE — PROGRESS NOTE ADULT - SUBJECTIVE AND OBJECTIVE BOX
PROGRESS NOTE:     Patient is a 60y old  Male who presents with a chief complaint of hepatic encephalopathy (12 Nov 2020 11:14)      SUBJECTIVE / OVERNIGHT EVENTS: KYREE no over bleeding    ADDITIONAL REVIEW OF SYSTEMS:  No fever or chills  No n/v/d      MEDICATIONS  (STANDING):  albumin human 25% IVPB 50 milliLiter(s) IV Intermittent every 12 hours  atorvastatin 20 milliGRAM(s) Oral at bedtime  ceFAZolin   IVPB 2000 milliGRAM(s) IV Intermittent every 8 hours  ciprofloxacin     Tablet 500 milliGRAM(s) Oral every 24 hours  ferrous    sulfate 325 milliGRAM(s) Oral daily  furosemide   Injectable 40 milliGRAM(s) IV Push two times a day  influenza   Vaccine 0.5 milliLiter(s) IntraMuscular once  lactulose Syrup 20 Gram(s) Oral every 8 hours  lidocaine   Patch 1 Patch Transdermal daily  melatonin 5 milliGRAM(s) Oral at bedtime  midodrine. 10 milliGRAM(s) Oral three times a day  nystatin Powder 1 Application(s) Topical every 12 hours  pantoprazole    Tablet 40 milliGRAM(s) Oral before breakfast  rifAXIMin 550 milliGRAM(s) Oral two times a day  sucralfate 1 Gram(s) Oral four times a day    MEDICATIONS  (PRN):  acetaminophen   Tablet .. 650 milliGRAM(s) Oral every 6 hours PRN Mild Pain (1 - 3), Moderate Pain (4 - 6)  benzonatate 100 milliGRAM(s) Oral every 8 hours PRN Cough  guaiFENesin   Syrup  (Sugar-Free) 100 milliGRAM(s) Oral every 6 hours PRN Cough  traMADol 50 milliGRAM(s) Oral three times a day PRN Severe Pain (7 - 10)      CAPILLARY BLOOD GLUCOSE        I&O's Summary    11 Nov 2020 07:01  -  12 Nov 2020 07:00  --------------------------------------------------------  IN: 440 mL / OUT: 1750 mL / NET: -1310 mL    12 Nov 2020 07:01  -  12 Nov 2020 12:18  --------------------------------------------------------  IN: 240 mL / OUT: 500 mL / NET: -260 mL        PHYSICAL EXAM:  Vital Signs Last 24 Hrs  T(C): 36.8 (12 Nov 2020 04:21), Max: 36.8 (12 Nov 2020 04:21)  T(F): 98.3 (12 Nov 2020 04:21), Max: 98.3 (12 Nov 2020 04:21)  HR: 79 (12 Nov 2020 04:21) (74 - 79)  BP: 108/58 (12 Nov 2020 04:21) (108/58 - 112/68)  BP(mean): --  RR: 18 (12 Nov 2020 04:21) (18 - 18)  SpO2: 96% (12 Nov 2020 04:21) (95% - 96%)    CONSTITUTIONAL: NAD, well-developed  RESPIRATORY: Normal respiratory effort; lungs are clear to auscultation bilaterally  CARDIOVASCULAR: Regular rate and rhythm, normal S1 and S2, no murmur/rub/gallop; +4 equal lower extremity edema; Peripheral pulses are 2+ bilaterally  ABDOMEN: Nontender to palpation, normoactive bowel sounds, no rebound/guarding; No hepatosplenomegaly  MUSCLOSKELETAL: no clubbing or cyanosis of digits;  Erythema b/l somewhat improving  PSYCH: A+O to person, place, and time; affect appropriate    LABS:                        6.5    1.27  )-----------( 39       ( 12 Nov 2020 06:55 )             20.9     11-12    136  |  97  |  19  ----------------------------<  81  3.7   |  29  |  1.56<H>    Ca    8.7      12 Nov 2020 06:52  Mg     1.6     11-12      PT/INR - ( 11 Nov 2020 10:27 )   PT: 33.7 sec;   INR: 2.96 ratio                     RADIOLOGY & ADDITIONAL TESTS:  Results Reviewed:   Imaging Personally Reviewed:  Electrocardiogram Personally Reviewed:    COORDINATION OF CARE:  Care Discussed with Consultants/Other Providers [Y/N]:  Prior or Outpatient Records Reviewed [Y/N]:

## 2020-11-12 NOTE — PROGRESS NOTE ADULT - ASSESSMENT
59M morbidly obese male w/ PMHx of Hepatic Cirrhosis, ascites, Thrombocytopenia, CHF, HTN, HLD, recent admit for Hepatic Encephalopathy, presents as transfer from OSH for advanced GI and hepatology eval, admitted initially 10/14-10/23 with HSE, anemia, and SBP, s/p LVP.   ID consulted for Pre-Transplant Evaluation, SBP.  S/p EGD, colonoscopy, capsule study with no significant findings  Course c/b MONI concerning for HRS, now with LE erythema and possible cellulitis    #cellulitis of RLE - improving  DVT study negative.  MRSA PCR neg.  - Cont Ancef to 2g q8 based on BMI likely for 7-10 days( 11/5- )  - f/u bl clx sent 11/4 - NGTD  - cont leg elevations    #Ascites - undergoing diuresis.  - is there a role for repeat paracentesis    #MONI - Cr is improving now with diuresis  - care per primary team  - renally dose meds    #SBP in setting of decompensated ETOH hepatic cirrhosis -   Paracentesis on 10/14 with Ecoli, s/p 7 days of ceftriaxone. Repeat Para on 10/22 abd fluid still NGTD.  No current abd pain, and nontoxic appearing without fevers. received ctx until 11/2  - left abdomen bag no longer draining  - cont oral ciprofloxacin for secondary prophylaxis    #Leukopenia - has chronic leukopenia of unknown etiology. Could be acute in setting of infection and chronic due to advanced cirrhosis but has been never been formally evaluated.  - cont to monitor    #Anemia - received 1U PRBC 11/2  - cont to monitor    #pre-transplant eval- No current plan for further transplant eval  - HAV IgG pos  - HBsAb, HBsAg, HBVc total Ab: ind/neg/nneg  - HCV Ab neg  - HSV 1/2 IgG neg/neg  - EBV Serology: past EBV infection  - CMV IgG neg  - VZV IgG pos  - Measles, Mumps and Rubella IgG serologies: pos/pos/pos  - Quantiferon Gold neg  - HIV Test Neg  - Syphilis Screen neg  - Toxoplasma IgG neg  - Strongyloides Ab neg     #Vaccines -  Received prevnar 10/21/2020  - will need influenza vaccine- please administer  - will need Hep B series- can give first dose in hospital  - pneumovax 8 weeks from prevnar      Natanael Shine MD  Fellow, Infectious Diseases, PGY-4  Pager: [phone]  Before 9am or after 5pm/Weekends: Call [phone]         59M morbidly obese male w/ PMHx of Hepatic Cirrhosis, ascites, Thrombocytopenia, CHF, HTN, HLD, recent admit for Hepatic Encephalopathy, presents as transfer from OSH for advanced GI and hepatology eval, admitted initially 10/14-10/23 with HSE, anemia, and SBP, s/p LVP.   ID consulted for Pre-Transplant Evaluation, SBP.  S/p EGD, colonoscopy, capsule study with no significant findings  Course c/b MONI concerning for HRS, now with LE erythema and possible cellulitis    #cellulitis of RLE - improving overall. DVT study negative. MRSA PCR neg.  - Cont Ancef to 2g q8 based on BMI likely for 7-10 days( 11/5- )  - f/u bl clx sent 11/4 - NG  - cont leg elevations    #Ascites - undergoing diuresis.  - is there a role for repeat paracentesis?    #MONI - Cr is improving now with diuresis  - care per primary team  - renally dose meds    #SBP in setting of decompensated ETOH hepatic cirrhosis -   Paracentesis on 10/14 with Ecoli, s/p 7 days of ceftriaxone. Repeat Para on 10/22 abd fluid still NGTD.  No current abd pain, and nontoxic appearing without fevers. received ctx until 11/2  - left abdomen bag no longer draining  - cont oral ciprofloxacin for secondary prophylaxis    #Leukopenia - has chronic leukopenia of unknown etiology. Could be acute in setting of infection and chronic due to advanced cirrhosis but has been never been formally evaluated.  - cont to monitor    #Anemia - received 1U PRBC 11/2  - cont to monitor    #pre-transplant eval- No current plan for further transplant eval  - HAV IgG pos  - HBsAb, HBsAg, HBVc total Ab: ind/neg/nneg  - HCV Ab neg  - HSV 1/2 IgG neg/neg  - EBV Serology: past EBV infection  - CMV IgG neg  - VZV IgG pos  - Measles, Mumps and Rubella IgG serologies: pos/pos/pos  - Quantiferon Gold neg  - HIV Test Neg  - Syphilis Screen neg  - Toxoplasma IgG neg  - Strongyloides Ab neg     #Vaccines -  Received prevnar 10/21/2020  - will need influenza vaccine- please administer  - will need Hep B series- can give first dose in hospital  - pneumovax 8 weeks from prevnar      Natanael Shine MD  Fellow, Infectious Diseases, PGY-4  Pager: [phone]  Before 9am or after 5pm/Weekends: Call [phone]

## 2020-11-12 NOTE — PROGRESS NOTE ADULT - PROBLEM SELECTOR PLAN 4
- s/p egd and colonoscopy, no bleed source, VCE showing brown stool throughout without source of bleed. Likely hemorrhoidal- anemic again today  - Protonix PO daily  - c/w Carafate  - sbp ppx on cipro 500mg daily  -Transfuse 1 unit prbc

## 2020-11-12 NOTE — PROGRESS NOTE ADULT - PROBLEM SELECTOR PLAN 1
Off vancomycin  appreciate ID recs  c/w Ancef 2gm q8hr end date to be determined by ID likely through sunday

## 2020-11-12 NOTE — PROGRESS NOTE ADULT - ASSESSMENT
59M morbidly obese male w/ PMHx of Hepatic Cirrhosis, ascites, thrombocytopenia, CHF, HTN, HLD, recent admx for Hepatic Encephalopathy, noncompliant with lactulose who was admitted to OSH  for hepatic encephalopathy, acute blood lose anemia (?variceal bleed, GI ulcer) and possible SBP. He was transferred to [facility] for endoscopy since he is high risk for planned procedure. He is also here for evaluation by Liver Transplant team, now s/p EGD showing gastritis no varices, colonoscopy without any source of bleed, now s/p VCE without source of bleed. Now with uptrending Cr concerning for pre-renal vs. HRS. New B'/L LE cellulitis on ancef.

## 2020-11-13 LAB
ALBUMIN SERPL ELPH-MCNC: 3.9 G/DL — SIGNIFICANT CHANGE UP (ref 3.3–5)
ALP SERPL-CCNC: 98 U/L — SIGNIFICANT CHANGE UP (ref 40–120)
ALT FLD-CCNC: <5 U/L — LOW (ref 10–45)
ANION GAP SERPL CALC-SCNC: 11 MMOL/L — SIGNIFICANT CHANGE UP (ref 5–17)
AST SERPL-CCNC: 27 U/L — SIGNIFICANT CHANGE UP (ref 10–40)
BILIRUB SERPL-MCNC: 5 MG/DL — HIGH (ref 0.2–1.2)
BLD GP AB SCN SERPL QL: NEGATIVE — SIGNIFICANT CHANGE UP
BUN SERPL-MCNC: 18 MG/DL — SIGNIFICANT CHANGE UP (ref 7–23)
CALCIUM SERPL-MCNC: 9 MG/DL — SIGNIFICANT CHANGE UP (ref 8.4–10.5)
CHLORIDE SERPL-SCNC: 95 MMOL/L — LOW (ref 96–108)
CO2 SERPL-SCNC: 29 MMOL/L — SIGNIFICANT CHANGE UP (ref 22–31)
CREAT SERPL-MCNC: 1.5 MG/DL — HIGH (ref 0.5–1.3)
GLUCOSE SERPL-MCNC: 81 MG/DL — SIGNIFICANT CHANGE UP (ref 70–99)
HCT VFR BLD CALC: 23.5 % — LOW (ref 39–50)
HGB BLD-MCNC: 7.7 G/DL — LOW (ref 13–17)
MCHC RBC-ENTMCNC: 31.4 PG — SIGNIFICANT CHANGE UP (ref 27–34)
MCHC RBC-ENTMCNC: 32.8 GM/DL — SIGNIFICANT CHANGE UP (ref 32–36)
MCV RBC AUTO: 95.9 FL — SIGNIFICANT CHANGE UP (ref 80–100)
NRBC # BLD: 0 /100 WBCS — SIGNIFICANT CHANGE UP (ref 0–0)
PLATELET # BLD AUTO: 41 K/UL — LOW (ref 150–400)
POTASSIUM SERPL-MCNC: 3.8 MMOL/L — SIGNIFICANT CHANGE UP (ref 3.5–5.3)
POTASSIUM SERPL-SCNC: 3.8 MMOL/L — SIGNIFICANT CHANGE UP (ref 3.5–5.3)
PROT SERPL-MCNC: 7.1 G/DL — SIGNIFICANT CHANGE UP (ref 6–8.3)
RBC # BLD: 2.45 M/UL — LOW (ref 4.2–5.8)
RBC # FLD: 19.4 % — HIGH (ref 10.3–14.5)
RH IG SCN BLD-IMP: NEGATIVE — SIGNIFICANT CHANGE UP
SODIUM SERPL-SCNC: 135 MMOL/L — SIGNIFICANT CHANGE UP (ref 135–145)
WBC # BLD: 1.5 K/UL — LOW (ref 3.8–10.5)
WBC # FLD AUTO: 1.5 K/UL — LOW (ref 3.8–10.5)

## 2020-11-13 PROCEDURE — 99223 1ST HOSP IP/OBS HIGH 75: CPT | Mod: GC

## 2020-11-13 PROCEDURE — 99233 SBSQ HOSP IP/OBS HIGH 50: CPT

## 2020-11-13 PROCEDURE — 99232 SBSQ HOSP IP/OBS MODERATE 35: CPT

## 2020-11-13 RX ADMIN — MIDODRINE HYDROCHLORIDE 10 MILLIGRAM(S): 2.5 TABLET ORAL at 05:30

## 2020-11-13 RX ADMIN — LACTULOSE 20 GRAM(S): 10 SOLUTION ORAL at 05:33

## 2020-11-13 RX ADMIN — Medication 500 MILLIGRAM(S): at 09:17

## 2020-11-13 RX ADMIN — Medication 100 MILLIGRAM(S): at 21:12

## 2020-11-13 RX ADMIN — Medication 1 GRAM(S): at 05:30

## 2020-11-13 RX ADMIN — Medication 50 MILLILITER(S): at 05:36

## 2020-11-13 RX ADMIN — Medication 100 MILLIGRAM(S): at 12:33

## 2020-11-13 RX ADMIN — NYSTATIN CREAM 1 APPLICATION(S): 100000 CREAM TOPICAL at 21:13

## 2020-11-13 RX ADMIN — Medication 1 GRAM(S): at 00:22

## 2020-11-13 RX ADMIN — Medication 1 GRAM(S): at 23:13

## 2020-11-13 RX ADMIN — Medication 5 MILLIGRAM(S): at 21:13

## 2020-11-13 RX ADMIN — Medication 60 MILLIGRAM(S): at 17:03

## 2020-11-13 RX ADMIN — Medication 1 GRAM(S): at 12:34

## 2020-11-13 RX ADMIN — Medication 100 MILLIGRAM(S): at 09:16

## 2020-11-13 RX ADMIN — Medication 100 MILLIGRAM(S): at 05:32

## 2020-11-13 RX ADMIN — LACTULOSE 20 GRAM(S): 10 SOLUTION ORAL at 12:34

## 2020-11-13 RX ADMIN — Medication 60 MILLIGRAM(S): at 05:31

## 2020-11-13 RX ADMIN — NYSTATIN CREAM 1 APPLICATION(S): 100000 CREAM TOPICAL at 12:35

## 2020-11-13 RX ADMIN — Medication 325 MILLIGRAM(S): at 12:34

## 2020-11-13 RX ADMIN — LACTULOSE 20 GRAM(S): 10 SOLUTION ORAL at 21:13

## 2020-11-13 RX ADMIN — PANTOPRAZOLE SODIUM 40 MILLIGRAM(S): 20 TABLET, DELAYED RELEASE ORAL at 05:30

## 2020-11-13 RX ADMIN — Medication 1 GRAM(S): at 17:02

## 2020-11-13 RX ADMIN — ATORVASTATIN CALCIUM 20 MILLIGRAM(S): 80 TABLET, FILM COATED ORAL at 21:13

## 2020-11-13 RX ADMIN — Medication 50 MILLILITER(S): at 17:01

## 2020-11-13 RX ADMIN — MIDODRINE HYDROCHLORIDE 10 MILLIGRAM(S): 2.5 TABLET ORAL at 17:03

## 2020-11-13 RX ADMIN — MIDODRINE HYDROCHLORIDE 10 MILLIGRAM(S): 2.5 TABLET ORAL at 12:34

## 2020-11-13 NOTE — CONSULT NOTE ADULT - ASSESSMENT
INCOMPLETE NOTE 60M morbidly obese male w/ PMHx of ETOH Hepatic Cirrhosis w/ ascites, pancytopenia, CHF, HTN, HLD, recent admx for Hepatic Encephalopathy, noncompliant with lactulose who was admitted to OSH  for hepatic encephalopathy, acute blood lose anemia (?variceal bleed, GI ulcer) and possible SBP. He was transferred to [facility] for endoscopy since he is high risk for planned procedure. He was also here for evaluation by Liver Transplant team, now s/p EGD showing gastritis no varices, colonoscopy without any source of bleed, now s/p VCE without source of bleed. Now with uptrending Cr concerning for pre-renal vs. HRS. New B'/L LE cellulitis on ancef. Hematology consulted for pancytopenia appears to date back to 10/2019.     #Thrombocytopenia   Likely multifactorial, but primarily from ETOH hepatic cirrhosis inducing splenomegaly and possibly some bone marrow suppression in the setting of frequent and prolonged  hospitalizations  - would recommend getting hemolysis labs (Indirect bilirubin, LDH, Haptoglobin) to help rule out Zieve's syndrome   - get reticulocyte count  - will obtain peripheral smear   - No acute indication for bone marrow biopsy  - no active signs of bleeding, would transfuse PRBC to keep Hgb >7    - Plts stable ~40k, would transfuse plts if less than 10k, or if fever and less than 15k, or actively bleeding and less than 50k  - chronic lymphopenia, currently being treated with cefazolin for cellulitis, ANC>590 from 11/10  - monitor CBC with diff    Juan Luis Whitt MD   Internal Medicine PGY-1  Pager: NS: [phone] [facility]: [phone]   60M morbidly obese male w/ PMHx of ETOH Hepatic Cirrhosis w/ ascites, pancytopenia, CHF, HTN, HLD, recent admx for Hepatic Encephalopathy, noncompliant with lactulose who was admitted to OSH  for hepatic encephalopathy, acute blood lose anemia (?variceal bleed, GI ulcer) and possible SBP. He was transferred to [facility] for endoscopy since he is high risk for planned procedure. He was also here for evaluation by Liver Transplant team, now s/p EGD showing gastritis no varices, colonoscopy without any source of bleed, now s/p VCE without source of bleed. Now with uptrending Cr concerning for pre-renal vs. HRS. New B'/L LE cellulitis on ancef. Hematology consulted for pancytopenia appears to date back to 10/2019.     #Thrombocytopenia   Likely multifactorial, but primarily from ETOH hepatic cirrhosis inducing splenomegaly and possibly some bone marrow suppression in the setting of frequent and prolonged  hospitalizations  - would recommend getting hemolysis labs (Indirect bilirubin, LDH, Haptoglobin) to help evaluate for Zieve's syndrome   - get reticulocyte count  - will obtain peripheral smear   - No acute indication for bone marrow biopsy  - no active signs of bleeding, would transfuse PRBC to keep Hgb >7    - Plts stable ~40k, would transfuse plts if less than 10k, or if fever and less than 15k, or actively bleeding and less than 50k  - chronic lymphopenia, currently being treated with cefazolin for cellulitis, ANC>590 from 11/10  - monitor CBC with diff    Juan Luis Whitt MD   Internal Medicine PGY-1  Pager: NS: [phone] [facility]: [phone]   60M morbidly obese male w/ PMHx of ETOH Hepatic Cirrhosis w/ ascites, pancytopenia, CHF, HTN, HLD, recent admx for Hepatic Encephalopathy, noncompliant with lactulose who was admitted to OSH  for hepatic encephalopathy, acute blood lose anemia (?variceal bleed, GI ulcer) and possible SBP. He was transferred to [facility] for endoscopy since he is high risk for planned procedure. He was also here for evaluation by Liver Transplant team, now s/p EGD showing gastritis no varices, colonoscopy without any source of bleed, now s/p VCE without source of bleed. Now with uptrending Cr concerning for pre-renal vs. HRS. New B'/L LE cellulitis on ancef. Hematology consulted for pancytopenia appears to date back to 10/2019.     #Thrombocytopenia   Likely multifactorial, but primarily from ETOH hepatic cirrhosis inducing splenomegaly and possibly some bone marrow suppression in the setting of frequent and prolonged  hospitalizations  - would recommend getting hemolysis labs (Indirect bilirubin, LDH, Haptoglobin), reticulocyte count and lipid panel to help evaluate for Zieve's syndrome   - would recommend SPEP  - will obtain peripheral smear   - previous Iron studies consistent with Iron deficiency anemia, continue with ferrous sulfate  - Folate and B12 wnl on 9/2020, can repeat   - No acute indication for bone marrow biopsy  - no active signs of bleeding, would transfuse PRBC to keep Hgb >7    - Plts stable ~40k, would transfuse plts if less than 10k, or if fever and less than 15k, or actively bleeding and less than 50k  - chronic lymphopenia, currently being treated with cefazolin for cellulitis, ANC>590 from 11/10  - monitor CBC with diff    Juan Luis Whitt MD   Internal Medicine PGY-1  Pager: NS: [phone] [facility]: [phone]

## 2020-11-13 NOTE — PROGRESS NOTE ADULT - PROBLEM SELECTOR PLAN 10
Transitions of Care Status:  1.  Name of PCP: Dr. White. Pt reports difficulty getting [facility] hepatology follow up.  2.  PCP Contacted on Admission: [ ] Y    [ ] N    3.  PCP contacted at Discharge: [ ] Y    [ ] N    [ ] N/A  4.  Post-Discharge Appointment Date and Location:  5.  Summary of Handoff given to PCP:    Dispo: likely home with home PT pending swelling improvement

## 2020-11-13 NOTE — PROGRESS NOTE ADULT - ASSESSMENT
59M morbidly obese male w/ PMHx of Hepatic Cirrhosis, ascites, Thrombocytopenia, CHF, HTN, HLD, recent admit for Hepatic Encephalopathy, presents as transfer from OSH for advanced GI and hepatology eval, admitted initially 10/14-10/23 with HSE, anemia, and SBP, s/p LVP.   ID consulted for Pre-Transplant Evaluation, SBP.  S/p EGD, colonoscopy, capsule study with no significant findings  Course c/b MONI concerning for HRS, now with LE erythema and possible cellulitis    #cellulitis of RLE - improving overall. DVT study negative. MRSA PCR neg.  - Cont Ancef to 2g q8 based on BMI likely for 7-10 days( 11/5- )  - f/u bl clx sent 11/4 - NG  - cont leg elevations    #Ascites - undergoing diuresis.  - is there a role for repeat paracentesis?    #MONI - Cr is improving now with diuresis  - care per primary team  - renally dose meds    #SBP in setting of decompensated ETOH hepatic cirrhosis -   Paracentesis on 10/14 with Ecoli, s/p 7 days of ceftriaxone. Repeat Para on 10/22 abd fluid still NGTD.  No current abd pain, and nontoxic appearing without fevers. received ctx until 11/2  - left abdomen bag no longer draining  - cont oral ciprofloxacin for secondary prophylaxis    #Leukopenia - has chronic leukopenia of unknown etiology. Could be acute in setting of infection and chronic due to advanced cirrhosis but has been - Heme consult in progress  - cont to monitor will complete antibiotics 11/15 but if WBC decreases further could complete early    #Anemia - received 1U PRBC 11/2  - cont to monitor    #pre-transplant eval- No current plan for further transplant eval  - HAV IgG pos  - HBsAb, HBsAg, HBVc total Ab: ind/neg/nneg  - HCV Ab neg  - HSV 1/2 IgG neg/neg  - EBV Serology: past EBV infection  - CMV IgG neg  - VZV IgG pos  - Measles, Mumps and Rubella IgG serologies: pos/pos/pos  - Quantiferon Gold neg  - HIV Test Neg  - Syphilis Screen neg  - Toxoplasma IgG neg  - Strongyloides Ab neg     #Vaccines -  Received prevnar 10/21/2020  - will need influenza vaccine- please administer  - will need Hep B series- can give first dose in hospital  - pneumovax 8 weeks from prevnar      Max Ashby MD  [phone]  After 5pm/weekends [phone]

## 2020-11-13 NOTE — PROGRESS NOTE ADULT - SUBJECTIVE AND OBJECTIVE BOX
INFECTIOUS DISEASES FOLLOW UP-- Lela Ashby  [phone]    This is a follow up note for this  60yMale with  Liver failure without hepatic coma        ROS:  CONSTITUTIONAL:  No fever, good appetite  CARDIOVASCULAR:  No chest pain or palpitations  RESPIRATORY:  No dyspnea  GASTROINTESTINAL:  No nausea, vomiting, diarrhea, or abdominal pain  GENITOURINARY:  No dysuria  NEUROLOGIC:  No headache,     Allergies    No Known Allergies    Intolerances        ANTIBIOTICS/RELEVANT:  antimicrobials  ceFAZolin   IVPB 2000 milliGRAM(s) IV Intermittent every 8 hours  ciprofloxacin     Tablet 500 milliGRAM(s) Oral every 24 hours  rifAXIMin 550 milliGRAM(s) Oral two times a day    immunologic:  influenza   Vaccine 0.5 milliLiter(s) IntraMuscular once    OTHER:  acetaminophen   Tablet .. 650 milliGRAM(s) Oral every 6 hours PRN  albumin human 25% IVPB 50 milliLiter(s) IV Intermittent every 12 hours  ALBUTerol    90 MICROgram(s) HFA Inhaler 2 Puff(s) Inhalation every 6 hours PRN  atorvastatin 20 milliGRAM(s) Oral at bedtime  benzonatate 100 milliGRAM(s) Oral every 8 hours PRN  ferrous    sulfate 325 milliGRAM(s) Oral daily  furosemide   Injectable 60 milliGRAM(s) IV Push two times a day  guaiFENesin   Syrup  (Sugar-Free) 100 milliGRAM(s) Oral every 6 hours PRN  lactulose Syrup 20 Gram(s) Oral every 8 hours  lidocaine   Patch 1 Patch Transdermal daily  melatonin 5 milliGRAM(s) Oral at bedtime  midodrine. 10 milliGRAM(s) Oral three times a day  nystatin Powder 1 Application(s) Topical every 12 hours  pantoprazole    Tablet 40 milliGRAM(s) Oral before breakfast  sucralfate 1 Gram(s) Oral four times a day  traMADol 50 milliGRAM(s) Oral three times a day PRN      Objective:  Vital Signs Last 24 Hrs  T(C): 37.2 (13 Nov 2020 20:49), Max: 37.2 (13 Nov 2020 20:49)  T(F): 99 (13 Nov 2020 20:49), Max: 99 (13 Nov 2020 20:49)  HR: 82 (13 Nov 2020 20:49) (74 - 82)  BP: 126/64 (13 Nov 2020 20:49) (116/69 - 126/69)  BP(mean): --  RR: 19 (13 Nov 2020 20:49) (18 - 20)  SpO2: 96% (13 Nov 2020 20:49) (96% - 96%)    PHYSICAL EXAM:  Constitutional:no acute distress  Eyes:MICHELLE, EOMI  Ear/Nose/Throat: no oral lesions, 	  Respiratory: clear BL  Cardiovascular: S1S2  Gastrointestinal:soft, (+) BS, no tenderness  Extremities:no e/e/c  No Lymphadenopathy  IV sites not inflammed.    LABS:                        7.7    1.50  )-----------( 41       ( 13 Nov 2020 06:46 )             23.5     11-13    135  |  95<L>  |  18  ----------------------------<  81  3.8   |  29  |  1.50<H>    Ca    9.0      13 Nov 2020 06:46  Mg     1.6     11-12    TPro  7.1  /  Alb  3.9  /  TBili  5.0<H>  /  DBili  x   /  AST  27  /  ALT  <5<L>  /  AlkPhos  98  11-13          MICROBIOLOGY:            RECENT CULTURES:      RADIOLOGY & ADDITIONAL STUDIES:    < from: [facility] Duplex Lower Ext Vein Scan, Bilat (11.03.20 @ 15:00) >  IMPRESSION:  No evidence of deep venous thrombosis in eitherlower extremity.    < end of copied text >

## 2020-11-13 NOTE — PROGRESS NOTE ADULT - ATTENDING COMMENTS
Shirley Ramírez DO  hospitalist  [phone]    Dispo: Pending improvement of cellulitis, heme onc eval for anemia and diuresis.

## 2020-11-13 NOTE — CONSULT NOTE ADULT - ATTENDING COMMENTS
I have reviewed and discussed patient's records, laboratory results, liver imaging report and management of patient's current medical problems with GI fellow Dr. Marlen Ventura today.  Patient is not seen or examined.
Patient seen and examined with Dr Shine  I agree with his history exam and plans as noted above    Patient with a history of cirrhosis unclear if secondary to BOB or ETOH, but admission for GI bleeding of indeterminate source and also with need for treatment for SBP with E.coli non resistant type.  Patient's exam notable for anicteric  clear lungs  reg COR  Abd- large abdomen with ascites but not tense or painful  undergoing capsule camera study  mental status- within normal limits    Ceftriaxone restarted int eh setting of GI bleeding possibly variceal related    Could change IV ceftriaxone to oral Levaquin for secondary SBP prophylaxis 250mg/day    Pre-OLTx transplant work up in progress  Serologies to be sent as above    Max Ashby MD  [phone]  After 5pm/weekends [phone]
Agree with above. Pt seen and evaluated at bedside. Laboratory values reviewed by me.     59 M w/ h/o alcohol use, cirrhosis, progressive cytopenias over the course of 2 yrs. Now a/w cellulitis, noted to have pancytopenia in the setting of splenomegaly. Suspect that pancytopenia is multifactorial 2/2 cirrhosis, splenomegaly and acute infection/Ab.   However, r/o other causes: check SPEP, free light chains, JUSTIN serum, serum Ig, hapto, LDH, retic   Peripheral smear reviewed. No evidence of dysplasia.   Hold off on BM bx at this time  If spikes temp >100.3, will need neutropenic fever treatment, pan culture  Keep Hb >7
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
## 2020-11-13 NOTE — PROGRESS NOTE ADULT - ASSESSMENT
59M morbidly obese male w/ PMHx of Hepatic Cirrhosis, ascites, thrombocytopenia, CHF, HTN, HLD, recent admx for Hepatic Encephalopathy, noncompliant with lactulose who was admitted to OSH  for hepatic encephalopathy, acute blood lose anemia (?variceal bleed, GI ulcer) and possible SBP. He was transferred to [facility] for endoscopy since he is high risk for planned procedure. He is also here for evaluation by Liver Transplant team, now s/p EGD showing gastritis no varices, colonoscopy without any source of bleed, now s/p VCE without source of bleed. Now with uptrending Cr concerning for pre-renal vs. HRS. New B'/L LE cellulitis on ancef.

## 2020-11-13 NOTE — PROGRESS NOTE ADULT - SUBJECTIVE AND OBJECTIVE BOX
PROGRESS NOTE:     Patient is a 60y old  Male who presents with a chief complaint of hepatic encephalopathy (13 Nov 2020 11:10)      SUBJECTIVE / OVERNIGHT EVENTS:  KYREE    ADDITIONAL REVIEW OF SYSTEMS:  No fever or chills  No n/v/d    MEDICATIONS  (STANDING):  albumin human 25% IVPB 50 milliLiter(s) IV Intermittent every 12 hours  atorvastatin 20 milliGRAM(s) Oral at bedtime  ceFAZolin   IVPB 2000 milliGRAM(s) IV Intermittent every 8 hours  ciprofloxacin     Tablet 500 milliGRAM(s) Oral every 24 hours  ferrous    sulfate 325 milliGRAM(s) Oral daily  furosemide   Injectable 60 milliGRAM(s) IV Push two times a day  influenza   Vaccine 0.5 milliLiter(s) IntraMuscular once  lactulose Syrup 20 Gram(s) Oral every 8 hours  lidocaine   Patch 1 Patch Transdermal daily  melatonin 5 milliGRAM(s) Oral at bedtime  midodrine. 10 milliGRAM(s) Oral three times a day  nystatin Powder 1 Application(s) Topical every 12 hours  pantoprazole    Tablet 40 milliGRAM(s) Oral before breakfast  rifAXIMin 550 milliGRAM(s) Oral two times a day  sucralfate 1 Gram(s) Oral four times a day    MEDICATIONS  (PRN):  acetaminophen   Tablet .. 650 milliGRAM(s) Oral every 6 hours PRN Mild Pain (1 - 3), Moderate Pain (4 - 6)  ALBUTerol    90 MICROgram(s) HFA Inhaler 2 Puff(s) Inhalation every 6 hours PRN coughing  benzonatate 100 milliGRAM(s) Oral every 8 hours PRN Cough  guaiFENesin   Syrup  (Sugar-Free) 100 milliGRAM(s) Oral every 6 hours PRN Cough  traMADol 50 milliGRAM(s) Oral three times a day PRN Severe Pain (7 - 10)      CAPILLARY BLOOD GLUCOSE        I&O's Summary    12 Nov 2020 07:01  -  13 Nov 2020 07:00  --------------------------------------------------------  IN: 960 mL / OUT: 2300 mL / NET: -1340 mL        PHYSICAL EXAM:  Vital Signs Last 24 Hrs  T(C): 36.7 (13 Nov 2020 04:37), Max: 36.9 (12 Nov 2020 13:03)  T(F): 98.1 (13 Nov 2020 04:37), Max: 98.5 (12 Nov 2020 18:00)  HR: 81 (13 Nov 2020 04:37) (72 - 81)  BP: 116/69 (13 Nov 2020 04:37) (116/69 - 134/61)  BP(mean): --  RR: 18 (13 Nov 2020 04:37) (18 - 19)  SpO2: 96% (13 Nov 2020 04:37) (94% - 97%)    CONSTITUTIONAL: NAD, well-developed  RESPIRATORY: Normal respiratory effort; lungs are clear to auscultation bilaterally  CARDIOVASCULAR: Regular rate and rhythm, normal S1 and S2, no murmur/rub/gallop; +4 equal lower extremity edema; Peripheral pulses are 2+ bilaterally  ABDOMEN: Nontender to palpation, normoactive bowel sounds, no rebound/guarding; No hepatosplenomegaly  MUSCLOSKELETAL: no clubbing or cyanosis of digits;  Erythema b/l still present and warm  PSYCH: A+O to person, place, and time; affect appropriate    LABS:                        7.7    1.50  )-----------( 41       ( 13 Nov 2020 06:46 )             23.5     11-13    135  |  95<L>  |  18  ----------------------------<  81  3.8   |  29  |  1.50<H>    Ca    9.0      13 Nov 2020 06:46  Mg     1.6     11-12    TPro  7.1  /  Alb  3.9  /  TBili  5.0<H>  /  DBili  x   /  AST  27  /  ALT  <5<L>  /  AlkPhos  98  11-13                RADIOLOGY & ADDITIONAL TESTS:  Results Reviewed:   Imaging Personally Reviewed:  Electrocardiogram Personally Reviewed:    COORDINATION OF CARE:  Care Discussed with Consultants/Other Providers [Y/N]:  Prior or Outpatient Records Reviewed [Y/N]:

## 2020-11-14 ENCOUNTER — TRANSCRIPTION ENCOUNTER (OUTPATIENT)
Age: 60
End: 2020-11-14

## 2020-11-14 DIAGNOSIS — R60.0 LOCALIZED EDEMA: ICD-10-CM

## 2020-11-14 LAB
ALBUMIN SERPL ELPH-MCNC: 3.9 G/DL — SIGNIFICANT CHANGE UP (ref 3.3–5)
ALP SERPL-CCNC: 91 U/L — SIGNIFICANT CHANGE UP (ref 40–120)
ALT FLD-CCNC: <5 U/L — LOW (ref 10–45)
ANION GAP SERPL CALC-SCNC: 12 MMOL/L — SIGNIFICANT CHANGE UP (ref 5–17)
APTT BLD: 54.6 SEC — HIGH (ref 27.5–35.5)
AST SERPL-CCNC: 26 U/L — SIGNIFICANT CHANGE UP (ref 10–40)
BILIRUB DIRECT SERPL-MCNC: 0.9 MG/DL — HIGH (ref 0–0.2)
BILIRUB SERPL-MCNC: 4.4 MG/DL — HIGH (ref 0.2–1.2)
BUN SERPL-MCNC: 18 MG/DL — SIGNIFICANT CHANGE UP (ref 7–23)
CALCIUM SERPL-MCNC: 8.9 MG/DL — SIGNIFICANT CHANGE UP (ref 8.4–10.5)
CHLORIDE SERPL-SCNC: 95 MMOL/L — LOW (ref 96–108)
CHOLEST SERPL-MCNC: 27 MG/DL — SIGNIFICANT CHANGE UP
CO2 SERPL-SCNC: 28 MMOL/L — SIGNIFICANT CHANGE UP (ref 22–31)
CREAT SERPL-MCNC: 1.54 MG/DL — HIGH (ref 0.5–1.3)
GLUCOSE SERPL-MCNC: 84 MG/DL — SIGNIFICANT CHANGE UP (ref 70–99)
HAPTOGLOB SERPL-MCNC: <20 MG/DL — LOW (ref 34–200)
HCT VFR BLD CALC: 22.7 % — LOW (ref 39–50)
HDLC SERPL-MCNC: 15 MG/DL — LOW
HGB BLD-MCNC: 7.2 G/DL — LOW (ref 13–17)
INR BLD: 2.71 RATIO — HIGH (ref 0.88–1.16)
LDH SERPL L TO P-CCNC: 185 U/L — SIGNIFICANT CHANGE UP (ref 50–242)
LIPID PNL WITH DIRECT LDL SERPL: 7 MG/DL — SIGNIFICANT CHANGE UP
MCHC RBC-ENTMCNC: 31.2 PG — SIGNIFICANT CHANGE UP (ref 27–34)
MCHC RBC-ENTMCNC: 31.7 GM/DL — LOW (ref 32–36)
MCV RBC AUTO: 98.3 FL — SIGNIFICANT CHANGE UP (ref 80–100)
NON HDL CHOLESTEROL: 13 MG/DL — SIGNIFICANT CHANGE UP
NRBC # BLD: 0 /100 WBCS — SIGNIFICANT CHANGE UP (ref 0–0)
PLATELET # BLD AUTO: 42 K/UL — LOW (ref 150–400)
POTASSIUM SERPL-MCNC: 3.8 MMOL/L — SIGNIFICANT CHANGE UP (ref 3.5–5.3)
POTASSIUM SERPL-SCNC: 3.8 MMOL/L — SIGNIFICANT CHANGE UP (ref 3.5–5.3)
PROT SERPL-MCNC: 6.9 G/DL — SIGNIFICANT CHANGE UP (ref 6–8.3)
PROTHROM AB SERPL-ACNC: 30.8 SEC — HIGH (ref 10.6–13.6)
RBC # BLD: 2.31 M/UL — LOW (ref 4.2–5.8)
RBC # BLD: 2.31 M/UL — LOW (ref 4.2–5.8)
RBC # FLD: 19.1 % — HIGH (ref 10.3–14.5)
RETICS #: 89.6 K/UL — SIGNIFICANT CHANGE UP (ref 25–125)
RETICS/RBC NFR: 3.9 % — HIGH (ref 0.5–2.5)
SODIUM SERPL-SCNC: 135 MMOL/L — SIGNIFICANT CHANGE UP (ref 135–145)
TRIGL SERPL-MCNC: 29 MG/DL — SIGNIFICANT CHANGE UP
WBC # BLD: 1.49 K/UL — LOW (ref 3.8–10.5)
WBC # FLD AUTO: 1.49 K/UL — LOW (ref 3.8–10.5)

## 2020-11-14 PROCEDURE — 99233 SBSQ HOSP IP/OBS HIGH 50: CPT

## 2020-11-14 RX ADMIN — MIDODRINE HYDROCHLORIDE 10 MILLIGRAM(S): 2.5 TABLET ORAL at 13:09

## 2020-11-14 RX ADMIN — Medication 1 GRAM(S): at 06:33

## 2020-11-14 RX ADMIN — NYSTATIN CREAM 1 APPLICATION(S): 100000 CREAM TOPICAL at 09:11

## 2020-11-14 RX ADMIN — Medication 100 MILLIGRAM(S): at 13:11

## 2020-11-14 RX ADMIN — Medication 500 MILLIGRAM(S): at 09:11

## 2020-11-14 RX ADMIN — LACTULOSE 20 GRAM(S): 10 SOLUTION ORAL at 21:59

## 2020-11-14 RX ADMIN — Medication 1 GRAM(S): at 23:48

## 2020-11-14 RX ADMIN — Medication 5 MILLIGRAM(S): at 23:48

## 2020-11-14 RX ADMIN — Medication 100 MILLIGRAM(S): at 06:31

## 2020-11-14 RX ADMIN — Medication 60 MILLIGRAM(S): at 17:42

## 2020-11-14 RX ADMIN — MIDODRINE HYDROCHLORIDE 10 MILLIGRAM(S): 2.5 TABLET ORAL at 17:42

## 2020-11-14 RX ADMIN — Medication 100 MILLIGRAM(S): at 23:56

## 2020-11-14 RX ADMIN — TRAMADOL HYDROCHLORIDE 50 MILLIGRAM(S): 50 TABLET ORAL at 23:47

## 2020-11-14 RX ADMIN — MIDODRINE HYDROCHLORIDE 10 MILLIGRAM(S): 2.5 TABLET ORAL at 06:32

## 2020-11-14 RX ADMIN — LACTULOSE 20 GRAM(S): 10 SOLUTION ORAL at 09:11

## 2020-11-14 RX ADMIN — Medication 1 GRAM(S): at 13:09

## 2020-11-14 RX ADMIN — Medication 50 MILLILITER(S): at 17:41

## 2020-11-14 RX ADMIN — ATORVASTATIN CALCIUM 20 MILLIGRAM(S): 80 TABLET, FILM COATED ORAL at 21:59

## 2020-11-14 RX ADMIN — NYSTATIN CREAM 1 APPLICATION(S): 100000 CREAM TOPICAL at 21:58

## 2020-11-14 RX ADMIN — Medication 325 MILLIGRAM(S): at 13:10

## 2020-11-14 RX ADMIN — Medication 1 GRAM(S): at 17:42

## 2020-11-14 RX ADMIN — Medication 60 MILLIGRAM(S): at 06:31

## 2020-11-14 RX ADMIN — Medication 50 MILLILITER(S): at 06:30

## 2020-11-14 RX ADMIN — PANTOPRAZOLE SODIUM 40 MILLIGRAM(S): 20 TABLET, DELAYED RELEASE ORAL at 06:32

## 2020-11-14 NOTE — PROGRESS NOTE ADULT - PROBLEM SELECTOR PLAN 4
- s/p egd and colonoscopy, no bleed source, VCE showing brown stool throughout without source of bleed. Likely hemorrhoidal- anemic again today  - Protonix PO daily  - c/w Carafate  - sbp ppx on cipro 500mg daily  -s/p 1 unit prbc

## 2020-11-14 NOTE — DISCHARGE NOTE PROVIDER - PROVIDER TOKENS
PROVIDER:[TOKEN:[[id number]],FOLLOWUP:[1 week]] PROVIDER:[TOKEN:[[id number]]] PROVIDER:[TOKEN:[[id number]]],PROVIDER:[TOKEN:[[id number]]],PROVIDER:[TOKEN:[[id number]]],PROVIDER:[TOKEN:[[id number]]]

## 2020-11-14 NOTE — DISCHARGE NOTE PROVIDER - NSDCFUADDINST_GEN_ALL_CORE_FT
follow up with PMD within 1 week of discharge Please bring this discharge paperwork to all follow up appointments.

## 2020-11-14 NOTE — DISCHARGE NOTE PROVIDER - NSFOLLOWUPCLINICS_GEN_ALL_ED_FT
No [facility] - Primary Care  Primary Care  [address] [place] [address]  Phone: [phone]  Fax:   Follow Up Time:

## 2020-11-14 NOTE — DISCHARGE NOTE PROVIDER - NSDCFUADDAPPT_GEN_ALL_CORE_FT
You will need to follow up with your primary medical doctor within one week of discharge - please call to make an appointment.  At this appointment, you will need a CBC drawn to monitor.    You will need to follow up with your hepatologist (Dr. Painter) within one week of discharge - please call to make an appointment.  At this appointment, you will need a CMP drawn to monitor.    You will need to follow up with your nephrologist (Dr. Parra) and your hematologist (Dr. Worrell) within 1-2 weeks of discharge - please call to make these appointments. You will need to follow up with your primary medical doctor within one week of discharge - please call to make an appointment.  At this appointment, you will need a CBC drawn to monitor.    You will need to follow up with your hepatologist (Dr. Painter) within one week of discharge - please call to make an appointment.  At this appointment, you will need a CMP drawn to monitor.  You will also need to discuss your current medications and if any changes need to be made.    You will need to follow up with your nephrologist (Dr. Parra) and your hematologist (Dr. Worrell) within 1-2 weeks of discharge - please call to make these appointments.

## 2020-11-14 NOTE — PROGRESS NOTE ADULT - SUBJECTIVE AND OBJECTIVE BOX
[facility] Division of Hospital Medicine  Mark Galeas DO  Pager (MK, 6D-4U): [phone]  Other Times:  [phone]    Patient is a 60y old  Male who presents with a chief complaint of hepatic encephalopathy (13 Nov 2020 22:03)    SUBJECTIVE / OVERNIGHT EVENTS: No acute events overnight. Patient seen and examined at bedside in the morning, endorses lower extremity edema and erythema but denies any chest pain, shortness of breath, abdominal pain, nausea, vomiting or diarrhea.    REVIEW OF SYSTEMS:    CONSTITUTIONAL: No weakness, fevers or chills  EYES/ENT: No visual changes;  No vertigo or throat pain   NECK: No pain or stiffness  RESPIRATORY: No cough, wheezing, hemoptysis; No shortness of breath  CARDIOVASCULAR: No chest pain or palpitations  GASTROINTESTINAL: No abdominal or epigastric pain. No nausea, vomiting, or hematemesis; No diarrhea or constipation. No melena or hematochezia.  GENITOURINARY: No dysuria, frequency or hematuria  NEUROLOGICAL: No numbness or weakness  SKIN: Lower extremity edema, erythema bilaterally  All other review of systems is negative unless indicated above.    MEDICATIONS  (STANDING):  albumin human 25% IVPB 50 milliLiter(s) IV Intermittent every 12 hours  atorvastatin 20 milliGRAM(s) Oral at bedtime  ceFAZolin   IVPB 2000 milliGRAM(s) IV Intermittent every 8 hours  ciprofloxacin     Tablet 500 milliGRAM(s) Oral every 24 hours  ferrous    sulfate 325 milliGRAM(s) Oral daily  furosemide   Injectable 60 milliGRAM(s) IV Push two times a day  influenza   Vaccine 0.5 milliLiter(s) IntraMuscular once  lactulose Syrup 20 Gram(s) Oral every 8 hours  lidocaine   Patch 1 Patch Transdermal daily  melatonin 5 milliGRAM(s) Oral at bedtime  midodrine. 10 milliGRAM(s) Oral three times a day  nystatin Powder 1 Application(s) Topical every 12 hours  pantoprazole    Tablet 40 milliGRAM(s) Oral before breakfast  rifAXIMin 550 milliGRAM(s) Oral two times a day  sucralfate 1 Gram(s) Oral four times a day    MEDICATIONS  (PRN):  acetaminophen   Tablet .. 650 milliGRAM(s) Oral every 6 hours PRN Mild Pain (1 - 3), Moderate Pain (4 - 6)  ALBUTerol    90 MICROgram(s) HFA Inhaler 2 Puff(s) Inhalation every 6 hours PRN coughing  benzonatate 100 milliGRAM(s) Oral every 8 hours PRN Cough  guaiFENesin   Syrup  (Sugar-Free) 100 milliGRAM(s) Oral every 6 hours PRN Cough  traMADol 50 milliGRAM(s) Oral three times a day PRN Severe Pain (7 - 10)      CAPILLARY BLOOD GLUCOSE        I&O's Summary    13 Nov 2020 07:01  -  14 Nov 2020 07:00  --------------------------------------------------------  IN: 700 mL / OUT: 1000 mL / NET: -300 mL    14 Nov 2020 07:01  -  14 Nov 2020 13:43  --------------------------------------------------------  IN: 360 mL / OUT: 400 mL / NET: -40 mL        PHYSICAL EXAM:  Vital Signs Last 24 Hrs  T(C): 36.9 (14 Nov 2020 05:13), Max: 37.2 (13 Nov 2020 20:49)  T(F): 98.4 (14 Nov 2020 05:13), Max: 99 (13 Nov 2020 20:49)  HR: 73 (14 Nov 2020 05:13) (73 - 82)  BP: 119/64 (14 Nov 2020 05:13) (119/64 - 126/64)  BP(mean): --  RR: 20 (14 Nov 2020 05:13) (19 - 20)  SpO2: 95% (14 Nov 2020 05:13) (95% - 96%)    CONSTITUTIONAL: NAD, well-developed, well-groomed  RESPIRATORY: Normal respiratory effort; lungs are clear to auscultation bilaterally  CARDIOVASCULAR: Regular rate and rhythm, normal S1 and S2, no murmur/rub/gallop; No lower extremity edema; Peripheral pulses are 2+ bilaterally  ABDOMEN: Nontender to palpation, normoactive bowel sounds, no rebound/guarding  PSYCH: A+O to person, place, and time; affect appropriate  NEUROLOGY: CN 2-12 are intact and symmetric; no gross sensory deficits   SKIN: Bilateral lower extremity erythema, warmth, edema    LABS:                        7.2    1.49  )-----------( 42       ( 14 Nov 2020 07:04 )             22.7     11-14    135  |  95<L>  |  18  ----------------------------<  84  3.8   |  28  |  1.54<H>    Ca    8.9      14 Nov 2020 07:04    TPro  6.9  /  Alb  3.9  /  TBili  4.4<H>  /  DBili  0.9<H>  /  AST  26  /  ALT  <5<L>  /  AlkPhos  91  11-14    PT/INR - ( 14 Nov 2020 12:01 )   PT: 30.8 sec;   INR: 2.71 ratio         PTT - ( 14 Nov 2020 12:01 )  PTT:54.6 sec

## 2020-11-14 NOTE — PROGRESS NOTE ADULT - ASSESSMENT
59M morbidly obese male w/ PMHx of Hepatic Cirrhosis, ascites, thrombocytopenia, CHF, HTN, HLD, recent admx for Hepatic Encephalopathy, noncompliant with lactulose who was admitted to OSH  for hepatic encephalopathy, acute blood lose anemia (?variceal bleed, GI ulcer) and possible SBP. He was transferred to [facility] for endoscopy since he is high risk for planned procedure. He is also here for evaluation by Liver Transplant team, now s/p EGD showing gastritis no varices, colonoscopy without any source of bleed, now s/p VCE without source of bleed. Now with uptrending Cr concerning for pre-renal vs. HRS. New B'/L LE cellulitis on ancef.

## 2020-11-14 NOTE — DISCHARGE NOTE PROVIDER - NSDCCPCAREPLAN_GEN_ALL_CORE_FT
PRINCIPAL DISCHARGE DIAGNOSIS  Diagnosis: Other cirrhosis of liver  Assessment and Plan of Treatment: Cirrhosis is scarring of the liver which can cause heavy bleeding, swelling, & breathing problems  Call your doctor with belly & leg swelling, shortness of breath, bruising or bleeding easily, feeling full, tired, trouble getting enough or too much sleep, yellowing of skin or whites of eye, sudden confusion, or coma  Causes may include heavy alcohol use, hepatitis B or C, or fatty liver.    You can help yourself by avoiding alcohol, speak with your doctor before starting on any new medication, herbs, vitamins, or supplements which may cause more damage to the liver  Treatment includes treating the cause, reducing blood pressure, low salt diet, diuretics, belly fluid drainage, treating infections, getting vaccinations to prevent common infections, &/or lactulose to improve confusion  It is important to get help if you have an alcohol problem, use condoms when having sex, and nor sharing drug needles if this applies to you      SECONDARY DISCHARGE DIAGNOSES  Diagnosis: Congestive heart failure, unspecified HF chronicity, unspecified heart failure type  Assessment and Plan of Treatment: Weigh yourself daily.  If you gain 3lbs in 3 days, or 5lbs in a week call your Health Care Provider.  Do not eat or drink foods containing more than 2000mg of salt (sodium) in your diet every day.  Call your Health Care Provider if you have any swelling or increased swelling in your feet, ankles, and/or stomach.  Take all of your medication as directed.  If you become dizzy call your Health Care Provider.    Diagnosis: Cellulitis of leg, right  Assessment and Plan of Treatment: Cellulitis is a skin infection caused by bacteria. This condition occurs most often in the arms and lower legs but can occur anywhere over the body. Symptoms include redness, swelling, warm skin, tenderness, and chills/fever. If you were prescribed an antibiotic medicine, take it as told by your health care provider. Do not stop taking the antibiotic even if you start to feel better.  SEEK IMMEDIATE MEDICAL CARE IF YOU HAVE ANY OF THE FOLLOWING SYMPTOMS: worsening fever, red streaks coming from affected area, vomiting or diarrhea, or dizziness/lightheadedness.     PRINCIPAL DISCHARGE DIAGNOSIS  Diagnosis: Other cirrhosis of liver  Assessment and Plan of Treatment: You will continue with Rifaximin, Lactulose, Cipro (for SBP prophylaxis), and your diuretis (lasix and aldactone) as prescribed.    You must follow up with your hepatologist, Dr. Painter, within one week of discharge - please call to make an appointment  Cirrhosis is scarring of the liver which can cause heavy bleeding, swelling, & breathing problems  Call your doctor with belly & leg swelling, shortness of breath, bruising or bleeding easily, feeling full, tired, trouble getting enough or too much sleep, yellowing of skin or whites of eye, sudden confusion, or coma  Causes may include heavy alcohol use, hepatitis B or C, or fatty liver.    You can help yourself by avoiding alcohol, speak with your doctor before starting on any new medication, herbs, vitamins, or supplements which may cause more damage to the liver  Treatment includes treating the cause, reducing blood pressure, low salt diet, diuretics, belly fluid drainage, treating infections, getting vaccinations to prevent common infections, &/or lactulose to improve confusion  It is important to get help if you have an alcohol problem, use condoms when having sex, and nor sharing drug needles if this applies to you      SECONDARY DISCHARGE DIAGNOSES  Diagnosis: Congestive heart failure, unspecified HF chronicity, unspecified heart failure type  Assessment and Plan of Treatment: Weigh yourself daily.  If you gain 3lbs in 3 days, or 5lbs in a week call your Health Care Provider.  Do not eat or drink foods containing more than 2000mg of salt (sodium) in your diet every day.  Call your Health Care Provider if you have any swelling or increased swelling in your feet, ankles, and/or stomach.  Take all of your medication as directed.  If you become dizzy call your Health Care Provider.    Diagnosis: MONI (acute kidney injury)  Assessment and Plan of Treatment: You will need to follow up with your nephrologist, Dr. Parra, within 1-2 weeks of discharge - please call to make an appointment.    Diagnosis: Pancytopenia  Assessment and Plan of Treatment: You must follow up with your hematologist, Dr. Worrell, within 1-2 weeks of discharge - please call to make an appointment.    Diagnosis: Lower extremity edema  Assessment and Plan of Treatment: You are being discharged on two diuretics, lasix and aldactone.    You must follow up with your primary medical doctor within one week of discharge - please call to make an appointment.    Diagnosis: Cellulitis of leg, right  Assessment and Plan of Treatment: You have completed your antibiotic course.  Cellulitis is a skin infection caused by bacteria. This condition occurs most often in the arms and lower legs but can occur anywhere over the body. Symptoms include redness, swelling, warm skin, tenderness, and chills/fever.   SEEK IMMEDIATE MEDICAL CARE IF YOU HAVE ANY OF THE FOLLOWING SYMPTOMS: worsening fever, red streaks coming from affected area, vomiting or diarrhea, or dizziness/lightheadedness.

## 2020-11-14 NOTE — DISCHARGE NOTE PROVIDER - NSDCMRMEDTOKEN_GEN_ALL_CORE_FT
atorvastatin 20 mg oral tablet: 1 tab(s) orally once a day  ferrous sulfate 325 mg (65 mg elemental iron) oral tablet: 1 tab(s) orally once a day  furosemide 40 mg oral tablet: 1 tab(s) orally 2 times a day  lactulose 10 g/15 mL oral syrup: 30 milliliter(s) orally 2 times a day  Omega-3 oral capsule: 1 cap(s) orally once a day  pantoprazole 40 mg intravenous injection: 40 milligram(s) intravenous every 12 hours  rifAXIMin 550 mg oral tablet: 1 tab(s) orally 2 times a day  spironolactone 100 mg oral tablet: 1 tab(s) orally once a day   atorvastatin 20 mg oral tablet: 1 tab(s) orally once a day  benzonatate 100 mg oral capsule: 1 cap(s) orally every 8 hours, As needed, Cough  ciprofloxacin 500 mg oral tablet: 1 tab(s) orally every 24 hours  ferrous sulfate 325 mg (65 mg elemental iron) oral tablet: 1 tab(s) orally once a day  furosemide 40 mg oral tablet: 1 tab(s) orally 2 times a day  guaiFENesin 100 mg/5 mL oral liquid: 5 milliliter(s) orally every 6 hours, As needed, Cough  lactulose 10 g/15 mL oral syrup: 30 milliliter(s) orally every 8 hours  midodrine 10 mg oral tablet: 1 tab(s) orally 3 times a day  nystatin 100,000 units/g topical powder: 1 application topically every 12 hours  Omega-3 oral capsule: 1 cap(s) orally once a day  pantoprazole 40 mg oral delayed release tablet: 1 tab(s) orally once a day (before a meal)  rifAXIMin 550 mg oral tablet: 1 tab(s) orally 2 times a day  spironolactone 100 mg oral tablet: 1 tab(s) orally once a day  sucralfate 1 g oral tablet: 1 tab(s) orally 4 times a day   atorvastatin 20 mg oral tablet: 1 tab(s) orally once a day  benzonatate 100 mg oral capsule: 1 cap(s) orally every 8 hours, As needed, Cough  ciprofloxacin 500 mg oral tablet: 1 tab(s) orally every 24 hours  ferrous sulfate 325 mg (65 mg elemental iron) oral tablet: 1 tab(s) orally once a day  furosemide 40 mg oral tablet: 1 tab(s) orally 3 times a day  guaiFENesin 100 mg/5 mL oral liquid: 5 milliliter(s) orally every 6 hours, As needed, Cough  lactulose 10 g/15 mL oral syrup: 30 milliliter(s) orally every 8 hours  midodrine 5 mg oral tablet: 1 tab(s) orally 3 times a day  nystatin 100,000 units/g topical powder: 1 application topically every 12 hours  Omega-3 oral capsule: 1 cap(s) orally once a day  pantoprazole 40 mg oral delayed release tablet: 1 tab(s) orally once a day (before a meal)  rifAXIMin 550 mg oral tablet: 1 tab(s) orally 2 times a day  spironolactone 100 mg oral tablet: 1 tab(s) orally once a day  sucralfate 1 g oral tablet: 1 tab(s) orally 4 times a day   atorvastatin 20 mg oral tablet: 1 tab(s) orally once a day  benzonatate 100 mg oral capsule: 1 cap(s) orally every 8 hours, As needed, Cough  ciprofloxacin 500 mg oral tablet: 1 tab(s) orally every 24 hours  ferrous sulfate 325 mg (65 mg elemental iron) oral tablet: 1 tab(s) orally once a day  furosemide 40 mg oral tablet: 1 tab(s) orally 3 times a day  guaiFENesin 100 mg/5 mL oral liquid: 5 milliliter(s) orally every 6 hours, As needed, Cough  lactulose 10 g/15 mL oral syrup: 30 milliliter(s) orally every 8 hours  midodrine 5 mg oral tablet: 1 tab(s) orally 3 times a day  nystatin 100,000 units/g topical powder: 1 application topically every 12 hours   To affected area  Omega-3 oral capsule: 1 cap(s) orally once a day  pantoprazole 40 mg oral delayed release tablet: 1 tab(s) orally once a day (before a meal)  rifAXIMin 550 mg oral tablet: 1 tab(s) orally 2 times a day  spironolactone 100 mg oral tablet: 1 tab(s) orally once a day  sucralfate 1 g oral tablet: 1 tab(s) orally 4 times a day

## 2020-11-14 NOTE — DISCHARGE NOTE PROVIDER - CARE PROVIDERS DIRECT ADDRESSES
,DirectAddress_Unknown ,DirectAddress_Unknown,[email or web address]

## 2020-11-14 NOTE — PROGRESS NOTE ADULT - PROBLEM SELECTOR PLAN 10
Transitions of Care Status:  1.  Name of PCP: Dr. White. Pt reports difficulty getting [facility] hepatology follow up.  2.  PCP Contacted on Admission: [ ] Y    [ ] N    3.  PCP contacted at Discharge: [ ] Y    [ ] N    [ ] N/A  4.  Post-Discharge Appointment Date and Location:  5.  Summary of Handoff given to PCP:    Dispo: likely home with home PT pending swelling improvement

## 2020-11-15 DIAGNOSIS — D61.818 OTHER PANCYTOPENIA: ICD-10-CM

## 2020-11-15 LAB
ALBUMIN SERPL ELPH-MCNC: 3.8 G/DL — SIGNIFICANT CHANGE UP (ref 3.3–5)
ALP SERPL-CCNC: 87 U/L — SIGNIFICANT CHANGE UP (ref 40–120)
ALT FLD-CCNC: <5 U/L — LOW (ref 10–45)
ANION GAP SERPL CALC-SCNC: 11 MMOL/L — SIGNIFICANT CHANGE UP (ref 5–17)
AST SERPL-CCNC: 27 U/L — SIGNIFICANT CHANGE UP (ref 10–40)
BILIRUB SERPL-MCNC: 4.2 MG/DL — HIGH (ref 0.2–1.2)
BUN SERPL-MCNC: 17 MG/DL — SIGNIFICANT CHANGE UP (ref 7–23)
CALCIUM SERPL-MCNC: 8.7 MG/DL — SIGNIFICANT CHANGE UP (ref 8.4–10.5)
CHLORIDE SERPL-SCNC: 97 MMOL/L — SIGNIFICANT CHANGE UP (ref 96–108)
CO2 SERPL-SCNC: 29 MMOL/L — SIGNIFICANT CHANGE UP (ref 22–31)
CREAT SERPL-MCNC: 1.54 MG/DL — HIGH (ref 0.5–1.3)
FOLATE SERPL-MCNC: 7.8 NG/ML — SIGNIFICANT CHANGE UP
GLUCOSE SERPL-MCNC: 87 MG/DL — SIGNIFICANT CHANGE UP (ref 70–99)
HCT VFR BLD CALC: 22.3 % — LOW (ref 39–50)
HGB BLD-MCNC: 7.1 G/DL — LOW (ref 13–17)
INR BLD: 2.84 RATIO — HIGH (ref 0.88–1.16)
MCHC RBC-ENTMCNC: 31.1 PG — SIGNIFICANT CHANGE UP (ref 27–34)
MCHC RBC-ENTMCNC: 31.8 GM/DL — LOW (ref 32–36)
MCV RBC AUTO: 97.8 FL — SIGNIFICANT CHANGE UP (ref 80–100)
NRBC # BLD: 0 /100 WBCS — SIGNIFICANT CHANGE UP (ref 0–0)
PLATELET # BLD AUTO: 39 K/UL — LOW (ref 150–400)
POTASSIUM SERPL-MCNC: 3.7 MMOL/L — SIGNIFICANT CHANGE UP (ref 3.5–5.3)
POTASSIUM SERPL-SCNC: 3.7 MMOL/L — SIGNIFICANT CHANGE UP (ref 3.5–5.3)
PROT SERPL-MCNC: 6.6 G/DL — SIGNIFICANT CHANGE UP (ref 6–8.3)
PROT SERPL-MCNC: 6.6 G/DL — SIGNIFICANT CHANGE UP (ref 6–8.3)
PROT SERPL-MCNC: 6.8 G/DL — SIGNIFICANT CHANGE UP (ref 6–8.3)
PROTHROM AB SERPL-ACNC: 31.9 SEC — HIGH (ref 10.6–13.6)
RBC # BLD: 2.28 M/UL — LOW (ref 4.2–5.8)
RBC # FLD: 19.7 % — HIGH (ref 10.3–14.5)
SODIUM SERPL-SCNC: 137 MMOL/L — SIGNIFICANT CHANGE UP (ref 135–145)
VIT B12 SERPL-MCNC: 1190 PG/ML — SIGNIFICANT CHANGE UP (ref 232–1245)
WBC # BLD: 1.58 K/UL — LOW (ref 3.8–10.5)
WBC # FLD AUTO: 1.58 K/UL — LOW (ref 3.8–10.5)

## 2020-11-15 PROCEDURE — 99233 SBSQ HOSP IP/OBS HIGH 50: CPT | Mod: GC

## 2020-11-15 RX ADMIN — PANTOPRAZOLE SODIUM 40 MILLIGRAM(S): 20 TABLET, DELAYED RELEASE ORAL at 05:52

## 2020-11-15 RX ADMIN — Medication 1 GRAM(S): at 11:35

## 2020-11-15 RX ADMIN — MIDODRINE HYDROCHLORIDE 10 MILLIGRAM(S): 2.5 TABLET ORAL at 06:02

## 2020-11-15 RX ADMIN — Medication 1 GRAM(S): at 05:52

## 2020-11-15 RX ADMIN — Medication 325 MILLIGRAM(S): at 11:35

## 2020-11-15 RX ADMIN — Medication 5 MILLIGRAM(S): at 21:16

## 2020-11-15 RX ADMIN — Medication 50 MILLILITER(S): at 18:13

## 2020-11-15 RX ADMIN — LACTULOSE 20 GRAM(S): 10 SOLUTION ORAL at 05:48

## 2020-11-15 RX ADMIN — Medication 1 GRAM(S): at 18:12

## 2020-11-15 RX ADMIN — TRAMADOL HYDROCHLORIDE 50 MILLIGRAM(S): 50 TABLET ORAL at 02:47

## 2020-11-15 RX ADMIN — Medication 100 MILLIGRAM(S): at 21:15

## 2020-11-15 RX ADMIN — NYSTATIN CREAM 1 APPLICATION(S): 100000 CREAM TOPICAL at 08:46

## 2020-11-15 RX ADMIN — Medication 60 MILLIGRAM(S): at 18:12

## 2020-11-15 RX ADMIN — Medication 100 MILLIGRAM(S): at 13:21

## 2020-11-15 RX ADMIN — ATORVASTATIN CALCIUM 20 MILLIGRAM(S): 80 TABLET, FILM COATED ORAL at 21:15

## 2020-11-15 RX ADMIN — Medication 60 MILLIGRAM(S): at 05:49

## 2020-11-15 RX ADMIN — Medication 100 MILLIGRAM(S): at 05:52

## 2020-11-15 RX ADMIN — Medication 50 MILLILITER(S): at 07:18

## 2020-11-15 RX ADMIN — Medication 500 MILLIGRAM(S): at 08:46

## 2020-11-15 RX ADMIN — MIDODRINE HYDROCHLORIDE 10 MILLIGRAM(S): 2.5 TABLET ORAL at 11:52

## 2020-11-15 RX ADMIN — LACTULOSE 20 GRAM(S): 10 SOLUTION ORAL at 13:21

## 2020-11-15 RX ADMIN — MIDODRINE HYDROCHLORIDE 10 MILLIGRAM(S): 2.5 TABLET ORAL at 18:12

## 2020-11-15 RX ADMIN — NYSTATIN CREAM 1 APPLICATION(S): 100000 CREAM TOPICAL at 20:47

## 2020-11-15 NOTE — PROGRESS NOTE ADULT - PROBLEM SELECTOR PLAN 8
hold pharmacologic dvt ppx given GI bleed and thrombocytopenia  SCDs Chronic back pain worse with hospital bed.  Tylenol at home   CW  Tramadol ( close monitoring as Tramadol is hepatically metabolized) .    Does not need pain meds on DC he attributes it to his lack of mobility in hospital.

## 2020-11-15 NOTE — PROGRESS NOTE ADULT - SUBJECTIVE AND OBJECTIVE BOX
Patient is a 60y old  Male who presents with a chief complaint of hepatic encephalopathy (14 Nov 2020 16:18)      SUBJECTIVE / OVERNIGHT EVENTS:    Tele reviewed:       ADDITIONAL REVIEW OF SYSTEMS: Negative except for above    MEDICATIONS  (STANDING):  albumin human 25% IVPB 50 milliLiter(s) IV Intermittent every 12 hours  atorvastatin 20 milliGRAM(s) Oral at bedtime  ceFAZolin   IVPB 2000 milliGRAM(s) IV Intermittent every 8 hours  ciprofloxacin     Tablet 500 milliGRAM(s) Oral every 24 hours  ferrous    sulfate 325 milliGRAM(s) Oral daily  furosemide   Injectable 60 milliGRAM(s) IV Push two times a day  influenza   Vaccine 0.5 milliLiter(s) IntraMuscular once  lactulose Syrup 20 Gram(s) Oral every 8 hours  lidocaine   Patch 1 Patch Transdermal daily  melatonin 5 milliGRAM(s) Oral at bedtime  midodrine. 10 milliGRAM(s) Oral three times a day  nystatin Powder 1 Application(s) Topical every 12 hours  pantoprazole    Tablet 40 milliGRAM(s) Oral before breakfast  rifAXIMin 550 milliGRAM(s) Oral two times a day  sucralfate 1 Gram(s) Oral four times a day    MEDICATIONS  (PRN):  acetaminophen   Tablet .. 650 milliGRAM(s) Oral every 6 hours PRN Mild Pain (1 - 3), Moderate Pain (4 - 6)  ALBUTerol    90 MICROgram(s) HFA Inhaler 2 Puff(s) Inhalation every 6 hours PRN coughing  benzonatate 100 milliGRAM(s) Oral every 8 hours PRN Cough  guaiFENesin   Syrup  (Sugar-Free) 100 milliGRAM(s) Oral every 6 hours PRN Cough  traMADol 50 milliGRAM(s) Oral three times a day PRN Severe Pain (7 - 10)      CAPILLARY BLOOD GLUCOSE        I&O's Summary    14 Nov 2020 07:01  -  15 Nov 2020 07:00  --------------------------------------------------------  IN: 1440 mL / OUT: 1725 mL / NET: -285 mL    15 Nov 2020 07:01  -  15 Nov 2020 14:03  --------------------------------------------------------  IN: 360 mL / OUT: 600 mL / NET: -240 mL        PHYSICAL EXAM:  Vital Signs Last 24 Hrs  T(C): 37 (15 Nov 2020 11:47), Max: 37.1 (14 Nov 2020 19:55)  T(F): 98.6 (15 Nov 2020 11:47), Max: 98.7 (14 Nov 2020 19:55)  HR: 73 (15 Nov 2020 11:47) (73 - 84)  BP: 108/57 (15 Nov 2020 11:47) (108/56 - 129/68)  BP(mean): --  RR: 18 (15 Nov 2020 11:47) (18 - 18)  SpO2: 98% (15 Nov 2020 11:47) (97% - 99%)    PHYSICAL EXAM:  GENERAL: NAD, well-developed  HEAD:  Atraumatic, Normocephalic  EYES:  conjunctiva and sclera clear  NECK: Supple, No JVD  CHEST/LUNG: Clear to auscultation bilaterally; No wheeze  HEART: Regular rate and rhythm; No murmurs, rubs, or gallops  ABDOMEN: Soft, Nontender, Nondistended; Bowel sounds present  EXTREMITIES:  2+ Peripheral Pulses, No clubbing, cyanosis, or edema  PSYCH: AAOx3  NEUROLOGY: non-focal  SKIN: No rashes or lesions      LABS:                        7.1    1.58  )-----------( 39       ( 15 Nov 2020 06:56 )             22.3     11-15    137  |  97  |  17  ----------------------------<  87  3.7   |  29  |  1.54<H>    Ca    8.7      15 Nov 2020 06:56    TPro  6.6  /  Alb  x   /  TBili  x   /  DBili  x   /  AST  x   /  ALT  x   /  AlkPhos  x   11-15    PT/INR - ( 15 Nov 2020 08:14 )   PT: 31.9 sec;   INR: 2.84 ratio         PTT - ( 14 Nov 2020 12:01 )  PTT:54.6 sec            RADIOLOGY & ADDITIONAL TESTS:    Imaging Personally Reviewed:    Electrocardiogram Personally Reviewed:    COORDINATION OF CARE:  Care Discussed with Consultants/Other Providers [Y/N]:  Prior or Outpatient Records Reviewed [Y/N]:     Patient is a 60y old  Male who presents with a chief complaint of hepatic encephalopathy (14 Nov 2020 16:18)      SUBJECTIVE / OVERNIGHT EVENTS:  No complaint presented .       ADDITIONAL REVIEW OF SYSTEMS: Negative except for above    MEDICATIONS  (STANDING):  albumin human 25% IVPB 50 milliLiter(s) IV Intermittent every 12 hours  atorvastatin 20 milliGRAM(s) Oral at bedtime  ceFAZolin   IVPB 2000 milliGRAM(s) IV Intermittent every 8 hours  ciprofloxacin     Tablet 500 milliGRAM(s) Oral every 24 hours  ferrous    sulfate 325 milliGRAM(s) Oral daily  furosemide   Injectable 60 milliGRAM(s) IV Push two times a day  influenza   Vaccine 0.5 milliLiter(s) IntraMuscular once  lactulose Syrup 20 Gram(s) Oral every 8 hours  lidocaine   Patch 1 Patch Transdermal daily  melatonin 5 milliGRAM(s) Oral at bedtime  midodrine. 10 milliGRAM(s) Oral three times a day  nystatin Powder 1 Application(s) Topical every 12 hours  pantoprazole    Tablet 40 milliGRAM(s) Oral before breakfast  rifAXIMin 550 milliGRAM(s) Oral two times a day  sucralfate 1 Gram(s) Oral four times a day    MEDICATIONS  (PRN):  acetaminophen   Tablet .. 650 milliGRAM(s) Oral every 6 hours PRN Mild Pain (1 - 3), Moderate Pain (4 - 6)  ALBUTerol    90 MICROgram(s) HFA Inhaler 2 Puff(s) Inhalation every 6 hours PRN coughing  benzonatate 100 milliGRAM(s) Oral every 8 hours PRN Cough  guaiFENesin   Syrup  (Sugar-Free) 100 milliGRAM(s) Oral every 6 hours PRN Cough  traMADol 50 milliGRAM(s) Oral three times a day PRN Severe Pain (7 - 10)      CAPILLARY BLOOD GLUCOSE        I&O's Summary    14 Nov 2020 07:01  -  15 Nov 2020 07:00  --------------------------------------------------------  IN: 1440 mL / OUT: 1725 mL / NET: -285 mL    15 Nov 2020 07:01  -  15 Nov 2020 14:03  --------------------------------------------------------  IN: 360 mL / OUT: 600 mL / NET: -240 mL        PHYSICAL EXAM:  Vital Signs Last 24 Hrs  T(C): 37 (15 Nov 2020 11:47), Max: 37.1 (14 Nov 2020 19:55)  T(F): 98.6 (15 Nov 2020 11:47), Max: 98.7 (14 Nov 2020 19:55)  HR: 73 (15 Nov 2020 11:47) (73 - 84)  BP: 108/57 (15 Nov 2020 11:47) (108/56 - 129/68)  BP(mean): --  RR: 18 (15 Nov 2020 11:47) (18 - 18)  SpO2: 98% (15 Nov 2020 11:47) (97% - 99%)    PHYSICAL EXAM:  CONSTITUTIONAL: NAD, well-developed, well-groomed  RESPIRATORY: Normal respiratory effort; lungs are clear to auscultation bilaterally  CARDIOVASCULAR: Regular rate and rhythm, normal S1 and S2, no murmur/rub/gallop; No lower extremity edema; Peripheral pulses are 2+ bilaterally  ABDOMEN: Nontender to palpation, normoactive bowel sounds, no rebound/guarding  PSYCH:  affect appropriate  NEUROLOGY: A+O to person, place, and time; no gross sensory deficits   SKIN: Bilateral lower extremity erythema, warmth, edema    LABS:                        7.1    1.58  )-----------( 39       ( 15 Nov 2020 06:56 )             22.3     11-15    137  |  97  |  17  ----------------------------<  87  3.7   |  29  |  1.54<H>    Ca    8.7      15 Nov 2020 06:56    TPro  6.6  /  Alb  x   /  TBili  x   /  DBili  x   /  AST  x   /  ALT  x   /  AlkPhos  x   11-15    PT/INR - ( 15 Nov 2020 08:14 )   PT: 31.9 sec;   INR: 2.84 ratio         PTT - ( 14 Nov 2020 12:01 )  PTT:54.6 sec            RADIOLOGY & ADDITIONAL TESTS:    Imaging Personally Reviewed:    Electrocardiogram Personally Reviewed:    COORDINATION OF CARE:  Care Discussed with Consultants/Other Providers [Y/N]:  Prior or Outpatient Records Reviewed [Y/N]:

## 2020-11-15 NOTE — PROGRESS NOTE ADULT - PROBLEM SELECTOR PLAN 9
Chronic back pain worse with hospital bed.  Tylenol at home will try Tramadol here.  Does not need pain meds on DC he attributes it to his lack of mobility in hospital. Transitions of Care Status:  1.  Name of PCP: Dr. White. Pt reports difficulty getting [facility] hepatology follow up.  2.  PCP Contacted on Admission: [ ] Y    [ ] N    3.  PCP contacted at Discharge: [ ] Y    [ ] N    [ ] N/A  4.  Post-Discharge Appointment Date and Location:  5.  Summary of Handoff given to PCP:    Dispo: likely home with home PT pending swelling improvement

## 2020-11-15 NOTE — PROGRESS NOTE ADULT - PROBLEM SELECTOR PLAN 1
Off vancomycin  appreciate ID recs  c/w Ancef 2gm q8hr end date to be determined by ID likely through sunday Off vancomycin  appreciate ID recs  c/w Ancef 2gm q8hr end date to be determined by ID

## 2020-11-15 NOTE — PROGRESS NOTE ADULT - PROBLEM SELECTOR PLAN 10
Transitions of Care Status:  1.  Name of PCP: Dr. White. Pt reports difficulty getting [facility] hepatology follow up.  2.  PCP Contacted on Admission: [ ] Y    [ ] N    3.  PCP contacted at Discharge: [ ] Y    [ ] N    [ ] N/A  4.  Post-Discharge Appointment Date and Location:  5.  Summary of Handoff given to PCP:    Dispo: likely home with home PT pending swelling improvement Was seen by Hem/ onc who recommended hemolysis profile   Hapto was low which could be from liver cirrhosis   will repeat Retic/ LDH. hapto and factor 8

## 2020-11-15 NOTE — PROGRESS NOTE ADULT - PROBLEM SELECTOR PLAN 4
- s/p egd and colonoscopy, no bleed source, VCE showing brown stool throughout without source of bleed. Likely hemorrhoidal- anemic again today  - Protonix PO daily  - c/w Carafate  - sbp ppx on cipro 500mg daily  -s/p 1 unit prbc - s/p egd and colonoscopy, no bleed source, VCE showing brown stool throughout without source of bleed. Likely hemorrhoidal- anemic again today  - Protonix PO daily  - c/w Carafate  - sbp ppx on cipro 500mg daily  -s/p 1 unit prbc  - Hem remains around 7.1 / F/up in AM

## 2020-11-15 NOTE — PROGRESS NOTE ADULT - ASSESSMENT
59M morbidly obese male w/ PMHx of Hepatic Cirrhosis, ascites, thrombocytopenia, CHF, HTN, HLD, recent admx for Hepatic Encephalopathy, noncompliant with lactulose who was admitted to OSH  for hepatic encephalopathy, acute blood lose anemia (?variceal bleed, GI ulcer) and possible SBP. He was transferred to [facility] for endoscopy since he is high risk for planned procedure. He is also here for evaluation by Liver Transplant team, now s/p EGD showing gastritis no varices, colonoscopy without any source of bleed, now s/p VCE without source of bleed. Now with uptrending Cr concerning for pre-renal vs. HRS. New B'/L LE cellulitis on ancef.

## 2020-11-16 LAB
BILIRUB DIRECT SERPL-MCNC: 0.9 MG/DL — HIGH (ref 0–0.2)
BILIRUB INDIRECT FLD-MCNC: 3.2 MG/DL — HIGH (ref 0.2–1)
BILIRUB SERPL-MCNC: 4.1 MG/DL — HIGH (ref 0.2–1.2)
HAPTOGLOB SERPL-MCNC: <20 MG/DL — LOW (ref 34–200)
HCT VFR BLD CALC: 22.1 % — LOW (ref 39–50)
HGB BLD-MCNC: 7 G/DL — CRITICAL LOW (ref 13–17)
IGA FLD-MCNC: 843 MG/DL — HIGH (ref 84–499)
IGG FLD-MCNC: 2165 MG/DL — HIGH (ref 610–1660)
IGM SERPL-MCNC: 85 MG/DL — SIGNIFICANT CHANGE UP (ref 35–242)
KAPPA LC SER QL IFE: 17.09 MG/DL — HIGH (ref 0.33–1.94)
KAPPA LC SER QL IFE: 17.09 MG/DL — HIGH (ref 0.33–1.94)
KAPPA/LAMBDA FREE LIGHT CHAIN RATIO, SERUM: 1.26 RATIO — SIGNIFICANT CHANGE UP (ref 0.26–1.65)
KAPPA/LAMBDA FREE LIGHT CHAIN RATIO, SERUM: 1.26 RATIO — SIGNIFICANT CHANGE UP (ref 0.26–1.65)
LAMBDA LC SER QL IFE: 13.53 MG/DL — HIGH (ref 0.57–2.63)
LAMBDA LC SER QL IFE: 13.53 MG/DL — HIGH (ref 0.57–2.63)
LDH SERPL L TO P-CCNC: 181 U/L — SIGNIFICANT CHANGE UP (ref 50–242)
MCHC RBC-ENTMCNC: 31.4 PG — SIGNIFICANT CHANGE UP (ref 27–34)
MCHC RBC-ENTMCNC: 31.7 GM/DL — LOW (ref 32–36)
MCV RBC AUTO: 99.1 FL — SIGNIFICANT CHANGE UP (ref 80–100)
NRBC # BLD: 0 /100 WBCS — SIGNIFICANT CHANGE UP (ref 0–0)
PLATELET # BLD AUTO: 38 K/UL — LOW (ref 150–400)
PROT SERPL-MCNC: 6.6 G/DL — SIGNIFICANT CHANGE UP (ref 6–8.3)
PROT SERPL-MCNC: 6.6 G/DL — SIGNIFICANT CHANGE UP (ref 6–8.3)
RBC # BLD: 2.22 M/UL — LOW (ref 4.2–5.8)
RBC # BLD: 2.23 M/UL — LOW (ref 4.2–5.8)
RBC # FLD: 19.6 % — HIGH (ref 10.3–14.5)
RETICS #: 92.6 K/UL — SIGNIFICANT CHANGE UP (ref 25–125)
RETICS/RBC NFR: 4.2 % — HIGH (ref 0.5–2.5)
WBC # BLD: 1.56 K/UL — LOW (ref 3.8–10.5)
WBC # FLD AUTO: 1.56 K/UL — LOW (ref 3.8–10.5)

## 2020-11-16 PROCEDURE — 99232 SBSQ HOSP IP/OBS MODERATE 35: CPT

## 2020-11-16 RX ORDER — SPIRONOLACTONE 25 MG/1
100 TABLET, FILM COATED ORAL DAILY
Refills: 0 | Status: DISCONTINUED | OUTPATIENT
Start: 2020-11-16 | End: 2020-11-18

## 2020-11-16 RX ORDER — SUCRALFATE 1 G
1 TABLET ORAL
Qty: 0 | Refills: 0 | DISCHARGE
Start: 2020-11-16

## 2020-11-16 RX ORDER — NYSTATIN CREAM 100000 [USP'U]/G
1 CREAM TOPICAL
Qty: 0 | Refills: 0 | DISCHARGE
Start: 2020-11-16

## 2020-11-16 RX ORDER — PANTOPRAZOLE SODIUM 20 MG/1
1 TABLET, DELAYED RELEASE ORAL
Qty: 0 | Refills: 0 | DISCHARGE
Start: 2020-11-16

## 2020-11-16 RX ORDER — MIDODRINE HYDROCHLORIDE 2.5 MG/1
1 TABLET ORAL
Qty: 0 | Refills: 0 | DISCHARGE
Start: 2020-11-16

## 2020-11-16 RX ORDER — CIPROFLOXACIN LACTATE 400MG/40ML
1 VIAL (ML) INTRAVENOUS
Qty: 0 | Refills: 0 | DISCHARGE
Start: 2020-11-16

## 2020-11-16 RX ORDER — LACTULOSE 10 G/15ML
30 SOLUTION ORAL
Qty: 0 | Refills: 0 | DISCHARGE

## 2020-11-16 RX ORDER — ALBUMIN HUMAN 25 %
50 VIAL (ML) INTRAVENOUS EVERY 6 HOURS
Refills: 0 | Status: DISCONTINUED | OUTPATIENT
Start: 2020-11-16 | End: 2020-11-17

## 2020-11-16 RX ORDER — LACTULOSE 10 G/15ML
30 SOLUTION ORAL
Qty: 0 | Refills: 0 | DISCHARGE
Start: 2020-11-16

## 2020-11-16 RX ADMIN — MIDODRINE HYDROCHLORIDE 10 MILLIGRAM(S): 2.5 TABLET ORAL at 06:42

## 2020-11-16 RX ADMIN — Medication 1 GRAM(S): at 17:38

## 2020-11-16 RX ADMIN — Medication 50 MILLILITER(S): at 08:25

## 2020-11-16 RX ADMIN — LACTULOSE 20 GRAM(S): 10 SOLUTION ORAL at 06:41

## 2020-11-16 RX ADMIN — ATORVASTATIN CALCIUM 20 MILLIGRAM(S): 80 TABLET, FILM COATED ORAL at 21:33

## 2020-11-16 RX ADMIN — PANTOPRAZOLE SODIUM 40 MILLIGRAM(S): 20 TABLET, DELAYED RELEASE ORAL at 06:42

## 2020-11-16 RX ADMIN — NYSTATIN CREAM 1 APPLICATION(S): 100000 CREAM TOPICAL at 08:27

## 2020-11-16 RX ADMIN — Medication 325 MILLIGRAM(S): at 12:00

## 2020-11-16 RX ADMIN — Medication 60 MILLIGRAM(S): at 17:34

## 2020-11-16 RX ADMIN — MIDODRINE HYDROCHLORIDE 10 MILLIGRAM(S): 2.5 TABLET ORAL at 17:38

## 2020-11-16 RX ADMIN — Medication 500 MILLIGRAM(S): at 08:25

## 2020-11-16 RX ADMIN — SPIRONOLACTONE 100 MILLIGRAM(S): 25 TABLET, FILM COATED ORAL at 14:30

## 2020-11-16 RX ADMIN — Medication 5 MILLIGRAM(S): at 21:33

## 2020-11-16 RX ADMIN — Medication 50 MILLILITER(S): at 13:05

## 2020-11-16 RX ADMIN — Medication 1 GRAM(S): at 06:42

## 2020-11-16 RX ADMIN — Medication 1 GRAM(S): at 12:01

## 2020-11-16 RX ADMIN — Medication 1 GRAM(S): at 00:10

## 2020-11-16 RX ADMIN — Medication 60 MILLIGRAM(S): at 06:42

## 2020-11-16 RX ADMIN — Medication 100 MILLIGRAM(S): at 06:41

## 2020-11-16 RX ADMIN — LACTULOSE 20 GRAM(S): 10 SOLUTION ORAL at 14:30

## 2020-11-16 RX ADMIN — Medication 50 MILLILITER(S): at 21:31

## 2020-11-16 RX ADMIN — MIDODRINE HYDROCHLORIDE 10 MILLIGRAM(S): 2.5 TABLET ORAL at 12:03

## 2020-11-16 RX ADMIN — NYSTATIN CREAM 1 APPLICATION(S): 100000 CREAM TOPICAL at 21:21

## 2020-11-16 NOTE — CHART NOTE - NSCHARTNOTEFT_GEN_A_CORE
Pt's hemolysis labs were negative with normal LDH. Low haptoglobin in the setting of liver cirrhosis. B12, folate WNL. Pt has low count likely from BM supression from ETOH  use. However the plts are relatively stable. Pt can be discharged if medically cleared to follow up with [facility].      Brandon Eid MD. PGY 6  Heme-Onc fellow  [phone]; [id number]

## 2020-11-16 NOTE — PROGRESS NOTE ADULT - PROBLEM SELECTOR PLAN 9
Transitions of Care Status:  1.  Name of PCP: Dr. White. Pt reports difficulty getting [facility] hepatology follow up.  2.  PCP Contacted on Admission: [ ] Y    [ ] N    3.  PCP contacted at Discharge: [ ] Y    [ ] N    [ ] N/A  4.  Post-Discharge Appointment Date and Location:  5.  Summary of Handoff given to PCP:    Dispo: likely home with home PT tomorrow. Discussed dc plan with hepatology, heme/onc, pt's brother Fritz, patient and floor NP. All in agreement. Will provide [facility] hepatology and [facility] cancer center referral on discharge.

## 2020-11-16 NOTE — PROGRESS NOTE ADULT - ASSESSMENT
59M morbidly obese male w/ PMHx of Hepatic Cirrhosis, ascites, thrombocytopenia, CHF, HTN, HLD, recent admx for Hepatic Encephalopathy, noncompliant with lactulose who was admitted to OSH  for hepatic encephalopathy, acute blood lose anemia (?variceal bleed, GI ulcer) and possible SBP. He was transferred to [facility] for endoscopy since he is high risk for planned procedure. He is also here for evaluation by Liver Transplant team, now s/p EGD showing gastritis no varices, colonoscopy without any source of bleed, now s/p VCE without source of bleed. Course also complicated by cellulitis, now treated s/p antibiotics, now with stable creatinine and no further inpatient hepatology recs, stable for discharge home with home PT

## 2020-11-16 NOTE — PROGRESS NOTE ADULT - PROBLEM SELECTOR PLAN 8
Chronic back pain worse with hospital bed.  Tylenol at home   CW  Tramadol ( close monitoring as Tramadol is hepatically metabolized) .    Does not need pain meds on DC he attributes it to his lack of mobility in hospital.

## 2020-11-16 NOTE — PROGRESS NOTE ADULT - SUBJECTIVE AND OBJECTIVE BOX
Lewis Leon MD  Division of Hospital Medicine  Pager: [phone]  If no response or off-hours, page [phone]  -------------------------------------    Patient is a 60y old  Male who presents with a chief complaint of hepatic encephalopathy (15 Nov 2020 14:02)      SUBJECTIVE / OVERNIGHT EVENTS: none acute  ADDITIONAL REVIEW OF SYSTEMS:    MEDICATIONS  (STANDING):  albumin human 25% IVPB 50 milliLiter(s) IV Intermittent every 6 hours  atorvastatin 20 milliGRAM(s) Oral at bedtime  ciprofloxacin     Tablet 500 milliGRAM(s) Oral every 24 hours  ferrous    sulfate 325 milliGRAM(s) Oral daily  furosemide   Injectable 60 milliGRAM(s) IV Push two times a day  influenza   Vaccine 0.5 milliLiter(s) IntraMuscular once  lactulose Syrup 20 Gram(s) Oral every 8 hours  lidocaine   Patch 1 Patch Transdermal daily  melatonin 5 milliGRAM(s) Oral at bedtime  midodrine. 10 milliGRAM(s) Oral three times a day  nystatin Powder 1 Application(s) Topical every 12 hours  pantoprazole    Tablet 40 milliGRAM(s) Oral before breakfast  rifAXIMin 550 milliGRAM(s) Oral two times a day  spironolactone 100 milliGRAM(s) Oral daily  sucralfate 1 Gram(s) Oral four times a day    MEDICATIONS  (PRN):  acetaminophen   Tablet .. 650 milliGRAM(s) Oral every 6 hours PRN Mild Pain (1 - 3), Moderate Pain (4 - 6)  ALBUTerol    90 MICROgram(s) HFA Inhaler 2 Puff(s) Inhalation every 6 hours PRN coughing  benzonatate 100 milliGRAM(s) Oral every 8 hours PRN Cough  guaiFENesin   Syrup  (Sugar-Free) 100 milliGRAM(s) Oral every 6 hours PRN Cough  traMADol 50 milliGRAM(s) Oral three times a day PRN Severe Pain (7 - 10)      CAPILLARY BLOOD GLUCOSE        I&O's Summary    15 Nov 2020 07:01  -  16 Nov 2020 07:00  --------------------------------------------------------  IN: 1180 mL / OUT: 1850 mL / NET: -670 mL    16 Nov 2020 07:01  -  16 Nov 2020 14:17  --------------------------------------------------------  IN: 480 mL / OUT: 300 mL / NET: 180 mL        PHYSICAL EXAM:  Vital Signs Last 24 Hrs  T(C): 36.8 (16 Nov 2020 13:55), Max: 37.7 (15 Nov 2020 20:56)  T(F): 98.3 (16 Nov 2020 13:55), Max: 99.8 (15 Nov 2020 20:56)  HR: 76 (16 Nov 2020 13:55) (71 - 85)  BP: 120/63 (16 Nov 2020 13:55) (118/67 - 122/57)  BP(mean): --  RR: 18 (16 Nov 2020 13:55) (18 - 18)  SpO2: 97% (16 Nov 2020 13:55) (95% - 97%)  CONSTITUTIONAL: NAD, well-developed, well-groomed  EYES: PERRLA; conjunctiva and sclera clear  ENMT: Moist oral mucosa, no pharyngeal injection or exudates; normal dentition  NECK: Supple, no palpable masses; no thyromegaly  RESPIRATORY: Normal respiratory effort; lungs are clear to auscultation bilaterally  CARDIOVASCULAR: Regular rate and rhythm, normal S1 and S2, no murmur/rub/gallop; No lower extremity edema; Peripheral pulses are 2+ bilaterally  ABDOMEN: Nontender to palpation, normoactive bowel sounds, no rebound/guarding; No hepatosplenomegaly  MUSCLOSKELETAL:  Normal gait; no clubbing or cyanosis of digits; no joint swelling or tenderness to palpation  PSYCH: A+O to person, place, and time; affect appropriate  NEUROLOGY: CN 2-12 are intact and symmetric; no gross sensory deficits;   SKIN: No rashes; no palpable lesions    LABS:                        7.0    1.56  )-----------( 38       ( 16 Nov 2020 07:11 )             22.1     11-15    137  |  97  |  17  ----------------------------<  87  3.7   |  29  |  1.54<H>    Ca    8.7      15 Nov 2020 06:56    TPro  6.6  /  Alb  x   /  TBili  x   /  DBili  x   /  AST  x   /  ALT  x   /  AlkPhos  x   11-16    PT/INR - ( 15 Nov 2020 08:14 )   PT: 31.9 sec;   INR: 2.84 ratio        RADIOLOGY & ADDITIONAL TESTS:  Results Reviewed:   Imaging Personally Reviewed:  Electrocardiogram Personally Reviewed:    COORDINATION OF CARE:  Care Discussed with Consultants/Other Providers [Y/N]: hepatology, Dr. White  Prior or Outpatient Records Reviewed [Y/N]:

## 2020-11-16 NOTE — PROGRESS NOTE ADULT - PROBLEM SELECTOR PLAN 6
Cr elevated, presumed to be from intravascular depletion. Baseline appears to be around 1.4. Currently stabe at 1.5  - renal US - has ruled out hydro, unremarkable kidneys  - c/w midodrine to 10mg tid /dc octretide/ for presumed HRS but likely due to fluid overload  - PVRs have been normal  - strict I/O monitoring

## 2020-11-16 NOTE — PROGRESS NOTE ADULT - PROBLEM SELECTOR PLAN 10
Was seen by Hem/ onc who recommended hemolysis profile   Hapto was low which could be from liver cirrhosis   will repeat Retic/ LDH. hapto and factor 8

## 2020-11-16 NOTE — PROGRESS NOTE ADULT - PROBLEM SELECTOR PLAN 4
- s/p egd and colonoscopy, no bleed source, VCE showing brown stool throughout without source of bleed. Likely hemorrhoidal- anemic again today  - Protonix PO daily  - c/w Carafate  - sbp ppx on cipro 500mg daily  -s/p 1 unit prbc, h/h now stable

## 2020-11-17 LAB
% ALBUMIN: 53.7 % — SIGNIFICANT CHANGE UP
% ALBUMIN: 54.9 % — SIGNIFICANT CHANGE UP
% ALPHA 1: 2.3 % — SIGNIFICANT CHANGE UP
% ALPHA 1: 2.3 % — SIGNIFICANT CHANGE UP
% ALPHA 2: 2.3 % — SIGNIFICANT CHANGE UP
% ALPHA 2: 2.8 % — SIGNIFICANT CHANGE UP
% BETA: 7.2 % — SIGNIFICANT CHANGE UP
% BETA: 7.8 % — SIGNIFICANT CHANGE UP
% GAMMA: 33.3 % — SIGNIFICANT CHANGE UP
% GAMMA: 33.4 % — SIGNIFICANT CHANGE UP
% M SPIKE: 5.1 % — SIGNIFICANT CHANGE UP
% M SPIKE: 5.2 % — SIGNIFICANT CHANGE UP
ALBUMIN SERPL ELPH-MCNC: 3.5 G/DL — LOW (ref 3.6–5.5)
ALBUMIN SERPL ELPH-MCNC: 3.6 G/DL — SIGNIFICANT CHANGE UP (ref 3.6–5.5)
ALBUMIN SERPL ELPH-MCNC: 3.7 G/DL — SIGNIFICANT CHANGE UP (ref 3.3–5)
ALBUMIN/GLOB SERPL ELPH: 1.1 RATIO — SIGNIFICANT CHANGE UP
ALBUMIN/GLOB SERPL ELPH: 1.2 RATIO — SIGNIFICANT CHANGE UP
ALP SERPL-CCNC: 88 U/L — SIGNIFICANT CHANGE UP (ref 40–120)
ALPHA1 GLOB SERPL ELPH-MCNC: 0.2 G/DL — SIGNIFICANT CHANGE UP (ref 0.1–0.4)
ALPHA1 GLOB SERPL ELPH-MCNC: 0.2 G/DL — SIGNIFICANT CHANGE UP (ref 0.1–0.4)
ALPHA2 GLOB SERPL ELPH-MCNC: 0.2 G/DL — LOW (ref 0.5–1)
ALPHA2 GLOB SERPL ELPH-MCNC: 0.2 G/DL — LOW (ref 0.5–1)
ALT FLD-CCNC: 6 U/L — LOW (ref 10–45)
ANION GAP SERPL CALC-SCNC: 9 MMOL/L — SIGNIFICANT CHANGE UP (ref 5–17)
AST SERPL-CCNC: 30 U/L — SIGNIFICANT CHANGE UP (ref 10–40)
B-GLOBULIN SERPL ELPH-MCNC: 0.5 G/DL — SIGNIFICANT CHANGE UP (ref 0.5–1)
B-GLOBULIN SERPL ELPH-MCNC: 0.5 G/DL — SIGNIFICANT CHANGE UP (ref 0.5–1)
BILIRUB SERPL-MCNC: 4.3 MG/DL — HIGH (ref 0.2–1.2)
BLD GP AB SCN SERPL QL: NEGATIVE — SIGNIFICANT CHANGE UP
BUN SERPL-MCNC: 17 MG/DL — SIGNIFICANT CHANGE UP (ref 7–23)
CALCIUM SERPL-MCNC: 9.1 MG/DL — SIGNIFICANT CHANGE UP (ref 8.4–10.5)
CHLORIDE SERPL-SCNC: 96 MMOL/L — SIGNIFICANT CHANGE UP (ref 96–108)
CO2 SERPL-SCNC: 31 MMOL/L — SIGNIFICANT CHANGE UP (ref 22–31)
CREAT SERPL-MCNC: 1.55 MG/DL — HIGH (ref 0.5–1.3)
FACT XIII ACT/NOR PPP CHRO: 28 % — LOW (ref 51–163)
GAMMA GLOBULIN: 2.2 G/DL — HIGH (ref 0.6–1.6)
GAMMA GLOBULIN: 2.2 G/DL — HIGH (ref 0.6–1.6)
GLUCOSE SERPL-MCNC: 90 MG/DL — SIGNIFICANT CHANGE UP (ref 70–99)
HAPTOGLOB SERPL-MCNC: <20 MG/DL — LOW (ref 34–200)
HCT VFR BLD CALC: 21.4 % — LOW (ref 39–50)
HCT VFR BLD CALC: 23 % — LOW (ref 39–50)
HGB BLD-MCNC: 6.8 G/DL — CRITICAL LOW (ref 13–17)
HGB BLD-MCNC: 7.5 G/DL — LOW (ref 13–17)
INR BLD: 3.01 RATIO — HIGH (ref 0.88–1.16)
INTERPRETATION SERPL IFE-IMP: SIGNIFICANT CHANGE UP
INTERPRETATION SERPL IFE-IMP: SIGNIFICANT CHANGE UP
M-SPIKE: 0.3 G/DL — HIGH (ref 0–0)
M-SPIKE: 0.3 G/DL — HIGH (ref 0–0)
MCHC RBC-ENTMCNC: 31.1 PG — SIGNIFICANT CHANGE UP (ref 27–34)
MCHC RBC-ENTMCNC: 31.4 PG — SIGNIFICANT CHANGE UP (ref 27–34)
MCHC RBC-ENTMCNC: 31.8 GM/DL — LOW (ref 32–36)
MCHC RBC-ENTMCNC: 32.6 GM/DL — SIGNIFICANT CHANGE UP (ref 32–36)
MCV RBC AUTO: 96.2 FL — SIGNIFICANT CHANGE UP (ref 80–100)
MCV RBC AUTO: 97.7 FL — SIGNIFICANT CHANGE UP (ref 80–100)
NRBC # BLD: 0 /100 WBCS — SIGNIFICANT CHANGE UP (ref 0–0)
NRBC # BLD: 0 /100 WBCS — SIGNIFICANT CHANGE UP (ref 0–0)
PLATELET # BLD AUTO: 39 K/UL — LOW (ref 150–400)
PLATELET # BLD AUTO: 40 K/UL — LOW (ref 150–400)
POTASSIUM SERPL-MCNC: 3.6 MMOL/L — SIGNIFICANT CHANGE UP (ref 3.5–5.3)
POTASSIUM SERPL-SCNC: 3.6 MMOL/L — SIGNIFICANT CHANGE UP (ref 3.5–5.3)
PROT PATTERN SERPL ELPH-IMP: SIGNIFICANT CHANGE UP
PROT PATTERN SERPL ELPH-IMP: SIGNIFICANT CHANGE UP
PROT SERPL-MCNC: 6.9 G/DL — SIGNIFICANT CHANGE UP (ref 6–8.3)
PROTHROM AB SERPL-ACNC: 33.7 SEC — HIGH (ref 10.6–13.6)
RBC # BLD: 2.19 M/UL — LOW (ref 4.2–5.8)
RBC # BLD: 2.39 M/UL — LOW (ref 4.2–5.8)
RBC # FLD: 19.7 % — HIGH (ref 10.3–14.5)
RBC # FLD: 19.7 % — HIGH (ref 10.3–14.5)
RH IG SCN BLD-IMP: NEGATIVE — SIGNIFICANT CHANGE UP
SODIUM SERPL-SCNC: 136 MMOL/L — SIGNIFICANT CHANGE UP (ref 135–145)
WBC # BLD: 1.55 K/UL — LOW (ref 3.8–10.5)
WBC # BLD: 2.17 K/UL — LOW (ref 3.8–10.5)
WBC # FLD AUTO: 1.55 K/UL — LOW (ref 3.8–10.5)
WBC # FLD AUTO: 2.17 K/UL — LOW (ref 3.8–10.5)

## 2020-11-17 PROCEDURE — 93970 EXTREMITY STUDY: CPT | Mod: 26

## 2020-11-17 PROCEDURE — 99233 SBSQ HOSP IP/OBS HIGH 50: CPT

## 2020-11-17 RX ORDER — FUROSEMIDE 40 MG
60 TABLET ORAL THREE TIMES A DAY
Refills: 0 | Status: DISCONTINUED | OUTPATIENT
Start: 2020-11-17 | End: 2020-11-18

## 2020-11-17 RX ORDER — MIDODRINE HYDROCHLORIDE 2.5 MG/1
5 TABLET ORAL THREE TIMES A DAY
Refills: 0 | Status: DISCONTINUED | OUTPATIENT
Start: 2020-11-17 | End: 2020-11-18

## 2020-11-17 RX ADMIN — Medication 5 MILLIGRAM(S): at 21:43

## 2020-11-17 RX ADMIN — TRAMADOL HYDROCHLORIDE 50 MILLIGRAM(S): 50 TABLET ORAL at 21:41

## 2020-11-17 RX ADMIN — Medication 325 MILLIGRAM(S): at 11:54

## 2020-11-17 RX ADMIN — Medication 1 GRAM(S): at 11:54

## 2020-11-17 RX ADMIN — Medication 1 GRAM(S): at 06:48

## 2020-11-17 RX ADMIN — Medication 1 GRAM(S): at 01:33

## 2020-11-17 RX ADMIN — Medication 60 MILLIGRAM(S): at 06:48

## 2020-11-17 RX ADMIN — PANTOPRAZOLE SODIUM 40 MILLIGRAM(S): 20 TABLET, DELAYED RELEASE ORAL at 06:48

## 2020-11-17 RX ADMIN — Medication 60 MILLIGRAM(S): at 15:21

## 2020-11-17 RX ADMIN — Medication 60 MILLIGRAM(S): at 21:42

## 2020-11-17 RX ADMIN — MIDODRINE HYDROCHLORIDE 10 MILLIGRAM(S): 2.5 TABLET ORAL at 11:55

## 2020-11-17 RX ADMIN — LACTULOSE 20 GRAM(S): 10 SOLUTION ORAL at 21:42

## 2020-11-17 RX ADMIN — SPIRONOLACTONE 100 MILLIGRAM(S): 25 TABLET, FILM COATED ORAL at 06:48

## 2020-11-17 RX ADMIN — ATORVASTATIN CALCIUM 20 MILLIGRAM(S): 80 TABLET, FILM COATED ORAL at 21:42

## 2020-11-17 RX ADMIN — TRAMADOL HYDROCHLORIDE 50 MILLIGRAM(S): 50 TABLET ORAL at 22:42

## 2020-11-17 RX ADMIN — MIDODRINE HYDROCHLORIDE 10 MILLIGRAM(S): 2.5 TABLET ORAL at 06:48

## 2020-11-17 RX ADMIN — NYSTATIN CREAM 1 APPLICATION(S): 100000 CREAM TOPICAL at 21:44

## 2020-11-17 RX ADMIN — Medication 50 MILLILITER(S): at 06:50

## 2020-11-17 RX ADMIN — Medication 1 GRAM(S): at 17:35

## 2020-11-17 RX ADMIN — Medication 500 MILLIGRAM(S): at 09:24

## 2020-11-17 RX ADMIN — NYSTATIN CREAM 1 APPLICATION(S): 100000 CREAM TOPICAL at 09:25

## 2020-11-17 RX ADMIN — Medication 50 MILLILITER(S): at 01:33

## 2020-11-17 NOTE — PROGRESS NOTE ADULT - ASSESSMENT
59M morbidly obese male w/ PMHx of Hepatic Cirrhosis, ascites, thrombocytopenia, CHF, HTN, HLD, recent admx for Hepatic Encephalopathy, noncompliant with lactulose who was admitted to OSH  for hepatic encephalopathy, acute blood lose anemia (?variceal bleed, GI ulcer) and possible SBP. He was transferred to [facility] for endoscopy since he is high risk for planned procedure. He is also here for evaluation by Liver Transplant team, now s/p EGD showing gastritis no varices, colonoscopy without any source of bleed, now s/p VCE without source of bleed. Course also complicated by cellulitis, now treated s/p antibiotics, now with stable creatinine and no further inpatient hepatology recs,  stable for discharge home with home PT after doppler and transfusion of 1 unit prbc for bm suppression and anemia

## 2020-11-17 NOTE — PROGRESS NOTE ADULT - PROBLEM SELECTOR PLAN 10
Was seen by Hem/ onc who recommended hemolysis profile- not consistent with hemolysis, likely 2/2 BM suppression from cirrhosis.  - f/u OP on discharge, pt likely to need intermittent transfusions

## 2020-11-17 NOTE — PROGRESS NOTE ADULT - PROBLEM SELECTOR PLAN 4
- s/p egd and colonoscopy, no bleed source, VCE showing brown stool throughout without source of bleed. Likely hemorrhoidal- anemic again today, without clinical signs of bleeding- suspect 2/2 BM suppression likely 2/2 cirrhosis  - transfuse 1 unit prbc 11/17  - Protonix PO daily  - c/w Carafate  - sbp ppx on cipro 500mg daily

## 2020-11-17 NOTE — PROGRESS NOTE ADULT - SUBJECTIVE AND OBJECTIVE BOX
Lewis Leon MD  Division of Hospital Medicine  Pager: [phone]  If no response or off-hours, page [phone]  -------------------------------------    Patient is a 60y old  Male who presents with a chief complaint of hepatic encephalopathy (16 Nov 2020 14:16)      SUBJECTIVE / OVERNIGHT EVENTS: none acute  ADDITIONAL REVIEW OF SYSTEMS: pt notes feeling comfortable overall- no current aches/pains, no fevers/chills, no sob/cough, no cp/palpitations. States no one has explained that his liver disease is not reversible. Also states that he doesn't see himself changing his diet much as he traditionally has never eaten healthy. Notes worsening leg swelling, stable redness. ros otherwise negative.     MEDICATIONS  (STANDING):  atorvastatin 20 milliGRAM(s) Oral at bedtime  ciprofloxacin     Tablet 500 milliGRAM(s) Oral every 24 hours  ferrous    sulfate 325 milliGRAM(s) Oral daily  furosemide   Injectable 60 milliGRAM(s) IV Push three times a day  influenza   Vaccine 0.5 milliLiter(s) IntraMuscular once  lactulose Syrup 20 Gram(s) Oral every 8 hours  lidocaine   Patch 1 Patch Transdermal daily  melatonin 5 milliGRAM(s) Oral at bedtime  midodrine. 5 milliGRAM(s) Oral three times a day  nystatin Powder 1 Application(s) Topical every 12 hours  pantoprazole    Tablet 40 milliGRAM(s) Oral before breakfast  rifAXIMin 550 milliGRAM(s) Oral two times a day  spironolactone 100 milliGRAM(s) Oral daily  sucralfate 1 Gram(s) Oral four times a day    MEDICATIONS  (PRN):  acetaminophen   Tablet .. 650 milliGRAM(s) Oral every 6 hours PRN Mild Pain (1 - 3), Moderate Pain (4 - 6)  ALBUTerol    90 MICROgram(s) HFA Inhaler 2 Puff(s) Inhalation every 6 hours PRN coughing  benzonatate 100 milliGRAM(s) Oral every 8 hours PRN Cough  guaiFENesin   Syrup  (Sugar-Free) 100 milliGRAM(s) Oral every 6 hours PRN Cough  traMADol 50 milliGRAM(s) Oral three times a day PRN Severe Pain (7 - 10)      CAPILLARY BLOOD GLUCOSE        I&O's Summary    16 Nov 2020 07:01  -  17 Nov 2020 07:00  --------------------------------------------------------  IN: 720 mL / OUT: 1300 mL / NET: -580 mL    17 Nov 2020 07:01  -  17 Nov 2020 14:30  --------------------------------------------------------  IN: 720 mL / OUT: 800 mL / NET: -80 mL        PHYSICAL EXAM:  Vital Signs Last 24 Hrs  T(C): 36.8 (17 Nov 2020 12:14), Max: 37.1 (17 Nov 2020 04:42)  T(F): 98.2 (17 Nov 2020 12:14), Max: 98.7 (17 Nov 2020 04:42)  HR: 80 (17 Nov 2020 12:14) (74 - 84)  BP: 127/66 (17 Nov 2020 12:14) (112/61 - 127/66)  BP(mean): --  RR: 19 (17 Nov 2020 12:14) (18 - 19)  SpO2: 96% (17 Nov 2020 12:14) (95% - 97%)  CONSTITUTIONAL: NAD, well-developed, well-groomed  EYES: PERRLA; conjunctiva and sclera clear  ENMT: Moist oral mucosa, no pharyngeal injection or exudates; normal dentition  NECK: Supple, no palpable masses; no thyromegaly  RESPIRATORY: Normal respiratory effort; lungs are clear to auscultation bilaterally  CARDIOVASCULAR: Regular rate and rhythm, normal S1 and S2, no murmur/rub/gallop; +3 pitting edema, Peripheral pulses are 2+ bilaterally  ABDOMEN: +softly distended, normoactive bowel sounds, no rebound/guarding; No hepatosplenomegaly  MUSCLOSKELETAL:  Normal gait; no clubbing or cyanosis of digits; no joint swelling or tenderness to palpation  PSYCH: A+O to person, place, and time; affect appropriate  NEUROLOGY: CN 2-12 are intact and symmetric; no gross sensory deficits;   SKIN: No rashes; no palpable lesions, +resolving B/L LE cellulitis    LABS:                        6.8    1.55  )-----------( 39       ( 17 Nov 2020 06:42 )             21.4     11-17    136  |  96  |  17  ----------------------------<  90  3.6   |  31  |  1.55<H>    Ca    9.1      17 Nov 2020 06:40    TPro  6.9  /  Alb  3.7  /  TBili  4.3<H>  /  DBili  x   /  AST  30  /  ALT  6<L>  /  AlkPhos  88  11-17    PT/INR - ( 17 Nov 2020 08:53 )   PT: 33.7 sec;   INR: 3.01 ratio                     RADIOLOGY & ADDITIONAL TESTS:  Results Reviewed:   Imaging Personally Reviewed:  Electrocardiogram Personally Reviewed:    COORDINATION OF CARE:  Care Discussed with Consultants/Other Providers [Y/N]: heme/onc  Prior or Outpatient Records Reviewed [Y/N]:

## 2020-11-17 NOTE — PROGRESS NOTE ADULT - PROBLEM SELECTOR PLAN 9
Transitions of Care Status:  1.  Name of PCP: Dr. White  2.  PCP Contacted on Admission: [ ] Y    [ ] N    3.  PCP contacted at Discharge: [ ] Y    [ ] N    [ ] N/A  4.  Post-Discharge Appointment Date and Location:  5.  Summary of Handoff given to PCP:    Dispo: likely home with home PT tomorrow. Discussed dc plan with patient, his brother Fritz. Pt will be given hepatology, nephrology and [facility] referral on discharge

## 2020-11-17 NOTE — PROGRESS NOTE ADULT - PROBLEM SELECTOR PLAN 6
Cr elevated, presumed to be from intravascular depletion. Baseline appears to be around 1.4. Currently stabe at 1.5  - renal US - has ruled out hydro, unremarkable kidneys  - decreased midodrine to 5mg po tid due to stable BP, continue weaning as OP  - PVRs have been normal  - strict I/O monitoring

## 2020-11-18 VITALS
OXYGEN SATURATION: 97 % | DIASTOLIC BLOOD PRESSURE: 64 MMHG | SYSTOLIC BLOOD PRESSURE: 116 MMHG | RESPIRATION RATE: 17 BRPM | HEART RATE: 79 BPM

## 2020-11-18 LAB
ALBUMIN SERPL ELPH-MCNC: 3.9 G/DL — SIGNIFICANT CHANGE UP (ref 3.3–5)
ALP SERPL-CCNC: 92 U/L — SIGNIFICANT CHANGE UP (ref 40–120)
ALT FLD-CCNC: <5 U/L — LOW (ref 10–45)
ANION GAP SERPL CALC-SCNC: 13 MMOL/L — SIGNIFICANT CHANGE UP (ref 5–17)
AST SERPL-CCNC: 33 U/L — SIGNIFICANT CHANGE UP (ref 10–40)
BILIRUB SERPL-MCNC: 6.7 MG/DL — HIGH (ref 0.2–1.2)
BUN SERPL-MCNC: 17 MG/DL — SIGNIFICANT CHANGE UP (ref 7–23)
CALCIUM SERPL-MCNC: 9.3 MG/DL — SIGNIFICANT CHANGE UP (ref 8.4–10.5)
CHLORIDE SERPL-SCNC: 93 MMOL/L — LOW (ref 96–108)
CO2 SERPL-SCNC: 30 MMOL/L — SIGNIFICANT CHANGE UP (ref 22–31)
CREAT SERPL-MCNC: 1.69 MG/DL — HIGH (ref 0.5–1.3)
GLUCOSE SERPL-MCNC: 84 MG/DL — SIGNIFICANT CHANGE UP (ref 70–99)
HCT VFR BLD CALC: 23.4 % — LOW (ref 39–50)
HGB BLD-MCNC: 7.4 G/DL — LOW (ref 13–17)
INR BLD: 2.95 RATIO — HIGH (ref 0.88–1.16)
MCHC RBC-ENTMCNC: 31.1 PG — SIGNIFICANT CHANGE UP (ref 27–34)
MCHC RBC-ENTMCNC: 31.6 GM/DL — LOW (ref 32–36)
MCV RBC AUTO: 98.3 FL — SIGNIFICANT CHANGE UP (ref 80–100)
NRBC # BLD: 0 /100 WBCS — SIGNIFICANT CHANGE UP (ref 0–0)
PLATELET # BLD AUTO: 38 K/UL — LOW (ref 150–400)
POTASSIUM SERPL-MCNC: 3.7 MMOL/L — SIGNIFICANT CHANGE UP (ref 3.5–5.3)
POTASSIUM SERPL-SCNC: 3.7 MMOL/L — SIGNIFICANT CHANGE UP (ref 3.5–5.3)
PROT SERPL-MCNC: 7.1 G/DL — SIGNIFICANT CHANGE UP (ref 6–8.3)
PROTHROM AB SERPL-ACNC: 33.3 SEC — HIGH (ref 10.6–13.6)
RBC # BLD: 2.38 M/UL — LOW (ref 4.2–5.8)
RBC # FLD: 19.9 % — HIGH (ref 10.3–14.5)
SODIUM SERPL-SCNC: 136 MMOL/L — SIGNIFICANT CHANGE UP (ref 135–145)
WBC # BLD: 1.61 K/UL — LOW (ref 3.8–10.5)
WBC # FLD AUTO: 1.61 K/UL — LOW (ref 3.8–10.5)

## 2020-11-18 PROCEDURE — 83521 IG LIGHT CHAINS FREE EACH: CPT

## 2020-11-18 PROCEDURE — 81001 URINALYSIS AUTO W/SCOPE: CPT

## 2020-11-18 PROCEDURE — 86762 RUBELLA ANTIBODY: CPT

## 2020-11-18 PROCEDURE — 82746 ASSAY OF FOLIC ACID SERUM: CPT

## 2020-11-18 PROCEDURE — 86923 COMPATIBILITY TEST ELECTRIC: CPT

## 2020-11-18 PROCEDURE — 83735 ASSAY OF MAGNESIUM: CPT

## 2020-11-18 PROCEDURE — 97116 GAIT TRAINING THERAPY: CPT

## 2020-11-18 PROCEDURE — 93306 TTE W/DOPPLER COMPLETE: CPT

## 2020-11-18 PROCEDURE — 86803 HEPATITIS C AB TEST: CPT

## 2020-11-18 PROCEDURE — 85730 THROMBOPLASTIN TIME PARTIAL: CPT

## 2020-11-18 PROCEDURE — 83615 LACTATE (LD) (LDH) ENZYME: CPT

## 2020-11-18 PROCEDURE — 86704 HEP B CORE ANTIBODY TOTAL: CPT

## 2020-11-18 PROCEDURE — 85014 HEMATOCRIT: CPT

## 2020-11-18 PROCEDURE — U0003: CPT

## 2020-11-18 PROCEDURE — 87641 MR-STAPH DNA AMP PROBE: CPT

## 2020-11-18 PROCEDURE — 82570 ASSAY OF URINE CREATININE: CPT

## 2020-11-18 PROCEDURE — 87389 HIV-1 AG W/HIV-1&-2 AB AG IA: CPT

## 2020-11-18 PROCEDURE — 80061 LIPID PANEL: CPT

## 2020-11-18 PROCEDURE — 85045 AUTOMATED RETICULOCYTE COUNT: CPT

## 2020-11-18 PROCEDURE — 85610 PROTHROMBIN TIME: CPT

## 2020-11-18 PROCEDURE — 82248 BILIRUBIN DIRECT: CPT

## 2020-11-18 PROCEDURE — 85027 COMPLETE CBC AUTOMATED: CPT

## 2020-11-18 PROCEDURE — 81003 URINALYSIS AUTO W/O SCOPE: CPT

## 2020-11-18 PROCEDURE — 86735 MUMPS ANTIBODY: CPT

## 2020-11-18 PROCEDURE — 86780 TREPONEMA PALLIDUM: CPT

## 2020-11-18 PROCEDURE — 86644 CMV ANTIBODY: CPT

## 2020-11-18 PROCEDURE — P9047: CPT

## 2020-11-18 PROCEDURE — 84300 ASSAY OF URINE SODIUM: CPT

## 2020-11-18 PROCEDURE — 82247 BILIRUBIN TOTAL: CPT

## 2020-11-18 PROCEDURE — 80048 BASIC METABOLIC PNL TOTAL CA: CPT

## 2020-11-18 PROCEDURE — 86765 RUBEOLA ANTIBODY: CPT

## 2020-11-18 PROCEDURE — 36430 TRANSFUSION BLD/BLD COMPNT: CPT

## 2020-11-18 PROCEDURE — 82784 ASSAY IGA/IGD/IGG/IGM EACH: CPT

## 2020-11-18 PROCEDURE — 86708 HEPATITIS A ANTIBODY: CPT

## 2020-11-18 PROCEDURE — 86706 HEP B SURFACE ANTIBODY: CPT

## 2020-11-18 PROCEDURE — 86663 EPSTEIN-BARR ANTIBODY: CPT

## 2020-11-18 PROCEDURE — P9016: CPT

## 2020-11-18 PROCEDURE — 85018 HEMOGLOBIN: CPT

## 2020-11-18 PROCEDURE — 87640 STAPH A DNA AMP PROBE: CPT

## 2020-11-18 PROCEDURE — 84133 ASSAY OF URINE POTASSIUM: CPT

## 2020-11-18 PROCEDURE — 86480 TB TEST CELL IMMUN MEASURE: CPT

## 2020-11-18 PROCEDURE — 86777 TOXOPLASMA ANTIBODY: CPT

## 2020-11-18 PROCEDURE — 86769 SARS-COV-2 COVID-19 ANTIBODY: CPT

## 2020-11-18 PROCEDURE — 86664 EPSTEIN-BARR NUCLEAR ANTIGEN: CPT

## 2020-11-18 PROCEDURE — C1889: CPT

## 2020-11-18 PROCEDURE — 84155 ASSAY OF PROTEIN SERUM: CPT

## 2020-11-18 PROCEDURE — 86665 EPSTEIN-BARR CAPSID VCA: CPT

## 2020-11-18 PROCEDURE — 83010 ASSAY OF HAPTOGLOBIN QUANT: CPT

## 2020-11-18 PROCEDURE — 97530 THERAPEUTIC ACTIVITIES: CPT

## 2020-11-18 PROCEDURE — 93970 EXTREMITY STUDY: CPT

## 2020-11-18 PROCEDURE — 84165 PROTEIN E-PHORESIS SERUM: CPT

## 2020-11-18 PROCEDURE — 80053 COMPREHEN METABOLIC PANEL: CPT

## 2020-11-18 PROCEDURE — 76775 US EXAM ABDO BACK WALL LIM: CPT

## 2020-11-18 PROCEDURE — 82607 VITAMIN B-12: CPT

## 2020-11-18 PROCEDURE — 85025 COMPLETE CBC W/AUTO DIFF WBC: CPT

## 2020-11-18 PROCEDURE — 86901 BLOOD TYPING SEROLOGIC RH(D): CPT

## 2020-11-18 PROCEDURE — 86695 HERPES SIMPLEX TYPE 1 TEST: CPT

## 2020-11-18 PROCEDURE — 93005 ELECTROCARDIOGRAM TRACING: CPT

## 2020-11-18 PROCEDURE — 86787 VARICELLA-ZOSTER ANTIBODY: CPT

## 2020-11-18 PROCEDURE — 86334 IMMUNOFIX E-PHORESIS SERUM: CPT

## 2020-11-18 PROCEDURE — 87040 BLOOD CULTURE FOR BACTERIA: CPT

## 2020-11-18 PROCEDURE — 86682 HELMINTH ANTIBODY: CPT

## 2020-11-18 PROCEDURE — 99239 HOSP IP/OBS DSCHRG MGMT >30: CPT

## 2020-11-18 PROCEDURE — P9011: CPT

## 2020-11-18 PROCEDURE — 83935 ASSAY OF URINE OSMOLALITY: CPT

## 2020-11-18 PROCEDURE — 80202 ASSAY OF VANCOMYCIN: CPT

## 2020-11-18 PROCEDURE — 87340 HEPATITIS B SURFACE AG IA: CPT

## 2020-11-18 PROCEDURE — 84100 ASSAY OF PHOSPHORUS: CPT

## 2020-11-18 PROCEDURE — 97110 THERAPEUTIC EXERCISES: CPT

## 2020-11-18 PROCEDURE — 86850 RBC ANTIBODY SCREEN: CPT

## 2020-11-18 PROCEDURE — 82140 ASSAY OF AMMONIA: CPT

## 2020-11-18 PROCEDURE — 86696 HERPES SIMPLEX TYPE 2 TEST: CPT

## 2020-11-18 PROCEDURE — 84540 ASSAY OF URINE/UREA-N: CPT

## 2020-11-18 PROCEDURE — 86900 BLOOD TYPING SEROLOGIC ABO: CPT

## 2020-11-18 PROCEDURE — 97162 PT EVAL MOD COMPLEX 30 MIN: CPT

## 2020-11-18 PROCEDURE — 85291 CLOT FACTOR XIII FIBRIN SCRN: CPT

## 2020-11-18 PROCEDURE — 86985 SPLIT BLOOD OR PRODUCTS: CPT

## 2020-11-18 PROCEDURE — 85290 CLOT FACTOR XIII FIBRIN STAB: CPT

## 2020-11-18 PROCEDURE — 82436 ASSAY OF URINE CHLORIDE: CPT

## 2020-11-18 RX ORDER — FUROSEMIDE 40 MG
1 TABLET ORAL
Qty: 90 | Refills: 0
Start: 2020-11-18 | End: 2020-12-17

## 2020-11-18 RX ORDER — LACTULOSE 10 G/15ML
30 SOLUTION ORAL
Qty: 2700 | Refills: 0
Start: 2020-11-18 | End: 2020-12-17

## 2020-11-18 RX ORDER — SUCRALFATE 1 G
1 TABLET ORAL
Qty: 120 | Refills: 0
Start: 2020-11-18 | End: 2020-12-17

## 2020-11-18 RX ORDER — MIDODRINE HYDROCHLORIDE 2.5 MG/1
1 TABLET ORAL
Qty: 0 | Refills: 0 | DISCHARGE

## 2020-11-18 RX ORDER — CIPROFLOXACIN LACTATE 400MG/40ML
1 VIAL (ML) INTRAVENOUS
Qty: 30 | Refills: 0
Start: 2020-11-18 | End: 2020-12-17

## 2020-11-18 RX ORDER — FUROSEMIDE 40 MG
1 TABLET ORAL
Qty: 0 | Refills: 0 | DISCHARGE

## 2020-11-18 RX ORDER — NYSTATIN CREAM 100000 [USP'U]/G
1 CREAM TOPICAL
Qty: 1 | Refills: 0
Start: 2020-11-18 | End: 2020-11-24

## 2020-11-18 RX ORDER — PANTOPRAZOLE SODIUM 20 MG/1
1 TABLET, DELAYED RELEASE ORAL
Qty: 30 | Refills: 0
Start: 2020-11-18 | End: 2020-12-17

## 2020-11-18 RX ORDER — FUROSEMIDE 40 MG
1 TABLET ORAL
Qty: 0 | Refills: 0 | DISCHARGE
Start: 2020-11-18 | End: 2020-12-17

## 2020-11-18 RX ORDER — MIDODRINE HYDROCHLORIDE 2.5 MG/1
1 TABLET ORAL
Qty: 90 | Refills: 0
Start: 2020-11-18 | End: 2020-12-17

## 2020-11-18 RX ADMIN — Medication 500 MILLIGRAM(S): at 09:44

## 2020-11-18 RX ADMIN — Medication 1 GRAM(S): at 06:08

## 2020-11-18 RX ADMIN — TRAMADOL HYDROCHLORIDE 50 MILLIGRAM(S): 50 TABLET ORAL at 07:00

## 2020-11-18 RX ADMIN — PANTOPRAZOLE SODIUM 40 MILLIGRAM(S): 20 TABLET, DELAYED RELEASE ORAL at 06:09

## 2020-11-18 RX ADMIN — Medication 60 MILLIGRAM(S): at 06:07

## 2020-11-18 RX ADMIN — SPIRONOLACTONE 100 MILLIGRAM(S): 25 TABLET, FILM COATED ORAL at 06:08

## 2020-11-18 RX ADMIN — TRAMADOL HYDROCHLORIDE 50 MILLIGRAM(S): 50 TABLET ORAL at 06:11

## 2020-11-18 RX ADMIN — MIDODRINE HYDROCHLORIDE 5 MILLIGRAM(S): 2.5 TABLET ORAL at 06:08

## 2020-11-18 NOTE — PROGRESS NOTE ADULT - PROBLEM SELECTOR PLAN 5
- TTE done October 2019 shows normal systolic and diastolic function  - TTE pending
- TTE done October 2019 shows normal systolic and diastolic function  - TTE pending
- TTE done October 2019 shows normal systolic and diastolic function  - defer repeat TTE for now
- s/p EGD, colonoscopy and VCE without source of bleed- c-scope showing internal hemorrhoids, nonbleeding. Suspect this was source of previous blood.  - PPI as above  - Monitor HH daily, continue to monitor, transfuse if Hb <7 last unit 11/12  -Spoke with GI who rec Heme onc consult for pancytopenia as no GIB has been identified.
- s/p EGD, colonoscopy and VCE without source of bleed- c-scope showing internal hemorrhoids, nonbleeding. Suspect this was source of previous blood.  - PPI as above  - Monitor HH daily, continue to monitor, transfuse if Hb <7 last unit 11/12  -heme onc consult appreciated- likely BM suppression from cirrhosis, pt to f/u with [facility] on discharge
- s/p EGD, colonoscopy and VCE without source of bleed- c-scope showing internal hemorrhoids, nonbleeding. Suspect this was source of previous blood. Now with ongoing anemia likely 2/2 BM suppression.  - PPI as above  - received unit PRBC 11/12, transfuse additional unit 11/17  -heme onc consult appreciated- likely BM suppression from cirrhosis, pt to f/u with [facility] on discharge
- s/p EGD, colonoscopy and VCE without source of bleed- c-scope showing internal hemorrhoids, nonbleeding. Suspect this was source of previous blood. Now with ongoing anemia likely 2/2 BM suppression.  - PPI as above  - received unit PRBC 11/12, transfuse additional unit 11/17  -heme onc consult appreciated- likely BM suppression from cirrhosis, pt to f/u with [facility] on discharge
- secondary to GI bleed now resolved, suspect hemorrhoidal. Pt reports episode occurred 2 weeks ago. Since resolved.  - s/p EGD, colonoscopy and VCE without source of bleed- c-scope showing internal hemorrhoids, nonbleeding. Suspect this was source of previous blood.  - PPI as above  - Monitor HH
- secondary to GI bleed now resolved, suspect hemorrhoidal. Pt reports episode occurred 2 weeks ago. Since resolved.  - s/p EGD, colonoscopy and VCE without source of bleed- c-scope showing internal hemorrhoids, nonbleeding. Suspect this was source of previous blood.  - PPI as above  - Monitor HH daily, Hb 7.0 today, continue to monitor
- secondary to GI bleed now resolved, suspect hemorrhoidal. Pt reports episode occurred 2 weeks ago. Since resolved.  - s/p EGD, colonoscopy and VCE without source of bleed- c-scope showing internal hemorrhoids, nonbleeding. Suspect this was source of previous blood.  - PPI as above  - Monitor HH daily, continue to monitor, transfuse if Hb <7
Cr elevated, presumed to be from intravascular depletion. Baseline appears to be around 1.4-  -severe volume overloaded- holding albumin- level is normal-  - renal US - has ruled out hydro, unremarkable kidneys  - continue midodrine/octretide/ for presumed HRS  - rod recommended, patient refusing,  -PVRs have been normal- 1L UO  - strict I/O monitoring
Cr elevated, presumed to be from intravascular depletion. Baseline appears to be around 1.4-  -severe volume overloaded- holding albumin- level is normal-  - renal US - has ruled out hydro, unremarkable kidneys  - continue midodrine/octretide/ for presumed HRS, will follow up renal when dc octeotride  - rod recommended, patient refusing,  - PVRs have been normal- 1L UO  - strict I/O monitoring
Repeated today and hgb remains stable  - secondary to GI bleed now resolved, suspect hemorrhoidal.   - s/p EGD, colonoscopy and VCE without source of bleed- c-scope showing internal hemorrhoids, nonbleeding. Suspect this was source of previous blood.  - PPI as above  - Monitor HH daily, continue to monitor, transfuse if Hb <7
Repeated today and hgb remains stable  - secondary to GI bleed now resolved, suspect hemorrhoidal.   - s/p EGD, colonoscopy and VCE without source of bleed- c-scope showing internal hemorrhoids, nonbleeding. Suspect this was source of previous blood.  - PPI as above  - Monitor HH daily, continue to monitor, transfuse if Hb <7
Repeated today and hgb remains stable  - secondary to GI bleed now resolved, suspect hemorrhoidal.   - s/p EGD, colonoscopy and VCE without source of bleed- c-scope showing internal hemorrhoids, nonbleeding. Suspect this was source of previous blood.  - PPI as above  - Monitor HH daily, continue to monitor, transfuse if Hb <7 will give again today
hold pharmacologic dvt ppx given GI bleed and thrombocytopenia  SCDs
- TTE done October 2019 shows normal systolic and diastolic function  - defer repeat TTE for now

## 2020-11-18 NOTE — PROGRESS NOTE ADULT - PROBLEM SELECTOR PROBLEM 3
Anemia due to acute blood loss
Gastrointestinal hemorrhage with melena
Gastrointestinal hemorrhage with melena
Other cirrhosis of liver
Anemia due to acute blood loss

## 2020-11-18 NOTE — PROGRESS NOTE ADULT - PROBLEM SELECTOR PROBLEM 6
Congestive heart failure, unspecified HF chronicity, unspecified heart failure type
Congestive heart failure, unspecified HF chronicity, unspecified heart failure type
Discharge planning issues
MONI (acute kidney injury)
Prophylactic measure

## 2020-11-18 NOTE — CHART NOTE - NSCHARTNOTESELECT_GEN_ALL_CORE
Malnutrition Notification
Discharge Note
Nutrition Services
Nutrition Services
Video capsule endoscopy/Event Note
hematology/Event Note
nephrology

## 2020-11-18 NOTE — PROGRESS NOTE ADULT - SUBJECTIVE AND OBJECTIVE BOX
Lewis Leon MD  Division of Hospital Medicine  Pager: [phone]  If no response or off-hours, page [phone]  -------------------------------------    Patient is a 60y old  Male who presents with a chief complaint of hepatic encephalopathy (17 Nov 2020 14:29)      SUBJECTIVE / OVERNIGHT EVENTS: none acute  ADDITIONAL REVIEW OF SYSTEMS: pt reports feeling well overall- feels ready for dc home. Notes improvement in swelling. No bleeding/bloody stool, ambulating to bathroom without problems. back pain quiescent. No fevers/chills, ros otherwise unchanged.    MEDICATIONS  (STANDING):  atorvastatin 20 milliGRAM(s) Oral at bedtime  ciprofloxacin     Tablet 500 milliGRAM(s) Oral every 24 hours  ferrous    sulfate 325 milliGRAM(s) Oral daily  furosemide   Injectable 60 milliGRAM(s) IV Push three times a day  influenza   Vaccine 0.5 milliLiter(s) IntraMuscular once  lactulose Syrup 20 Gram(s) Oral every 8 hours  lidocaine   Patch 1 Patch Transdermal daily  melatonin 5 milliGRAM(s) Oral at bedtime  midodrine. 5 milliGRAM(s) Oral three times a day  nystatin Powder 1 Application(s) Topical every 12 hours  pantoprazole    Tablet 40 milliGRAM(s) Oral before breakfast  rifAXIMin 550 milliGRAM(s) Oral two times a day  spironolactone 100 milliGRAM(s) Oral daily  sucralfate 1 Gram(s) Oral four times a day    MEDICATIONS  (PRN):  acetaminophen   Tablet .. 650 milliGRAM(s) Oral every 6 hours PRN Mild Pain (1 - 3), Moderate Pain (4 - 6)  ALBUTerol    90 MICROgram(s) HFA Inhaler 2 Puff(s) Inhalation every 6 hours PRN coughing  benzonatate 100 milliGRAM(s) Oral every 8 hours PRN Cough  guaiFENesin   Syrup  (Sugar-Free) 100 milliGRAM(s) Oral every 6 hours PRN Cough  traMADol 50 milliGRAM(s) Oral three times a day PRN Severe Pain (7 - 10)      CAPILLARY BLOOD GLUCOSE        I&O's Summary    17 Nov 2020 07:01  -  18 Nov 2020 07:00  --------------------------------------------------------  IN: 1320 mL / OUT: 2150 mL / NET: -830 mL    18 Nov 2020 07:01  -  18 Nov 2020 14:17  --------------------------------------------------------  IN: 360 mL / OUT: 400 mL / NET: -40 mL        PHYSICAL EXAM:  Vital Signs Last 24 Hrs  T(C): 37.2 (18 Nov 2020 11:53), Max: 37.4 (17 Nov 2020 20:49)  T(F): 98.9 (18 Nov 2020 11:53), Max: 99.4 (17 Nov 2020 20:49)  HR: 79 (18 Nov 2020 12:10) (79 - 84)  BP: 116/64 (18 Nov 2020 12:10) (107/56 - 126/51)  BP(mean): --  RR: 17 (18 Nov 2020 12:10) (17 - 18)  SpO2: 97% (18 Nov 2020 12:10) (94% - 98%)  CONSTITUTIONAL: NAD, well-developed, well-groomed  EYES: PERRLA; conjunctiva and sclera clear  ENMT: Moist oral mucosa, no pharyngeal injection or exudates; normal dentition  NECK: Supple, no palpable masses; no thyromegaly  RESPIRATORY: Normal respiratory effort; lungs are clear to auscultation bilaterally  CARDIOVASCULAR: Regular rate and rhythm, normal S1 and S2, no murmur/rub/gallop; +2 pitting edema much improved; Peripheral pulses are 2+ bilaterally  ABDOMEN: +softly distended, Nontender to palpation, normoactive bowel sounds, no rebound/guarding; No hepatosplenomegaly  MUSCLOSKELETAL:  Normal gait; no clubbing or cyanosis of digits; no joint swelling or tenderness to palpation  PSYCH: A+O to person, place, and time; affect appropriate  NEUROLOGY: CN 2-12 are intact and symmetric; no gross sensory deficits;   SKIN: No rashes; no palpable lesions    LABS:                        7.4    1.61  )-----------( 38       ( 18 Nov 2020 07:07 )             23.4     11-18    136  |  93<L>  |  17  ----------------------------<  84  3.7   |  30  |  1.69<H>    Ca    9.3      18 Nov 2020 07:08    TPro  7.1  /  Alb  3.9  /  TBili  6.7<H>  /  DBili  x   /  AST  33  /  ALT  <5<L>  /  AlkPhos  92  11-18    PT/INR - ( 18 Nov 2020 07:48 )   PT: 33.3 sec;   INR: 2.95 ratio                     RADIOLOGY & ADDITIONAL TESTS:  Results Reviewed:   Imaging Personally Reviewed:  Electrocardiogram Personally Reviewed:    COORDINATION OF CARE:  Care Discussed with Consultants/Other Providers [Y/N]:  Prior or Outpatient Records Reviewed [Y/N]:

## 2020-11-18 NOTE — PROGRESS NOTE ADULT - PROBLEM SELECTOR PROBLEM 2
Hyponatremia
Gastrointestinal hemorrhage with melena
Lower extremity edema
Other cirrhosis of liver
Other cirrhosis of liver
Hyponatremia
Gastrointestinal hemorrhage with melena

## 2020-11-18 NOTE — PROGRESS NOTE ADULT - PROBLEM SELECTOR PLAN 6
Cr elevated, presumed to be from intravascular depletion. Baseline appears to be around 1.4. Currently stabe at 1.5-1.6  - renal US - has ruled out hydro, unremarkable kidneys  - decreased midodrine to 5mg po tid due to stable BP, continue weaning as OP  - PVRs have been normal  - strict I/O monitoring  - will provide  nephrology referral on discharge

## 2020-11-18 NOTE — PROGRESS NOTE ADULT - PROBLEM SELECTOR PLAN 8
Chronic back pain worse with hospital bed.  Tylenol at home. Stable at present.  CW  Tramadol ( close monitoring as Tramadol is hepatically metabolized) .    Does not need pain meds on DC he attributes it to his lack of mobility in hospital.

## 2020-11-18 NOTE — PROGRESS NOTE ADULT - PROBLEM SELECTOR PLAN 2
- Acute encephalopathy 2/2 to PSE-resolved  - c/w Rifaximin 550mg po bid  - c/w Lactulose 20g po q8h  - monitor LFTs  - Hepatology eval appreciated  - Pre transplant ID eval: F/U Strongyloides Ab, Quant gold; vaccinations per ID recs
- Acute encephalopathy 2/2 to PSE-resolved  - c/w Rifaximin 550mg po bid  - c/w Lactulose 20g po q8h  - monitor LFTs  - Hepatology eval appreciated  - Pre transplant ID eval: F/U Strongyloides Ab, Quant gold; vaccinations per ID recs
- Hb stable at 8.0  - denies melena  - EGD planned for tomorrow  - c/w Protonix 40mg ivp a q12  - c/w Carafate  - monitor Hb daily  - NPO@Midnight
- s/p EGD today --> severe gastritis and esophagitis. no source of bleeding was identified. Plan for a Colonoscopy tomorrow.  - c/w Protonix 40mg ivp a q12  - c/w Carafate  - monitor Hb daily  - NPO@Midnight
- s/p colonoscopy this morning, no bleed source- now with VCE  - c/w Protonix 40mg ivp a q12  - c/w Carafate  - monitor Hb daily  - advance diet per GI
- s/p egd and colonoscopy, no bleed source, VCE showing brown stool throughout without source of bleed. Likely hemorrhoidal- now resolved.  - Protonix PO daily  - c/w Carafate  - monitor Hb daily- 6.9 today- however hesistant to give 1u since grossly volume overloaded  - sbp ppx on Ceftriaxone - switch to PO bactrim vs. cipro today pending QTC- ekg ordered
- s/p egd and colonoscopy, no bleed source, VCE showing brown stool throughout without source of bleed. Likely hemorrhoidal- now resolved.  - Protonix PO qd  - c/w Carafate  - monitor Hb daily  - sbp ppx on Ceftriaxone - switch to PO bactrim vs. cipro pending improvement in Cr and EKG to eval qtc
- s/p egd and colonoscopy, no bleed source, VCE showing brown stool throughout without source of bleed. Likely hemorrhoidal- now resolved.  - Protonix PO qd  - c/w Carafate  - monitor Hb daily  - sbp ppx on Ceftriaxone - switch to PO bactrim vs. cipro pending improvement in Cr and EKG to eval qtc
- s/p egd and colonoscopy, no bleed source, VCE showing brown stool throughout without source of bleed. Likely hemorrhoidal- now resolved.  - switch to PPI po daily  - c/w Carafate  - monitor Hb daily  - advance diet per GI  - sbp ppx with ctx resumed- switch to PO bactrim vs. cipro pending improvement in Cr and EKG to eval qtc
- s/p egd and colonoscopy, no bleed source- now with VCE  - c/w Protonix 40mg ivp a q12  - c/w Carafate  - monitor Hb daily  - advance diet per GI  - sbp ppx with ctx resumed
- s/p egd and colonoscopy, no bleed source- now with VCE  - c/w Protonix 40mg ivp a q12  - c/w Carafate  - monitor Hb daily  - advance diet per GI  - sbp ppx with ctx resumed
2/2 decompensated liver disease   Lasix 60mg IV BID  1.3L off yesterday  2d echo with hyperdynamic ventricle EF 75%
2/2 decompensated liver disease   Lasix 60mg IV BID  1.3L off yesterday again  2d echo with hyperdynamic ventricle EF 75%
2/2 decompensated liver disease   Lasix 60mg IV BID  2d echo with hyperdynamic ventricle EF 75%
2/2 decompensated liver disease   Lasix 60mg IV BID  2d echo with hyperdynamic ventricle EF 75%
2/2 decompensated liver disease  - Lasix 60mg IV BID -transition back to home lasix 40mg po bid  - add back aldactone 100mg po daily  - will provide 1 day of albumin assisted diuresis  - 2d echo with hyperdynamic ventricle EF 75%
2/2 decompensated liver disease  Albumin w/ lasix 60mg IV daily, continue with, anticipate switching to po in 1-2 days   2d echo with hyperdynamic ventricle EF 75%
2/2 decompensated liver disease  Albumin w/ lasix 60mg IV daily, continue with, anticipate switching to po in 1-2 days   2d echo with hyperdynamic ventricle EF 75%
2/2 decompensated liver disease  If Cr ok will switch to albumin with Lasix 40mg IV BID , continue with, anticipate switching to po in 1-2 days   2d echo with hyperdynamic ventricle EF 75%
2/2 decompensated liver disease +/- cellulitis, appears worse today  - will stop albumin as serum level is close to normal and at this juncture may only be adding volume without benefit of mobilizing existing fluid  - increase lasix to 60mg iv tid with hold parameters  - aldactone 100mg po daily  - monitor Cr
2/2 decompensated liver disease +/- cellulitis, improved today after high dose diuretics  - will stop albumin as serum level is close to normal and at this juncture may only be adding volume without benefit of mobilizing existing fluid  - switch to lasix 40mg po tid on discharge  - aldactone 100mg po daily  - further adjustments to diuretics on OP f/u with hepatology
duplex negative for DVT  net neg 1.8L,  c/w lasix 60mg IV daily, continue with, anticipate switchting to po in 1-2 days d/w renal attending Dr. Dean today  2d echo with hyperdynamic ventricle EF 75%
duplex negative for DVT  net neg 1.8L,  c/w lasix 60mg IV daily, continue with, anticipate switchting to po in 1-2 days d/w renal attending Dr. Dean today  2d echo with hyperdynamic ventricle EF 75%
duplex negative for DVT  net neg 1.8L,  will increase lasix 60mg IV daily  check 2d echo
duplex negative for DVT  net neg 1.8L,  will increase lasix 60mg IV daily, continue with, anticipate switchting to po in 1-2 days  check 2d echo
- s/p egd and colonoscopy, no bleed source, VCE showing brown stool throughout without source of bleed. Likely hemorrhoidal- now resolved.  - switch to PPI po daily  - c/w Carafate  - monitor Hb daily  - advance diet per GI  - sbp ppx with ctx resumed- switch to PO bactrim vs. cipro pending improvement in Cr and EKG to eval qtc

## 2020-11-18 NOTE — PROGRESS NOTE ADULT - PROBLEM SELECTOR PROBLEM 1
MONI (acute kidney injury)
Cellulitis of leg, right
Lower extremity edema
Lower extremity edema
MONI (acute kidney injury)
Other cirrhosis of liver
MONI (acute kidney injury)
Other cirrhosis of liver

## 2020-11-18 NOTE — PROGRESS NOTE ADULT - PROBLEM SELECTOR PLAN 7
- TTE done October 2019 shows normal systolic and diastolic function  - 2d echo Ef 70-75%
- TTE done October 2019 shows normal systolic and diastolic function  - 2d echo Ef 70-75%
- TTE done October 2019 shows normal systolic and diastolic function  - EF 65%  - repeat 2d echo, unclear what function was and given LE edema will need to assess
- TTE done October 2019 shows normal systolic and diastolic function  - EF 65%  - repeat 2d echo, unclear what function was and given LE edema will need to assess
- TTE done October 2019 shows normal systolic and diastolic function so no evidence of heart failure on this admission.  IV diuresis is 2/2 his decompensated liver disease.   - 2d echo Ef 70-75%
Transitions of Care Status:  1.  Name of PCP:  2.  PCP Contacted on Admission: [ ] Y    [ ] N    3.  PCP contacted at Discharge: [ ] Y    [ ] N    [ ] N/A  4.  Post-Discharge Appointment Date and Location:  5.  Summary of Handoff given to PCP:
Transitions of Care Status:  1.  Name of PCP: Dr. White. Pt reports difficulty getting [facility] hepatology follow up.  2.  PCP Contacted on Admission: [ ] Y    [ ] N    3.  PCP contacted at Discharge: [ ] Y    [ ] N    [ ] N/A  4.  Post-Discharge Appointment Date and Location:  5.  Summary of Handoff given to PCP:    Left VM for family member Fritz Trujillo to discuss.   Dispo: likely home with home PT pending improvement in MONI, hepatology/renal clearance. Possibly over the weekend.
Transitions of Care Status:  1.  Name of PCP: Dr. White. Pt reports difficulty getting [facility] hepatology follow up.  2.  PCP Contacted on Admission: [ ] Y    [ ] N    3.  PCP contacted at Discharge: [ ] Y    [ ] N    [ ] N/A  4.  Post-Discharge Appointment Date and Location:  5.  Summary of Handoff given to PCP:    Left VM for family member Fritz Trujillo to discuss.   Dispo: likely home with home PT pending improvement in MONI, hepatology/renal clearance.
Transitions of Care Status:  1.  Name of PCP: Dr. White. Pt reports difficulty getting [facility] hepatology follow up.  2.  PCP Contacted on Admission: [ ] Y    [ ] N    3.  PCP contacted at Discharge: [ ] Y    [ ] N    [ ] N/A  4.  Post-Discharge Appointment Date and Location:  5.  Summary of Handoff given to PCP:    Left VM for family member Fritz Trujillo to discuss.   Dispo: likely home with home PT pending improvement in MONI, hepatology/renal clearance. Possibly over the weekend.
Transitions of Care Status:  1.  Name of PCP: Dr. White. Pt reports difficulty getting [facility] hepatology follow up.  2.  PCP Contacted on Admission: [ ] Y    [ ] N    3.  PCP contacted at Discharge: [ ] Y    [ ] N    [ ] N/A  4.  Post-Discharge Appointment Date and Location:  5.  Summary of Handoff given to PCP:
Transitions of Care Status:  1.  Name of PCP: Dr. White. Pt reports difficulty getting [facility] hepatology follow up.  2.  PCP Contacted on Admission: [ ] Y    [ ] N    3.  PCP contacted at Discharge: [ ] Y    [ ] N    [ ] N/A  4.  Post-Discharge Appointment Date and Location:  5.  Summary of Handoff given to PCP:
Transitions of Care Status:  1.  Name of PCP: Dr. White. Pt reports difficulty getting [facility] hepatology follow up.  2.  PCP Contacted on Admission: [ ] Y    [ ] N    3.  PCP contacted at Discharge: [ ] Y    [ ] N    [ ] N/A  4.  Post-Discharge Appointment Date and Location:  5.  Summary of Handoff given to PCP:    Left VM for family member Fritz Trujillo to discuss.   Dispo: likely home with home PT pending improvement in MONI, hepatology/renal clearance. Possibly over the weekend.
Transitions of Care Status:  1.  Name of PCP: Dr. White. Pt reports difficulty getting [facility] hepatology follow up.  2.  PCP Contacted on Admission: [ ] Y    [ ] N    3.  PCP contacted at Discharge: [ ] Y    [ ] N    [ ] N/A  4.  Post-Discharge Appointment Date and Location:  5.  Summary of Handoff given to PCP:
hold pharmacologic dvt ppx given GI bleed and thrombocytopenia  SCDs
hold pharmacologic dvt ppx given GI bleed and thrombocytopenia  SCDs
Transitions of Care Status:  1.  Name of PCP: Dr. White. Pt reports difficulty getting [facility] hepatology follow up.  2.  PCP Contacted on Admission: [ ] Y    [ ] N    3.  PCP contacted at Discharge: [ ] Y    [ ] N    [ ] N/A  4.  Post-Discharge Appointment Date and Location:  5.  Summary of Handoff given to PCP:    Left VM for family member Fritz Trujillo to discuss.   Dispo: likely home with home PT pending improvement in MONI, hepatology/renal clearance. Possibly over the weekend.

## 2020-11-18 NOTE — PROGRESS NOTE ADULT - PROBLEM SELECTOR PLAN 9
Transitions of Care Status:  1.  Name of PCP: Dr. White-   2.  PCP Contacted on Admission: [ ] Y    [ ] N    3.  PCP contacted at Discharge: [ ] Y    [ ] N    [ ] N/A  4.  Post-Discharge Appointment Date and Location:  5.  Summary of Handoff given to PCP:    Dispo: likely home with home PT today.     Discussed hospitalization and follow up plans with Dr. White

## 2020-11-18 NOTE — CHART NOTE - NSCHARTNOTEFT_GEN_A_CORE
Request from Dr. Leon to facilitate patient discharge.  Medication reconciliation reviewed, revised, and resolved with Dr. Leon, who has medically cleared patient for discharge with follow up as advised.  Please refer to discharge note for detailed hospital course.

## 2020-11-18 NOTE — PROGRESS NOTE ADULT - ASSESSMENT
59M morbidly obese male w/ PMHx of Hepatic Cirrhosis, ascites, thrombocytopenia, CHF, HTN, HLD, recent admx for Hepatic Encephalopathy, noncompliant with lactulose who was admitted to OSH  for hepatic encephalopathy, acute blood lose anemia (?variceal bleed, GI ulcer) and possible SBP. He was transferred to [facility] for endoscopy since he is high risk for planned procedure. He is also here for evaluation by Liver Transplant team, now s/p EGD showing gastritis no varices, colonoscopy without any source of bleed, now s/p VCE without source of bleed. Course also complicated by cellulitis, now treated s/p antibiotics, now with stable creatinine and no further inpatient hepatology recs,  stable for discharge home on increased dose diuretics

## 2020-11-18 NOTE — PROGRESS NOTE ADULT - PROBLEM SELECTOR PROBLEM 5
Anemia due to acute blood loss
Congestive heart failure, unspecified HF chronicity, unspecified heart failure type
MONI (acute kidney injury)
MONI (acute kidney injury)
Prophylactic measure
Congestive heart failure, unspecified HF chronicity, unspecified heart failure type

## 2020-11-18 NOTE — PROGRESS NOTE ADULT - PROBLEM SELECTOR PLAN 3
- Acute encephalopathy 2/2 to PSE-resolved  - c/w Rifaximin 550mg po bid  - c/w Lactulose 20g po q8h  - monitor LFTs  - Hepatology eval appreciated  - Pre transplant ID eval: F/U Strongyloides Ab, Quant gold; vaccinations per ID recs
- Acute encephalopathy 2/2 to PSE-resolved  - c/w Rifaximin 550mg po bid  - c/w Lactulose 20g po q8h  - monitor LFTs  - Hepatology nayeli galvan
- s/p egd and colonoscopy, no bleed source, VCE showing brown stool throughout without source of bleed. Likely hemorrhoidal- now resolved.  - Protonix PO daily  - c/w Carafate  - monitor Hb daily- 6.9 today- however hesistant to give 1u since grossly volume overloaded  - sbp ppx on Ceftriaxone - switch to PO bactrim vs. cipro today pending QTC- ekg ordered
- s/p egd and colonoscopy, no bleed source, VCE showing brown stool throughout without source of bleed. Likely hemorrhoidal- now resolved.  - Protonix PO daily  - c/w Carafate  - monitor Hb daily- 6.9 today- however hesistant to give 1u since grossly volume overloaded  - sbp ppx on cipro 500mg daily
- secondary to GI bleed  - see management plan above
- secondary to GI bleed now resolved, suspect hemorrhoidal. Pt reports episode occurred 2 weeks ago. Since resolved.  - s/p EGD, colonoscopy and VCE without source of bleed- c-scope showing internal hemorrhoids, nonbleeding. Suspect this was source of previous blood.  - PPI as above
- secondary to GI bleed now resolved, suspect hemorrhoidal. Pt reports episode occurred 2 weeks ago. Since resolved.  - s/p EGD, colonoscopy and VCE without source of bleed- c-scope showing internal hemorrhoids, nonbleeding. Suspect this was source of previous blood.  - PPI as above  - Hgb 6.9>6.7 today - will transfuse 1 unit PRBC  - Monitor HH
- secondary to GI bleed now resolved, suspect hemorrhoidal. Pt reports episode occurred 2 weeks ago. Since resolved.  - s/p EGD, colonoscopy and VCE without source of bleed- c-scope showing internal hemorrhoids, nonbleeding. Suspect this was source of previous blood.  - PPI as above  - Monitor HH
- secondary to GI bleed now resolved, suspect hemorrhoidal. Pt reports episode occurred 2 weeks ago. Since resolved.  - s/p EGD, colonoscopy and VCE without source of bleed- c-scope showing internal hemorrhoids, nonbleeding. Suspect this was source of previuos blood.
- secondary to GI bleed now resolved, suspect hemorrhoidal. Pt reports episode occurred 2 weeks ago. Since resolved.  - s/p EGD, colonoscopy and VCE without source of bleed- c-scope showing internal hemorrhoids, nonbleeding. Suspect this was source of previuos blood.  - PPI as above
EtOH cirrhosis 2/2 GIB now resolved  - varices: present on CT, but no varices on recent EGD  - HE: present on admission to [facility], now resolved on Rifaximin 550mg PO BID and lactulose Q8hrs  - HCC: no focal lesions on CT  -+ascites s/p para x2, last on 10/22 with 5.7L removed, +SBP on 10/14 with ascitic cultures +Ecoli off treatment, Lasix  60 IV BID  - MELD 28 today
EtOH cirrhosis 2/2 GIB now resolved  - varices: present on CT, but no varices on recent EGD  - HE: present on admission to [facility], now resolved on Rifaximin 550mg PO BID and lactulose Q8hrs  - HCC: no focal lesions on CT  -+ascites s/p para x2, last on 10/22 with 5.7L removed, +SBP on 10/14 with ascitic cultures +Ecoli off treatment, Lasix 60mg IV daily
EtOH cirrhosis 2/2 GIB now resolved  - varices: present on CT, but no varices on recent EGD  - HE: present on admission to [facility], now resolved on Rifaximin 550mg PO BID and lactulose Q8hrs  - HCC: no focal lesions on CT  -+ascites s/p para x2, last on 10/22 with 5.7L removed, +SBP on 10/14 with ascitic cultures +Ecoli off treatment, Lasix  60 IV BID
EtOH cirrhosis 2/2 GIB now resolved  - varices: present on CT, but no varices on recent EGD  - HE: present on admission to [facility], now resolved on Rifaximin 550mg PO BID and lactulose Q8hrs  - HCC: no focal lesions on CT  -+ascites s/p para x2, last on 10/22 with 5.7L removed, +SBP on 10/14 with ascitic cultures +Ecoli off treatment, Lasix  60 IV BID
EtOH cirrhosis 2/2 GIB now resolved  - varices: present on CT, but no varices on recent EGD  - HE: present on admission to [facility], now resolved on Rifaximin 550mg PO BID and lactulose Q8hrs  - HCC: no focal lesions on CT  -+ascites s/p para x2, last on 10/22 with 5.7L removed, +SBP on 10/14 with ascitic cultures +Ecoli off treatment, Lasix  60 IV BID  - MELD 28  - discussed extensively with patient and his brother that his 3 month mortality based on MELD is 80% (20% chance of dying) and that his liver disease is not fully reversible- course is likely to be chronic decompensation with acute exacerbations brought on by infections, bleeding, med nonadherence, etc. Also discussed high likelihood that patient has component of NAFLD along with alcoholic cirrhosis, and is presently not a transplant candidate due to obesity. Pt and brother understand.
EtOH cirrhosis 2/2 GIB now resolved  - varices: present on CT, but no varices on recent EGD  - HE: present on admission to [facility], now resolved on Rifaximin 550mg PO BID and lactulose Q8hrs  - HCC: no focal lesions on CT  -+ascites s/p para x2, last on 10/22 with 5.7L removed, +SBP on 10/14 with ascitic cultures +Ecoli off treatment, Lasix 40mg IV BID if Cr ok today
EtOH cirrhosis 2/2 GIB now resolved  - varices: present on CT, but no varices on recent EGD  - HE: present on admission to [facility], now resolved on Rifaximin 550mg PO BID and lactulose Q8hrs  - HCC: no focal lesions on CT  -+ascites s/p para x2, last on 10/22 with 5.7L removed, +SBP on 10/14 with ascitic cultures +Ecoli off treatment, Lasix  60 IV BID  - MELD 28  - discussed extensively with patient and his brother that his 3 month mortality based on MELD is 80% (20% chance of dying) and that his liver disease is not fully reversible- course is likely to be chronic decompensation with acute exacerbations brought on by infections, bleeding, med nonadherence, etc. Also discussed high likelihood that patient has component of NAFLD along with alcoholic cirrhosis, and is presently not a transplant candidate due to obesity. Pt and brother understand.
EtOH cirrhosis 2/2 GIB now resolved  - varices: present on CT, but no varices on recent EGD  - HE: present on admission to [facility], now resolved on Rifaximin 550mg PO BID and lactulose Q8hrs  - HCC: no focal lesions on CT  -+ascites s/p para x2, last on 10/22 with 5.7L removed, +SBP on 10/14 with ascitic cultures +Ecoli off treatment, Lasix  60 IV BID
EtOH cirrhosis 2/2 GIB now resolved  - varices: present on CT, but no varices on recent EGD  - HE: present on admission to [facility], now resolved on Rifaximin 550mg PO BID and lactulose Q8hrs  - HCC: no focal lesions on CT  -+ascites s/p para x2, last on 10/22 with 5.7L removed, +SBP on 10/14 with ascitic cultures +Ecoli off treatment, Lasix  60 IV BID
EtOH cirrhosis 2/2 GIB now resolved  - varices: present on CT, but no varices on recent EGD  - HE: present on admission to [facility], now resolved on Rifaximin 550mg PO BID and lactulose Q8hrs  - HCC: no focal lesions on CT  -+ascites s/p para x2, last on 10/22 with 5.7L removed, +SBP on 10/14 with ascitic cultures +Ecoli off treatment, Lasix 60mg IV daily
- secondary to GI bleed now resolved, suspect hemorrhoidal. Pt reports episode occurred 2 weeks ago. Since resolved.  - s/p EGD, colonoscopy and VCE without source of bleed- c-scope showing internal hemorrhoids, nonbleeding. Suspect this was source of previuos blood.  - PPI as above

## 2020-11-18 NOTE — PROGRESS NOTE ADULT - REASON FOR ADMISSION
hepatic encephalopathy

## 2020-11-18 NOTE — PROGRESS NOTE ADULT - PROBLEM SELECTOR PLAN 4
- s/p egd and colonoscopy, no bleed source, VCE showing brown stool throughout without source of bleed. Likely hemorrhoidal- anemic again today, without clinical signs of bleeding- suspect 2/2 BM suppression likely 2/2 cirrhosis  - transfused 1 unit prbc 11/12, 11/17  - Protonix PO daily  - c/w Carafate  - sbp ppx on cipro 500mg daily

## 2020-11-18 NOTE — PROGRESS NOTE ADULT - PROBLEM SELECTOR PROBLEM 7
Congestive heart failure, unspecified HF chronicity, unspecified heart failure type
Discharge planning issues
Prophylactic measure
Prophylactic measure
Discharge planning issues

## 2020-11-18 NOTE — PROGRESS NOTE ADULT - PROBLEM SELECTOR PROBLEM 4
Anemia due to acute blood loss
Anemia due to acute blood loss
Congestive heart failure, unspecified HF chronicity, unspecified heart failure type
Gastrointestinal hemorrhage with melena
MONI (acute kidney injury)

## 2020-11-21 LAB
CULTURE RESULTS: SIGNIFICANT CHANGE UP
SPECIMEN SOURCE: SIGNIFICANT CHANGE UP

## 2020-12-11 ENCOUNTER — LABORATORY RESULT (OUTPATIENT)
Age: 60
End: 2020-12-11

## 2020-12-11 ENCOUNTER — APPOINTMENT (OUTPATIENT)
Dept: HEPATOLOGY | Facility: CLINIC | Age: 60
End: 2020-12-11
Payer: COMMERCIAL

## 2020-12-11 VITALS
TEMPERATURE: 97.4 F | HEIGHT: 67.5 IN | SYSTOLIC BLOOD PRESSURE: 107 MMHG | OXYGEN SATURATION: 100 % | BODY MASS INDEX: 46.85 KG/M2 | WEIGHT: 302 LBS | HEART RATE: 79 BPM | DIASTOLIC BLOOD PRESSURE: 69 MMHG

## 2020-12-11 DIAGNOSIS — Z78.9 OTHER SPECIFIED HEALTH STATUS: ICD-10-CM

## 2020-12-11 PROCEDURE — 99214 OFFICE O/P EST MOD 30 MIN: CPT

## 2020-12-11 PROCEDURE — 99204 OFFICE O/P NEW MOD 45 MIN: CPT

## 2020-12-11 PROCEDURE — 99072 ADDL SUPL MATRL&STAF TM PHE: CPT

## 2020-12-11 RX ORDER — PANTOPRAZOLE 40 MG/1
40 TABLET, DELAYED RELEASE ORAL
Refills: 0 | Status: ACTIVE | COMMUNITY

## 2020-12-11 RX ORDER — SPIRONOLACTONE 100 MG/1
100 TABLET ORAL DAILY
Qty: 90 | Refills: 3 | Status: ACTIVE | COMMUNITY

## 2020-12-11 RX ORDER — FUROSEMIDE 40 MG/1
40 TABLET ORAL
Qty: 180 | Refills: 2 | Status: ACTIVE | COMMUNITY
Start: 2020-12-11 | End: 1900-01-01

## 2020-12-11 RX ORDER — FERROUS SULFATE 325(65) MG
325 TABLET ORAL
Refills: 0 | Status: DISCONTINUED | COMMUNITY
End: 2020-12-11

## 2020-12-11 RX ORDER — ZINC SULFATE TAB 220 MG (50 MG ZINC EQUIVALENT) 220 (50 ZN) MG
220 (50 ZN) TAB ORAL
Qty: 120 | Refills: 3 | Status: ACTIVE | COMMUNITY
Start: 2020-12-11 | End: 1900-01-01

## 2020-12-11 RX ORDER — LACTULOSE 10 G/15ML
10 SOLUTION ORAL 3 TIMES DAILY
Qty: 3 | Refills: 3 | Status: ACTIVE | COMMUNITY

## 2020-12-11 RX ORDER — ATORVASTATIN CALCIUM 20 MG/1
20 TABLET, FILM COATED ORAL
Qty: 60 | Refills: 3 | Status: ACTIVE | COMMUNITY

## 2020-12-11 RX ORDER — CIPROFLOXACIN HYDROCHLORIDE 500 MG/1
500 TABLET, FILM COATED ORAL DAILY
Qty: 60 | Refills: 3 | Status: ACTIVE | COMMUNITY
Start: 2020-12-11 | End: 1900-01-01

## 2020-12-11 RX ORDER — RIFAXIMIN 550 MG/1
550 TABLET ORAL
Qty: 180 | Refills: 3 | Status: ACTIVE | COMMUNITY

## 2020-12-11 RX ORDER — MIDODRINE HYDROCHLORIDE 5 MG/1
5 TABLET ORAL
Refills: 0 | Status: ACTIVE | COMMUNITY

## 2020-12-11 NOTE — ASSESSMENT
[FreeTextEntry1] : 59 y/o M w/ morbid obesity, CKD, HTN, HLD, decompensated BOB/ETOH cirrhosis c/b hepatic encephalopathy, ascites requiring LVP, hx culture positive SBP, hepatic hydrothorax here for initial liver clinic appt. \par \par Blood type A, MELD 32 as of 11/18/20. BMI 46. Needs liver transplantation however BMI may preclude him at our center, will need to discuss further. He also may fit SLK criteria soon with worsening kidney function (now about 40-45 eGFR). His GFR in 9/2020 was already under 60.\par \par 1. Cirrhosis Surveillance\par    > Esophageal / gastric varices - no EV on EGD 10/26/2020\par    > History of SBP - cipro 500 mg daily for life\par    > Ascites - continue lasix 40 mg TID, aldactone 100 mg daily. Schedule LVP for fluid removal.\par    > Hepatic encephalopathy - yes on lactulose + rifaximin\par    > HCC screening - none on CT w/ IV contrast 10/14/20\par    > Portal vein thrombosis - none\par    > HBV/HAV status - check\par    > Transplant candidate - unclear right now. will arrange alcohol counseling\par \par 2. RHM \par - colonoscopy 10/26/2020 - 2 small poyps (3 mm and 4 mm, not removed because bowel prep was suboptimal)\par \par \par RTC 2 weeks\par

## 2020-12-11 NOTE — PHYSICAL EXAM
[Scleral Icterus] : Scleral Icterus noted [Abdominal  Ascites] : ascites [Ascites Tense] : ascites is not tense [Ascites Shifting Dullness] : shifting dullness if ascites [Asterixis] : no asterixis observed [Jaundice] : Jaundice [General Appearance - Alert] : alert [General Appearance - In No Acute Distress] : in no acute distress [Neck Appearance] : the appearance of the neck was normal [Neck Cervical Mass (___cm)] : no neck mass was observed [Jugular Venous Distention Increased] : there was no jugular-venous distention [Thyroid Diffuse Enlargement] : the thyroid was not enlarged [Thyroid Nodule] : there were no palpable thyroid nodules [] : no respiratory distress [Auscultation Breath Sounds / Voice Sounds] : lungs were clear to auscultation bilaterally [Bowel Sounds] : normal bowel sounds [Abdomen Soft] : soft [Abdomen Tenderness] : non-tender [Abdomen Mass (___ Cm)] : no abdominal mass palpated [Oriented To Time, Place, And Person] : oriented to person, place, and time [Impaired Insight] : insight and judgment were intact [Affect] : the affect was normal

## 2020-12-11 NOTE — CONSULT LETTER
[Dear  ___] : Dear  [unfilled], [Consult Letter:] : I had the pleasure of evaluating your patient, [unfilled]. [( Thank you for referring [unfilled] for consultation for _____ )] : Thank you for referring [unfilled] for consultation for [unfilled] [Please see my note below.] : Please see my note below. [Consult Closing:] : Thank you very much for allowing me to participate in the care of this patient.  If you have any questions, please do not hesitate to contact me. [Sincerely,] : Sincerely, [FreeTextEntry2] : Wesley White [FreeTextEntry3] : Dr. Justo Rojas MD\par  of Medicine\par [facility]\par [facility]\par
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
## 2020-12-14 LAB
AFP-TM SERPL-MCNC: <1.8 NG/ML
ALBUMIN SERPL ELPH-MCNC: 3.4 G/DL
ALP BLD-CCNC: 137 U/L
ALT SERPL-CCNC: 17 U/L
ANION GAP SERPL CALC-SCNC: 10 MMOL/L
AST SERPL-CCNC: 42 U/L
BASOPHILS # BLD AUTO: 0.02 K/UL
BASOPHILS NFR BLD AUTO: 1 %
BILIRUB SERPL-MCNC: 4.6 MG/DL
BUN SERPL-MCNC: 15 MG/DL
CALCIUM SERPL-MCNC: 8.8 MG/DL
CHLORIDE SERPL-SCNC: 99 MMOL/L
CO2 SERPL-SCNC: 29 MMOL/L
CREAT SERPL-MCNC: 1.31 MG/DL
EOSINOPHIL # BLD AUTO: 0.07 K/UL
EOSINOPHIL NFR BLD AUTO: 3 %
FERRITIN SERPL-MCNC: 143 NG/ML
GGT SERPL-CCNC: 26 U/L
GLUCOSE SERPL-MCNC: 108 MG/DL
HBV CORE IGG+IGM SER QL: NONREACTIVE
HBV SURFACE AB SER QL: REACTIVE
HBV SURFACE AG SER QL: NONREACTIVE
HCT VFR BLD CALC: 28.1 %
HCV AB SER QL: NONREACTIVE
HCV S/CO RATIO: 0.15 S/CO
HEPATITIS A IGG ANTIBODY: REACTIVE
HGB BLD-MCNC: 8.4 G/DL
INR PPP: 2.31 RATIO
IRON SATN MFR SERPL: 37 %
IRON SERPL-MCNC: 70 UG/DL
LYMPHOCYTES # BLD AUTO: 0.57 K/UL
LYMPHOCYTES NFR BLD AUTO: 26 %
MAN DIFF?: NORMAL
MCHC RBC-ENTMCNC: 29.9 GM/DL
MCHC RBC-ENTMCNC: 31.1 PG
MCV RBC AUTO: 104.1 FL
MONOCYTES # BLD AUTO: 0.18 K/UL
MONOCYTES NFR BLD AUTO: 8 %
NEUTROPHILS # BLD AUTO: 1.37 K/UL
NEUTROPHILS NFR BLD AUTO: 62 %
PLATELET # BLD AUTO: 57 K/UL
POTASSIUM SERPL-SCNC: 4 MMOL/L
PROT SERPL-MCNC: 7.9 G/DL
PT BLD: 26.2 SEC
RBC # BLD: 2.7 M/UL
RBC # FLD: 18.6 %
SARS-COV-2 IGG SERPL IA-ACNC: 0.19 INDEX
SARS-COV-2 IGG SERPL QL IA: NEGATIVE
SODIUM SERPL-SCNC: 138 MMOL/L
TIBC SERPL-MCNC: 189 UG/DL
UIBC SERPL-MCNC: 120 UG/DL
WBC # FLD AUTO: 2.21 K/UL

## 2020-12-16 ENCOUNTER — NON-APPOINTMENT (OUTPATIENT)
Age: 60
End: 2020-12-16

## 2020-12-17 LAB — ZINC SERPL-MCNC: 59 UG/DL

## 2020-12-18 LAB — PHOSPHATIDYETHANOL (PETH), WHOLE BLOOD: NEGATIVE NG/ML

## 2020-12-21 LAB — VIT B6 SERPL-MCNC: 9.2 UG/L

## 2020-12-22 ENCOUNTER — NON-APPOINTMENT (OUTPATIENT)
Age: 60
End: 2020-12-22

## 2020-12-29 ENCOUNTER — APPOINTMENT (OUTPATIENT)
Dept: HEPATOLOGY | Facility: CLINIC | Age: 60
End: 2020-12-29
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
## 2021-01-20 NOTE — DIETITIAN INITIAL EVALUATION ADULT. - PROBLEM SELECTOR PLAN 3
Satisfactory
- Fever to 100.3, HR 's, RR 20,  - patient reporting fevers of 102 at home  -likely due to bacterial pna  - f/u BCx, UCx  - c/w Vancomycin, Zosyn pending culture results

## 2021-04-15 ENCOUNTER — INPATIENT (INPATIENT)
Facility: HOSPITAL | Age: 61
LOS: 6 days | Discharge: ANOTHER HOSPITAL | DRG: 432 | End: 2021-04-22
Attending: STUDENT IN AN ORGANIZED HEALTH CARE EDUCATION/TRAINING PROGRAM | Admitting: HOSPITALIST
Payer: COMMERCIAL

## 2021-04-15 VITALS
WEIGHT: 300.05 LBS | HEIGHT: 70 IN | DIASTOLIC BLOOD PRESSURE: 76 MMHG | OXYGEN SATURATION: 97 % | TEMPERATURE: 98 F | HEART RATE: 97 BPM | RESPIRATION RATE: 80 BRPM | SYSTOLIC BLOOD PRESSURE: 120 MMHG

## 2021-04-15 DIAGNOSIS — Z02.9 ENCOUNTER FOR ADMINISTRATIVE EXAMINATIONS, UNSPECIFIED: ICD-10-CM

## 2021-04-15 DIAGNOSIS — N17.9 ACUTE KIDNEY FAILURE, UNSPECIFIED: ICD-10-CM

## 2021-04-15 DIAGNOSIS — K70.31 ALCOHOLIC CIRRHOSIS OF LIVER WITH ASCITES: ICD-10-CM

## 2021-04-15 DIAGNOSIS — Z98.890 OTHER SPECIFIED POSTPROCEDURAL STATES: Chronic | ICD-10-CM

## 2021-04-15 DIAGNOSIS — Z29.9 ENCOUNTER FOR PROPHYLACTIC MEASURES, UNSPECIFIED: ICD-10-CM

## 2021-04-15 DIAGNOSIS — R18.8 OTHER ASCITES: ICD-10-CM

## 2021-04-15 DIAGNOSIS — J90 PLEURAL EFFUSION, NOT ELSEWHERE CLASSIFIED: ICD-10-CM

## 2021-04-15 LAB
ALBUMIN SERPL ELPH-MCNC: 2.9 G/DL — LOW (ref 3.3–5)
ALP SERPL-CCNC: 129 U/L — HIGH (ref 40–120)
ALT FLD-CCNC: 24 U/L — SIGNIFICANT CHANGE UP (ref 10–45)
AMMONIA BLD-MCNC: 112 UMOL/L — HIGH (ref 11–55)
ANION GAP SERPL CALC-SCNC: 12 MMOL/L — SIGNIFICANT CHANGE UP (ref 5–17)
APTT BLD: 42 SEC — HIGH (ref 27.5–35.5)
AST SERPL-CCNC: 40 U/L — SIGNIFICANT CHANGE UP (ref 10–40)
BASOPHILS # BLD AUTO: 0 K/UL — SIGNIFICANT CHANGE UP (ref 0–0.2)
BASOPHILS NFR BLD AUTO: 0 % — SIGNIFICANT CHANGE UP (ref 0–2)
BILIRUB SERPL-MCNC: 3.9 MG/DL — HIGH (ref 0.2–1.2)
BUN SERPL-MCNC: 36 MG/DL — HIGH (ref 7–23)
CALCIUM SERPL-MCNC: 8.8 MG/DL — SIGNIFICANT CHANGE UP (ref 8.4–10.5)
CHLORIDE SERPL-SCNC: 102 MMOL/L — SIGNIFICANT CHANGE UP (ref 96–108)
CO2 SERPL-SCNC: 22 MMOL/L — SIGNIFICANT CHANGE UP (ref 22–31)
CREAT SERPL-MCNC: 2.14 MG/DL — HIGH (ref 0.5–1.3)
EOSINOPHIL # BLD AUTO: 0 K/UL — SIGNIFICANT CHANGE UP (ref 0–0.5)
EOSINOPHIL NFR BLD AUTO: 0 % — SIGNIFICANT CHANGE UP (ref 0–6)
GAS PNL BLDV: SIGNIFICANT CHANGE UP
GLUCOSE SERPL-MCNC: 106 MG/DL — HIGH (ref 70–99)
HCT VFR BLD CALC: 29.8 % — LOW (ref 39–50)
HGB BLD-MCNC: 9.9 G/DL — LOW (ref 13–17)
INR BLD: 2.03 RATIO — HIGH (ref 0.88–1.16)
LIDOCAIN IGE QN: 57 U/L — SIGNIFICANT CHANGE UP (ref 7–60)
LYMPHOCYTES # BLD AUTO: 0.48 K/UL — LOW (ref 1–3.3)
LYMPHOCYTES # BLD AUTO: 13.1 % — SIGNIFICANT CHANGE UP (ref 13–44)
MCHC RBC-ENTMCNC: 31.2 PG — SIGNIFICANT CHANGE UP (ref 27–34)
MCHC RBC-ENTMCNC: 33.2 GM/DL — SIGNIFICANT CHANGE UP (ref 32–36)
MCV RBC AUTO: 94 FL — SIGNIFICANT CHANGE UP (ref 80–100)
MONOCYTES # BLD AUTO: 0.16 K/UL — SIGNIFICANT CHANGE UP (ref 0–0.9)
MONOCYTES NFR BLD AUTO: 4.4 % — SIGNIFICANT CHANGE UP (ref 2–14)
NEUTROPHILS # BLD AUTO: 2.97 K/UL — SIGNIFICANT CHANGE UP (ref 1.8–7.4)
NEUTROPHILS NFR BLD AUTO: 81.6 % — HIGH (ref 43–77)
PLATELET # BLD AUTO: 48 K/UL — LOW (ref 150–400)
POTASSIUM SERPL-MCNC: 3.7 MMOL/L — SIGNIFICANT CHANGE UP (ref 3.5–5.3)
POTASSIUM SERPL-SCNC: 3.7 MMOL/L — SIGNIFICANT CHANGE UP (ref 3.5–5.3)
PROT SERPL-MCNC: 7.2 G/DL — SIGNIFICANT CHANGE UP (ref 6–8.3)
PROTHROM AB SERPL-ACNC: 23.5 SEC — HIGH (ref 10.6–13.6)
RBC # BLD: 3.17 M/UL — LOW (ref 4.2–5.8)
RBC # FLD: 16.1 % — HIGH (ref 10.3–14.5)
SARS-COV-2 RNA SPEC QL NAA+PROBE: SIGNIFICANT CHANGE UP
SODIUM SERPL-SCNC: 136 MMOL/L — SIGNIFICANT CHANGE UP (ref 135–145)
WBC # BLD: 3.64 K/UL — LOW (ref 3.8–10.5)
WBC # FLD AUTO: 3.64 K/UL — LOW (ref 3.8–10.5)

## 2021-04-15 PROCEDURE — 99285 EMERGENCY DEPT VISIT HI MDM: CPT

## 2021-04-15 PROCEDURE — 71250 CT THORAX DX C-: CPT | Mod: 26

## 2021-04-15 PROCEDURE — 71045 X-RAY EXAM CHEST 1 VIEW: CPT | Mod: 26

## 2021-04-15 PROCEDURE — 99223 1ST HOSP IP/OBS HIGH 75: CPT

## 2021-04-15 PROCEDURE — 93010 ELECTROCARDIOGRAM REPORT: CPT

## 2021-04-15 RX ORDER — FUROSEMIDE 40 MG
40 TABLET ORAL DAILY
Refills: 0 | Status: DISCONTINUED | OUTPATIENT
Start: 2021-04-16 | End: 2021-04-17

## 2021-04-15 RX ORDER — IBUPROFEN 200 MG
600 TABLET ORAL ONCE
Refills: 0 | Status: COMPLETED | OUTPATIENT
Start: 2021-04-15 | End: 2021-04-15

## 2021-04-15 RX ORDER — SPIRONOLACTONE 25 MG/1
100 TABLET, FILM COATED ORAL DAILY
Refills: 0 | Status: DISCONTINUED | OUTPATIENT
Start: 2021-04-15 | End: 2021-04-17

## 2021-04-15 RX ORDER — LACTULOSE 10 G/15ML
20 SOLUTION ORAL EVERY 8 HOURS
Refills: 0 | Status: DISCONTINUED | OUTPATIENT
Start: 2021-04-15 | End: 2021-04-18

## 2021-04-15 RX ORDER — CEFTRIAXONE 500 MG/1
1000 INJECTION, POWDER, FOR SOLUTION INTRAMUSCULAR; INTRAVENOUS ONCE
Refills: 0 | Status: COMPLETED | OUTPATIENT
Start: 2021-04-15 | End: 2021-04-15

## 2021-04-15 RX ORDER — FUROSEMIDE 40 MG
40 TABLET ORAL ONCE
Refills: 0 | Status: COMPLETED | OUTPATIENT
Start: 2021-04-15 | End: 2021-04-15

## 2021-04-15 RX ORDER — MIDODRINE HYDROCHLORIDE 2.5 MG/1
5 TABLET ORAL THREE TIMES A DAY
Refills: 0 | Status: DISCONTINUED | OUTPATIENT
Start: 2021-04-15 | End: 2021-04-16

## 2021-04-15 RX ORDER — ATORVASTATIN CALCIUM 80 MG/1
20 TABLET, FILM COATED ORAL AT BEDTIME
Refills: 0 | Status: DISCONTINUED | OUTPATIENT
Start: 2021-04-15 | End: 2021-04-22

## 2021-04-15 RX ADMIN — MIDODRINE HYDROCHLORIDE 5 MILLIGRAM(S): 2.5 TABLET ORAL at 23:46

## 2021-04-15 RX ADMIN — CEFTRIAXONE 100 MILLIGRAM(S): 500 INJECTION, POWDER, FOR SOLUTION INTRAMUSCULAR; INTRAVENOUS at 22:20

## 2021-04-15 RX ADMIN — ATORVASTATIN CALCIUM 20 MILLIGRAM(S): 80 TABLET, FILM COATED ORAL at 23:46

## 2021-04-15 RX ADMIN — Medication 600 MILLIGRAM(S): at 21:53

## 2021-04-15 RX ADMIN — Medication 600 MILLIGRAM(S): at 21:26

## 2021-04-15 RX ADMIN — LACTULOSE 20 GRAM(S): 10 SOLUTION ORAL at 22:20

## 2021-04-15 RX ADMIN — Medication 40 MILLIGRAM(S): at 21:00

## 2021-04-15 NOTE — H&P ADULT - ASSESSMENT
NIGHT HOSPITALIST:    NIGHT HOSPITALIST:   Protracted course of 3 months of increasing abdominal girth with worsening in the past month with dyspnoea>>patient with now large LEFT pleural effusion unclear to etiology if it is an effusion from patient's known accumulative ascites from his history of alcoholic cirrhosis.   Will obtain an urgent CTT chest to clarify>>concerned about risk for SBP and will provide one dose of Rocephin 1 gm.  Would consider formal evaluation by GI and IR in the AM for consideration of likely large volume paracentesis.   Will continue with patient's aldactone and will continue with IV Lasix for now.   Patient with elevated ammonia but no overt encephalopathy, albeit subtle short term memory impairment.   Will continue patient's lactulose and rifaximin.      Would also consider formal nephrology evaluation in the AM, due to anticipation of further decline in renal function, particularly with planned paracentesis and diuretic requirements.  UA ordered.    STEPH for now with patient's thrombocytopaenia.   NIGHT HOSPITALIST:   Protracted course of 3 months of increasing abdominal girth with worsening in the past month with dyspnoea>>patient with now large LEFT pleural effusion unclear to etiology if it is an effusion from patient's known accumulative ascites from his history of alcoholic cirrhosis.   Will obtain an urgent CTT chest to clarify>>concerned about risk for SBP and will provide one dose of Rocephin 1 gm.  Would consider formal evaluation by GI and IR in the AM for consideration of likely large volume paracentesis.   Will continue with patient's aldactone and will continue with IV Lasix for now.   Patient with elevated ammonia but no overt encephalopathy, albeit subtle short term memory impairment.   Will continue patient's lactulose and rifaximin.      Would also consider formal nephrology evaluation in the AM, due to anticipation of further decline in renal function, particularly with planned paracentesis and diuretic requirements.  UA ordered.    STEPH for now with patient's thrombocytopaenia.      Patient aware of course and agrees with plan/care as above.   Given patient's comorbidities, patient's long term prognosis is guarded.  Emotional support provided to patient.  Care reviewed with covering NP/PA for endorsement to my physician colleagues.    Matt Renae MD  [phone] NIGHT HOSPITALIST:   Protracted course of 3 months of increasing abdominal girth with worsening in the past month with dyspnoea>>patient with now large LEFT pleural effusion unclear to etiology if it is an effusion from patient's known accumulative ascites from his history of alcoholic cirrhosis.   Will obtain an urgent CTT chest to clarify>>concerned about risk for SBP and will provide one dose of Rocephin 1 gm.  Would consider formal evaluation by GI and IR in the AM for consideration of likely large volume paracentesis.   Will continue with patient's aldactone and will continue with IV Lasix for now.   Patient with elevated ammonia but no overt encephalopathy, albeit subtle short term memory impairment.   Will continue patient's lactulose and rifaximin.      Would also consider formal nephrology evaluation in the AM, due to anticipation of further decline in renal function, particularly with planned paracentesis and diuretic requirements.  UA ordered.    STEPH for now with patient's thrombocytopaenia.      Patient aware of course and agrees with plan/care as above.   Given patient's comorbidities, patient's long term prognosis is guarded.  Emotional support provided to patient.  Care reviewed with covering NP, Jovi,  for endorsement to my physician colleagues.    Matt Renae MD  [phone]

## 2021-04-15 NOTE — H&P ADULT - NSHPSOURCEINFOTX_GEN_ALL_CORE
Reviewed Medex with patient via patient recall from [facility] in Nov 2020.   Patient did not wish examiner to contact brother at this hour.  [facility]   closed at this hour.

## 2021-04-15 NOTE — H&P ADULT - NSHPREVIEWOFSYSTEMS_GEN_ALL_CORE
No abdominal pain, no red blood per rectum or melena.  NO chest pain/pressure.  NO palpitations.  NO HA, no focal weakness.  No rash.  NO back pain, no tearing back pain.    NO dysuria, no hematuria.  NO cough, no wheezing.  NO joint pain.  NO thyroid symptoms.  NO SI/HI.

## 2021-04-15 NOTE — H&P ADULT - NSHPPHYSICALEXAM_GEN_ALL_CORE
Physical exam with a morbidly obese, chronically ill appearing M, appears older than stated age.    Afebrile.   HR  76   RR 14  /77  96% on RA    HEENT< PERRL< EOMI  Neck supple  NO thyromegaly  Chest decreased breath sounds LEFT base  Cor s1 s2  Abdomen softly distended, normal bowel sounds, no rebound. nontender  Skin dry  Ext poor nail hygiene, 2++ B/L LE oedema.  Neurologic AxOx3.  Speech fluent.  Cognition intact.  UE/LE 5/5.  NO fine or coarse tremor.  NO SI/HI.  Affect neutral.

## 2021-04-15 NOTE — ED PROVIDER NOTE - RAPID ASSESSMENT
60 year old M with pmhx of hepatic cirrhosis, CHF, HLD, HTN presents to ED c/o worsening abd ascites x4 weeks. Pt states he has required paracentesis in the past. Denies fevers, abd pain, nausea, vomiting.    Patient was seen as a tele QDOC patient. The patient will be seen and further worked up in the main emergency department and their care will be completed by the main emergency department team along with a thorough physical exam. Receiving team will follow up on labs, analgesia, any clinical imaging, reassess and disposition as clinically indicated, all decisions regarding the progression of care will be made at their discretion.    Scribe Statement: Brendon CORRALES, attest that this documentation has been prepared under the direction and in the presence of Ale Calvert) 60 year old M with pmhx of hepatic cirrhosis, CHF, HLD, HTN presents to ED c/o worsening abd ascites x4 weeks. Pt states he has required paracentesis in the past. Denies fevers, abd pain, nausea, vomiting.    Patient was seen as a tele QDOC patient. The patient will be seen and further worked up in the main emergency department and their care will be completed by the main emergency department team along with a thorough physical exam. Receiving team will follow up on labs, analgesia, any clinical imaging, reassess and disposition as clinically indicated, all decisions regarding the progression of care will be made at their discretion.    Scribe Statement: Brendon CORRALES, attest that this documentation has been prepared under the direction and in the presence of Ale Calvert (PA)    Ale CORRALES PA-C, personally performed the service described in the documentation recorded by the scribe in my presence, and it accurately and completely records my words and actions.

## 2021-04-15 NOTE — ED PROVIDER NOTE - PHYSICAL EXAMINATION
Gen: AAO x 3, NAD  Skin: No rashes or lesions  HEENT: NC/AT, PERRLA, EOMI, MMM  Resp: unlabored, dullness to auscultation left base.  CTA right  Cardiac: rrr s1s2, no murmurs  GI: distended, +BS, Soft, NT  Ext: 2+ pedal edema BLE, FROM in all extremities  Neuro: no focal deficits, mild BL hand tremor no asterixis

## 2021-04-15 NOTE — H&P ADULT - NSHPLABSRESULTS_GEN_ALL_CORE
WBC 3.6    Hgb 9.9    Platelets of 48K.    INR 2.0    NH3 112    Random glucose of 106    Cr 1.69 (11/2020) >> 2.1    COVID-19 PCR>>sent.    EKG tracing reviewed with sinus at 76 with nonspecific T changes.    Chest radiograph with LARGE LEFT pleural effusion,.    Alb 2.9

## 2021-04-15 NOTE — H&P ADULT - PROBLEM SELECTOR PLAN 3
See above.   Would consider formal renal evaluation in the AM with anticipation of further fluctuations of Cr with diuretic requirements.

## 2021-04-15 NOTE — H&P ADULT - NSHPSOCIALHISTORY_GEN_ALL_CORE
NO tobacco or recreational drug use.  Reports quit ethanol 3 years ago.  Lives alone.  Patient did not wish examiner to contact brother at this time.

## 2021-04-15 NOTE — H&P ADULT - PROBLEM SELECTOR PLAN 2
See above.  CTT chest no contrast. See above.  CTT chest no contrast>>reviewed with radiologist on call, LEFT pleural effusion but no mediastinal shift, no suggestion of empyema.   Unclear if this represents a sympathetic effusion from patient's ascites but may consider thoracentesis under US given symptoms in the AM.

## 2021-04-15 NOTE — H&P ADULT - PROBLEM SELECTOR PLAN 1
See above.   Would consider formal Hepatology and IR evaluation in the AM, with anticipation for large volume paracentesis.  Rocephin 1 gm x 1.

## 2021-04-15 NOTE — H&P ADULT - PROBLEM SELECTOR PLAN 5
Transitions of Care Status:  1.  Name of PCP:     Wesley Patrick MD, PhD (Hepatology-[facility])   2.  PCP Contacted on Admission: [ ] Y    [x ] N    3.  PCP contacted at Discharge: [ ] Y    [ ] N    [ ] N/A  4.  Post-Discharge Appointment Date and Location:  5.  Summary of Handoff given to PCP:

## 2021-04-15 NOTE — ED ADULT NURSE NOTE - OBJECTIVE STATEMENT
60 yr old male with a h/o of liver cirrhosis and chf came in with large amount of fluid collecting in abd and legs. on assessment pt is obese and sob with difficulty walking. last time he had his abd drained was 3 months ago. on assessment a and o x 3 lungs clear abd is very large and tight legs swollen an tight. no n/v/d no fevers no other complaints.

## 2021-04-15 NOTE — ED PROVIDER NOTE - ATTENDING CONTRIBUTION TO CARE
I have personally performed a face to face medical and diagnostic evaluation of the patient. I have discussed with and reviewed the ACP's note and agree with the History, ROS, Physical Exam and MDM unless otherwise indicated. A brief summary of my personal evaluation and impression can be found below.    60F hx of abdominal ascites s/p prior paracentesis hepatic cirrhosis CHF HTN HLD presents w a cc of worsening ascites x4 weeks now a/w worsening SOB SEVERINO cough, no prior hx of dvt or pe has been admitted before for same issue, no fever, no abdominal pain, per pt does not think is more confused. denies worsening LE edema.      All other ROS negative, except as above and as per HPI and ROS section.    VITALS: Initial triage and subsequent vitals have been reviewed by me.  GEN APPEARANCE: WDWN, alert, non-toxic, NAD  HEAD: Atraumatic.  EYES: PERRLa, EOMI, vision grossly intact.   NECK: Supple  CV: RRR, S1S2, no c/r/m/g. Cap refill <2 seconds. No bruits.   LUNGS: L diminished breath sounds   ABDOMEN: large ascitic abdomen, ntnd.   MSK/EXT: No spinal or extremity point tenderness. No CVA ttp. Pelvis stable. No peripheral edema.  NEURO: Alert, follows commands. Weight bearing normal. Speech normal. Sensation and motor normal x4 extremities.   SKIN: Warm, dry and intact. No rash.  PSYCH: Appropriate    Plan/MDM: 60M presents w worsening abdominal fullness and now SOB SEVERINO c/w prior exacerbations of abdominal ascites requiring paracentesis. exam vss non toxic abdomen exam as above ddx c/f worsening ascites less c/f SBP given no pain no fever. Will check labs cxr ekg anticipate admission.

## 2021-04-15 NOTE — ED PROVIDER NOTE - OBJECTIVE STATEMENT
61 yo male with PMHx of Hepatic Cirrhosis c/b ascites and hepatic encephalopathy, thrombocytopenia, CHF, HTN, HLD p/w SOB.  Patient reports that he has had worsening abdominal distention over the past month.  Abd distention is now associated with worsening SOB and BLE edema.  Patient states that he periodically requires paracentesis to remove ascites.  Denies fevers, abd pain, NVD.  Patient denies confusion.  Taking lactulose as prescribed.  Having 3-5 BMs per day.

## 2021-04-16 ENCOUNTER — RESULT REVIEW (OUTPATIENT)
Age: 61
End: 2021-04-16

## 2021-04-16 DIAGNOSIS — D69.6 THROMBOCYTOPENIA, UNSPECIFIED: ICD-10-CM

## 2021-04-16 LAB
ALBUMIN FLD-MCNC: <0.3 G/DL — SIGNIFICANT CHANGE UP
ALBUMIN SERPL ELPH-MCNC: 2.4 G/DL — LOW (ref 3.3–5)
ALP SERPL-CCNC: 118 U/L — SIGNIFICANT CHANGE UP (ref 40–120)
ALT FLD-CCNC: 21 U/L — SIGNIFICANT CHANGE UP (ref 10–45)
ANION GAP SERPL CALC-SCNC: 9 MMOL/L — SIGNIFICANT CHANGE UP (ref 5–17)
AST SERPL-CCNC: 35 U/L — SIGNIFICANT CHANGE UP (ref 10–40)
B PERT IGG+IGM PNL SER: ABNORMAL
B PERT IGG+IGM PNL SER: ABNORMAL
BASOPHILS # BLD AUTO: 0.01 K/UL — SIGNIFICANT CHANGE UP (ref 0–0.2)
BASOPHILS NFR BLD AUTO: 0.3 % — SIGNIFICANT CHANGE UP (ref 0–2)
BILIRUB SERPL-MCNC: 3 MG/DL — HIGH (ref 0.2–1.2)
BUN SERPL-MCNC: 38 MG/DL — HIGH (ref 7–23)
CALCIUM SERPL-MCNC: 8.5 MG/DL — SIGNIFICANT CHANGE UP (ref 8.4–10.5)
CHLORIDE SERPL-SCNC: 102 MMOL/L — SIGNIFICANT CHANGE UP (ref 96–108)
CO2 SERPL-SCNC: 23 MMOL/L — SIGNIFICANT CHANGE UP (ref 22–31)
COLOR FLD: YELLOW — SIGNIFICANT CHANGE UP
COLOR FLD: YELLOW — SIGNIFICANT CHANGE UP
COVID-19 SPIKE DOMAIN AB INTERP: NEGATIVE — SIGNIFICANT CHANGE UP
COVID-19 SPIKE DOMAIN ANTIBODY RESULT: 0.4 U/ML — SIGNIFICANT CHANGE UP
CREAT SERPL-MCNC: 2.46 MG/DL — HIGH (ref 0.5–1.3)
EOSINOPHIL # BLD AUTO: 0.05 K/UL — SIGNIFICANT CHANGE UP (ref 0–0.5)
EOSINOPHIL NFR BLD AUTO: 1.4 % — SIGNIFICANT CHANGE UP (ref 0–6)
FLUID INTAKE SUBSTANCE CLASS: SIGNIFICANT CHANGE UP
FLUID INTAKE SUBSTANCE CLASS: SIGNIFICANT CHANGE UP
FLUID SEGMENTED GRANULOCYTES: 4 % — SIGNIFICANT CHANGE UP
FLUID SEGMENTED GRANULOCYTES: 6 % — SIGNIFICANT CHANGE UP
GLUCOSE FLD-MCNC: 131 MG/DL — SIGNIFICANT CHANGE UP
GLUCOSE SERPL-MCNC: 98 MG/DL — SIGNIFICANT CHANGE UP (ref 70–99)
HCT VFR BLD CALC: 24.9 % — LOW (ref 39–50)
HGB BLD-MCNC: 8.3 G/DL — LOW (ref 13–17)
IMM GRANULOCYTES NFR BLD AUTO: 0.3 % — SIGNIFICANT CHANGE UP (ref 0–1.5)
LDH SERPL L TO P-CCNC: 39 U/L — SIGNIFICANT CHANGE UP
LYMPHOCYTES # BLD AUTO: 0.88 K/UL — LOW (ref 1–3.3)
LYMPHOCYTES # BLD AUTO: 25.3 % — SIGNIFICANT CHANGE UP (ref 13–44)
LYMPHOCYTES # FLD: 58 % — SIGNIFICANT CHANGE UP
LYMPHOCYTES # FLD: 79 % — SIGNIFICANT CHANGE UP
MCHC RBC-ENTMCNC: 31 PG — SIGNIFICANT CHANGE UP (ref 27–34)
MCHC RBC-ENTMCNC: 33.3 GM/DL — SIGNIFICANT CHANGE UP (ref 32–36)
MCV RBC AUTO: 92.9 FL — SIGNIFICANT CHANGE UP (ref 80–100)
MESOTHL CELL # FLD: 22 % — SIGNIFICANT CHANGE UP
MESOTHL CELL # FLD: 8 % — SIGNIFICANT CHANGE UP
MONOCYTES # BLD AUTO: 0.36 K/UL — SIGNIFICANT CHANGE UP (ref 0–0.9)
MONOCYTES NFR BLD AUTO: 10.3 % — SIGNIFICANT CHANGE UP (ref 2–14)
MONOS+MACROS # FLD: 11 % — SIGNIFICANT CHANGE UP
MONOS+MACROS # FLD: 7 % — SIGNIFICANT CHANGE UP
NEUTROPHILS # BLD AUTO: 2.17 K/UL — SIGNIFICANT CHANGE UP (ref 1.8–7.4)
NEUTROPHILS NFR BLD AUTO: 62.4 % — SIGNIFICANT CHANGE UP (ref 43–77)
NRBC # BLD: 0 /100 WBCS — SIGNIFICANT CHANGE UP (ref 0–0)
OTHER CELLS FLD MANUAL: 2 % — SIGNIFICANT CHANGE UP
OTHER CELLS FLD MANUAL: 3 % — SIGNIFICANT CHANGE UP
PH FLD: 7.87 — SIGNIFICANT CHANGE UP
PLATELET # BLD AUTO: 41 K/UL — LOW (ref 150–400)
POTASSIUM SERPL-MCNC: 3.8 MMOL/L — SIGNIFICANT CHANGE UP (ref 3.5–5.3)
POTASSIUM SERPL-SCNC: 3.8 MMOL/L — SIGNIFICANT CHANGE UP (ref 3.5–5.3)
PROT FLD-MCNC: <0.6 G/DL — SIGNIFICANT CHANGE UP
PROT SERPL-MCNC: 6.4 G/DL — SIGNIFICANT CHANGE UP (ref 6–8.3)
RBC # BLD: 2.68 M/UL — LOW (ref 4.2–5.8)
RBC # FLD: 16 % — HIGH (ref 10.3–14.5)
RCV VOL RI: 440 /UL — HIGH (ref 0–0)
RCV VOL RI: 4917 /UL — HIGH (ref 0–0)
SARS-COV-2 IGG+IGM SERPL QL IA: 0.4 U/ML — SIGNIFICANT CHANGE UP
SARS-COV-2 IGG+IGM SERPL QL IA: NEGATIVE — SIGNIFICANT CHANGE UP
SODIUM SERPL-SCNC: 134 MMOL/L — LOW (ref 135–145)
SPECIMEN SOURCE FLD: SIGNIFICANT CHANGE UP
TOTAL NUCLEATED CELL COUNT, BODY FLUID: 42 /UL — SIGNIFICANT CHANGE UP
TOTAL NUCLEATED CELL COUNT, BODY FLUID: 58 /UL — SIGNIFICANT CHANGE UP
TUBE TYPE: SIGNIFICANT CHANGE UP
TUBE TYPE: SIGNIFICANT CHANGE UP
WBC # BLD: 3.48 K/UL — LOW (ref 3.8–10.5)
WBC # FLD AUTO: 3.48 K/UL — LOW (ref 3.8–10.5)

## 2021-04-16 PROCEDURE — 88305 TISSUE EXAM BY PATHOLOGIST: CPT | Mod: 26

## 2021-04-16 PROCEDURE — 88341 IMHCHEM/IMCYTCHM EA ADD ANTB: CPT | Mod: 26

## 2021-04-16 PROCEDURE — 32555 ASPIRATE PLEURA W/ IMAGING: CPT | Mod: LT

## 2021-04-16 PROCEDURE — 88108 CYTOPATH CONCENTRATE TECH: CPT | Mod: 26,59

## 2021-04-16 PROCEDURE — 88112 CYTOPATH CELL ENHANCE TECH: CPT | Mod: 26

## 2021-04-16 PROCEDURE — 99223 1ST HOSP IP/OBS HIGH 75: CPT

## 2021-04-16 PROCEDURE — 88342 IMHCHEM/IMCYTCHM 1ST ANTB: CPT | Mod: 26

## 2021-04-16 PROCEDURE — 99233 SBSQ HOSP IP/OBS HIGH 50: CPT | Mod: GC

## 2021-04-16 PROCEDURE — 49083 ABD PARACENTESIS W/IMAGING: CPT

## 2021-04-16 RX ORDER — FERROUS SULFATE 325(65) MG
1 TABLET ORAL
Qty: 0 | Refills: 0 | DISCHARGE

## 2021-04-16 RX ORDER — ALBUMIN HUMAN 25 %
25 VIAL (ML) INTRAVENOUS EVERY 6 HOURS
Refills: 0 | Status: DISCONTINUED | OUTPATIENT
Start: 2021-04-16 | End: 2021-04-16

## 2021-04-16 RX ORDER — PANTOPRAZOLE SODIUM 20 MG/1
40 TABLET, DELAYED RELEASE ORAL
Refills: 0 | Status: DISCONTINUED | OUTPATIENT
Start: 2021-04-16 | End: 2021-04-22

## 2021-04-16 RX ORDER — OMEGA-3 ACID ETHYL ESTERS 1 G
1 CAPSULE ORAL
Qty: 0 | Refills: 0 | DISCHARGE

## 2021-04-16 RX ORDER — ALBUMIN HUMAN 25 %
50 VIAL (ML) INTRAVENOUS ONCE
Refills: 0 | Status: COMPLETED | OUTPATIENT
Start: 2021-04-16 | End: 2021-04-16

## 2021-04-16 RX ADMIN — ATORVASTATIN CALCIUM 20 MILLIGRAM(S): 80 TABLET, FILM COATED ORAL at 21:17

## 2021-04-16 RX ADMIN — Medication 40 MILLIGRAM(S): at 07:42

## 2021-04-16 RX ADMIN — SPIRONOLACTONE 100 MILLIGRAM(S): 25 TABLET, FILM COATED ORAL at 17:10

## 2021-04-16 RX ADMIN — PANTOPRAZOLE SODIUM 40 MILLIGRAM(S): 20 TABLET, DELAYED RELEASE ORAL at 17:19

## 2021-04-16 RX ADMIN — Medication 50 MILLILITER(S): at 17:12

## 2021-04-16 RX ADMIN — LACTULOSE 20 GRAM(S): 10 SOLUTION ORAL at 23:05

## 2021-04-16 RX ADMIN — LACTULOSE 20 GRAM(S): 10 SOLUTION ORAL at 07:42

## 2021-04-16 RX ADMIN — LACTULOSE 20 GRAM(S): 10 SOLUTION ORAL at 17:11

## 2021-04-16 NOTE — PROCEDURE NOTE - PROCEDURE FINDINGS AND DETAILS
Status post left thoracentesis with manual aspiration of 1000 cc of clear yellow fluid.  Status post right lower quadrant paracentesis with aspiration of 20 cc of clear yellow fluid.   Abdominal ultrasound showed small volume ascites  Left lung ultrasound showed moderate pleural effusion

## 2021-04-16 NOTE — CONSULT NOTE ADULT - ATTENDING COMMENTS
Hepatology Staff: Miguel Lyman MD    I saw and examined the patient along with  Dr. Hall 04-16-21 @ 19:06  Patient Medical Record, hosptial course was reviewed and summarized as below:    Vitals: Vital Signs Last 24 Hrs  T(C): 36.4 (16 Apr 2021 13:30), Max: 37.3 (15 Apr 2021 23:51)  T(F): 97.6 (16 Apr 2021 13:30), Max: 99.1 (15 Apr 2021 23:51)  HR: 80 (16 Apr 2021 01:00) (77 - 82)  BP: 123/65 (16 Apr 2021 13:30) (106/60 - 123/65)    RR: 18 (16 Apr 2021 13:30) (18 - 18)  SpO2: 96% (16 Apr 2021 13:30) (95% - 96%)    Antibiotics:rifAXIMin 550 milliGRAM(s) Oral two times a day    Diuretics:furosemide   Injectable 40 milliGRAM(s) IV Push daily  spironolactone 100 milliGRAM(s) Oral daily    Labs:Creatinine, Serum: 2.46 mg/dL (04-16-21 @ 07:01)  Bilirubin Total, Serum: 3.0 mg/dL (04-16-21 @ 07:01)    I/O: I&O's Summary    15 Apr 2021 07:01  -  16 Apr 2021 07:00  --------------------------------------------------------  IN: 180 mL / OUT: 0 mL / NET: 180 mL      Nutritional Status: Weight (kg): 73.3 (04-16-21 @ 13:30)  BMI (kg/m2): 26.1 (04-16-21 @ 13:30)  Albumin, Serum: 2.4 g/dL (04-16-21 @ 07:01)    Recommendations: This is a 59-year-old male with decompensated cirrhosis from suspected nonalcoholic steatohepatitis with component of alcohol related liver disease, complicated with ascites, coagulopathy and hepatic encephalopathy.  Patient was admitted with worsening abdominal distention and shortness of breath for the last couple of weeks.  In addition patient appeared acutely encephalopathic during my round.  I agree with the outlined recommendations by Dr. Hall.  Keep patient on IV albumin 25% 25 g every 8 hours, await infectious work-up including blood, urine and peritoneal fluid culture.  Hold diuretics for now.  Needs aggressive lactulose therapy to titrate for 2-3 bowel movements daily along with rifaximin 550 mg twice a day.      Plan discussed with Primary team.

## 2021-04-16 NOTE — CONSULT NOTE ADULT - REASON FOR ADMISSION
3 months of increasing abdominal girth, worst in the past month, along with exertional dyspnoea
3 months of increasing abdominal girth, worst in the past month, along with exertional dyspnoea

## 2021-04-16 NOTE — PROGRESS NOTE ADULT - PROBLEM SELECTOR PLAN 1
- Will consult hepatology   - Will consult IR for thoracentesis - MELD-Na 29  - Consulted Hepatology, appreciate recs  - Consulted IR. Planning for paracentesis with Dr. Fung on 4/16

## 2021-04-16 NOTE — CONSULT NOTE ADULT - SUBJECTIVE AND OBJECTIVE BOX
Mr. 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    Patient previously known to the hepatology service, Follows with Dr. Patrick ([place]).   Most recent admission (10-11/20) for worsening encephalopathy and melena. Found to have SBP with E. Coli (diagnosed at OSH), and completed a course of antibiotics. Endoscopy with gastritis (no varices). Colonoscopy with rectal varices and hemorrhoids. VCE negative for bleeding. Was evaluated by hematology for pancytopenia - presumed chronic BM suppression secondary to chronic EtOH use and was recommended outpatient followup.    Since discharge patient reports slowly worsening abdominal distention, managed with diuretics and occasional paracentesis (last one several months prior to admission). For the past 2 weeks he noticed worsening SEVERINO associated with some cough, not associated with position. No fever, chills, rhinorrhea. No sick contacts. No EtOH use. No abdominal pain. No diarrhea or constipation. Compliant with his lactulose. Adheres to low sodium diet.    In the ED: VSS, CT abdomen chest done.      Allergies:  No Known Allergies      Home Medications:  atorvastatin 20 mg oral tablet: 1 tab(s) orally once a day (16 Apr 2021 09:41)  furosemide 40 mg oral tablet: 1 tab(s) orally 2 times a day (16 Apr 2021 09:41)  rifAXIMin 550 mg oral tablet: 1 tab(s) orally 2 times a day (16 Apr 2021 09:41)  spironolactone 100 mg oral tablet: 1 tab(s) orally once a day (16 Apr 2021 09:41)    Hospital Medications:  atorvastatin 20 milliGRAM(s) Oral at bedtime  benzonatate 100 milliGRAM(s) Oral every 8 hours PRN  furosemide   Injectable 40 milliGRAM(s) IV Push daily  lactulose Syrup 20 Gram(s) Oral every 8 hours  midodrine. 5 milliGRAM(s) Oral three times a day  pantoprazole    Tablet 40 milliGRAM(s) Oral before breakfast  rifAXIMin 550 milliGRAM(s) Oral two times a day  spironolactone 100 milliGRAM(s) Oral daily      PMHX/PSHX:  CHF (congestive heart failure)    Pneumonia    Hyperlipidemia    Obese    CHF (congestive heart failure)    Hepatic cirrhosis    HLD (hyperlipidemia)    No significant past surgical history    History of chest tube placement        Family history:  No pertinent family history in first degree relatives        Denies family history of colon cancer/polyps, stomach cancer/polyps, pancreatic cancer/masses, liver cancer/disease, ovarian cancer and endometrial cancer.    Social History:     Tob: Denies  EtOH: Denies  Illicit Drugs: Denies    ROS:   General:  No wt loss, fevers, chills, night sweats, fatigue  Eyes:  Good vision, no reported pain  ENT:  No sore throat, pain, runny nose, dysphagia  CV:  No pain, palpitations, hypo/hypertension  Pulm:  No dyspnea, cough, tachypnea, wheezing  GI:  As above  :  No pain, bleeding, incontinence, nocturia  Muscle:  No pain, weakness  Neuro:  No weakness, tingling, memory problems  Psych:  No fatigue, insomnia, mood problems, depression  Endocrine:  No polyuria, polydipsia, cold/heat intolerance  Heme:  No petechiae, ecchymosis, easy bruisability  Skin:  No rash, tattoos, scars, edema    PHYSICAL EXAM:   GENERAL:  No acute distress  HEENT:  Normocephalic/atraumatic, no scleral icterus  CHEST:  No accessory muscle use  HEART:  Regular rate and rhythm  ABDOMEN:  Soft, non-tender, markedly distended, normoactive bowel sounds  EXTREMITIES: +++ BLE edema  SKIN:  No rash  NEURO:  Alert and oriented x 3, +asterixis    Vital Signs:  Vital Signs Last 24 Hrs  T(C): 36.6 (16 Apr 2021 04:45), Max: 37.3 (15 Apr 2021 23:51)  T(F): 97.9 (16 Apr 2021 04:45), Max: 99.1 (15 Apr 2021 23:51)  HR: 80 (16 Apr 2021 01:00) (76 - 97)  BP: 106/60 (16 Apr 2021 04:45) (106/60 - 122/75)  BP(mean): --  RR: 18 (16 Apr 2021 04:45) (18 - 80)  SpO2: 95% (16 Apr 2021 04:45) (95% - 98%)  Daily Height in cm: 177.8 (16 Apr 2021 01:00)    Daily     LABS:                        8.3    3.48  )-----------( 41       ( 16 Apr 2021 07:03 )             24.9     Mean Cell Volume: 92.9 fl (04-16-21 @ 07:03)    04-16    134<L>  |  102  |  38<H>  ----------------------------<  98  3.8   |  23  |  2.46<H>    Ca    8.5      16 Apr 2021 07:01    TPro  6.4  /  Alb  2.4<L>  /  TBili  3.0<H>  /  DBili  x   /  AST  35  /  ALT  21  /  AlkPhos  118  04-16    LIVER FUNCTIONS - ( 16 Apr 2021 07:01 )  Alb: 2.4 g/dL / Pro: 6.4 g/dL / ALK PHOS: 118 U/L / ALT: 21 U/L / AST: 35 U/L / GGT: x           PT/INR - ( 15 Apr 2021 17:30 )   PT: 23.5 sec;   INR: 2.03 ratio         PTT - ( 15 Apr 2021 17:30 )  PTT:42.0 sec    Amylase Serum--      Lipase serum--       [id number]  Amylase Serum--      Lipase serum57       Ammonia--                          8.3    3.48  )-----------( 41       ( 16 Apr 2021 07:03 )             24.9                         9.9    3.64  )-----------( 48       ( 15 Apr 2021 17:30 )             29.8       Imaging:    EXAM:  CT CHEST                            PROCEDURE DATE:  04/15/2021            INTERPRETATION:  EXAMINATION: CT CHEST    CLINICAL INDICATION: Pleural effusion.    TECHNIQUE: Noncontrast CT of the chest was obtained.    COMPARISON: Multiple CT, most recent 2/22/2020.    FINDINGS:    AIRWAYS AND LUNGS: The central tracheobronchial tree is patent.  Large left pleural effusion with complete collapse of the left lower lobe and partial collapse left upper lobe. Right lung is clear.    MEDIASTINUM AND PLEURA: There are no enlarged mediastinal, hilar or axillary lymph nodes. The visualized portion of the thyroid gland is unremarkable. There is no pneumothorax.    HEART AND VESSELS: The heart is normal in size.  There are atherosclerotic calcifications of the aorta and coronary arteries.  There is no pericardial effusion.    UPPER ABDOMEN: Images of the upper abdomen demonstrate cholelithiasis and ascites.    BONES AND SOFT TISSUES: Partially healing right-sided rib fractures  The soft tissues are unremarkable.    TUBES/LINES: None.    IMPRESSION:  Large left pleural effusion with complete collapse of the left lower lobe and partial collapse left upper lobe. Right lung is clear.                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    # L pleural effusion - with worsening SOB. Appears chronic pleural effusion, likely secondary to hepatic hydrothorax in the settings of worsening ascites. No systemic signs of infection.   # Worsening ascites - medication/diet non compliance vs. refractory ascites vs. infection vs. thrombosis  # Decompensated cirrhosis - MELD-Na 29 4/16  - varices: previously present on CT, but no varices on recent EGD 11/20  - HE: Present  - HCC: no focal lesions on CT 11/20  - Ascites - present, history of SBP  #MONI on CKD - unclear etiology. Pre-renal azotemia vs. HRS.   # Pancytopenia - Unclear etiology, presumed 2/2 chronic EtOH use. Might benefit from non urgent BM biopsy as outpatient. No evidence of overt bleeding. Recent negative GI workup.       Recommendation:   - Please obtain urine lytes  - Albumin repletion 25% 100mg q8h  - Abdominal US with doppler  - Infectious workup including blood, urine and peritoneal cultures  - Diagnostic and therapeutic paracentesis  - Repeat CXR following paracentesis  - Hold diuretics given MONI  - c/w lactulose, titrate to 2-3 BM/day  - c/w rifaximin 550 BID  - Patient will need long-term antibiotics for SBP prophylaxis  - daily CMP, INR, CBC  - Monitor for bleeding    Thank you for involving us in the care of this patient. Please reach out if any further questions.    Carlo Hall, PGY-4  Hepatology Fellow    Available on [employer] Teams  Pager [phone] ([facility]) or [phone] ([facility])  After 5PM/Weekends, please contact the on-call GI fellow: [phone]  Available through [employer] Teams     Mr. 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    Patient previously known to the hepatology service, Follows with Dr. Patrick ([place]).   Most recent admission (10-11/20) for worsening encephalopathy and melena. Found to have SBP with E. Coli (diagnosed at OSH), and completed a course of antibiotics. Endoscopy with gastritis (no varices). Colonoscopy with rectal varices and hemorrhoids. VCE negative for bleeding. Was evaluated by hematology for pancytopenia - presumed chronic BM suppression secondary to chronic EtOH use and was recommended outpatient followup.    Since discharge patient reports slowly worsening abdominal distention, managed with diuretics and occasional paracentesis (last one several months prior to admission). For the past 2 weeks he noticed worsening SEVERINO associated with some cough, not associated with position. No fever, chills, rhinorrhea. No sick contacts. No EtOH use. No abdominal pain. No diarrhea or constipation. Compliant with his lactulose. Adheres to low sodium diet.    In the ED: VSS, CT abdomen chest done.      Allergies:  No Known Allergies      Home Medications:  atorvastatin 20 mg oral tablet: 1 tab(s) orally once a day (16 Apr 2021 09:41)  furosemide 40 mg oral tablet: 1 tab(s) orally 2 times a day (16 Apr 2021 09:41)  rifAXIMin 550 mg oral tablet: 1 tab(s) orally 2 times a day (16 Apr 2021 09:41)  spironolactone 100 mg oral tablet: 1 tab(s) orally once a day (16 Apr 2021 09:41)    Hospital Medications:  atorvastatin 20 milliGRAM(s) Oral at bedtime  benzonatate 100 milliGRAM(s) Oral every 8 hours PRN  furosemide   Injectable 40 milliGRAM(s) IV Push daily  lactulose Syrup 20 Gram(s) Oral every 8 hours  midodrine. 5 milliGRAM(s) Oral three times a day  pantoprazole    Tablet 40 milliGRAM(s) Oral before breakfast  rifAXIMin 550 milliGRAM(s) Oral two times a day  spironolactone 100 milliGRAM(s) Oral daily      PMHX/PSHX:  CHF (congestive heart failure)    Pneumonia    Hyperlipidemia    Obese    CHF (congestive heart failure)    Hepatic cirrhosis    HLD (hyperlipidemia)    No significant past surgical history    History of chest tube placement        Family history:  No pertinent family history in first degree relatives        Denies family history of colon cancer/polyps, stomach cancer/polyps, pancreatic cancer/masses, liver cancer/disease, ovarian cancer and endometrial cancer.    Social History:     Tob: Denies  EtOH: Denies  Illicit Drugs: Denies    ROS:   General:  No wt loss, fevers, chills, night sweats, fatigue  Eyes:  Good vision, no reported pain  ENT:  No sore throat, pain, runny nose, dysphagia  CV:  No pain, palpitations, hypo/hypertension  Pulm:  No dyspnea, cough, tachypnea, wheezing  GI:  As above  :  No pain, bleeding, incontinence, nocturia  Muscle:  No pain, weakness  Neuro:  No weakness, tingling, memory problems  Psych:  No fatigue, insomnia, mood problems, depression  Endocrine:  No polyuria, polydipsia, cold/heat intolerance  Heme:  No petechiae, ecchymosis, easy bruisability  Skin:  No rash, tattoos, scars, edema    PHYSICAL EXAM:   GENERAL:  No acute distress  HEENT:  Normocephalic/atraumatic, no scleral icterus  CHEST:  No accessory muscle use  HEART:  Regular rate and rhythm  ABDOMEN:  Soft, non-tender, markedly distended, normoactive bowel sounds  EXTREMITIES: +++ BLE edema  SKIN:  No rash  NEURO:  Alert and oriented x 3, +asterixis    Vital Signs:  Vital Signs Last 24 Hrs  T(C): 36.6 (16 Apr 2021 04:45), Max: 37.3 (15 Apr 2021 23:51)  T(F): 97.9 (16 Apr 2021 04:45), Max: 99.1 (15 Apr 2021 23:51)  HR: 80 (16 Apr 2021 01:00) (76 - 97)  BP: 106/60 (16 Apr 2021 04:45) (106/60 - 122/75)  BP(mean): --  RR: 18 (16 Apr 2021 04:45) (18 - 80)  SpO2: 95% (16 Apr 2021 04:45) (95% - 98%)  Daily Height in cm: 177.8 (16 Apr 2021 01:00)    Daily     LABS:                        8.3    3.48  )-----------( 41       ( 16 Apr 2021 07:03 )             24.9     Mean Cell Volume: 92.9 fl (04-16-21 @ 07:03)    04-16    134<L>  |  102  |  38<H>  ----------------------------<  98  3.8   |  23  |  2.46<H>    Ca    8.5      16 Apr 2021 07:01    TPro  6.4  /  Alb  2.4<L>  /  TBili  3.0<H>  /  DBili  x   /  AST  35  /  ALT  21  /  AlkPhos  118  04-16    LIVER FUNCTIONS - ( 16 Apr 2021 07:01 )  Alb: 2.4 g/dL / Pro: 6.4 g/dL / ALK PHOS: 118 U/L / ALT: 21 U/L / AST: 35 U/L / GGT: x           PT/INR - ( 15 Apr 2021 17:30 )   PT: 23.5 sec;   INR: 2.03 ratio         PTT - ( 15 Apr 2021 17:30 )  PTT:42.0 sec    Amylase Serum--      Lipase serum--       [id number]  Amylase Serum--      Lipase serum57       Ammonia--                          8.3    3.48  )-----------( 41       ( 16 Apr 2021 07:03 )             24.9                         9.9    3.64  )-----------( 48       ( 15 Apr 2021 17:30 )             29.8       Imaging:    EXAM:  CT CHEST                            PROCEDURE DATE:  04/15/2021            INTERPRETATION:  EXAMINATION: CT CHEST    CLINICAL INDICATION: Pleural effusion.    TECHNIQUE: Noncontrast CT of the chest was obtained.    COMPARISON: Multiple CT, most recent 2/22/2020.    FINDINGS:    AIRWAYS AND LUNGS: The central tracheobronchial tree is patent.  Large left pleural effusion with complete collapse of the left lower lobe and partial collapse left upper lobe. Right lung is clear.    MEDIASTINUM AND PLEURA: There are no enlarged mediastinal, hilar or axillary lymph nodes. The visualized portion of the thyroid gland is unremarkable. There is no pneumothorax.    HEART AND VESSELS: The heart is normal in size.  There are atherosclerotic calcifications of the aorta and coronary arteries.  There is no pericardial effusion.    UPPER ABDOMEN: Images of the upper abdomen demonstrate cholelithiasis and ascites.    BONES AND SOFT TISSUES: Partially healing right-sided rib fractures  The soft tissues are unremarkable.    TUBES/LINES: None.    IMPRESSION:  Large left pleural effusion with complete collapse of the left lower lobe and partial collapse left upper lobe. Right lung is clear.                NOLA WEINBERG MD; Attending Radiologist

## 2021-04-16 NOTE — PROGRESS NOTE ADULT - PROBLEM SELECTOR PLAN 3
- Will f/u urine lytes and osm  - - Will obtain urine lytes and osm  - Will obtain US kidney and bladder  - Will consider nephrology consult - Possibly in the setting of hepatorenal vs pre-renal/intra-renal/post-renal  - Will obtain urine lytes and osm  - Will obtain US kidney and bladder  - Will consider nephrology consult

## 2021-04-16 NOTE — PROGRESS NOTE ADULT - SUBJECTIVE AND OBJECTIVE BOX
***************************************************************  Katelyn Painter MD, PGY1 Resident  Internal Medicine   pager: [phone]  ***************************************************************    SANTOS BELLE  60y  MRN: [id number]    Patient is a 60y old  Male who presents with a chief complaint of 3 months of increasing abdominal girth, worst in the past month, along with exertional dyspnoea (15 Apr 2021 21:46)      Subjective: no events ON. Denies fever, CP, SOB, abn pain, N/V, dysuria. Tolerating diet.      REVIEW OF SYSTEMS:    CONSTITUTIONAL: No weakness, fevers or chills  EYES/ENT: No visual changes;  No vertigo or throat pain   NECK: No pain or stiffness  RESPIRATORY: No cough, wheezing, hemoptysis; No shortness of breath  CARDIOVASCULAR: No chest pain or palpitations  GASTROINTESTINAL: No abdominal or epigastric pain. No nausea, vomiting, or hematemesis; No diarrhea or constipation. No melena or hematochezia.  GENITOURINARY: No dysuria, frequency or hematuria  NEUROLOGICAL: No numbness or weakness  SKIN: No itching, rashes      Objective:    MEDICATIONS  (STANDING):  atorvastatin 20 milliGRAM(s) Oral at bedtime  furosemide   Injectable 40 milliGRAM(s) IV Push daily  lactulose Syrup 20 Gram(s) Oral every 8 hours  midodrine. 5 milliGRAM(s) Oral three times a day  rifAXIMin 550 milliGRAM(s) Oral two times a day  spironolactone 100 milliGRAM(s) Oral daily    MEDICATIONS  (PRN):        Vitals: Vital Signs Last 24 Hrs  T(C): 36.6 (04-16-21 @ 04:45), Max: 37.3 (04-15-21 @ 23:51)  T(F): 97.9 (04-16-21 @ 04:45), Max: 99.1 (04-15-21 @ 23:51)  HR: 80 (04-16-21 @ 01:00) (76 - 97)  BP: 106/60 (04-16-21 @ 04:45) (106/60 - 122/75)  BP(mean): --  RR: 18 (04-16-21 @ 04:45) (18 - 80)  SpO2: 95% (04-16-21 @ 04:45) (95% - 98%)            I&O's Summary      PHYSICAL EXAM:  GENERAL: NAD  HEAD:  Atraumatic, Normocephalic  EYES: EOMI, conjunctiva and sclera clear  CHEST/LUNG: Clear to percussion bilaterally; No rales, rhonchi, wheezing, or rubs  HEART: Regular rate and rhythm; No murmurs, rubs, or gallops  ABDOMEN: Soft, Nontender, Nondistended;   SKIN: No rashes or lesions  NERVOUS SYSTEM:  Alert & Oriented X3, no focal deficit    LABS:  04-15    136  |  102  |  36<H>  ----------------------------<  106<H>  3.7   |  22  |  2.14<H>    Ca    8.8      15 Apr 2021 17:30    TPro  7.2  /  Alb  2.9<L>  /  TBili  3.9<H>  /  DBili  x   /  AST  40  /  ALT  24  /  AlkPhos  129<H>  04-15      PT/INR - ( 15 Apr 2021 17:30 )   PT: 23.5 sec;   INR: 2.03 ratio         PTT - ( 15 Apr 2021 17:30 )  PTT:42.0 sec                                        8.3    3.48  )-----------( 41       ( 16 Apr 2021 07:03 )             24.9                         9.9    3.64  )-----------( 48       ( 15 Apr 2021 17:30 )             29.8     CAPILLARY BLOOD GLUCOSE          RADIOLOGY & ADDITIONAL TESTS:    Imaging Personally Reviewed:  [x ] YES  [ ] NO    Consultants involved in case:   Consultant(s) Notes Reviewed:  [ x] YES  [ ] NO:   Care Discussed with Consultants/Other Providers [x ] YES  [ ] NO         ***************************************************************  Katelyn Painter MD, PGY1 Resident  Internal Medicine   pager: [phone]  ***************************************************************    SANTOS BELLE  60y  MRN: [id number]    Patient is a 60y old  Male who presents with a chief complaint of 3 months of increasing abdominal girth, worst in the past month, along with exertional dyspnoea (15 Apr 2021 21:46)      Subjective: no events ON. Still feels distension and short of breath when walking. Denies fever, CP, abn pain, N/V, dysuria. Tolerating diet.      REVIEW OF SYSTEMS: as noted above. Otherwise negative.      Objective:    MEDICATIONS  (STANDING):  atorvastatin 20 milliGRAM(s) Oral at bedtime  furosemide   Injectable 40 milliGRAM(s) IV Push daily  lactulose Syrup 20 Gram(s) Oral every 8 hours  midodrine. 5 milliGRAM(s) Oral three times a day  rifAXIMin 550 milliGRAM(s) Oral two times a day  spironolactone 100 milliGRAM(s) Oral daily    MEDICATIONS  (PRN):        Vitals: Vital Signs Last 24 Hrs  T(C): 36.6 (04-16-21 @ 04:45), Max: 37.3 (04-15-21 @ 23:51)  T(F): 97.9 (04-16-21 @ 04:45), Max: 99.1 (04-15-21 @ 23:51)  HR: 80 (04-16-21 @ 01:00) (76 - 97)  BP: 106/60 (04-16-21 @ 04:45) (106/60 - 122/75)  BP(mean): --  RR: 18 (04-16-21 @ 04:45) (18 - 80)  SpO2: 95% (04-16-21 @ 04:45) (95% - 98%)            I&O's Summary      PHYSICAL EXAM:  GENERAL: NAD  HEAD:  Atraumatic, Normocephalic  EYES: EOMI, conjunctiva and sclera clear  CHEST/LUNG: Clear to percussion bilaterally; No rales, rhonchi, wheezing, or rubs  HEART: Regular rate and rhythm; No murmurs, rubs, or gallops  ABDOMEN: Soft, Nontender, Nondistended;   SKIN: No rashes or lesions  NERVOUS SYSTEM:  Alert & Oriented X3, no focal deficit    LABS:  04-15    136  |  102  |  36<H>  ----------------------------<  106<H>  3.7   |  22  |  2.14<H>    Ca    8.8      15 Apr 2021 17:30    TPro  7.2  /  Alb  2.9<L>  /  TBili  3.9<H>  /  DBili  x   /  AST  40  /  ALT  24  /  AlkPhos  129<H>  04-15      PT/INR - ( 15 Apr 2021 17:30 )   PT: 23.5 sec;   INR: 2.03 ratio         PTT - ( 15 Apr 2021 17:30 )  PTT:42.0 sec                                        8.3    3.48  )-----------( 41       ( 16 Apr 2021 07:03 )             24.9                         9.9    3.64  )-----------( 48       ( 15 Apr 2021 17:30 )             29.8     CAPILLARY BLOOD GLUCOSE          RADIOLOGY & ADDITIONAL TESTS:    Imaging Personally Reviewed:  [x ] YES  [ ] NO    Consultants involved in case:   Consultant(s) Notes Reviewed:  [ x] YES  [ ] NO:   Care Discussed with Consultants/Other Providers [x ] YES  [ ] NO         ***************************************************************  Katelyn Painter MD, PGY1 Resident  Internal Medicine   pager: [phone]  ***************************************************************    SANTOS BELLE  60y  MRN: [id number]    Patient is a 60y old  Male who presents with a chief complaint of 3 months of increasing abdominal girth, worst in the past month, along with exertional dyspnoea (15 Apr 2021 21:46)      Subjective: no events ON. Still feels distension and short of breath when walking. Denies fever, CP, abn pain, N/V, dysuria. Tolerating diet.      REVIEW OF SYSTEMS: as noted above. Otherwise negative.      Objective:    MEDICATIONS  (STANDING):  atorvastatin 20 milliGRAM(s) Oral at bedtime  furosemide   Injectable 40 milliGRAM(s) IV Push daily  lactulose Syrup 20 Gram(s) Oral every 8 hours  midodrine. 5 milliGRAM(s) Oral three times a day  rifAXIMin 550 milliGRAM(s) Oral two times a day  spironolactone 100 milliGRAM(s) Oral daily    MEDICATIONS  (PRN):        Vitals: Vital Signs Last 24 Hrs  T(C): 36.6 (04-16-21 @ 04:45), Max: 37.3 (04-15-21 @ 23:51)  T(F): 97.9 (04-16-21 @ 04:45), Max: 99.1 (04-15-21 @ 23:51)  HR: 80 (04-16-21 @ 01:00) (76 - 97)  BP: 106/60 (04-16-21 @ 04:45) (106/60 - 122/75)  BP(mean): --  RR: 18 (04-16-21 @ 04:45) (18 - 80)  SpO2: 95% (04-16-21 @ 04:45) (95% - 98%)            I&O's Summary      PHYSICAL EXAM:  GENERAL: NAD  HEAD:  Atraumatic, Normocephalic  EYES: EOMI, conjunctiva and sclera clear; ptosis?  CHEST/LUNG: Clear to percussion bilaterally; No rales, rhonchi, wheezing, or rubs  HEART: Regular rate and rhythm; No murmurs, rubs, or gallops  ABDOMEN: Soft, Nontender, Nondistended; however large abdomen  EXTREMITIES: 2+ pitting edema b/l LE  NERVOUS SYSTEM:  Alert & Oriented X3, no focal deficit    LABS:  04-15    136  |  102  |  36<H>  ----------------------------<  106<H>  3.7   |  22  |  2.14<H>    Ca    8.8      15 Apr 2021 17:30    TPro  7.2  /  Alb  2.9<L>  /  TBili  3.9<H>  /  DBili  x   /  AST  40  /  ALT  24  /  AlkPhos  129<H>  04-15      PT/INR - ( 15 Apr 2021 17:30 )   PT: 23.5 sec;   INR: 2.03 ratio         PTT - ( 15 Apr 2021 17:30 )  PTT:42.0 sec                                        8.3    3.48  )-----------( 41       ( 16 Apr 2021 07:03 )             24.9                         9.9    3.64  )-----------( 48       ( 15 Apr 2021 17:30 )             29.8     CAPILLARY BLOOD GLUCOSE          RADIOLOGY & ADDITIONAL TESTS:    Imaging Personally Reviewed:  [x ] YES  [ ] NO    Consultants involved in case:   Consultant(s) Notes Reviewed:  [ x] YES  [ ] NO:   Care Discussed with Consultants/Other Providers [x ] YES  [ ] NO

## 2021-04-16 NOTE — PROGRESS NOTE ADULT - ASSESSMENT
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    # L pleural effusion - with worsening SOB. Appears chronic pleural effusion, likely secondary to hepatic hydrothorax in the settings of worsening ascites. No systemic signs of infection.   # Decompensated cirrhosis -   - varices: previously present on CT, but no varices on recent EGD 11/20  - HE: Present  - HCC: no focal lesions on CT 11/20  - Ascites - present, history of SBP  #MONI on CKD - unclear etiology. Pre-renal azotemia vs. HRS.   # Pancytopenia - Unclear etiology, presumed 2/2 chronic EtOH use. Might potentially benefit from non urgent BM biopsy. No evidence of overt bleeding. Recent negative GI workup.       Recommendation:   - Please obtain urine lytes  - Albumin repletion 25% 100mg q8h  - Diagnostic and therapeutic paracentesis  - Repeat CXR following paracentesis  - Hold diuretics  - c/w lactulose, titrate to 2-3 BM/day  - c/w rifaximin 550 BID  - Patient will need long-term antibiotics for SBP prophylaxis  - daily CMP, INR, CBC  - Monitor for bleeding    Thank you for involving us in the care of this patient. Please reach out if any further questions.    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    # L pleural effusion - with worsening SOB. Appears chronic pleural effusion, likely secondary to hepatic hydrothorax in the settings of worsening ascites. No systemic signs of infection.   # Worsening ascites - medication/diet non compliance vs. refractory ascites vs. infection vs. thrombosis  # Decompensated cirrhosis - MELD-Na 29 4/16  - varices: previously present on CT, but no varices on recent EGD 11/20  - HE: Present  - HCC: no focal lesions on CT 11/20  - Ascites - present, history of SBP  #MONI on CKD - unclear etiology. Pre-renal azotemia vs. HRS.   # Pancytopenia - Unclear etiology, presumed 2/2 chronic EtOH use. Might benefit from non urgent BM biopsy as outpatient. No evidence of overt bleeding. Recent negative GI workup.       Recommendation:   - Please obtain urine lytes  - Albumin repletion 25% 100mg q8h  - Abdominal US with doppler  - Infectious workup including blood, urine and peritoneal cultures  - Diagnostic and therapeutic paracentesis  - Repeat CXR following paracentesis  - Hold diuretics given MONI  - c/w lactulose, titrate to 2-3 BM/day  - c/w rifaximin 550 BID  - Patient will need long-term antibiotics for SBP prophylaxis  - daily CMP, INR, CBC  - Monitor for bleeding    Thank you for involving us in the care of this patient. Please reach out if any further questions.    Carlo Hall, PGY-4  Hepatology Fellow    Available on [employer] Teams  Pager [phone] ([facility]) or [phone] ([facility])  After 5PM/Weekends, please contact the on-call GI fellow: [phone]  Available through [employer] Teams

## 2021-04-16 NOTE — PROCEDURE NOTE - PLAN
Bedrest x 1 hour  Post thoracentesis chest x-ray, evaluate for pneumothorax--followup results  Keep overlying dressings clean, dry and intact

## 2021-04-16 NOTE — PROGRESS NOTE ADULT - PROBLEM SELECTOR PLAN 2
- CXR at ED showed large left pleural effusion  - [facility] hospitalist reviewed CT with radiologist on call. L pleural effusion but no mediastinal shift, no suggestion of empyema.   Unclear if this represents a sympathetic effusion from patient's ascites  - Will consult IR for eval of thoracentesis - CXR at ED showed large left pleural effusion  - [facility] hospitalist reviewed CT with radiologist on call. L pleural effusion but no mediastinal shift, no suggestion of empyema.   Unclear if this represents a sympathetic effusion from patient's ascites  - Consulted IR. Planning for thoracentesis with Dr. Fung on 4/16
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
## 2021-04-16 NOTE — PROGRESS NOTE ADULT - SUBJECTIVE AND OBJECTIVE BOX
Chief Complaint:  Patient is a 60y old  Male who presents with a chief complaint of 3 months of increasing abdominal girth, worst in the past month, along with exertional dyspnoea (16 Apr 2021 07:34)      HPI:  Mr. 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    Patient previously known to the hepatology service, Follows with Dr. Patrick ([place]).   Most recent admission (10-/11/20) for worsening encephalopathy and melena. Found to have SBP with E. Coli (diagnosed at OSH), and completed a course of antibiotics. Endoscopy with gastritis (no varices). Colonoscopy with rectal varices and hemorrhoids.           Allergies:  No Known Allergies      Home Medications:    Hospital Medications:  atorvastatin 20 milliGRAM(s) Oral at bedtime  benzonatate 100 milliGRAM(s) Oral every 8 hours PRN  furosemide   Injectable 40 milliGRAM(s) IV Push daily  lactulose Syrup 20 Gram(s) Oral every 8 hours  midodrine. 5 milliGRAM(s) Oral three times a day  pantoprazole    Tablet 40 milliGRAM(s) Oral before breakfast  rifAXIMin 550 milliGRAM(s) Oral two times a day  spironolactone 100 milliGRAM(s) Oral daily      PMHX/PSHX:  CHF (congestive heart failure)    Pneumonia    Hyperlipidemia    Obese    CHF (congestive heart failure)    Hepatic cirrhosis    HLD (hyperlipidemia)    No significant past surgical history    History of chest tube placement        Family history:  No pertinent family history in first degree relatives        Denies family history of colon cancer/polyps, stomach cancer/polyps, pancreatic cancer/masses, liver cancer/disease, ovarian cancer and endometrial cancer.    Social History:     Tob: Denies  EtOH: Denies  Illicit Drugs: Denies    ROS:     General:  No wt loss, fevers, chills, night sweats, fatigue  Eyes:  Good vision, no reported pain  ENT:  No sore throat, pain, runny nose, dysphagia  CV:  No pain, palpitations, hypo/hypertension  Pulm:  No dyspnea, cough, tachypnea, wheezing  GI:  No pain, No nausea, No vomiting, No diarrhea, No constipation, No weight loss, No fever, No pruritis, No rectal bleeding, No tarry stools, No dysphagia,  :  No pain, bleeding, incontinence, nocturia  Muscle:  No pain, weakness  Neuro:  No weakness, tingling, memory problems  Psych:  No fatigue, insomnia, mood problems, depression  Endocrine:  No polyuria, polydipsia, cold/heat intolerance  Heme:  No petechiae, ecchymosis, easy bruisability  Skin:  No rash, tattoos, scars, edema    PHYSICAL EXAM:     GENERAL:  No acute distress  HEENT:  Normocephalic/atraumatic, no scleral icterus  CHEST:  Clear to auscultation bilaterally, no wheezes/rales/ronchi, no accessory muscle use  HEART:  Regular rate and rhythm, no murmurs/rubs/gallops  ABDOMEN:  Soft, non-tender, non-distended, normoactive bowel sounds,  no masses, no hepato-splenomegaly, no signs of chronic liver disease  EXTREMITIES: No cyanosis, clubbing, or edema  SKIN:  No rash/erythema/ecchymoses/petechiae/wounds/abscess/warm/dry  NEURO:  Alert and oriented x 3, no asterixis    Vital Signs:  Vital Signs Last 24 Hrs  T(C): 36.6 (16 Apr 2021 04:45), Max: 37.3 (15 Apr 2021 23:51)  T(F): 97.9 (16 Apr 2021 04:45), Max: 99.1 (15 Apr 2021 23:51)  HR: 80 (16 Apr 2021 01:00) (76 - 97)  BP: 106/60 (16 Apr 2021 04:45) (106/60 - 122/75)  BP(mean): --  RR: 18 (16 Apr 2021 04:45) (18 - 80)  SpO2: 95% (16 Apr 2021 04:45) (95% - 98%)  Daily Height in cm: 177.8 (16 Apr 2021 01:00)    Daily     LABS:                        8.3    3.48  )-----------( 41       ( 16 Apr 2021 07:03 )             24.9     Mean Cell Volume: 92.9 fl (04-16-21 @ 07:03)    04-16    134<L>  |  102  |  38<H>  ----------------------------<  98  3.8   |  23  |  2.46<H>    Ca    8.5      16 Apr 2021 07:01    TPro  6.4  /  Alb  2.4<L>  /  TBili  3.0<H>  /  DBili  x   /  AST  35  /  ALT  21  /  AlkPhos  118  04-16    LIVER FUNCTIONS - ( 16 Apr 2021 07:01 )  Alb: 2.4 g/dL / Pro: 6.4 g/dL / ALK PHOS: 118 U/L / ALT: 21 U/L / AST: 35 U/L / GGT: x           PT/INR - ( 15 Apr 2021 17:30 )   PT: 23.5 sec;   INR: 2.03 ratio         PTT - ( 15 Apr 2021 17:30 )  PTT:42.0 sec    Amylase Serum--      Lipase serum--       [id number]  Amylase Serum--      Lipase serum57       Ammonia--                          8.3    3.48  )-----------( 41       ( 16 Apr 2021 07:03 )             24.9                         9.9    3.64  )-----------( 48       ( 15 Apr 2021 17:30 )             29.8       Imaging:           Chief Complaint:  Patient is a 60y old  Male who presents with a chief complaint of 3 months of increasing abdominal girth, worst in the past month, along with exertional dyspnoea (16 Apr 2021 07:34)      HPI:  Mr. 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    Patient previously known to the hepatology service, Follows with Dr. Patrick ([place]).   Most recent admission (10-/11/20) for worsening encephalopathy and melena. Found to have SBP with E. Coli (diagnosed at OSH), and completed a course of antibiotics. Endoscopy with gastritis (no varices). Colonoscopy with rectal varices and hemorrhoids. VCE negative for bleeding. Was evaluated by hematology for pancytopenia - presumed chronic BM suppression secondary to chronic EtOH use and was recommended outpatient followup.    Since discharge patient reports slowly worsening abdominal distention, managed with diuretics and occasional paracentesis (last one several months prior to admission). For the past 2 weeks he noticed worsening SEVERINO associated with some cough, not associated with position. No fever, chills, rhinorrhea. No sick contacts. No EtOH use. No abdominal pain. No diarrhea or constipation. Compliant with his lactulose. Adheres to low sodium diet.    In the ED: VSS, CT abdomen chest done.      Allergies:  No Known Allergies      Home Medications:  atorvastatin 20 mg oral tablet: 1 tab(s) orally once a day (16 Apr 2021 09:41)  furosemide 40 mg oral tablet: 1 tab(s) orally 2 times a day (16 Apr 2021 09:41)  rifAXIMin 550 mg oral tablet: 1 tab(s) orally 2 times a day (16 Apr 2021 09:41)  spironolactone 100 mg oral tablet: 1 tab(s) orally once a day (16 Apr 2021 09:41)    Hospital Medications:  atorvastatin 20 milliGRAM(s) Oral at bedtime  benzonatate 100 milliGRAM(s) Oral every 8 hours PRN  furosemide   Injectable 40 milliGRAM(s) IV Push daily  lactulose Syrup 20 Gram(s) Oral every 8 hours  midodrine. 5 milliGRAM(s) Oral three times a day  pantoprazole    Tablet 40 milliGRAM(s) Oral before breakfast  rifAXIMin 550 milliGRAM(s) Oral two times a day  spironolactone 100 milliGRAM(s) Oral daily      PMHX/PSHX:  CHF (congestive heart failure)    Pneumonia    Hyperlipidemia    Obese    CHF (congestive heart failure)    Hepatic cirrhosis    HLD (hyperlipidemia)    No significant past surgical history    History of chest tube placement        Family history:  No pertinent family history in first degree relatives        Denies family history of colon cancer/polyps, stomach cancer/polyps, pancreatic cancer/masses, liver cancer/disease, ovarian cancer and endometrial cancer.    Social History:     Tob: Denies  EtOH: Denies  Illicit Drugs: Denies    ROS:   General:  No wt loss, fevers, chills, night sweats, fatigue  Eyes:  Good vision, no reported pain  ENT:  No sore throat, pain, runny nose, dysphagia  CV:  No pain, palpitations, hypo/hypertension  Pulm:  No dyspnea, cough, tachypnea, wheezing  GI:  As above  :  No pain, bleeding, incontinence, nocturia  Muscle:  No pain, weakness  Neuro:  No weakness, tingling, memory problems  Psych:  No fatigue, insomnia, mood problems, depression  Endocrine:  No polyuria, polydipsia, cold/heat intolerance  Heme:  No petechiae, ecchymosis, easy bruisability  Skin:  No rash, tattoos, scars, edema    PHYSICAL EXAM:   GENERAL:  No acute distress  HEENT:  Normocephalic/atraumatic, no scleral icterus  CHEST:  No accessory muscle use  HEART:  Regular rate and rhythm  ABDOMEN:  Soft, non-tender, markedly distended, normoactive bowel sounds  EXTREMITIES: +++ BLE edema  SKIN:  No rash  NEURO:  Alert and oriented x 3, +asterixis    Vital Signs:  Vital Signs Last 24 Hrs  T(C): 36.6 (16 Apr 2021 04:45), Max: 37.3 (15 Apr 2021 23:51)  T(F): 97.9 (16 Apr 2021 04:45), Max: 99.1 (15 Apr 2021 23:51)  HR: 80 (16 Apr 2021 01:00) (76 - 97)  BP: 106/60 (16 Apr 2021 04:45) (106/60 - 122/75)  BP(mean): --  RR: 18 (16 Apr 2021 04:45) (18 - 80)  SpO2: 95% (16 Apr 2021 04:45) (95% - 98%)  Daily Height in cm: 177.8 (16 Apr 2021 01:00)    Daily     LABS:                        8.3    3.48  )-----------( 41       ( 16 Apr 2021 07:03 )             24.9     Mean Cell Volume: 92.9 fl (04-16-21 @ 07:03)    04-16    134<L>  |  102  |  38<H>  ----------------------------<  98  3.8   |  23  |  2.46<H>    Ca    8.5      16 Apr 2021 07:01    TPro  6.4  /  Alb  2.4<L>  /  TBili  3.0<H>  /  DBili  x   /  AST  35  /  ALT  21  /  AlkPhos  118  04-16    LIVER FUNCTIONS - ( 16 Apr 2021 07:01 )  Alb: 2.4 g/dL / Pro: 6.4 g/dL / ALK PHOS: 118 U/L / ALT: 21 U/L / AST: 35 U/L / GGT: x           PT/INR - ( 15 Apr 2021 17:30 )   PT: 23.5 sec;   INR: 2.03 ratio         PTT - ( 15 Apr 2021 17:30 )  PTT:42.0 sec    Amylase Serum--      Lipase serum--       [id number]  Amylase Serum--      Lipase serum57       Ammonia--                          8.3    3.48  )-----------( 41       ( 16 Apr 2021 07:03 )             24.9                         9.9    3.64  )-----------( 48       ( 15 Apr 2021 17:30 )             29.8       Imaging:    EXAM:  CT CHEST                            PROCEDURE DATE:  04/15/2021            INTERPRETATION:  EXAMINATION: CT CHEST    CLINICAL INDICATION: Pleural effusion.    TECHNIQUE: Noncontrast CT of the chest was obtained.    COMPARISON: Multiple CT, most recent 2/22/2020.    FINDINGS:    AIRWAYS AND LUNGS: The central tracheobronchial tree is patent.  Large left pleural effusion with complete collapse of the left lower lobe and partial collapse left upper lobe. Right lung is clear.    MEDIASTINUM AND PLEURA: There are no enlarged mediastinal, hilar or axillary lymph nodes. The visualized portion of the thyroid gland is unremarkable. There is no pneumothorax.    HEART AND VESSELS: The heart is normal in size.  There are atherosclerotic calcifications of the aorta and coronary arteries.  There is no pericardial effusion.    UPPER ABDOMEN: Images of the upper abdomen demonstrate cholelithiasis and ascites.    BONES AND SOFT TISSUES: Partially healing right-sided rib fractures  The soft tissues are unremarkable.    TUBES/LINES: None.    IMPRESSION:  Large left pleural effusion with complete collapse of the left lower lobe and partial collapse left upper lobe. Right lung is clear.                NOLA WEINBERG MD; Attending Radiologist  This document has been electronically signed. Apr 16 2021  7:53AM           Chief Complaint:  Patient is a 60y old  Male who presents with a chief complaint of 3 months of increasing abdominal girth, worst in the past month, along with exertional dyspnoea (16 Apr 2021 07:34)      HPI:  Mr. Trujillo is a 58yo M with PMH of decompensated cirrhosis 2/2 EtOH/BOB (with ascites and HE), Thrombocytopenia, HTN, HLD who presents with worsening abdominal distention and SOB x 2 weeks.    Patient previously known to the hepatology service, Follows with Dr. Patrick ([place]).   Most recent admission (10-11/20) for worsening encephalopathy and melena. Found to have SBP with E. Coli (diagnosed at OSH), and completed a course of antibiotics. Endoscopy with gastritis (no varices). Colonoscopy with rectal varices and hemorrhoids. VCE negative for bleeding. Was evaluated by hematology for pancytopenia - presumed chronic BM suppression secondary to chronic EtOH use and was recommended outpatient followup.    Since discharge patient reports slowly worsening abdominal distention, managed with diuretics and occasional paracentesis (last one several months prior to admission). For the past 2 weeks he noticed worsening SEVERINO associated with some cough, not associated with position. No fever, chills, rhinorrhea. No sick contacts. No EtOH use. No abdominal pain. No diarrhea or constipation. Compliant with his lactulose. Adheres to low sodium diet.    In the ED: VSS, CT abdomen chest done.      Allergies:  No Known Allergies      Home Medications:  atorvastatin 20 mg oral tablet: 1 tab(s) orally once a day (16 Apr 2021 09:41)  furosemide 40 mg oral tablet: 1 tab(s) orally 2 times a day (16 Apr 2021 09:41)  rifAXIMin 550 mg oral tablet: 1 tab(s) orally 2 times a day (16 Apr 2021 09:41)  spironolactone 100 mg oral tablet: 1 tab(s) orally once a day (16 Apr 2021 09:41)    Hospital Medications:  atorvastatin 20 milliGRAM(s) Oral at bedtime  benzonatate 100 milliGRAM(s) Oral every 8 hours PRN  furosemide   Injectable 40 milliGRAM(s) IV Push daily  lactulose Syrup 20 Gram(s) Oral every 8 hours  midodrine. 5 milliGRAM(s) Oral three times a day  pantoprazole    Tablet 40 milliGRAM(s) Oral before breakfast  rifAXIMin 550 milliGRAM(s) Oral two times a day  spironolactone 100 milliGRAM(s) Oral daily      PMHX/PSHX:  CHF (congestive heart failure)    Pneumonia    Hyperlipidemia    Obese    CHF (congestive heart failure)    Hepatic cirrhosis    HLD (hyperlipidemia)    No significant past surgical history    History of chest tube placement        Family history:  No pertinent family history in first degree relatives        Denies family history of colon cancer/polyps, stomach cancer/polyps, pancreatic cancer/masses, liver cancer/disease, ovarian cancer and endometrial cancer.    Social History:     Tob: Denies  EtOH: Denies  Illicit Drugs: Denies    ROS:   General:  No wt loss, fevers, chills, night sweats, fatigue  Eyes:  Good vision, no reported pain  ENT:  No sore throat, pain, runny nose, dysphagia  CV:  No pain, palpitations, hypo/hypertension  Pulm:  No dyspnea, cough, tachypnea, wheezing  GI:  As above  :  No pain, bleeding, incontinence, nocturia  Muscle:  No pain, weakness  Neuro:  No weakness, tingling, memory problems  Psych:  No fatigue, insomnia, mood problems, depression  Endocrine:  No polyuria, polydipsia, cold/heat intolerance  Heme:  No petechiae, ecchymosis, easy bruisability  Skin:  No rash, tattoos, scars, edema    PHYSICAL EXAM:   GENERAL:  No acute distress  HEENT:  Normocephalic/atraumatic, no scleral icterus  CHEST:  No accessory muscle use  HEART:  Regular rate and rhythm  ABDOMEN:  Soft, non-tender, markedly distended, normoactive bowel sounds  EXTREMITIES: +++ BLE edema  SKIN:  No rash  NEURO:  Alert and oriented x 3, +asterixis    Vital Signs:  Vital Signs Last 24 Hrs  T(C): 36.6 (16 Apr 2021 04:45), Max: 37.3 (15 Apr 2021 23:51)  T(F): 97.9 (16 Apr 2021 04:45), Max: 99.1 (15 Apr 2021 23:51)  HR: 80 (16 Apr 2021 01:00) (76 - 97)  BP: 106/60 (16 Apr 2021 04:45) (106/60 - 122/75)  BP(mean): --  RR: 18 (16 Apr 2021 04:45) (18 - 80)  SpO2: 95% (16 Apr 2021 04:45) (95% - 98%)  Daily Height in cm: 177.8 (16 Apr 2021 01:00)    Daily     LABS:                        8.3    3.48  )-----------( 41       ( 16 Apr 2021 07:03 )             24.9     Mean Cell Volume: 92.9 fl (04-16-21 @ 07:03)    04-16    134<L>  |  102  |  38<H>  ----------------------------<  98  3.8   |  23  |  2.46<H>    Ca    8.5      16 Apr 2021 07:01    TPro  6.4  /  Alb  2.4<L>  /  TBili  3.0<H>  /  DBili  x   /  AST  35  /  ALT  21  /  AlkPhos  118  04-16    LIVER FUNCTIONS - ( 16 Apr 2021 07:01 )  Alb: 2.4 g/dL / Pro: 6.4 g/dL / ALK PHOS: 118 U/L / ALT: 21 U/L / AST: 35 U/L / GGT: x           PT/INR - ( 15 Apr 2021 17:30 )   PT: 23.5 sec;   INR: 2.03 ratio         PTT - ( 15 Apr 2021 17:30 )  PTT:42.0 sec    Amylase Serum--      Lipase serum--       [id number]  Amylase Serum--      Lipase serum57       Ammonia--                          8.3    3.48  )-----------( 41       ( 16 Apr 2021 07:03 )             24.9                         9.9    3.64  )-----------( 48       ( 15 Apr 2021 17:30 )             29.8       Imaging:    EXAM:  CT CHEST                            PROCEDURE DATE:  04/15/2021            INTERPRETATION:  EXAMINATION: CT CHEST    CLINICAL INDICATION: Pleural effusion.    TECHNIQUE: Noncontrast CT of the chest was obtained.    COMPARISON: Multiple CT, most recent 2/22/2020.    FINDINGS:    AIRWAYS AND LUNGS: The central tracheobronchial tree is patent.  Large left pleural effusion with complete collapse of the left lower lobe and partial collapse left upper lobe. Right lung is clear.    MEDIASTINUM AND PLEURA: There are no enlarged mediastinal, hilar or axillary lymph nodes. The visualized portion of the thyroid gland is unremarkable. There is no pneumothorax.    HEART AND VESSELS: The heart is normal in size.  There are atherosclerotic calcifications of the aorta and coronary arteries.  There is no pericardial effusion.    UPPER ABDOMEN: Images of the upper abdomen demonstrate cholelithiasis and ascites.    BONES AND SOFT TISSUES: Partially healing right-sided rib fractures  The soft tissues are unremarkable.    TUBES/LINES: None.    IMPRESSION:  Large left pleural effusion with complete collapse of the left lower lobe and partial collapse left upper lobe. Right lung is clear.                NOLA WEINBERG MD; Attending Radiologist  This document has been electronically signed. Apr 16 2021  7:53AM

## 2021-04-16 NOTE — PROGRESS NOTE ADULT - PROBLEM SELECTOR PLAN 5
"              After Visit Summary   7/30/2018    Mary Jane Matta    MRN: [id number]           Patient Information     Date Of Birth          1955        Visit Information        Provider Department      7/30/2018 11:30 AM Alyson Yeboah MD [facility]        Today's Diagnoses     Encounter for gynecological examination without abnormal finding    -  1       Follow-ups after your visit        Who to contact     If you have questions or need follow up information about today's clinic visit or your schedule please contact [facility] directly at [phone].  Normal or non-critical lab and imaging results will be communicated to you by [employer], letter or phone within 4 business days after the clinic has received the results. If you do not hear from us within 7 days, please contact the clinic through [employer] or phone. If you have a critical or abnormal lab result, we will notify you by phone as soon as possible.  Submit refill requests through [employer] or call your pharmacy and they will forward the refill request to us. Please allow 3 business days for your refill to be completed.          Additional Information About Your Visit        MyChart Information     [employer] gives you secure access to your electronic health record. If you see a primary care provider, you can also send messages to your care team and make appointments. If you have questions, please call your primary care clinic.  If you do not have a primary care provider, please call [phone] and they will assist you.        Care EveryWhere ID     This is your Care EveryWhere ID. This could be used by other organizations to access your [facility] medical records  [id number]        Your Vitals Were     Height Last Period BMI (Body Mass Index)             5' 8\" (1.727 m) 08/07/2008 27.7 kg/m2          Blood Pressure from Last 3 Encounters:   07/30/18 (!) 130/98   07/19/18 128/80   02/20/17 130/88    Weight from " Last 3 Encounters:   07/30/18 182 lb 3.2 oz (82.6 kg)   07/19/18 181 lb (82.1 kg)   05/24/17 177 lb (80.3 kg)              Today, you had the following     No orders found for display       Primary Care Provider Office Phone # Fax #    Laura Obi Ford [phone] [id number]  [facility] [address]        Equal Access to Services     MARY ANNE DURBIN : Hadii aad ku hadasho Soomaali, waaxda luqadaha, qaybta kaalmada adeegyada, waxay idiin hayaan adeeg dinaharakatie lamaria luisa . So [facility] [phone].    ATENCIÓN: Si habla español, tiene a ayers disposición servicios gratuitos de asistencia lingüística. [facility] [phone].    We comply with applicable federal civil rights laws and [place] laws. We do not discriminate on the basis of race, color, national origin, age, disability, sex, sexual orientation, or gender identity.            Thank you!     Thank you for choosing [facility]  for your care. Our goal is always to provide you with excellent care. Hearing back from our patients is one way we can continue to improve our services. Please take a few minutes to complete the written survey that you may receive in the mail after your visit with us. Thank you!             Your Updated Medication List - Protect others around you: Learn how to safely use, store and throw away your medicines at www.disposemymeds.org.          This list is accurate as of 7/30/18  2:00 PM.  Always use your most recent med list.                   Brand Name Dispense Instructions for use Diagnosis    DAILY MULTIVITAMIN PO      Take by mouth daily Reported on 2/20/2017        valACYclovir 500 MG tablet    VALTREX    40 tablet    Take  by mouth. 2 gm twice a day with onset of cold sore symptoms, for one day -atleast 12 hours apart    HSV (herpes simplex virus) infection, Encounter for gynecological examination with abnormal finding          - DVT ppx: scd for now for thrombocytopenia

## 2021-04-16 NOTE — PROGRESS NOTE ADULT - ASSESSMENT
59 y.o. M with PMH of alcoholic cirrhosis, ascites, thrombocytopenia, CHF, HTN, nad HLD coming in due to 3 mo of increasing abdominal girth, now found to have large left pleural effusion 2/2 unclear etiology. Ddx include ascites vs alcoholic cirrhosis vs r/o SBP        Would also consider formal nephrology evaluation in the AM, due to anticipation of further decline in renal function, particularly with planned paracentesis and diuretic requirements.  UA ordered.    STEPH for now with patient's thrombocytopaenia.     59 y.o. M with PMH of alcoholic cirrhosis, ascites, thrombocytopenia, CHF, HTN, nad HLD coming in due to 3 mo of increasing abdominal girth, now found to have large left pleural effusion 2/2 unclear etiology. Ddx include ascites vs alcoholic cirrhosis vs r/o SBP

## 2021-04-17 ENCOUNTER — TRANSCRIPTION ENCOUNTER (OUTPATIENT)
Age: 61
End: 2021-04-17

## 2021-04-17 LAB
ALBUMIN SERPL ELPH-MCNC: 2.5 G/DL — LOW (ref 3.3–5)
ALP SERPL-CCNC: 111 U/L — SIGNIFICANT CHANGE UP (ref 40–120)
ALT FLD-CCNC: 21 U/L — SIGNIFICANT CHANGE UP (ref 10–45)
AMMONIA BLD-MCNC: 137 UMOL/L — HIGH (ref 11–55)
ANION GAP SERPL CALC-SCNC: 9 MMOL/L — SIGNIFICANT CHANGE UP (ref 5–17)
APTT BLD: 41 SEC — HIGH (ref 27.5–35.5)
AST SERPL-CCNC: 32 U/L — SIGNIFICANT CHANGE UP (ref 10–40)
BILIRUB SERPL-MCNC: 2.6 MG/DL — HIGH (ref 0.2–1.2)
BUN SERPL-MCNC: 44 MG/DL — HIGH (ref 7–23)
CALCIUM SERPL-MCNC: 8.4 MG/DL — SIGNIFICANT CHANGE UP (ref 8.4–10.5)
CHLORIDE SERPL-SCNC: 102 MMOL/L — SIGNIFICANT CHANGE UP (ref 96–108)
CO2 SERPL-SCNC: 24 MMOL/L — SIGNIFICANT CHANGE UP (ref 22–31)
CREAT SERPL-MCNC: 2.91 MG/DL — HIGH (ref 0.5–1.3)
GLUCOSE SERPL-MCNC: 94 MG/DL — SIGNIFICANT CHANGE UP (ref 70–99)
GRAM STN FLD: SIGNIFICANT CHANGE UP
GRAM STN FLD: SIGNIFICANT CHANGE UP
HCT VFR BLD CALC: 24.5 % — LOW (ref 39–50)
HGB BLD-MCNC: 8.2 G/DL — LOW (ref 13–17)
INR BLD: 2.15 RATIO — HIGH (ref 0.88–1.16)
LDH SERPL L TO P-CCNC: 175 U/L — SIGNIFICANT CHANGE UP (ref 50–242)
MAGNESIUM SERPL-MCNC: 1.9 MG/DL — SIGNIFICANT CHANGE UP (ref 1.6–2.6)
MCHC RBC-ENTMCNC: 31.5 PG — SIGNIFICANT CHANGE UP (ref 27–34)
MCHC RBC-ENTMCNC: 33.5 GM/DL — SIGNIFICANT CHANGE UP (ref 32–36)
MCV RBC AUTO: 94.2 FL — SIGNIFICANT CHANGE UP (ref 80–100)
NRBC # BLD: 0 /100 WBCS — SIGNIFICANT CHANGE UP (ref 0–0)
PHOSPHATE SERPL-MCNC: 3.7 MG/DL — SIGNIFICANT CHANGE UP (ref 2.5–4.5)
PLATELET # BLD AUTO: 40 K/UL — LOW (ref 150–400)
POTASSIUM SERPL-MCNC: 4.1 MMOL/L — SIGNIFICANT CHANGE UP (ref 3.5–5.3)
POTASSIUM SERPL-SCNC: 4.1 MMOL/L — SIGNIFICANT CHANGE UP (ref 3.5–5.3)
PROT SERPL-MCNC: 6.2 G/DL — SIGNIFICANT CHANGE UP (ref 6–8.3)
PROTHROM AB SERPL-ACNC: 24.9 SEC — HIGH (ref 10.6–13.6)
RBC # BLD: 2.6 M/UL — LOW (ref 4.2–5.8)
RBC # FLD: 15.9 % — HIGH (ref 10.3–14.5)
SODIUM SERPL-SCNC: 135 MMOL/L — SIGNIFICANT CHANGE UP (ref 135–145)
SPECIMEN SOURCE: SIGNIFICANT CHANGE UP
SPECIMEN SOURCE: SIGNIFICANT CHANGE UP
WBC # BLD: 2.57 K/UL — LOW (ref 3.8–10.5)
WBC # FLD AUTO: 2.57 K/UL — LOW (ref 3.8–10.5)

## 2021-04-17 PROCEDURE — 99232 SBSQ HOSP IP/OBS MODERATE 35: CPT

## 2021-04-17 PROCEDURE — 99233 SBSQ HOSP IP/OBS HIGH 50: CPT | Mod: GC

## 2021-04-17 RX ORDER — ALBUMIN HUMAN 25 %
25 VIAL (ML) INTRAVENOUS EVERY 8 HOURS
Refills: 0 | Status: DISCONTINUED | OUTPATIENT
Start: 2021-04-17 | End: 2021-04-20

## 2021-04-17 RX ORDER — ALBUMIN HUMAN 25 %
25 VIAL (ML) INTRAVENOUS EVERY 8 HOURS
Refills: 0 | Status: DISCONTINUED | OUTPATIENT
Start: 2021-04-17 | End: 2021-04-17

## 2021-04-17 RX ADMIN — LACTULOSE 20 GRAM(S): 10 SOLUTION ORAL at 06:23

## 2021-04-17 RX ADMIN — Medication 40 MILLIGRAM(S): at 06:23

## 2021-04-17 RX ADMIN — LACTULOSE 20 GRAM(S): 10 SOLUTION ORAL at 22:38

## 2021-04-17 RX ADMIN — Medication 50 GRAM(S): at 13:04

## 2021-04-17 RX ADMIN — PANTOPRAZOLE SODIUM 40 MILLIGRAM(S): 20 TABLET, DELAYED RELEASE ORAL at 06:23

## 2021-04-17 RX ADMIN — ATORVASTATIN CALCIUM 20 MILLIGRAM(S): 80 TABLET, FILM COATED ORAL at 22:39

## 2021-04-17 RX ADMIN — SPIRONOLACTONE 100 MILLIGRAM(S): 25 TABLET, FILM COATED ORAL at 06:23

## 2021-04-17 RX ADMIN — Medication 50 GRAM(S): at 22:39

## 2021-04-17 RX ADMIN — LACTULOSE 20 GRAM(S): 10 SOLUTION ORAL at 13:06

## 2021-04-17 NOTE — PROGRESS NOTE ADULT - PROBLEM SELECTOR PLAN 5
- DVT ppx: scd for now for thrombocytopenia  - Diet: - DVT ppx: scd for now for thrombocytopenia  - Diet: DASH/TLC, fluid restriction, low sodium  - Dispo: pending clinical improvement, SBP r/o

## 2021-04-17 NOTE — DIETITIAN INITIAL EVALUATION ADULT. - PROBLEM SELECTOR PLAN 2
See above.  CTT chest no contrast>>reviewed with radiologist on call, LEFT pleural effusion but no mediastinal shift, no suggestion of empyema.   Unclear if this represents a sympathetic effusion from patient's ascites but may consider thoracentesis under US given symptoms in the AM.

## 2021-04-17 NOTE — PROGRESS NOTE ADULT - SUBJECTIVE AND OBJECTIVE BOX
Chief Complaint:  Patient is a 60y old  Male who presents with a chief complaint of 3 months of increasing abdominal girth, worst in the past month, along with exertional dyspnoea (17 Apr 2021 09:27)      Interval Events: No changes. Patient feels confused.    Allergies:  No Known Allergies      Hospital Medications:  albumin human 25% IVPB 25 Gram(s) IV Intermittent every 8 hours  atorvastatin 20 milliGRAM(s) Oral at bedtime  benzonatate 100 milliGRAM(s) Oral every 8 hours PRN  furosemide   Injectable 40 milliGRAM(s) IV Push daily  lactulose Syrup 20 Gram(s) Oral every 8 hours  pantoprazole    Tablet 40 milliGRAM(s) Oral before breakfast  rifAXIMin 550 milliGRAM(s) Oral two times a day  spironolactone 100 milliGRAM(s) Oral daily      PMHX/PSHX:  CHF (congestive heart failure)    Pneumonia    Hyperlipidemia    Obese    CHF (congestive heart failure)    Hepatic cirrhosis    HLD (hyperlipidemia)    No significant past surgical history    History of chest tube placement        Family history:  No pertinent family history in first degree relatives        ROS: As per HPI, 14-point ROS negative otherwise.    General:  No wt loss, fevers, chills, night sweats, fatigue,   Eyes:  Good vision, no reported pain  ENT:  No sore throat, pain, runny nose, dysphagia  CV:  No pain, palpitations, hypo/hypertension  Resp:  No dyspnea, cough, tachypnea, wheezing  GI:  See HPI  :  No pain, bleeding, incontinence, nocturia  Muscle:  No pain, weakness  Neuro:  No weakness, tingling, memory problems  Psych:  No fatigue, insomnia, mood problems, depression  Endocrine:  No polyuria, polydipsia, cold/heat intolerance  Heme:  No petechiae, ecchymosis, easy bruisability  Skin:  No rash, edema      PHYSICAL EXAM:     Vital Signs:  Vital Signs Last 24 Hrs  T(C): 36.5 (17 Apr 2021 04:12), Max: 36.8 (16 Apr 2021 18:20)  T(F): 97.7 (17 Apr 2021 04:12), Max: 98.2 (16 Apr 2021 18:20)  HR: 85 (17 Apr 2021 04:12) (73 - 85)  BP: 105/63 (17 Apr 2021 04:12) (105/63 - 123/65)  BP(mean): --  RR: 18 (17 Apr 2021 04:12) (18 - 18)  SpO2: 96% (17 Apr 2021 04:12) (96% - 99%)  Daily Height in cm: 167.6 (16 Apr 2021 13:30)    Daily     GENERAL:  appears comfortable, no acute distress  HEENT:  NC/AT, anicteric sclera  CHEST:  no increased effort  HEART:  Regular rate and rhythm  ABDOMEN:  Soft, non-tender, distended  EXTREMITIES:  no cyanosis, clubbing or edema  SKIN:  No rash/erythema/ecchymoses/petechiae/wounds  NEURO:  Alert, +asterixis    LABS:                        8.2    2.57  )-----------( 40       ( 17 Apr 2021 03:43 )             24.5     04-17    135  |  102  |  44<H>  ----------------------------<  94  4.1   |  24  |  2.91<H>    Ca    8.4      17 Apr 2021 03:43  Phos  3.7     04-17  Mg     1.9     04-17    TPro  6.2  /  Alb  2.5<L>  /  TBili  2.6<H>  /  DBili  x   /  AST  32  /  ALT  21  /  AlkPhos  111  04-17    LIVER FUNCTIONS - ( 17 Apr 2021 03:43 )  Alb: 2.5 g/dL / Pro: 6.2 g/dL / ALK PHOS: 111 U/L / ALT: 21 U/L / AST: 32 U/L / GGT: x           PT/INR - ( 17 Apr 2021 03:43 )   PT: 24.9 sec;   INR: 2.15 ratio         PTT - ( 17 Apr 2021 03:43 )  PTT:41.0 sec    Amylase Serum--      Lipase serum--       [id number]      Imaging:

## 2021-04-17 NOTE — DIETITIAN INITIAL EVALUATION ADULT. - OTHER INFO
Pt seen for: consult                                  GI issues:  denies N/V, + abd distention having loose BM w/ lactulose rx                Food Allergies/Intolerances:  NKFA                Vitamins/Supplements: none  Wt hx:  pt reports losing ~50 lb over past 6 months by cutting back on portion sizes, unsure most recent wt. Per previous RD notes wt 10/25/20 343 lb, 10/27 346 lb. Dosing wt 4/16 311 lb and ht of 70" (dosing wt currently listed at 161 lb is incorrect)                             Skin: no pressure injuries documented     Subjective/Objective: pt reports good appetite. Has had previous education on wt loss and low Na diet. Reviewed fluid restriction, pt verbalized understanding. Encouraged continued wt loss. Noted K and phos have been WDL since adm.    Wt used for nutrition needs: 4/16 dosing wt 311 lb for energy needs   lb for protein needs

## 2021-04-17 NOTE — PROGRESS NOTE ADULT - PROBLEM SELECTOR PLAN 1
- MELD-Na 29  - Consulted Hepatology, appreciate recs  - s/p RLQ paracentesis on 4/16 showing cloudy fluid, total nucleated cells 58, mesothelial 22%, monocyte 11%, granulocyte 6%  - Will continue rifaximin, lactulose, and aldactone - MELD-Na 30  - Consulted Hepatology, appreciate recs  - s/p RLQ paracentesis on 4/16 showing cloudy fluid, total nucleated cells 58, mesothelial 22%, monocyte 11%, granulocyte 6%  - Will continue rifaximin, lactulose, and aldactone - MELD-Na 30  - Consulted Hepatology, appreciate recs  - s/p RLQ paracentesis on 4/16 showing cloudy fluid, total nucleated cells 58, mesothelial 22%, monocyte 11%, granulocyte 6%  - Will continue rifaximin, lactulose, and aldactone  - Will f/u Cx result from paracentesis - MELD-Na 30  - Consulted Hepatology, appreciate recs  - s/p RLQ paracentesis on 4/16 showing cloudy fluid, total nucleated cells 58, mesothelial 22%, monocyte 11%, granulocyte 6%  - Will continue rifaximin, lactulose, and aldactone  - Will f/u Cx result from paracentesis  - Will obtain BCx per hepatology

## 2021-04-17 NOTE — PROGRESS NOTE ADULT - ASSESSMENT
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    # L pleural effusion - no infection noted. Improved SOB. Appears chronic pleural effusion, likely secondary to hepatic hydrothorax in the settings of worsening ascites. No systemic signs of infection.   # Worsening ascites - medication/diet non compliance vs. refractory ascites vs. infection vs. thrombosis  # Decompensated cirrhosis - MELD-Na 29 4/16  - varices: previously present on CT, but no varices on recent EGD 11/20  - HE: Present  - HCC: no focal lesions on CT 11/20  - Ascites - present, history of SBP  #MONI on CKD - unclear etiology. Pre-renal azotemia vs. HRS.   # Pancytopenia - Unclear etiology, presumed 2/2 chronic EtOH use. Might benefit from non urgent BM biopsy as outpatient. No evidence of overt bleeding. Recent negative GI workup.       Recommendation:   - stop diuretics given MONI  - await obtain urine lytes  - continue with Albumin repletion 25% 100mg q8h  - please check urine and blood cx  - please check urine and blood culture 30 q6hrs and titrate to 4 BM/day  - c/w rifaximin 550 BID  - daily CMP, INR, CBC  - hepatology to follow

## 2021-04-17 NOTE — PROGRESS NOTE ADULT - ASSESSMENT
59 y.o. M with PMH of alcoholic cirrhosis, ascites, thrombocytopenia, CHF, HTN, nad HLD coming in due to 3 mo of increasing abdominal girth, now found to have large left pleural effusion 2/2 unclear etiology. Ddx include ascites vs alcoholic cirrhosis vs r/o SBP

## 2021-04-17 NOTE — PROGRESS NOTE ADULT - PROBLEM SELECTOR PLAN 3
- Possibly in the setting of hepatorenal vs pre-renal/intra-renal/post-renal  - Will obtain urine lytes and osm  - Will obtain US kidney and bladder  - Will consider nephrology consult - Possibly in the setting of hepatorenal vs pre-renal/intra-renal/post-renal  - Will obtain urine lytes and osm  - Will f/u US kidney and bladder  - Will consider nephrology consult - Possibly in the setting of hepatorenal vs pre-renal/intra-renal/post-renal  - Baseline 1.3-1.5  - Will obtain urine lytes and osm, UA, and UCx  - Will f/u US kidney and bladder  - Will consider nephrology consult

## 2021-04-17 NOTE — DISCHARGE NOTE PROVIDER - NSDCCPCAREPLAN_GEN_ALL_CORE_FT
PRINCIPAL DISCHARGE DIAGNOSIS  Diagnosis: Abdominal ascites  Assessment and Plan of Treatment: You presented to hospital with increased abdominal girth. You were found to have ascites and fluid in your lung. We took fluid out of your lungs and belly which were negative for infection. You are stable for transfer

## 2021-04-17 NOTE — DISCHARGE NOTE PROVIDER - HOSPITAL COURSE
59 y.o. M with PMH of alcoholic cirrhosis, ascites, thrombocytopenia, CHF, HTN, nad HLD coming in due to 3 mo of increasing abdominal girth, now found to have large left pleural effusion 2/2 unclear etiology. Ddx include ascites vs alcoholic cirrhosis vs r/o SBP. Hepatology consulted and recommended continue albumin, aggressive lactulose, and rifaximin. IR consulted for L thoracentesis and paracentesis. s/p L thora and RLL paracentesis. Fluids sent for Cx, with result pending. 59 y.o. M with PMH of alcoholic cirrhosis, ascites, thrombocytopenia, CHF, HTN, and HLD coming in due to 3 mo of increasing abdominal girth, now found to have large left pleural effusion 2/2 unclear etiology. Ddx include ascites vs alcoholic cirrhosis vs r/o SBP. Hepatology consulted and recommended continue albumin, aggressive lactulose, and rifaximin. IR consulted for L thoracentesis and paracentesis. s/p L thora  (removed 1L) and RLL paracentesis (removed ~4L). Fluids sent for Cx, which were negative. Patient developed MONI during hospital stay which improved on albumin. Blood cx and urine cx also negative.   Patient now stable, being maintained on Rifaximin, lactulose.     Patient stable for transfer to [place] to continue his care.

## 2021-04-17 NOTE — DIETITIAN NUTRITION RISK NOTIFICATION - TREATMENT: THE FOLLOWING DIET HAS BEEN RECOMMENDED
Diet, Regular:   DASH/TLC {Sodium & Cholesterol Restricted} (DASH)  1500mL Fluid Restriction ([id number])  No Concentrated Potassium  No Concentrated Phosphorus (04-15-21 @ 22:02) [Active]

## 2021-04-17 NOTE — DIETITIAN INITIAL EVALUATION ADULT. - PROBLEM SELECTOR PLAN 5
Transitions of Care Status:  1.  Name of PCP:     Wesley Patrick MD, PhD (Hepatology-[facility])   2.  PCP Contacted on Admission: [ ] Y    [x ] N    3.  PCP contacted at Discharge: [ ] Y    [ ] N    [ ] N/A  4.  Post-Discharge Appointment Date and Location:  5.  Summary of Handoff given to PCP:

## 2021-04-17 NOTE — DISCHARGE NOTE PROVIDER - NSDCMRMEDTOKEN_GEN_ALL_CORE_FT
atorvastatin 20 mg oral tablet: 1 tab(s) orally once a day  benzonatate 100 mg oral capsule: 1 cap(s) orally every 8 hours, As needed, Cough  furosemide 40 mg oral tablet: 1 tab(s) orally 2 times a day  lactulose 10 g/15 mL oral syrup: 30 milliliter(s) orally every 8 hours  midodrine 5 mg oral tablet: 1 tab(s) orally 3 times a day  pantoprazole 40 mg oral delayed release tablet: 1 tab(s) orally once a day (before a meal)  rifAXIMin 550 mg oral tablet: 1 tab(s) orally 2 times a day  spironolactone 100 mg oral tablet: 1 tab(s) orally once a day   atorvastatin 20 mg oral tablet: 1 tab(s) orally once a day (at bedtime)  benzonatate 100 mg oral capsule: 1 cap(s) orally 3 times a day  lactulose 10 g/15 mL oral syrup: 30 milliliter(s) orally every 6 hours  morphine: 2 milligram(s) intravenous every 6 hours, As Needed for severe pain.  pantoprazole 40 mg oral delayed release tablet: 1 tab(s) orally once a day (before a meal)  rifAXIMin 550 mg oral tablet: 1 tab(s) orally 2 times a day   albumin human: 100 milliliter(s) intravenously every 12 hours  atorvastatin 20 mg oral tablet: 1 tab(s) orally once a day (at bedtime)  benzonatate 100 mg oral capsule: 1 cap(s) orally 3 times a day  furosemide 100 mg/100 mL-0.9% intravenous solution: 20 milliliter(s) intravenous every 12 hours  lactulose 10 g/15 mL oral syrup: 30 milliliter(s) orally every 6 hours  morphine: 2 milligram(s) intravenous every 6 hours, As Needed for severe pain.  pantoprazole 40 mg oral delayed release tablet: 1 tab(s) orally once a day (before a meal)  rifAXIMin 550 mg oral tablet: 1 tab(s) orally 2 times a day

## 2021-04-17 NOTE — PROGRESS NOTE ADULT - PROBLEM SELECTOR PLAN 2
- CXR at ED showed large left pleural effusion  - Ovn hospitalist reviewed CT with radiologist on call. L pleural effusion but no mediastinal shift, no suggestion of empyema.   Unclear if this represents a sympathetic effusion from patient's ascites  - s/p L thoracentesis on 4/16  - Pleural fluid showing cloudy fluid, RBC 4917, total nucleated cells 42, 58% lymphocytes, mesothelial 22%, monocyte 11%, granulocyte 6% - CXR at ED showed large left pleural effusion  - CT on admission showing large L pleural effusion with complete collapse of LLL and partial collapse per LISANDRO. Rt lung is clear  - s/p L thoracentesis on 4/16  - Pleural fluid showing cloudy fluid, RBC 4917, total nucleated cells 42, 58% lymphocytes, mesothelial 22%, monocyte 11%, granulocyte 6% - CXR at ED showed large left pleural effusion  - CT on admission showing large L pleural effusion with complete collapse of LLL and partial collapse per LISANDRO. Rt lung is clear  - s/p L thoracentesis on 4/16  - Pleural fluid showing cloudy fluid, RBC 4917, total nucleated cells 42, 58% lymphocytes, mesothelial 22%, monocyte 11%, granulocyte 6%  - Will f/u Cx result from thoracentesis

## 2021-04-17 NOTE — DIETITIAN INITIAL EVALUATION ADULT. - PERTINENT MEDS FT
MEDICATIONS  (STANDING):  albumin human 25% IVPB 25 Gram(s) IV Intermittent every 8 hours  atorvastatin 20 milliGRAM(s) Oral at bedtime  lactulose Syrup 20 Gram(s) Oral every 8 hours  pantoprazole    Tablet 40 milliGRAM(s) Oral before breakfast  rifAXIMin 550 milliGRAM(s) Oral two times a day

## 2021-04-17 NOTE — PROGRESS NOTE ADULT - SUBJECTIVE AND OBJECTIVE BOX
***************************************************************  Katelyn Painter MD, PGY1 Resident  Internal Medicine   pager: [phone]  ***************************************************************    SANTOS BELLE  60y  MRN: [id number]    Patient is a 60y old  Male who presents with a chief complaint of 3 months of increasing abdominal girth, worst in the past month, along with exertional dyspnoea (16 Apr 2021 13:48)      Subjective: no events ON. Denies fever, CP, SOB, abn pain, N/V, dysuria. Tolerating diet.      REVIEW OF SYSTEMS:    CONSTITUTIONAL: No weakness, fevers or chills  EYES/ENT: No visual changes;  No vertigo or throat pain   NECK: No pain or stiffness  RESPIRATORY: No cough, wheezing, hemoptysis; No shortness of breath  CARDIOVASCULAR: No chest pain or palpitations  GASTROINTESTINAL: No abdominal or epigastric pain. No nausea, vomiting, or hematemesis; No diarrhea or constipation. No melena or hematochezia.  GENITOURINARY: No dysuria, frequency or hematuria  NEUROLOGICAL: No numbness or weakness  SKIN: No itching, rashes      Objective:    MEDICATIONS  (STANDING):  atorvastatin 20 milliGRAM(s) Oral at bedtime  furosemide   Injectable 40 milliGRAM(s) IV Push daily  lactulose Syrup 20 Gram(s) Oral every 8 hours  pantoprazole    Tablet 40 milliGRAM(s) Oral before breakfast  rifAXIMin 550 milliGRAM(s) Oral two times a day  spironolactone 100 milliGRAM(s) Oral daily    MEDICATIONS  (PRN):  benzonatate 100 milliGRAM(s) Oral every 8 hours PRN Cough        Vitals: Vital Signs Last 24 Hrs  T(C): 36.5 (04-17-21 @ 04:12), Max: 36.8 (04-16-21 @ 18:20)  T(F): 97.7 (04-17-21 @ 04:12), Max: 98.2 (04-16-21 @ 18:20)  HR: 85 (04-17-21 @ 04:12) (73 - 85)  BP: 105/63 (04-17-21 @ 04:12) (105/63 - 123/65)  BP(mean): --  RR: 18 (04-17-21 @ 04:12) (18 - 18)  SpO2: 96% (04-17-21 @ 04:12) (96% - 99%)            I&O's Summary      PHYSICAL EXAM:  GENERAL: NAD  HEAD:  Atraumatic, Normocephalic  EYES: EOMI, conjunctiva and sclera clear; ptosis?  CHEST/LUNG: Clear to percussion bilaterally; No rales, rhonchi, wheezing, or rubs  HEART: Regular rate and rhythm; No murmurs, rubs, or gallops  ABDOMEN: Soft, Nontender, Nondistended; however large abdomen  EXTREMITIES: 2+ pitting edema b/l LE  NERVOUS SYSTEM:  Alert & Oriented X3, no focal deficit    LABS:  04-17    135  |  102  |  44<H>  ----------------------------<  94  4.1   |  24  |  2.91<H>  04-16    134<L>  |  102  |  38<H>  ----------------------------<  98  3.8   |  23  |  2.46<H>  04-15    136  |  102  |  36<H>  ----------------------------<  106<H>  3.7   |  22  |  2.14<H>    Ca    8.4      17 Apr 2021 03:43  Ca    8.5      16 Apr 2021 07:01  Ca    8.8      15 Apr 2021 17:30  Phos  3.7     04-17  Mg     1.9     04-17    TPro  6.2  /  Alb  2.5<L>  /  TBili  2.6<H>  /  DBili  x   /  AST  32  /  ALT  21  /  AlkPhos  111  04-17  TPro  6.4  /  Alb  2.4<L>  /  TBili  3.0<H>  /  DBili  x   /  AST  35  /  ALT  21  /  AlkPhos  118  04-16  TPro  7.2  /  Alb  2.9<L>  /  TBili  3.9<H>  /  DBili  x   /  AST  40  /  ALT  24  /  AlkPhos  129<H>  04-15      PT/INR - ( 17 Apr 2021 03:43 )   PT: 24.9 sec;   INR: 2.15 ratio         PTT - ( 17 Apr 2021 03:43 )  PTT:41.0 sec                                        8.2    2.57  )-----------( 40       ( 17 Apr 2021 03:43 )             24.5                         8.3    3.48  )-----------( 41       ( 16 Apr 2021 07:03 )             24.9                         9.9    3.64  )-----------( 48       ( 15 Apr 2021 17:30 )             29.8     CAPILLARY BLOOD GLUCOSE          RADIOLOGY & ADDITIONAL TESTS:    Imaging Personally Reviewed:  [x ] YES  [ ] NO    Consultants involved in case:   Consultant(s) Notes Reviewed:  [ x] YES  [ ] NO:   Care Discussed with Consultants/Other Providers [x ] YES  [ ] NO         ***************************************************************  Katelyn Painter MD, PGY1 Resident  Internal Medicine   pager: [phone]  ***************************************************************    SANTOS BELLE  60y  MRN: [id number]    Patient is a 60y old  Male who presents with a chief complaint of 3 months of increasing abdominal girth, worst in the past month, along with exertional dyspnoea (16 Apr 2021 13:48)      Subjective: no events ON. s/p post left thoracentesis and right lower quadrant paracentesis Denies fever, CP, SOB, abn pain, N/V, dysuria. Tolerating diet.      REVIEW OF SYSTEMS:    CONSTITUTIONAL: No weakness, fevers or chills  EYES/ENT: No visual changes;  No vertigo or throat pain   NECK: No pain or stiffness  RESPIRATORY: No cough, wheezing, hemoptysis; No shortness of breath  CARDIOVASCULAR: No chest pain or palpitations  GASTROINTESTINAL: No abdominal or epigastric pain. No nausea, vomiting, or hematemesis; No diarrhea or constipation. No melena or hematochezia.  GENITOURINARY: No dysuria, frequency or hematuria  NEUROLOGICAL: No numbness or weakness  SKIN: No itching, rashes      Objective:    MEDICATIONS  (STANDING):  atorvastatin 20 milliGRAM(s) Oral at bedtime  furosemide   Injectable 40 milliGRAM(s) IV Push daily  lactulose Syrup 20 Gram(s) Oral every 8 hours  pantoprazole    Tablet 40 milliGRAM(s) Oral before breakfast  rifAXIMin 550 milliGRAM(s) Oral two times a day  spironolactone 100 milliGRAM(s) Oral daily    MEDICATIONS  (PRN):  benzonatate 100 milliGRAM(s) Oral every 8 hours PRN Cough        Vitals: Vital Signs Last 24 Hrs  T(C): 36.5 (04-17-21 @ 04:12), Max: 36.8 (04-16-21 @ 18:20)  T(F): 97.7 (04-17-21 @ 04:12), Max: 98.2 (04-16-21 @ 18:20)  HR: 85 (04-17-21 @ 04:12) (73 - 85)  BP: 105/63 (04-17-21 @ 04:12) (105/63 - 123/65)  BP(mean): --  RR: 18 (04-17-21 @ 04:12) (18 - 18)  SpO2: 96% (04-17-21 @ 04:12) (96% - 99%)            I&O's Summary      PHYSICAL EXAM:  GENERAL: NAD  HEAD:  Atraumatic, Normocephalic  EYES: EOMI, conjunctiva and sclera clear; ptosis?  CHEST/LUNG: Clear to percussion bilaterally; No rales, rhonchi, wheezing, or rubs  HEART: Regular rate and rhythm; No murmurs, rubs, or gallops  ABDOMEN: Soft, Nontender, Nondistended; however large abdomen  EXTREMITIES: 2+ pitting edema b/l LE  NERVOUS SYSTEM:  Alert & Oriented X3, no focal deficit    LABS:  04-17    135  |  102  |  44<H>  ----------------------------<  94  4.1   |  24  |  2.91<H>  04-16    134<L>  |  102  |  38<H>  ----------------------------<  98  3.8   |  23  |  2.46<H>  04-15    136  |  102  |  36<H>  ----------------------------<  106<H>  3.7   |  22  |  2.14<H>    Ca    8.4      17 Apr 2021 03:43  Ca    8.5      16 Apr 2021 07:01  Ca    8.8      15 Apr 2021 17:30  Phos  3.7     04-17  Mg     1.9     04-17    TPro  6.2  /  Alb  2.5<L>  /  TBili  2.6<H>  /  DBili  x   /  AST  32  /  ALT  21  /  AlkPhos  111  04-17  TPro  6.4  /  Alb  2.4<L>  /  TBili  3.0<H>  /  DBili  x   /  AST  35  /  ALT  21  /  AlkPhos  118  04-16  TPro  7.2  /  Alb  2.9<L>  /  TBili  3.9<H>  /  DBili  x   /  AST  40  /  ALT  24  /  AlkPhos  129<H>  04-15      PT/INR - ( 17 Apr 2021 03:43 )   PT: 24.9 sec;   INR: 2.15 ratio         PTT - ( 17 Apr 2021 03:43 )  PTT:41.0 sec                                        8.2    2.57  )-----------( 40       ( 17 Apr 2021 03:43 )             24.5                         8.3    3.48  )-----------( 41       ( 16 Apr 2021 07:03 )             24.9                         9.9    3.64  )-----------( 48       ( 15 Apr 2021 17:30 )             29.8     CAPILLARY BLOOD GLUCOSE          RADIOLOGY & ADDITIONAL TESTS:    Imaging Personally Reviewed:  [x ] YES  [ ] NO    Consultants involved in case:   Consultant(s) Notes Reviewed:  [ x] YES  [ ] NO:   Care Discussed with Consultants/Other Providers [x ] YES  [ ] NO         ***************************************************************  Katelyn Painter MD, PGY1 Resident  Internal Medicine   pager: [phone]  ***************************************************************    SANTOS BELLE  60y  MRN: [id number]    Patient is a 60y old  Male who presents with a chief complaint of 3 months of increasing abdominal girth, worst in the past month, along with exertional dyspnoea (16 Apr 2021 13:48)      Subjective: no events ON. s/p post left thoracentesis and right lower quadrant paracentesis. Good appetite. Interval resolution of SOB at rest. Not sure yet about on exertion as not moved yet. Denies fever, CP, SOB, abn pain, N/V, dysuria. Tolerating diet.      REVIEW OF SYSTEMS: as noted above. Otherwise negative.      Objective:    MEDICATIONS  (STANDING):  atorvastatin 20 milliGRAM(s) Oral at bedtime  furosemide   Injectable 40 milliGRAM(s) IV Push daily  lactulose Syrup 20 Gram(s) Oral every 8 hours  pantoprazole    Tablet 40 milliGRAM(s) Oral before breakfast  rifAXIMin 550 milliGRAM(s) Oral two times a day  spironolactone 100 milliGRAM(s) Oral daily    MEDICATIONS  (PRN):  benzonatate 100 milliGRAM(s) Oral every 8 hours PRN Cough        Vitals: Vital Signs Last 24 Hrs  T(C): 36.5 (04-17-21 @ 04:12), Max: 36.8 (04-16-21 @ 18:20)  T(F): 97.7 (04-17-21 @ 04:12), Max: 98.2 (04-16-21 @ 18:20)  HR: 85 (04-17-21 @ 04:12) (73 - 85)  BP: 105/63 (04-17-21 @ 04:12) (105/63 - 123/65)  BP(mean): --  RR: 18 (04-17-21 @ 04:12) (18 - 18)  SpO2: 96% (04-17-21 @ 04:12) (96% - 99%)            I&O's Summary      PHYSICAL EXAM:  GENERAL: NAD  HEAD:  Atraumatic, Normocephalic  EYES: EOMI, conjunctiva and sclera clear; ptosis?  CHEST/LUNG: Clear to ascultation bilaterally; No rales, rhonchi, wheezing, or rubs  HEART: Regular rate and rhythm; No murmurs, rubs, or gallops  ABDOMEN: Soft, Nontender, large abdomen  EXTREMITIES: 2+ pitting edema b/l LE  SKIN: L thoracentesis site and RLL paracentesis site without any erythema, swelling, or drainage  NERVOUS SYSTEM:  Alert & Oriented X3, no focal deficit    LABS:  04-17    135  |  102  |  44<H>  ----------------------------<  94  4.1   |  24  |  2.91<H>  04-16    134<L>  |  102  |  38<H>  ----------------------------<  98  3.8   |  23  |  2.46<H>  04-15    136  |  102  |  36<H>  ----------------------------<  106<H>  3.7   |  22  |  2.14<H>    Ca    8.4      17 Apr 2021 03:43  Ca    8.5      16 Apr 2021 07:01  Ca    8.8      15 Apr 2021 17:30  Phos  3.7     04-17  Mg     1.9     04-17    TPro  6.2  /  Alb  2.5<L>  /  TBili  2.6<H>  /  DBili  x   /  AST  32  /  ALT  21  /  AlkPhos  111  04-17  TPro  6.4  /  Alb  2.4<L>  /  TBili  3.0<H>  /  DBili  x   /  AST  35  /  ALT  21  /  AlkPhos  118  04-16  TPro  7.2  /  Alb  2.9<L>  /  TBili  3.9<H>  /  DBili  x   /  AST  40  /  ALT  24  /  AlkPhos  129<H>  04-15      PT/INR - ( 17 Apr 2021 03:43 )   PT: 24.9 sec;   INR: 2.15 ratio         PTT - ( 17 Apr 2021 03:43 )  PTT:41.0 sec                                        8.2    2.57  )-----------( 40       ( 17 Apr 2021 03:43 )             24.5                         8.3    3.48  )-----------( 41       ( 16 Apr 2021 07:03 )             24.9                         9.9    3.64  )-----------( 48       ( 15 Apr 2021 17:30 )             29.8     CAPILLARY BLOOD GLUCOSE          RADIOLOGY & ADDITIONAL TESTS:    Imaging Personally Reviewed:  [x ] YES  [ ] NO    Consultants involved in case:   Consultant(s) Notes Reviewed:  [ x] YES  [ ] NO:   Care Discussed with Consultants/Other Providers [x ] YES  [ ] NO

## 2021-04-17 NOTE — DISCHARGE NOTE PROVIDER - DETAILS OF MALNUTRITION DIAGNOSIS/DIAGNOSES
This patient has been assessed with a concern for Malnutrition and was treated during this hospitalization for the following Nutrition diagnosis/diagnoses:     -  04/17/2021: Morbid obesity (BMI > 40)

## 2021-04-18 LAB
ALBUMIN SERPL ELPH-MCNC: 2.6 G/DL — LOW (ref 3.3–5)
ALP SERPL-CCNC: 95 U/L — SIGNIFICANT CHANGE UP (ref 40–120)
ALT FLD-CCNC: 18 U/L — SIGNIFICANT CHANGE UP (ref 10–45)
ANION GAP SERPL CALC-SCNC: 9 MMOL/L — SIGNIFICANT CHANGE UP (ref 5–17)
APTT BLD: 42.4 SEC — HIGH (ref 27.5–35.5)
AST SERPL-CCNC: 27 U/L — SIGNIFICANT CHANGE UP (ref 10–40)
BILIRUB SERPL-MCNC: 2.4 MG/DL — HIGH (ref 0.2–1.2)
BUN SERPL-MCNC: 45 MG/DL — HIGH (ref 7–23)
CALCIUM SERPL-MCNC: 8.8 MG/DL — SIGNIFICANT CHANGE UP (ref 8.4–10.5)
CHLORIDE SERPL-SCNC: 105 MMOL/L — SIGNIFICANT CHANGE UP (ref 96–108)
CO2 SERPL-SCNC: 24 MMOL/L — SIGNIFICANT CHANGE UP (ref 22–31)
CREAT SERPL-MCNC: 2.78 MG/DL — HIGH (ref 0.5–1.3)
GLUCOSE SERPL-MCNC: 93 MG/DL — SIGNIFICANT CHANGE UP (ref 70–99)
HCT VFR BLD CALC: 22.2 % — LOW (ref 39–50)
HGB BLD-MCNC: 7.5 G/DL — LOW (ref 13–17)
INR BLD: 2.22 RATIO — HIGH (ref 0.88–1.16)
MAGNESIUM SERPL-MCNC: 1.9 MG/DL — SIGNIFICANT CHANGE UP (ref 1.6–2.6)
MCHC RBC-ENTMCNC: 32.1 PG — SIGNIFICANT CHANGE UP (ref 27–34)
MCHC RBC-ENTMCNC: 33.8 GM/DL — SIGNIFICANT CHANGE UP (ref 32–36)
MCV RBC AUTO: 94.9 FL — SIGNIFICANT CHANGE UP (ref 80–100)
NRBC # BLD: 0 /100 WBCS — SIGNIFICANT CHANGE UP (ref 0–0)
PHOSPHATE SERPL-MCNC: 3.4 MG/DL — SIGNIFICANT CHANGE UP (ref 2.5–4.5)
PLATELET # BLD AUTO: 36 K/UL — LOW (ref 150–400)
POTASSIUM SERPL-MCNC: 3.7 MMOL/L — SIGNIFICANT CHANGE UP (ref 3.5–5.3)
POTASSIUM SERPL-SCNC: 3.7 MMOL/L — SIGNIFICANT CHANGE UP (ref 3.5–5.3)
PROT SERPL-MCNC: 6 G/DL — SIGNIFICANT CHANGE UP (ref 6–8.3)
PROTHROM AB SERPL-ACNC: 25.7 SEC — HIGH (ref 10.6–13.6)
RBC # BLD: 2.34 M/UL — LOW (ref 4.2–5.8)
RBC # FLD: 16.2 % — HIGH (ref 10.3–14.5)
SODIUM SERPL-SCNC: 138 MMOL/L — SIGNIFICANT CHANGE UP (ref 135–145)
WBC # BLD: 1.99 K/UL — LOW (ref 3.8–10.5)
WBC # FLD AUTO: 1.99 K/UL — LOW (ref 3.8–10.5)

## 2021-04-18 PROCEDURE — 99233 SBSQ HOSP IP/OBS HIGH 50: CPT | Mod: GC

## 2021-04-18 PROCEDURE — 76770 US EXAM ABDO BACK WALL COMP: CPT | Mod: 26

## 2021-04-18 RX ORDER — LACTULOSE 10 G/15ML
30 SOLUTION ORAL EVERY 6 HOURS
Refills: 0 | Status: DISCONTINUED | OUTPATIENT
Start: 2021-04-18 | End: 2021-04-22

## 2021-04-18 RX ADMIN — LACTULOSE 20 GRAM(S): 10 SOLUTION ORAL at 05:11

## 2021-04-18 RX ADMIN — Medication 50 GRAM(S): at 13:41

## 2021-04-18 RX ADMIN — Medication 50 GRAM(S): at 05:12

## 2021-04-18 RX ADMIN — Medication 50 GRAM(S): at 21:27

## 2021-04-18 RX ADMIN — PANTOPRAZOLE SODIUM 40 MILLIGRAM(S): 20 TABLET, DELAYED RELEASE ORAL at 05:13

## 2021-04-18 RX ADMIN — LACTULOSE 30 GRAM(S): 10 SOLUTION ORAL at 17:07

## 2021-04-18 RX ADMIN — Medication 100 MILLIGRAM(S): at 13:41

## 2021-04-18 RX ADMIN — Medication 100 MILLIGRAM(S): at 21:28

## 2021-04-18 RX ADMIN — LACTULOSE 30 GRAM(S): 10 SOLUTION ORAL at 23:13

## 2021-04-18 RX ADMIN — ATORVASTATIN CALCIUM 20 MILLIGRAM(S): 80 TABLET, FILM COATED ORAL at 21:28

## 2021-04-18 NOTE — PROGRESS NOTE ADULT - PROBLEM SELECTOR PLAN 2
- CXR at ED showed large left pleural effusion  - CT on admission showing large L pleural effusion with complete collapse of LLL and partial collapse per LISANDRO. Rt lung is clear  - s/p L thoracentesis on 4/16  - Pleural fluid showing cloudy fluid, RBC 4917, total nucleated cells 42, 58% lymphocytes, mesothelial 22%, monocyte 11%, granulocyte 6%  - Will f/u Cx result from thoracentesis

## 2021-04-18 NOTE — PROGRESS NOTE ADULT - ASSESSMENT
58yo M with PMH of decompensated cirrhosis 2/2 EtOH/BOB (with ascites and HE), Thrombocytopenia, HTN, HLD who presents with worsening abdominal distention and SOB x 2 weeks.    # L pleural effusion - no infection noted. Improved SOB. Appears chronic pleural effusion, likely secondary to hepatic hydrothorax in the settings of worsening ascites. No systemic signs of infection.   # Ascites - stable now. Likely secondary to medication/diet non compliance vs. refractory ascites vs. infection vs. thrombosis  # Decompensated cirrhosis - MELD-Na 29 4/16  - varices: previously present on CT, but no varices on recent EGD 11/20  - HE: Present, on lactulose and xifaxin  - HCC: no focal lesions on CT 11/20  - Ascites - present, history of SBP  #MONI on CKD - slight improvement with albumin. unclear etiology. Likely Pre-renal azotemia vs. HRS.   # Pancytopenia - ongoing. Unclear etiology, presumed 2/2 chronic EtOH use. Might benefit from non urgent BM biopsy as outpatient. No evidence of overt bleeding. Recent negative GI workup.     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    # L pleural effusion - no infection noted. Improved SOB. Appears chronic pleural effusion, likely secondary to hepatic hydrothorax in the settings of worsening ascites. No systemic signs of infection.   # Ascites - stable now. Likely secondary to medication/diet non compliance vs. refractory ascites vs. infection vs. thrombosis  # Decompensated cirrhosis - MELD-Na 29 4/16  - varices: previously present on CT, but no varices on recent EGD 11/20  - HE: Present, on lactulose and xifaxin, had only 2-3BM yesterday  - HCC: no focal lesions on CT 11/20  - Ascites - present, history of SBP  #MONI on CKD - slight improvement with albumin. unclear etiology. Likely Pre-renal azotemia vs. HRS.   # Pancytopenia - ongoing. Unclear etiology, presumed 2/2 chronic EtOH use. Might benefit from non urgent BM biopsy as outpatient. No evidence of overt bleeding. Recent negative GI workup.     Recommendation:   - please continue with albumin 25% in 100mg q8hrs  - hold diuretics given MONI  - await check urine and blood cx  - await urine lytes  - increase lactulose 30 q6hrs and titrate to 4 BM/day given HE  - c/w rifaximin 550 BID  - daily CMP, INR, CBC  - hepatology to follow

## 2021-04-18 NOTE — PROGRESS NOTE ADULT - PROBLEM SELECTOR PLAN 5
- DVT ppx: scd for now for thrombocytopenia  - Diet: DASH/TLC, fluid restriction, low sodium  - Dispo: pending clinical improvement, SBP r/o

## 2021-04-18 NOTE — PROGRESS NOTE ADULT - PROBLEM SELECTOR PLAN 3
- Possibly in the setting of hepatorenal vs pre-renal/intra-renal/post-renal  - Baseline 1.3-1.5  - Will obtain urine lytes and osm, UA, and UCx  - Will f/u US kidney and bladder  - Will consider nephrology consult - Possibly in the setting of hepatorenal vs pre-renal/intra-renal/post-renal  - Baseline 1.3-1.5  - Will obtain urine lytes and osm, UA, and UCx  - Will f/u US kidney and bladder

## 2021-04-18 NOTE — PROGRESS NOTE ADULT - SUBJECTIVE AND OBJECTIVE BOX
***************************************************************  Jose L Soliz PGY-2  Internal Medicine   pager: [phone]  ***************************************************************    SANTOS BELLE  60y  MRN: [id number]    Patient is a 60y old  Male who presents with a chief complaint of 3 months of increasing abdominal girth, worst in the past month, along with exertional dyspnoea (16 Apr 2021 13:48)      Subjective: no events ON. Denies fever, CP, SOB, abn pain, N/V, dysuria. Tolerating diet.      REVIEW OF SYSTEMS: as noted above. Otherwise negative.      Objective:    MEDICATIONS  (STANDING):  albumin human 25% IVPB 25 Gram(s) IV Intermittent every 8 hours  atorvastatin 20 milliGRAM(s) Oral at bedtime  lactulose Syrup 20 Gram(s) Oral every 8 hours  pantoprazole    Tablet 40 milliGRAM(s) Oral before breakfast  rifAXIMin 550 milliGRAM(s) Oral two times a day    MEDICATIONS  (PRN):  benzonatate 100 milliGRAM(s) Oral every 8 hours PRN Cough        Vital Signs Last 24 Hrs  T(C): 37.2 (18 Apr 2021 04:20), Max: 37.2 (18 Apr 2021 04:20)  T(F): 99 (18 Apr 2021 04:20), Max: 99 (18 Apr 2021 04:20)  HR: 100 (18 Apr 2021 04:20) (74 - 100)  BP: 105/53 (18 Apr 2021 04:20) (103/50 - 118/85)  BP(mean): --  RR: 18 (18 Apr 2021 04:20) (18 - 18)  SpO2: 96% (18 Apr 2021 04:20) (96% - 97%)    I&O's Summary      PHYSICAL EXAM:  GENERAL: NAD  HEAD:  Atraumatic, Normocephalic  EYES: EOMI, conjunctiva and sclera clear; ptosis?  CHEST/LUNG: Clear to ascultation bilaterally; No rales, rhonchi, wheezing, or rubs  HEART: Regular rate and rhythm; No murmurs, rubs, or gallops  ABDOMEN: Soft, Nontender, large abdomen  EXTREMITIES: 2+ pitting edema b/l LE  SKIN: L thoracentesis site and RLL paracentesis site without any erythema, swelling, or drainage  NERVOUS SYSTEM:  Alert & Oriented X3, no focal deficit    LABS:  04-17    135  |  102  |  44<H>  ----------------------------<  94  4.1   |  24  |  2.91<H>    Ca    8.4      17 Apr 2021 03:43  Phos  3.7     04-17  Mg     1.9     04-17    TPro  6.2  /  Alb  2.5<L>  /  TBili  2.6<H>  /  DBili  x   /  AST  32  /  ALT  21  /  AlkPhos  111  04-17 04-17    135  |  102  |  44<H>  ----------------------------<  94  4.1   |  24  |  2.91<H>  04-16    134<L>  |  102  |  38<H>  ----------------------------<  98  3.8   |  23  |  2.46<H>  04-15    136  |  102  |  36<H>  ----------------------------<  106<H>  3.7   |  22  |  2.14<H>    Ca    8.4      17 Apr 2021 03:43  Ca    8.5      16 Apr 2021 07:01  Ca    8.8      15 Apr 2021 17:30  Phos  3.7     04-17  Mg     1.9     04-17    TPro  6.2  /  Alb  2.5<L>  /  TBili  2.6<H>  /  DBili  x   /  AST  32  /  ALT  21  /  AlkPhos  111  04-17  TPro  6.4  /  Alb  2.4<L>  /  TBili  3.0<H>  /  DBili  x   /  AST  35  /  ALT  21  /  AlkPhos  118  04-16  TPro  7.2  /  Alb  2.9<L>  /  TBili  3.9<H>  /  DBili  x   /  AST  40  /  ALT  24  /  AlkPhos  129<H>  04-15      PT/INR - ( 17 Apr 2021 03:43 )   PT: 24.9 sec;   INR: 2.15 ratio         PTT - ( 17 Apr 2021 03:43 )  PTT:41.0 sec                                        7.5    1.99  )-----------( 36       ( 18 Apr 2021 07:26 )             22.2                         8.2    2.57  )-----------( 40       ( 17 Apr 2021 03:43 )             24.5                         8.3    3.48  )-----------( 41       ( 16 Apr 2021 07:03 )             24.9                         9.9    3.64  )-----------( 48       ( 15 Apr 2021 17:30 )             29.8     CAPILLARY BLOOD GLUCOSE          RADIOLOGY & ADDITIONAL TESTS:    Imaging Personally Reviewed:  [x ] YES  [ ] NO    Consultants involved in case:   Consultant(s) Notes Reviewed:  [ x] YES  [ ] NO:   Care Discussed with Consultants/Other Providers [x ] YES  [ ] NO

## 2021-04-18 NOTE — PROGRESS NOTE ADULT - SUBJECTIVE AND OBJECTIVE BOX
Chief Complaint:  Patient is a 60y old  Male who presents with a chief complaint of 3 months of increasing abdominal girth, worst in the past month, along with exertional dyspnoea (18 Apr 2021 07:53)      Interval Events: No new events    Allergies:  No Known Allergies      Hospital Medications:  albumin human 25% IVPB 25 Gram(s) IV Intermittent every 8 hours  atorvastatin 20 milliGRAM(s) Oral at bedtime  benzonatate 100 milliGRAM(s) Oral every 8 hours PRN  lactulose Syrup 20 Gram(s) Oral every 8 hours  pantoprazole    Tablet 40 milliGRAM(s) Oral before breakfast  rifAXIMin 550 milliGRAM(s) Oral two times a day      PMHX/PSHX:  CHF (congestive heart failure)    Pneumonia    Hyperlipidemia    Obese    CHF (congestive heart failure)    Hepatic cirrhosis    HLD (hyperlipidemia)    No significant past surgical history    History of chest tube placement        Family history:  No pertinent family history in first degree relatives        ROS: As per HPI, 14-point ROS negative otherwise.    General:  No wt loss, fevers, chills, night sweats, fatigue,   Eyes:  Good vision, no reported pain  ENT:  No sore throat, pain, runny nose, dysphagia  CV:  No pain, palpitations, hypo/hypertension  Resp:  No dyspnea, cough, tachypnea, wheezing  GI:  See HPI  :  No pain, bleeding, incontinence, nocturia  Muscle:  No pain, weakness  Neuro:  No weakness, tingling, memory problems  Psych:  No fatigue, insomnia, mood problems, depression  Endocrine:  No polyuria, polydipsia, cold/heat intolerance  Heme:  No petechiae, ecchymosis, easy bruisability  Skin:  No rash, edema      PHYSICAL EXAM:     Vital Signs:  Vital Signs Last 24 Hrs  T(C): 37.2 (18 Apr 2021 04:20), Max: 37.2 (18 Apr 2021 04:20)  T(F): 99 (18 Apr 2021 04:20), Max: 99 (18 Apr 2021 04:20)  HR: 100 (18 Apr 2021 04:20) (74 - 100)  BP: 105/53 (18 Apr 2021 04:20) (103/50 - 118/85)  BP(mean): --  RR: 18 (18 Apr 2021 04:20) (18 - 18)  SpO2: 96% (18 Apr 2021 04:20) (96% - 97%)  Daily Height in cm: 177.8 (17 Apr 2021 12:12)    Daily     GENERAL:  appears comfortable, no acute distress  HEENT:  NC/AT, anicteric sclera  CHEST:  no increased effort  HEART:  Regular rate and rhythm  ABDOMEN:  Soft, non-tender, distended  EXTREMITIES:  no cyanosis, clubbing or edema  SKIN:  No rash/erythema/ecchymoses/petechiae/wounds  NEURO:  Alert, +asterixis    LABS:                        7.5    1.99  )-----------( 36       ( 18 Apr 2021 07:26 )             22.2     04-18    138  |  105  |  45<H>  ----------------------------<  93  3.7   |  24  |  2.78<H>    Ca    8.8      18 Apr 2021 07:25  Phos  3.4     04-18  Mg     1.9     04-18    TPro  6.0  /  Alb  2.6<L>  /  TBili  2.4<H>  /  DBili  x   /  AST  27  /  ALT  18  /  AlkPhos  95  04-18    LIVER FUNCTIONS - ( 18 Apr 2021 07:25 )  Alb: 2.6 g/dL / Pro: 6.0 g/dL / ALK PHOS: 95 U/L / ALT: 18 U/L / AST: 27 U/L / GGT: x           PT/INR - ( 18 Apr 2021 07:26 )   PT: 25.7 sec;   INR: 2.22 ratio         PTT - ( 18 Apr 2021 07:26 )  PTT:42.4 sec        Imaging:             Chief Complaint:  Patient is a 60y old  Male who presents with a chief complaint of 3 months of increasing abdominal girth, worst in the past month, along with exertional dyspnoea (18 Apr 2021 07:53)      Interval Events: No new events. More coherent today.    Allergies:  No Known Allergies      Hospital Medications:  albumin human 25% IVPB 25 Gram(s) IV Intermittent every 8 hours  atorvastatin 20 milliGRAM(s) Oral at bedtime  benzonatate 100 milliGRAM(s) Oral every 8 hours PRN  lactulose Syrup 20 Gram(s) Oral every 8 hours  pantoprazole    Tablet 40 milliGRAM(s) Oral before breakfast  rifAXIMin 550 milliGRAM(s) Oral two times a day      PMHX/PSHX:  CHF (congestive heart failure)    Pneumonia    Hyperlipidemia    Obese    CHF (congestive heart failure)    Hepatic cirrhosis    HLD (hyperlipidemia)    No significant past surgical history    History of chest tube placement        Family history:  No pertinent family history in first degree relatives        ROS: As per HPI, 14-point ROS negative otherwise.    General:  No wt loss, fevers, chills, night sweats, fatigue,   Eyes:  Good vision, no reported pain  ENT:  No sore throat, pain, runny nose, dysphagia  CV:  No pain, palpitations, hypo/hypertension  Resp:  No dyspnea, cough, tachypnea, wheezing  GI:  See HPI  :  No pain, bleeding, incontinence, nocturia  Muscle:  No pain, weakness  Neuro:  No weakness, tingling, memory problems  Psych:  No fatigue, insomnia, mood problems, depression  Endocrine:  No polyuria, polydipsia, cold/heat intolerance  Heme:  No petechiae, ecchymosis, easy bruisability  Skin:  No rash, edema      PHYSICAL EXAM:     Vital Signs:  Vital Signs Last 24 Hrs  T(C): 37.2 (18 Apr 2021 04:20), Max: 37.2 (18 Apr 2021 04:20)  T(F): 99 (18 Apr 2021 04:20), Max: 99 (18 Apr 2021 04:20)  HR: 100 (18 Apr 2021 04:20) (74 - 100)  BP: 105/53 (18 Apr 2021 04:20) (103/50 - 118/85)  BP(mean): --  RR: 18 (18 Apr 2021 04:20) (18 - 18)  SpO2: 96% (18 Apr 2021 04:20) (96% - 97%)  Daily Height in cm: 177.8 (17 Apr 2021 12:12)    Daily     GENERAL:  appears comfortable, no acute distress  HEENT:  NC/AT, anicteric sclera  CHEST:  no increased effort  HEART:  Regular rate and rhythm  ABDOMEN:  Soft, non-tender, distended  EXTREMITIES:  no cyanosis, clubbing or edema  SKIN:  No rash/erythema/ecchymoses/petechiae/wounds  NEURO:  Alert, +asterixis    LABS:                        7.5    1.99  )-----------( 36       ( 18 Apr 2021 07:26 )             22.2     04-18    138  |  105  |  45<H>  ----------------------------<  93  3.7   |  24  |  2.78<H>    Ca    8.8      18 Apr 2021 07:25  Phos  3.4     04-18  Mg     1.9     04-18    TPro  6.0  /  Alb  2.6<L>  /  TBili  2.4<H>  /  DBili  x   /  AST  27  /  ALT  18  /  AlkPhos  95  04-18    LIVER FUNCTIONS - ( 18 Apr 2021 07:25 )  Alb: 2.6 g/dL / Pro: 6.0 g/dL / ALK PHOS: 95 U/L / ALT: 18 U/L / AST: 27 U/L / GGT: x           PT/INR - ( 18 Apr 2021 07:26 )   PT: 25.7 sec;   INR: 2.22 ratio         PTT - ( 18 Apr 2021 07:26 )  PTT:42.4 sec        Imaging:

## 2021-04-18 NOTE — PROGRESS NOTE ADULT - PROBLEM SELECTOR PLAN 1
- MELD-Na 30  - Consulted Hepatology, appreciate recs  - s/p RLQ paracentesis on 4/16 showing cloudy fluid, total nucleated cells 58, mesothelial 22%, monocyte 11%, granulocyte 6%  - Will continue rifaximin, lactulose, and aldactone  - Will f/u Cx result from paracentesis  - Will obtain BCx per hepatology

## 2021-04-19 LAB
ALBUMIN SERPL ELPH-MCNC: 3.4 G/DL — SIGNIFICANT CHANGE UP (ref 3.3–5)
ALP SERPL-CCNC: 92 U/L — SIGNIFICANT CHANGE UP (ref 40–120)
ALT FLD-CCNC: 16 U/L — SIGNIFICANT CHANGE UP (ref 10–45)
ANION GAP SERPL CALC-SCNC: 9 MMOL/L — SIGNIFICANT CHANGE UP (ref 5–17)
APPEARANCE UR: CLEAR — SIGNIFICANT CHANGE UP
APTT BLD: 42.1 SEC — HIGH (ref 27.5–35.5)
AST SERPL-CCNC: 28 U/L — SIGNIFICANT CHANGE UP (ref 10–40)
BACTERIA # UR AUTO: NEGATIVE — SIGNIFICANT CHANGE UP
BILIRUB SERPL-MCNC: 2.6 MG/DL — HIGH (ref 0.2–1.2)
BILIRUB UR-MCNC: NEGATIVE — SIGNIFICANT CHANGE UP
BUN SERPL-MCNC: 42 MG/DL — HIGH (ref 7–23)
CALCIUM SERPL-MCNC: 9 MG/DL — SIGNIFICANT CHANGE UP (ref 8.4–10.5)
CHLORIDE SERPL-SCNC: 104 MMOL/L — SIGNIFICANT CHANGE UP (ref 96–108)
CO2 SERPL-SCNC: 25 MMOL/L — SIGNIFICANT CHANGE UP (ref 22–31)
COLOR SPEC: YELLOW — SIGNIFICANT CHANGE UP
CREAT ?TM UR-MCNC: 137 MG/DL — SIGNIFICANT CHANGE UP
CREAT SERPL-MCNC: 2.33 MG/DL — HIGH (ref 0.5–1.3)
CULTURE RESULTS: SIGNIFICANT CHANGE UP
DIFF PNL FLD: NEGATIVE — SIGNIFICANT CHANGE UP
EPI CELLS # UR: 1 /HPF — SIGNIFICANT CHANGE UP
GLUCOSE SERPL-MCNC: 96 MG/DL — SIGNIFICANT CHANGE UP (ref 70–99)
GLUCOSE UR QL: NEGATIVE — SIGNIFICANT CHANGE UP
HCT VFR BLD CALC: 23.5 % — LOW (ref 39–50)
HGB BLD-MCNC: 7.6 G/DL — LOW (ref 13–17)
HYALINE CASTS # UR AUTO: 3 /LPF — HIGH (ref 0–2)
INR BLD: 2.32 RATIO — HIGH (ref 0.88–1.16)
KETONES UR-MCNC: SIGNIFICANT CHANGE UP
LEUKOCYTE ESTERASE UR-ACNC: ABNORMAL
MAGNESIUM SERPL-MCNC: 2 MG/DL — SIGNIFICANT CHANGE UP (ref 1.6–2.6)
MCHC RBC-ENTMCNC: 31.1 PG — SIGNIFICANT CHANGE UP (ref 27–34)
MCHC RBC-ENTMCNC: 32.3 GM/DL — SIGNIFICANT CHANGE UP (ref 32–36)
MCV RBC AUTO: 96.3 FL — SIGNIFICANT CHANGE UP (ref 80–100)
NITRITE UR-MCNC: NEGATIVE — SIGNIFICANT CHANGE UP
NRBC # BLD: 0 /100 WBCS — SIGNIFICANT CHANGE UP (ref 0–0)
OSMOLALITY UR: 387 MOS/KG — SIGNIFICANT CHANGE UP (ref 300–900)
PH UR: 6 — SIGNIFICANT CHANGE UP (ref 5–8)
PHOSPHATE SERPL-MCNC: 2.9 MG/DL — SIGNIFICANT CHANGE UP (ref 2.5–4.5)
PLATELET # BLD AUTO: 37 K/UL — LOW (ref 150–400)
POTASSIUM SERPL-MCNC: 3.6 MMOL/L — SIGNIFICANT CHANGE UP (ref 3.5–5.3)
POTASSIUM SERPL-SCNC: 3.6 MMOL/L — SIGNIFICANT CHANGE UP (ref 3.5–5.3)
PROT SERPL-MCNC: 6.5 G/DL — SIGNIFICANT CHANGE UP (ref 6–8.3)
PROT UR-MCNC: NEGATIVE — SIGNIFICANT CHANGE UP
PROTHROM AB SERPL-ACNC: 26.8 SEC — HIGH (ref 10.6–13.6)
RBC # BLD: 2.44 M/UL — LOW (ref 4.2–5.8)
RBC # FLD: 16 % — HIGH (ref 10.3–14.5)
RBC CASTS # UR COMP ASSIST: 1 /HPF — SIGNIFICANT CHANGE UP (ref 0–4)
SODIUM SERPL-SCNC: 138 MMOL/L — SIGNIFICANT CHANGE UP (ref 135–145)
SODIUM UR-SCNC: 6 MMOL/L — SIGNIFICANT CHANGE UP
SP GR SPEC: 1.01 — SIGNIFICANT CHANGE UP (ref 1.01–1.02)
SPECIMEN SOURCE: SIGNIFICANT CHANGE UP
UROBILINOGEN FLD QL: NEGATIVE — SIGNIFICANT CHANGE UP
WBC # BLD: 1.72 K/UL — LOW (ref 3.8–10.5)
WBC # FLD AUTO: 1.72 K/UL — LOW (ref 3.8–10.5)
WBC UR QL: 5 /HPF — SIGNIFICANT CHANGE UP (ref 0–5)

## 2021-04-19 PROCEDURE — 99233 SBSQ HOSP IP/OBS HIGH 50: CPT | Mod: GC

## 2021-04-19 PROCEDURE — 99232 SBSQ HOSP IP/OBS MODERATE 35: CPT | Mod: GC

## 2021-04-19 RX ORDER — MORPHINE SULFATE 50 MG/1
0.5 CAPSULE, EXTENDED RELEASE ORAL ONCE
Refills: 0 | Status: DISCONTINUED | OUTPATIENT
Start: 2021-04-19 | End: 2021-04-19

## 2021-04-19 RX ADMIN — MORPHINE SULFATE 0.5 MILLIGRAM(S): 50 CAPSULE, EXTENDED RELEASE ORAL at 23:55

## 2021-04-19 RX ADMIN — LACTULOSE 30 GRAM(S): 10 SOLUTION ORAL at 05:22

## 2021-04-19 RX ADMIN — LACTULOSE 20 GRAM(S): 10 SOLUTION ORAL at 17:14

## 2021-04-19 RX ADMIN — PANTOPRAZOLE SODIUM 40 MILLIGRAM(S): 20 TABLET, DELAYED RELEASE ORAL at 05:23

## 2021-04-19 RX ADMIN — Medication 50 GRAM(S): at 05:21

## 2021-04-19 RX ADMIN — LACTULOSE 30 GRAM(S): 10 SOLUTION ORAL at 12:17

## 2021-04-19 RX ADMIN — Medication 100 MILLIGRAM(S): at 05:22

## 2021-04-19 RX ADMIN — ATORVASTATIN CALCIUM 20 MILLIGRAM(S): 80 TABLET, FILM COATED ORAL at 21:51

## 2021-04-19 RX ADMIN — Medication 100 MILLIGRAM(S): at 14:54

## 2021-04-19 RX ADMIN — Medication 100 MILLIGRAM(S): at 21:52

## 2021-04-19 RX ADMIN — Medication 50 GRAM(S): at 21:50

## 2021-04-19 RX ADMIN — Medication 50 GRAM(S): at 14:54

## 2021-04-19 NOTE — PROGRESS NOTE ADULT - PROBLEM SELECTOR PLAN 1
- MELD-Na 30 on presentation   - Consulted Hepatology, appreciate recs  - s/p RLQ paracentesis on 4/16 showing cloudy fluid, total nucleated cells 58, mesothelial 22%, monocyte 11%, granulocyte 6%  - Will continue rifaximin, lactulose, and aldactone  - lactulose increased to 30 mg q6h, titrate to 2-3 BM daily   - albumin 100 mg q8h   - Will f/u Cx result from paracentesis  - Will obtain BCx per hepatology

## 2021-04-19 NOTE — PHYSICAL THERAPY INITIAL EVALUATION ADULT - PERTINENT HX OF CURRENT PROBLEM, REHAB EVAL
59 y.o. M with PMH of alcoholic cirrhosis, ascites, thrombocytopenia, CHF, HTN, nad HLD coming in due to 3 mo of increasing abdominal girth, now found to have large left pleural effusion 2/2 unclear etiology. Ddx include ascites vs alcoholic cirrhosis vs r/o SBP. CXR at ED showed large left pleural effusion. CT on admission showing large L pleural effusion with complete collapse of LLL and partial collapse per LISANDRO. Rt lung is clear. s/p L thoracentesis on 4/16 - 1L fluid.

## 2021-04-19 NOTE — PROGRESS NOTE ADULT - SUBJECTIVE AND OBJECTIVE BOX
Izzy Dillon MD  PGY 1 Department of Internal Medicine  Pager: [phone] ([facility])       Patient is a 60y old  Male who presents with a chief complaint of 3 months of increasing abdominal girth, worst in the past month, along with exertional dyspnoea (18 Apr 2021 08:31)      SUBJECTIVE / OVERNIGHT EVENTS: Pt seen and examined. No acute overnight events. Vitals stable overnight. One BM during last shift  Denies fevers, chills, CP, SOB, Abdominal pain, N/V, Constipation, Diarrhea        MEDICATIONS  (STANDING):  albumin human 25% IVPB 25 Gram(s) IV Intermittent every 8 hours  atorvastatin 20 milliGRAM(s) Oral at bedtime  benzonatate 100 milliGRAM(s) Oral three times a day  lactulose Syrup 30 Gram(s) Oral every 6 hours  pantoprazole    Tablet 40 milliGRAM(s) Oral before breakfast  rifAXIMin 550 milliGRAM(s) Oral two times a day    MEDICATIONS  (PRN):      I&O's Summary    18 Apr 2021 07:01  -  19 Apr 2021 07:00  --------------------------------------------------------  IN: 1110 mL / OUT: 0 mL / NET: 1110 mL        Vital Signs Last 24 Hrs  T(C): 36.9 (19 Apr 2021 04:22), Max: 36.9 (19 Apr 2021 04:22)  T(F): 98.5 (19 Apr 2021 04:22), Max: 98.5 (19 Apr 2021 04:22)  HR: 79 (19 Apr 2021 04:22) (79 - 84)  BP: 107/57 (19 Apr 2021 04:22) (101/58 - 107/57)  BP(mean): --  RR: 20 (19 Apr 2021 04:22) (18 - 20)  SpO2: 96% (19 Apr 2021 04:22) (96% - 96%)    CAPILLARY BLOOD GLUCOSE          PHYSICAL EXAM:  GENERAL: NAD,       LABS:                        7.6    1.72  )-----------( 37       ( 19 Apr 2021 06:35 )             23.5     Auto Eosinophil # x     / Auto Eosinophil % x     / Auto Neutrophil # x     / Auto Neutrophil % x     / BANDS % x                            7.5    1.99  )-----------( 36       ( 18 Apr 2021 07:26 )             22.2     Auto Eosinophil # x     / Auto Eosinophil % x     / Auto Neutrophil # x     / Auto Neutrophil % x     / BANDS % x        04-19    138  |  104  |  42<H>  ----------------------------<  96  3.6   |  25  |  2.33<H>  04-18    138  |  105  |  45<H>  ----------------------------<  93  3.7   |  24  |  2.78<H>    Ca    9.0      19 Apr 2021 06:35  Mg     2.0     04-19  Phos  2.9     04-19  TPro  6.5  /  Alb  3.4  /  TBili  2.6<H>  /  DBili  x   /  AST  28  /  ALT  16  /  AlkPhos  92  04-19  TPro  6.0  /  Alb  2.6<L>  /  TBili  2.4<H>  /  DBili  x   /  AST  27  /  ALT  18  /  AlkPhos  95  04-18    PT/INR - ( 19 Apr 2021 06:35 )   PT: 26.8 sec;   INR: 2.32 ratio         PTT - ( 19 Apr 2021 06:35 )  PTT:42.1 sec

## 2021-04-19 NOTE — PROGRESS NOTE ADULT - PROBLEM SELECTOR PLAN 3
- Possibly in the setting of hepatorenal vs pre-renal/intra-renal/post-renal  - Baseline 1.3-1.5  - Will obtain urine lytes and osm, UA, and UCx  - Will f/u US kidney and bladder

## 2021-04-19 NOTE — PROGRESS NOTE ADULT - PROBLEM SELECTOR PLAN 2
- CXR at ED showed large left pleural effusion  - CT on admission showing large L pleural effusion with complete collapse of LLL and partial collapse per LISANDRO. Rt lung is clear  - s/p L thoracentesis on 4/16 - 1L fluid   - Pleural fluid showing cloudy fluid, RBC 4917, total nucleated cells 42, 58% lymphocytes, mesothelial 22%, monocyte 11%, granulocyte 6%  - Will f/u Cx result from thoracentesis- NGTD - CXR at ED showed large left pleural effusion  - CT on admission showing large L pleural effusion with complete collapse of LLL and partial collapse per LISANDRO. Rt lung is clear  - s/p L thoracentesis on 4/16 - 1L fluid   - Pleural fluid showing cloudy fluid, RBC 4917, total nucleated cells 42, 58% lymphocytes, mesothelial 22%, monocyte 11%, granulocyte 6%  - Transudative   - Will f/u Cx result from thoracentesis- NGTD

## 2021-04-19 NOTE — PHYSICAL THERAPY INITIAL EVALUATION ADULT - PLANNED THERAPY INTERVENTIONS, PT EVAL
Goal: Pt will be able to negotiate one flight of stairs independently with single handrail and step over step pattern in 2 weeks./balance training/bed mobility training/gait training/strengthening/transfer training

## 2021-04-19 NOTE — PROGRESS NOTE ADULT - SUBJECTIVE AND OBJECTIVE BOX
Chief Complaint:  Patient is a 60y old  Male who presents with a chief complaint of 3 months of increasing abdominal girth, worst in the past month, along with exertional dyspnoea (19 Apr 2021 07:28)      Interval Events:   - No acute events  - Had 4 BMs in the past 24h    Allergies:  No Known Allergies      Hospital Medications:  albumin human 25% IVPB 25 Gram(s) IV Intermittent every 8 hours  atorvastatin 20 milliGRAM(s) Oral at bedtime  benzonatate 100 milliGRAM(s) Oral three times a day  lactulose Syrup 30 Gram(s) Oral every 6 hours  pantoprazole    Tablet 40 milliGRAM(s) Oral before breakfast  rifAXIMin 550 milliGRAM(s) Oral two times a day      PMHX/PSHX:  CHF (congestive heart failure)    Pneumonia    Hyperlipidemia    Obese    CHF (congestive heart failure)    Hepatic cirrhosis    HLD (hyperlipidemia)    No significant past surgical history    History of chest tube placement        Family history:  No pertinent family history in first degree relatives        ROS:   General:  No wt loss, fevers, chills, night sweats, fatigue,   Eyes:  Good vision, no reported pain  ENT:  No sore throat, pain, runny nose, dysphagia  CV:  No pain, palpitations, hypo/hypertension  Resp:  No dyspnea, cough, tachypnea, wheezing  GI:  See HPI  :  No pain, bleeding, incontinence, nocturia  Muscle:  No pain, weakness  Neuro:  No weakness, tingling, memory problems  Psych:  No fatigue, insomnia, mood problems, depression  Endocrine:  No polyuria, polydipsia, cold/heat intolerance  Heme:  No petechiae, ecchymosis, easy bruisability  Skin:  No rash, edema    PHYSICAL EXAM:   Vital Signs:  Vital Signs Last 24 Hrs  T(C): 36.7 (19 Apr 2021 08:10), Max: 36.9 (19 Apr 2021 04:22)  T(F): 98.1 (19 Apr 2021 08:10), Max: 98.5 (19 Apr 2021 04:22)  HR: 74 (19 Apr 2021 08:10) (74 - 84)  BP: 113/61 (19 Apr 2021 08:10) (101/58 - 113/61)  BP(mean): --  RR: 18 (19 Apr 2021 08:10) (18 - 20)  SpO2: 96% (19 Apr 2021 04:22) (96% - 96%)  Daily     Daily     GENERAL:  appears comfortable, no acute distress  HEENT:  NC/AT, anicteric sclera  CHEST:  no increased effort  HEART:  Regular rate and rhythm  ABDOMEN:  Soft, non-tender, distended  EXTREMITIES:  no cyanosis, clubbing or edema  SKIN:  No rash/erythema/ecchymoses/petechiae/wounds  NEURO:  Alert, +asterixis    LABS:                        7.6    1.72  )-----------( 37       ( 19 Apr 2021 06:35 )             23.5     Mean Cell Volume: 96.3 fl (04-19-21 @ 06:35)    04-19    138  |  104  |  42<H>  ----------------------------<  96  3.6   |  25  |  2.33<H>    Ca    9.0      19 Apr 2021 06:35  Phos  2.9     04-19  Mg     2.0     04-19    TPro  6.5  /  Alb  3.4  /  TBili  2.6<H>  /  DBili  x   /  AST  28  /  ALT  16  /  AlkPhos  92  04-19    LIVER FUNCTIONS - ( 19 Apr 2021 06:35 )  Alb: 3.4 g/dL / Pro: 6.5 g/dL / ALK PHOS: 92 U/L / ALT: 16 U/L / AST: 28 U/L / GGT: x           PT/INR - ( 19 Apr 2021 06:35 )   PT: 26.8 sec;   INR: 2.32 ratio         PTT - ( 19 Apr 2021 06:35 )  PTT:42.1 sec                            7.6    1.72  )-----------( 37       ( 19 Apr 2021 06:35 )             23.5                         7.5    1.99  )-----------( 36       ( 18 Apr 2021 07:26 )             22.2                         8.2    2.57  )-----------( 40       ( 17 Apr 2021 03:43 )             24.5       Imaging:             Chief Complaint:  Patient is a 60y old  Male who presents with a chief complaint of 3 months of increasing abdominal girth, worst in the past month, along with exertional dyspnoea (19 Apr 2021 07:28)      Interval Events:   - No acute events  - Had 4 BMs in the past 24h    Allergies:  No Known Allergies      Hospital Medications:  albumin human 25% IVPB 25 Gram(s) IV Intermittent every 8 hours  atorvastatin 20 milliGRAM(s) Oral at bedtime  benzonatate 100 milliGRAM(s) Oral three times a day  lactulose Syrup 30 Gram(s) Oral every 6 hours  pantoprazole    Tablet 40 milliGRAM(s) Oral before breakfast  rifAXIMin 550 milliGRAM(s) Oral two times a day      PMHX/PSHX:  CHF (congestive heart failure)    Pneumonia    Hyperlipidemia    Obese    CHF (congestive heart failure)    Hepatic cirrhosis    HLD (hyperlipidemia)    No significant past surgical history    History of chest tube placement        Family history:  No pertinent family history in first degree relatives        ROS:   General:  No wt loss, fevers, chills, night sweats   Eyes:  Good vision, no reported pain  ENT:  No sore throat, pain, runny nose, dysphagia  CV:  No pain, palpitations  Resp:  +Dyspnea No cough or wheezing  GI:  See HPI  :  No pain, bleeding, incontinence, nocturia  Muscle:  No pain, weakness  Neuro:  No weakness, tingling, memory problems  Psych:  No fatigue, insomnia, mood problems, depression  Endocrine:  No polyuria, polydipsia, cold/heat intolerance  Heme:  No petechiae, ecchymosis, easy bruisability  Skin:  +Edema No rash    PHYSICAL EXAM:   Vital Signs:  Vital Signs Last 24 Hrs  T(C): 36.7 (19 Apr 2021 08:10), Max: 36.9 (19 Apr 2021 04:22)  T(F): 98.1 (19 Apr 2021 08:10), Max: 98.5 (19 Apr 2021 04:22)  HR: 74 (19 Apr 2021 08:10) (74 - 84)  BP: 113/61 (19 Apr 2021 08:10) (101/58 - 113/61)  BP(mean): --  RR: 18 (19 Apr 2021 08:10) (18 - 20)  SpO2: 96% (19 Apr 2021 04:22) (96% - 96%)  Daily     Daily     GENERAL:  appears comfortable, no acute distress  HEENT:  NC/AT, anicteric sclera  CHEST:  Mildly tachypneic, +decreased BS L lung base, R lung CTA, no crackles appreciated  HEART:  Regular rate and rhythm  ABDOMEN:  Soft, non-tender, +distended  EXTREMITIES:  +Edema no cyanosis, clubbing  SKIN:  No rash/erythema/ecchymoses/petechiae/wounds  NEURO:  Alert, +asterixis    LABS:                        7.6    1.72  )-----------( 37       ( 19 Apr 2021 06:35 )             23.5     Mean Cell Volume: 96.3 fl (04-19-21 @ 06:35)    04-19    138  |  104  |  42<H>  ----------------------------<  96  3.6   |  25  |  2.33<H>    Ca    9.0      19 Apr 2021 06:35  Phos  2.9     04-19  Mg     2.0     04-19    TPro  6.5  /  Alb  3.4  /  TBili  2.6<H>  /  DBili  x   /  AST  28  /  ALT  16  /  AlkPhos  92  04-19    LIVER FUNCTIONS - ( 19 Apr 2021 06:35 )  Alb: 3.4 g/dL / Pro: 6.5 g/dL / ALK PHOS: 92 U/L / ALT: 16 U/L / AST: 28 U/L / GGT: x           PT/INR - ( 19 Apr 2021 06:35 )   PT: 26.8 sec;   INR: 2.32 ratio         PTT - ( 19 Apr 2021 06:35 )  PTT:42.1 sec                            7.6    1.72  )-----------( 37       ( 19 Apr 2021 06:35 )             23.5                         7.5    1.99  )-----------( 36       ( 18 Apr 2021 07:26 )             22.2                         8.2    2.57  )-----------( 40       ( 17 Apr 2021 03:43 )             24.5       Imaging:

## 2021-04-19 NOTE — PHYSICAL THERAPY INITIAL EVALUATION ADULT - GENERAL OBSERVATIONS, REHAB EVAL
Rec'd seated in chair, in NAD, +IVL. Agreeable to PT. RN cleared pt to be seen. P/w worsening abdominal distention and SOBx2 weeks.

## 2021-04-19 NOTE — PROGRESS NOTE ADULT - ASSESSMENT
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    # L pleural effusion - no infection noted. Improved SOB. Appears chronic pleural effusion, likely secondary to hepatic hydrothorax in the settings of worsening ascites. No systemic signs of infection.   # Ascites - stable now. Likely secondary to medication/diet non compliance vs. refractory ascites vs. infection vs. thrombosis  # Decompensated cirrhosis - MELD-Na 29 4/16  - varices: previously present on CT, but no varices on recent EGD 11/20  - HE: Present, on lactulose and xifaxin, had only 2-3BM yesterday  - HCC: no focal lesions on CT 11/20  - Ascites - present, history of SBP  #MONI on CKD - slight improvement with albumin. Unclear etiology. Likely Pre-renal azotemia vs. HRS.   # Pancytopenia - ongoing. Unclear etiology, presumed 2/2 chronic EtOH use. Might benefit from non urgent BM biopsy as outpatient. No evidence of overt bleeding. Recent negative GI workup.     Recommendation:   - please continue with albumin 25% in 100mg q8hrs  - hold diuretics given MONI  - await urine and blood cx  - Please collect urine lytes  - increase lactulose 30 q6hrs and titrate to 4 BM/day given HE  - c/w rifaximin 550 BID  - daily CMP, INR, CBC  - hepatology to follow    Thank you for involving us in the care of this patient. Please reach out if any further questions.    Carlo Hall, PGY-4  Hepatology Fellow    Available on [employer] Teams  Pager [phone] ([facility]) or [phone] ([facility])  After 5PM/Weekends, please contact the on-call GI fellow: [phone]  Available through [employer] Teams

## 2021-04-19 NOTE — PHYSICAL THERAPY INITIAL EVALUATION ADULT - ADDITIONAL COMMENTS
Per pt, he lives alone on second floor of , +12-15 steps to 2nd floor (+b/l rail). PTA pt was independent with all mobility/ADLs and reports he has been having difficulty amb 2/2 SOB. Pt owns a RW.

## 2021-04-20 LAB
ALBUMIN SERPL ELPH-MCNC: 3.4 G/DL — SIGNIFICANT CHANGE UP (ref 3.3–5)
ALP SERPL-CCNC: 85 U/L — SIGNIFICANT CHANGE UP (ref 40–120)
ALT FLD-CCNC: 15 U/L — SIGNIFICANT CHANGE UP (ref 10–45)
ANION GAP SERPL CALC-SCNC: 13 MMOL/L — SIGNIFICANT CHANGE UP (ref 5–17)
APTT BLD: 44.6 SEC — HIGH (ref 27.5–35.5)
AST SERPL-CCNC: 26 U/L — SIGNIFICANT CHANGE UP (ref 10–40)
BILIRUB SERPL-MCNC: 2.7 MG/DL — HIGH (ref 0.2–1.2)
BUN SERPL-MCNC: 40 MG/DL — HIGH (ref 7–23)
CALCIUM SERPL-MCNC: 9.3 MG/DL — SIGNIFICANT CHANGE UP (ref 8.4–10.5)
CHLORIDE SERPL-SCNC: 104 MMOL/L — SIGNIFICANT CHANGE UP (ref 96–108)
CO2 SERPL-SCNC: 22 MMOL/L — SIGNIFICANT CHANGE UP (ref 22–31)
CREAT SERPL-MCNC: 2.1 MG/DL — HIGH (ref 0.5–1.3)
GLUCOSE SERPL-MCNC: 87 MG/DL — SIGNIFICANT CHANGE UP (ref 70–99)
HCT VFR BLD CALC: 21.6 % — LOW (ref 39–50)
HGB BLD-MCNC: 7.1 G/DL — LOW (ref 13–17)
INR BLD: 2.44 RATIO — HIGH (ref 0.88–1.16)
MAGNESIUM SERPL-MCNC: 1.8 MG/DL — SIGNIFICANT CHANGE UP (ref 1.6–2.6)
MCHC RBC-ENTMCNC: 31.7 PG — SIGNIFICANT CHANGE UP (ref 27–34)
MCHC RBC-ENTMCNC: 32.9 GM/DL — SIGNIFICANT CHANGE UP (ref 32–36)
MCV RBC AUTO: 96.4 FL — SIGNIFICANT CHANGE UP (ref 80–100)
NRBC # BLD: 0 /100 WBCS — SIGNIFICANT CHANGE UP (ref 0–0)
PHOSPHATE SERPL-MCNC: 2.6 MG/DL — SIGNIFICANT CHANGE UP (ref 2.5–4.5)
PLATELET # BLD AUTO: 34 K/UL — LOW (ref 150–400)
POTASSIUM SERPL-MCNC: 3.7 MMOL/L — SIGNIFICANT CHANGE UP (ref 3.5–5.3)
POTASSIUM SERPL-SCNC: 3.7 MMOL/L — SIGNIFICANT CHANGE UP (ref 3.5–5.3)
PROT SERPL-MCNC: 6.5 G/DL — SIGNIFICANT CHANGE UP (ref 6–8.3)
PROTHROM AB SERPL-ACNC: 28.1 SEC — HIGH (ref 10.6–13.6)
RBC # BLD: 2.24 M/UL — LOW (ref 4.2–5.8)
RBC # FLD: 15.9 % — HIGH (ref 10.3–14.5)
SODIUM SERPL-SCNC: 139 MMOL/L — SIGNIFICANT CHANGE UP (ref 135–145)
UUN UR-MCNC: 754 MG/DL — SIGNIFICANT CHANGE UP
WBC # BLD: 1.68 K/UL — LOW (ref 3.8–10.5)
WBC # FLD AUTO: 1.68 K/UL — LOW (ref 3.8–10.5)

## 2021-04-20 PROCEDURE — 99233 SBSQ HOSP IP/OBS HIGH 50: CPT | Mod: GC

## 2021-04-20 PROCEDURE — 99232 SBSQ HOSP IP/OBS MODERATE 35: CPT | Mod: GC

## 2021-04-20 RX ORDER — MORPHINE SULFATE 50 MG/1
2 CAPSULE, EXTENDED RELEASE ORAL
Qty: 0 | Refills: 0 | DISCHARGE
Start: 2021-04-20

## 2021-04-20 RX ORDER — LACTULOSE 10 G/15ML
30 SOLUTION ORAL
Qty: 0 | Refills: 0 | DISCHARGE
Start: 2021-04-20

## 2021-04-20 RX ORDER — ATORVASTATIN CALCIUM 80 MG/1
1 TABLET, FILM COATED ORAL
Qty: 0 | Refills: 0 | DISCHARGE
Start: 2021-04-20

## 2021-04-20 RX ORDER — ATORVASTATIN CALCIUM 80 MG/1
1 TABLET, FILM COATED ORAL
Qty: 0 | Refills: 0 | DISCHARGE

## 2021-04-20 RX ORDER — MORPHINE SULFATE 50 MG/1
2 CAPSULE, EXTENDED RELEASE ORAL EVERY 6 HOURS
Refills: 0 | Status: DISCONTINUED | OUTPATIENT
Start: 2021-04-20 | End: 2021-04-22

## 2021-04-20 RX ORDER — SPIRONOLACTONE 25 MG/1
1 TABLET, FILM COATED ORAL
Qty: 0 | Refills: 0 | DISCHARGE

## 2021-04-20 RX ORDER — PANTOPRAZOLE SODIUM 20 MG/1
1 TABLET, DELAYED RELEASE ORAL
Qty: 0 | Refills: 0 | DISCHARGE
Start: 2021-04-20

## 2021-04-20 RX ADMIN — MORPHINE SULFATE 2 MILLIGRAM(S): 50 CAPSULE, EXTENDED RELEASE ORAL at 15:25

## 2021-04-20 RX ADMIN — LACTULOSE 30 GRAM(S): 10 SOLUTION ORAL at 06:19

## 2021-04-20 RX ADMIN — PANTOPRAZOLE SODIUM 40 MILLIGRAM(S): 20 TABLET, DELAYED RELEASE ORAL at 06:20

## 2021-04-20 RX ADMIN — Medication 100 MILLIGRAM(S): at 06:20

## 2021-04-20 RX ADMIN — Medication 100 MILLIGRAM(S): at 17:21

## 2021-04-20 RX ADMIN — MORPHINE SULFATE 2 MILLIGRAM(S): 50 CAPSULE, EXTENDED RELEASE ORAL at 23:14

## 2021-04-20 RX ADMIN — LACTULOSE 30 GRAM(S): 10 SOLUTION ORAL at 00:42

## 2021-04-20 RX ADMIN — ATORVASTATIN CALCIUM 20 MILLIGRAM(S): 80 TABLET, FILM COATED ORAL at 22:22

## 2021-04-20 RX ADMIN — LACTULOSE 30 GRAM(S): 10 SOLUTION ORAL at 17:21

## 2021-04-20 RX ADMIN — Medication 50 GRAM(S): at 15:02

## 2021-04-20 RX ADMIN — MORPHINE SULFATE 2 MILLIGRAM(S): 50 CAPSULE, EXTENDED RELEASE ORAL at 23:44

## 2021-04-20 RX ADMIN — Medication 100 MILLIGRAM(S): at 22:22

## 2021-04-20 RX ADMIN — LACTULOSE 30 GRAM(S): 10 SOLUTION ORAL at 12:53

## 2021-04-20 RX ADMIN — MORPHINE SULFATE 2 MILLIGRAM(S): 50 CAPSULE, EXTENDED RELEASE ORAL at 15:06

## 2021-04-20 RX ADMIN — Medication 50 GRAM(S): at 07:09

## 2021-04-20 NOTE — PROGRESS NOTE ADULT - ASSESSMENT
58yo M with PMH of decompensated cirrhosis 2/2 EtOH/BOB (with ascites and HE), Thrombocytopenia, HTN, HLD who presents with worsening abdominal distention and SOB x 2 weeks.    # L pleural effusion - no infection noted. Improved SOB. Appears chronic pleural effusion, likely secondary to hepatic hydrothorax in the settings of worsening ascites. No systemic signs of infection.   # Ascites - stable now. Likely secondary to medication/diet non compliance vs. refractory ascites vs. infection.  # Decompensated cirrhosis - MELD-Na 28 4/20. Patient had started transplant evaluation at [facility] - discussed possible transfer, awaiting their response.  - varices: previously present on CT, but no varices on recent EGD 11/20  - HE: Present, on lactulose and xifaxin, had only 2-3BM yesterday  - HCC: no focal lesions on CT 11/20  - Ascites - present, history of SBP  #MONI on CKD - slight improvement with albumin. Urine sodium not consistent with HRS/pre-renal, suggestive of possible resolution vs. ATN.  # Pancytopenia - ongoing. Unclear etiology, presumed 2/2 chronic EtOH use. Might benefit from non urgent BM biopsy as outpatient. No evidence of overt bleeding. Recent negative GI workup.     Recommendation:   - Awaiting [facility]'s decision re: transfer over  - HOLD albumin 25% in 100mg q8hrs  - HOLD diuretics given MONI  - await urine and blood cx  - lactulose 30 q6hrs and titrate to 4 BM/day given HE  - c/w rifaximin 550 BID  - daily CMP, INR, CBC  - hepatology to follow    Thank you for involving us in the care of this patient. Please reach out if any further questions.    Carlo Hall, PGY-4  Hepatology Fellow    Available on [employer] Teams  Pager [phone] ([facility]) or [phone] ([facility])  After 5PM/Weekends, please contact the on-call GI fellow: [phone]  Available through [employer] Teams

## 2021-04-20 NOTE — PROGRESS NOTE ADULT - SUBJECTIVE AND OBJECTIVE BOX
Chief Complaint:  Patient is a 60y old  Male who presents with a chief complaint of 3 months of increasing abdominal girth, worst in the past month, along with exertional dyspnoea (2021 07:41)      Interval Events:   - No acute events    Allergies:  No Known Allergies        Hospital Medications:  albumin human 25% IVPB 25 Gram(s) IV Intermittent every 8 hours  atorvastatin 20 milliGRAM(s) Oral at bedtime  benzonatate 100 milliGRAM(s) Oral three times a day  lactulose Syrup 30 Gram(s) Oral every 6 hours  morphine  - Injectable 2 milliGRAM(s) IV Push every 6 hours PRN  pantoprazole    Tablet 40 milliGRAM(s) Oral before breakfast  rifAXIMin 550 milliGRAM(s) Oral two times a day      PMHX/PSHX:  CHF (congestive heart failure)    Pneumonia    Hyperlipidemia    Obese    CHF (congestive heart failure)    Hepatic cirrhosis    HLD (hyperlipidemia)    No significant past surgical history    History of chest tube placement        Family history:  No pertinent family history in first degree relatives        ROS:   General:  No wt loss, fevers, chills, night sweats   Eyes:  Good vision, no reported pain  ENT:  No sore throat, pain, runny nose, dysphagia  CV:  No pain, palpitations  Resp:  +Dyspnea No cough or wheezing  GI:  See HPI  :  No pain, bleeding, incontinence, nocturia  Muscle:  No pain, weakness  Neuro:  No weakness, tingling, memory problems  Psych:  No fatigue, insomnia, mood problems, depression  Endocrine:  No polyuria, polydipsia, cold/heat intolerance  Heme:  No petechiae, ecchymosis, easy bruisability  Skin:  +Edema No rash    PHYSICAL EXAM:   Vital Signs:  Vital Signs Last 24 Hrs  T(C): 36.7 (2021 13:32), Max: 36.8 (2021 05:09)  T(F): 98.1 (2021 13:32), Max: 98.2 (2021 05:09)  HR: 86 (2021 13:32) (80 - 91)  BP: 107/67 (2021 13:32) (102/56 - 119/62)  BP(mean): --  RR: 16 (2021 13:32) (16 - 20)  SpO2: 97% (2021 13:32) (96% - 98%)  Daily     Daily     GENERAL:  appears comfortable, no acute distress  HEENT:  NC/AT, anicteric sclera  CHEST:  Mildly tachypneic, +decreased BS L lung base, R lung CTA, no crackles appreciated  HEART:  Regular rate and rhythm  ABDOMEN:  Soft, non-tender, +distended  EXTREMITIES:  +Edema no cyanosis, clubbing  SKIN:  No rash/erythema/ecchymoses/petechiae/wounds  NEURO:  Alert, +asterixis      LABS:                        7.1    1.68  )-----------( 34       ( 2021 06:24 )             21.6     Mean Cell Volume: 96.4 fl (- @ 06:24)    -    139  |  104  |  40<H>  ----------------------------<  87  3.7   |  22  |  2.10<H>    Ca    9.3      2021 06:24  Phos  2.6     -20  Mg     1.8     20    TPro  6.5  /  Alb  3.4  /  TBili  2.7<H>  /  DBili  x   /  AST  26  /  ALT  15  /  AlkPhos  85  04-20    LIVER FUNCTIONS - ( 2021 06:24 )  Alb: 3.4 g/dL / Pro: 6.5 g/dL / ALK PHOS: 85 U/L / ALT: 15 U/L / AST: 26 U/L / GGT: x           PT/INR - ( 2021 06:24 )   PT: 28.1 sec;   INR: 2.44 ratio         PTT - ( 2021 06:24 )  PTT:44.6 sec  Urinalysis Basic - ( 2021 16:38 )    Color: Yellow / Appearance: Clear / S.014 / pH: x  Gluc: x / Ketone: Trace  / Bili: Negative / Urobili: Negative   Blood: x / Protein: Negative / Nitrite: Negative   Leuk Esterase: Large / RBC: 1 /hpf / WBC 5 /HPF   Sq Epi: x / Non Sq Epi: 1 /hpf / Bacteria: Negative                              7.1    1.68  )-----------( 34       ( 2021 06:24 )             21.6                         7.6    1.72  )-----------( 37       ( 2021 06:35 )             23.5                         7.5    1.99  )-----------( 36       ( 2021 07:26 )             22.2       Imaging:             Chief Complaint:  Patient is a 60y old  Male who presents with a chief complaint of 3 months of increasing abdominal girth, worst in the past month, along with exertional dyspnoea (2021 07:41)      Interval Events:   - No acute events    Allergies:  No Known Allergies        Hospital Medications:  albumin human 25% IVPB 25 Gram(s) IV Intermittent every 8 hours  atorvastatin 20 milliGRAM(s) Oral at bedtime  benzonatate 100 milliGRAM(s) Oral three times a day  lactulose Syrup 30 Gram(s) Oral every 6 hours  morphine  - Injectable 2 milliGRAM(s) IV Push every 6 hours PRN  pantoprazole    Tablet 40 milliGRAM(s) Oral before breakfast  rifAXIMin 550 milliGRAM(s) Oral two times a day      PMHX/PSHX:  CHF (congestive heart failure)    Pneumonia    Hyperlipidemia    Obese    CHF (congestive heart failure)    Hepatic cirrhosis    HLD (hyperlipidemia)    No significant past surgical history    History of chest tube placement        Family history:  No pertinent family history in first degree relatives        ROS:   General:  No wt loss, fevers, chills, night sweats   Eyes:  Good vision, no reported pain  ENT:  No sore throat, pain, runny nose, dysphagia  CV:  No pain, palpitations  Resp:  +Dyspnea No cough or wheezing  GI:  See HPI  :  No pain, bleeding, incontinence, nocturia  Muscle:  No pain, weakness  Neuro:  No weakness, tingling, memory problems  Psych:  No fatigue, insomnia, mood problems, depression  Endocrine:  No polyuria, polydipsia, cold/heat intolerance  Heme:  No petechiae, ecchymosis, easy bruisability  Skin:  +Edema No rash    PHYSICAL EXAM:   Vital Signs:  Vital Signs Last 24 Hrs  T(C): 36.7 (2021 13:32), Max: 36.8 (2021 05:09)  T(F): 98.1 (2021 13:32), Max: 98.2 (2021 05:09)  HR: 86 (2021 13:32) (80 - 91)  BP: 107/67 (2021 13:32) (102/56 - 119/62)  BP(mean): --  RR: 16 (2021 13:32) (16 - 20)  SpO2: 97% (2021 13:32) (96% - 98%)  Daily     Daily     GENERAL:  appears comfortable, no acute distress  HEENT:  NC/AT, anicteric sclera  CHEST: Unlabored, +decreased BS L>R lung base, no crackles appreciated  HEART:  Regular rate and rhythm  ABDOMEN:  Soft, non-tender, +distended  EXTREMITIES:  +Edema no cyanosis, clubbing  SKIN:  No rash/erythema/ecchymoses/petechiae/wounds  NEURO:  Alert, oriented x3, +faint asterixis      LABS:                        7.1    1.68  )-----------( 34       ( 2021 06:24 )             21.6     Mean Cell Volume: 96.4 fl (- @ 06:24)    -    139  |  104  |  40<H>  ----------------------------<  87  3.7   |  22  |  2.10<H>    Ca    9.3      2021 06:24  Phos  2.6     -20  Mg     1.8     20    TPro  6.5  /  Alb  3.4  /  TBili  2.7<H>  /  DBili  x   /  AST  26  /  ALT  15  /  AlkPhos  85  04-20    LIVER FUNCTIONS - ( 2021 06:24 )  Alb: 3.4 g/dL / Pro: 6.5 g/dL / ALK PHOS: 85 U/L / ALT: 15 U/L / AST: 26 U/L / GGT: x           PT/INR - ( 2021 06:24 )   PT: 28.1 sec;   INR: 2.44 ratio         PTT - ( 2021 06:24 )  PTT:44.6 sec  Urinalysis Basic - ( 2021 16:38 )    Color: Yellow / Appearance: Clear / S.014 / pH: x  Gluc: x / Ketone: Trace  / Bili: Negative / Urobili: Negative   Blood: x / Protein: Negative / Nitrite: Negative   Leuk Esterase: Large / RBC: 1 /hpf / WBC 5 /HPF   Sq Epi: x / Non Sq Epi: 1 /hpf / Bacteria: Negative                              7.1    1.68  )-----------( 34       ( 2021 06:24 )             21.6                         7.6    1.72  )-----------( 37       ( 2021 06:35 )             23.5                         7.5    1.99  )-----------( 36       ( 2021 07:26 )             22.2       Imaging:

## 2021-04-20 NOTE — PROGRESS NOTE ADULT - ASSESSMENT
59 y.o. M with PMH of alcoholic cirrhosis, ascites, thrombocytopenia, CHF, HTN, nad HLD coming in due to 3 mo of increasing abdominal girth, now found to have large left pleural effusion 2/2 unclear etiology. Ddx include ascites vs alcoholic cirrhosis

## 2021-04-20 NOTE — PROGRESS NOTE ADULT - PROBLEM SELECTOR PLAN 1
- MELD-Na 30 on presentation   - Consulted Hepatology, appreciate recs  - s/p RLQ paracentesis on 4/16 showing cloudy fluid, total nucleated cells 58, mesothelial 22%, monocyte 11%, granulocyte 6%  - Will continue rifaximin, lactulose, and aldactone  - lactulose increased to 30 mg q6h, titrate to 2-3 BM daily   - albumin 100 mg q8h   - Cx result from paracentesis- NG  - BCx per hepatology- NG  - MELD today: 27? pending INR  - continue albumin per hepatolgoy - MELD-Na 30 on presentation   - Consulted Hepatology, appreciate recs  - s/p RLQ paracentesis on 4/16 showing cloudy fluid, total nucleated cells 58, mesothelial 22%, monocyte 11%, granulocyte 6%  - Will continue rifaximin, lactulose, and aldactone  - lactulose increased to 30 mg q6h, titrate to 2-3 BM daily   - albumin 100 mg q8h   - Cx result from paracentesis- NG  - BCx per hepatology- NG  - MELD today: 28  - continue albumin per hepatology

## 2021-04-20 NOTE — PROGRESS NOTE ADULT - PROBLEM SELECTOR PLAN 5
- DVT ppx: scd for now for thrombocytopenia  - Diet: DASH/TLC, fluid restriction, low sodium  - Dispo: pending clinical improvement, and hepatology recs

## 2021-04-20 NOTE — PROGRESS NOTE ADULT - PROBLEM SELECTOR PLAN 3
- Possibly in the setting of hepatorenal vs pre-renal/intra-renal/post-renal  - Baseline 1.3-1.5  - U/A negative, FeNa 0.1 %   - UCx- 10-50,000 morganella morganii   - US kidney and bladder- no hydro  - Improving with albumin

## 2021-04-20 NOTE — PROGRESS NOTE ADULT - PROBLEM SELECTOR PLAN 2
- CXR at ED showed large left pleural effusion  - CT on admission showing large L pleural effusion with complete collapse of LLL and partial collapse per LISANDRO. Rt lung is clear  - s/p L thoracentesis on 4/16 - 1L fluid   - Pleural fluid showing cloudy fluid, RBC 4917, total nucleated cells 42, 58% lymphocytes, mesothelial 22%, monocyte 11%, granulocyte 6%  - Transudative   - Cx result from thoracentesis- NGTD

## 2021-04-20 NOTE — PROGRESS NOTE ADULT - SUBJECTIVE AND OBJECTIVE BOX
Izzy Dillon MD  PGY 1 Department of Internal Medicine  Pager: [phone] ([facility])       Patient is a 60y old  Male who presents with a chief complaint of 3 months of increasing abdominal girth, worst in the past month, along with exertional dyspnoea (2021 09:20)      SUBJECTIVE / OVERNIGHT EVENTS: Pt seen and examined. Patient complained of back pain overnight, received 0.5 mg morphine.   Patient wants to go home         MEDICATIONS  (STANDING):  albumin human 25% IVPB 25 Gram(s) IV Intermittent every 8 hours  atorvastatin 20 milliGRAM(s) Oral at bedtime  benzonatate 100 milliGRAM(s) Oral three times a day  lactulose Syrup 30 Gram(s) Oral every 6 hours  pantoprazole    Tablet 40 milliGRAM(s) Oral before breakfast  rifAXIMin 550 milliGRAM(s) Oral two times a day    MEDICATIONS  (PRN):      I&O's Summary    2021 07:01  -  2021 07:00  --------------------------------------------------------  IN: 360 mL / OUT: 0 mL / NET: 360 mL        Vital Signs Last 24 Hrs  T(C): 36.8 (2021 05:09), Max: 36.8 (2021 05:09)  T(F): 98.2 (2021 05:09), Max: 98.2 (2021 05:09)  HR: 83 (2021 05:09) (74 - 86)  BP: 119/62 (2021 05:09) (108/58 - 119/62)  BP(mean): --  RR: 20 (2021 05:09) (18 - 20)  SpO2: 96% (2021 05:09) (96% - 98%)    CAPILLARY BLOOD GLUCOSE          PHYSICAL EXAM:  GENERAL: obese gentleman, sitting in chair   HEAD:  Atraumatic, Normocephalic  EYES: mild scleral icterus   CHEST/LUNG: on room air, difficult ot assess breath sounds given body habitus   HEART: S1S2 present   ABDOMEN: Distended, obese, bowel sounds present   EXTREMITIES:  2+ Peripheral Pulses, (+) LE pitting edema   PSYCH: AAOx3 however confused when having a conversation. cyclical conversations   NEUROLOGY: asterixis not appreciated          LABS:                        7.1    1.68  )-----------( 34       ( 2021 06:24 )             21.6     Auto Eosinophil # x     / Auto Eosinophil % x     / Auto Neutrophil # x     / Auto Neutrophil % x     / BANDS % x                            7.6    1.72  )-----------( 37       ( 2021 06:35 )             23.5     Auto Eosinophil # x     / Auto Eosinophil % x     / Auto Neutrophil # x     / Auto Neutrophil % x     / BANDS % x        04-20    139  |  104  |  40<H>  ----------------------------<  87  3.7   |  22  |  2.10<H>  04-19    138  |  104  |  42<H>  ----------------------------<  96  3.6   |  25  |  2.33<H>    Ca    9.3      2021 06:24  Mg     1.8     04-20  Phos  2.6     04-20  TPro  6.5  /  Alb  3.4  /  TBili  2.7<H>  /  DBili  x   /  AST  26  /  ALT  15  /  AlkPhos  85  04-20  TPro  6.5  /  Alb  3.4  /  TBili  2.6<H>  /  DBili  x   /  AST  28  /  ALT  16  /  AlkPhos  92  04-19    PT/INR - ( 2021 06:35 )   PT: 26.8 sec;   INR: 2.32 ratio         PTT - ( 2021 06:35 )  PTT:42.1 sec      Urinalysis Basic - ( 2021 16:38 )    Color: Yellow / Appearance: Clear / S.014 / pH: x  Gluc: x / Ketone: Trace  / Bili: Negative / Urobili: Negative   Blood: x / Protein: Negative / Nitrite: Negative   Leuk Esterase: Large / RBC: 1 /hpf / WBC 5 /HPF   Sq Epi: x / Non Sq Epi: 1 /hpf / Bacteria: Negative

## 2021-04-21 ENCOUNTER — TRANSCRIPTION ENCOUNTER (OUTPATIENT)
Age: 61
End: 2021-04-21

## 2021-04-21 LAB
ALBUMIN SERPL ELPH-MCNC: 3.4 G/DL — SIGNIFICANT CHANGE UP (ref 3.3–5)
ALP SERPL-CCNC: 81 U/L — SIGNIFICANT CHANGE UP (ref 40–120)
ALT FLD-CCNC: 16 U/L — SIGNIFICANT CHANGE UP (ref 10–45)
ANION GAP SERPL CALC-SCNC: 9 MMOL/L — SIGNIFICANT CHANGE UP (ref 5–17)
APTT BLD: 43.3 SEC — HIGH (ref 27.5–35.5)
AST SERPL-CCNC: 30 U/L — SIGNIFICANT CHANGE UP (ref 10–40)
BILIRUB SERPL-MCNC: 2.7 MG/DL — HIGH (ref 0.2–1.2)
BUN SERPL-MCNC: 39 MG/DL — HIGH (ref 7–23)
CALCIUM SERPL-MCNC: 9.1 MG/DL — SIGNIFICANT CHANGE UP (ref 8.4–10.5)
CHLORIDE SERPL-SCNC: 105 MMOL/L — SIGNIFICANT CHANGE UP (ref 96–108)
CO2 SERPL-SCNC: 22 MMOL/L — SIGNIFICANT CHANGE UP (ref 22–31)
CREAT SERPL-MCNC: 2.1 MG/DL — HIGH (ref 0.5–1.3)
CULTURE RESULTS: SIGNIFICANT CHANGE UP
CULTURE RESULTS: SIGNIFICANT CHANGE UP
GLUCOSE SERPL-MCNC: 105 MG/DL — HIGH (ref 70–99)
HCT VFR BLD CALC: 23 % — LOW (ref 39–50)
HGB BLD-MCNC: 7.3 G/DL — LOW (ref 13–17)
INR BLD: 2.33 RATIO — HIGH (ref 0.88–1.16)
MAGNESIUM SERPL-MCNC: 1.8 MG/DL — SIGNIFICANT CHANGE UP (ref 1.6–2.6)
MCHC RBC-ENTMCNC: 31.1 PG — SIGNIFICANT CHANGE UP (ref 27–34)
MCHC RBC-ENTMCNC: 31.7 GM/DL — LOW (ref 32–36)
MCV RBC AUTO: 97.9 FL — SIGNIFICANT CHANGE UP (ref 80–100)
NRBC # BLD: 0 /100 WBCS — SIGNIFICANT CHANGE UP (ref 0–0)
PHOSPHATE SERPL-MCNC: 2.3 MG/DL — LOW (ref 2.5–4.5)
PLATELET # BLD AUTO: 33 K/UL — LOW (ref 150–400)
POTASSIUM SERPL-MCNC: 3.9 MMOL/L — SIGNIFICANT CHANGE UP (ref 3.5–5.3)
POTASSIUM SERPL-SCNC: 3.9 MMOL/L — SIGNIFICANT CHANGE UP (ref 3.5–5.3)
PROT SERPL-MCNC: 6.4 G/DL — SIGNIFICANT CHANGE UP (ref 6–8.3)
PROTHROM AB SERPL-ACNC: 26.9 SEC — HIGH (ref 10.6–13.6)
RBC # BLD: 2.35 M/UL — LOW (ref 4.2–5.8)
RBC # FLD: 16.1 % — HIGH (ref 10.3–14.5)
SODIUM SERPL-SCNC: 136 MMOL/L — SIGNIFICANT CHANGE UP (ref 135–145)
SPECIMEN SOURCE: SIGNIFICANT CHANGE UP
SPECIMEN SOURCE: SIGNIFICANT CHANGE UP
WBC # BLD: 1.82 K/UL — LOW (ref 3.8–10.5)
WBC # FLD AUTO: 1.82 K/UL — LOW (ref 3.8–10.5)

## 2021-04-21 PROCEDURE — 99233 SBSQ HOSP IP/OBS HIGH 50: CPT | Mod: GC

## 2021-04-21 PROCEDURE — 99232 SBSQ HOSP IP/OBS MODERATE 35: CPT | Mod: GC

## 2021-04-21 RX ORDER — ALBUMIN HUMAN 25 %
100 VIAL (ML) INTRAVENOUS
Qty: 0 | Refills: 0 | DISCHARGE
Start: 2021-04-21

## 2021-04-21 RX ORDER — FUROSEMIDE 40 MG
20 TABLET ORAL
Qty: 0 | Refills: 0 | DISCHARGE
Start: 2021-04-21

## 2021-04-21 RX ORDER — ALBUMIN HUMAN 25 %
100 VIAL (ML) INTRAVENOUS EVERY 12 HOURS
Refills: 0 | Status: DISCONTINUED | OUTPATIENT
Start: 2021-04-21 | End: 2021-04-22

## 2021-04-21 RX ORDER — FUROSEMIDE 40 MG
20 TABLET ORAL EVERY 12 HOURS
Refills: 0 | Status: DISCONTINUED | OUTPATIENT
Start: 2021-04-21 | End: 2021-04-22

## 2021-04-21 RX ADMIN — MORPHINE SULFATE 2 MILLIGRAM(S): 50 CAPSULE, EXTENDED RELEASE ORAL at 09:40

## 2021-04-21 RX ADMIN — MORPHINE SULFATE 2 MILLIGRAM(S): 50 CAPSULE, EXTENDED RELEASE ORAL at 09:25

## 2021-04-21 RX ADMIN — Medication 100 MILLIGRAM(S): at 21:04

## 2021-04-21 RX ADMIN — Medication 100 MILLIGRAM(S): at 16:32

## 2021-04-21 RX ADMIN — MORPHINE SULFATE 2 MILLIGRAM(S): 50 CAPSULE, EXTENDED RELEASE ORAL at 16:50

## 2021-04-21 RX ADMIN — Medication 20 MILLIGRAM(S): at 20:52

## 2021-04-21 RX ADMIN — LACTULOSE 30 GRAM(S): 10 SOLUTION ORAL at 16:32

## 2021-04-21 RX ADMIN — LACTULOSE 30 GRAM(S): 10 SOLUTION ORAL at 12:38

## 2021-04-21 RX ADMIN — Medication 100 MILLIGRAM(S): at 06:38

## 2021-04-21 RX ADMIN — PANTOPRAZOLE SODIUM 40 MILLIGRAM(S): 20 TABLET, DELAYED RELEASE ORAL at 06:38

## 2021-04-21 RX ADMIN — ATORVASTATIN CALCIUM 20 MILLIGRAM(S): 80 TABLET, FILM COATED ORAL at 21:02

## 2021-04-21 RX ADMIN — MORPHINE SULFATE 2 MILLIGRAM(S): 50 CAPSULE, EXTENDED RELEASE ORAL at 16:35

## 2021-04-21 NOTE — PROGRESS NOTE ADULT - ASSESSMENT
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    # L pleural effusion - no infection noted. Improved SOB. Appears chronic pleural effusion, likely secondary to hepatic hydrothorax in the settings of worsening ascites. No systemic signs of infection.   # Ascites - stable now. Likely secondary to medication/diet non compliance vs. refractory ascites vs. infection.  # Decompensated cirrhosis - MELD-Na 28 4/20. Patient had started transplant evaluation at [place] - discussed possible transfer, awaiting their response.  - varices: previously present on CT, but no varices on recent EGD 11/20  - HE: Present, on lactulose and xifaxin, had only 2-3BM yesterday  - HCC: no focal lesions on CT 11/20  - Ascites - present, history of SBP  #OMNI on CKD - slight improvement with albumin. Urine sodium not consistent with HRS/pre-renal, suggestive of possible resolution vs. ATN.  # Pancytopenia - ongoing. Unclear etiology, presumed 2/2 chronic EtOH use. Might benefit from non urgent BM biopsy as outpatient. No evidence of overt bleeding. Recent negative GI workup.     Recommendation:   - Planned for transfer to [place]  - HOLD albumin 25% in 100mg q8hrs  - HOLD diuretics given MONI  - await urine and blood cx  - lactulose 30 q6hrs and titrate to 4 BM/day given HE  - c/w rifaximin 550 BID  - daily CMP, INR, CBC  - hepatology to follow    Thank you for involving us in the care of this patient. Please reach out if any further questions.    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    # L pleural effusion - no infection noted. Improved SOB. Appears chronic pleural effusion, likely secondary to hepatic hydrothorax in the settings of worsening ascites. No systemic signs of infection.   # Ascites - stable now. Likely secondary to medication/diet non compliance vs. refractory ascites vs. infection.  # Decompensated cirrhosis - MELD-Na 28 4/20. Patient had started transplant evaluation at [place] - discussed possible transfer, awaiting their response.  - varices: previously present on CT, but no varices on recent EGD 11/20  - HE: Present, on lactulose and xifaxin, had only 2-3BM yesterday  - HCC: no focal lesions on CT 11/20  - Ascites - present, history of SBP  #MONI on CKD - slight improvement with albumin. Urine sodium not consistent with HRS/pre-renal, suggestive of possible resolution vs. ATN.  # Pancytopenia - ongoing. Unclear etiology, presumed 2/2 chronic EtOH use. Might benefit from non urgent BM biopsy as outpatient. No evidence of overt bleeding. Recent negative GI workup.     Recommendation:   - Planned for transfer to [place]  - Resume albumin 25% at 100 mL q12h, with Lasix 20 mg iv q12h for trial of albumin-supported diuresis  - await urine and blood cx  - lactulose 30 q6hrs and titrate to 4 BM/day given HE  - c/w rifaximin 550 BID  - daily CMP, INR, CBC  - hepatology to follow    Thank you for involving us in the care of this patient. Please reach out if any further questions.    Carlo Hall, PGY-4  Hepatology Fellow    Available on [employer] Teams  Pager [phone] ([facility]) or [phone] ([facility])  After 5PM/Weekends, please contact the on-call GI fellow: [phone]  Available through [employer] Teams

## 2021-04-21 NOTE — PROGRESS NOTE ADULT - SUBJECTIVE AND OBJECTIVE BOX
Chief Complaint:  Patient is a 60y old  Male who presents with a chief complaint of 3 months of increasing abdominal girth, worst in the past month, along with exertional dyspnoea (2021 14:29)      Interval Events:   - No acute events  - Planned for transfer to [place]    Allergies:  No Known Allergies        Hospital Medications:  atorvastatin 20 milliGRAM(s) Oral at bedtime  benzonatate 100 milliGRAM(s) Oral three times a day  lactulose Syrup 30 Gram(s) Oral every 6 hours  morphine  - Injectable 2 milliGRAM(s) IV Push every 6 hours PRN  pantoprazole    Tablet 40 milliGRAM(s) Oral before breakfast  rifAXIMin 550 milliGRAM(s) Oral two times a day      PMHX/PSHX:  CHF (congestive heart failure)    Pneumonia    Hyperlipidemia    Obese    CHF (congestive heart failure)    Hepatic cirrhosis    HLD (hyperlipidemia)    No significant past surgical history    History of chest tube placement        Family history:  No pertinent family history in first degree relatives        ROS:   General:  No wt loss, fevers, chills, night sweats   Eyes:  Good vision, no reported pain  ENT:  No sore throat, pain, runny nose, dysphagia  CV:  No pain, palpitations  Resp:  +Dyspnea No cough or wheezing  GI:  See HPI  :  No pain, bleeding, incontinence, nocturia  Muscle:  No pain, weakness  Neuro:  No weakness, tingling, memory problems  Psych:  No fatigue, insomnia, mood problems, depression  Endocrine:  No polyuria, polydipsia, cold/heat intolerance  Heme:  No petechiae, ecchymosis, easy bruisability  Skin:  +Edema No rash      PHYSICAL EXAM:   Vital Signs:  Vital Signs Last 24 Hrs  T(C): 36.8 (2021 04:25), Max: 36.8 (2021 20:54)  T(F): 98.3 (2021 04:25), Max: 98.3 (2021 20:54)  HR: 103 (2021 04:25) (82 - 103)  BP: 122/67 (2021 04:25) (102/56 - 122/67)  BP(mean): --  RR: 16 (2021 04:25) (16 - 18)  SpO2: 96% (2021 04:25) (96% - 98%)  Daily     Daily     GENERAL:  appears comfortable, no acute distress  HEENT:  NC/AT, anicteric sclera  CHEST: Unlabored, +decreased BS L>R lung base, no crackles appreciated  HEART:  Regular rate and rhythm  ABDOMEN:  Soft, non-tender, +distended  EXTREMITIES:  +Edema no cyanosis, clubbing  SKIN:  No rash/erythema/ecchymoses/petechiae/wounds  NEURO:  Alert, oriented x3, +faint asterixis    LABS:                        7.1    1.68  )-----------( 34       ( 2021 06:24 )             21.6       04-20    139  |  104  |  40<H>  ----------------------------<  87  3.7   |  22  |  2.10<H>    Ca    9.3      2021 06:24  Phos  2.6     04-20  Mg     1.8     04-20    TPro  6.5  /  Alb  3.4  /  TBili  2.7<H>  /  DBili  x   /  AST  26  /  ALT  15  /  AlkPhos  85  04-20    LIVER FUNCTIONS - ( 2021 06:24 )  Alb: 3.4 g/dL / Pro: 6.5 g/dL / ALK PHOS: 85 U/L / ALT: 15 U/L / AST: 26 U/L / GGT: x           PT/INR - ( 2021 06:24 )   PT: 28.1 sec;   INR: 2.44 ratio         PTT - ( 2021 06:24 )  PTT:44.6 sec  Urinalysis Basic - ( 2021 16:38 )    Color: Yellow / Appearance: Clear / S.014 / pH: x  Gluc: x / Ketone: Trace  / Bili: Negative / Urobili: Negative   Blood: x / Protein: Negative / Nitrite: Negative   Leuk Esterase: Large / RBC: 1 /hpf / WBC 5 /HPF   Sq Epi: x / Non Sq Epi: 1 /hpf / Bacteria: Negative                              7.1    1.68  )-----------( 34       ( 2021 06:24 )             21.6                         7.6    1.72  )-----------( 37       ( 2021 06:35 )             23.5       Imaging:

## 2021-04-21 NOTE — PROGRESS NOTE ADULT - SUBJECTIVE AND OBJECTIVE BOX
Izzy Dillon MD  PGY 1 Department of Internal Medicine  Pager: [phone] ([facility])       Patient is a 60y old  Male who presents with a chief complaint of 3 months of increasing abdominal girth, worst in the past month, along with exertional dyspnoea (2021 14:29)      SUBJECTIVE / OVERNIGHT EVENTS: Pt seen and examined. No acute overnight events, except mild tachycardia 103 this AM. Yesterday albumin was stopped for patient and patient was approved for transfer to [facility].         MEDICATIONS  (STANDING):  atorvastatin 20 milliGRAM(s) Oral at bedtime  benzonatate 100 milliGRAM(s) Oral three times a day  lactulose Syrup 30 Gram(s) Oral every 6 hours  pantoprazole    Tablet 40 milliGRAM(s) Oral before breakfast  rifAXIMin 550 milliGRAM(s) Oral two times a day    MEDICATIONS  (PRN):  morphine  - Injectable 2 milliGRAM(s) IV Push every 6 hours PRN Severe Pain (7 - 10)      I&O's Summary      Vital Signs Last 24 Hrs  T(C): 36.8 (2021 04:25), Max: 36.8 (2021 20:54)  T(F): 98.3 (2021 04:25), Max: 98.3 (2021 20:54)  HR: 103 (2021 04:25) (82 - 103)  BP: 122/67 (2021 04:25) (102/56 - 122/67)  BP(mean): --  RR: 16 (2021 04:25) (16 - 18)  SpO2: 96% (2021 04:25) (96% - 98%)    CAPILLARY BLOOD GLUCOSE          PHYSICAL EXAM:  GENERAL: obese gentleman, sitting in chair   HEAD:  Atraumatic, Normocephalic  EYES: mild scleral icterus   CHEST/LUNG: on room air, difficult ot assess breath sounds given body habitus   HEART: S1S2 present   ABDOMEN: Distended, obese, bowel sounds present   EXTREMITIES:  2+ Peripheral Pulses, (+) LE pitting edema   PSYCH: AAOx3 however confused when having a conversation. cyclical conversations   NEUROLOGY: asterixis not appreciated        LABS:                        7.1    1.68  )-----------( 34       ( 2021 06:24 )             21.6     Auto Eosinophil # x     / Auto Eosinophil % x     / Auto Neutrophil # x     / Auto Neutrophil % x     / BANDS % x        04-20    139  |  104  |  40<H>  ----------------------------<  87  3.7   |  22  |  2.10<H>    Ca    9.3      2021 06:24  Mg     1.8     04-20  Phos  2.6     -20  TPro  6.5  /  Alb  3.4  /  TBili  2.7<H>  /  DBili  x   /  AST  26  /  ALT  15  /  AlkPhos  85  04-20    PT/INR - ( 2021 06:24 )   PT: 28.1 sec;   INR: 2.44 ratio         PTT - ( 2021 06:24 )  PTT:44.6 sec      Urinalysis Basic - ( 2021 16:38 )    Color: Yellow / Appearance: Clear / S.014 / pH: x  Gluc: x / Ketone: Trace  / Bili: Negative / Urobili: Negative   Blood: x / Protein: Negative / Nitrite: Negative   Leuk Esterase: Large / RBC: 1 /hpf / WBC 5 /HPF   Sq Epi: x / Non Sq Epi: 1 /hpf / Bacteria: Negative     Izzy Dillon MD  PGY 1 Department of Internal Medicine  Pager: [phone] ([facility])       Patient is a 60y old  Male who presents with a chief complaint of 3 months of increasing abdominal girth, worst in the past month, along with exertional dyspnoea (2021 14:29)      SUBJECTIVE / OVERNIGHT EVENTS: Pt seen and examined. No acute overnight events, except mild tachycardia 103 this AM. Yesterday albumin was stopped  and patient was approved for transfer to [facility] for further liver transplant work up.         MEDICATIONS  (STANDING):  atorvastatin 20 milliGRAM(s) Oral at bedtime  benzonatate 100 milliGRAM(s) Oral three times a day  lactulose Syrup 30 Gram(s) Oral every 6 hours  pantoprazole    Tablet 40 milliGRAM(s) Oral before breakfast  rifAXIMin 550 milliGRAM(s) Oral two times a day    MEDICATIONS  (PRN):  morphine  - Injectable 2 milliGRAM(s) IV Push every 6 hours PRN Severe Pain (7 - 10)      I&O's Summary      Vital Signs Last 24 Hrs  T(C): 36.8 (2021 04:25), Max: 36.8 (2021 20:54)  T(F): 98.3 (2021 04:25), Max: 98.3 (2021 20:54)  HR: 103 (2021 04:25) (82 - 103)  BP: 122/67 (2021 04:25) (102/56 - 122/67)  BP(mean): --  RR: 16 (2021 04:25) (16 - 18)  SpO2: 96% (2021 04:25) (96% - 98%)    CAPILLARY BLOOD GLUCOSE          PHYSICAL EXAM:  GENERAL: obese gentleman, sitting in chair eating breakfast   HEAD:  Atraumatic, Normocephalic  EYES: mild scleral icterus   CHEST/LUNG: on room air, difficult ot assess breath sounds given body habitus   HEART: S1S2 present   ABDOMEN: Distended, obese, bowel sounds present   EXTREMITIES:  2+ Peripheral Pulses, (+) LE pitting edema up to mid shins   PSYCH: AAOx3 however confused when having a conversation. cyclical conversations   NEUROLOGY: asterixis not appreciated        LABS:                        7.1    1.68  )-----------( 34       ( 2021 06:24 )             21.6     Auto Eosinophil # x     / Auto Eosinophil % x     / Auto Neutrophil # x     / Auto Neutrophil % x     / BANDS % x        04-20    139  |  104  |  40<H>  ----------------------------<  87  3.7   |  22  |  2.10<H>    Ca    9.3      2021 06:24  Mg     1.8     04-20  Phos  2.6     -20  TPro  6.5  /  Alb  3.4  /  TBili  2.7<H>  /  DBili  x   /  AST  26  /  ALT  15  /  AlkPhos  85  04-20    PT/INR - ( 2021 06:24 )   PT: 28.1 sec;   INR: 2.44 ratio         PTT - ( 2021 06:24 )  PTT:44.6 sec      Urinalysis Basic - ( 2021 16:38 )    Color: Yellow / Appearance: Clear / S.014 / pH: x  Gluc: x / Ketone: Trace  / Bili: Negative / Urobili: Negative   Blood: x / Protein: Negative / Nitrite: Negative   Leuk Esterase: Large / RBC: 1 /hpf / WBC 5 /HPF   Sq Epi: x / Non Sq Epi: 1 /hpf / Bacteria: Negative

## 2021-04-21 NOTE — PROGRESS NOTE ADULT - PROBLEM SELECTOR PLAN 2
- CXR at ED showed large left pleural effusion  - CT on admission showing large L pleural effusion with complete collapse of LLL and partial collapse per LISANDRO. Rt lung is clear  - s/p L thoracentesis on 4/16 - 1L fluid   - Pleural fluid showing cloudy fluid, RBC 4917, total nucleated cells 42, 58% lymphocytes, mesothelial 22%, monocyte 11%, granulocyte 6%  - Transudative   - Cx result from thoracentesis- NGTD yes

## 2021-04-21 NOTE — PROGRESS NOTE ADULT - PROBLEM SELECTOR PLAN 5
- DVT ppx: scd for now for thrombocytopenia  - Diet: DASH/TLC, fluid restriction, low sodium  - Dispo: pending transfer to [facility] - DVT ppx: scd for now for thrombocytopenia  - Diet: DASH/TLC, fluid restriction, low sodium  - Dispo: pending transfer to [facility] today

## 2021-04-21 NOTE — PROGRESS NOTE ADULT - PROBLEM SELECTOR PLAN 1
- MELD-Na 30 on presentation   - Consulted Hepatology, appreciate recs  - s/p RLQ paracentesis on 4/16 showing cloudy fluid, total nucleated cells 58, mesothelial 22%, monocyte 11%, granulocyte 6%  - Will continue rifaximin, lactulose, and aldactone  - lactulose increased to 30 mg q6h, titrate to 2-3 BM daily   - Cx result from paracentesis- NG  - BCx per hepatology- NG  - MELD 4/21: 28  - dc'd albumin per hepatology given albumin >4 and MONI improving

## 2021-04-21 NOTE — PROGRESS NOTE ADULT - PROBLEM SELECTOR PLAN 3
- Possibly in the setting of hepatorenal vs pre-renal/intra-renal/post-renal  - Baseline 1.3-1.5  - U/A negative, FeNa 0.1 %   - UCx- 10-50,000 morganella morganii   - US kidney and bladder- no hydro  - Improved with albumin

## 2021-04-21 NOTE — DISCHARGE NOTE NURSING/CASE MANAGEMENT/SOCIAL WORK - PATIENT PORTAL LINK FT
You can access the FollowMyHealth Patient Portal offered by [facility] by registering at the following website: [email or web address]. By joining [facility]’s FollowMyHealth portal, you will also be able to view your health information using other applications (apps) compatible with our system.

## 2021-04-22 VITALS
OXYGEN SATURATION: 99 % | DIASTOLIC BLOOD PRESSURE: 68 MMHG | RESPIRATION RATE: 18 BRPM | HEART RATE: 91 BPM | TEMPERATURE: 98 F | SYSTOLIC BLOOD PRESSURE: 126 MMHG

## 2021-04-22 LAB
ALBUMIN SERPL ELPH-MCNC: 3.4 G/DL — SIGNIFICANT CHANGE UP (ref 3.3–5)
ALP SERPL-CCNC: 84 U/L — SIGNIFICANT CHANGE UP (ref 40–120)
ALT FLD-CCNC: 17 U/L — SIGNIFICANT CHANGE UP (ref 10–45)
ANION GAP SERPL CALC-SCNC: 10 MMOL/L — SIGNIFICANT CHANGE UP (ref 5–17)
APTT BLD: 43.3 SEC — HIGH (ref 27.5–35.5)
AST SERPL-CCNC: 32 U/L — SIGNIFICANT CHANGE UP (ref 10–40)
BILIRUB SERPL-MCNC: 3 MG/DL — HIGH (ref 0.2–1.2)
BUN SERPL-MCNC: 38 MG/DL — HIGH (ref 7–23)
CALCIUM SERPL-MCNC: 9.1 MG/DL — SIGNIFICANT CHANGE UP (ref 8.4–10.5)
CHLORIDE SERPL-SCNC: 104 MMOL/L — SIGNIFICANT CHANGE UP (ref 96–108)
CO2 SERPL-SCNC: 22 MMOL/L — SIGNIFICANT CHANGE UP (ref 22–31)
CREAT SERPL-MCNC: 1.95 MG/DL — HIGH (ref 0.5–1.3)
CULTURE RESULTS: SIGNIFICANT CHANGE UP
CULTURE RESULTS: SIGNIFICANT CHANGE UP
GLUCOSE SERPL-MCNC: 90 MG/DL — SIGNIFICANT CHANGE UP (ref 70–99)
HCT VFR BLD CALC: 22.4 % — LOW (ref 39–50)
HGB BLD-MCNC: 7.5 G/DL — LOW (ref 13–17)
INR BLD: 2.14 RATIO — HIGH (ref 0.88–1.16)
MAGNESIUM SERPL-MCNC: 1.9 MG/DL — SIGNIFICANT CHANGE UP (ref 1.6–2.6)
MCHC RBC-ENTMCNC: 31.5 PG — SIGNIFICANT CHANGE UP (ref 27–34)
MCHC RBC-ENTMCNC: 33.5 GM/DL — SIGNIFICANT CHANGE UP (ref 32–36)
MCV RBC AUTO: 94.1 FL — SIGNIFICANT CHANGE UP (ref 80–100)
NRBC # BLD: 0 /100 WBCS — SIGNIFICANT CHANGE UP (ref 0–0)
PHOSPHATE SERPL-MCNC: 3.3 MG/DL — SIGNIFICANT CHANGE UP (ref 2.5–4.5)
PLATELET # BLD AUTO: 38 K/UL — LOW (ref 150–400)
POTASSIUM SERPL-MCNC: 4.3 MMOL/L — SIGNIFICANT CHANGE UP (ref 3.5–5.3)
POTASSIUM SERPL-SCNC: 4.3 MMOL/L — SIGNIFICANT CHANGE UP (ref 3.5–5.3)
PROT SERPL-MCNC: 6.6 G/DL — SIGNIFICANT CHANGE UP (ref 6–8.3)
PROTHROM AB SERPL-ACNC: 24.8 SEC — HIGH (ref 10.6–13.6)
RBC # BLD: 2.38 M/UL — LOW (ref 4.2–5.8)
RBC # FLD: 15.8 % — HIGH (ref 10.3–14.5)
SODIUM SERPL-SCNC: 136 MMOL/L — SIGNIFICANT CHANGE UP (ref 135–145)
SPECIMEN SOURCE: SIGNIFICANT CHANGE UP
SPECIMEN SOURCE: SIGNIFICANT CHANGE UP
WBC # BLD: 1.79 K/UL — LOW (ref 3.8–10.5)
WBC # FLD AUTO: 1.79 K/UL — LOW (ref 3.8–10.5)

## 2021-04-22 PROCEDURE — 71250 CT THORAX DX C-: CPT

## 2021-04-22 PROCEDURE — 83735 ASSAY OF MAGNESIUM: CPT

## 2021-04-22 PROCEDURE — U0005: CPT

## 2021-04-22 PROCEDURE — 82330 ASSAY OF CALCIUM: CPT

## 2021-04-22 PROCEDURE — 84295 ASSAY OF SERUM SODIUM: CPT

## 2021-04-22 PROCEDURE — 83605 ASSAY OF LACTIC ACID: CPT

## 2021-04-22 PROCEDURE — 89051 BODY FLUID CELL COUNT: CPT

## 2021-04-22 PROCEDURE — 87075 CULTR BACTERIA EXCEPT BLOOD: CPT

## 2021-04-22 PROCEDURE — 84132 ASSAY OF SERUM POTASSIUM: CPT

## 2021-04-22 PROCEDURE — 82140 ASSAY OF AMMONIA: CPT

## 2021-04-22 PROCEDURE — 87102 FUNGUS ISOLATION CULTURE: CPT

## 2021-04-22 PROCEDURE — 85018 HEMOGLOBIN: CPT

## 2021-04-22 PROCEDURE — 82945 GLUCOSE OTHER FLUID: CPT

## 2021-04-22 PROCEDURE — 99232 SBSQ HOSP IP/OBS MODERATE 35: CPT | Mod: GC

## 2021-04-22 PROCEDURE — 83615 LACTATE (LD) (LDH) ENZYME: CPT

## 2021-04-22 PROCEDURE — 82803 BLOOD GASES ANY COMBINATION: CPT

## 2021-04-22 PROCEDURE — 87077 CULTURE AEROBIC IDENTIFY: CPT

## 2021-04-22 PROCEDURE — 84300 ASSAY OF URINE SODIUM: CPT

## 2021-04-22 PROCEDURE — 83690 ASSAY OF LIPASE: CPT

## 2021-04-22 PROCEDURE — 86769 SARS-COV-2 COVID-19 ANTIBODY: CPT

## 2021-04-22 PROCEDURE — 82570 ASSAY OF URINE CREATININE: CPT

## 2021-04-22 PROCEDURE — 87040 BLOOD CULTURE FOR BACTERIA: CPT

## 2021-04-22 PROCEDURE — 97116 GAIT TRAINING THERAPY: CPT

## 2021-04-22 PROCEDURE — 84157 ASSAY OF PROTEIN OTHER: CPT

## 2021-04-22 PROCEDURE — 83986 ASSAY PH BODY FLUID NOS: CPT

## 2021-04-22 PROCEDURE — 82042 OTHER SOURCE ALBUMIN QUAN EA: CPT

## 2021-04-22 PROCEDURE — 87086 URINE CULTURE/COLONY COUNT: CPT

## 2021-04-22 PROCEDURE — 85730 THROMBOPLASTIN TIME PARTIAL: CPT

## 2021-04-22 PROCEDURE — 49083 ABD PARACENTESIS W/IMAGING: CPT

## 2021-04-22 PROCEDURE — 97110 THERAPEUTIC EXERCISES: CPT

## 2021-04-22 PROCEDURE — 97161 PT EVAL LOW COMPLEX 20 MIN: CPT

## 2021-04-22 PROCEDURE — C1729: CPT

## 2021-04-22 PROCEDURE — 84100 ASSAY OF PHOSPHORUS: CPT

## 2021-04-22 PROCEDURE — P9047: CPT

## 2021-04-22 PROCEDURE — 71045 X-RAY EXAM CHEST 1 VIEW: CPT

## 2021-04-22 PROCEDURE — 82435 ASSAY OF BLOOD CHLORIDE: CPT

## 2021-04-22 PROCEDURE — 88341 IMHCHEM/IMCYTCHM EA ADD ANTB: CPT

## 2021-04-22 PROCEDURE — 82947 ASSAY GLUCOSE BLOOD QUANT: CPT

## 2021-04-22 PROCEDURE — 83935 ASSAY OF URINE OSMOLALITY: CPT

## 2021-04-22 PROCEDURE — U0003: CPT

## 2021-04-22 PROCEDURE — 87070 CULTURE OTHR SPECIMN AEROBIC: CPT

## 2021-04-22 PROCEDURE — 87205 SMEAR GRAM STAIN: CPT

## 2021-04-22 PROCEDURE — 81001 URINALYSIS AUTO W/SCOPE: CPT

## 2021-04-22 PROCEDURE — 85025 COMPLETE CBC W/AUTO DIFF WBC: CPT

## 2021-04-22 PROCEDURE — 80053 COMPREHEN METABOLIC PANEL: CPT

## 2021-04-22 PROCEDURE — 99233 SBSQ HOSP IP/OBS HIGH 50: CPT | Mod: GC

## 2021-04-22 PROCEDURE — 76770 US EXAM ABDO BACK WALL COMP: CPT

## 2021-04-22 PROCEDURE — 85610 PROTHROMBIN TIME: CPT

## 2021-04-22 PROCEDURE — 85014 HEMATOCRIT: CPT

## 2021-04-22 PROCEDURE — 88305 TISSUE EXAM BY PATHOLOGIST: CPT

## 2021-04-22 PROCEDURE — 85027 COMPLETE CBC AUTOMATED: CPT

## 2021-04-22 PROCEDURE — 84540 ASSAY OF URINE/UREA-N: CPT

## 2021-04-22 PROCEDURE — 88112 CYTOPATH CELL ENHANCE TECH: CPT

## 2021-04-22 PROCEDURE — 88342 IMHCHEM/IMCYTCHM 1ST ANTB: CPT

## 2021-04-22 PROCEDURE — 99285 EMERGENCY DEPT VISIT HI MDM: CPT

## 2021-04-22 PROCEDURE — 32555 ASPIRATE PLEURA W/ IMAGING: CPT

## 2021-04-22 RX ADMIN — LACTULOSE 30 GRAM(S): 10 SOLUTION ORAL at 05:17

## 2021-04-22 RX ADMIN — Medication 100 MILLIGRAM(S): at 05:15

## 2021-04-22 RX ADMIN — Medication 20 MILLIGRAM(S): at 05:25

## 2021-04-22 RX ADMIN — Medication 50 MILLILITER(S): at 05:26

## 2021-04-22 RX ADMIN — PANTOPRAZOLE SODIUM 40 MILLIGRAM(S): 20 TABLET, DELAYED RELEASE ORAL at 05:25

## 2021-04-22 NOTE — PROGRESS NOTE ADULT - PROBLEM SELECTOR PROBLEM 1
Ascites due to alcoholic cirrhosis

## 2021-04-22 NOTE — PROGRESS NOTE ADULT - NSICDXPILOT_GEN_ALL_CORE
[place]

## 2021-04-22 NOTE — PROGRESS NOTE ADULT - ATTENDING SUPERVISION STATEMENT
Fellow
Resident

## 2021-04-22 NOTE — PROGRESS NOTE ADULT - SUBJECTIVE AND OBJECTIVE BOX
Izzy Dillon MD  PGY 1 Department of Internal Medicine  Pager: [phone] ([facility])       Patient is a 60y old  Male who presents with a chief complaint of 3 months of increasing abdominal girth, worst in the past month, along with exertional dyspnoea (21 Apr 2021 07:46)      SUBJECTIVE / OVERNIGHT EVENTS: Pt seen and examined. No acute overnight events. Vitals wnl. Pending transfer to [facility]. Received Albumin 100 q12h and Lasix 20 IV q12h.           MEDICATIONS  (STANDING):  albumin human 25% IVPB 100 milliLiter(s) IV Intermittent every 12 hours  atorvastatin 20 milliGRAM(s) Oral at bedtime  benzonatate 100 milliGRAM(s) Oral three times a day  furosemide   Injectable 20 milliGRAM(s) IV Push every 12 hours  lactulose Syrup 30 Gram(s) Oral every 6 hours  pantoprazole    Tablet 40 milliGRAM(s) Oral before breakfast  rifAXIMin 550 milliGRAM(s) Oral two times a day    MEDICATIONS  (PRN):  morphine  - Injectable 2 milliGRAM(s) IV Push every 6 hours PRN Severe Pain (7 - 10)      I&O's Summary    21 Apr 2021 07:01  -  22 Apr 2021 07:00  --------------------------------------------------------  IN: 800 mL / OUT: 425 mL / NET: 375 mL        Vital Signs Last 24 Hrs  T(C): 36.8 (22 Apr 2021 04:37), Max: 37.1 (21 Apr 2021 13:13)  T(F): 98.3 (22 Apr 2021 04:37), Max: 98.8 (21 Apr 2021 13:13)  HR: 83 (22 Apr 2021 04:37) (81 - 83)  BP: 104/56 (22 Apr 2021 04:37) (104/56 - 118/68)  BP(mean): --  RR: 18 (22 Apr 2021 04:37) (18 - 20)  SpO2: 99% (22 Apr 2021 04:37) (98% - 99%)    CAPILLARY BLOOD GLUCOSE          PHYSICAL EXAM:  GENERAL: obese gentleman, sitting in chair eating breakfast   HEAD:  Atraumatic, Normocephalic  EYES: mild scleral icterus   CHEST/LUNG: on room air, difficult ot assess breath sounds given body habitus   HEART: S1S2 present   ABDOMEN: Distended, obese, bowel sounds present   EXTREMITIES:  2+ Peripheral Pulses, (+) LE pitting edema up to mid shins   PSYCH: AAOx3 however confused when having a conversation. cyclical conversations   NEUROLOGY: asterixis not appreciated     LABS:                        7.3    1.82  )-----------( 33       ( 21 Apr 2021 12:40 )             23.0     Auto Eosinophil # x     / Auto Eosinophil % x     / Auto Neutrophil # x     / Auto Neutrophil % x     / BANDS % x        04-21    136  |  105  |  39<H>  ----------------------------<  105<H>  3.9   |  22  |  2.10<H>    Ca    9.1      21 Apr 2021 12:40  Mg     1.8     04-21  Phos  2.3     04-21  TPro  6.4  /  Alb  3.4  /  TBili  2.7<H>  /  DBili  x   /  AST  30  /  ALT  16  /  AlkPhos  81  04-21    PT/INR - ( 21 Apr 2021 12:40 )   PT: 26.9 sec;   INR: 2.33 ratio         PTT - ( 21 Apr 2021 12:40 )  PTT:43.3 sec

## 2021-04-22 NOTE — PROGRESS NOTE ADULT - NUTRITIONAL ASSESSMENT
This patient has been assessed with a concern for Malnutrition and has been determined to have a diagnosis/diagnoses of Morbid obesity (BMI > 40).    This patient is being managed with:   Diet Regular-  DASH/TLC {Sodium & Cholesterol Restricted} (DASH)  1500mL Fluid Restriction ([id number])  No Concentrated Potassium  No Concentrated Phosphorus  Entered: Apr 15 2021 10:02PM    
This patient has been assessed with a concern for Malnutrition and has been determined to have a diagnosis/diagnoses of Morbid obesity (BMI > 40).    This patient is being managed with:   Diet Regular-  DASH/TLC {Sodium & Cholesterol Restricted} (DASH)  1500mL Fluid Restriction ([id number])  No Concentrated Potassium  No Concentrated Phosphorus  Entered: Apr 15 2021 10:02PM    
This patient has been assessed with a concern for Malnutrition and has been determined to have a diagnosis/diagnoses of Morbid obesity (BMI > 40).    This patient is being managed with:   Diet Regular-  DASH/TLC {Sodium & Cholesterol Restricted} (DASH)  1500mL Fluid Restriction ([id number])  No Concentrated Potassium  No Concentrated Phosphorus  Entered: Apr 15 2021 10:02PM    
This patient has been assessed with a concern for Malnutrition and has been determined to have a diagnosis/diagnoses of Morbid obesity (BMI > 40).    This patient is being managed with:   Diet Regular-  DASH/TLC {Sodium & Cholesterol Restricted} (DASH)  1500mL Fluid Restriction ([id number])  No Concentrated Potassium  No Concentrated Phosphorus  Entered: Apr 15 2021 10:02PM    
This patient has been assessed with a concern for Malnutrition and has been determined to have a diagnosis/diagnoses of Morbid obesity (BMI > 40).    This patient is being managed with:   Diet Regular-  DASH/TLC {Sodium & Cholesterol Restricted} (DASH)  1500mL Fluid Restriction ([id number])  No Concentrated Potassium  No Concentrated Phosphorus  Entered: Apr 15 2021 10:02PM    
This patient has been assessed with a concern for Malnutrition and has been determined to have a diagnosis/diagnoses of Morbid obesity (BMI > 40).    This patient is being managed with:   Diet Regular-  DASH/TLC {Sodium & Cholesterol Restricted} (DASH)  1500mL Fluid Restriction ([id number])  No Concentrated Potassium  No Concentrated Phosphorus  Entered: Apr 15 2021 10:02PM

## 2021-04-22 NOTE — PROGRESS NOTE ADULT - REASON FOR ADMISSION
3 months of increasing abdominal girth, worst in the past month, along with exertional dyspnoea

## 2021-04-22 NOTE — PROGRESS NOTE ADULT - PROBLEM SELECTOR PLAN 1
- MELD-Na 30 on presentation   - Consulted Hepatology, appreciate recs  - s/p RLQ paracentesis on 4/16 showing cloudy fluid, total nucleated cells 58, mesothelial 22%, monocyte 11%, granulocyte 6%  - Will continue rifaximin, lactulose, and aldactone  - lactulose increased to 30 mg q6h, titrate to 2-3 BM daily   - Cx result from paracentesis- NG  - BCx per hepatology- NG  - MELD 4/21: 28  - Resumed albumin 100 q12h with lasix 20 bid for albumin supported diuresis

## 2021-04-22 NOTE — PROGRESS NOTE ADULT - PROBLEM SELECTOR PLAN 6
Transitions of Care Status:  1.  Name of PCP:     Wesley Patrick MD, PhD (Hepatology-[facility])   2.  PCP Contacted on Admission: [ ] Y    [x ] N    3.  PCP contacted at Discharge: [ ] Y    [ ] N    [ ] N/A  4.  Post-Discharge Appointment Date and Location:  5.  Summary of Handoff given to PCP:
Transitions of Care Status:  1.  Name of PCP:     Wesley Patrick MD, PhD (Hepatology-[facility])   2.  PCP Contacted on Admission: [ ] Y    [x ] N    3.  PCP contacted at Discharge: [ ] Y    [ ] N    [ ] N/A  4.  Post-Discharge Appointment Date and Location:  5.  Summary of Handoff given to PCP:
Transitions of Care Status:  1.  Name of PCP:     Wesley Patrick MD, PhD (Hepatology-[facility])   2.  PCP Contacted on Admission: [ ] Y    [x ] N    3.  PCP contacted at Discharge: [ ] Y    [ ] N    [ ] N/A  4.  Post-Discharge Appointment Date and Location:  5.  Summary of Handoff given to PCP:
Transitions of Care Status:  1.  Name of PCP:     Wesley Patrick MD, PhD (Hepatology-[facility])   2.  PCP Contacted on Admission: [ ] Y    [x ] N    3.  PCP contacted at Discharge: [ ] Y    [ ] N    [ ] N/A  4.  Post-Discharge Appointment Date and Location:  5.  Summary of Handoff given to PCP:
Transitions of Care Status:  1.  Name of PCP:     Wesley Patrick MD, PhD (Hepatology-[facility])   2.  PCP Contacted on Admission: [ ] Y    [x ] N    3.  PCP contacted at Discharge: [ ] Y    [ ] N    [ ] N/A  4.  Post-Discharge Appointment Date and Location:  5.  Summary of Handoff given to PCP:
Transitions of Care Status:  1.  Name of PCP:     Wesley Patrick MD, PhD (Hepatology-[facility])   2.  PCP Contacted on Admission: [ ] Y    [x ] N    3.  PCP contacted at Discharge: [ ] Y    [ ] N    [ ] N/A  4.  Post-Discharge Appointment Date and Location:  5.  Summary of Handoff given to PCP:
Transitions of Care Status:  1.  Name of PCP:     Wesley Patrick MD, PhD (Hepatology-[facility])   2.  PCP Contacted on Admission: [ ] Y    [x ] N    3.  PCP contacted at Discharge: [ ] Y    [ ] N    [ ] N/A  4.  Post-Discharge Appointment Date and Location:  5.  Summary of Handoff given to PCP:

## 2021-04-22 NOTE — PROGRESS NOTE ADULT - ASSESSMENT
59 y.o. M with PMH of alcoholic cirrhosis, ascites, thrombocytopenia, CHF, HTN, nad HLD coming in due to 3 mo of increasing abdominal girth, now found to have large left pleural effusion likely 2/2 ascites now s/p thoracentesis and paracentesis. Patient pending transfer to [facility] for liver transplant eval

## 2021-04-22 NOTE — PROGRESS NOTE ADULT - PROBLEM SELECTOR PLAN 5
- DVT ppx: scd for now for thrombocytopenia  - Diet: DASH/TLC, fluid restriction, low sodium  - Dispo: pending transfer to [facility]

## 2021-04-22 NOTE — PROGRESS NOTE ADULT - SUBJECTIVE AND OBJECTIVE BOX
Chief Complaint:  Patient is a 60y old  Male who presents with a chief complaint of 3 months of increasing abdominal girth, worst in the past month, along with exertional dyspnoea (2021 07:29)      Interval Events:   - No acute events  - Pending a transfer to [place]    Allergies:  No Known Allergies        Hospital Medications:  albumin human 25% IVPB 100 milliLiter(s) IV Intermittent every 12 hours  atorvastatin 20 milliGRAM(s) Oral at bedtime  benzonatate 100 milliGRAM(s) Oral three times a day  furosemide   Injectable 20 milliGRAM(s) IV Push every 12 hours  lactulose Syrup 30 Gram(s) Oral every 6 hours  morphine  - Injectable 2 milliGRAM(s) IV Push every 6 hours PRN  pantoprazole    Tablet 40 milliGRAM(s) Oral before breakfast  rifAXIMin 550 milliGRAM(s) Oral two times a day      PMHX/PSHX:  CHF (congestive heart failure)    Pneumonia    Hyperlipidemia    Obese    CHF (congestive heart failure)    Hepatic cirrhosis    HLD (hyperlipidemia)    No significant past surgical history    History of chest tube placement        Family history:  No pertinent family history in first degree relatives        ROS:   General:  No wt loss, fevers, chills, night sweats   Eyes:  Good vision, no reported pain  ENT:  No sore throat, pain, runny nose, dysphagia  CV:  No pain, palpitations  Resp:  +Dyspnea No cough or wheezing  GI:  See HPI  :  No pain, bleeding, incontinence, nocturia  Muscle:  No pain, weakness  Neuro:  No weakness, tingling, memory problems  Psych:  No fatigue, insomnia, mood problems, depression  Endocrine:  No polyuria, polydipsia, cold/heat intolerance  Heme:  No petechiae, ecchymosis, easy bruisability  Skin:  +Edema No rash      PHYSICAL EXAM:   Vital Signs:  Vital Signs Last 24 Hrs  T(C): 36.8 (2021 04:37), Max: 37.1 (2021 13:13)  T(F): 98.3 (2021 04:37), Max: 98.8 (2021 13:13)  HR: 83 (2021 04:37) (81 - 83)  BP: 104/56 (2021 04:37) (104/56 - 118/68)  BP(mean): --  RR: 18 (2021 04:37) (18 - 20)  SpO2: 99% (2021 04:37) (98% - 99%)  Daily     Daily Weight in k (2021 13:05)    GENERAL:  appears comfortable, no acute distress  HEENT:  NC/AT, anicteric sclera  CHEST: Unlabored, +decreased BS L>R lung base, no crackles appreciated  HEART:  Regular rate and rhythm  ABDOMEN:  Soft, non-tender, +distended  EXTREMITIES:  +Edema no cyanosis, clubbing  SKIN:  No rash/erythema/ecchymoses/petechiae/wounds  NEURO:  Alert, oriented x3, +faint asterixis    LABS:                        7.3    1.82  )-----------( 33       ( 2021 12:40 )             23.0     Mean Cell Volume: 97.9 fl (- @ 12:40)        136  |  105  |  39<H>  ----------------------------<  105<H>  3.9   |  22  |  2.10<H>    Ca    9.1      2021 12:40  Phos  2.3     -  Mg     1.8         TPro  6.4  /  Alb  3.4  /  TBili  2.7<H>  /  DBili  x   /  AST  30  /  ALT  16  /  AlkPhos  81  04-21    LIVER FUNCTIONS - ( 2021 12:40 )  Alb: 3.4 g/dL / Pro: 6.4 g/dL / ALK PHOS: 81 U/L / ALT: 16 U/L / AST: 30 U/L / GGT: x           PT/INR - ( 2021 07:28 )   PT: 24.8 sec;   INR: 2.14 ratio         PTT - ( 2021 07:28 )  PTT:43.3 sec                            7.3    1.82  )-----------( 33       ( 2021 12:40 )             23.0                         7.1    1.68  )-----------( 34       ( 2021 06:24 )             21.6       Imaging:

## 2021-04-22 NOTE — PROGRESS NOTE ADULT - PROVIDER SPECIALTY LIST ADULT
Internal Medicine
Hepatology
Internal Medicine

## 2021-04-22 NOTE — PROGRESS NOTE ADULT - PROBLEM SELECTOR PLAN 2
- CXR at ED showed large left pleural effusion  - CT on admission showing large L pleural effusion with complete collapse of LLL and partial collapse per LISANDRO. Rt lung is clear  - s/p L thoracentesis on 4/16 - 1L fluid   - Pleural fluid showing cloudy fluid, RBC 4917, total nucleated cells 42, 58% lymphocytes, mesothelial 22%, monocyte 11%, granulocyte 6%  - Transudative   - Cx result from thoracentesis- NG

## 2021-04-22 NOTE — PROGRESS NOTE ADULT - ASSESSMENT
61yo M with PMH of decompensated cirrhosis 2/2 EtOH/BOB (with ascites and HE), Thrombocytopenia, HTN, HLD who presents with worsening abdominal distention and SOB x 2 weeks.    # L pleural effusion - no infection noted. Improved SOB. Appears chronic pleural effusion, likely secondary to hepatic hydrothorax in the settings of worsening ascites. No systemic signs of infection.   # Ascites - stable now. Likely secondary to medication/diet non compliance vs. refractory ascites. Negative infectious workup.  # Decompensated cirrhosis - MELD-Na 28 4/20. Patient had started transplant evaluation at [place] - planned for transfer to complete the evaluation process.  - varices: previously present on CT, but no varices on recent EGD 11/20  - HE: Present, on lactulose and xifaxin  - HCC: no focal lesions on CT 11/20  - Ascites - present, history of SBP  #MONI on CKD - slight improvement with albumin. Urine sodium not consistent with HRS/pre-renal, suggestive of possible resolution vs. ATN.  # Pancytopenia - ongoing. Unclear etiology, presumed 2/2 chronic EtOH use. Might benefit from non urgent BM biopsy as outpatient. No evidence of overt bleeding. Recent negative GI workup.     Recommendation:   - Planned for transfer to [place]  - Resume albumin 25% at 100 mL q12h, with Lasix 20 mg iv q12h for trial of albumin-supported diuresis  - lactulose 30 q6hrs and titrate to 4 BM/day given HE  - c/w rifaximin 550 BID  - daily CMP, INR, CBC  - hepatology to follow    Thank you for involving us in the care of this patient. Please reach out if any further questions.    Carlo Hall, PGY-4  Hepatology Fellow    Available on [employer] Teams  Pager [phone] ([facility]) or [phone] ([facility])  After 5PM/Weekends, please contact the on-call GI fellow: [phone]  Available through [employer]

## 2021-04-22 NOTE — PROGRESS NOTE ADULT - PROBLEM SELECTOR PLAN 4
- Likely 2/2 bone marrow suppression from chronic alcohol use  - will hold medical dvt ppx
- Likely 2/2 bone marrow suppression from chronic alcohol use  - will hold medical dvt ppx
Pancytopenia   - Likely 2/2 bone marrow suppression from chronic alcohol use  - will hold medical dvt ppx
- Likely 2/2 bone marrow suppression from chronic alcohol use  - will hold medical dvt ppx

## 2021-04-22 NOTE — PROGRESS NOTE ADULT - ATTENDING COMMENTS
59 yo M with HTN, dyslipidemia, morbid obesity, CKD, and decompensated ALD/BOB cirrhosis complicated by chronic pancytopenic, ascites, and hepatic encephalopathy, admitted with anasarca with large ascites and left hepatic hydrothorax as well as MONI on CKD and hepatic encephalopathy. Renal function improving with IV albumin, with Cr down to 2.1 today. Though this is not yet back to his prior baseline, recommend to discontinue IV albumin today given decreased BS at lung bases and concern for risk of fluid overload. Monitor renal function x24h off albumin and off diuretics. Hepatic encephalopathy improved with rifaximin and lactulose. Current MELD-Na 27. Awaiting decision from [facility] (where he has been under the care of hepatologist Dr. Wesley Patrick as an outpatient) re: whether they want inpatient transfer for completion of his pre-transplant evaluation.    Please don't hesitate to call with any questions or concerns.    Diego Bernard M.D., Ph.D.  Transplant Hepatology  Cell: [phone]
Renal function improved in past 24h even with gentle albumin-supported diuresis. Ambulatory and mentating well. MELD-Na 26. Pending transfer to [place] for completion of his liver transplant evaluation there.
61 yo M with HTN, dyslipidemia, morbid obesity, CKD, and decompensated ALD/BOB cirrhosis complicated by chronic pancytopenic, ascites, and hepatic encephalopathy, admitted with anasarca with large ascites and left hepatic hydrothorax as well as MONI on CKD and hepatic encephalopathy. Renal function had improved with IV albumin, but with no further improvement after albumin was held in past 24h. Recommend trial of albumin-supported diuresis with albumin 25% 100 mL iv q12h and Lasix 20 mg iv q12h, with continued monitoring of renal function. Hold spironolactone for now. Mental status back to his baseline. Continue rifaximin and lactulose for secondary prophylaxis. Current MELD-Na 27. Accepted for inpatient transfer to [place] (where he has been under the care of hepatologist Dr. Wesley Patrick as an outpatient) for completion of his pre-transplant evaluation, pending bed availability.    Please don't hesitate to call with any questions or concerns.    Diego Bernard M.D., Ph.D.  Transplant Hepatology  Cell: [phone]
59 yo M with HTN, dyslipidemia, morbid obesity, CKD, and decompensated ALD/BOB cirrhosis complicated by chronic pancytopenic, ascites, and hepatic encephalopathy, admitted with anasarca with large ascites and left hepatic hydrothorax as well as MONI on CKD and hepatic encephalopathy. Renal function improving with IV albumin but not yet at his prior baseline. Diuretics currently held due to the MONI. Hepatic encephalopathy also responding to rifaximin and lactulose but not yet at his baseline mentation.    Current MELD-Na 27. He has been seen by transplant hepatologist Dr. Wesley Patrick at [facility] in the past but is not yet wait-listed there for unclear reasons; it sounds like the evaluation was not yet completed. Given his increased MELD-Na score and multiple portal hypertensive complications, we reached out to [facility] today to see whether they would want an inpatient transfer there so that he could complete his pre-liver transplant evaluation. We are awaiting their decision.    Please don't hesitate to call with any questions or concerns.    Diego Bernard M.D., Ph.D.  Transplant Hepatology  Cell: [phone]
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    s/p paracentesis and thoracentesis  Needs albumin challenge for MNOI on CKD.  Will discuss with [facility], unclear where he is on their evaluation process for transplant.  Liver function improved from fall 2020.  Needs more time and lactulose. + asterixis.
58yo M with PMH of decompensated cirrhosis 2/2 EtOH/BOB (with ascites and HE), Thrombocytopenia, HTN, HLD who presents with worsening abdominal distention and SOB x 2 weeks.    s/p paracentesis and thoracentesis last week.  Kidney function improving with albumin challenge.  Will discuss with [facility] where he is on their evaluation process for transplant.  Liver function improved from fall 2020.  Needs more time and lactulose. + asterixis. mental status improving from yesterday.
59M PMH of alcoholic cirrhosis, ascites, thrombocytopenia, CHF, HTN, nad HLD a/w 3 mo of increasing abdominal girth and worsening shortness of breath concerning for decompensated cirrhosis, in addition patient with an MONI concerning for possible Hepatorenal syndrome.    Acites, Decompensated Cirrhosis  - s/p paracentesis and thoracentesis --> pt reports significant improvement of symptom  - c/w albumin    MONI  - possible Hepatorenal syndrome  - c/w albumin  - check renal u/s  - pending above may need to start midodrine, octreotide, albumin regimen,   - hold diuretics    Thrombocytopenia  - is due to liver dysfxn
care discussed with Dr. Dillon    # Decompensated cirrhosis 2/2 EtOH/BOB  # L pleural effusion  # MONI on CKD  # pancytopenia    - s/p paracentesis and thoracentesis, with improvement in symptoms  - MONI overall improving with adequate urine output  - both cultures from ascites and pleural effusion negative  - continue to hold lasix in setting of MONI; care discussed with Dr. Bernard (hepatology)  - fu blood/urine cultures  - appreciate hepatology rec: in touch with Dr. Patrick at [facility] who requests transfer to [facility] (inpatient-inpatient tx)    Sally Plummer MD  Division of Hospital Medicine  Cell: [phone]  Office: [phone]
care discussed with Dr. Dillon    # Decompensated cirrhosis 2/2 EtOH/BOB  # L pleural effusion  # MONI on CKD  # pancytopenia    - s/p paracentesis and thoracentesis, with improvement in symptoms  - continue with albumin 25% in 100mg q8hrs  - MONI overall improving with adequate urine output  - both cultures from ascites and pleural effusion negative  - continue to hold lasix in setting of MONI  - fu blood/urine cultures  - appreciate hepatology recs on discharge regimen  - dc planning in 1-2 days    Sally Plummer MD  Division of Hospital Medicine  Cell: [phone]  Office: [phone]
care discussed with Dr. Dillon    # Decompensated cirrhosis 2/2 EtOH/BOB  # L pleural effusion  # MONI on CKD  # pancytopenia    - s/p paracentesis and thoracentesis, with improvement in symptoms  - continue with albumin 25% in 100mg q8hrs; care discussed with Dr. Bernard - continue IV albumin for now   - MONI overall improving with adequate urine output  - both cultures from ascites and pleural effusion negative  - continue to hold lasix in setting of MONI  - fu blood/urine cultures  - appreciate hepatology rec: in touch with Dr. Patrick at [facility]    Sally Plummer MD  Division of Hospital Medicine  Cell: [phone]  Office: [phone]
care discussed with Dr. Dillon    # Decompensated cirrhosis 2/2 EtOH/BOB  # L pleural effusion  # MONI on CKD  # pancytopenia    - s/p paracentesis and thoracentesis, with improvement in symptoms  - dc albumin  - MONI overall improving with adequate urine output  - both cultures from ascites and pleural effusion negative  - continue to hold lasix in setting of MONI; care discussed with Dr. Bernard (hepatology)  - fu blood/urine cultures  - appreciate hepatology rec: in touch with Dr. Patrick at [facility] who requests transfer to [facility] (inpatient-inpatient tx)    Sally Plummer MD  Division of Hospital Medicine  Cell: [phone]  Office: [phone] .
59M PMH of alcoholic cirrhosis, ascites, thrombocytopenia, CHF, HTN, nad HLD a/w 3 mo of increasing abdominal girth and worsening shortness of breath concerning for decompensated cirrhosis, in addition patient with an MONI concerning for possible Hepatorenal syndrome.    Acites, Decompensated Cirrhosis  - IR eval for paracentesis and thoracentesis  - start albumin    MONI  - possible Hepatorenal syndrome  - Check urine studies  - start albumin  - check renal u/s  - pending above may need to start midodrine, octreotide, albumin regimen    Thrombocytopenia  - is due to liver dysfxn
59M PMH of alcoholic cirrhosis, ascites, thrombocytopenia, CHF, HTN, nad HLD a/w 3 mo of increasing abdominal girth and worsening shortness of breath concerning for decompensated cirrhosis, in addition patient with an MONI concerning for possible Hepatorenal syndrome.    Acites, Decompensated Cirrhosis  - s/p paracentesis and thoracentesis --> pt resports significant improvement of symptom  - c/w albumin    MONI  - possible Hepatorenal syndrome  - c/w albumin  - check renal u/s  - pending above may need to start midodrine, octreotide, albumin regimen,   - hold diuretics    Thrombocytopenia  - is due to liver dysfxn

## 2021-04-23 LAB
COMMENT - FLUIDS: SIGNIFICANT CHANGE UP
COMMENT - FLUIDS: SIGNIFICANT CHANGE UP
NON-GYNECOLOGICAL CYTOLOGY STUDY: SIGNIFICANT CHANGE UP

## 2021-06-23 NOTE — DISCHARGE NOTE NURSING/CASE MANAGEMENT/SOCIAL WORK - NSTRANSFERBELONGINGSDISPO_GEN_A_NUR
Anesthesia Evaluation      Patient summary reviewed   No history of anesthetic complications     Airway   Mallampati: I  Neck ROM: full   Pulmonary - negative ROS and normal exam   (+) asthma  mild,  well controlled,                          Cardiovascular - negative ROS and normal exam   Neuro/Psych - negative ROS     Endo/Other - negative ROS   (+) hypothyroidism,      GI/Hepatic/Renal - negative ROS           Dental - normal exam                        Anesthesia Plan  Planned anesthetic: general endotracheal    ASA 2   Induction: intravenous   Anesthetic plan and risks discussed with: patient and spouse    Post-op plan: routine recovery           with patient

## 2021-06-26 NOTE — CONSULT NOTE ADULT - ASSESSMENT
Interventional Radiology    Evaluate for Procedure: diagnostic and therapeutic paracentesis/thoracentesis    HPI: 60y Male with history of morbid obesity, hepatic cirrhosis with past ethanol use (last reported intake 3 years ago), ascites, thrombocytopaenia. P/w 3 months of increased abdominal girth, worsening in the past month, along with exertional dyspnoea. CT with large volume ascites and large L pleural effusion w/o mediastinal shift, thought to be possibly a hepatic hydrothorax. IR consulted for diagnostic and therapeutic paracentesis/thoracentesis    Allergies:   Medications (Abx/Cardiac/Anticoagulation/Blood Products)    cefTRIAXone   IVPB: 100 mL/Hr IV Intermittent (04-15 @ 22:20)  furosemide   Injectable: 40 milliGRAM(s) IV Push (04-16 @ 07:42)  furosemide   Injectable: 40 milliGRAM(s) IV Push (04-15 @ 21:00)  midodrine.: 5 milliGRAM(s) Oral (04-15 @ 23:46)  rifAXIMin: 550 milliGRAM(s) Oral (04-16 @ 07:44)    Data:  167.6  73.3  T(C): 36.4  HR: 80  BP: 123/65  RR: 18  SpO2: 96%    -WBC 3.48 / HgB 8.3 / Hct 24.9 / Plt 41  -Na 134 / Cl 102 / BUN 38 / Glucose 98  -K 3.8 / CO2 23 / Cr 2.46  -ALT 21 / Alk Phos 118 / T.Bili 3.0  -INR 2.03 / PTT 42.0      Radiology: CT --> large L pleural effusion and large volume ascites    Assessment/Plan: 60y Male with history of morbid obesity, hepatic cirrhosis with past ethanol use (last reported intake 3 years ago), ascites, thrombocytopaenia. P/w 3 months of increased abdominal girth, worsening in the past month, along with exertional dyspnoea. CT with large volume ascites and large L pleural effusion w/o mediastinal shift, thought to be possibly a hepatic hydrothorax.    -- IR will plan to perform a diagnostic and therapeutic thoracentesis today  -- please complete IR pre-procedure note  -- please place IR procedure request order under Dr. Fung
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    # L pleural effusion - with worsening SOB. Appears chronic pleural effusion, likely secondary to hepatic hydrothorax in the settings of worsening ascites. No systemic signs of infection.   # Worsening ascites - medication/diet non compliance vs. refractory ascites vs. infection vs. thrombosis  # Decompensated cirrhosis - MELD-Na 29 4/16  - varices: previously present on CT, but no varices on recent EGD 11/20  - HE: Present  - HCC: no focal lesions on CT 11/20  - Ascites - present, history of SBP  #MONI on CKD - unclear etiology. Pre-renal azotemia vs. HRS.   # Pancytopenia - Unclear etiology, presumed 2/2 chronic EtOH use. Might benefit from non urgent BM biopsy as outpatient. No evidence of overt bleeding. Recent negative GI workup.       Recommendation:   - Please obtain urine lytes  - Albumin repletion 25% 100mg q8h  - Abdominal US with doppler  - Infectious workup including blood, urine and peritoneal cultures  - Diagnostic and therapeutic paracentesis  - Repeat CXR following paracentesis  - Hold diuretics given MONI  - c/w lactulose, titrate to 2-3 BM/day  - c/w rifaximin 550 BID  - Patient will need long-term antibiotics for SBP prophylaxis  - daily CMP, INR, CBC  - Monitor for bleeding    Thank you for involving us in the care of this patient. Please reach out if any further questions.    Carlo Hall, PGY-4  Hepatology Fellow    Available on [employer] Teams  Pager [phone] ([facility]) or [phone] ([facility])  After 5PM/Weekends, please contact the on-call GI fellow: [phone]  Available through [employer] Teams  
Awake/Alert/Cooperative

## 2021-07-05 NOTE — DIETITIAN INITIAL EVALUATION ADULT. - ENERGY NEEDS
Ht: 70 inches Wt: 369 pounds BMI:  kg/m2 IBW: 166 pounds(+/-10%) 222%IBW  no edema. no pressure ulcers documented. stated

## 2021-09-23 ENCOUNTER — NON-APPOINTMENT (OUTPATIENT)
Age: 61
End: 2021-09-23

## 2021-11-08 NOTE — PATIENT PROFILE ADULT - FAMILY NEEDS
[de-identified] : Patient presents today c/o  tinnitus .Possible hearing  loss.  Had  Tinnitus for a while  noticed it within the last  6 months  b/l ears.  History of loud noise exposure (in [employer]) , sporadic wear  ear protection   . No recent audiogram .  He has noticed   a  decreased i hearing ,  turns Tv  volume up .    History of swimmers ears in past.  No current ear pain or pressure . No lightheadedness or dizziness .  \par   
no

## 2022-01-14 NOTE — ED PROVIDER NOTE - PRO INTERPRETER NEED 2
English Airway patent, Nasal mucosa clear. Mouth with normal mucosa. Throat has no vesicles, no oropharyngeal exudates and uvula is midline.

## 2022-01-26 NOTE — DISCHARGE NOTE PROVIDER - NSDCACTIVITY_GEN_ALL_CORE
68
Showering allowed/Do not drive or operate machinery/Walking - Outdoors allowed/Walking - Indoors allowed/No heavy lifting/straining

## 2022-03-07 ENCOUNTER — RX RENEWAL (OUTPATIENT)
Age: 62
End: 2022-03-07

## 2022-03-10 ENCOUNTER — NON-APPOINTMENT (OUTPATIENT)
Age: 62
End: 2022-03-10

## 2022-04-25 ENCOUNTER — APPOINTMENT (OUTPATIENT)
Dept: GASTROENTEROLOGY | Facility: CLINIC | Age: 62
End: 2022-04-25
Payer: MEDICARE

## 2022-04-25 VITALS
HEIGHT: 67.5 IN | BODY MASS INDEX: 48.86 KG/M2 | OXYGEN SATURATION: 97 % | HEART RATE: 79 BPM | SYSTOLIC BLOOD PRESSURE: 150 MMHG | WEIGHT: 315 LBS | TEMPERATURE: 97.8 F | DIASTOLIC BLOOD PRESSURE: 100 MMHG

## 2022-04-25 DIAGNOSIS — M54.50 LOW BACK PAIN, UNSPECIFIED: ICD-10-CM

## 2022-04-25 DIAGNOSIS — M51.37 OTHER INTERVERTEBRAL DISC DEGENERATION, LUMBOSACRAL REGION: ICD-10-CM

## 2022-04-25 PROCEDURE — 99203 OFFICE O/P NEW LOW 30 MIN: CPT

## 2022-04-25 NOTE — REVIEW OF SYSTEMS
[Feeling Tired] : feeling tired [As Noted in HPI] : as noted in HPI [Arthralgias] : arthralgias [Joint Stiffness] : joint stiffness [Limb Pain] : limb pain [Negative] : Respiratory

## 2022-04-25 NOTE — ASSESSMENT
[FreeTextEntry1] : Mr. Trujillo is a 61-year-old male who is status post liver and renal transplantation.  He undergoes regular surveillance by the transplant center and his numbers have been acceptable.  Mr. Trujillo has chronic back issues and has been on disability since 1995.  Due to the patient's underlying medical and orthopedic issues he is permanently disabled and is unable to have any meaningful employment.  It is difficult for him to sit or stand for extended periods of time.  He will continue his monitoring at [place] and call me with any changes in his medical status.  Dietary modification and weight reduction was stressed.

## 2022-04-25 NOTE — PHYSICAL EXAM
[] : no respiratory distress [Respiration, Rhythm And Depth] : normal respiratory rhythm and effort [Auscultation Breath Sounds / Voice Sounds] : lungs were clear to auscultation bilaterally [Apical Impulse] : the apical impulse was normal [Heart Rate And Rhythm] : heart rate was normal and rhythm regular [Heart Sounds] : normal S1 and S2 [Bowel Sounds] : normal bowel sounds [Abdomen Soft] : soft [Abdomen Tenderness] : non-tender [FreeTextEntry1] : There  are multiple abdominal incisions.
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
## 2022-05-25 RX ORDER — FOLIC ACID 1 MG/1
1 TABLET ORAL DAILY
Qty: 60 | Refills: 3 | Status: ACTIVE | COMMUNITY
Start: 2020-12-11 | End: 1900-01-01

## 2022-06-23 NOTE — PHYSICAL THERAPY INITIAL EVALUATION ADULT - COORDINATION ASSESSED, REHAB EVAL
Pt calling to office for a refill    Requested Prescriptions     Pending Prescriptions Disp Refills    oxyCODONE-acetaminophen (PERCOCET) 5-325 mg per tablet 20 Tablet 0     Sig: Take 1 Tablet by mouth every eight (8) hours as needed for Pain for up to 14 days. Max Daily Amount: 3 Tablets. WNL/finger to nose/heel to shin

## 2022-07-12 NOTE — PROGRESS NOTE ADULT - NUTRITIONAL ASSESSMENT
No Protocol     Medication Requested:   ipratropium 0.03 % Nasal Solution  Filled: 04/22/22 #30 mL w/ 1 refill   LOV: 05/18/23 w/ Dr. Amna Stanley   RTC: 05/18/23   FOV: not on file   Recent Labs: 04/06/22
This patient has been assessed with a concern for Malnutrition and has been determined to have a diagnosis/diagnoses of Morbid obesity/BMI > 40.    This patient is being managed with:   Diet Regular-  DASH/TLC {Sodium & Cholesterol Restricted} (DASH)  Entered: Oct 26 2020  1:57PM    

## 2022-09-23 NOTE — PATIENT PROFILE ADULT - NSTRANSFERBELONGINGSDISPO_GEN_A_NUR
with patient
Detail Level: Simple
Comment: Will use fabior for this as well
Render Risk Assessment In Note?: no

## 2022-11-09 ENCOUNTER — NON-APPOINTMENT (OUTPATIENT)
Age: 62
End: 2022-11-09

## 2022-11-25 ENCOUNTER — RX RENEWAL (OUTPATIENT)
Age: 62
End: 2022-11-25

## 2022-12-02 NOTE — PATIENT PROFILE ADULT - FUNCTIONAL SCREEN CURRENT LEVEL: BATHING, MLM
Medicare Annual Wellness Visit    Silvio Herrera is here for Medicare AWV (/)    Assessment & Plan    Recommendations for Preventive Services Due: see orders and patient instructions/AVS.  Recommended screening schedule for the next 5-10 years is provided to the patient in written form: see Patient Instructions/AVS.     No follow-ups on file. Subjective       Patient's complete Health Risk Assessment and screening values have been reviewed and are found in Flowsheets. The following problems were reviewed today and where indicated follow up appointments were made and/or referrals ordered.     Positive Risk Factor Screenings with Interventions:         Drug Use Screening: DAST-10 Score: 0  DAST-10 Score Interpretation:  1-2: Low level - Monitor, re-assess at a later date; 3-5: Moderate level - Further Investigation; 6-8: Substantial level - Intensive Assessment; 9-10: Severe level - Intensive Assessment  Substance Use - Drug Use Interventions:  patient is not ready to change his/her recreational drug use behavior at this time        Health Habits/Nutrition:  Physical Activity: Inactive    Days of Exercise per Week: 0 days    Minutes of Exercise per Session: 0 min     Have you lost any weight without trying in the past 3 months?: No     Have you seen the dentist within the past year?: Yes  Health Habits/Nutrition Interventions:  Inadequate physical activity:  patient is not ready to increase his/her physical activity level at this time     Safety:  Do you have working smoke detectors?: Yes  Do you have any tripping hazards - loose or unsecured carpets or rugs?: No  Do you have any tripping hazards - clutter in doorways, halls, or stairs?: No  Do you have either shower bars, grab bars, non-slip mats or non-slip surfaces in your shower or bathtub?: Yes  Do all of your stairways have a railing or banister?: Yes  Do you always fasten your seatbelt when you are in a car?: (!) No  Safety Interventions:  Patient declines any further evaluation/treatment for this issue           Objective      Patient-Reported Vitals  No data recorded         Allergies   Allergen Reactions    Benazepril Other (See Comments)     cough    Dynabac [Dirithromycin]     Lisinopril Other (See Comments)     Cough      Other      Generic Prozac     Prior to Visit Medications    Medication Sig Taking? Authorizing Provider   atorvastatin (LIPITOR) 20 MG tablet TAKE 1 TABLET DAILY Yes Marcelene Palms Black, DO   Cholecalciferol (D3-1000 PO) Take by mouth Yes Historical Provider, MD   levothyroxine (SYNTHROID) 25 MCG tablet Take 0.5 tablets by mouth Daily Yes Marcelene Palms Black, DO   atenolol (TENORMIN) 50 MG tablet TAKE 1 TABLET DAILY Yes Marcelene Palms Black, DO   losartan (COZAAR) 25 MG tablet TAKE 1 TABLET DAILY Yes Marcelene Palms Black, DO   furosemide (LASIX) 20 MG tablet TAKE 1 TABLET DAILY Yes Marcelene Palms Black, DO   buPROPion (WELLBUTRIN XL) 300 MG extended release tablet TAKE 1 TABLET EVERY MORNING  Patient not taking: Reported on 12/2/2022  Aretha Emerson DO       [facility] (Including outside providers/suppliers regularly involved in providing care):   Patient Care Team:  Aretha Emerson DO as PCP - General (Family Medicine)  Aretha Emerson DO as PCP - [facility] Empaneled Provider     Reviewed and updated this visit:  Tobacco  Allergies  Meds  Med Hx  Surg Hx  Soc Hx  Fam Hx          Iris Lawn, was evaluated through a synchronous (real-time) telephone encounter. The patient (or guardian if applicable) is aware that this is a billable service, which includes applicable co-pays. This Virtual Visit was conducted with patient's (and/or legal guardian's) consent. The visit was conducted pursuant to the emergency declaration under the [facility], [address] [place] [address] authority and the [email or web address] and [employer] General Act. Patient identification was verified, and a caregiver was present when appropriate.    The patient was located at Home: [address] [place] [address]. Provider was located at [facility] ([facility]): [address] [place] [address],  [id number]. This encounter was performed under myErika DOs, direct supervision, 12/2/2022. 0 = independent

## 2022-12-07 NOTE — PATIENT PROFILE ADULT - LOCATION #1
Blood culture obtained yesterday, preliminary test result; growth in aerobic bottle, gram positive cocci in clusters blood culture from 11/23 gram stain growth in aerobic and anerobic bottle, gram positive cocci in cluster Blood culture collected from 11/23 - preliminary result showed growth in anaerobic bottle gram positive cocci in clusters Critical value notification left ischium

## 2022-12-17 NOTE — H&P ADULT - BP NONINVASIVE SYSTOLIC (MM HG)
119 This was a shared visit with the ZOILA. I reviewed and verified the documentation and independently performed the documented:

## 2023-05-09 NOTE — DIETITIAN INITIAL EVALUATION ADULT. - EST PROTEIN NEEDS5
75.2 Camila Torres  (RN)  2021 06:06:05 Erythromycin Pregnancy And Lactation Text: This medication is Pregnancy Category B and is considered safe during pregnancy. It is also excreted in breast milk.

## 2023-08-04 RX ORDER — PREDNISONE 5 MG/1
5 TABLET ORAL
Qty: 50 | Refills: 2 | Status: ACTIVE | COMMUNITY
Start: 2021-10-15 | End: 1900-01-01

## 2023-08-10 NOTE — PHYSICAL THERAPY INITIAL EVALUATION ADULT - LEVEL OF INDEPENDENCE: GAIT, REHAB EVAL
supervision Quality 226: Preventive Care And Screening: Tobacco Use: Screening And Cessation Intervention: Patient screened for tobacco use and is an ex/non-smoker Quality 130: Documentation Of Current Medications In The Medical Record: Current Medications Documented Detail Level: Detailed Quality 431: Preventive Care And Screening: Unhealthy Alcohol Use - Screening: Patient not identified as an unhealthy alcohol user when screened for unhealthy alcohol use using a systematic screening method

## 2023-09-14 NOTE — PATIENT PROFILE ADULT - NSPROMEDSADMININFO_GEN_A_NUR
no concerns Tumor Depth: Less than 6mm from granular layer and no invasion beyond the subcutaneous fat

## 2023-09-21 NOTE — ED ADULT NURSE NOTE - NSIMPLEMENTINTERV_GEN_ALL_ED
97
Implemented All Fall with Harm Risk Interventions:  [place] to call system. Call bell, personal items and telephone within reach. Instruct patient to call for assistance. Room bathroom lighting operational. Non-slip footwear when patient is off stretcher. Physically safe environment: no spills, clutter or unnecessary equipment. Stretcher in lowest position, wheels locked, appropriate side rails in place. Provide visual cue, wrist band, yellow gown, etc. Monitor gait and stability. Monitor for mental status changes and reorient to person, place, and time. Review medications for side effects contributing to fall risk. Reinforce activity limits and safety measures with patient and family. Provide visual clues: red socks.

## 2023-12-15 ENCOUNTER — APPOINTMENT (OUTPATIENT)
Dept: GASTROENTEROLOGY | Facility: CLINIC | Age: 63
End: 2023-12-15
Payer: MEDICARE

## 2023-12-15 VITALS
SYSTOLIC BLOOD PRESSURE: 132 MMHG | OXYGEN SATURATION: 96 % | TEMPERATURE: 97.1 F | HEART RATE: 93 BPM | HEIGHT: 68 IN | DIASTOLIC BLOOD PRESSURE: 70 MMHG | WEIGHT: 315 LBS | BODY MASS INDEX: 47.74 KG/M2

## 2023-12-15 DIAGNOSIS — M10.9 GOUT, UNSPECIFIED: ICD-10-CM

## 2023-12-15 DIAGNOSIS — M51.26 OTHER INTERVERTEBRAL DISC DISPLACEMENT, LUMBAR REGION: ICD-10-CM

## 2023-12-15 DIAGNOSIS — Z94.0 KIDNEY TRANSPLANT STATUS: ICD-10-CM

## 2023-12-15 DIAGNOSIS — M1A.0610 IDIOPATHIC CHRONIC GOUT, RIGHT KNEE, W/OUT TOPHUS (TOPHI): ICD-10-CM

## 2023-12-15 DIAGNOSIS — Z94.4 LIVER TRANSPLANT STATUS: ICD-10-CM

## 2023-12-15 DIAGNOSIS — K74.60 UNSPECIFIED CIRRHOSIS OF LIVER: ICD-10-CM

## 2023-12-15 PROCEDURE — 99214 OFFICE O/P EST MOD 30 MIN: CPT

## 2023-12-15 RX ORDER — PREDNISONE 10 MG/1
TABLET ORAL
Refills: 0 | Status: ACTIVE | COMMUNITY

## 2023-12-15 RX ORDER — TACROLIMUS 1 MG/1
1 CAPSULE, GELATIN COATED ORAL
Refills: 0 | Status: ACTIVE | COMMUNITY

## 2023-12-15 RX ORDER — TACROLIMUS 0.5 MG/1
0.5 CAPSULE, GELATIN COATED ORAL
Refills: 0 | Status: ACTIVE | COMMUNITY

## 2023-12-15 NOTE — PHYSICAL EXAM
[Alert] : alert [No Acute Distress] : no acute distress [Obese (BMI >= 30)] : obese (BMI >= 30) [No Respiratory Distress] : no respiratory distress [Heart Rate And Rhythm] : heart rate was normal and rhythm regular [Auscultation Breath Sounds / Voice Sounds] : lungs were clear to auscultation bilaterally [Murmurs] : no murmurs [Bowel Sounds] : normal bowel sounds [Abdomen Soft] : soft

## 2023-12-15 NOTE — REVIEW OF SYSTEMS
[Fever] : no fever [Chills] : no chills [Feeling Tired] : not feeling tired [Chest Pain] : no chest pain [Palpitations] : no palpitations [SOB on Exertion] : no shortness of breath during exertion [Abdominal Pain] : no abdominal pain [Constipation] : no constipation [Diarrhea] : no diarrhea [Heartburn] : no heartburn [Bloating (gassiness)] : no bloating

## 2023-12-15 NOTE — ASSESSMENT
[FreeTextEntry1] : The patient is a 63-year-old male who has a history of obesity hyperlipidemia and is status post liver and kidney transplant.  The patient is doing well with favorable values during follow-up with the transplant center.  The patient unequivocally needs to lose weight to prevent damage to the transplanted liver.  Nutrition consult was recommended but the patient is hesitant at the present time.  He is aware of the long-term ramifications of his obesity.  Demario will get back to me with a progress report if there are any acute changes in his status.

## 2023-12-15 NOTE — HISTORY OF PRESENT ILLNESS
[FreeTextEntry1] : I saw patient Demario Trujillo in the office today.  The patient is a 63-year-old male who has a history of morbid obesity, hyperlipidemia and is status post liver and kidney transplant.  This was provoked by advanced fatty liver disease and cirrhosis.  The patient follows regularly with the transplant center and has his Prograf levels checked regularly.  The patient is on low-dose steroids.  Demario denies a history of any chest pain or shortness of breath.  The patient remains morbidly obese and does admit to eating large quantities of sugar.  He states his bowel movements are normal with no blood in the stool or on the toilet tissue.  The patient has several caffeinated beverages a day no ethanol and no tobacco.

## 2024-01-29 NOTE — ED PROVIDER NOTE - PR
Pt admitted. Started on diuresis. 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 There is severely reduced systolic function with a visually estimated ejection fraction of 15 - 20%. Ejection fraction by visual approximation is 20%. Global longitudinal strain is -4%. Severly reduced. There is diastolic dysfunction.     Pt diruesed over 4L with SOB improved. Cards ok'd to transition to po. Deemed stable to be d/c.      154

## 2024-02-28 NOTE — DISCHARGE NOTE NURSING/CASE MANAGEMENT/SOCIAL WORK - NSCORESITESY/N_GEN_A_CORE_RD
NAD, alert and oriented, appears well hydrated, sitting comfortably in wheelchair, daughter by side
No

## 2024-04-12 NOTE — PHYSICAL THERAPY INITIAL EVALUATION ADULT - PATIENT/FAMILY/SIGNIFICANT OTHER GOALS STATEMENT, PT EVAL
I want to go home
to get better

## 2025-02-05 NOTE — ED PROVIDER NOTE - CADM POA PRESS ULCER
Alecia Oliva  [address] [facility]    [place] [address]      2025        : 2008    To Whom It May Concern:    Alecia Oliva has been under my care and was seen on 2025 at [facility].    Sincerely,        Keith E Weiler, MD  [address] [place] [address] [address] [place] [id number]    No

## 2025-03-11 NOTE — PHYSICAL THERAPY INITIAL EVALUATION ADULT - PHYSICAL ASSIST/NONPHYSICAL ASSIST: STAND/SIT, REHAB EVAL
"I have reviewed the surgical (or preoperative) H&P that is linked to this encounter, and examined the patient. There are no significant changes    Clinical Conditions Present on Arrival:  Clinically Significant Risk Factors Present on Admission                  # Drug Induced Platelet Defect: home medication list includes an antiplatelet medication      # Obesity: Estimated body mass index is 39.62 kg/m  as calculated from the following:    Height as of this encounter: 1.765 m (5' 9.49\").    Weight as of this encounter: 123.4 kg (272 lb 1.6 oz).       "
verbal cues/1 person assist/nonverbal cues (demo/gestures)

## 2025-09-08 ENCOUNTER — NON-APPOINTMENT (OUTPATIENT)
Age: 65
End: 2025-09-08